# Patient Record
Sex: FEMALE | Race: WHITE | NOT HISPANIC OR LATINO | Employment: UNEMPLOYED | URBAN - METROPOLITAN AREA
[De-identification: names, ages, dates, MRNs, and addresses within clinical notes are randomized per-mention and may not be internally consistent; named-entity substitution may affect disease eponyms.]

---

## 2017-01-09 ENCOUNTER — OFFICE VISIT (OUTPATIENT)
Dept: PULMONOLOGY | Facility: CLINIC | Age: 82
End: 2017-01-09
Payer: MEDICARE

## 2017-01-09 ENCOUNTER — TELEPHONE (OUTPATIENT)
Dept: FAMILY MEDICINE | Facility: CLINIC | Age: 82
End: 2017-01-09

## 2017-01-09 VITALS
OXYGEN SATURATION: 90 % | WEIGHT: 148.38 LBS | DIASTOLIC BLOOD PRESSURE: 76 MMHG | HEIGHT: 65 IN | SYSTOLIC BLOOD PRESSURE: 118 MMHG | RESPIRATION RATE: 12 BRPM | HEART RATE: 66 BPM | BODY MASS INDEX: 24.72 KG/M2

## 2017-01-09 DIAGNOSIS — I35.0 SEVERE AORTIC STENOSIS BY PRIOR ECHOCARDIOGRAM: ICD-10-CM

## 2017-01-09 DIAGNOSIS — R05.3 CHRONIC COUGH: ICD-10-CM

## 2017-01-09 DIAGNOSIS — R91.8 MULTIPLE LUNG NODULES: Primary | ICD-10-CM
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
## 2017-01-09 DIAGNOSIS — Z79.01 LONG-TERM (CURRENT) USE OF ANTICOAGULANTS, INR GOAL 2.0-3.0: ICD-10-CM

## 2017-01-09 PROCEDURE — 99999 PR PBB SHADOW E&M-EST. PATIENT-LVL III: CPT | Mod: PBBFAC,,, | Performed by: INTERNAL MEDICINE

## 2017-01-09 PROCEDURE — 99213 OFFICE O/P EST LOW 20 MIN: CPT | Mod: PBBFAC | Performed by: INTERNAL MEDICINE

## 2017-01-09 PROCEDURE — 99214 OFFICE O/P EST MOD 30 MIN: CPT | Mod: S$PBB,,, | Performed by: INTERNAL MEDICINE

## 2017-01-09 NOTE — TELEPHONE ENCOUNTER
Can she go to outpatient PT or does she need HH to come and do PT (she must be home bound for HH to come out).

## 2017-01-09 NOTE — TELEPHONE ENCOUNTER
----- Message from Alanna Gautam sent at 1/9/2017  2:28 PM CST -----  Contact: self  Pt requesting an order for Home Health regarding knee pain.  Please call pt at .    Thanks

## 2017-01-09 NOTE — TELEPHONE ENCOUNTER
Spoke w/patient, requesting PT continuation. States when she fell and hospitalized, she was sent home with [facility] for physical therapy and discharged after 2 weeks due to her progress. States now she feels like she is having difficulty with her gait again. Please advise.

## 2017-01-10 NOTE — PATIENT INSTRUCTIONS
Agree with trial with Guaifenesin (syrup or Mucinex DM).  Call if resp symptoms worsen.  Return visit 12/2017 with repeat CXR.

## 2017-01-10 NOTE — PROGRESS NOTES
Subjective:       Patient ID: Adela Garay is a 89 y.o. female.    Chief Complaint: Pulmonary Nodules    HPI HISTORY OF PRESENT ILLNESS:  Mrs. Garay is an 89-year-old nonsmoker, who comes   for an interval assessment of pulmonary nodules.  She has had visits   here during the past several years to monitor bilateral pulmonary nodules.  Her   most recent previous visit was in January 2016.  She has a right lower lobe    nodule, which has shown very gradual enlargement during a period of many years.    Other nodules, present within her lungs, have appeared stable.  There is   one in the mid upper zone on the left, which is densely calcified.    Today, Mrs. Garay reports an intermittent cough.  This seems to occur in   association with meals or when she is talking.  She has had minimal sputum   associated with the cough.  She has never been aware of any wheezing.  She does   not recognize any ongoing sinus or nasal congestion.  She is not generally   bothered with heartburn.  She has not recognized any definite swallowing   problems.  She is not currently taking any prescribed medications which would   commonly be associated with cough as an adverse effect.  She has not taken   any prescribed medications for the cough, but plans to begin a trial with   guaifenesin-DM cough syrup.    DATA:  I have reviewed the images from a portable chest x-ray done in November.    This study once again shows bilateral pulmonary nodules.  The largest nodule in   the lower lung zone on the right does not appear significantly changed in   comparison to her chest x-ray from December 2015.  It should be noted again    that over a longer interval of time this nodule has shown very gradual enlargement.      CB/HN  dd: 01/09/2017 20:49:26 (CST)  td: 01/10/2017 10:23:27 (CST)  Doc ID   #[id number]  Job ID #[id number]    CC:       Review of Systems   Constitutional: Negative for fever and fatigue.   HENT: Negative for postnasal drip, sinus  pressure, voice change and congestion.    Respiratory: Positive for cough and dyspnea on extertion. Negative for sputum production, shortness of breath and wheezing.    Cardiovascular: Negative for chest pain and leg swelling.   Genitourinary: Negative for difficulty urinating.   Musculoskeletal: Positive for arthralgias. Negative for back pain.   Skin: Negative for rash.   Gastrointestinal: Negative for abdominal pain and acid reflux.   Neurological: Negative for dizziness and weakness.   Hematological: Negative for adenopathy.       Objective:      Physical Exam   Constitutional: She is oriented to person, place, and time. She appears well-developed and well-nourished.   HENT:   Head: Normocephalic.   Nose: Nose normal.   Mouth/Throat: No oropharyngeal exudate.   Neck: Normal range of motion. No JVD present. No tracheal deviation present. No thyromegaly present.   Cardiovascular: Normal rate and regular rhythm.    Murmur (3/6 harsh syst M at aortic area) heard.  Pulmonary/Chest: Symmetric chest wall expansion. No stridor. She has no wheezes. She has no rhonchi. She has rales (few rales posteriorly). She exhibits no tenderness.   Abdominal: Soft. There is no tenderness.   Musculoskeletal: She exhibits no edema.   Lymphadenopathy:     She has no cervical adenopathy.   Neurological: She is alert and oriented to person, place, and time.   Skin: Skin is warm and dry. No rash noted. No erythema. Nails show no clubbing.   Psychiatric: She has a normal mood and affect.   Vitals reviewed.    Personal Diagnostic Review    No flowsheet data found.      Assessment:       1. Multiple lung nodules    2. Severe aortic stenosis by prior echocardiogram    3. Chronic cough    4. Long-term (current) use of anticoagulants, INR goal 2.0-3.0        Outpatient Encounter Prescriptions as of 1/9/2017   Medication Sig Dispense Refill    acetaminophen (TYLENOL) 325 MG tablet Take 325 mg by mouth every 6 (six) hours as needed for Pain.       alpha lipoic acid 600 mg Cap Take 600 mg by mouth Daily. (Patient taking differently: Take 100 mg by mouth Daily. ) 100 each 12    aspirin 81 mg Tab Take 81 mg by mouth. 4 times weekly      blood sugar diagnostic (BLOOD GLUCOSE TEST) Strp 1 strip by Misc.(Non-Drug; Combo Route) route once daily. Currently using Nova max plus meter. 50 strip 11    cholecalciferol, vitamin D3, 1,000 unit capsule Take 1,000 Units by mouth once daily.      CHROMIUM PICOLINATE ORAL Take 200 mg by mouth once daily.      CINNAMON BARK (CINNAMON ORAL) Take 1,000 mg by mouth 2 (two) times daily.      digoxin (LANOXIN) 250 mcg tablet TAKE 1 TABLET ONE TIME DAILY AND TAKE AN ADDITIONAL 1/2 TABLET ONCE EACH WEEK 97 tablet 1    docusate sodium (COLACE) 100 MG capsule Take 100 mg by mouth. 1 Capsule Oral Every day      doxepin (SINEQUAN) 25 MG capsule TAKE 1 CAPSULE EVERY EVENING AT BEDTIME 90 capsule 1    folic acid (FOLVITE) 1 MG tablet Take 1 tablet (1 mg total) by mouth once daily. 90 tablet 3    metoprolol tartrate (LOPRESSOR) 50 MG tablet TAKE 1 TABLET TWICE DAILY 180 tablet 1    mupirocin (BACTROBAN) 2 % ointment Apply topically as needed.      ondansetron (ZOFRAN-ODT) 4 MG TbDL Take 1-2 tablets (4-8 mg total) by mouth every 8 (eight) hours as needed. 40 tablet 0    simvastatin (ZOCOR) 20 MG tablet TAKE 1 TABLET EVERY EVENING 90 tablet 1    triamcinolone acetonide 0.025% (KENALOG) 0.025 % cream Use bid as needed. 80 g 1    triamterene-hydrochlorothiazide 37.5-25 mg (DYAZIDE) 37.5-25 mg per capsule TAKE 1 CAPSULE ONE TIME DAILY 90 capsule 1    warfarin (COUMADIN) 2 MG tablet TAKE 2 TABLETS EVERY DAY EXCEPT TAKE 1 TABLET ON SUNDAY 169 tablet 3     No facility-administered encounter medications on file as of 1/9/2017.      Orders Placed This Encounter   Procedures    X-Ray Chest PA And Lateral     Standing Status:   Future     Standing Expiration Date:   1/9/2018     Plan:     Agree with trial with Guaifenesin (syrup or  Mucinex DM).  Call if resp symptoms worsen.  Return visit 12/2017 with repeat CXR.

## 2017-01-10 NOTE — TELEPHONE ENCOUNTER
Spoke w/patient, states she is on the verge on being home bound. States the only time she goes out is for doctor appt. States if she can just have the service for 1 or 2 weeks, she believes she can get back to where she need to be with strength.

## 2017-01-12 ENCOUNTER — ANTI-COAG VISIT (OUTPATIENT)
Dept: CARDIOLOGY | Facility: CLINIC | Age: 82
End: 2017-01-12

## 2017-01-12 DIAGNOSIS — Z79.01 LONG-TERM (CURRENT) USE OF ANTICOAGULANTS, INR GOAL 2.0-3.0: ICD-10-CM

## 2017-01-12 DIAGNOSIS — Z86.73 HX-TIA (TRANSIENT ISCHEMIC ATTACK): ICD-10-CM

## 2017-01-12 LAB — INR PPP: 2.1

## 2017-01-13 ENCOUNTER — TELEPHONE (OUTPATIENT)
Dept: FAMILY MEDICINE | Facility: CLINIC | Age: 82
End: 2017-01-13

## 2017-01-13 NOTE — TELEPHONE ENCOUNTER
----- Message from Samantha Rivera sent at 1/13/2017  8:36 AM CST -----  Patient was admitted to Home health on the 11th for skilled nursing and therapy. Thank you!

## 2017-01-17 ENCOUNTER — HOSPITAL ENCOUNTER (OUTPATIENT)
Dept: RADIOLOGY | Facility: HOSPITAL | Age: 82
Discharge: HOME | End: 2017-01-17
Attending: ORTHOPAEDIC SURGERY
Payer: MEDICARE

## 2017-01-17 DIAGNOSIS — R60.9 EDEMA: ICD-10-CM

## 2017-01-17 PROCEDURE — 93971 EXTREMITY STUDY: CPT | Mod: TC

## 2017-01-17 PROCEDURE — 93971 EXTREMITY STUDY: CPT | Mod: 26,,, | Performed by: RADIOLOGY

## 2017-02-01 ENCOUNTER — OFFICE VISIT (OUTPATIENT)
Dept: FAMILY MEDICINE | Facility: CLINIC | Age: 82
End: 2017-02-01
Payer: MEDICARE

## 2017-02-01 VITALS
BODY MASS INDEX: 24.61 KG/M2 | DIASTOLIC BLOOD PRESSURE: 74 MMHG | TEMPERATURE: 98 F | HEIGHT: 65 IN | SYSTOLIC BLOOD PRESSURE: 112 MMHG | OXYGEN SATURATION: 97 % | HEART RATE: 56 BPM | WEIGHT: 147.69 LBS

## 2017-02-01 DIAGNOSIS — T14.8XXA BRUISING: Primary | ICD-10-CM

## 2017-02-01 DIAGNOSIS — I50.22 CHRONIC SYSTOLIC HEART FAILURE: ICD-10-CM

## 2017-02-01 DIAGNOSIS — R80.9 CONTROLLED TYPE 2 DIABETES MELLITUS WITH MICROALBUMINURIA, WITHOUT LONG-TERM CURRENT USE OF INSULIN: ICD-10-CM

## 2017-02-01 DIAGNOSIS — I77.9 CAROTID ARTERY DISEASE, UNSPECIFIED LATERALITY: ICD-10-CM

## 2017-02-01 DIAGNOSIS — E11.29 CONTROLLED TYPE 2 DIABETES MELLITUS WITH MICROALBUMINURIA, WITHOUT LONG-TERM CURRENT USE OF INSULIN: ICD-10-CM

## 2017-02-01 DIAGNOSIS — I11.0 BENIGN HYPERTENSIVE HEART DISEASE WITH CONGESTIVE HEART FAILURE: ICD-10-CM

## 2017-02-01 DIAGNOSIS — R91.8 MULTIPLE LUNG NODULES: ICD-10-CM

## 2017-02-01 DIAGNOSIS — Z79.01 LONG-TERM (CURRENT) USE OF ANTICOAGULANTS, INR GOAL 2.0-3.0: ICD-10-CM

## 2017-02-01 DIAGNOSIS — I27.20 PULMONARY HYPERTENSION: ICD-10-CM

## 2017-02-01 DIAGNOSIS — E78.5 HYPERLIPIDEMIA, UNSPECIFIED HYPERLIPIDEMIA TYPE: ICD-10-CM

## 2017-02-01 DIAGNOSIS — Z86.73 HX-TIA (TRANSIENT ISCHEMIC ATTACK): ICD-10-CM

## 2017-02-01 DIAGNOSIS — I48.0 PAROXYSMAL ATRIAL FIBRILLATION: ICD-10-CM

## 2017-02-01 DIAGNOSIS — I70.0 ATHEROSCLEROSIS OF AORTA: ICD-10-CM

## 2017-02-01 PROCEDURE — 99214 OFFICE O/P EST MOD 30 MIN: CPT | Mod: S$PBB,,, | Performed by: NURSE PRACTITIONER

## 2017-02-01 PROCEDURE — 99999 PR PBB SHADOW E&M-EST. PATIENT-LVL IV: CPT | Mod: PBBFAC,,, | Performed by: NURSE PRACTITIONER

## 2017-02-01 PROCEDURE — 99214 OFFICE O/P EST MOD 30 MIN: CPT | Mod: PBBFAC,PO | Performed by: NURSE PRACTITIONER

## 2017-02-01 NOTE — PROGRESS NOTES
Plan:         Bruising  - Cancel: Protime-INR; Future; Expected date: 2/1/17  - Coumadin clinic will have home health check her PT and INR tomorrow at her house

## 2017-02-01 NOTE — MR AVS SNAPSHOT
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         Carotid artery disease, unspecified laterality         Controlled type 2 diabetes mellitus with microalbuminuria, without long-term current use of insulin         Chronic systolic heart failure         Hx-TIA (transient ischemic attack)         Hyperlipidemia, unspecified hyperlipidemia type         Long-term (current) use of anticoagulants, INR goal 2.0-3.0         Multiple lung nodules         Pulmonary hypertension                To Do List           Future Appointments        Provider Department Dept Phone    3/6/2017 9:00 AM Torrie Farrell MD [facility] Medicine [phone]    3/6/2017 11:00 AM [facility] [place] [phone]    3/9/2017 10:45 AM Theo Ivy MD [facility] [place] [facility] [place] [phone]    2017 11:00 AM Rory Penaloza Jr. [facility] [place] - Podiatry [phone]      Goals (5 Years of Data)              9/9/16    4/8/16    10/14/15    HEMOGLOBIN A1C < 7.0   6.2  5.8  5.7    Related Problems    Controlled type 2 diabetes mellitus with microalbuminuria    COMPLETED: Patient/caregiver will have an action plan in place to manage and prevent complications of risk for falls and wound care prior to discharge from [facility].           Notes - Note edited  2016  3:53 PM by Elyse Medina RN    Overall Time to Completion  4 weeks from 2016    Short Term Goals  Patient/caregiver will verbalize  importance of having a safe home environment by keeping room free of clutter, making sure rooms are well lit and removing throw rugs within 3 weeks.  Interventions   · Recognize and provide educational material (ALEX).  · Facilitate to needed DME.   Status  · Partially met               [facility]     [facility] Care [facility] - 24/7 Assistance  Registered nurses in the [facility] provide clinical advisement, health education, appointment booking, and other advisory services.  Call for this free service at [phone].             Medications           Message regarding Medications     Verify the changes and/or additions to your medication regime listed below are the same as discussed with your clinician today.  If any of these changes or additions are incorrect, please notify your healthcare provider.        STOP taking these medications     ondansetron (ZOFRAN-ODT) 4 MG TbDL Take 1-2 tablets (4-8 mg total) by mouth every 8 (eight) hours as needed.           Verify that the below list of medications is an accurate representation of the medications you are currently taking.  If none reported, the list may be blank. If incorrect, please contact your healthcare provider. Carry this list with you in case of emergency.           Current Medications     acetaminophen (TYLENOL) 325 MG tablet Take 325 mg by mouth every 6 (six) hours as needed for Pain.    alpha lipoic acid 600 mg Cap Take 600 mg by mouth Daily.    aspirin 81 mg Tab Take 81 mg by mouth. 4 times weekly    blood sugar diagnostic (BLOOD GLUCOSE TEST) Strp 1 strip by Misc.(Non-Drug; Combo Route) route once daily. Currently using Nova max plus meter.    cholecalciferol, vitamin D3, 1,000 unit capsule Take 1,000 Units by mouth once daily.    CHROMIUM PICOLINATE ORAL Take 200 mg by mouth once daily.    CINNAMON BARK (CINNAMON ORAL) Take 1,000 mg by mouth 2 (two) times daily.    digoxin (LANOXIN) 250 mcg tablet TAKE 1 TABLET ONE TIME  "DAILY AND TAKE AN ADDITIONAL 1/2 TABLET ONCE EACH WEEK    docusate sodium (COLACE) 100 MG capsule Take 100 mg by mouth. 1 Capsule Oral Every day    doxepin (SINEQUAN) 25 MG capsule TAKE 1 CAPSULE EVERY EVENING AT BEDTIME    metoprolol tartrate (LOPRESSOR) 50 MG tablet TAKE 1 TABLET TWICE DAILY    mupirocin (BACTROBAN) 2 % ointment Apply topically as needed.    simvastatin (ZOCOR) 20 MG tablet TAKE 1 TABLET EVERY EVENING    triamcinolone acetonide 0.025% (KENALOG) 0.025 % cream Use bid as needed.    triamterene-hydrochlorothiazide 37.5-25 mg (DYAZIDE) 37.5-25 mg per capsule TAKE 1 CAPSULE ONE TIME DAILY    warfarin (COUMADIN) 2 MG tablet TAKE 2 TABLETS EVERY DAY EXCEPT TAKE 1 TABLET ON SUNDAY    folic acid (FOLVITE) 1 MG tablet Take 1 tablet (1 mg total) by mouth once daily.           Clinical Reference Information           Vital Signs - Last Recorded  Most recent update: 2/1/2017 10:01 AM by Benita Zhu MA    BP Pulse Temp Ht Wt SpO2    112/74 (BP Location: Left arm, Patient Position: Sitting, BP Method: Manual) (!) 56 97.5 °F (36.4 °C) (Oral) 5' 5" (1.651 m) 67 kg (147 lb 11.3 oz) 97%    BMI                24.58 kg/m2          Blood Pressure          Most Recent Value    BP  112/74      Allergies as of 2/1/2017     Levaquin [Levofloxacin]    Doxycycline Hyclate    Iodine And Iodide Containing Products    Neurontin  [Gabapentin]    Norpace  [Disopyramide]    Phenytoin Sodium Extended    Sulfamethoxazole-trimethoprim      Immunizations Administered on Date of Encounter - 2/1/2017     None      "

## 2017-02-01 NOTE — PROGRESS NOTES
Subjective:       Patient ID: Adela Garay is a 89 y.o. female.    Chief Complaint: Bleeding/Bruising (on forehead)    HPI Comments: 89-year-old female presents to the clinic today with complaint of bruising to her right forehead that she noticed 2 days ago.  She denies any recent trauma.  She had a fall on November 13 which she had a large amount of bruising noted to her face and a  traumatic hematoma to her right knee which required I&D.  She states she's not sure why she is having bruising on her forehead now.  She is concerned about her PT and INR.  She would like her level checked today.  I spoke with the Coumadin clinic and will have home health draw PT and INR are at her house tomorrow.  She is currently still in physical therapy and is receiving home health.  Presently she states she feels fine.  She denies any headaches, dizziness, or blurred vision.  She denies any cardiac chest pain, heart palpitations, shortness of breath or swelling to lower extremities.  She states her blood sugars have been running anywhere from 110-123.     Past Medical History   Diagnosis Date    Anticoagulated on Coumadin     Atrial fibrillation     Atrial fibrillation     Chronic rhinosinusitis     Coronary artery disease     Granulomatous lung disease     Hyperlipidemia     Hypertension     Hypertensive heart disease without CHF (congestive heart failure)     Mitral valve prolapse     PN (peripheral neuropathy)      hereditary?(sister has it also)/idiopathic?/patient thinks from Zocor    Severe aortic stenosis by prior echocardiogram     TIA (transient ischemic attack)     Type II or unspecified type diabetes mellitus without mention of complication, not stated as uncontrolled      Past Surgical History   Procedure Laterality Date    Sinus surgery      Tonsillectomy        reports that she has never smoked. She has never used smokeless tobacco. She reports that she drinks alcohol. She reports that she does  not use illicit drugs.  Review of Systems   Respiratory: Negative for cough, shortness of breath and wheezing.    Cardiovascular: Negative for chest pain, palpitations and leg swelling.   Gastrointestinal: Negative for abdominal pain, diarrhea, nausea and vomiting.   Musculoskeletal: Positive for gait problem.        Walks with a cane    Skin:        Bruising right side of forehead    Neurological: Negative for dizziness, light-headedness and headaches.       Objective:      Physical Exam   Constitutional: She is oriented to person, place, and time. She appears well-developed and well-nourished. No distress.   Eyes: Conjunctivae and EOM are normal. Pupils are equal, round, and reactive to light. Right eye exhibits no discharge. Left eye exhibits no discharge. No scleral icterus.   Neck: Normal range of motion. Neck supple. No JVD present.   Cardiovascular: Normal rate, regular rhythm and normal heart sounds.  Exam reveals no gallop and no friction rub.    No murmur heard.  Pulmonary/Chest: Effort normal and breath sounds normal. No respiratory distress. She has no wheezes. She has no rales.   Abdominal: Soft. Bowel sounds are normal. There is no tenderness.   Musculoskeletal: Normal range of motion.   Neurological: She is alert and oriented to person, place, and time.   Skin: Skin is warm and dry. She is not diaphoretic.   Small bruise noted to right side of forehead    Psychiatric: She has a normal mood and affect.       Assessment:       1. Bruising    2. Atherosclerosis of aorta    3. Paroxysmal atrial fibrillation    4. Benign hypertensive heart disease with congestive heart failure    5. Carotid artery disease, unspecified laterality    6. Controlled type 2 diabetes mellitus with microalbuminuria, without long-term current use of insulin    7. Chronic systolic heart failure    8. Hx-TIA (transient ischemic attack)    9. Hyperlipidemia, unspecified hyperlipidemia type    10. Long-term (current) use of  anticoagulants, INR goal 2.0-3.0    11. Multiple lung nodules    12. Pulmonary hypertension        Plan:         Bruising  -     Cancel: Protime-INR; Future; Expected date: 2/1/17  - Coumadin clinic will have home health check her PT and INR tomorrow  at her house     Atherosclerosis of aorta  - Stable / Asymptomatic is on blood pressure and cholesterol lowering medications      Paroxysmal atrial fibrillation  - The current medical regimen is effective;  continue present plan and medications.    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    Carotid artery disease, unspecified laterality  - stable     Controlled type 2 diabetes mellitus with microalbuminuria, without long-term current use of insulin  - diet controlled    Chronic systolic heart failure  - The current medical regimen is effective;  continue present plan and medications.  - followed by Dr. Ivy    Hx- TIA ( transient ischemic attack)  - stable    Hyperlipidemia, unspcified hyperlipidemia type  - The current medical regimen is effective;  continue present plan and medications.    Long-term (current) use of anticoagulants, INR goal 2.0-3.0  - on Coumadin followed by the Coumadin clinic     Multiple lung nodules  - followed by Dr. Saldivar    Pulmonary hypertension  - The current medical regimen is effective;  continue present plan and medications.  - followed by Dr. Ivy

## 2017-02-02 ENCOUNTER — ANTI-COAG VISIT (OUTPATIENT)
Dept: CARDIOLOGY | Facility: CLINIC | Age: 82
End: 2017-02-02

## 2017-02-02 DIAGNOSIS — Z79.01 LONG-TERM (CURRENT) USE OF ANTICOAGULANTS, INR GOAL 2.0-3.0: ICD-10-CM

## 2017-02-02 DIAGNOSIS — Z86.73 HX-TIA (TRANSIENT ISCHEMIC ATTACK): ICD-10-CM

## 2017-02-02 DIAGNOSIS — I48.0 PAROXYSMAL ATRIAL FIBRILLATION: ICD-10-CM

## 2017-02-02 LAB — INR PPP: 2.1

## 2017-02-03 ENCOUNTER — TELEPHONE (OUTPATIENT)
Dept: FAMILY MEDICINE | Facility: CLINIC | Age: 82
End: 2017-02-03

## 2017-02-21 ENCOUNTER — TELEPHONE (OUTPATIENT)
Dept: FAMILY MEDICINE | Facility: CLINIC | Age: 82
End: 2017-02-21

## 2017-02-21 NOTE — TELEPHONE ENCOUNTER
I called and spoke with pt in regards to her prior auth. She asked me if we were working on it and I told her yes we were just waiting to hear back from [employer] and I will call her when we have a answer,Thanks

## 2017-02-21 NOTE — TELEPHONE ENCOUNTER
----- Message from Samantha Rivera sent at 2/20/2017 12:50 PM CST -----  Patient would like a call regarding her insurance needing a  fax. Please call at [phone] Thank you!

## 2017-02-22 NOTE — TELEPHONE ENCOUNTER
dmitri needs a letter stating she needs digoxin and that she has been on this medication and  patient will not do well on any other medication that this medication is working for her.  The claim number needs to be on the letter and needs to be faxed to the number below.        Claim #[id number]   Fax  [phone]

## 2017-02-24 ENCOUNTER — TELEPHONE (OUTPATIENT)
Dept: CARDIOLOGY | Facility: CLINIC | Age: 82
End: 2017-02-24

## 2017-02-24 ENCOUNTER — OFFICE VISIT (OUTPATIENT)
Dept: FAMILY MEDICINE | Facility: CLINIC | Age: 82
End: 2017-02-24
Payer: MEDICARE

## 2017-02-24 VITALS
DIASTOLIC BLOOD PRESSURE: 78 MMHG | SYSTOLIC BLOOD PRESSURE: 126 MMHG | HEART RATE: 58 BPM | OXYGEN SATURATION: 96 % | TEMPERATURE: 98 F | BODY MASS INDEX: 23.2 KG/M2 | WEIGHT: 144.38 LBS | HEIGHT: 66 IN

## 2017-02-24 DIAGNOSIS — E11.29 CONTROLLED TYPE 2 DIABETES MELLITUS WITH MICROALBUMINURIA, WITHOUT LONG-TERM CURRENT USE OF INSULIN: ICD-10-CM

## 2017-02-24 DIAGNOSIS — R80.9 CONTROLLED TYPE 2 DIABETES MELLITUS WITH MICROALBUMINURIA, WITHOUT LONG-TERM CURRENT USE OF INSULIN: ICD-10-CM

## 2017-02-24 DIAGNOSIS — E78.5 HYPERLIPIDEMIA, UNSPECIFIED HYPERLIPIDEMIA TYPE: ICD-10-CM

## 2017-02-24 DIAGNOSIS — J84.10 POSTINFLAMMATORY PULMONARY FIBROSIS: ICD-10-CM

## 2017-02-24 DIAGNOSIS — I35.0 SEVERE AORTIC STENOSIS BY PRIOR ECHOCARDIOGRAM: ICD-10-CM

## 2017-02-24 DIAGNOSIS — I11.0 BENIGN HYPERTENSIVE HEART DISEASE WITH CONGESTIVE HEART FAILURE: ICD-10-CM

## 2017-02-24 DIAGNOSIS — F32.0 MILD MAJOR DEPRESSION: ICD-10-CM

## 2017-02-24 DIAGNOSIS — I70.0 ATHEROSCLEROSIS OF AORTA: ICD-10-CM

## 2017-02-24 DIAGNOSIS — I48.0 PAROXYSMAL ATRIAL FIBRILLATION: ICD-10-CM

## 2017-02-24 DIAGNOSIS — Z00.00 ROUTINE MEDICAL EXAM: Primary | ICD-10-CM

## 2017-02-24 DIAGNOSIS — I27.20 PULMONARY HYPERTENSION: ICD-10-CM

## 2017-02-24 PROCEDURE — 99213 OFFICE O/P EST LOW 20 MIN: CPT | Mod: PBBFAC,PO | Performed by: INTERNAL MEDICINE

## 2017-02-24 PROCEDURE — 99397 PER PM REEVAL EST PAT 65+ YR: CPT | Mod: S$PBB,,, | Performed by: INTERNAL MEDICINE

## 2017-02-24 PROCEDURE — 99999 PR PBB SHADOW E&M-EST. PATIENT-LVL III: CPT | Mod: PBBFAC,,, | Performed by: INTERNAL MEDICINE

## 2017-02-24 NOTE — Clinical Note
Dr. Ivy, I am having a very hard time getting the insurance to approve this patient's digoxin. Do you think she should still be on this medication at her age? I believe she has a follow up appt with you in the near future. Torrie

## 2017-02-24 NOTE — MR AVS SNAPSHOT
[facility]  [address]  [place] [address]  Phone: [phone]  Fax: [phone]                  Adela Garay   2017 9:40 AM   Office Visit    Description:  Female : 1927   Provider:  Torrie Farrell MD   Department:  [facility] [facility]           Reason for Visit     Hyperlipidemia     Follow-up           Diagnoses this Visit        Comments    Routine medical exam    -  Primary     Controlled type 2 diabetes mellitus with microalbuminuria, without long-term current use of insulin         Benign hypertensive heart disease with congestive heart failure         Hyperlipidemia, unspecified hyperlipidemia type         Severe aortic stenosis by prior echocardiogram         Pulmonary hypertension         Paroxysmal atrial fibrillation         Mild major depression         Atherosclerosis of aorta         Postinflammatory pulmonary fibrosis                To Do List           Future Appointments        Provider Department Dept Phone    3/6/2017 9:00 AM Torrie Farrell MD [facility] [phone]    3/6/2017 11:00 AM [facility] [phone]    3/9/2017 10:45 AM Theo Ivy [place] [facility] [phone]    2017 11:00 AM Rory Penaloza Jr., DPM [place] - Podiatry [phone]      Goals (5 Years of Data)              9/9/16    4/8/16    10/14/15    HEMOGLOBIN A1C < 7.0   6.2  5.8  5.7    Related Problems    Controlled type 2 diabetes mellitus with microalbuminuria    COMPLETED: Patient/caregiver will have an action plan in place to manage and prevent complications of risk for falls and wound care prior to discharge from [facility].           Notes - Note edited  2016  3:53 PM by Elyse Medina RN    Overall Time to Completion  4 weeks from 2016    Short Term Goals  Patient/caregiver will verbalize importance of having a safe home environment by keeping room free of clutter, making sure  rooms are well lit and removing throw rugs within 3 weeks.  Interventions   · Recognize and provide educational material (ALEX).  · Facilitate to needed DME.   Status  · Partially met               Follow-Up and Disposition     Return in about 6 months (around 8/24/2017), or if symptoms worsen or fail to improve, for follow up chronic medical conditions..      [facility] On Call     [facility] On Call Nurse Care Line - 24/7 Assistance  Registered nurses in the [facility] On Call Center provide clinical advisement, health education, appointment booking, and other advisory services.  Call for this free service at [phone].             Medications           Message regarding Medications     Verify the changes and/or additions to your medication regime listed below are the same as discussed with your clinician today.  If any of these changes or additions are incorrect, please notify your healthcare provider.             Verify that the below list of medications is an accurate representation of the medications you are currently taking.  If none reported, the list may be blank. If incorrect, please contact your healthcare provider. Carry this list with you in case of emergency.           Current Medications     acetaminophen (TYLENOL) 325 MG tablet Take 325 mg by mouth every 6 (six) hours as needed for Pain.    alpha lipoic acid 600 mg Cap Take 600 mg by mouth Daily.    aspirin 81 mg Tab Take 81 mg by mouth. 4 times weekly    blood sugar diagnostic (BLOOD GLUCOSE TEST) Strp 1 strip by Misc.(Non-Drug; Combo Route) route once daily. Currently using Nova max plus meter.    cholecalciferol, vitamin D3, 1,000 unit capsule Take 1,000 Units by mouth once daily.    CHROMIUM PICOLINATE ORAL Take 200 mg by mouth once daily.    CINNAMON BARK (CINNAMON ORAL) Take 1,000 mg by mouth 2 (two) times daily.    digoxin (LANOXIN) 250 mcg tablet TAKE 1 TABLET ONE TIME DAILY AND TAKE AN ADDITIONAL 1/2 TABLET ONCE EACH WEEK    docusate sodium  (COLACE) 100 MG capsule Take 100 mg by mouth. 1 Capsule Oral Every day    doxepin (SINEQUAN) 25 MG capsule TAKE 1 CAPSULE EVERY EVENING AT BEDTIME    metoprolol tartrate (LOPRESSOR) 50 MG tablet TAKE 1 TABLET TWICE DAILY    mupirocin (BACTROBAN) 2 % ointment Apply topically as needed.    simvastatin (ZOCOR) 20 MG tablet TAKE 1 TABLET EVERY EVENING    triamcinolone acetonide 0.025% (KENALOG) 0.025 % cream Use bid as needed.    triamterene-hydrochlorothiazide 37.5-25 mg (DYAZIDE) 37.5-25 mg per capsule TAKE 1 CAPSULE ONE TIME DAILY    warfarin (COUMADIN) 2 MG tablet TAKE 2 TABLETS EVERY DAY EXCEPT TAKE 1 TABLET ON SUNDAY    folic acid (FOLVITE) 1 MG tablet Take 1 tablet (1 mg total) by mouth once daily.           Clinical Reference Information           Your Vitals Were     BP                   126/78           Blood Pressure          Most Recent Value    BP  126/78      Allergies as of 2/24/2017     Levaquin [Levofloxacin]    Doxycycline Hyclate    Iodine And Iodide Containing Products    Neurontin  [Gabapentin]    Norpace  [Disopyramide]    Phenytoin Sodium Extended    Sulfamethoxazole-trimethoprim      Immunizations Administered on Date of Encounter - 2/24/2017     None      Language Assistance Services     ATTENTION: Language assistance services are available, free of charge. Please call [phone].      ATENCIÓN: Si gaudenciola zaynab, tiene a garcia disposición servicios gratuitos de asistencia lingüística. Llame al [phone].     [facility] Ý: N?u b?n nói Ti?ng Vi?t, có các d?ch v? h? tr? ngôn ng? mi?n phí dành cho b?n. G?i s? [phone].         [facility] complies with applicable Federal civil rights laws and does not discriminate on the basis of race, color, national origin, age, disability, or sex.

## 2017-02-24 NOTE — PROGRESS NOTES
HISTORY OF PRESENT ILLNESS:  Adela Garay is a 89 y.o. female who presents to the clinic today for a routine medical physical exam. Her last physical exam was more than a year ago.    Contraception: n/a      She has completed home health physical therapy.  She has 2 more home nurse visits.  She is applying to be able to do home PT/INR monitoring.      PAST MEDICAL HISTORY:  Past Medical History:   Diagnosis Date    Anticoagulated on Coumadin     Atrial fibrillation     Atrial fibrillation     Chronic rhinosinusitis     Coronary artery disease     Granulomatous lung disease     Hyperlipidemia     Hypertension     Hypertensive heart disease without CHF (congestive heart failure)     Mitral valve prolapse     PN (peripheral neuropathy)     hereditary?(sister has it also)/idiopathic?/patient thinks from Zocor    Severe aortic stenosis by prior echocardiogram     TIA (transient ischemic attack)     Type II or unspecified type diabetes mellitus without mention of complication, not stated as uncontrolled        PAST SURGICAL HISTORY:  Past Surgical History:   Procedure Laterality Date    SINUS SURGERY      tonsillectomy         SOCIAL HISTORY:  Social History     Social History    Marital status:      Spouse name: N/A    Number of children: 6    Years of education: 2 college     Occupational History    retired housewife      Social History Main Topics    Smoking status: Never Smoker    Smokeless tobacco: Never Used    Alcohol use 0.0 oz/week     0 Standard drinks or equivalent per week      Comment: rare    Drug use: No    Sexual activity: No     Other Topics Concern    Not on file     Social History Narrative       FAMILY HISTORY:  Family History   Problem Relation Age of Onset    Heart disease Father      49    Heart disease Mother     Thyroid disease Sister     Atrial fibrillation Sister     Atrial fibrillation Sister      neice    Lymphoma Sister 29    Atrial fibrillation  Sister     Asthma Neg Hx     Emphysema Neg Hx        ALLERGIES AND MEDICATIONS: updated and reviewed.  Review of patient's allergies indicates:   Allergen Reactions    Levaquin [levofloxacin]     Doxycycline hyclate Other (See Comments)     Unknown to patient    Iodine and iodide containing products     Neurontin  [gabapentin]      Other reaction(s): Unknown    Norpace  [disopyramide]      Other reaction(s): Hives    Phenytoin sodium extended      Other reaction(s): Muscle pain    Sulfamethoxazole-trimethoprim      Other reaction(s): Muscle cramps     Medication List with Changes/Refills   Current Medications    ACETAMINOPHEN (TYLENOL) 325 MG TABLET    Take 325 mg by mouth every 6 (six) hours as needed for Pain.    ALPHA LIPOIC ACID 600 MG CAP    Take 600 mg by mouth Daily.    ASPIRIN 81 MG TAB    Take 81 mg by mouth. 4 times weekly    BLOOD SUGAR DIAGNOSTIC (BLOOD GLUCOSE TEST) STRP    1 strip by Misc.(Non-Drug; Combo Route) route once daily. Currently using Nova max plus meter.    CHOLECALCIFEROL, VITAMIN D3, 1,000 UNIT CAPSULE    Take 1,000 Units by mouth once daily.    CHROMIUM PICOLINATE ORAL    Take 200 mg by mouth once daily.    CINNAMON BARK (CINNAMON ORAL)    Take 1,000 mg by mouth 2 (two) times daily.    DIGOXIN (LANOXIN) 250 MCG TABLET    TAKE 1 TABLET ONE TIME DAILY AND TAKE AN ADDITIONAL 1/2 TABLET ONCE EACH WEEK    DOCUSATE SODIUM (COLACE) 100 MG CAPSULE    Take 100 mg by mouth. 1 Capsule Oral Every day    FOLIC ACID (FOLVITE) 1 MG TABLET    Take 1 tablet (1 mg total) by mouth once daily.    METOPROLOL TARTRATE (LOPRESSOR) 50 MG TABLET    TAKE 1 TABLET TWICE DAILY    MUPIROCIN (BACTROBAN) 2 % OINTMENT    Apply topically as needed.    SIMVASTATIN (ZOCOR) 20 MG TABLET    TAKE 1 TABLET EVERY EVENING    TRIAMCINOLONE ACETONIDE 0.025% (KENALOG) 0.025 % CREAM    Use bid as needed.    TRIAMTERENE-HYDROCHLOROTHIAZIDE 37.5-25 MG (DYAZIDE) 37.5-25 MG PER CAPSULE    TAKE 1 CAPSULE ONE TIME DAILY     "WARFARIN (COUMADIN) 2 MG TABLET    TAKE 2 TABLETS EVERY DAY EXCEPT TAKE 1 TABLET ON SUNDAY   Discontinued Medications    DOXEPIN (SINEQUAN) 25 MG CAPSULE    TAKE 1 CAPSULE EVERY EVENING AT BEDTIME             SCREENING HISTORY:  Health Maintenance       Date Due Completion Date    Hemoglobin A1c 3/9/2017 9/9/2016    Lipid Panel 9/9/2017 9/9/2016    Urine Microalbumin 9/12/2017 9/12/2016    Foot Exam 10/19/2017 10/19/2016 (Done)    Override on 10/19/2016: Done    Override on 8/10/2016: Done (patient has idiopathic neuropathy)    Override on 3/23/2015: Done (Mr. Rebolledo)    Eye Exam 11/9/2017 11/9/2016 (Done)    Override on 11/9/2016: Done (no retinopathy - [facility] (Dr. Hylton))    Override on 10/21/2015: Done (no retinopathy per patient)    Override on 5/5/2014: Done    Override on 5/13/2013: Done    TETANUS VACCINE 11/13/2026 11/13/2016            REVIEW OF SYSTEMS:   The patient reports good dietary habits.  The patient does not exercise regularly, but stays very active.  General ROS: negative for - chills, fever or malaise  Psychological ROS: negative for - memory difficulties, obsessive thoughts or suicidal ideation; she states she was prescribed doxepin in the past because she was easily "aggravated" - she has been taking this every other day and feels is fine and wants to stop the medication  Ophthalmic ROS: negative for - blurry vision or eye pain  ENT ROS: negative for - epistaxis, headaches, oral lesions or sore throat  Allergy and Immunology ROS: negative for - hives  Hematological and Lymphatic ROS: negative for - swollen lymph nodes  Endocrine ROS: negative for - polydipsia/polyuria or temperature intolerance  Respiratory ROS: no cough, shortness of breath, or wheezing  Cardiovascular ROS: no chest pain or dyspnea on exertion  Gastrointestinal ROS: no abdominal pain, change in bowel habits, or black or bloody stools  Genito-Urinary ROS: no dysuria, trouble voiding, or " "hematuria  Breast ROS: negative for breast lumps  Musculoskeletal ROS: negative for - gait disturbance, joint swelling or muscle pain  Neurological ROS: no TIA or stroke symptoms  Dermatological ROS: negative for mole changes    Physical Examination:   Vitals:    02/24/17 1017   BP: 126/78   Pulse: (!) 58   Temp: 97.8 °F (36.6 °C)     Weight: 65.5 kg (144 lb 6.4 oz)   Height: 5' 6" (167.6 cm)   Body mass index is 23.31 kg/(m^2).    General appearance - alert, well appearing, and in no distress and normal appearing weight  Mental status - alert, oriented to person, place, and time, normal mood, behavior, speech, dress, motor activity, and thought processes  Eyes - pupils equal and reactive, extraocular eye movements intact, sclera anicteric  Mouth - mucous membranes moist, pharynx normal without lesions  Neck - supple, no significant adenopathy, carotids upstroke normal bilaterally, no bruits, thyroid exam: thyroid is normal in size without nodules or tenderness  Lymphatics - no palpable lymphadenopathy  Chest - clear to auscultation, no wheezes, rales or rhonchi, symmetric air entry  Heart - no gallops noted, irregularly irregular rhythm with rate well controlled  Abdomen - soft, nontender, nondistended, no masses or organomegaly  Breasts - not examined  Back exam - limited range of motion, no pain with motion noted during exam  Neurological - alert, oriented, normal speech, no focal findings or movement disorder noted, cranial nerves II through XII intact  Musculoskeletal - no muscular tenderness noted, Mild osteoarthritic changes noted to both knee joints. No joint effusions noted.   Extremities - no pedal edema noted  Skin - normal coloration and turgor, no rashes, no suspicious skin lesions noted; she had a small area of hyperpigmentation/nodule palpated over the right medial eyebrow from previous fall about 3 months ago      ASSESSMENT AND PLAN:  1. Routine medical exam  Counseled on age appropriate medical " preventative services including age appropriate cancer screenings, age appropriate eye and dental exams, over all nutritional health, need for a consistent exercise regimen, and an over all push towards maintaining a vigorous and active lifestyle.  Counseled on age appropriate vaccines and discussed upcoming health care needs based on age/gender. Discussed good sleep hygiene and stress management.     2. Controlled type 2 diabetes mellitus with microalbuminuria, without long-term current use of insulin  Stable. We discussed low sugar/low carbohydrate diet and regular exercise to prevent progression. No need for prescription medication at this time.   - Hemoglobin A1c; Future    3. Benign hypertensive heart disease with congestive heart failure  Discussed sodium restriction, maintaining ideal body weight and regular exercise program as physiologic means to achieve blood pressure control. The patient will strive towards this. The current medical regimen is effective;  continue present plan and medications. Recommended patient to check home readings to monitor and see me for followup as scheduled or sooner as needed. Patient was educated that both decongestant and anti-inflammatory medication may raise blood pressure. Followed by cardiology.    4. Hyperlipidemia, unspecified hyperlipidemia type  We discussed low fat diet and regular exercise.The current medical regimen is effective;  continue present plan and medications.      5. Severe aortic stenosis by prior echocardiogram/6. Pulmonary hypertension  Minimally symptomatic - stable/baseline.     7. Paroxysmal atrial fibrillation  Stable. Followed by cardiology.    8. Mild major depression  The current medical regimen is effective;  continue present plan and medications.     9. Atherosclerosis of aorta  Patient with Atherosclerosis of the Aorta.  Stable/asymptomatic. Currently stable on lipid lowering medication and b/p monitoring.     10. Postinflammatory pulmonary  fibrosis  Stable. Observe.          Return in about 6 months (around 8/24/2017), or if symptoms worsen or fail to improve, for follow up chronic medical conditions.. or sooner as needed.

## 2017-03-02 ENCOUNTER — ANTI-COAG VISIT (OUTPATIENT)
Dept: CARDIOLOGY | Facility: CLINIC | Age: 82
End: 2017-03-02

## 2017-03-02 DIAGNOSIS — I48.0 PAROXYSMAL ATRIAL FIBRILLATION: ICD-10-CM

## 2017-03-02 DIAGNOSIS — Z86.73 HX-TIA (TRANSIENT ISCHEMIC ATTACK): ICD-10-CM

## 2017-03-02 DIAGNOSIS — I48.20 CHRONIC ATRIAL FIBRILLATION: ICD-10-CM

## 2017-03-02 DIAGNOSIS — Z79.01 LONG-TERM (CURRENT) USE OF ANTICOAGULANTS, INR GOAL 2.0-3.0: ICD-10-CM

## 2017-03-02 LAB — INR PPP: 1.9

## 2017-03-06 ENCOUNTER — HOSPITAL ENCOUNTER (OUTPATIENT)
Dept: CARDIOLOGY | Facility: HOSPITAL | Age: 82
Discharge: HOME | End: 2017-03-06
Attending: INTERNAL MEDICINE
Payer: MEDICARE

## 2017-03-06 DIAGNOSIS — I35.0 SEVERE AORTIC STENOSIS BY PRIOR ECHOCARDIOGRAM: ICD-10-CM

## 2017-03-06 DIAGNOSIS — I50.22 CHRONIC SYSTOLIC HEART FAILURE: ICD-10-CM

## 2017-03-06 DIAGNOSIS — E11.29 CONTROLLED TYPE 2 DIABETES MELLITUS WITH MICROALBUMINURIA, WITHOUT LONG-TERM CURRENT USE OF INSULIN: ICD-10-CM

## 2017-03-06 DIAGNOSIS — R80.9 CONTROLLED TYPE 2 DIABETES MELLITUS WITH MICROALBUMINURIA, WITHOUT LONG-TERM CURRENT USE OF INSULIN: ICD-10-CM

## 2017-03-06 DIAGNOSIS — Z86.73 HX-TIA (TRANSIENT ISCHEMIC ATTACK): ICD-10-CM

## 2017-03-06 DIAGNOSIS — I27.20 PULMONARY HYPERTENSION: ICD-10-CM

## 2017-03-06 DIAGNOSIS — I48.91 ATRIAL FIBRILLATION, UNSPECIFIED TYPE: ICD-10-CM

## 2017-03-06 PROCEDURE — 93306 TTE W/DOPPLER COMPLETE: CPT | Mod: 26,,, | Performed by: INTERNAL MEDICINE

## 2017-03-06 PROCEDURE — 93306 TTE W/DOPPLER COMPLETE: CPT

## 2017-03-07 LAB
AORTIC VALVE STENOSIS: ABNORMAL
DIASTOLIC DYSFUNCTION: YES
ESTIMATED PA SYSTOLIC PRESSURE: 28.84
GLOBAL PERICARDIAL EFFUSION: ABNORMAL
MITRAL VALVE REGURGITATION: ABNORMAL
RETIRED EF AND QEF - SEE NOTES: 50 (ref 55–65)
TRICUSPID VALVE REGURGITATION: ABNORMAL

## 2017-03-09 ENCOUNTER — OFFICE VISIT (OUTPATIENT)
Dept: CARDIOLOGY | Facility: CLINIC | Age: 82
End: 2017-03-09
Payer: MEDICARE

## 2017-03-09 VITALS
BODY MASS INDEX: 23.7 KG/M2 | HEIGHT: 66 IN | SYSTOLIC BLOOD PRESSURE: 139 MMHG | OXYGEN SATURATION: 99 % | WEIGHT: 147.5 LBS | DIASTOLIC BLOOD PRESSURE: 68 MMHG | HEART RATE: 60 BPM

## 2017-03-09 DIAGNOSIS — I27.20 PULMONARY HYPERTENSION: Primary | ICD-10-CM

## 2017-03-09 DIAGNOSIS — I35.0 SEVERE AORTIC STENOSIS BY PRIOR ECHOCARDIOGRAM: ICD-10-CM

## 2017-03-09 DIAGNOSIS — I11.0 BENIGN HYPERTENSIVE HEART DISEASE WITH CONGESTIVE HEART FAILURE: ICD-10-CM

## 2017-03-09 DIAGNOSIS — R80.9 CONTROLLED TYPE 2 DIABETES MELLITUS WITH MICROALBUMINURIA, WITHOUT LONG-TERM CURRENT USE OF INSULIN: ICD-10-CM

## 2017-03-09 DIAGNOSIS — I25.10 CORONARY ARTERY DISEASE INVOLVING NATIVE CORONARY ARTERY OF NATIVE HEART WITHOUT ANGINA PECTORIS: ICD-10-CM

## 2017-03-09 DIAGNOSIS — I48.20 CHRONIC ATRIAL FIBRILLATION: ICD-10-CM

## 2017-03-09 DIAGNOSIS — E11.29 CONTROLLED TYPE 2 DIABETES MELLITUS WITH MICROALBUMINURIA, WITHOUT LONG-TERM CURRENT USE OF INSULIN: ICD-10-CM

## 2017-03-09 PROCEDURE — 99999 PR PBB SHADOW E&M-EST. PATIENT-LVL III: CPT | Mod: PBBFAC,,, | Performed by: INTERNAL MEDICINE

## 2017-03-09 PROCEDURE — 99213 OFFICE O/P EST LOW 20 MIN: CPT | Mod: S$PBB,,, | Performed by: INTERNAL MEDICINE

## 2017-03-09 PROCEDURE — 99213 OFFICE O/P EST LOW 20 MIN: CPT | Mod: PBBFAC | Performed by: INTERNAL MEDICINE

## 2017-03-09 NOTE — PROGRESS NOTES
Subjective:    Patient ID:  Adela Garay is a 89 y.o. female who presents for follow-up of Valvular Heart Disease      HPI     Severe AS - medical Rx for now given lack of symptoms  Chronic A-fib - rate controlled on coumadin, HTN, DM, Hx TIA    Saw Dr Lamb 6/21/16  Ms Garay is the  of a [employer] vet ([facility]) referred by Dr Ivy for evaluation of severe AS. She has a PMH significant for diet-controlled DM, chronic a-fib on Coumadin, remote hx of TIA, and HTN. She has been evaluated for TAVR in the past in [place] by Dr Briseno, but she was asymptomatic at that time. She is very active and is able to do all of her ADLs without difficulty. She states she does not feel limited at allShe watches her 15 yo grandson who has Downs Syndrome every day. She denies CP, ESTRADA, PND, orthopnea, or LE edema.   She has undergone the following TAVR work-up:  ¨ Echo (4/4/16): DILAN = 0.7 cm2, mean gradient = 47.0 mmHg, EF= 50%  ¨ Needs LHC +/- (last angiogram was >20 years ago)  ¨ Frailty: 1/4  ¨ Prelim STS= 5.3%  ¨ Needs TAVR CTA  ¨ Needs PFTs              Echo 9/15/16    1 - Normal left ventricular systolic function (EF 55-60%).     2 - Concentric hypertrophy.     3 - Severe left atrial enlargement.     4 - Normal right ventricular systolic function .     5 - Pulmonary hypertension. The estimated PA systolic pressure is 64 mmHg.     6 - Severe aortic stenosis, DILAN = 0.62 cm2, mean gradient = 55.05 mmHg.     7 - Moderate mitral regurgitation.     8 - Intermediate central venous pressure.     Echo 3/6/17    1 - Low normal to mildly depressed left ventricular systolic function (EF 50-55%).     2 - Concentric hypertrophy.     3 - Severe left atrial enlargement.     4 - Left ventricular diastolic dysfunction.     5 - Low normal to mildly depressed right ventricular systolic function--severe RVE and R sided strain .     6 - The estimated PA systolic pressure is 29 mmHg--likely underestimated.     7 - Severe aortic stenosis,  DILAN = 0.44 cm2, mean gradient = 53.42 mmHg.     8 - Moderate mitral regurgitation.     9 - Increased central venous pressure.     Activity level has been stable  Denies significant CP or SOB    Review of Systems   Constitution: Negative for decreased appetite.   HENT: Negative for ear discharge.    Eyes: Negative for blurred vision.   Respiratory: Negative for hemoptysis.    Endocrine: Negative for polyphagia.   Hematologic/Lymphatic: Negative for adenopathy.   Skin: Negative for color change.   Musculoskeletal: Negative for joint swelling.   Neurological: Negative for brief paralysis.   Psychiatric/Behavioral: Negative for hallucinations.        Objective:    Physical Exam   Constitutional: She is oriented to person, place, and time. She appears well-developed and well-nourished.   HENT:   Head: Normocephalic and atraumatic.   Eyes: Conjunctivae are normal. Pupils are equal, round, and reactive to light.   Neck: Normal range of motion. Neck supple.   Cardiovascular: Normal rate and intact distal pulses.  An irregularly irregular rhythm present.   Murmur heard.   Harsh midsystolic murmur is present with a grade of 2/6  at the upper right sternal border radiating to the neck  Pulmonary/Chest: Effort normal and breath sounds normal.   Abdominal: Soft. Bowel sounds are normal.   Musculoskeletal: Normal range of motion. She exhibits edema.   Neurological: She is alert and oriented to person, place, and time.   Skin: Skin is warm and dry.         Assessment:       1. Pulmonary hypertension    2. Coronary artery disease involving native coronary artery of native heart without angina pectoris    3. Severe aortic stenosis by prior echocardiogram    4. Benign hypertensive heart disease with congestive heart failure    5. Chronic atrial fibrillation    6. Controlled type 2 diabetes mellitus with microalbuminuria, without long-term current use of insulin         Plan:       Will continue to monitor severe AS with stable  symptoms  OV 3 months  Repeat echo 6 months  If symptoms progress refer back to TAVR clinic

## 2017-03-09 NOTE — MR AVS SNAPSHOT
[facility] [place] [facility]  [address] [place] [address]  Phone: [phone]                  Adela Garay   3/9/2017 10:45 AM   Office Visit    Description:  Female : 1927   Provider:  Theo Ivy MD   Department:  [facility]                To Do List           Future Appointments        Provider Department Dept Phone    3/16/2017 8:45 AM Kylee Cormier, PharmD [facility] Cou[facility]in [phone]    2017 11:00 AM Rory Penaloza Jr., DPM [place] - Podiatry [phone]      Goals (5 Years of Data)              9/9/16    4/8/16    10/14/15    HEMOGLOBIN A1C < 7.0   6.2  5.8  5.7    Related Problems    Controlled type 2 diabetes mellitus with microalbuminuria    COMPLETED: Patient/caregiver will have an action plan in place to manage and prevent complications of risk for falls and wound care prior to discharge from [facility].           Notes - Note edited  2016  3:53 PM by Elyse Medina RN    Overall Time to Completion  4 weeks from 2016    Short Term Goals  Patient/caregiver will verbalize importance of having a safe home environment by keeping room free of clutter, making sure rooms are well lit and removing throw rugs within 3 weeks.  Interventions   · Recognize and provide educational material (ALEX).  · Facilitate to needed DME.   Status  · Partially met               [facility]     [facility] On [facility] Nurse Care Line -  Assistance  Registered nurses in the [facility] On Call Center provide clinical advisement, health education, appointment booking, and other advisory services.  Call for this free service at [phone].             Medications           Message regarding Medications     Verify the changes and/or additions to your medication regime listed below are the same as discussed with your clinician today.  If any of these changes or additions are incorrect, please notify your healthcare provider.             Verify that the below list of  "medications is an accurate representation of the medications you are currently taking.  If none reported, the list may be blank. If incorrect, please contact your healthcare provider. Carry this list with you in case of emergency.           Current Medications     acetaminophen (TYLENOL) 325 MG tablet Take 325 mg by mouth every 6 (six) hours as needed for Pain.    alpha lipoic acid 600 mg Cap Take 600 mg by mouth Daily.    aspirin 81 mg Tab Take 81 mg by mouth. 4 times weekly    blood sugar diagnostic (BLOOD GLUCOSE TEST) Strp 1 strip by Misc.(Non-Drug; Combo Route) route once daily. Currently using Nova max plus meter.    cholecalciferol, vitamin D3, 1,000 unit capsule Take 1,000 Units by mouth once daily.    CHROMIUM PICOLINATE ORAL Take 200 mg by mouth once daily.    CINNAMON BARK (CINNAMON ORAL) Take 1,000 mg by mouth 2 (two) times daily.    docusate sodium (COLACE) 100 MG capsule Take 100 mg by mouth. 1 Capsule Oral Every day    metoprolol tartrate (LOPRESSOR) 50 MG tablet TAKE 1 TABLET TWICE DAILY    mupirocin (BACTROBAN) 2 % ointment Apply topically as needed.    simvastatin (ZOCOR) 20 MG tablet TAKE 1 TABLET EVERY EVENING    triamcinolone acetonide 0.025% (KENALOG) 0.025 % cream Use bid as needed.    triamterene-hydrochlorothiazide 37.5-25 mg (DYAZIDE) 37.5-25 mg per capsule TAKE 1 CAPSULE ONE TIME DAILY    warfarin (COUMADIN) 2 MG tablet TAKE 2 TABLETS EVERY DAY EXCEPT TAKE 1 TABLET ON SUNDAY    folic acid (FOLVITE) 1 MG tablet Take 1 tablet (1 mg total) by mouth once daily.           Clinical Reference Information           Your Vitals Were     BP Pulse Height Weight SpO2 BMI    139/68 (BP Location: Right arm, Patient Position: Sitting, BP Method: Automatic) 60 5' 6" (1.676 m) 66.9 kg (147 lb 7.8 oz) 99% 23.81 kg/m2      Blood Pressure          Most Recent Value    BP  139/68      Allergies as of 3/9/2017     Levaquin [Levofloxacin]    Doxycycline Hyclate    Iodine And Iodide Containing Products    " Neurontin  [Gabapentin]    Norpace  [Disopyramide]    Phenytoin Sodium Extended    Sulfamethoxazole-trimethoprim      Immunizations Administered on Date of Encounter - 3/9/2017     None      Language Assistance Services     ATTENTION: Language assistance services are available, free of charge. Please call [phone].      ATENCIÓN: Si habla zaynab, tiene a garcia disposición servicios gratuitos de asistencia lingüística. Llame al [phone].     CHÚ Ý: N?u b?n nói Ti?ng Vi?t, có các d?ch v? h? tr? ngôn ng? mi?n phí dành cho b?n. G?i s? [phone].         [facility] complies with applicable Federal civil rights laws and does not discriminate on the basis of race, color, national origin, age, disability, or sex.

## 2017-03-16 ENCOUNTER — ANTI-COAG VISIT (OUTPATIENT)
Dept: CARDIOLOGY | Facility: CLINIC | Age: 82
End: 2017-03-16
Payer: MEDICARE

## 2017-03-16 DIAGNOSIS — Z86.73 HX-TIA (TRANSIENT ISCHEMIC ATTACK): ICD-10-CM

## 2017-03-16 DIAGNOSIS — I48.20 CHRONIC ATRIAL FIBRILLATION: ICD-10-CM

## 2017-03-16 DIAGNOSIS — Z79.01 LONG-TERM (CURRENT) USE OF ANTICOAGULANTS, INR GOAL 2.0-3.0: Primary | ICD-10-CM

## 2017-03-16 LAB — INR PPP: 2.4 (ref 2–3)

## 2017-03-16 PROCEDURE — 99211 OFF/OP EST MAY X REQ PHY/QHP: CPT | Mod: S$PBB,25,,

## 2017-03-16 PROCEDURE — 85610 PROTHROMBIN TIME: CPT | Mod: PBBFAC,PO

## 2017-03-16 NOTE — PROGRESS NOTES
INR very good. Pt denies changes in meds, health, diet. No s/sx of bleeding. Dose is very stable. Pt bruises easily. No new bruises of concern. We will continue on maintenance dose. Repeat INR next month. We may have her meter in by then so will plan accordingly.

## 2017-03-20 ENCOUNTER — TELEPHONE (OUTPATIENT)
Dept: CARDIOLOGY | Facility: CLINIC | Age: 82
End: 2017-03-20

## 2017-03-21 RX ORDER — FUROSEMIDE 20 MG/1
20 TABLET ORAL DAILY PRN
Qty: 30 TABLET | Refills: 11 | Status: SHIPPED | OUTPATIENT
Start: 2017-03-21 | End: 2017-03-21 | Stop reason: SDUPTHER

## 2017-03-22 RX ORDER — METOPROLOL TARTRATE 50 MG/1
TABLET ORAL
Qty: 180 TABLET | Refills: 0 | Status: SHIPPED | OUTPATIENT
Start: 2017-03-22 | End: 2017-06-06 | Stop reason: SDUPTHER

## 2017-03-22 RX ORDER — DIGOXIN 250 MCG
TABLET ORAL
Qty: 97 TABLET | Refills: 0 | Status: SHIPPED | OUTPATIENT
Start: 2017-03-22 | End: 2017-08-11 | Stop reason: SDUPTHER

## 2017-03-22 RX ORDER — FUROSEMIDE 20 MG/1
20 TABLET ORAL DAILY PRN
Qty: 30 TABLET | Refills: 11 | Status: SHIPPED | OUTPATIENT
Start: 2017-03-22 | End: 2017-04-12 | Stop reason: SDUPTHER

## 2017-04-11 ENCOUNTER — OFFICE VISIT (OUTPATIENT)
Dept: PODIATRY | Facility: CLINIC | Age: 82
End: 2017-04-11
Payer: MEDICARE

## 2017-04-11 VITALS
BODY MASS INDEX: 23.63 KG/M2 | SYSTOLIC BLOOD PRESSURE: 110 MMHG | HEIGHT: 66 IN | DIASTOLIC BLOOD PRESSURE: 62 MMHG | WEIGHT: 147 LBS

## 2017-04-11 DIAGNOSIS — R60.0 BILATERAL LEG EDEMA: ICD-10-CM

## 2017-04-11 DIAGNOSIS — B35.1 ONYCHOMYCOSIS DUE TO DERMATOPHYTE: ICD-10-CM

## 2017-04-11 DIAGNOSIS — I83.90 VARICOSITIES OF LEG: ICD-10-CM

## 2017-04-11 DIAGNOSIS — Z79.01 ANTICOAGULATED ON COUMADIN: ICD-10-CM

## 2017-04-11 DIAGNOSIS — L84 CORN OR CALLUS: ICD-10-CM

## 2017-04-11 DIAGNOSIS — G60.9 IDIOPATHIC PERIPHERAL NEUROPATHY: Primary | ICD-10-CM

## 2017-04-11 PROCEDURE — 11721 DEBRIDE NAIL 6 OR MORE: CPT | Mod: 59,Q9,S$PBB, | Performed by: PODIATRIST

## 2017-04-11 PROCEDURE — 99999 PR PBB SHADOW E&M-EST. PATIENT-LVL III: CPT | Mod: PBBFAC,,, | Performed by: PODIATRIST

## 2017-04-11 PROCEDURE — 11057 PARNG/CUTG B9 HYPRKR LES >4: CPT | Mod: Q9,PBBFAC,PO | Performed by: PODIATRIST

## 2017-04-11 PROCEDURE — 99213 OFFICE O/P EST LOW 20 MIN: CPT | Mod: PBBFAC,PO | Performed by: PODIATRIST

## 2017-04-11 PROCEDURE — 11057 PARNG/CUTG B9 HYPRKR LES >4: CPT | Mod: Q9,S$PBB,, | Performed by: PODIATRIST

## 2017-04-11 PROCEDURE — 99499 UNLISTED E&M SERVICE: CPT | Mod: S$PBB,,, | Performed by: PODIATRIST

## 2017-04-11 PROCEDURE — 11721 DEBRIDE NAIL 6 OR MORE: CPT | Mod: Q9,PBBFAC,PO,59 | Performed by: PODIATRIST

## 2017-04-11 NOTE — MR AVS SNAPSHOT
[facility]  [address] [place]  [place] [address]  Phone: [phone]                  Adela Garay   2017 9:15 AM   Office Visit    Description:  Female : 1927   Provider:  Elena Sharpe DPM   Department:  [facility]           Reason for Visit     Follow-up           Diagnoses this Visit        Comments    Idiopathic peripheral neuropathy    -  Primary     Bilateral leg edema         Varicosities of leg         Onychomycosis due to dermatophyte         Corn or callus         Anticoagulated on Coumadin                To Do List           Future Appointments        Provider Department Dept Phone    2017 9:00 AM Boston ReavesD [place] [facility] Coumad[facility] [phone]    2017 10:00 AM Theo Ivy MD [facility] [place] [facility] [phone]    8/15/2017 9:00 AM lEena Sharpe DPM [place] [facility] Podiatry [phone]      Goals (5 Years of Data)              9/9/16    4/8/16    10/14/15    HEMOGLOBIN A1C < 7.0   6.2  5.8  5.7    Related Problems    Controlled type 2 diabetes mellitus with microalbuminuria    COMPLETED: Patient/caregiver will have an action plan in place to manage and prevent complications of risk for falls and wound care prior to discharge from [facility].           Notes - Note edited  2016  3:53 PM by Elyse Medina RN    Overall Time to Completion  4 weeks from 2016    Short Term Goals  Patient/caregiver will verbalize importance of having a safe home environment by keeping room free of clutter, making sure rooms are well lit and removing throw rugs within 3 weeks.  Interventions   · Recognize and provide educational material (ALEX).  · Facilitate to needed DME.   Status  · Partially met               [facility]     [facility] On Call Nurse Care Line - 24/ Assistance  Unless otherwise directed by your provider, please contact [facility], our nurse care line that is available for / assistance.     Registered nurses in the [facility] On  Call Center provide: appointment scheduling, clinical advisement, health education, and other advisory services.  Call: [phone] (toll free)               Medications           Message regarding Medications     Verify the changes and/or additions to your medication regime listed below are the same as discussed with your clinician today.  If any of these changes or additions are incorrect, please notify your healthcare provider.             Verify that the below list of medications is an accurate representation of the medications you are currently taking.  If none reported, the list may be blank. If incorrect, please contact your healthcare provider. Carry this list with you in case of emergency.           Current Medications     acetaminophen (TYLENOL) 325 MG tablet Take 325 mg by mouth every 6 (six) hours as needed for Pain.    alpha lipoic acid 600 mg Cap Take 600 mg by mouth Daily.    aspirin 81 mg Tab Take 81 mg by mouth. 4 times weekly    blood sugar diagnostic (BLOOD GLUCOSE TEST) Strp 1 strip by Misc.(Non-Drug; Combo Route) route once daily. Currently using Nova max plus meter.    cholecalciferol, vitamin D3, 1,000 unit capsule Take 1,000 Units by mouth once daily.    CHROMIUM PICOLINATE ORAL Take 200 mg by mouth once daily.    CINNAMON BARK (CINNAMON ORAL) Take 1,000 mg by mouth 2 (two) times daily.    digoxin (LANOXIN) 250 mcg tablet TAKE 1 TABLET ONE TIME DAILY AND TAKE AN ADDITIONAL 1/2 TABLET ONCE EACH WEEK    docusate sodium (COLACE) 100 MG capsule Take 100 mg by mouth. 1 Capsule Oral Every day    furosemide (LASIX) 20 MG tablet Take 1 tablet (20 mg total) by mouth daily as needed (leg swelling).    metoprolol tartrate (LOPRESSOR) 50 MG tablet TAKE 1 TABLET TWICE DAILY    mupirocin (BACTROBAN) 2 % ointment Apply topically as needed.    simvastatin (ZOCOR) 20 MG tablet TAKE 1 TABLET EVERY EVENING    triamcinolone acetonide 0.025% (KENALOG) 0.025 % cream Use bid as needed.     "triamterene-hydrochlorothiazide 37.5-25 mg (DYAZIDE) 37.5-25 mg per capsule TAKE 1 CAPSULE ONE TIME DAILY    warfarin (COUMADIN) 2 MG tablet TAKE 2 TABLETS EVERY DAY EXCEPT TAKE 1 TABLET ON SUNDAY    folic acid (FOLVITE) 1 MG tablet Take 1 tablet (1 mg total) by mouth once daily.           Clinical Reference Information           Your Vitals Were     BP Height Weight BMI       110/62 5' 6" (1.676 m) 66.7 kg (147 lb) 23.73 kg/m2       Blood Pressure          Most Recent Value    BP  110/62      Allergies as of 4/11/2017     Levaquin [Levofloxacin]    Doxycycline Hyclate    Iodine And Iodide Containing Products    Neurontin  [Gabapentin]    Norpace  [Disopyramide]    Phenytoin Sodium Extended    Sulfamethoxazole-trimethoprim      Immunizations Administered on Date of Encounter - 4/11/2017     None      Instructions    Recommend lotions: eucerin, aquaphor, A&D ointment, gold bond for diabetics, sween    Shoe recommendations: (try 6pm.[employer], zappos.[employer] , Solavei, or shoes.[employer] for discounted prices) you can visit [employer]SW shoes in [place] as well    Asics (GT 1000 or gel foundations), new balance, saucony (stabil c3),  Whitney (transcend), vionic, propet (tennis shoe)    soft brand, clarks, crocs, aerosoles, naturalizers, SAS, ecco, prashant, nehemiah chew, rockports (dress shoes)    Vionic, volitiles, burkenstocks, fitflops, propet (sandals)    Nike comfort thong sandals, crocs (house shoes)    Nail Home remedy:  Vicks Vapor rub OR Listerine and apple cider vinegar in a spray bottle to nails      Understanding Chronic Venous Insufficiency  Problems with the veins in the legs may lead to chronic venous insufficiency (CVI). CVI means that there is a long-term problem with the veins not being able to pump blood back to your heart. When this happens, blood stays in the legs and causes swelling and aching.   Two problems that may lead to chronic venous insufficiency are:  · Damaged valves. Valves keep blood flowing from the legs " through the blood vessels and back to the heart. When the valves are damaged, blood does not flow as well.   · Deep vein thrombosis (DVT). Blood clots may form in the deep veins of the legs. This may cause pain, redness, and swelling in the legs. It may also block the flow of blood back to the heart. Seek immediate medical care if you have these symptoms.  · A blood clot in the leg can also break off and travel to the lungs. This is called pulmonary embolism (PE). In the lungs, the clot can cut off the flow of blood. This may cause chest pain, trouble breathing, sweating, a fast heartbeat, coughing (may cough up blood), and fainting. It is a medical emergency and may cause death. Call 911 if you have these symptoms.  · Healthcare providers call the two conditions, DVT and PE, venous thromboembolism (VTE).  CVI cant be cured, but you can control leg swelling to reduce the likelihood of ulcers (sores).  Recognizing the symptoms  Be aware of the following:  · If you stand or sit with your feet down for long periods, your legs may ache or feel heavy.  · Swollen ankles are possibly the most common symptom of CVI.  · As swelling increases, the skin over your ankles may show red spots or a brownish tinge. The skin may feel leathery or scaly, and may start to itch.  · If swelling is not controlled, an ulcer (open wound) may form.  What you can do  Reduce your risk of developing ulcers by doing the following:  · Increase blood flow back to your heart by elevating your legs, exercising daily, and wearing elastic stockings.  · Boost blood flow in your legs by losing excess weight.  · If you must stand or sit in one place for a period of time, keep your blood moving by wiggling your toes, shifting your body position, and rising up on the balls of your feet.    Date Last Reviewed: 5/1/2016  © 0020-6807 [employer]. [address] [place] [address], [place] [address]. All rights reserved. This information is not intended as  a substitute for professional medical care. Always follow your healthcare professional's instructions.        Leg Swelling in Both Legs    Swelling of the feet, ankles, and legs is called edema. It is caused by excess fluid that has collected in the tissues. Extra fluid in the body settles in the lowest part because of gravity. This is why the legs and feet are most affected.  Some of the causes for edema include:  · Disease of the heart like congestive heart failure  · Standing or sitting for long periods of time  · Infection of the feet or legs  · Blood pooling in the veins of your legs (venous insufficiency)  · Dilated veins in your lower leg (varicose veins)  · Garters or other clothing that is tight on your legs. This will cause blood to pool in your legs because the clothing limits blood flow.  · Some medicines such as hormones like birth control pills, some blood pressure medicines like calcium channel blockers (amlodipine) and steroids, some antidepressants like MAO inhibitors and tricyclics  · Menstrual periods that cause you to retain fluids  · Many types of renal disease  · Liver failure or cirrhosis  · Pregnancy, some swelling is normal, but a sudden increase in leg swelling or weight gain can be a sign of a dangerous complication of pregnancy  · Poor nutrition  · Thyroid disease  Medical treatment will depend on what is causing the swelling in your legs. Your healthcare provider may prescribe water pills (diuretics) to get rid of the extra fluid.  Home care  Follow these guidelines when caring for yourself at home:  · Don't wear clothing like garters that is tight on your legs.  · Keep your legs up while lying or sitting.  · If infection, injury, or recent surgery is causing the swelling, stay off your legs as much as possible until symptoms get better.  · If your healthcare provider says that your leg swelling is caused by venous insufficiency or varicose veins, don't sit or  one place for long  periods of time. Take breaks and walk about every few hours. Brisk walking is a good exercise. It helps circulate the blood that has collected in your leg. Talk with your provider about using support stockings to stop daytime leg swelling.  · If your provider says that heart disease is causing your leg swelling, follow a low-salt diet to stop extra fluid from staying in your body. You may also need medicine.  Follow-up care  Follow up with your healthcare provider, or as advised.  When to seek medical advice  Call your healthcare provider right away if any of these occur:  · New shortness of breath or chest pain  · Shortness of breath or chest pain that gets worse  · Swelling in both legs or ankles that gets worse  · Swelling of the abdomen  · Redness, warmth, or swelling in one leg  · Fever of 100.4ºF (38ºC) or higher, or as directed by your healthcare provider  · Yellow color to your skin or eyes  · Rapid, unexplained weight gain  · Having to sleep upright or use an increased number of pillows  Date Last Reviewed: 3/31/2016  © 2821-8025 [employer]. [address] [place] [address]. All rights reserved. This information is not intended as a substitute for professional medical care. Always follow your healthcare professional's instructions.             Language Assistance Services     ATTENTION: Language assistance services are available, free of charge. Please call [phone].      ATENCIÓN: Si habla zaynab, tiene a garcia disposición servicios gratuitos de asistencia lingüística. Llame al [phone].     CHÚ Ý: N?u b?n nói Ti?ng Vi?t, có các d?ch v? h? tr? ngôn ng? mi?n phí dành cho b?n. G?i s? [phone].         [facility] complies with applicable Federal civil rights laws and does not discriminate on the basis of race, color, national origin, age, disability, or sex.

## 2017-04-11 NOTE — PROGRESS NOTES
Subjective:      Patient ID: Adela Garay is a 89 y.o. female.    Chief Complaint: Follow-up (Idiopathic peripheral neuropathy pcp Dr. Farrell 2-24-17)    Adela is a 89 y.o. female who presents to the clinic for evaluation and treatment of high risk feet. Adela has a past medical history of Anticoagulated on Coumadin; Atrial fibrillation; Atrial fibrillation; Chronic rhinosinusitis; Coronary artery disease; Granulomatous lung disease; Hyperlipidemia; Hypertension; Hypertensive heart disease without CHF (congestive heart failure); Mitral valve prolapse; PN (peripheral neuropathy); Severe aortic stenosis by prior echocardiogram; TIA (transient ischemic attack); and Type II or unspecified type diabetes mellitus without mention of complication, not stated as uncontrolled. The patient's chief complaint is long, thick toenails. This patient has documented high risk feet requiring routine maintenance secondary to peripheral neuropathy.      PCP: Torrie Farrell MD    Date Last Seen by PCP:   Chief Complaint   Patient presents with    Follow-up     Idiopathic peripheral neuropathy pcp Dr. Farrell 2-24-17       Current shoe gear:  SAS shoes    Hemoglobin A1C   Date Value Ref Range Status   09/09/2016 6.2 4.5 - 6.2 % Final     Comment:     According to ADA guidelines, hemoglobin A1C <7.0% represents  optimal control in non-pregnant diabetic patients.  Different  metrics may apply to specific populations.   Standards of Medical Care in Diabetes - 2016.  For the purpose of screening for the presence of diabetes:  <5.7%     Consistent with the absence of diabetes  5.7-6.4%  Consistent with increasing risk for diabetes   (prediabetes)  >or=6.5%  Consistent with diabetes  Currently no consensus exists for use of hemoglobin A1C  for diagnosis of diabetes for children.     04/08/2016 5.8 4.5 - 6.2 % Final   10/14/2015 5.7 4.5 - 6.2 % Final         Patient Active Problem List   Diagnosis    Hyperlipidemia    Coronary  artery disease    Hx-TIA (transient ischemic attack)    Severe aortic stenosis by prior echocardiogram    Long-term (current) use of anticoagulants, INR goal 2.0-3.0    Benign hypertensive heart disease with congestive heart failure    Pulmonary hypertension    Carotid arterial disease    Carotid aneurysm, left    Atrial fibrillation    Controlled type 2 diabetes mellitus with microalbuminuria    DDD (degenerative disc disease), cervical    Lumbar disc disease    Multiple lung nodules    Mild major depression    Idiopathic peripheral neuropathy    Atherosclerosis of aorta    Postinflammatory pulmonary fibrosis    Chronic cough    Chronic atrial fibrillation       Current Outpatient Prescriptions on File Prior to Visit   Medication Sig Dispense Refill    acetaminophen (TYLENOL) 325 MG tablet Take 325 mg by mouth every 6 (six) hours as needed for Pain.      alpha lipoic acid 600 mg Cap Take 600 mg by mouth Daily. (Patient taking differently: Take 100 mg by mouth Daily. ) 100 each 12    aspirin 81 mg Tab Take 81 mg by mouth. 4 times weekly      blood sugar diagnostic (BLOOD GLUCOSE TEST) Strp 1 strip by Misc.(Non-Drug; Combo Route) route once daily. Currently using Nova max plus meter. 50 strip 11    cholecalciferol, vitamin D3, 1,000 unit capsule Take 1,000 Units by mouth once daily.      CHROMIUM PICOLINATE ORAL Take 200 mg by mouth once daily.      CINNAMON BARK (CINNAMON ORAL) Take 1,000 mg by mouth 2 (two) times daily.      digoxin (LANOXIN) 250 mcg tablet TAKE 1 TABLET ONE TIME DAILY AND TAKE AN ADDITIONAL 1/2 TABLET ONCE EACH WEEK 97 tablet 0    docusate sodium (COLACE) 100 MG capsule Take 100 mg by mouth. 1 Capsule Oral Every day      furosemide (LASIX) 20 MG tablet Take 1 tablet (20 mg total) by mouth daily as needed (leg swelling). 30 tablet 11    metoprolol tartrate (LOPRESSOR) 50 MG tablet TAKE 1 TABLET TWICE DAILY 180 tablet 0    mupirocin (BACTROBAN) 2 % ointment Apply  topically as needed.      simvastatin (ZOCOR) 20 MG tablet TAKE 1 TABLET EVERY EVENING 90 tablet 1    triamcinolone acetonide 0.025% (KENALOG) 0.025 % cream Use bid as needed. 80 g 1    triamterene-hydrochlorothiazide 37.5-25 mg (DYAZIDE) 37.5-25 mg per capsule TAKE 1 CAPSULE ONE TIME DAILY 90 capsule 1    warfarin (COUMADIN) 2 MG tablet TAKE 2 TABLETS EVERY DAY EXCEPT TAKE 1 TABLET ON SUNDAY 169 tablet 3    folic acid (FOLVITE) 1 MG tablet Take 1 tablet (1 mg total) by mouth once daily. 90 tablet 3     No current facility-administered medications on file prior to visit.        Review of patient's allergies indicates:   Allergen Reactions    Levaquin [levofloxacin]     Doxycycline hyclate Other (See Comments)     Unknown to patient    Iodine and iodide containing products     Neurontin  [gabapentin]      Other reaction(s): Unknown    Norpace  [disopyramide]      Other reaction(s): Hives    Phenytoin sodium extended      Other reaction(s): Muscle pain    Sulfamethoxazole-trimethoprim      Other reaction(s): Muscle cramps       Past Surgical History:   Procedure Laterality Date    SINUS SURGERY      tonsillectomy         Family History   Problem Relation Age of Onset    Heart disease Father      49    Heart disease Mother     Thyroid disease Sister     Atrial fibrillation Sister     Atrial fibrillation Sister      neice    Lymphoma Sister 29    Atrial fibrillation Sister     Asthma Neg Hx     Emphysema Neg Hx        Social History     Social History    Marital status:      Spouse name: N/A    Number of children: 6    Years of education: 2 college     Occupational History    retired housewife      Social History Main Topics    Smoking status: Never Smoker    Smokeless tobacco: Never Used    Alcohol use 0.0 oz/week     0 Standard drinks or equivalent per week      Comment: rare    Drug use: No    Sexual activity: No     Other Topics Concern    Not on file     Social History  "Narrative             ROS        Objective:       Vitals:    04/11/17 0914   BP: 110/62   Weight: 66.7 kg (147 lb)   Height: 5' 6" (1.676 m)   PainSc: 0-No pain       Physical Exam   Constitutional:  Non-toxic appearance. She does not have a sickly appearance. No distress.   Pt. is well-developed, well-nourished, appears stated age, in no acute distress, alert and oriented x 3. No evidence of depression, anxiety, or agitation. Calm, cooperative, and communicative. Appropriate interactions and affect.   Cardiovascular:   Pulses:       Dorsalis pedis pulses are 1+ on the right side, and 1+ on the left side.        Posterior tibial pulses are 1+ on the right side, and 1+ on the left side.   Dorsalis pedis and posterior tibial pulses are diminished Bilaterally. Mild pitting edema, skin is atrophic, decreased digital hair, feet slightly cool, nails thickened, mild plantar rubor     Pulmonary/Chest: No respiratory distress.   Musculoskeletal:        Right ankle: No tenderness. No lateral malleolus, no medial malleolus, no AITFL, no CF ligament and no posterior TFL tenderness found. Achilles tendon exhibits no pain, no defect and normal Harman's test results.        Left ankle: No tenderness. No lateral malleolus, no medial malleolus, no AITFL, no CF ligament and no posterior TFL tenderness found. Achilles tendon exhibits no pain, no defect and normal Harman's test results.        Right foot: There is no tenderness and no bony tenderness.        Left foot: There is no tenderness and no bony tenderness.   Patient has hammertoes of digits 2-5 bilateral partially reducible without symptom today.    2nd toes adduct against great toe    TA function absent L on muscle testing    Visible and palpable bunion without pain at dorsomedial 1st metatarsal head right and left.  Hallux abducted right and left partially reducible, tracks laterally without being track bound.  No ecchymosis, erythema, edema, or cardinal signs infection " or signs of trauma same foot.     Lymphadenopathy:   No lymphatic streaking    Negative lymphadenopathy bilateral popliteal fossa and tarsal tunnel.     Neurological:   Evart-Jakub 5.07 monofilamant testing is diminished Moreno feet. Decreased/absent vibratory sensation bilateral feet to 128Hz tuning fork.    Skin: Skin is warm, dry and intact. No abrasion, no bruising, no ecchymosis and no rash noted. She is not diaphoretic. No cyanosis or erythema. No pallor. Nails show no clubbing.   Toenails 1-5 bilaterally are elongated by 2-3 mm, thickened by 2-3 mm, discolored/yellowed, dystrophic, brittle with subungual debris.     Focal hyperkeratotic lesion consisting entirely of hyperkeratotic tissue without open skin, drainage, pus, fluctuance, malodor, or signs of infection: Med DIPJ 2 L, distal sub 2nd met, left 1st interspace   Psychiatric: Her mood appears not anxious. Her affect is not inappropriate. Her speech is not slurred. She is not combative. She is communicative. She is attentive.   Nursing note reviewed.        Assessment:       Encounter Diagnoses   Name Primary?    Idiopathic peripheral neuropathy Yes    Bilateral leg edema     Varicosities of leg     Onychomycosis due to dermatophyte     Corn or callus     Anticoagulated on Coumadin          Plan:       Adela was seen today for follow-up.    Diagnoses and all orders for this visit:    Idiopathic peripheral neuropathy    Bilateral leg edema    Varicosities of leg    Onychomycosis due to dermatophyte    Corn or callus    Anticoagulated on Coumadin      I counseled the patient on her conditions, their implications and medical management.    - Shoe inspection. Patient instructed on proper foot hygeine. We discussed wearing proper shoe gear, daily foot inspections, never walking without protective shoe gear, never putting sharp instruments to feet.      - With patient's permission, nails were aggressively reduced and debrided x 10 to their soft tissue  attachment mechanically and with electric , removing all offending nail and debris. Patient relates relief following the procedure. After cleansing the  area w/ alcohol prep pad the above mentioned hyperkeratosis was trimmed utilizing No 15 scapel, to a smooth base with out incident. Patient tolerated this  well and reported comfort to the area of: Med DIPJ 2 L, distal sub 2nd met, left 1st interspace    - She will continue to monitor the areas daily, inspect her feet, wear protective shoe gear when ambulatory, moisturizer to maintain skin integrity and follow in this office in approximately 2-3 months, sooner p.r.n.

## 2017-04-11 NOTE — PATIENT INSTRUCTIONS
Recommend lotions: eucerin, aquaphor, A&D ointment, gold bond for diabetics, sween    Shoe recommendations: (try 6pm.com, [email or web address].com , [email or web address], or shoes.com for discounted prices) you can visit DSW shoes in [place] as well    Asics (GT 1000 or gel foundations), new balance, saucony (stabil c3),  Whitney (transcend), vionic, propet (tennis shoe)    soft brand, clarks, crocs, aerosoles, naturalizers, SAS, ecco, prashant, nehemiah chew, rockports (dress shoes)    Vionic, volitiles, burkenstocks, fitflops, propet (sandals)    Nike comfort thong sandals, crocs (house shoes)    Nail Home remedy:  Vicks Vapor rub OR Listerine and apple cider vinegar in a spray bottle to nails      Understanding Chronic Venous Insufficiency  Problems with the veins in the legs may lead to chronic venous insufficiency (CVI). CVI means that there is a long-term problem with the veins not being able to pump blood back to your heart. When this happens, blood stays in the legs and causes swelling and aching.   Two problems that may lead to chronic venous insufficiency are:  · Damaged valves. Valves keep blood flowing from the legs through the blood vessels and back to the heart. When the valves are damaged, blood does not flow as well.   · Deep vein thrombosis (DVT). Blood clots may form in the deep veins of the legs. This may cause pain, redness, and swelling in the legs. It may also block the flow of blood back to the heart. Seek immediate medical care if you have these symptoms.  · A blood clot in the leg can also break off and travel to the lungs. This is called pulmonary embolism (PE). In the lungs, the clot can cut off the flow of blood. This may cause chest pain, trouble breathing, sweating, a fast heartbeat, coughing (may cough up blood), and fainting. It is a medical emergency and may cause death. Call 911 if you have these symptoms.  · Healthcare providers call the two conditions, DVT and PE, venous thromboembolism (VTE).  CVI cant  be cured, but you can control leg swelling to reduce the likelihood of ulcers (sores).  Recognizing the symptoms  Be aware of the following:  · If you stand or sit with your feet down for long periods, your legs may ache or feel heavy.  · Swollen ankles are possibly the most common symptom of CVI.  · As swelling increases, the skin over your ankles may show red spots or a brownish tinge. The skin may feel leathery or scaly, and may start to itch.  · If swelling is not controlled, an ulcer (open wound) may form.  What you can do  Reduce your risk of developing ulcers by doing the following:  · Increase blood flow back to your heart by elevating your legs, exercising daily, and wearing elastic stockings.  · Boost blood flow in your legs by losing excess weight.  · If you must stand or sit in one place for a period of time, keep your blood moving by wiggling your toes, shifting your body position, and rising up on the balls of your feet.    Date Last Reviewed: 5/1/2016 © 2000-2016 [employer]. [address] [place] [address]. All rights reserved. This information is not intended as a substitute for professional medical care. Always follow your healthcare professional's instructions.        Leg Swelling in Both Legs    Swelling of the feet, ankles, and legs is called edema. It is caused by excess fluid that has collected in the tissues. Extra fluid in the body settles in the lowest part because of gravity. This is why the legs and feet are most affected.  Some of the causes for edema include:  · Disease of the heart like congestive heart failure  · Standing or sitting for long periods of time  · Infection of the feet or legs  · Blood pooling in the veins of your legs (venous insufficiency)  · Dilated veins in your lower leg (varicose veins)  · Garters or other clothing that is tight on your legs. This will cause blood to pool in your legs because the clothing limits blood flow.  · Some medicines  such as hormones like birth control pills, some blood pressure medicines like calcium channel blockers (amlodipine) and steroids, some antidepressants like MAO inhibitors and tricyclics  · Menstrual periods that cause you to retain fluids  · Many types of renal disease  · Liver failure or cirrhosis  · Pregnancy, some swelling is normal, but a sudden increase in leg swelling or weight gain can be a sign of a dangerous complication of pregnancy  · Poor nutrition  · Thyroid disease  Medical treatment will depend on what is causing the swelling in your legs. Your healthcare provider may prescribe water pills (diuretics) to get rid of the extra fluid.  Home care  Follow these guidelines when caring for yourself at home:  · Don't wear clothing like garters that is tight on your legs.  · Keep your legs up while lying or sitting.  · If infection, injury, or recent surgery is causing the swelling, stay off your legs as much as possible until symptoms get better.  · If your healthcare provider says that your leg swelling is caused by venous insufficiency or varicose veins, don't sit or  one place for long periods of time. Take breaks and walk about every few hours. Brisk walking is a good exercise. It helps circulate the blood that has collected in your leg. Talk with your provider about using support stockings to stop daytime leg swelling.  · If your provider says that heart disease is causing your leg swelling, follow a low-salt diet to stop extra fluid from staying in your body. You may also need medicine.  Follow-up care  Follow up with your healthcare provider, or as advised.  When to seek medical advice  Call your healthcare provider right away if any of these occur:  · New shortness of breath or chest pain  · Shortness of breath or chest pain that gets worse  · Swelling in both legs or ankles that gets worse  · Swelling of the abdomen  · Redness, warmth, or swelling in one leg  · Fever of 100.4ºF (38ºC) or  higher, or as directed by your healthcare provider  · Yellow color to your skin or eyes  · Rapid, unexplained weight gain  · Having to sleep upright or use an increased number of pillows  Date Last Reviewed: 3/31/2016  © 9251-3062 [employer]. [address] [place] [address], [place] [address]. All rights reserved. This information is not intended as a substitute for professional medical care. Always follow your healthcare professional's instructions.

## 2017-04-12 ENCOUNTER — OFFICE VISIT (OUTPATIENT)
Dept: CARDIOLOGY | Facility: CLINIC | Age: 82
End: 2017-04-12
Payer: MEDICARE

## 2017-04-12 VITALS
BODY MASS INDEX: 24.06 KG/M2 | DIASTOLIC BLOOD PRESSURE: 78 MMHG | HEIGHT: 66 IN | SYSTOLIC BLOOD PRESSURE: 158 MMHG | WEIGHT: 149.69 LBS | HEART RATE: 65 BPM | OXYGEN SATURATION: 94 %

## 2017-04-12 DIAGNOSIS — R80.9 CONTROLLED TYPE 2 DIABETES MELLITUS WITH MICROALBUMINURIA, WITHOUT LONG-TERM CURRENT USE OF INSULIN: ICD-10-CM

## 2017-04-12 DIAGNOSIS — I35.0 SEVERE AORTIC STENOSIS BY PRIOR ECHOCARDIOGRAM: ICD-10-CM

## 2017-04-12 DIAGNOSIS — I48.20 CHRONIC ATRIAL FIBRILLATION: ICD-10-CM

## 2017-04-12 DIAGNOSIS — I25.10 CORONARY ARTERY DISEASE INVOLVING NATIVE CORONARY ARTERY OF NATIVE HEART WITHOUT ANGINA PECTORIS: ICD-10-CM

## 2017-04-12 DIAGNOSIS — I27.20 PULMONARY HYPERTENSION: Primary | ICD-10-CM

## 2017-04-12 DIAGNOSIS — E11.29 CONTROLLED TYPE 2 DIABETES MELLITUS WITH MICROALBUMINURIA, WITHOUT LONG-TERM CURRENT USE OF INSULIN: ICD-10-CM

## 2017-04-12 PROCEDURE — 99213 OFFICE O/P EST LOW 20 MIN: CPT | Mod: S$PBB,,, | Performed by: INTERNAL MEDICINE

## 2017-04-12 PROCEDURE — 99999 PR PBB SHADOW E&M-EST. PATIENT-LVL III: CPT | Mod: PBBFAC,,, | Performed by: INTERNAL MEDICINE

## 2017-04-12 PROCEDURE — 99213 OFFICE O/P EST LOW 20 MIN: CPT | Mod: PBBFAC | Performed by: INTERNAL MEDICINE

## 2017-04-12 RX ORDER — FUROSEMIDE 20 MG/1
20 TABLET ORAL 2 TIMES DAILY PRN
Qty: 60 TABLET | Refills: 11 | Status: ON HOLD | OUTPATIENT
Start: 2017-04-12 | End: 2017-11-12 | Stop reason: HOSPADM

## 2017-04-12 NOTE — PROGRESS NOTES
Subjective:    Patient ID:  Adela Garay is a 89 y.o. female who presents for follow-up of Edema      HPI     Severe AS - medical Rx for now given lack of symptoms  Chronic A-fib - rate controlled on coumadin, HTN, DM, Hx TIA     Saw Dr Lamb 6/21/16  Ms Garay is the  of a [employer] vet ([facility]) referred by Dr Ivy for evaluation of severe AS. She has a PMH significant for diet-controlled DM, chronic a-fib on Coumadin, remote hx of TIA, and HTN. She has been evaluated for TAVR in the past in [place] by Dr Briseno, but she was asymptomatic at that time. She is very active and is able to do all of her ADLs without difficulty. She states she does not feel limited at allShe watches her 13 yo grandson who has Downs Syndrome every day. She denies CP, ESTRADA, PND, orthopnea, or LE edema.   She has undergone the following TAVR work-up:  ¨ Echo (4/4/16): DILAN = 0.7 cm2, mean gradient = 47.0 mmHg, EF= 50%  ¨ Needs LHC +/- (last angiogram was >20 years ago)  ¨ Frailty: 1/4  ¨ Prelim STS= 5.3%  ¨ Needs TAVR CTA  ¨ Needs PFTs              Echo 9/15/16    1 - Normal left ventricular systolic function (EF 55-60%).     2 - Concentric hypertrophy.     3 - Severe left atrial enlargement.     4 - Normal right ventricular systolic function .     5 - Pulmonary hypertension. The estimated PA systolic pressure is 64 mmHg.     6 - Severe aortic stenosis, DILAN = 0.62 cm2, mean gradient = 55.05 mmHg.     7 - Moderate mitral regurgitation.     8 - Intermediate central venous pressure.      Echo 3/6/17    1 - Low normal to mildly depressed left ventricular systolic function (EF 50-55%).     2 - Concentric hypertrophy.     3 - Severe left atrial enlargement.     4 - Left ventricular diastolic dysfunction.     5 - Low normal to mildly depressed right ventricular systolic function--severe RVE and R sided strain .     6 - The estimated PA systolic pressure is 29 mmHg--likely underestimated.     7 - Severe aortic stenosis, DILAN = 0.44 cm2,  mean gradient = 53.42 mmHg.     8 - Moderate mitral regurgitation.     9 - Increased central venous pressure.      Going out of town to [place] tonight and has been having more LE edema  Stopped dyazide when she started lasix    Review of Systems   Constitution: Negative for decreased appetite.   HENT: Negative for ear discharge.    Eyes: Negative for blurred vision.   Respiratory: Negative for hemoptysis.    Endocrine: Negative for polyphagia.   Hematologic/Lymphatic: Negative for adenopathy.   Skin: Negative for color change.   Musculoskeletal: Negative for joint swelling.   Neurological: Negative for brief paralysis.   Psychiatric/Behavioral: Negative for hallucinations.        Objective:    Physical Exam   Constitutional: She is oriented to person, place, and time. She appears well-developed and well-nourished.   HENT:   Head: Normocephalic and atraumatic.   Eyes: Conjunctivae are normal. Pupils are equal, round, and reactive to light.   Neck: Normal range of motion. Neck supple.   Cardiovascular: Normal rate and intact distal pulses.  An irregularly irregular rhythm present.   Murmur heard.   Harsh midsystolic murmur is present with a grade of 2/6  at the upper right sternal border radiating to the neck  Pulmonary/Chest: Effort normal and breath sounds normal.   Abdominal: Soft. Bowel sounds are normal.   Musculoskeletal: Normal range of motion. She exhibits edema.   Neurological: She is alert and oriented to person, place, and time.   Skin: Skin is warm and dry.         Assessment:       1. Pulmonary hypertension    2. Coronary artery disease involving native coronary artery of native heart without angina pectoris    3. Severe aortic stenosis by prior echocardiogram    4. Chronic atrial fibrillation    5. Controlled type 2 diabetes mellitus with microalbuminuria, without long-term current use of insulin         Plan:       Ok to take lasix bid prn  Keep OV 3 months

## 2017-04-12 NOTE — MR AVS SNAPSHOT
[facility]  [address] [place] [address]  Phone: [phone]                  Adela Garay   2017 3:30 PM   Office Visit    Description:  Female : 1927   Provider:  Theo Ivy MD   Department:  [facility]           Reason for Visit     Edema           Diagnoses this Visit        Comments    Pulmonary hypertension    -  Primary     Coronary artery disease involving native coronary artery of native heart without angina pectoris         Severe aortic stenosis by prior echocardiogram         Chronic atrial fibrillation         Controlled type 2 diabetes mellitus with microalbuminuria, without long-term current use of insulin                To Do List           Future Appointments        Provider Department Dept Phone    2017 3:30 PM Theo Ivy MD [facility] [phone]    2017 9:00 AM Kylee Cormier, PharmD [facility] Cou[facility] [phone]    2017 10:00 AM Theo Ivy MD [facility] [phone]    8/15/2017 9:00 AM Elena Sharpe DPM [facility] Podiatry [phone]      Goals (5 Years of Data)              9/9/16    4/8/16    10/14/15    HEMOGLOBIN A1C < 7.0   6.2  5.8  5.7    Related Problems    Controlled type 2 diabetes mellitus with microalbuminuria    COMPLETED: Patient/caregiver will have an action plan in place to manage and prevent complications of risk for falls and wound care prior to discharge from [facility].           Notes - Note edited  2016  3:53 PM by Elyse Medina RN    Overall Time to Completion  4 weeks from 2016    Short Term Goals  Patient/caregiver will verbalize importance of having a safe home environment by keeping room free of clutter, making sure rooms are well lit and removing throw rugs within 3 weeks.  Interventions   · Recognize and provide educational material (ALEX).  · Facilitate to needed DME.   Status  · Partially met               [facility]      [facility] Care [facility] - 24/7 Assistance  Unless otherwise directed by your provider, please contact [facility], our nurse care line that is available for 24/7 assistance.     Registered nurses in the [facility] provide: appointment scheduling, clinical advisement, health education, and other advisory services.  Call: [phone] (toll free)               Medications           Message regarding Medications     Verify the changes and/or additions to your medication regime listed below are the same as discussed with your clinician today.  If any of these changes or additions are incorrect, please notify your healthcare provider.             Verify that the below list of medications is an accurate representation of the medications you are currently taking.  If none reported, the list may be blank. If incorrect, please contact your healthcare provider. Carry this list with you in case of emergency.           Current Medications     acetaminophen (TYLENOL) 325 MG tablet Take 325 mg by mouth every 6 (six) hours as needed for Pain.    alpha lipoic acid 600 mg Cap Take 600 mg by mouth Daily.    aspirin 81 mg Tab Take 81 mg by mouth. 4 times weekly    blood sugar diagnostic (BLOOD GLUCOSE TEST) Strp 1 strip by Misc.(Non-Drug; Combo Route) route once daily. Currently using Nova max plus meter.    cholecalciferol, vitamin D3, 1,000 unit capsule Take 1,000 Units by mouth once daily.    CHROMIUM PICOLINATE ORAL Take 200 mg by mouth once daily.    CINNAMON BARK (CINNAMON ORAL) Take 1,000 mg by mouth 2 (two) times daily.    digoxin (LANOXIN) 250 mcg tablet TAKE 1 TABLET ONE TIME DAILY AND TAKE AN ADDITIONAL 1/2 TABLET ONCE EACH WEEK    docusate sodium (COLACE) 100 MG capsule Take 100 mg by mouth. 1 Capsule Oral Every day    folic acid (FOLVITE) 1 MG tablet Take 1 tablet (1 mg total) by mouth once daily.    furosemide (LASIX) 20 MG tablet Take 1 tablet (20 mg total) by mouth daily as needed (leg  "swelling).    metoprolol tartrate (LOPRESSOR) 50 MG tablet TAKE 1 TABLET TWICE DAILY    mupirocin (BACTROBAN) 2 % ointment Apply topically as needed.    simvastatin (ZOCOR) 20 MG tablet TAKE 1 TABLET EVERY EVENING    triamcinolone acetonide 0.025% (KENALOG) 0.025 % cream Use bid as needed.    triamterene-hydrochlorothiazide 37.5-25 mg (DYAZIDE) 37.5-25 mg per capsule TAKE 1 CAPSULE ONE TIME DAILY    warfarin (COUMADIN) 2 MG tablet TAKE 2 TABLETS EVERY DAY EXCEPT TAKE 1 TABLET ON SUNDAY           Clinical Reference Information           Your Vitals Were     BP Pulse Height Weight SpO2 BMI    158/78 (BP Location: Left arm, Patient Position: Sitting, BP Method: Automatic) 65 5' 6" (1.676 m) 67.9 kg (149 lb 11.1 oz) 94% 24.16 kg/m2      Blood Pressure          Most Recent Value    BP  (!)  158/78      Allergies as of 4/12/2017     Levaquin [Levofloxacin]    Doxycycline Hyclate    Iodine And Iodide Containing Products    Neurontin  [Gabapentin]    Norpace  [Disopyramide]    Phenytoin Sodium Extended    Sulfamethoxazole-trimethoprim      Immunizations Administered on Date of Encounter - 4/12/2017     None      Language Assistance Services     ATTENTION: Language assistance services are available, free of charge. Please call [phone].      ATENCIÓN: Si gaudenciola zaynab, tiene a garcia disposición servicios gratuitos de asistencia lingüística. Llame al [phone].     [facility] Ý: N?u b?n nói Ti?ng Vi?t, có các d?ch v? h? tr? ngôn ng? mi?n phí dành cho b?n. G?i s? [phone].         [facility] complies with applicable Federal civil rights laws and does not discriminate on the basis of race, color, national origin, age, disability, or sex.        "

## 2017-04-19 ENCOUNTER — TELEPHONE (OUTPATIENT)
Dept: FAMILY MEDICINE | Facility: CLINIC | Age: 82
End: 2017-04-19

## 2017-04-19 NOTE — TELEPHONE ENCOUNTER
----- Message from Margie Naylor sent at 4/19/2017 10:50 AM CDT -----  Contact: [phone]  Pt wants to know if she can get in Friday for a few mins to ask the provider  something important Please call pt at your earliest convenience.  Thanks !

## 2017-04-20 ENCOUNTER — ANTI-COAG VISIT (OUTPATIENT)
Dept: CARDIOLOGY | Facility: CLINIC | Age: 82
End: 2017-04-20
Payer: MEDICARE

## 2017-04-20 DIAGNOSIS — Z79.01 LONG-TERM (CURRENT) USE OF ANTICOAGULANTS, INR GOAL 2.0-3.0: ICD-10-CM

## 2017-04-20 DIAGNOSIS — Z86.73 HX-TIA (TRANSIENT ISCHEMIC ATTACK): ICD-10-CM

## 2017-04-20 DIAGNOSIS — I48.20 CHRONIC ATRIAL FIBRILLATION: ICD-10-CM

## 2017-04-20 LAB — INR PPP: 1.9

## 2017-04-20 PROCEDURE — 99211 OFF/OP EST MAY X REQ PHY/QHP: CPT | Mod: S$PBB,,,

## 2017-04-20 PROCEDURE — G0248 DEMONSTRATE USE HOME INR MON: HCPCS | Mod: PBBFAC,PO

## 2017-04-20 NOTE — PROGRESS NOTES
Pt here today for self test education. Educated on how to test, report results, order supplies, and clinic policy. Pt signed policy contract and understands the rules of the program. Pt tested and demonstrated appropriate technique. She will test on Mondays.     INR a little low today. She reports one day this past week only taking 2mg instead of 4mg. We will keep dose as is since INR should already be on an upward trend. Pt is repeating INR on Monday.

## 2017-04-21 ENCOUNTER — TELEPHONE (OUTPATIENT)
Dept: FAMILY MEDICINE | Facility: CLINIC | Age: 82
End: 2017-04-21

## 2017-04-21 NOTE — TELEPHONE ENCOUNTER
----- Message from Bindu Marin sent at 4/21/2017  1:41 PM CDT -----  Contact: Self  Pt wants to know when she's due for 6 mth check-up. Pt can be reached @ [phone].

## 2017-04-24 ENCOUNTER — ANTI-COAG VISIT (OUTPATIENT)
Dept: CARDIOLOGY | Facility: CLINIC | Age: 82
End: 2017-04-24

## 2017-04-24 DIAGNOSIS — I48.20 CHRONIC ATRIAL FIBRILLATION: ICD-10-CM

## 2017-04-24 DIAGNOSIS — Z79.01 LONG-TERM (CURRENT) USE OF ANTICOAGULANTS, INR GOAL 2.0-3.0: ICD-10-CM

## 2017-04-24 DIAGNOSIS — Z86.73 HX-TIA (TRANSIENT ISCHEMIC ATTACK): ICD-10-CM

## 2017-04-24 LAB — INR PPP: 1.8

## 2017-04-28 ENCOUNTER — TELEPHONE (OUTPATIENT)
Dept: FAMILY MEDICINE | Facility: CLINIC | Age: 82
End: 2017-04-28

## 2017-04-28 NOTE — TELEPHONE ENCOUNTER
----- Message from Margie Naylor sent at 4/25/2017  2:00 PM CDT -----  Contact: [phone]  Pt is requesting a call back today in regards to a issues with medication Please call pt at your earliest convenience.  Thanks !

## 2017-05-01 ENCOUNTER — ANTI-COAG VISIT (OUTPATIENT)
Dept: CARDIOLOGY | Facility: CLINIC | Age: 82
End: 2017-05-01

## 2017-05-01 DIAGNOSIS — I48.20 CHRONIC ATRIAL FIBRILLATION: ICD-10-CM

## 2017-05-01 DIAGNOSIS — Z86.73 HX-TIA (TRANSIENT ISCHEMIC ATTACK): ICD-10-CM

## 2017-05-01 DIAGNOSIS — Z79.01 LONG-TERM (CURRENT) USE OF ANTICOAGULANTS, INR GOAL 2.0-3.0: ICD-10-CM

## 2017-05-01 LAB — INR PPP: 1.7

## 2017-05-11 ENCOUNTER — ANTI-COAG VISIT (OUTPATIENT)
Dept: CARDIOLOGY | Facility: CLINIC | Age: 82
End: 2017-05-11

## 2017-05-11 DIAGNOSIS — Z86.73 HX-TIA (TRANSIENT ISCHEMIC ATTACK): ICD-10-CM

## 2017-05-11 DIAGNOSIS — Z79.01 LONG-TERM (CURRENT) USE OF ANTICOAGULANTS, INR GOAL 2.0-3.0: ICD-10-CM

## 2017-05-11 DIAGNOSIS — I48.20 CHRONIC ATRIAL FIBRILLATION: ICD-10-CM

## 2017-05-11 LAB — INR PPP: 2.3

## 2017-05-15 ENCOUNTER — ANTI-COAG VISIT (OUTPATIENT)
Dept: CARDIOLOGY | Facility: CLINIC | Age: 82
End: 2017-05-15

## 2017-05-15 DIAGNOSIS — I48.20 CHRONIC ATRIAL FIBRILLATION: ICD-10-CM

## 2017-05-15 DIAGNOSIS — Z79.01 LONG-TERM (CURRENT) USE OF ANTICOAGULANTS, INR GOAL 2.0-3.0: ICD-10-CM

## 2017-05-15 DIAGNOSIS — Z86.73 HX-TIA (TRANSIENT ISCHEMIC ATTACK): ICD-10-CM

## 2017-05-15 LAB — INR PPP: 2.3

## 2017-05-18 ENCOUNTER — TELEPHONE (OUTPATIENT)
Dept: CARDIOLOGY | Facility: CLINIC | Age: 82
End: 2017-05-18

## 2017-05-18 DIAGNOSIS — E78.5 HYPERLIPIDEMIA, UNSPECIFIED HYPERLIPIDEMIA TYPE: Primary | ICD-10-CM

## 2017-05-22 ENCOUNTER — ANTI-COAG VISIT (OUTPATIENT)
Dept: CARDIOLOGY | Facility: CLINIC | Age: 82
End: 2017-05-22
Payer: MEDICARE

## 2017-05-22 DIAGNOSIS — Z79.01 LONG-TERM (CURRENT) USE OF ANTICOAGULANTS, INR GOAL 2.0-3.0: ICD-10-CM

## 2017-05-22 DIAGNOSIS — Z86.73 HX-TIA (TRANSIENT ISCHEMIC ATTACK): ICD-10-CM

## 2017-05-22 DIAGNOSIS — I48.20 CHRONIC ATRIAL FIBRILLATION: ICD-10-CM

## 2017-05-22 LAB — INR PPP: 1.9

## 2017-05-22 PROCEDURE — G0250 MD INR TEST REVIE INTER MGMT: HCPCS | Mod: ,,,

## 2017-05-22 NOTE — PROGRESS NOTES
Verbal result taken from ____Ms. Garay (the pt)_____. INR __1.9_____ Date drawn____5/22/2017____ Hardcopy to be faxed.    The pt reports taking 4mg on 5/15, rather than the recommended 6mg dose.

## 2017-05-26 RX ORDER — SIMVASTATIN 20 MG/1
20 TABLET, FILM COATED ORAL NIGHTLY
Qty: 90 TABLET | Refills: 0 | Status: SHIPPED | OUTPATIENT
Start: 2017-05-26 | End: 2017-08-09 | Stop reason: SDUPTHER

## 2017-05-29 ENCOUNTER — LAB VISIT (OUTPATIENT)
Dept: LAB | Facility: HOSPITAL | Age: 82
End: 2017-05-29
Attending: INTERNAL MEDICINE
Payer: MEDICARE

## 2017-05-29 ENCOUNTER — ANTI-COAG VISIT (OUTPATIENT)
Dept: CARDIOLOGY | Facility: CLINIC | Age: 82
End: 2017-05-29
Payer: MEDICARE

## 2017-05-29 DIAGNOSIS — Z86.73 HX-TIA (TRANSIENT ISCHEMIC ATTACK): ICD-10-CM

## 2017-05-29 DIAGNOSIS — I48.20 CHRONIC ATRIAL FIBRILLATION: ICD-10-CM

## 2017-05-29 DIAGNOSIS — Z79.01 LONG-TERM (CURRENT) USE OF ANTICOAGULANTS, INR GOAL 2.0-3.0: ICD-10-CM

## 2017-05-29 DIAGNOSIS — E78.5 HYPERLIPIDEMIA, UNSPECIFIED HYPERLIPIDEMIA TYPE: ICD-10-CM

## 2017-05-29 LAB
CHOLEST/HDLC SERPL: 3.5 {RATIO}
HDL/CHOLESTEROL RATIO: 28.7 %
HDLC SERPL-MCNC: 136 MG/DL
HDLC SERPL-MCNC: 39 MG/DL
INR PPP: 2.6
LDLC SERPL CALC-MCNC: 83.6 MG/DL
NONHDLC SERPL-MCNC: 97 MG/DL
TRIGL SERPL-MCNC: 67 MG/DL

## 2017-05-29 PROCEDURE — 36415 COLL VENOUS BLD VENIPUNCTURE: CPT | Mod: PO

## 2017-05-29 PROCEDURE — 80061 LIPID PANEL: CPT

## 2017-05-29 NOTE — PROGRESS NOTES
The pt called to let us know that she is uncomfortable with taking her newly increased dose of warfarin.  She would prefer to resume with her 28mg/weekly dose.  She feels that her recently low INR was 2/2 increasing her consumption of avocados, which she does not intend to continue doing.  Therefore, I agreed to resume her lower dose, but told her that if next week's INR is low, she will need to increase to this higher dose.

## 2017-05-31 ENCOUNTER — OFFICE VISIT (OUTPATIENT)
Dept: FAMILY MEDICINE | Facility: CLINIC | Age: 82
End: 2017-05-31
Payer: MEDICARE

## 2017-05-31 ENCOUNTER — LAB VISIT (OUTPATIENT)
Dept: LAB | Facility: HOSPITAL | Age: 82
End: 2017-05-31
Attending: INTERNAL MEDICINE
Payer: MEDICARE

## 2017-05-31 VITALS
HEART RATE: 67 BPM | BODY MASS INDEX: 22.52 KG/M2 | WEIGHT: 140.13 LBS | RESPIRATION RATE: 19 BRPM | DIASTOLIC BLOOD PRESSURE: 70 MMHG | OXYGEN SATURATION: 96 % | SYSTOLIC BLOOD PRESSURE: 128 MMHG | HEIGHT: 66 IN

## 2017-05-31 DIAGNOSIS — I11.0 BENIGN HYPERTENSIVE HEART DISEASE WITH CONGESTIVE HEART FAILURE: ICD-10-CM

## 2017-05-31 DIAGNOSIS — I25.10 CORONARY ARTERY DISEASE INVOLVING NATIVE CORONARY ARTERY OF NATIVE HEART WITHOUT ANGINA PECTORIS: ICD-10-CM

## 2017-05-31 DIAGNOSIS — R80.9 CONTROLLED TYPE 2 DIABETES MELLITUS WITH MICROALBUMINURIA, WITHOUT LONG-TERM CURRENT USE OF INSULIN: ICD-10-CM

## 2017-05-31 DIAGNOSIS — I77.9 CAROTID ARTERY DISEASE, UNSPECIFIED LATERALITY: ICD-10-CM

## 2017-05-31 DIAGNOSIS — I72.0 CAROTID ANEURYSM, LEFT: ICD-10-CM

## 2017-05-31 DIAGNOSIS — I27.20 PULMONARY HYPERTENSION: ICD-10-CM

## 2017-05-31 DIAGNOSIS — G60.9 IDIOPATHIC PERIPHERAL NEUROPATHY: ICD-10-CM

## 2017-05-31 DIAGNOSIS — E11.29 CONTROLLED TYPE 2 DIABETES MELLITUS WITH MICROALBUMINURIA, WITHOUT LONG-TERM CURRENT USE OF INSULIN: ICD-10-CM

## 2017-05-31 DIAGNOSIS — I48.20 CHRONIC ATRIAL FIBRILLATION: ICD-10-CM

## 2017-05-31 DIAGNOSIS — J84.10 POSTINFLAMMATORY PULMONARY FIBROSIS: ICD-10-CM

## 2017-05-31 DIAGNOSIS — Z00.00 ENCOUNTER FOR PREVENTIVE HEALTH EXAMINATION: Primary | ICD-10-CM

## 2017-05-31 DIAGNOSIS — I48.0 PAROXYSMAL ATRIAL FIBRILLATION: ICD-10-CM

## 2017-05-31 DIAGNOSIS — I70.0 ATHEROSCLEROSIS OF AORTA: ICD-10-CM

## 2017-05-31 DIAGNOSIS — F32.0 MILD MAJOR DEPRESSION: ICD-10-CM

## 2017-05-31 PROCEDURE — 83036 HEMOGLOBIN GLYCOSYLATED A1C: CPT

## 2017-05-31 PROCEDURE — G0439 PPPS, SUBSEQ VISIT: HCPCS | Mod: ,,, | Performed by: NURSE PRACTITIONER

## 2017-05-31 PROCEDURE — 99999 PR PBB SHADOW E&M-EST. PATIENT-LVL IV: CPT | Mod: PBBFAC,,, | Performed by: NURSE PRACTITIONER

## 2017-05-31 PROCEDURE — 36415 COLL VENOUS BLD VENIPUNCTURE: CPT | Mod: PO

## 2017-05-31 NOTE — PROGRESS NOTES
"Adela Garay presented for a  Medicare AWV and comprehensive Health Risk Assessment today. The following components were reviewed and updated:    · Medical history  · Family History  · Social history  · Allergies and Current Medications  · Health Risk Assessment  · Health Maintenance  · Care Team     ** See Completed Assessments for Annual Wellness Visit within the encounter summary.**       The following assessments were completed:  · Living Situation  · CAGE  · Depression Screening  · Timed Get Up and Go  · Whisper Test  · Cognitive Function Screening  · Nutrition Screening  · ADL Screening  · PAQ Screening    Vitals:    05/31/17 1122   BP: 128/70   BP Location: Left arm   Patient Position: Sitting   BP Method: Manual   Pulse: 67   Resp: 19   SpO2: 96%   Weight: 63.6 kg (140 lb 1.6 oz)   Height: 5' 6" (1.676 m)     Body mass index is 22.61 kg/m².  Physical Exam   Constitutional: She is oriented to person, place, and time.   Cardiovascular: Normal rate.    Murmur heard.  Pulmonary/Chest: Effort normal and breath sounds normal.   Musculoskeletal: Normal range of motion.   Neurological: She is alert and oriented to person, place, and time.   Skin: Skin is warm.   Psychiatric: She has a normal mood and affect. Her behavior is normal. Thought content normal.   Vitals reviewed.        Diagnoses and health risks identified today and associated recommendations/orders:    1. Encounter for preventive health examination  Education provided about preventive health examinations and procedures; addressed and discussed patient's health concerns. Additionally, reviewed medical record for risk factors and documented the results during this encounter.    2. Controlled type 2 diabetes mellitus with microalbuminuria, without long-term current use of insulin  Education provided about diabetes, management of blood glucose with diet and activities, monitoring for worsening effects of diabetes.  Reviewed most recent Ha1c (6.2- Sept " 2016), complications associated with uncontrolled diabetes. Informed of need for updated A1c She was evaluated by [facility] endocrinology dept.     3. Mild major depression  Symptoms controlled. Education provided about depression. Continue as advised.   Denies homicidal or suicidal ideation. Discussed family dynamics, social support, enrichment activities.     4. Carotid aneurysm, left  Stable, followed by [facility] cardiology, denies worsening symptoms; continue as advised.     5. Carotid artery disease, unspecified laterality  Stable, followed by [facility] cardiology, denies worsening symptoms; continue as advised.     6. Postinflammatory pulmonary fibrosis  Stable, followed by [facility] pulmonology, denies worsening symptoms; continue as advised.     7. Chronic atrial fibrillation  Stable, followed by [facility] cardiology, taking digoxin, advised about supplements and medication absorption; denies worsening symptoms; she monitors her INR with a home device; continue as advised.     8. Paroxysmal atrial fibrillation  Stable, followed by [facility] cardiology, denies worsening symptoms; she monitors her INR with a home device; continue as advised.     9. Atherosclerosis of aorta  Stable, asymptomatic on ASA, cholesterol managing STATIN, and antihypertensive medication; monitor    10. Benign hypertensive heart disease with congestive heart failure  Stable, followed by [facility] cardiology, denies worsening symptoms; continue as advised.     11. Pulmonary hypertension  Stable, followed by [facility] cardiology, denies worsening symptoms; continue as advised.     12. Coronary artery disease involving native coronary artery of native heart without angina pectoris  Stable, asymptomatic on ASA, cholesterol managing STATIN, and antihypertensive medication; monitor    13. Idiopathic peripheral neuropathy  Stable, reports compression stockings eliminates intermittent foot pain. We discussed activities, types of floor surfaces.     Reviewed health maintenance with patient,  educated about recommended examinations, procedures (labs & images), and immunizations.     Provided patient with a copy of her lipid panel with an explanation.     Return in about 1 year (around 5/31/2018) for HRA visit .    Willis Alva Jr, NP

## 2017-05-31 NOTE — PATIENT INSTRUCTIONS
Counseling and Referral of Other Preventative  (Italic type indicates deductible and co-insurance are waived)    Patient Name: Adela Garay  Today's Date: 5/31/2017      SERVICE LIMITATIONS RECOMMENDATION    Vaccines    · Pneumococcal (once after 65)    · Influenza (annually)    · Hepatitis B (if medium/high risk)    · Prevnar 13      Hepatitis B medium/high risk factors:       - End-stage renal disease       - Hemophiliacs who received Factor VII or         IX concentrates       - Clients of institutions for the mentally             retarded       - Persons who live in the same house as          a HepB carrier       - Homosexual men       - Illicit injectable drug abusers     Pneumococcal: Done, no repeat necessary     Influenza: Done, repeat in one year     Hepatitis B: N/A     Prevnar 13: Done, no repeat necessary    Mammogram (biennial age 50-74)  Annually (age 40 or over)  N/A completed Sept 2010    Pap (up to age 70 and after 70 if unknown history or abnormal study last 10 years)    N/A     no clinical risk factors     Colorectal cancer screening (to age 75)    · Fecal occult blood test (annual)  · Flexible sigmoidoscopy (5y)  · Screening colonoscopy (10y)  · Barium enema   N/A    Diabetes self-management training (no USPSTF recommendations)  Requires referral by treating physician for patient with diabetes or renal disease. 10 hours of initial DSMT sessions of no less than 30 minutes each in a continuous 12-month period. 2 hours of follow-up DSMT in subsequent years.  discussed with patient      Bone mass measurements (age 65 & older, biennial)  Requires diagnosis related to osteoporosis or estrogen deficiency. Biennial benefit unless patient has history of long-term glucocorticoid  completed bone density exam Sept 2014    Glaucoma screening (no USPSTF recommendation)  Diabetes mellitus, family history   , age 50 or over    American, age 65 or over  Last done 11/2016, recommend to  repeat every 1  years    Medical nutrition therapy for diabetes or renal disease (no recommended schedule)  Requires referral by treating physician for patient with diabetes or renal disease or kidney transplant within the past 3 years.  Can be provided in same year as diabetes self-management training (DSMT), and CMS recommends medical nutrition therapy take place after DSMT. Up to 3 hours for initial year and 2 hours in subsequent years.  discussed with patient      Cardiovascular screening blood tests (every 5 years)  · Fasting lipid panel  Order as a panel if possible  Done this year, repeat every year    Diabetes screening tests (at least every 3 years, Medicare covers annually or at 6-month intervals for prediabetic patients)  · Fasting blood sugar (FBS) or glucose tolerance test (GTT)  Patient must be diagnosed with one of the following:       - Hypertension       - Dyslipidemia       - Obesity (BMI 30kg/m2)       - Previous elevated impaired FBS or GTT       ... or any two of the following:       - Overweight (BMI 25 but <30)       - Family history of diabetes       - Age 65 or older       - History of gestational diabetes or birth of baby weighing more than 9 pounds  Done this year, repeat every 6 months     Abdominal aortic aneurysm screening (once)  · Sonogram   Limited to patients who meet one of the following criteria:       - Men who are 65-75 years old and have smoked more than 100 cigarette in their lifetime       - Anyone with a family history of abdominal aortic aneurysm       - Anyone recommended for screening by the USPSTF  N/A    HIV screening (annually for increased risk patients)  · HIV-1 and HIV-2 by EIA, or GUSTAVO, rapid antibody test or oral mucosa transudate  Patients must be at increased risk for HIV infection per USPSTF guidelines or pregnant. Tests covered annually for patient at increased risk or as requested by the patient. Pregnant patients may receive up to 3 tests during pregnancy.   no clinical risk factors      Smoking cessation counseling (up to 8 sessions per year)  Patients must be asymptomatic of tobacco-related conditions to receive as a preventative service.  Non-smoker    Subsequent annual wellness visit  At least 12 months since last AWV  Return in one year     The following information is provided to all patients.  This information is to help you find resources for any of the problems found today that may be affecting your health:                Living healthy guide: www.[facility].[place].[facility]      Understanding Diabetes: www.diabetes.org      Eating healthy: www.cdc.gov/healthyweight      [facility] home safety checklist: www.cdc.gov/steadi/patient.html      Agency on Aging: www.goea.[place].[facility]      Alcoholics anonymous (AA): www.aa.org      Physical Activity: www.erik.nih.gov/oy2kfnp      Tobacco use: www.quitwithusla.org

## 2017-06-01 LAB
ESTIMATED AVG GLUCOSE: 126 MG/DL
HBA1C MFR BLD HPLC: 6 %

## 2017-06-05 ENCOUNTER — ANTI-COAG VISIT (OUTPATIENT)
Dept: CARDIOLOGY | Facility: CLINIC | Age: 82
End: 2017-06-05

## 2017-06-05 DIAGNOSIS — I48.20 CHRONIC ATRIAL FIBRILLATION: ICD-10-CM

## 2017-06-05 DIAGNOSIS — Z79.01 LONG-TERM (CURRENT) USE OF ANTICOAGULANTS, INR GOAL 2.0-3.0: ICD-10-CM

## 2017-06-05 DIAGNOSIS — Z86.73 HX-TIA (TRANSIENT ISCHEMIC ATTACK): ICD-10-CM

## 2017-06-05 LAB — INR PPP: 2

## 2017-06-06 ENCOUNTER — TELEPHONE (OUTPATIENT)
Dept: CARDIOLOGY | Facility: CLINIC | Age: 82
End: 2017-06-06

## 2017-06-06 DIAGNOSIS — I35.0 SEVERE AORTIC STENOSIS BY PRIOR ECHOCARDIOGRAM: Primary | ICD-10-CM

## 2017-06-06 RX ORDER — METOPROLOL TARTRATE 50 MG/1
TABLET ORAL
Qty: 180 TABLET | Refills: 0 | Status: SHIPPED | OUTPATIENT
Start: 2017-06-06 | End: 2017-08-10 | Stop reason: SDUPTHER

## 2017-06-07 ENCOUNTER — HOSPITAL ENCOUNTER (OUTPATIENT)
Dept: CARDIOLOGY | Facility: HOSPITAL | Age: 82
Discharge: HOME | End: 2017-06-07
Attending: INTERNAL MEDICINE
Payer: MEDICARE

## 2017-06-07 DIAGNOSIS — I35.0 SEVERE AORTIC STENOSIS BY PRIOR ECHOCARDIOGRAM: ICD-10-CM

## 2017-06-07 LAB
AORTIC VALVE STENOSIS: ABNORMAL
DIASTOLIC DYSFUNCTION: YES
ESTIMATED PA SYSTOLIC PRESSURE: 42.88
GLOBAL PERICARDIAL EFFUSION: ABNORMAL
MITRAL VALVE REGURGITATION: ABNORMAL
RETIRED EF AND QEF - SEE NOTES: 55 (ref 55–65)
TRICUSPID VALVE REGURGITATION: ABNORMAL

## 2017-06-07 PROCEDURE — 93306 TTE W/DOPPLER COMPLETE: CPT | Mod: 26,,, | Performed by: INTERNAL MEDICINE

## 2017-06-07 PROCEDURE — 93306 TTE W/DOPPLER COMPLETE: CPT

## 2017-06-09 ENCOUNTER — OFFICE VISIT (OUTPATIENT)
Dept: CARDIOLOGY | Facility: CLINIC | Age: 82
End: 2017-06-09
Payer: MEDICARE

## 2017-06-09 VITALS
DIASTOLIC BLOOD PRESSURE: 60 MMHG | HEIGHT: 66 IN | OXYGEN SATURATION: 98 % | SYSTOLIC BLOOD PRESSURE: 134 MMHG | WEIGHT: 143.5 LBS | BODY MASS INDEX: 23.06 KG/M2 | HEART RATE: 56 BPM

## 2017-06-09 DIAGNOSIS — E78.5 HYPERLIPIDEMIA, UNSPECIFIED HYPERLIPIDEMIA TYPE: ICD-10-CM

## 2017-06-09 DIAGNOSIS — I35.0 SEVERE AORTIC STENOSIS BY PRIOR ECHOCARDIOGRAM: ICD-10-CM

## 2017-06-09 DIAGNOSIS — R80.9 CONTROLLED TYPE 2 DIABETES MELLITUS WITH MICROALBUMINURIA, WITHOUT LONG-TERM CURRENT USE OF INSULIN: ICD-10-CM

## 2017-06-09 DIAGNOSIS — I48.20 CHRONIC ATRIAL FIBRILLATION: ICD-10-CM

## 2017-06-09 DIAGNOSIS — I25.10 CORONARY ARTERY DISEASE INVOLVING NATIVE CORONARY ARTERY OF NATIVE HEART WITHOUT ANGINA PECTORIS: ICD-10-CM

## 2017-06-09 DIAGNOSIS — I10 HYPERTENSION: ICD-10-CM

## 2017-06-09 DIAGNOSIS — E11.29 CONTROLLED TYPE 2 DIABETES MELLITUS WITH MICROALBUMINURIA, WITHOUT LONG-TERM CURRENT USE OF INSULIN: ICD-10-CM

## 2017-06-09 DIAGNOSIS — I27.20 PULMONARY HYPERTENSION: Primary | ICD-10-CM

## 2017-06-09 PROCEDURE — 99213 OFFICE O/P EST LOW 20 MIN: CPT | Mod: PBBFAC | Performed by: INTERNAL MEDICINE

## 2017-06-09 PROCEDURE — 1159F MED LIST DOCD IN RCRD: CPT | Mod: ,,, | Performed by: INTERNAL MEDICINE

## 2017-06-09 PROCEDURE — 93010 ELECTROCARDIOGRAM REPORT: CPT | Mod: S$PBB,,, | Performed by: INTERNAL MEDICINE

## 2017-06-09 PROCEDURE — 1157F ADVNC CARE PLAN IN RCRD: CPT | Mod: ,,, | Performed by: INTERNAL MEDICINE

## 2017-06-09 PROCEDURE — 1126F AMNT PAIN NOTED NONE PRSNT: CPT | Mod: ,,, | Performed by: INTERNAL MEDICINE

## 2017-06-09 PROCEDURE — 99999 PR PBB SHADOW E&M-EST. PATIENT-LVL III: CPT | Mod: PBBFAC,,, | Performed by: INTERNAL MEDICINE

## 2017-06-09 PROCEDURE — 99213 OFFICE O/P EST LOW 20 MIN: CPT | Mod: S$PBB,,, | Performed by: INTERNAL MEDICINE

## 2017-06-09 PROCEDURE — 93005 ELECTROCARDIOGRAM TRACING: CPT | Mod: PBBFAC | Performed by: INTERNAL MEDICINE

## 2017-06-09 NOTE — PROGRESS NOTES
Subjective:    Patient ID:  Adela Garay is a 89 y.o. female who presents for follow-up of Valvular Heart Disease      HPI     Severe AS - medical Rx for now given lack of symptoms  Chronic A-fib - rate controlled on coumadin, HTN, DM, Hx TIA     Saw Dr Lamb 6/21/16  Ms Garay is the  of a [employer] vet ([facility]) referred by Dr Ivy for evaluation of severe AS. She has a PMH significant for diet-controlled DM, chronic a-fib on Coumadin, remote hx of TIA, and HTN. She has been evaluated for TAVR in the past in [place] by Dr Briseno, but she was asymptomatic at that time. She is very active and is able to do all of her ADLs without difficulty. She states she does not feel limited at allShe watches her 13 yo grandson who has Downs Syndrome every day. She denies CP, ESTRADA, PND, orthopnea, or LE edema.   She has undergone the following TAVR work-up:  ? Echo (4/4/16): DILAN = 0.7 cm2, mean gradient = 47.0 mmHg, EF= 50%  ? Needs LHC +/- (last angiogram was >20 years ago)  ? Frailty: 1/4  ? Prelim STS= 5.3%  ? Needs TAVR CTA  ? Needs PFTs           Echo 9/15/16    1 - Normal left ventricular systolic function (EF 55-60%).     2 - Concentric hypertrophy.     3 - Severe left atrial enlargement.     4 - Normal right ventricular systolic function .     5 - Pulmonary hypertension. The estimated PA systolic pressure is 64 mmHg.     6 - Severe aortic stenosis, DILAN = 0.62 cm2, mean gradient = 55.05 mmHg.     7 - Moderate mitral regurgitation.     8 - Intermediate central venous pressure.      Echo 3/6/17    1 - Low normal to mildly depressed left ventricular systolic function (EF 50-55%).     2 - Concentric hypertrophy.     3 - Severe left atrial enlargement.     4 - Left ventricular diastolic dysfunction.     5 - Low normal to mildly depressed right ventricular systolic function--severe RVE and R sided strain .     6 - The estimated PA systolic pressure is 29 mmHg--likely underestimated.     7 - Severe aortic stenosis,  DILAN = 0.44 cm2, mean gradient = 53.42 mmHg.     8 - Moderate mitral regurgitation.     9 - Increased central venous pressure.     Echo 6/7/17    1 - Normal left ventricular systolic function (EF 55-60%).     2 - Eccentric hypertrophy.     3 - Biatrial enlargement.     4 - Restrictive LV filling pattern, indicating markedly elevated LAP (grade 3 diastolic dysfunction).     5 - Right ventricular enlargement with moderately depressed systolic function.     6 - Pulmonary hypertension. The estimated PA systolic pressure is 43 mmHg.     7 - Severe aortic stenosis, DILAN = 0.53 cm2, peak velocity = 4.86 m/s, mean gradient = 62 mmHg.     8 - Mild mitral regurgitation.     9 - Severe tricuspid regurgitation.     10 - Trivial pulmonic regurgitation.     11 - Trivial pericardial effusion.     Still with LE edema and SOB that has worsened some  EKG A-fib IVCD       Review of Systems   Constitution: Negative for decreased appetite.   HENT: Negative for ear discharge.    Eyes: Negative for blurred vision.   Respiratory: Negative for hemoptysis.    Endocrine: Negative for polyphagia.   Hematologic/Lymphatic: Negative for adenopathy.   Skin: Negative for color change.   Musculoskeletal: Negative for joint swelling.   Neurological: Negative for brief paralysis.   Psychiatric/Behavioral: Negative for hallucinations.        Objective:    Physical Exam   Constitutional: She is oriented to person, place, and time. She appears well-developed and well-nourished.   HENT:   Head: Normocephalic and atraumatic.   Eyes: Conjunctivae are normal. Pupils are equal, round, and reactive to light.   Neck: Normal range of motion. Neck supple.   Cardiovascular: Normal rate and intact distal pulses.  An irregularly irregular rhythm present.   Murmur heard.   Harsh midsystolic murmur is present with a grade of 2/6  at the upper right sternal border radiating to the neck  Pulmonary/Chest: Effort normal and breath sounds normal.   Abdominal: Soft. Bowel  sounds are normal.   Musculoskeletal: Normal range of motion. She exhibits edema.   Neurological: She is alert and oriented to person, place, and time.   Skin: Skin is warm and dry.         Assessment:       1. Pulmonary hypertension    2. Coronary artery disease involving native coronary artery of native heart without angina pectoris    3. Severe aortic stenosis by prior echocardiogram    4. Chronic atrial fibrillation    5. Controlled type 2 diabetes mellitus with microalbuminuria, without long-term current use of insulin    6. Hyperlipidemia, unspecified hyperlipidemia type         Plan:          Symptoms related to AS and RV failure are worsening  Will refer back to TAVR clinic  OV here 3 months

## 2017-06-12 ENCOUNTER — ANTI-COAG VISIT (OUTPATIENT)
Dept: CARDIOLOGY | Facility: CLINIC | Age: 82
End: 2017-06-12

## 2017-06-12 DIAGNOSIS — I35.0 AORTIC VALVE STENOSIS, UNSPECIFIED ETIOLOGY: Primary | ICD-10-CM

## 2017-06-12 DIAGNOSIS — N18.9 CHRONIC KIDNEY DISEASE, UNSPECIFIED STAGE: ICD-10-CM

## 2017-06-12 DIAGNOSIS — Z79.01 LONG-TERM (CURRENT) USE OF ANTICOAGULANTS, INR GOAL 2.0-3.0: ICD-10-CM

## 2017-06-12 DIAGNOSIS — Z86.73 HX-TIA (TRANSIENT ISCHEMIC ATTACK): ICD-10-CM

## 2017-06-12 DIAGNOSIS — I48.20 CHRONIC ATRIAL FIBRILLATION: ICD-10-CM

## 2017-06-12 LAB — INR PPP: 2.6

## 2017-06-16 ENCOUNTER — TELEPHONE (OUTPATIENT)
Dept: FAMILY MEDICINE | Facility: CLINIC | Age: 82
End: 2017-06-16

## 2017-06-16 ENCOUNTER — HOSPITAL ENCOUNTER (EMERGENCY)
Facility: HOSPITAL | Age: 82
Discharge: HOME | End: 2017-06-16
Attending: EMERGENCY MEDICINE
Payer: MEDICARE

## 2017-06-16 VITALS
OXYGEN SATURATION: 96 % | SYSTOLIC BLOOD PRESSURE: 134 MMHG | HEART RATE: 66 BPM | DIASTOLIC BLOOD PRESSURE: 63 MMHG | TEMPERATURE: 98 F | BODY MASS INDEX: 23.66 KG/M2 | RESPIRATION RATE: 18 BRPM | WEIGHT: 142 LBS | HEIGHT: 65 IN

## 2017-06-16 DIAGNOSIS — T63.444A BEE STING, UNDETERMINED INTENT, INITIAL ENCOUNTER: Primary | ICD-10-CM

## 2017-06-16 PROCEDURE — 25000003 PHARM REV CODE 250: Performed by: NURSE PRACTITIONER

## 2017-06-16 PROCEDURE — 99283 EMERGENCY DEPT VISIT LOW MDM: CPT

## 2017-06-16 RX ORDER — DIPHENHYDRAMINE HCL 25 MG
25 CAPSULE ORAL
Status: COMPLETED | OUTPATIENT
Start: 2017-06-16 | End: 2017-06-16

## 2017-06-16 RX ORDER — FAMOTIDINE 20 MG/1
20 TABLET, FILM COATED ORAL
Status: COMPLETED | OUTPATIENT
Start: 2017-06-16 | End: 2017-06-16

## 2017-06-16 RX ADMIN — FAMOTIDINE 20 MG: 20 TABLET, FILM COATED ORAL at 01:06

## 2017-06-16 RX ADMIN — DIPHENHYDRAMINE HYDROCHLORIDE 25 MG: 25 CAPSULE ORAL at 01:06

## 2017-06-16 NOTE — TELEPHONE ENCOUNTER
Spoke w/patient's daughter Mayte,  patient was stung in (L) hand by a bee last night at home.  patient was seen in urgent care, prescribed Clindamycin 300mg and instructed if swelling involves the arm, proceed to the ED. Mayte states this morning patient's entire (L) arm is swollen. States she will proceed to ED with patient.

## 2017-06-16 NOTE — DISCHARGE INSTRUCTIONS
Please return to the ED for any new or worsening symptoms: worsening swelling or redness, chest pain, shortness of breath, loss of consciousness or any other concerns. Please follow up with primary care within in the week. You may also call [phone] for the [facility] same day appointment line.     You may take over the counter Benadryl (25mg) and Pepcid as directed for itching.

## 2017-06-16 NOTE — ED PROVIDER NOTES
"Encounter Date: 6/16/2017    SCRIBE #1 NOTE: I, Chetan Sen, am scribing for, and in the presence of,  Homa Palacios NP. I have scribed the following portions of the note - Other sections scribed: HPI and ROS.       History     Chief Complaint   Patient presents with    Cellulitis     Stung by a bee last PM. Has cellulitis to L arm & hand     Review of patient's allergies indicates:   Allergen Reactions    Levaquin [levofloxacin]     Doxycycline hyclate Other (See Comments)     Unknown to patient    Iodine and iodide containing products     Neurontin  [gabapentin]      Other reaction(s): Unknown    Norpace  [disopyramide]      Other reaction(s): Hives    Phenytoin sodium extended      Other reaction(s): Muscle pain    Sulfamethoxazole-trimethoprim      Other reaction(s): Muscle cramps     CC: Cellulitis     HPI: This 89 y.o F with PN, A-Fib, HLD, HTN, CAD, DM type II, arthritis and PMHx of TIA presents to the ED accompanied by her daughter for emergent evaluation of L hand redness and swelling with associated itching which occurred while she was gardening yesterday. Patient states she was stung a bee. No reported allergy to bee stings. The pt was seen at an  yesterday who dx her with cellulitis and Rx Clindamycin. Pt states her L hand and L forearm were "swollen and tight" this AM. Per the pt's daughter, the swelling has since decreased, but the redness began radiating to her fingers and forearm at approximately 2300 yesterday. Pt's PCP recommended the pt come to the ED. The pt denies fever, emesis, chest pain and abdominal pain. Pt attempted tx with an ice pack. No prior tx.       The history is provided by the patient and a relative. No  was used.     Past Medical History:   Diagnosis Date    Anticoagulated on Coumadin     Arthritis     Atrial fibrillation     Atrial fibrillation     Chronic rhinosinusitis     Coronary artery disease     Granulomatous lung disease     " Heart failure     Hyperlipidemia     Hypertension     Hypertensive heart disease without CHF (congestive heart failure)     Mitral valve prolapse     PN (peripheral neuropathy)     hereditary?(sister has it also)/idiopathic?/patient thinks from Zocor    Severe aortic stenosis by prior echocardiogram     TIA (transient ischemic attack)     Type 2 diabetes mellitus     Type II or unspecified type diabetes mellitus without mention of complication, not stated as uncontrolled      Past Surgical History:   Procedure Laterality Date    SINUS SURGERY      tonsillectomy       Family History   Problem Relation Age of Onset    Heart disease Father      49    Heart disease Mother     Thyroid disease Sister     Atrial fibrillation Sister     Atrial fibrillation Sister      neice    Lymphoma Sister 29    Atrial fibrillation Sister     Asthma Neg Hx     Emphysema Neg Hx      Social History   Substance Use Topics    Smoking status: Never Smoker    Smokeless tobacco: Never Used    Alcohol use 0.0 oz/week      Comment: rare     Review of Systems   Constitutional: Negative for chills and fever.   HENT: Negative for facial swelling and sore throat.    Eyes: Negative for discharge.   Respiratory: Negative for shortness of breath.    Cardiovascular: Negative for chest pain.   Gastrointestinal: Negative for abdominal pain, nausea and vomiting.   Genitourinary: Negative for dysuria and urgency.   Musculoskeletal: Negative for back pain.        (+) L hand swelling   (+) L forehead swelling    Skin: Negative for rash.        (+) L hand redness  (+) L forearm redness  (+) L hand itching   (+) L forearm itching   Neurological: Negative for weakness and headaches.       Physical Exam     Initial Vitals [06/16/17 1251]   BP Pulse Resp Temp SpO2   134/63 66 18 98.3 °F (36.8 °C) 96 %     Physical Exam    Constitutional: Vital signs are normal. She appears well-developed and well-nourished.  Non-toxic appearance.   Eyes: EOM  are normal.   Neck: Full passive range of motion without pain. Neck supple. No no neck rigidity.   Cardiovascular: Normal rate, S1 normal, S2 normal and normal heart sounds. Exam reveals no gallop.    No murmur heard.  Pulmonary/Chest: Effort normal and breath sounds normal. No tachypnea. She has no decreased breath sounds. She has no wheezes. She has no rhonchi. She has no rales.   Neurological: She is alert and oriented to person, place, and time. She has normal strength. Gait normal. GCS eye subscore is 4. GCS verbal subscore is 5. GCS motor subscore is 6.   Skin: Skin is warm and dry. No rash noted. There is erythema (with swelling; no lesions).              ED Course   Procedures  Labs Reviewed - No data to display          Medical Decision Making:   ED Management:  This is an 89-year-old female who presents to the ED with complaints of left hand redness and swelling after bee sting that occurred last night.  She is afebrile and well-appearing.  She has taken one dose of clindamycin, prescribed today at urgent care.  She denies fever, lesions or drainage.  On exam, there is swelling and erythema to the dorsal aspect of the left hand extending to the mid forearm.  No open lesions, abscess or neurovascular compromise.  Distal pulses and capillary refill normal.  I suspect this is an ALLERGIC reaction to the venom and doubt cellulitis however, I believe coverage with clindamycin is appropriate.  No evidence of angioedema or anaphylactic shock.  Benadryl and Pepcid given.  Discharged home with instructions for supportive care and follow-up.  Return precautions given.  Patient's case was discussed with Dr. Vargas, who agrees with her plan of care.            Scribe Attestation:   Scribe #1: I performed the above scribed service and the documentation accurately describes the services I performed. I attest to the accuracy of the note.    Attending Attestation:           Physician Attestation for  Scribe:  Physician Attestation Statement for Scribe #1: I, Homa Palacios NP, reviewed documentation, as scribed by Chetan Sen in my presence, and it is both accurate and complete.                 ED Course     Clinical Impression:   The encounter diagnosis was Bee sting, undetermined intent, initial encounter.    Disposition:   Disposition: Discharged  Condition: Stable       Homa Palacios NP  06/16/17 1700

## 2017-06-16 NOTE — TELEPHONE ENCOUNTER
----- Message from Alanna Gautam sent at 6/16/2017 11:15 AM CDT -----  Contact: self  Pt requesting a call back regarding urgent care visit and dx cellulitis.  Pt states it is extremely important she speaks with Nurse.  Please call pt at .    Thanks

## 2017-06-17 ENCOUNTER — OUTPATIENT CASE MANAGEMENT (OUTPATIENT)
Dept: ADMINISTRATIVE | Facility: OTHER | Age: 82
End: 2017-06-17

## 2017-06-17 NOTE — PROGRESS NOTES
Thank you for the referral.     For your information:    The following patient has been assigned to Elyse Medina RN ,  with Outpatient [facility] for high risk screening.    Reason: High Risk     [facility] at [phone] with any questions.    Thank you,      Ml Clark, SSC

## 2017-06-19 ENCOUNTER — ANTI-COAG VISIT (OUTPATIENT)
Dept: CARDIOLOGY | Facility: CLINIC | Age: 82
End: 2017-06-19

## 2017-06-19 DIAGNOSIS — Z79.01 LONG-TERM (CURRENT) USE OF ANTICOAGULANTS, INR GOAL 2.0-3.0: ICD-10-CM

## 2017-06-19 DIAGNOSIS — I48.20 CHRONIC ATRIAL FIBRILLATION: ICD-10-CM

## 2017-06-19 DIAGNOSIS — Z86.73 HX-TIA (TRANSIENT ISCHEMIC ATTACK): ICD-10-CM

## 2017-06-19 LAB — INR PPP: 2.2

## 2017-06-20 ENCOUNTER — OUTPATIENT CASE MANAGEMENT (OUTPATIENT)
Dept: ADMINISTRATIVE | Facility: OTHER | Age: 82
End: 2017-06-20

## 2017-06-20 NOTE — PROGRESS NOTES
Spoke with pt. Pt denies any needs. Pt went to ED for a bee sting this week. She reports healing. All needs addressed at this time.

## 2017-06-26 ENCOUNTER — ANTI-COAG VISIT (OUTPATIENT)
Dept: CARDIOLOGY | Facility: CLINIC | Age: 82
End: 2017-06-26
Payer: MEDICARE

## 2017-06-26 DIAGNOSIS — I48.20 CHRONIC ATRIAL FIBRILLATION: ICD-10-CM

## 2017-06-26 DIAGNOSIS — Z86.73 HX-TIA (TRANSIENT ISCHEMIC ATTACK): ICD-10-CM

## 2017-06-26 DIAGNOSIS — Z79.01 LONG-TERM (CURRENT) USE OF ANTICOAGULANTS, INR GOAL 2.0-3.0: ICD-10-CM

## 2017-06-26 LAB — INR PPP: 2.5

## 2017-06-26 PROCEDURE — G0250 MD INR TEST REVIE INTER MGMT: HCPCS | Mod: ,,,

## 2017-06-28 NOTE — PROGRESS NOTES
Please advise patient that she needs to continue weekly INR for now. We can determine if that can change once she has her first meter follow up appointment which has not yet happened. She can book the meter follow up anytime in July.

## 2017-07-03 ENCOUNTER — ANTI-COAG VISIT (OUTPATIENT)
Dept: CARDIOLOGY | Facility: CLINIC | Age: 82
End: 2017-07-03

## 2017-07-03 DIAGNOSIS — Z86.73 HX-TIA (TRANSIENT ISCHEMIC ATTACK): ICD-10-CM

## 2017-07-03 DIAGNOSIS — Z79.01 LONG-TERM (CURRENT) USE OF ANTICOAGULANTS, INR GOAL 2.0-3.0: ICD-10-CM

## 2017-07-03 DIAGNOSIS — I48.20 CHRONIC ATRIAL FIBRILLATION: ICD-10-CM

## 2017-07-03 LAB — INR PPP: 2.3

## 2017-07-10 ENCOUNTER — ANTI-COAG VISIT (OUTPATIENT)
Dept: CARDIOLOGY | Facility: CLINIC | Age: 82
End: 2017-07-10
Payer: MEDICARE

## 2017-07-10 ENCOUNTER — INITIAL CONSULT (OUTPATIENT)
Dept: CARDIOLOGY | Facility: CLINIC | Age: 82
End: 2017-07-10
Payer: MEDICARE

## 2017-07-10 ENCOUNTER — EDUCATION (OUTPATIENT)
Dept: CARDIOLOGY | Facility: CLINIC | Age: 82
End: 2017-07-10

## 2017-07-10 ENCOUNTER — LAB VISIT (OUTPATIENT)
Dept: LAB | Facility: HOSPITAL | Age: 82
End: 2017-07-10
Payer: MEDICARE

## 2017-07-10 VITALS
OXYGEN SATURATION: 98 % | SYSTOLIC BLOOD PRESSURE: 143 MMHG | WEIGHT: 143.94 LBS | HEIGHT: 65 IN | HEART RATE: 44 BPM | DIASTOLIC BLOOD PRESSURE: 65 MMHG | BODY MASS INDEX: 23.98 KG/M2

## 2017-07-10 DIAGNOSIS — I35.0 AORTIC VALVE STENOSIS, UNSPECIFIED ETIOLOGY: Primary | ICD-10-CM

## 2017-07-10 DIAGNOSIS — N18.9 CHRONIC KIDNEY DISEASE: ICD-10-CM

## 2017-07-10 DIAGNOSIS — I48.20 CHRONIC ATRIAL FIBRILLATION: ICD-10-CM

## 2017-07-10 DIAGNOSIS — Z79.01 LONG-TERM (CURRENT) USE OF ANTICOAGULANTS, INR GOAL 2.0-3.0: ICD-10-CM

## 2017-07-10 DIAGNOSIS — I35.0 SEVERE AORTIC STENOSIS BY PRIOR ECHOCARDIOGRAM: ICD-10-CM

## 2017-07-10 DIAGNOSIS — Z86.73 HX-TIA (TRANSIENT ISCHEMIC ATTACK): ICD-10-CM

## 2017-07-10 DIAGNOSIS — I35.0 AORTIC VALVE STENOSIS, UNSPECIFIED ETIOLOGY: ICD-10-CM

## 2017-07-10 DIAGNOSIS — I25.10 CORONARY ARTERY DISEASE INVOLVING NATIVE CORONARY ARTERY OF NATIVE HEART WITHOUT ANGINA PECTORIS: Primary | ICD-10-CM

## 2017-07-10 LAB
ALBUMIN SERPL BCP-MCNC: 3.7 G/DL
ANION GAP SERPL CALC-SCNC: 8 MMOL/L
APTT BLDCRRT: 30.5 SEC
BUN SERPL-MCNC: 22 MG/DL
CALCIUM SERPL-MCNC: 9.5 MG/DL
CHLORIDE SERPL-SCNC: 104 MMOL/L
CO2 SERPL-SCNC: 30 MMOL/L
CREAT SERPL-MCNC: 0.8 MG/DL
ERYTHROCYTE [DISTWIDTH] IN BLOOD BY AUTOMATED COUNT: 15.1 %
EST. GFR  (AFRICAN AMERICAN): >60 ML/MIN/1.73 M^2
EST. GFR  (NON AFRICAN AMERICAN): >60 ML/MIN/1.73 M^2
GLUCOSE SERPL-MCNC: 88 MG/DL
HCT VFR BLD AUTO: 37 %
HGB BLD-MCNC: 11.8 G/DL
INR PPP: 2
INR PPP: 2.3
MCH RBC QN AUTO: 31.4 PG
MCHC RBC AUTO-ENTMCNC: 31.9 %
MCV RBC AUTO: 98 FL
PLATELET # BLD AUTO: 181 K/UL
PMV BLD AUTO: 10.3 FL
POTASSIUM SERPL-SCNC: 4.1 MMOL/L
PROTHROMBIN TIME: 20.5 SEC
RBC # BLD AUTO: 3.76 M/UL
SODIUM SERPL-SCNC: 142 MMOL/L
WBC # BLD AUTO: 7.93 K/UL

## 2017-07-10 PROCEDURE — 99999 PR PBB SHADOW E&M-EST. PATIENT-LVL III: CPT | Mod: PBBFAC,,, | Performed by: INTERNAL MEDICINE

## 2017-07-10 PROCEDURE — 1157F ADVNC CARE PLAN IN RCRD: CPT | Mod: ,,, | Performed by: INTERNAL MEDICINE

## 2017-07-10 PROCEDURE — 1126F AMNT PAIN NOTED NONE PRSNT: CPT | Mod: ,,, | Performed by: INTERNAL MEDICINE

## 2017-07-10 PROCEDURE — 1159F MED LIST DOCD IN RCRD: CPT | Mod: ,,, | Performed by: INTERNAL MEDICINE

## 2017-07-10 PROCEDURE — 99215 OFFICE O/P EST HI 40 MIN: CPT | Mod: S$PBB,,, | Performed by: INTERNAL MEDICINE

## 2017-07-10 PROCEDURE — 99999 PR PBB SHADOW E&M-EST. PATIENT-LVL I: CPT | Mod: PBBFAC,,,

## 2017-07-10 PROCEDURE — 99213 OFFICE O/P EST LOW 20 MIN: CPT | Mod: PBBFAC | Performed by: INTERNAL MEDICINE

## 2017-07-10 RX ORDER — DEXTROSE MONOHYDRATE AND SODIUM CHLORIDE 5; .45 G/100ML; G/100ML
INJECTION, SOLUTION INTRAVENOUS CONTINUOUS
Status: CANCELLED | OUTPATIENT
Start: 2017-07-10 | End: 2017-07-10

## 2017-07-10 RX ORDER — CLINDAMYCIN HYDROCHLORIDE 300 MG/1
CAPSULE ORAL
Status: ON HOLD | COMMUNITY
Start: 2017-06-16 | End: 2017-08-22 | Stop reason: HOSPADM

## 2017-07-10 NOTE — PROGRESS NOTES
OUTPATIENT CATHETERIZATION INSTRUCTIONS    You have been scheduled for a procedure in the catheterization lab on Tuesday, August 22, 2017.     Please report to the Cardiology Waiting Area on the Third floor of the hospital and check in at 10 AM.   You will then be taken to the SSCU (Short Stay Cardiac Unit) and prepared for your procedure. Please be aware that this is not the time of your procedure but the time you are to arrive. The procedures are scheduled on an hourly basis; however, emergency cases take precedence over all other cases.       You may not have anything to eat or drink after midnight the night before your test. You may take your regular morning medications with water. If there are any medications that you should not take you will be instructed to hold them that morning. If you are diabetic and on Metformin (Glucophage) do not take it the day before, the day of, and for 2 days after your procedure.      The procedure will take 1-2 hours to perform. After the procedure, you will return to SSCU on the third floor of the hospital. You will need to lie still (or keep your arm still) for the next 4 to 6 hours to minimize bleeding from the puncture site. Your family may remain in the room with you during this time.       You may be able to be discharged home that same afternoon if there is someone to drive you home and there were no complications. If you have one of the balloon, stent, or device procedures you may spend the night in the hospital. Your doctor will determine, based on your progress, the date and time of your discharge. The results of your procedure will be discussed with you before you are discharged. Any further testing or procedures will be scheduled for you either before you leave or you will be called with these appointments.       If you should have any questions, concerns, or need to change the date of your procedure, please call  [facility] @ [phone]    Special  Instructions:  Take 4 plavix tablets the day before then one daily  Stop coumadin 3 days before  Continue on Aspirin 81 mg daily  Take Iodine prep as instructed on enclosed instructions          THE ABOVE INSTRUCTIONS WERE GIVEN TO THE PATIENT VERBALLY AND THEY VERBALIZED UNDERSTANDING.  THEY DO NOT REQUIRE ANY SPECIAL NEEDS AND DO NOT HAVE ANY LEARNING BARRIERS.          Directions for Reporting to Cardiology Waiting Area in the Hospital  If you park in the Parking Garage:  Take elevators to the1st floor of the parking garage.  Continue past the gift shop, coffee shop, and piano.  Take a right and go to the gold elevators. (Elevator B)  Take the elevator to the 3rd floor.  Follow the arrow on the sign on the wall that says Cath Lab Registration/EP Lab Registration.  Follow the long hallway all the way around until you come to a big open area.  This is the registration area.  Check in at Reception Desk.    OR    If family is dropping you off:  Have them drop you off at the front of the Hospital under the green overhang.  Enter through the doors and take a right.  Take the E elevators to the 3rd floor Cardiology Waiting Area.  Check in at the Reception Desk in the waiting room.

## 2017-07-10 NOTE — LETTER
July 10, 2017      Theo Ivy MD  [address]  [place] [address]           [facility]  [address]  [facility] [address]  Phone: [phone]          Patient: Adela Garay   MR Number: [id number]   YOB: 1927   Date of Visit: 7/10/2017       Dear Dr. Theo Ivy:    Thank you for referring Adela Garay to me for evaluation. Attached you will find relevant portions of my assessment and plan of care.    If you have questions, please do not hesitate to call me. I look forward to following Adela Garay along with you.    Sincerely,    Cesario Campbell MD    Enclosure  CC:  No Recipients    If you would like to receive this communication electronically, please contact [email or web address] or [phone] to request more information on [email or web address] Link access.    For providers and/or their staff who would like to refer a patient to [facility], please contact us through our one-stop-shop provider referral line, [facility], at [phone].    If you feel you have received this communication in error or would no longer like to receive these types of communications, please e-mail [email or web address]

## 2017-07-10 NOTE — PROGRESS NOTES
Patient presents for meter follow-up appointment.  Patient demonstrated proper use of meter.  Patient denies any difficulty using home monitor or reporting results.  INR results in meter are consistent with those reported and are without discrepancies.  Patient denies self-adjusting diet or dose based on readings.  Reminded patient to continue to contact clinic with any new medications, changes in health, or changes in diet. Patient will begin monitoring INR every 2 weeks and return to clinic in 12 months.  Patient advised to contact clinic with any changes, questions, or concerns

## 2017-07-10 NOTE — PROGRESS NOTES
Interventional Cardiology Clinic Note  Reason for Visit: severe AS  Referring MD: Dr Theo Ivy     HPI:   Adela Garay is a 89 y.o. female referred by Dr Ivy for evaluation of severe AS (NYHA Class II sx).  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 Comorbidities include DM, AF on coumadin, TIA, severe TR, PA systolic pressure is 43 mmHg and HTN. In the past she has been evaluated at [facility] by Dr Briseno and then by us 6/6/2016 for TAVR but she was not symptomatic at that time and hence further workup was differed. She is now symptomatic and is short of breath on walking in the back yard which is new, also she gets up at night because she is short of breath. No chest pain. Her daughter is with her today.     The patient has undergone the following TAVR work-up:   ECHO (Date 6/7/2017): DILAN= 0.53 cm2, MG= 62 mmHg, Peak Jose Elias= 4.86 m/s, EF= 60%.   LHC : pending  STS: 5.3%   Frailty: 2/4 (walk and  strength)  Iliacs are pending  LVOT area by CTA is pending  Incidental findings on CT: is pending  CT Surgery risk assessment: high risk, per Dr Lamb due to age and comorbidities (note from 6/7/2016 by Dr Lamb)  Rhythm issues: AF with nonspecific intraventricular block  PFTs: pending          ROS:    Constitution: Negative for fever or chills. Negative for weight loss or gain.   HENT: Negative for sore throat or headaches. Negative for rhinorrhea.  Eyes: Negative for blurred or double vision.   Cardiovascular: See above  Pulmonary:  Negative for cough.   Gastrointestinal: Negative for abdominal pain. Negative for nausea/ vomiting. Negative for diarrhea.   : Negative for dysuria.   Neurological: Negative for focal weakness or sensory changes.  PMH:     Past Medical History:   Diagnosis Date    Anticoagulated on Coumadin     Arthritis     Atrial fibrillation     Atrial fibrillation     Chronic rhinosinusitis     Coronary artery disease     Granulomatous lung disease     Heart failure     Hyperlipidemia      Hypertension     Hypertensive heart disease without CHF (congestive heart failure)     Mitral valve prolapse     PN (peripheral neuropathy)     hereditary?(sister has it also)/idiopathic?/patient thinks from Zocor    Severe aortic stenosis by prior echocardiogram     TIA (transient ischemic attack)     Type 2 diabetes mellitus     Type II or unspecified type diabetes mellitus without mention of complication, not stated as uncontrolled      Past Surgical History:   Procedure Laterality Date    SINUS SURGERY      tonsillectomy       Allergies:     Review of patient's allergies indicates:   Allergen Reactions    Levaquin [levofloxacin]     Doxycycline hyclate Other (See Comments)     Unknown to patient    Iodine and iodide containing products     Neurontin  [gabapentin]      Other reaction(s): Unknown    Norpace  [disopyramide]      Other reaction(s): Hives    Phenytoin sodium extended      Other reaction(s): Muscle pain    Sulfamethoxazole-trimethoprim      Other reaction(s): Muscle cramps     Medications:     Current Outpatient Prescriptions on File Prior to Visit   Medication Sig Dispense Refill    acetaminophen (TYLENOL) 325 MG tablet Take 325 mg by mouth every 6 (six) hours as needed for Pain.      alpha lipoic acid 600 mg Cap Take 600 mg by mouth Daily. (Patient taking differently: Take 100 mg by mouth Daily. ) 100 each 12    aspirin 81 mg Tab Take 81 mg by mouth. 4 times weekly      cholecalciferol, vitamin D3, 1,000 unit capsule Take 1,000 Units by mouth once daily.      CHROMIUM PICOLINATE ORAL Take 200 mg by mouth once daily.      CINNAMON BARK (CINNAMON ORAL) Take 1,000 mg by mouth 2 (two) times daily.      digoxin (LANOXIN) 250 mcg tablet TAKE 1 TABLET ONE TIME DAILY AND TAKE AN ADDITIONAL 1/2 TABLET ONCE EACH WEEK 97 tablet 0    folic acid (FOLVITE) 1 MG tablet Take 1 tablet (1 mg total) by mouth once daily. 90 tablet 3    furosemide (LASIX) 20 MG tablet Take 1 tablet (20 mg total)  "by mouth 2 (two) times daily as needed (leg swelling). (Patient taking differently: Take 20 mg by mouth once daily. ) 60 tablet 11    metoprolol tartrate (LOPRESSOR) 50 MG tablet TAKE 1 TABLET TWICE DAILY 180 tablet 0    mupirocin (BACTROBAN) 2 % ointment Apply topically as needed.      simvastatin (ZOCOR) 20 MG tablet Take 1 tablet (20 mg total) by mouth every evening. 90 tablet 0    warfarin (COUMADIN) 2 MG tablet TAKE 2 TABLETS EVERY DAY EXCEPT TAKE 1 TABLET ON SUNDAY 169 tablet 3    blood sugar diagnostic (BLOOD GLUCOSE TEST) Strp 1 strip by Misc.(Non-Drug; Combo Route) route once daily. Currently using Nova max plus meter. 50 strip 11    docusate sodium (COLACE) 100 MG capsule Take 100 mg by mouth. 1 Capsule Oral Every day      triamcinolone acetonide 0.025% (KENALOG) 0.025 % cream Use bid as needed. 80 g 1     No current facility-administered medications on file prior to visit.      Social History:     Social History   Substance Use Topics    Smoking status: Never Smoker    Smokeless tobacco: Never Used    Alcohol use 0.0 oz/week      Comment: rare     Family History:     Family History   Problem Relation Age of Onset    Heart disease Father      49    Heart disease Mother     Thyroid disease Sister     Atrial fibrillation Sister     Atrial fibrillation Sister      neice    Lymphoma Sister 29    Atrial fibrillation Sister     Asthma Neg Hx     Emphysema Neg Hx      Physical Exam:   BP (!) 143/65 (BP Location: Right arm, Patient Position: Sitting, BP Method: Automatic)   Pulse (!) 44   Ht 5' 5.1" (1.654 m)   Wt 65.3 kg (143 lb 15.4 oz)   SpO2 98%   BMI 23.88 kg/m²      Constitutional: No acute distress, conversant  HEENT: Sclera anicteric, Pupils equal, round and reactive to light, extraocular motions intact, Oropharynx clear  Neck: No JVD, no carotid bruits  Cardiovascular: irregular rate and rhythm, mid systolic murmur, rubs or gallops, normal S1/S2  Pulmonary: Clear to auscultation " bilaterally  Abdominal: Abdomen soft, nontender, nondistended, positive bowel sounds  Extremities: No lower extremity edema,   Skin: No ecchymosis, erythema, or ulcers  Psych: Alert and oriented x 3, appropriate affect  Neuro: CNII-XII intact, no focal deficits    Labs:     Lab Results   Component Value Date     07/10/2017    K 4.1 07/10/2017     07/10/2017    CO2 30 (H) 07/10/2017    BUN 22 07/10/2017    CREATININE 0.8 07/10/2017    ANIONGAP 8 07/10/2017     Lab Results   Component Value Date    AST 27 11/18/2016    ALT 21 11/18/2016    ALKPHOS 71 11/18/2016    BILITOT 2.2 (H) 11/18/2016    BILIDIR 0.2 12/12/2005    ALBUMIN 3.7 07/10/2017     Lab Results   Component Value Date    CALCIUM 9.5 07/10/2017    MG 2.5 (H) 08/08/2005     Lab Results   Component Value Date     (H) 09/15/2014     (H) 03/25/2014     (H) 04/28/2012    Lab Results   Component Value Date    WBC 7.93 07/10/2017    HGB 11.8 (L) 07/10/2017    HCT 37.0 07/10/2017     07/10/2017    GRAN 3.6 01/17/2017    GRAN 56.9 01/17/2017     Lab Results   Component Value Date    INR 2.0 (H) 07/10/2017    INR 2.3 07/10/2017    INR 1.8 (H) 05/03/2012     Lab Results   Component Value Date    CHOL 136 05/29/2017    HDL 39 (L) 05/29/2017    LDLCALC 83.6 05/29/2017    TRIG 67 05/29/2017     Lab Results   Component Value Date    HGBA1C 6.0 05/31/2017     Lab Results   Component Value Date    TSH 2.630 03/11/2014            Assessment:   Adela Garay is a 89 y.o. female referred by Dr Ivy for evaluation of severe AS (NYHA Class II sx).  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 Comorbidities include DM, AF on coumadin, TIA, severe TR, PA systolic pressure is 43 mmHg and HTN. Currently she is symptomatic     The patient has undergone the following TAVR work-up:   ECHO (Date 6/7/2017): DILAN= 0.53 cm2, MG= 62 mmHg, Peak Jose Elias= 4.86 m/s, EF= 60%.   LHC : pending  STS: 5.3%   Frailty: 2/4 (walk and  strength)  Iliacs are  pending  LVOT area by CTA is pending  Incidental findings on CT: is pending  CT Surgery risk assessment: high risk, per Dr Lamb due to age and comorbidities (note from 6/7/2016 by Dr Lamb)  Rhythm issues: AF with nonspecific intraventricular block  PFTs: pending  Plan:   1. The family wants to discuss every thing over before deciding to go ahead with TAVR evaluation. They will give Dr Campbell / Lacy a call to once they make their decision.   2. Consented for LHC   3. Was seen by Dr Lamb last year and is high risk for surgery   4. Needs LHC, PFTs, and TAVR CTA   5. Will be a BMS candidate, currently is on aspirin and coumadin, once makes up their mind will need to start on plavix pre cath and check PRU  6. If cath, it will be right groin access and 5 F sheath and 4 F cath     Signed:  Reji Stoll MD  Interventional / Structural  Cardiology Fellow  Beeper:  [phone]  7/10/2017 11:12 AM    Staff:  I have personally taken the history and examined this patient and agree with the fellow's note as stated above and amended it accordingly :-) This delightful lady is not sure if she wants to have TAVR evaluation.   If she decides to proceed with TAVR evaluation will do cath and possible PCI, PFT's, CTA, and have her see CT surgery.

## 2017-07-14 ENCOUNTER — TELEPHONE (OUTPATIENT)
Dept: FAMILY MEDICINE | Facility: CLINIC | Age: 82
End: 2017-07-14

## 2017-07-14 ENCOUNTER — OFFICE VISIT (OUTPATIENT)
Dept: CARDIOLOGY | Facility: CLINIC | Age: 82
End: 2017-07-14
Payer: MEDICARE

## 2017-07-14 VITALS
SYSTOLIC BLOOD PRESSURE: 140 MMHG | HEIGHT: 65 IN | DIASTOLIC BLOOD PRESSURE: 64 MMHG | WEIGHT: 144.63 LBS | HEART RATE: 68 BPM | OXYGEN SATURATION: 94 % | BODY MASS INDEX: 24.1 KG/M2

## 2017-07-14 DIAGNOSIS — I27.20 PULMONARY HYPERTENSION: Primary | ICD-10-CM

## 2017-07-14 DIAGNOSIS — I35.0 SEVERE AORTIC STENOSIS BY PRIOR ECHOCARDIOGRAM: ICD-10-CM

## 2017-07-14 DIAGNOSIS — I77.9 CAROTID ARTERY DISEASE, UNSPECIFIED LATERALITY: ICD-10-CM

## 2017-07-14 DIAGNOSIS — I25.10 CORONARY ARTERY DISEASE INVOLVING NATIVE CORONARY ARTERY OF NATIVE HEART WITHOUT ANGINA PECTORIS: ICD-10-CM

## 2017-07-14 DIAGNOSIS — I11.0 BENIGN HYPERTENSIVE HEART DISEASE WITH CONGESTIVE HEART FAILURE: ICD-10-CM

## 2017-07-14 DIAGNOSIS — I48.0 PAROXYSMAL ATRIAL FIBRILLATION: ICD-10-CM

## 2017-07-14 PROCEDURE — 99213 OFFICE O/P EST LOW 20 MIN: CPT | Mod: PBBFAC | Performed by: INTERNAL MEDICINE

## 2017-07-14 PROCEDURE — 1157F ADVNC CARE PLAN IN RCRD: CPT | Mod: ,,, | Performed by: INTERNAL MEDICINE

## 2017-07-14 PROCEDURE — 99214 OFFICE O/P EST MOD 30 MIN: CPT | Mod: S$PBB,,, | Performed by: INTERNAL MEDICINE

## 2017-07-14 PROCEDURE — 1126F AMNT PAIN NOTED NONE PRSNT: CPT | Mod: ,,, | Performed by: INTERNAL MEDICINE

## 2017-07-14 PROCEDURE — 1159F MED LIST DOCD IN RCRD: CPT | Mod: ,,, | Performed by: INTERNAL MEDICINE

## 2017-07-14 PROCEDURE — 99999 PR PBB SHADOW E&M-EST. PATIENT-LVL III: CPT | Mod: PBBFAC,,, | Performed by: INTERNAL MEDICINE

## 2017-07-14 NOTE — TELEPHONE ENCOUNTER
----- Message from Edith Griggs sent at 7/14/2017 10:51 AM CDT -----  Contact: Self  Pt calling to speak to Dr. Farrell regarding heart procedure that she will be having soon. Please call [phone]

## 2017-07-17 ENCOUNTER — TELEPHONE (OUTPATIENT)
Dept: CARDIOLOGY | Facility: CLINIC | Age: 82
End: 2017-07-17

## 2017-07-17 RX ORDER — ENOXAPARIN SODIUM 100 MG/ML
1 INJECTION SUBCUTANEOUS 2 TIMES DAILY
Qty: 10 SYRINGE | Refills: 0 | Status: SHIPPED | OUTPATIENT
Start: 2017-07-17 | End: 2017-07-31

## 2017-07-17 NOTE — TELEPHONE ENCOUNTER
----- Message from oCrrina Avery MA sent at 7/17/2017  1:48 PM CDT -----      ----- Message -----  From: Tiffanie Palomares, PharmD  Sent: 7/17/2017   1:43 PM  To: Theo Ivy MD, [facility] Staff    Good Afternoon Dr. Ivy,    Ms. Garay let us know that she will be having an angiogram on 8/22 and was instructed to hold coumadin x 3 days prior.  She is CHADs = 6 (Age, TIA, HTN, CHF, DM) and therefore, I would recommend that she holds with a lovenox bridge.  Please let me know if you disagree with this recommendation.    Thanks,  Tiffanie

## 2017-07-17 NOTE — TELEPHONE ENCOUNTER
----- Message from Tiffanie Palomares, Ivon sent at 7/17/2017  1:43 PM CDT -----  Good Afternoon Dr. Ivy,    MsMagali Garay let us know that she will be having an angiogram on 8/22 and was instructed to hold coumadin x 3 days prior.  She is CHADs = 6 (Age, TIA, HTN, CHF, DM) and therefore, I would recommend that she holds with a lovenox bridge.  Please let me know if you disagree with this recommendation.    Thanks,  Tiffanie

## 2017-07-21 ENCOUNTER — TELEPHONE (OUTPATIENT)
Dept: CARDIOLOGY | Facility: CLINIC | Age: 82
End: 2017-07-21

## 2017-07-24 ENCOUNTER — ANTI-COAG VISIT (OUTPATIENT)
Dept: CARDIOLOGY | Facility: CLINIC | Age: 82
End: 2017-07-24

## 2017-07-24 DIAGNOSIS — I48.20 CHRONIC ATRIAL FIBRILLATION: ICD-10-CM

## 2017-07-24 DIAGNOSIS — Z86.73 HX-TIA (TRANSIENT ISCHEMIC ATTACK): ICD-10-CM

## 2017-07-24 DIAGNOSIS — Z79.01 LONG-TERM (CURRENT) USE OF ANTICOAGULANTS, INR GOAL 2.0-3.0: ICD-10-CM

## 2017-07-24 LAB — INR PPP: 2.4

## 2017-07-26 ENCOUNTER — OFFICE VISIT (OUTPATIENT)
Dept: PODIATRY | Facility: CLINIC | Age: 82
End: 2017-07-26
Payer: MEDICARE

## 2017-07-26 VITALS
SYSTOLIC BLOOD PRESSURE: 114 MMHG | BODY MASS INDEX: 23.99 KG/M2 | DIASTOLIC BLOOD PRESSURE: 60 MMHG | WEIGHT: 144 LBS | HEIGHT: 65 IN

## 2017-07-26 DIAGNOSIS — B35.1 ONYCHOMYCOSIS DUE TO DERMATOPHYTE: ICD-10-CM

## 2017-07-26 DIAGNOSIS — Z79.01 ANTICOAGULATED ON COUMADIN: ICD-10-CM

## 2017-07-26 DIAGNOSIS — G60.9 IDIOPATHIC PERIPHERAL NEUROPATHY: Primary | ICD-10-CM

## 2017-07-26 DIAGNOSIS — L84 CORN OR CALLUS: ICD-10-CM

## 2017-07-26 DIAGNOSIS — I11.0 BENIGN HYPERTENSIVE HEART DISEASE WITH CONGESTIVE HEART FAILURE: ICD-10-CM

## 2017-07-26 DIAGNOSIS — R60.0 BILATERAL LEG EDEMA: ICD-10-CM

## 2017-07-26 DIAGNOSIS — I83.90 VARICOSITIES OF LEG: ICD-10-CM

## 2017-07-26 PROCEDURE — 99499 UNLISTED E&M SERVICE: CPT | Mod: S$PBB,,, | Performed by: PODIATRIST

## 2017-07-26 PROCEDURE — 99999 PR PBB SHADOW E&M-EST. PATIENT-LVL III: CPT | Mod: PBBFAC,,, | Performed by: PODIATRIST

## 2017-07-26 PROCEDURE — 11721 DEBRIDE NAIL 6 OR MORE: CPT | Mod: 59,Q9,S$PBB, | Performed by: PODIATRIST

## 2017-07-26 PROCEDURE — 11721 DEBRIDE NAIL 6 OR MORE: CPT | Mod: Q9,PBBFAC,PO | Performed by: PODIATRIST

## 2017-07-26 PROCEDURE — 11056 PARNG/CUTG B9 HYPRKR LES 2-4: CPT | Mod: Q9,PBBFAC,PO | Performed by: PODIATRIST

## 2017-07-26 PROCEDURE — 11056 PARNG/CUTG B9 HYPRKR LES 2-4: CPT | Mod: Q9,S$PBB,, | Performed by: PODIATRIST

## 2017-07-26 PROCEDURE — 99213 OFFICE O/P EST LOW 20 MIN: CPT | Mod: PBBFAC,PO | Performed by: PODIATRIST

## 2017-07-26 RX ORDER — CIMETIDINE 300 MG/1
TABLET, FILM COATED ORAL
COMMUNITY
Start: 2017-07-18 | End: 2017-08-28

## 2017-07-26 RX ORDER — PREDNISONE 50 MG/1
TABLET ORAL
Status: ON HOLD | COMMUNITY
Start: 2017-07-16 | End: 2017-08-22 | Stop reason: HOSPADM

## 2017-07-26 RX ORDER — CLOPIDOGREL BISULFATE 75 MG/1
TABLET ORAL
COMMUNITY
Start: 2017-07-16 | End: 2017-08-21 | Stop reason: SDUPTHER

## 2017-07-26 RX ORDER — TRIAMTERENE AND HYDROCHLOROTHIAZIDE 37.5; 25 MG/1; MG/1
1 CAPSULE ORAL DAILY
Qty: 90 CAPSULE | Refills: 1 | Status: SHIPPED | OUTPATIENT
Start: 2017-07-26 | End: 2017-08-09 | Stop reason: SDUPTHER

## 2017-07-26 NOTE — PATIENT INSTRUCTIONS
Recommend lotions: eucerin, aquaphor, A&D ointment, gold bond for diabetics, sween    Shoe recommendations: (try 6pm.com, zappos.com , nordstromrack.com, or shoes.com for discounted prices) you can visit [employer] shoes in [place] as well    Asics (GT 1000 or gel foundations), new balance, saucony (stabil c3),  Whitney (transcend), vionic, propet (tennis shoe)    soft brand, clarks, crocs, aerosoles, naturalizers, SAS, ecco, prashant, nehemiah chew, rockports (dress shoes)    Vionic, volitiles, burkenstocks, fitflops, propet (sandals)    Nike comfort thong sandals, crocs (house shoes)    Nail Home remedy:  Vicks Vapor rub       Wearing Proper Shoes                    You walk on your feet every day, forcing them to support the weight of your body. Repeated stress on your feet can cause damage over time. The right shoes can help protect your feet. The wrong shoes can cause more foot problems. Read the information below to help you find a shoe that fits your foot needs.     A good shoe fit will cover your foot outline.       A shoe that doesnt cover the outline is a bad fit.      Whats Your Foot Shape?  To get a good fit, you need to know the shape of your foot. Do this simple test: While standing, place your foot on a piece of paper and trace around it. Is your foot straight or curved? Do you have a foot problem, such as a bunion, that causes your foot outline to show a bulge on the side of your big toe?  Finding Your Fit  Bring your foot outline to the [employer] store to help you find the right shoe. Place a shoe you like on top of the outline to see if it matches the shape. The shoe should cover the outline. (If you have a bunion, the shoe may not cover the bulge on the outline. Look for soft leather shoes to stretch over the bunion.) Once youve found a pair of proper shoes, put them on. Walk around. Be sure the shoes dont rub or pinch. If the shoes feel good, youve found your fit!  The Right Shoe for You  A good shoe has  features that provide comfort and support. It must also be the right size and shape for your feet. Look for a shoe made of breathable fabric and lining, such as leather or canvas. Be sure that shoes have enough tread to prevent slipping. Go to a good shoe store for help finding the right shoe.  Good Shoe Features  An ideal shoe has the following:  · Laces for support. If tying laces is a problem for you, try shoes with Velcro fasteners or radha.  · A front of the shoe (toe box) with ½ inch space in front of your longest toes.  · An arch shape that supports your foot.  · No more than 1½ inches of heel.  · A stiff, snug back of the shoe to keep your foot from sliding around.  · A smooth lining with no rough seams.  Shoe Shopping Tips  Below are some dos and donts for when you go to the shoe store.  Do:  · Select the shoes that feel right. Wear them around the house. Then bring them to your foot doctor to check for fit. If they dont fit well, return them.  · Shop late in the day, when your feet will be slightly bigger.  · Each time you buy shoes, have both your feet measured while you are standing. Foot size changes with time.  · Pick shoes to suit their purpose. High heels are okay for an occasional night on the town. But for everyday wear, choose a more sensible shoe.  · Try on shoes while wearing any inserts specially made for your feet (orthoses).  · Try on both the right and left shoes. If your feet are different sizes, pick a pair that fits the larger foot.  Dont:  · Dont buy shoes based on shoe size alone. Always try on shoes, as sizes differ from brand to brand and within brands.  · Dont expect shoes to break in. If they dont fit at the store, dont buy them.  · Dont buy a shoe that doesnt match your foot shape.  What About Socks?  Always wear socks with shoes. Socks help absorb sweat and reduce friction and blistering. When shopping for shoes, choose soft, padded socks with seams that dont  irritate your feet.  If You Have Foot Problems  Some foot problems cause deformities. This can make it hard to find a good fit. Look for shoes made of soft leather to stretch over the deformity. If you have bunions, buy shoes with a wider toe box. To fit hammertoes, look for shoes with a tall toe box. If you have arch problems, you may need inserts. In some cases, youll need to have custom footwear or orthoses made for your feet.  Suggested Footwear  Ask your healthcare provider what kind of footwear you need. He or she may recommend a certain brand or shoe store.  © 5421-7751 [employer]. [address] [place] [address]. All rights reserved. This information is not intended as a substitute for professional medical care. Always follow your healthcare professional's instructions.        Toe Extension (Flexibility)    These instructions are for your right foot. Switch sides for your left foot.  1. Sit in a chair. Rest your right ankle on your left knee.  2. Hold your toes with your right hand. Gently bend the toes backward. Feel a stretch in the undersides of the toes and ball of the foot. Hold for 30 to 60 seconds.  3. Then gently bend the toes in the other direction. Gently press on them until your foot is pointed. Hold for 30 to 60 seconds.  4. Repeat 5 times, or as instructed.  © 0656-6695 [employer]. [address] [place] [address]. All rights reserved. This information is not intended as a substitute for professional medical care. Always follow your healthcare professional's instructions.

## 2017-07-26 NOTE — PROGRESS NOTES
Subjective:      Patient ID: Adela Garay is a 89 y.o. female.    Chief Complaint: Follow-up (pcp Dr. Farrell 5-31-17)    Adela is a 89 y.o. female who presents to the clinic for evaluation and treatment of high risk feet. Adela has a past medical history of Anticoagulated on Coumadin; Arthritis; Atrial fibrillation; Atrial fibrillation; Chronic rhinosinusitis; Coronary artery disease; Granulomatous lung disease; Heart failure; Hyperlipidemia; Hypertension; Hypertensive heart disease without CHF (congestive heart failure); Mitral valve prolapse; PN (peripheral neuropathy); Severe aortic stenosis by prior echocardiogram; TIA (transient ischemic attack); Type 2 diabetes mellitus; and Type II or unspecified type diabetes mellitus without mention of complication, not stated as uncontrolled. The patient's chief complaint is long, thick toenails. This patient has documented high risk feet requiring routine maintenance secondary to peripheral neuropathy.      PCP: Torrie Farrell MD    Date Last Seen by PCP:   Chief Complaint   Patient presents with    Follow-up     pcp Dr. Farrell 5-31-17       Current shoe gear:  PDV shoes    Hemoglobin A1C   Date Value Ref Range Status   05/31/2017 6.0 4.5 - 6.2 % Final     Comment:     According to ADA guidelines, hemoglobin A1C <7.0% represents  optimal control in non-pregnant diabetic patients.  Different  metrics may apply to specific populations.   Standards of Medical Care in Diabetes - 2016.  For the purpose of screening for the presence of diabetes:  <5.7%     Consistent with the absence of diabetes  5.7-6.4%  Consistent with increasing risk for diabetes   (prediabetes)  >or=6.5%  Consistent with diabetes  Currently no consensus exists for use of hemoglobin A1C  for diagnosis of diabetes for children.     09/09/2016 6.2 4.5 - 6.2 % Final     Comment:     According to ADA guidelines, hemoglobin A1C <7.0% represents  optimal control in non-pregnant diabetic patients.   Different  metrics may apply to specific populations.   Standards of Medical Care in Diabetes - 2016.  For the purpose of screening for the presence of diabetes:  <5.7%     Consistent with the absence of diabetes  5.7-6.4%  Consistent with increasing risk for diabetes   (prediabetes)  >or=6.5%  Consistent with diabetes  Currently no consensus exists for use of hemoglobin A1C  for diagnosis of diabetes for children.     04/08/2016 5.8 4.5 - 6.2 % Final         Patient Active Problem List   Diagnosis    Hyperlipidemia    Coronary artery disease    Hx-TIA (transient ischemic attack)    Severe aortic stenosis by prior echocardiogram    Long-term (current) use of anticoagulants, INR goal 2.0-3.0    Benign hypertensive heart disease with congestive heart failure    Pulmonary hypertension    Carotid arterial disease    Carotid aneurysm, left    Atrial fibrillation    Controlled type 2 diabetes mellitus with microalbuminuria    DDD (degenerative disc disease), cervical    Lumbar disc disease    Multiple lung nodules    Mild major depression    Idiopathic peripheral neuropathy    Atherosclerosis of aorta    Postinflammatory pulmonary fibrosis    Chronic cough    Chronic atrial fibrillation       Current Outpatient Prescriptions on File Prior to Visit   Medication Sig Dispense Refill    acetaminophen (TYLENOL) 325 MG tablet Take 325 mg by mouth every 6 (six) hours as needed for Pain.      alpha lipoic acid 600 mg Cap Take 600 mg by mouth Daily. (Patient taking differently: Take 100 mg by mouth Daily. ) 100 each 12    aspirin 81 mg Tab Take 81 mg by mouth. 4 times weekly      blood sugar diagnostic (BLOOD GLUCOSE TEST) Strp 1 strip by Misc.(Non-Drug; Combo Route) route once daily. Currently using Nova max plus meter. 50 strip 11    cholecalciferol, vitamin D3, 1,000 unit capsule Take 1,000 Units by mouth once daily.      CHROMIUM PICOLINATE ORAL Take 200 mg by mouth once daily.      CINNAMON BARK  (CINNAMON ORAL) Take 1,000 mg by mouth 2 (two) times daily.      clindamycin (CLEOCIN) 300 MG capsule       digoxin (LANOXIN) 250 mcg tablet TAKE 1 TABLET ONE TIME DAILY AND TAKE AN ADDITIONAL 1/2 TABLET ONCE EACH WEEK 97 tablet 0    docusate sodium (COLACE) 100 MG capsule Take 100 mg by mouth. 1 Capsule Oral Every day      enoxaparin (LOVENOX) 100 mg/mL Syrg Inject 0.7 mLs (70 mg total) into the skin 2 (two) times daily. Start the day after stopping coumadin. Do not take the morning of your heart cath 10 Syringe 0    furosemide (LASIX) 20 MG tablet Take 1 tablet (20 mg total) by mouth 2 (two) times daily as needed (leg swelling). (Patient taking differently: Take 20 mg by mouth once daily. ) 60 tablet 11    metoprolol tartrate (LOPRESSOR) 50 MG tablet TAKE 1 TABLET TWICE DAILY 180 tablet 0    mupirocin (BACTROBAN) 2 % ointment Apply topically as needed.      simvastatin (ZOCOR) 20 MG tablet Take 1 tablet (20 mg total) by mouth every evening. 90 tablet 0    triamcinolone acetonide 0.025% (KENALOG) 0.025 % cream Use bid as needed. 80 g 1    warfarin (COUMADIN) 2 MG tablet TAKE 2 TABLETS EVERY DAY EXCEPT TAKE 1 TABLET ON SUNDAY 169 tablet 3    folic acid (FOLVITE) 1 MG tablet Take 1 tablet (1 mg total) by mouth once daily. 90 tablet 3     No current facility-administered medications on file prior to visit.        Review of patient's allergies indicates:   Allergen Reactions    Levaquin [levofloxacin]     Doxycycline hyclate Other (See Comments)     Unknown to patient    Iodine and iodide containing products     Neurontin  [gabapentin]      Other reaction(s): Unknown    Norpace  [disopyramide]      Other reaction(s): Hives    Phenytoin sodium extended      Other reaction(s): Muscle pain    Sulfamethoxazole-trimethoprim      Other reaction(s): Muscle cramps       Past Surgical History:   Procedure Laterality Date    SINUS SURGERY      tonsillectomy         Family History   Problem Relation Age of  "Onset    Heart disease Father      49    Heart disease Mother     Thyroid disease Sister     Atrial fibrillation Sister     Atrial fibrillation Sister      neice    Lymphoma Sister 29    Atrial fibrillation Sister     Asthma Neg Hx     Emphysema Neg Hx        Social History     Social History    Marital status:      Spouse name: N/A    Number of children: 6    Years of education: 2 college     Occupational History    retired housewife      Social History Main Topics    Smoking status: Never Smoker    Smokeless tobacco: Never Used    Alcohol use 0.0 oz/week      Comment: rare    Drug use: No    Sexual activity: No     Other Topics Concern    Not on file     Social History Narrative    No narrative on file             ROS  Review of Systems   Constitutional: Negative for chills and fever.   Respiratory: Negative for cough.    Cardiovascular: Positive for leg swelling.   Gastrointestinal: Negative for nausea and vomiting.   Musculoskeletal: Positive for joint pain and myalgias. Negative for falls.   Skin: Negative for itching and rash.   Neurological: Positive for tingling and sensory change.           Objective:       Vitals:    07/26/17 1358   BP: 114/60   Weight: 65.3 kg (144 lb)   Height: 5' 5" (1.651 m)   PainSc: 0-No pain       Physical Exam   Constitutional:  Non-toxic appearance. She does not have a sickly appearance. No distress.   Pt. is well-developed, well-nourished, appears stated age, in no acute distress, alert and oriented x 3. No evidence of depression, anxiety, or agitation. Calm, cooperative, and communicative. Appropriate interactions and affect.   Cardiovascular:   Pulses:       Dorsalis pedis pulses are 1+ on the right side, and 1+ on the left side.        Posterior tibial pulses are 1+ on the right side, and 1+ on the left side.   Dorsalis pedis and posterior tibial pulses are diminished Bilaterally. Mild pitting edema, skin is atrophic, decreased digital hair, feet " slightly cool, nails thickened, mild plantar rubor     Pulmonary/Chest: No respiratory distress.   Musculoskeletal:        Right ankle: No tenderness. No lateral malleolus, no medial malleolus, no AITFL, no CF ligament and no posterior TFL tenderness found. Achilles tendon exhibits no pain, no defect and normal Harman's test results.        Left ankle: No tenderness. No lateral malleolus, no medial malleolus, no AITFL, no CF ligament and no posterior TFL tenderness found. Achilles tendon exhibits no pain, no defect and normal Harman's test results.        Right foot: There is no tenderness and no bony tenderness.        Left foot: There is no tenderness and no bony tenderness.   Patient has hammertoes of digits 2-5 bilateral partially reducible without symptom today.    2nd toes adduct against great toe    TA function absent L on muscle testing    Visible and palpable bunion without pain at dorsomedial 1st metatarsal head right and left.  Hallux abducted right and left partially reducible, tracks laterally without being track bound.  No ecchymosis, erythema, edema, or cardinal signs infection or signs of trauma same foot.     Lymphadenopathy:   No lymphatic streaking    Negative lymphadenopathy bilateral popliteal fossa and tarsal tunnel.     Neurological:   Shell-Jakub 5.07 monofilamant testing is diminished Moreno feet. Decreased/absent vibratory sensation bilateral feet to 128Hz tuning fork.    Skin: Skin is warm, dry and intact. No abrasion, no bruising, no ecchymosis and no rash noted. She is not diaphoretic. No cyanosis or erythema. No pallor. Nails show no clubbing.   Toenails 1-5 bilaterally are elongated by 2-3 mm, thickened by 2-3 mm, discolored/yellowed, dystrophic, brittle with subungual debris.     Focal hyperkeratotic lesion consisting entirely of hyperkeratotic tissue without open skin, drainage, pus, fluctuance, malodor, or signs of infection: Med DIPJ 2 L, distal sub 2nd met, left 1st  interspace   Psychiatric: Her mood appears not anxious. Her affect is not inappropriate. Her speech is not slurred. She is not combative. She is communicative. She is attentive.   Nursing note reviewed.        Assessment:       Encounter Diagnoses   Name Primary?    Idiopathic peripheral neuropathy Yes    Bilateral leg edema     Varicosities of leg     Onychomycosis due to dermatophyte     Corn or callus     Anticoagulated on Coumadin          Plan:       Adela was seen today for follow-up.    Diagnoses and all orders for this visit:    Idiopathic peripheral neuropathy    Bilateral leg edema    Varicosities of leg    Onychomycosis due to dermatophyte    Corn or callus    Anticoagulated on Coumadin      I counseled the patient on her conditions, their implications and medical management.    - Shoe inspection. Patient instructed on proper foot hygeine. We discussed wearing proper shoe gear, daily foot inspections, never walking without protective shoe gear, never putting sharp instruments to feet.      - With patient's permission, nails were aggressively reduced and debrided x 10 to their soft tissue attachment mechanically and with electric , removing all offending nail and debris. Patient relates relief following the procedure. After cleansing the  area w/ alcohol prep pad the above mentioned hyperkeratosis was trimmed utilizing No 15 scapel, to a smooth base with out incident. Patient tolerated this  well and reported comfort to the area of: Med DIPJ 2 L, distal sub 2nd met, left 1st interspace    - She will continue to monitor the areas daily, inspect her feet, wear protective shoe gear when ambulatory, moisturizer to maintain skin integrity and follow in this office in approximately 2-3 months, sooner p.r.n.

## 2017-07-27 ENCOUNTER — TELEPHONE (OUTPATIENT)
Dept: FAMILY MEDICINE | Facility: CLINIC | Age: 82
End: 2017-07-27

## 2017-07-27 NOTE — TELEPHONE ENCOUNTER
----- Message from Valerie Jimenes sent at 7/25/2017  8:59 AM CDT -----  Contact: self  Pt would like to speak with a nurse in regards to getting a appt with Dr Farrell on Wednesday to discuss a procedure she is considering... Please call  [phone]... Thanks

## 2017-07-28 NOTE — PROGRESS NOTES
The pt called this afternoon out of concern for using lovenox while she holds her coumadin for her upcoming angiogram on 8/22.  I explained that this recommendation was made with Dr. Ivy' approval based on her risk factors.  Per her request, I messaged Dr. Campbell to be sure that he is okay with this plan as well.

## 2017-07-31 ENCOUNTER — TELEPHONE (OUTPATIENT)
Dept: CARDIOLOGY | Facility: CLINIC | Age: 82
End: 2017-07-31

## 2017-07-31 NOTE — TELEPHONE ENCOUNTER
----- Message from Cesario Campbell MD sent at 7/31/2017  8:48 AM CDT -----  We routinely treat AF patients with percutaneous procedures.  I just hold the warfarin for two days and don't worry about the INR.  I don't think a lovenox bridge is necessary as long as Dr. Ivy agrees.  Bj  ----- Message -----  From: Tiffanie Palomares, PharmD  Sent: 7/28/2017   1:56 PM  To: Cesario Campbell MD    Good Afternoon Dr. Campbell,    We were planning to give Ms. Garay instructions to hold her coumadin with a lovenox bridge, for her upcoming angiogram scheduled with you on 8/22.  (She is CHADs = 6 (Age, TIA, HTN, CHF, DM) and this recommendation was made with Dr. Ivy' approval.)  We will instruct her last dose of lovenox to be no less than 24hrs prior to her procedure with you.    She is concerned and would like to be sure you are aware of and in agreement with our plan.  Therefore, do you have any objection to a lovenox bridge while she holds her coumadin?    Please advise and thanks,  Tiffanie

## 2017-07-31 NOTE — PROGRESS NOTES
"From Dr. Campbell: "We routinely treat AF patients with percutaneous procedures.  I just hold the warfarin for two days and don't worry about the INR.  I don't think a lovenox bridge is necessary as long as Dr. Ivy agrees."  And from Dr. Ivy: "Sounds reasonable to me."  Therefore, we will instruct the pt to hold coumadin x 2 days prior without lovenox.  I let the pt know.  "

## 2017-08-03 ENCOUNTER — HOSPITAL ENCOUNTER (OUTPATIENT)
Dept: PULMONOLOGY | Facility: CLINIC | Age: 82
Discharge: HOME | End: 2017-08-03
Payer: MEDICARE

## 2017-08-03 ENCOUNTER — OFFICE VISIT (OUTPATIENT)
Dept: PULMONOLOGY | Facility: CLINIC | Age: 82
End: 2017-08-03
Payer: MEDICARE

## 2017-08-03 VITALS
BODY MASS INDEX: 23.88 KG/M2 | DIASTOLIC BLOOD PRESSURE: 74 MMHG | HEART RATE: 59 BPM | RESPIRATION RATE: 14 BRPM | HEIGHT: 65 IN | OXYGEN SATURATION: 97 % | SYSTOLIC BLOOD PRESSURE: 146 MMHG | WEIGHT: 143.31 LBS

## 2017-08-03 DIAGNOSIS — R05.3 CHRONIC COUGH: ICD-10-CM

## 2017-08-03 DIAGNOSIS — R05.3 CHRONIC COUGH: Primary | ICD-10-CM

## 2017-08-03 DIAGNOSIS — R91.8 MULTIPLE LUNG NODULES: ICD-10-CM

## 2017-08-03 LAB
PRE FEV1 FVC: 61
PRE FEV1: 1.08
PRE FVC: 1.76
PREDICTED FEV1 FVC: 74
PREDICTED FEV1: 1.92
PREDICTED FVC: 2.64

## 2017-08-03 PROCEDURE — 99999 PR PBB SHADOW E&M-EST. PATIENT-LVL III: CPT | Mod: PBBFAC,,, | Performed by: INTERNAL MEDICINE

## 2017-08-03 PROCEDURE — 1159F MED LIST DOCD IN RCRD: CPT | Mod: ,,, | Performed by: INTERNAL MEDICINE

## 2017-08-03 PROCEDURE — 1157F ADVNC CARE PLAN IN RCRD: CPT | Mod: ,,, | Performed by: INTERNAL MEDICINE

## 2017-08-03 PROCEDURE — 82803 BLOOD GASES ANY COMBINATION: CPT | Mod: PBBFAC | Performed by: INTERNAL MEDICINE

## 2017-08-03 PROCEDURE — 99214 OFFICE O/P EST MOD 30 MIN: CPT | Mod: 25,S$PBB,, | Performed by: INTERNAL MEDICINE

## 2017-08-03 PROCEDURE — 36415 COLL VENOUS BLD VENIPUNCTURE: CPT | Mod: PBBFAC | Performed by: INTERNAL MEDICINE

## 2017-08-03 PROCEDURE — 94010 BREATHING CAPACITY TEST: CPT | Mod: PBBFAC | Performed by: INTERNAL MEDICINE

## 2017-08-03 PROCEDURE — 94010 BREATHING CAPACITY TEST: CPT | Mod: 26,S$PBB,, | Performed by: INTERNAL MEDICINE

## 2017-08-03 PROCEDURE — 3008F BODY MASS INDEX DOCD: CPT | Mod: ,,, | Performed by: INTERNAL MEDICINE

## 2017-08-03 NOTE — PATIENT INSTRUCTIONS
Spirometry and CBG (phone report).  Await results from pre-TAVR studies (CTChest and PFTs).  Continue Mucinex DM if needed to control cough.

## 2017-08-03 NOTE — PROGRESS NOTES
Subjective:       Patient ID: Adela Garay is a 89 y.o. female.    Chief Complaint: Pulmonary Nodules and Cough    HPI HISTORY OF PRESENT ILLNESS:  Mrs. Garay is an 89-year-old nonsmoker, who comes   for assessment.  She has been seen in this clinic in the past for monitoring of   multiple pulmonary nodules.  Her most recent previous visit here was in   January.  These pulmonary nodules have all been stable over a period of several   years, except for one located in the right lower lobe.  This density has   shown very gradual enlargement during a long period of observation.    Mrs. Garay is currently being evaluated in the Cardiology Clinic for possible   TAVR procedure for management of severe aortic stenosis.  She has not begun the   preprocedure evaluation yet.    Today, Mrs. Garay reports a continued cough, which remains mostly   nonproductive.  She has used Mucinex DM for control of this problem.  She is not   having any associated respiratory symptoms to suggest active infection.    Mrs. Garay remains on long-term anticoagulation therapy due to chronic atrial   fibrillation.  She had a recent echocardiogram, which reported evidence for   right ventricular dysfunction and an estimated pulmonary artery pressure of 43   mmHg.      CB/HN  dd: 08/03/2017 18:26:05 (CDT)  td: 08/04/2017 02:30:36 (CDT)  Doc ID   #[id number]  Job ID #[id number]    CC:       Review of Systems   Constitutional: Negative for fever and fatigue.   HENT: Negative for postnasal drip, sinus pressure, voice change and congestion.    Respiratory: Positive for cough and dyspnea on extertion. Negative for sputum production, shortness of breath and wheezing.    Cardiovascular: Negative for chest pain and leg swelling.   Genitourinary: Negative for difficulty urinating.   Musculoskeletal: Negative for arthralgias and back pain.   Skin: Negative for rash.   Gastrointestinal: Negative for abdominal pain and acid reflux.   Neurological:  Negative for dizziness and weakness.   Hematological: Negative for adenopathy.       Objective:      Physical Exam   Constitutional: She is oriented to person, place, and time. She appears well-developed and well-nourished.   HENT:   Head: Normocephalic.   Nose: Nose normal.   Mouth/Throat: No oropharyngeal exudate.   Neck: Normal range of motion. No JVD present. No tracheal deviation present. No thyromegaly present.   Cardiovascular: Normal rate.    Murmur (3/6 syst M at aortic area) heard.  Pulmonary/Chest: Symmetric chest wall expansion. No stridor. She has no wheezes. She has no rhonchi. She has rales (few rales posteriorly on R). She exhibits no tenderness.   Abdominal: Soft. There is no tenderness.   Musculoskeletal: She exhibits no edema.   Lymphadenopathy:     She has no cervical adenopathy.   Neurological: She is alert and oriented to person, place, and time.   Skin: Skin is warm and dry. No rash noted. No erythema. Nails show no clubbing.   Psychiatric: She has a normal mood and affect.   Vitals reviewed.    Personal Diagnostic Review    No flowsheet data found.      Assessment:       1. Chronic cough    2. Multiple lung nodules        Outpatient Encounter Prescriptions as of 8/3/2017   Medication Sig Dispense Refill    acetaminophen (TYLENOL) 325 MG tablet Take 325 mg by mouth every 6 (six) hours as needed for Pain.      alpha lipoic acid 600 mg Cap Take 600 mg by mouth Daily. (Patient taking differently: Take 100 mg by mouth Daily. ) 100 each 12    aspirin 81 mg Tab Take 81 mg by mouth. 4 times weekly      blood sugar diagnostic (BLOOD GLUCOSE TEST) Strp 1 strip by Misc.(Non-Drug; Combo Route) route once daily. Currently using Nova max plus meter. 50 strip 11    cholecalciferol, vitamin D3, 1,000 unit capsule Take 1,000 Units by mouth once daily.      CHROMIUM PICOLINATE ORAL Take 200 mg by mouth once daily.      cimetidine (TAGAMET) 300 MG tablet       CINNAMON BARK (CINNAMON ORAL) Take 1,000 mg  by mouth 2 (two) times daily.      clindamycin (CLEOCIN) 300 MG capsule       clopidogrel (PLAVIX) 75 mg tablet       digoxin (LANOXIN) 250 mcg tablet TAKE 1 TABLET ONE TIME DAILY AND TAKE AN ADDITIONAL 1/2 TABLET ONCE EACH WEEK 97 tablet 0    docusate sodium (COLACE) 100 MG capsule Take 100 mg by mouth. 1 Capsule Oral Every day      furosemide (LASIX) 20 MG tablet Take 1 tablet (20 mg total) by mouth 2 (two) times daily as needed (leg swelling). (Patient taking differently: Take 20 mg by mouth once daily. ) 60 tablet 11    metoprolol tartrate (LOPRESSOR) 50 MG tablet TAKE 1 TABLET TWICE DAILY 180 tablet 0    mupirocin (BACTROBAN) 2 % ointment Apply topically as needed.      predniSONE (DELTASONE) 50 MG Tab       simvastatin (ZOCOR) 20 MG tablet Take 1 tablet (20 mg total) by mouth every evening. 90 tablet 0    triamcinolone acetonide 0.025% (KENALOG) 0.025 % cream Use bid as needed. 80 g 1    triamterene-hydrochlorothiazide 37.5-25 mg (DYAZIDE) 37.5-25 mg per capsule Take 1 capsule by mouth once daily. 90 capsule 1    warfarin (COUMADIN) 2 MG tablet TAKE 2 TABLETS EVERY DAY EXCEPT TAKE 1 TABLET ON SUNDAY 169 tablet 3    folic acid (FOLVITE) 1 MG tablet Take 1 tablet (1 mg total) by mouth once daily. 90 tablet 3     No facility-administered encounter medications on file as of 8/3/2017.      Orders Placed This Encounter   Procedures    CAPILLARY BLOOD GAS - PULM     Standing Status:   Future     Number of Occurrences:   1     Standing Expiration Date:   8/3/2018    Spirometry without bronchodilator     Standing Status:   Future     Number of Occurrences:   1     Standing Expiration Date:   8/3/2018     Plan:     Spirometry and CBG (phone report).  Await results from pre-TAVR studies (CTChest and PFTs).  Continue Mucinex DM if needed to control cough.

## 2017-08-07 ENCOUNTER — ANTI-COAG VISIT (OUTPATIENT)
Dept: CARDIOLOGY | Facility: CLINIC | Age: 82
End: 2017-08-07
Payer: MEDICARE

## 2017-08-07 DIAGNOSIS — Z86.73 HX-TIA (TRANSIENT ISCHEMIC ATTACK): ICD-10-CM

## 2017-08-07 DIAGNOSIS — I48.20 CHRONIC ATRIAL FIBRILLATION: ICD-10-CM

## 2017-08-07 DIAGNOSIS — Z79.01 LONG-TERM (CURRENT) USE OF ANTICOAGULANTS, INR GOAL 2.0-3.0: ICD-10-CM

## 2017-08-07 LAB — INR PPP: 2

## 2017-08-07 PROCEDURE — G0250 MD INR TEST REVIE INTER MGMT: HCPCS | Mod: ,,, | Performed by: PHARMACIST

## 2017-08-07 NOTE — PROGRESS NOTES
The pt called today to let us know that she will start taking plavix due to her upcoming angiogram.  She will also increase her ASA from 81mg four times a week to daily.

## 2017-08-09 DIAGNOSIS — I11.0 BENIGN HYPERTENSIVE HEART DISEASE WITH CONGESTIVE HEART FAILURE: ICD-10-CM

## 2017-08-09 RX ORDER — SIMVASTATIN 20 MG/1
TABLET, FILM COATED ORAL
Qty: 90 TABLET | Refills: 0 | Status: SHIPPED | OUTPATIENT
Start: 2017-08-09 | End: 2017-10-11 | Stop reason: SDUPTHER

## 2017-08-09 RX ORDER — TRIAMTERENE AND HYDROCHLOROTHIAZIDE 37.5; 25 MG/1; MG/1
1 CAPSULE ORAL DAILY
Qty: 90 CAPSULE | Refills: 1 | Status: SHIPPED | OUTPATIENT
Start: 2017-08-09 | End: 2018-04-16 | Stop reason: SDUPTHER

## 2017-08-10 DIAGNOSIS — I11.0 BENIGN HYPERTENSIVE HEART DISEASE WITH CONGESTIVE HEART FAILURE: Primary | ICD-10-CM

## 2017-08-10 RX ORDER — METOPROLOL TARTRATE 50 MG/1
50 TABLET ORAL 2 TIMES DAILY
Qty: 180 TABLET | Refills: 0 | Status: SHIPPED | OUTPATIENT
Start: 2017-08-10 | End: 2017-10-11 | Stop reason: SDUPTHER

## 2017-08-10 NOTE — TELEPHONE ENCOUNTER
----- Message from Dilcia Thomas sent at 8/10/2017 10:43 AM CDT -----  Contact: self  [phone]  Pt is requesting refill on Metoprolol. Pls call [facility]. Thanks.......Natacha

## 2017-08-11 RX ORDER — DIGOXIN 250 MCG
TABLET ORAL
Qty: 97 TABLET | Refills: 0 | Status: ON HOLD | OUTPATIENT
Start: 2017-08-11 | End: 2017-11-12 | Stop reason: HOSPADM

## 2017-08-11 NOTE — TELEPHONE ENCOUNTER
----- Message from Margie Naylor sent at 8/11/2017 11:42 AM CDT -----  Contact: [phone]  Pt is requesting a refill on digoxin (LANOXIN) 250 mcg tablet Please call pt at your earliest convenience.  Thanks !

## 2017-08-14 ENCOUNTER — TELEPHONE (OUTPATIENT)
Dept: FAMILY MEDICINE | Facility: CLINIC | Age: 82
End: 2017-08-14

## 2017-08-14 NOTE — TELEPHONE ENCOUNTER
----- Message from Katie Pham sent at 8/11/2017  3:30 PM CDT -----  Contact: self  Pt is requesting a call back concerning knee pain. Please advise. [phone]

## 2017-08-21 RX ORDER — CLOPIDOGREL BISULFATE 75 MG/1
TABLET ORAL
Qty: 30 TABLET | Refills: 0 | Status: ON HOLD | OUTPATIENT
Start: 2017-08-21 | End: 2017-08-22 | Stop reason: HOSPADM

## 2017-08-22 ENCOUNTER — HOSPITAL ENCOUNTER (OUTPATIENT)
Dept: PULMONOLOGY | Facility: CLINIC | Age: 82
Discharge: HOME | End: 2017-08-22
Payer: MEDICARE

## 2017-08-22 ENCOUNTER — HOSPITAL ENCOUNTER (OUTPATIENT)
Facility: HOSPITAL | Age: 82
Discharge: HOME | End: 2017-08-22
Attending: INTERNAL MEDICINE | Admitting: INTERNAL MEDICINE
Payer: MEDICARE

## 2017-08-22 VITALS
HEIGHT: 65 IN | TEMPERATURE: 98 F | WEIGHT: 138.25 LBS | HEIGHT: 65 IN | OXYGEN SATURATION: 97 % | HEART RATE: 51 BPM | SYSTOLIC BLOOD PRESSURE: 151 MMHG | DIASTOLIC BLOOD PRESSURE: 67 MMHG | BODY MASS INDEX: 23.03 KG/M2 | WEIGHT: 138 LBS | RESPIRATION RATE: 16 BRPM | BODY MASS INDEX: 22.99 KG/M2

## 2017-08-22 DIAGNOSIS — I35.0 AORTIC VALVE STENOSIS, UNSPECIFIED ETIOLOGY: ICD-10-CM

## 2017-08-22 DIAGNOSIS — I25.10 CORONARY ARTERY DISEASE INVOLVING NATIVE CORONARY ARTERY OF NATIVE HEART WITHOUT ANGINA PECTORIS: ICD-10-CM

## 2017-08-22 DIAGNOSIS — I35.0 SEVERE AORTIC STENOSIS BY PRIOR ECHOCARDIOGRAM: ICD-10-CM

## 2017-08-22 DIAGNOSIS — I25.10 ATHEROSCLEROTIC HEART DISEASE OF NATIVE CORONARY ARTERY WITHOUT ANGINA PECTORIS: ICD-10-CM

## 2017-08-22 DIAGNOSIS — I35.0 NONRHEUMATIC AORTIC VALVE STENOSIS: ICD-10-CM

## 2017-08-22 LAB
ABO + RH BLD: NORMAL
ANION GAP SERPL CALC-SCNC: 10 MMOL/L
BASOPHILS # BLD AUTO: 0 K/UL
BASOPHILS NFR BLD: 0 %
BLD GP AB SCN CELLS X3 SERPL QL: NORMAL
BUN SERPL-MCNC: 18 MG/DL
CALCIUM SERPL-MCNC: 9.7 MG/DL
CHLORIDE SERPL-SCNC: 101 MMOL/L
CO2 SERPL-SCNC: 28 MMOL/L
CREAT SERPL-MCNC: 0.9 MG/DL
DIFFERENTIAL METHOD: ABNORMAL
EOSINOPHIL # BLD AUTO: 0 K/UL
EOSINOPHIL NFR BLD: 0 %
ERYTHROCYTE [DISTWIDTH] IN BLOOD BY AUTOMATED COUNT: 13.6 %
EST. GFR  (AFRICAN AMERICAN): >60 ML/MIN/1.73 M^2
EST. GFR  (NON AFRICAN AMERICAN): 56.9 ML/MIN/1.73 M^2
GLUCOSE SERPL-MCNC: 153 MG/DL
HCT VFR BLD AUTO: 38.4 %
HGB BLD-MCNC: 12.6 G/DL
INR PPP: 1.7
LYMPHOCYTES # BLD AUTO: 0.6 K/UL
LYMPHOCYTES NFR BLD: 9.1 %
MCH RBC QN AUTO: 30.3 PG
MCHC RBC AUTO-ENTMCNC: 32.8 G/DL
MCV RBC AUTO: 92 FL
MONOCYTES # BLD AUTO: 0.2 K/UL
MONOCYTES NFR BLD: 2.4 %
NEUTROPHILS # BLD AUTO: 5.6 K/UL
NEUTROPHILS NFR BLD: 88.3 %
PLATELET # BLD AUTO: 255 K/UL
PLATELET RESPONSE PLAVIX: 249 PRU
PMV BLD AUTO: 9.9 FL
POCT GLUCOSE: 188 MG/DL (ref 70–110)
POTASSIUM SERPL-SCNC: 3.9 MMOL/L
PRE FEV1 FVC: 61
PRE FEV1: 1.12
PRE FVC: 1.85
PREDICTED FEV1 FVC: 74
PREDICTED FEV1: 1.92
PREDICTED FVC: 2.64
PROTHROMBIN TIME: 17.1 SEC
RBC # BLD AUTO: 4.16 M/UL
SODIUM SERPL-SCNC: 139 MMOL/L
WBC # BLD AUTO: 6.29 K/UL

## 2017-08-22 PROCEDURE — 93010 ELECTROCARDIOGRAM REPORT: CPT | Mod: ,,, | Performed by: INTERNAL MEDICINE

## 2017-08-22 PROCEDURE — 25000003 PHARM REV CODE 250: Performed by: PHYSICIAN ASSISTANT

## 2017-08-22 PROCEDURE — 99214 OFFICE O/P EST MOD 30 MIN: CPT | Mod: ,,, | Performed by: NURSE PRACTITIONER

## 2017-08-22 PROCEDURE — 85025 COMPLETE CBC W/AUTO DIFF WBC: CPT

## 2017-08-22 PROCEDURE — 85610 PROTHROMBIN TIME: CPT

## 2017-08-22 PROCEDURE — 63600175 PHARM REV CODE 636 W HCPCS

## 2017-08-22 PROCEDURE — C1894 INTRO/SHEATH, NON-LASER: HCPCS

## 2017-08-22 PROCEDURE — 25000003 PHARM REV CODE 250

## 2017-08-22 PROCEDURE — 82962 GLUCOSE BLOOD TEST: CPT

## 2017-08-22 PROCEDURE — 99152 MOD SED SAME PHYS/QHP 5/>YRS: CPT | Mod: ,,, | Performed by: INTERNAL MEDICINE

## 2017-08-22 PROCEDURE — 86901 BLOOD TYPING SEROLOGIC RH(D): CPT

## 2017-08-22 PROCEDURE — S5010 5% DEXTROSE AND 0.45% SALINE: HCPCS | Performed by: INTERNAL MEDICINE

## 2017-08-22 PROCEDURE — 80048 BASIC METABOLIC PNL TOTAL CA: CPT

## 2017-08-22 PROCEDURE — 63600175 PHARM REV CODE 636 W HCPCS: Performed by: PHYSICIAN ASSISTANT

## 2017-08-22 PROCEDURE — 25000003 PHARM REV CODE 250: Performed by: INTERNAL MEDICINE

## 2017-08-22 PROCEDURE — 94620 PR PULMONARY STRESS TESTING,SIMPLE: CPT | Mod: 26,S$PBB,, | Performed by: INTERNAL MEDICINE

## 2017-08-22 PROCEDURE — 94010 BREATHING CAPACITY TEST: CPT | Mod: 26,S$PBB,, | Performed by: INTERNAL MEDICINE

## 2017-08-22 PROCEDURE — 93005 ELECTROCARDIOGRAM TRACING: CPT

## 2017-08-22 PROCEDURE — 86900 BLOOD TYPING SEROLOGIC ABO: CPT

## 2017-08-22 PROCEDURE — 36416 COLLJ CAPILLARY BLOOD SPEC: CPT | Mod: S$PBB,,, | Performed by: INTERNAL MEDICINE

## 2017-08-22 PROCEDURE — 94729 DIFFUSING CAPACITY: CPT | Mod: 26,S$PBB,, | Performed by: INTERNAL MEDICINE

## 2017-08-22 PROCEDURE — 85576 BLOOD PLATELET AGGREGATION: CPT

## 2017-08-22 PROCEDURE — 93454 CORONARY ARTERY ANGIO S&I: CPT | Mod: 26,,, | Performed by: INTERNAL MEDICINE

## 2017-08-22 PROCEDURE — 25500020 PHARM REV CODE 255: Performed by: INTERNAL MEDICINE

## 2017-08-22 RX ORDER — DIPHENHYDRAMINE HCL 50 MG
50 CAPSULE ORAL ONCE
Status: COMPLETED | OUTPATIENT
Start: 2017-08-22 | End: 2017-08-22

## 2017-08-22 RX ORDER — DEXTROSE MONOHYDRATE AND SODIUM CHLORIDE 5; .45 G/100ML; G/100ML
INJECTION, SOLUTION INTRAVENOUS CONTINUOUS
Status: ACTIVE | OUTPATIENT
Start: 2017-08-22 | End: 2017-08-22

## 2017-08-22 RX ORDER — DIPHENHYDRAMINE HCL 50 MG
50 CAPSULE ORAL ONCE
Status: DISCONTINUED | OUTPATIENT
Start: 2017-08-22 | End: 2017-08-22

## 2017-08-22 RX ORDER — SODIUM CHLORIDE 9 MG/ML
1.5 INJECTION, SOLUTION INTRAVENOUS CONTINUOUS
Status: ACTIVE | OUTPATIENT
Start: 2017-08-22 | End: 2017-08-22

## 2017-08-22 RX ADMIN — IOHEXOL 100 ML: 350 INJECTION, SOLUTION INTRAVENOUS at 01:08

## 2017-08-22 RX ADMIN — DEXTROSE AND SODIUM CHLORIDE: 5; .45 INJECTION, SOLUTION INTRAVENOUS at 10:08

## 2017-08-22 RX ADMIN — DIPHENHYDRAMINE HYDROCHLORIDE 50 MG: 50 CAPSULE ORAL at 01:08

## 2017-08-22 RX ADMIN — PREDNISONE 50 MG: 20 TABLET ORAL at 01:08

## 2017-08-22 NOTE — CONSULTS
Consult Note  Cardiothoracic Surgery    Consults  SUBJECTIVE:     History of Present Illness:  Patient is a 89 y.o. female presents with severe AS.  She was initially evaluated last year and at that time was relatively asymptomatic.  She presents now with shortness of breath with walking.  She also is awaked with shortness of breath. Denies chest pain, syncope, or presyncope.  She states she has lost 25 pounds over the past 2 years.  She has an abnormal chest CT and is planning to follow up with Dr. Saldivar for this.    Scheduled Meds:   Infusions/Drips:   sodium chloride 0.9% 1.5 mL/kg/hr (08/22/17 1600)     PRN Meds:    Review of patient's allergies indicates:   Allergen Reactions    Levaquin [levofloxacin]     Doxycycline hyclate Other (See Comments)     Unknown to patient    Iodine and iodide containing products     Neurontin  [gabapentin]      Other reaction(s): Unknown    Norpace  [disopyramide]      Other reaction(s): Hives    Phenytoin sodium extended      Other reaction(s): Muscle pain    Sulfamethoxazole-trimethoprim      Other reaction(s): Muscle cramps       Past Medical History:   Diagnosis Date    Anticoagulant long-term use     Anticoagulated on Coumadin     Arthritis     Atrial fibrillation     Atrial fibrillation     Chronic rhinosinusitis     Coronary artery disease     Granulomatous lung disease     Heart failure     Hyperlipidemia     Hypertension     Hypertensive heart disease without CHF (congestive heart failure)     Mitral valve prolapse     PN (peripheral neuropathy)     hereditary?(sister has it also)/idiopathic?/patient thinks from Zocor    Severe aortic stenosis by prior echocardiogram     TIA (transient ischemic attack)     Type 2 diabetes mellitus     Type II or unspecified type diabetes mellitus without mention of complication, not stated as uncontrolled      Past Surgical History:   Procedure Laterality Date    SINUS SURGERY      tonsillectomy       TONSILLECTOMY       Family History   Problem Relation Age of Onset    Heart disease Father      49    Heart disease Mother     Thyroid disease Sister     Atrial fibrillation Sister     Atrial fibrillation Sister      neice    Lymphoma Sister 29    Atrial fibrillation Sister     Asthma Neg Hx     Emphysema Neg Hx      Social History   Substance Use Topics    Smoking status: Never Smoker    Smokeless tobacco: Never Used    Alcohol use 0.0 oz/week      Comment: rare        Review of Systems:  Review of Systems   Constitutional: Negative for fever and malaise/fatigue.   HENT: Negative for congestion and sore throat.    Eyes: Negative for blurred vision, double vision and discharge.   Respiratory: see HPI  Cardiovascular: see HPI  Gastrointestinal: Negative for abdominal pain, constipation, diarrhea, nausea and vomiting.   Genitourinary: Negative for dysuria, frequency and urgency.   Musculoskeletal: Negative for back pain and myalgias.   Skin: Negative for itching and rash.   Neurological: Negative for dizziness, speech change, seizures, weakness and headaches.   Endo/Heme/Allergies: Does not bruise/bleed easily.   Psychiatric/Behavioral: Negative for depression. The patient is not nervous/anxious.        OBJECTIVE:     Vital Signs (Most Recent)  Temp: 97.7 °F (36.5 °C) (08/22/17 1730)  Pulse: 67 (08/22/17 1730)  Resp: 16 (08/22/17 1730)  BP: (!) 151/62 (08/22/17 1730)  SpO2: 97 % (08/22/17 1730)    Admission Weight: 62.6 kg (138 lb) (08/22/17 0934)   Most Recent Weight: 62.6 kg (138 lb) (08/22/17 0934)    Vital Signs Range (Last 24H):  Temp:  [97.5 °F (36.4 °C)-97.7 °F (36.5 °C)]   Pulse:  [52-67]   Resp:  [16-18]   BP: (124-169)/(58-76)   SpO2:  [96 %-97 %]     Physical Exam:  Physical Exam   Constitutional: Patient appears well-developed and well-nourished.   HENT:   Head: Normocephalic and atraumatic.   Eyes: Pupils are equal, round, and reactive to light.   Neck: Normal range of motion. Neck supple.    Cardiovascular: Normal rate, irregular rate, and normal heart sounds.  midsystolic murmur  Pulmonary/Chest: Effort normal and breath sounds normal.   Abdominal: Soft. Bowel sounds are normal.   Musculoskeletal: Normal range of motion.   Neurological: Alert and oriented to person, place, and time.   Skin: Skin is warm and dry.   Psychiatric: Normal mood and affect. Behavior is normal.       Laboratory:  CBC:   Recent Labs  Lab 08/22/17  0942   WBC 6.29   RBC 4.16   HGB 12.6   HCT 38.4      MCV 92   MCH 30.3   MCHC 32.8     BMP:   Recent Labs  Lab 08/22/17  0942   *      K 3.9      CO2 28   BUN 18   CREATININE 0.9   CALCIUM 9.7       Diagnostic Results:  The patient has undergone the following TAVR work-up:   · ECHO (Date 6/7/2017): DILAN= 0.53 cm2, MG= 62 mmHg, Peak Jose Elias= 4.86 m/s, EF= 60%.   · LHC : nonobstructive CAD  · STS: 5.3%   · Frailty: 2/4 (walk and  strength)  · Iliacs are pending  · LVOT area by CTA is pending  · Incidental findings on CT: is pending  · CT Surgery risk assessment: high risk, per Dr Lamb due to age and comorbidities  · Rhythm issues: AF with nonspecific intraventricular block  · PFTs: pending      ASSESSMENT/PLAN:     89 year old female presents for TAVR evaluation.  She is high risk for surgical AVR due to age and comorbidities.    CTS Attending Note:    I have personally taken the history and agree with the NP's note as stated above.

## 2017-08-22 NOTE — DISCHARGE SUMMARY
[facility]  Discharge Summary      Admit Date: 8/22/2017    Discharge Date and Time:  08/22/2017 3:02 PM    Attending Physician: Cesario Campbell MD     Reason for Admission: LHC/tave eval    Procedures Performed: Procedure(s) (LRB):  HEART CATH-LEFT (N/A)    Hospital CourseImemaria dolores Garay is a 89 y.o. female referred by Dr Ivy for evaluation of severe AS (NYHA Class II sx).  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 Comorbidities include DM, AF on coumadin, TIA, severe TR, PA systolic pressure is 43 mmHg and HTN. Currently she is symptomatic.  She will need to see pulmonary given CT findings of increased pulmonary nodule and 30 lb weight loss.     The patient has undergone the following TAVR work-up:   · ECHO (Date 6/7/2017): DILAN= 0.53 cm2, MG= 62 mmHg, Peak Jose Elias= 4.86 m/s, EF= 60%.   · LHC : 98/22/2017) Non-obstructive CAD  · STS: 5.3%   · Frailty: 1/4 (walk)  · Iliacs are pending  · LVOT area by CTA is pending  · Incidental findings on CT: Multiple calcified granulomas within both lungs that are stable since 2011.The rounded pulmonary nodule in the right lung base is slightly larger in size from CT dated 11/09/2011, now measuring 2.1 x 1.8 cm.  there is also a new subcentimeter pulmonary nodule in the right middle lung.  · CT Surgery risk assessment: high risk, per Dr Lamb due to age and comorbidities (note from 6/7/2016 by Dr Lamb)  · Rhythm issues: AF with nonspecific intraventricular block  PFTs: pending    Consults: none    Significant Diagnostic Studies: Labs:   BMP:   Recent Labs  Lab 08/22/17  0942   *      K 3.9      CO2 28   BUN 18   CREATININE 0.9   CALCIUM 9.7       Final Diagnoses:    Principal Problem: Nonrheumatic aortic valve stenosis   Secondary Diagnoses:   Active Hospital Problems    Diagnosis  POA    *Nonrheumatic aortic valve stenosis [I35.0]  Unknown    Coronary artery disease involving native coronary artery of native heart without angina pectoris  [I25.10]  Yes      Resolved Hospital Problems    Diagnosis Date Resolved POA   No resolved problems to display.       Discharged Condition: good    Disposition: Home or Self Care    Follow Up/Patient Instructions: Cardiac diet.  No heavy lifting    Medications:  Reconciled Home Medications:   Current Discharge Medication List      CONTINUE these medications which have NOT CHANGED    Details   aspirin 81 mg Tab Take 81 mg by mouth. 4 times weekly      cimetidine (TAGAMET) 300 MG tablet       digoxin (LANOXIN) 250 mcg tablet TAKE 1 TABLET ONE TIME DAILY AND TAKE AN ADDITIONAL 1/2 TABLET ONCE EACH WEEK  Qty: 97 tablet, Refills: 0      furosemide (LASIX) 20 MG tablet Take 1 tablet (20 mg total) by mouth 2 (two) times daily as needed (leg swelling).  Qty: 60 tablet, Refills: 11      metoprolol tartrate (LOPRESSOR) 50 MG tablet Take 1 tablet (50 mg total) by mouth 2 (two) times daily.  Qty: 180 tablet, Refills: 0    Associated Diagnoses: Benign hypertensive heart disease with congestive heart failure      simvastatin (ZOCOR) 20 MG tablet TAKE 1 TABLET EVERY EVENING  Qty: 90 tablet, Refills: 0      triamterene-hydrochlorothiazide 37.5-25 mg (DYAZIDE) 37.5-25 mg per capsule Take 1 capsule by mouth once daily.  Qty: 90 capsule, Refills: 1    Associated Diagnoses: Benign hypertensive heart disease with congestive heart failure      acetaminophen (TYLENOL) 325 MG tablet Take 325 mg by mouth every 6 (six) hours as needed for Pain.      alpha lipoic acid 600 mg Cap Take 600 mg by mouth Daily.  Qty: 100 each, Refills: 12      blood sugar diagnostic (BLOOD GLUCOSE TEST) Strp 1 strip by Misc.(Non-Drug; Combo Route) route once daily. Currently using Nova max plus meter.  Qty: 50 strip, Refills: 11      cholecalciferol, vitamin D3, 1,000 unit capsule Take 1,000 Units by mouth once daily.      CHROMIUM PICOLINATE ORAL Take 200 mg by mouth once daily.      CINNAMON BARK (CINNAMON ORAL) Take 1,000 mg by mouth 2 (two) times daily.       docusate sodium (COLACE) 100 MG capsule Take 100 mg by mouth. 1 Capsule Oral Every day      folic acid (FOLVITE) 1 MG tablet Take 1 tablet (1 mg total) by mouth once daily.  Qty: 90 tablet, Refills: 3    Associated Diagnoses: Type II or unspecified type diabetes mellitus without mention of complication, not stated as uncontrolled      mupirocin (BACTROBAN) 2 % ointment Apply topically as needed.      triamcinolone acetonide 0.025% (KENALOG) 0.025 % cream Use bid as needed.  Qty: 80 g, Refills: 1    Associated Diagnoses: Rash      warfarin (COUMADIN) 2 MG tablet TAKE 2 TABLETS EVERY DAY EXCEPT TAKE 1 TABLET ON SUNDAY  Qty: 169 tablet, Refills: 3    Associated Diagnoses: Paroxysmal atrial fibrillation         STOP taking these medications       clopidogrel (PLAVIX) 75 mg tablet Comments:   Reason for Stopping:         predniSONE (DELTASONE) 50 MG Tab Comments:   Reason for Stopping:         clindamycin (CLEOCIN) 300 MG capsule Comments:   Reason for Stopping:             No discharge procedures on file.

## 2017-08-22 NOTE — PLAN OF CARE
Problem: Patient Care Overview  Goal: Plan of Care Review  Outcome: Ongoing (interventions implemented as appropriate)  Report received from MARILIA Ash. Patient s/p LHC. r groin dressing cdi. Soft. + pulse. Post procedure protocol discussed with patient and family. Call light in reach. Will monitor.

## 2017-08-22 NOTE — PROGRESS NOTES
Patient is a 89 y.o. female presents with severe AS.  She was initially evaluated last year and at that time was relatively asymptomatic.  She presents now with shortness of breath with walking.  She also is awaked with shortness of breath. Denies chest pain, syncope, or presyncope.     The patient has undergone the following TAVR work-up:   · ECHO (Date 6/7/2017): DILAN= 0.53 cm2, MG= 62 mmHg, Peak Jose Elias= 4.86 m/s, EF= 60%.   · LHC : nonobstructive CAD  · STS: 5.3%   · Frailty: 2/4 (walk and  strength)  · Iliacs are pending  · LVOT area by CTA is pending  · Incidental findings on CT: is pending  · CT Surgery risk assessment: high risk, per Dr Lamb due to age and comorbidities  · Rhythm issues: AF with nonspecific intraventricular block  · PFTs: pending      Agree with Dr. Lamb.  High risk for surgical AVR due to age and comorbidities.

## 2017-08-22 NOTE — H&P
Interventional Cardiology Clinic Note  Reason for Visit: severe AS  Referring MD: Dr Theo Ivy     HPI:   Adela Garay is a 89 y.o. female referred by Dr Ivy for evaluation of severe AS (NYHA Class II sx).  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 Comorbidities include DM, AF on coumadin, TIA, severe TR, PA systolic pressure is 43 mmHg and HTN. In the past she has been evaluated at [facility] by Dr Briseno and then by us 6/6/2016 for TAVR but she was not symptomatic at that time and hence further workup was differed. She is now symptomatic and is short of breath on walking in the back yard which is new, also she gets up at night because she is short of breath. No chest pain. Her daughter is with her today.     The patient has undergone the following TAVR work-up:   ECHO (Date 6/7/2017): DILAN= 0.53 cm2, MG= 62 mmHg, Peak Jose Elias= 4.86 m/s, EF= 60%.   LHC : pending  STS: 5.3%   Frailty: 2/4 (walk and  strength)  Iliacs are pending  LVOT area by CTA is pending  Incidental findings on CT: is pending  CT Surgery risk assessment: high risk, per Dr Lamb due to age and comorbidities (note from 6/7/2016 by Dr Lamb)  Rhythm issues: AF with nonspecific intraventricular block  PFTs: pending          ROS:    Constitution: Negative for fever or chills. Negative for weight loss or gain.   HENT: Negative for sore throat or headaches. Negative for rhinorrhea.  Eyes: Negative for blurred or double vision.   Cardiovascular: See above  Pulmonary:  Negative for cough.   Gastrointestinal: Negative for abdominal pain. Negative for nausea/ vomiting. Negative for diarrhea.   : Negative for dysuria.   Neurological: Negative for focal weakness or sensory changes.  PMH:     Past Medical History:   Diagnosis Date    Anticoagulated on Coumadin     Arthritis     Atrial fibrillation     Atrial fibrillation     Chronic rhinosinusitis     Coronary artery disease     Granulomatous lung disease     Heart failure     Hyperlipidemia      Hypertension     Hypertensive heart disease without CHF (congestive heart failure)     Mitral valve prolapse     PN (peripheral neuropathy)     hereditary?(sister has it also)/idiopathic?/patient thinks from Zocor    Severe aortic stenosis by prior echocardiogram     TIA (transient ischemic attack)     Type 2 diabetes mellitus     Type II or unspecified type diabetes mellitus without mention of complication, not stated as uncontrolled      Past Surgical History:   Procedure Laterality Date    SINUS SURGERY      tonsillectomy       Allergies:     Review of patient's allergies indicates:   Allergen Reactions    Levaquin [levofloxacin]     Doxycycline hyclate Other (See Comments)     Unknown to patient    Iodine and iodide containing products     Neurontin  [gabapentin]      Other reaction(s): Unknown    Norpace  [disopyramide]      Other reaction(s): Hives    Phenytoin sodium extended      Other reaction(s): Muscle pain    Sulfamethoxazole-trimethoprim      Other reaction(s): Muscle cramps     Medications:     No current facility-administered medications on file prior to encounter.      Current Outpatient Prescriptions on File Prior to Encounter   Medication Sig Dispense Refill    acetaminophen (TYLENOL) 325 MG tablet Take 325 mg by mouth every 6 (six) hours as needed for Pain.      alpha lipoic acid 600 mg Cap Take 600 mg by mouth Daily. (Patient taking differently: Take 100 mg by mouth Daily. ) 100 each 12    aspirin 81 mg Tab Take 81 mg by mouth. 4 times weekly      blood sugar diagnostic (BLOOD GLUCOSE TEST) Strp 1 strip by Misc.(Non-Drug; Combo Route) route once daily. Currently using Nova max plus meter. 50 strip 11    cholecalciferol, vitamin D3, 1,000 unit capsule Take 1,000 Units by mouth once daily.      CHROMIUM PICOLINATE ORAL Take 200 mg by mouth once daily.      CINNAMON BARK (CINNAMON ORAL) Take 1,000 mg by mouth 2 (two) times daily.      clindamycin (CLEOCIN) 300 MG capsule        docusate sodium (COLACE) 100 MG capsule Take 100 mg by mouth. 1 Capsule Oral Every day      folic acid (FOLVITE) 1 MG tablet Take 1 tablet (1 mg total) by mouth once daily. 90 tablet 3    furosemide (LASIX) 20 MG tablet Take 1 tablet (20 mg total) by mouth 2 (two) times daily as needed (leg swelling). (Patient taking differently: Take 20 mg by mouth once daily. ) 60 tablet 11    mupirocin (BACTROBAN) 2 % ointment Apply topically as needed.      triamcinolone acetonide 0.025% (KENALOG) 0.025 % cream Use bid as needed. 80 g 1    warfarin (COUMADIN) 2 MG tablet TAKE 2 TABLETS EVERY DAY EXCEPT TAKE 1 TABLET ON SUNDAY 169 tablet 3     Social History:     Social History   Substance Use Topics    Smoking status: Never Smoker    Smokeless tobacco: Never Used    Alcohol use 0.0 oz/week      Comment: rare     Family History:     Family History   Problem Relation Age of Onset    Heart disease Father      49    Heart disease Mother     Thyroid disease Sister     Atrial fibrillation Sister     Atrial fibrillation Sister      neice    Lymphoma Sister 29    Atrial fibrillation Sister     Asthma Neg Hx     Emphysema Neg Hx      Physical Exam:   There were no vitals taken for this visit.     Constitutional: No acute distress, conversant  HEENT: Sclera anicteric, Pupils equal, round and reactive to light, extraocular motions intact, Oropharynx clear  Neck: No JVD, no carotid bruits  Cardiovascular: irregular rate and rhythm, mid systolic murmur, rubs or gallops, normal S1/S2  Pulmonary: Clear to auscultation bilaterally  Abdominal: Abdomen soft, nontender, nondistended, positive bowel sounds  Extremities: No lower extremity edema,   Skin: No ecchymosis, erythema, or ulcers  Psych: Alert and oriented x 3, appropriate affect  Neuro: CNII-XII intact, no focal deficits    Labs:     Lab Results   Component Value Date     07/10/2017    K 4.1 07/10/2017     07/10/2017    CO2 30 (H) 07/10/2017    BUN 22  07/10/2017    CREATININE 0.8 07/10/2017    ANIONGAP 8 07/10/2017     Lab Results   Component Value Date    AST 27 11/18/2016    ALT 21 11/18/2016    ALKPHOS 71 11/18/2016    BILITOT 2.2 (H) 11/18/2016    BILIDIR 0.2 12/12/2005    ALBUMIN 3.7 07/10/2017     Lab Results   Component Value Date    CALCIUM 9.5 07/10/2017    MG 2.5 (H) 08/08/2005     Lab Results   Component Value Date     (H) 09/15/2014     (H) 03/25/2014     (H) 04/28/2012    Lab Results   Component Value Date    WBC 7.93 07/10/2017    HGB 11.8 (L) 07/10/2017    HCT 37.0 07/10/2017     07/10/2017    GRAN 3.6 01/17/2017    GRAN 56.9 01/17/2017     Lab Results   Component Value Date    INR 2.0 08/07/2017    INR 1.8 (H) 05/03/2012     Lab Results   Component Value Date    CHOL 136 05/29/2017    HDL 39 (L) 05/29/2017    LDLCALC 83.6 05/29/2017    TRIG 67 05/29/2017     Lab Results   Component Value Date    HGBA1C 6.0 05/31/2017     Lab Results   Component Value Date    TSH 2.630 03/11/2014            Assessment:   Adela Garay is a 89 y.o. female referred by Dr Ivy for evaluation of severe AS (NYHA Class II sx).  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 Comorbidities include DM, AF on coumadin, TIA, severe TR, PA systolic pressure is 43 mmHg and HTN. Currently she is symptomatic     The patient has undergone the following TAVR work-up:   ECHO (Date 6/7/2017): DILAN= 0.53 cm2, MG= 62 mmHg, Peak Jose Elias= 4.86 m/s, EF= 60%.   LHC : pending  STS: 5.3%   Frailty: 2/4 (walk and  strength)  Iliacs are pending  LVOT area by CTA is pending  Incidental findings on CT: is pending  CT Surgery risk assessment: high risk, per Dr Lamb due to age and comorbidities (note from 6/7/2016 by Dr Lamb)  Rhythm issues: AF with nonspecific intraventricular block  PFTs: pending  Plan:   1. The family wants to discuss every thing over before deciding to go ahead with TAVR evaluation. They will give Dr Campbell / Lacy a call to once they make their  decision.   2. Consented for LHC   3. Was seen by Dr Lamb last year and is high risk for surgery   4. Needs LHC, PFTs, and TAVR CTA   5. Will be a BMS candidate, currently is on aspirin and coumadin, once makes up their mind will need to start on plavix pre cath and check PRU  6. If cath, it will be right groin access and 5 F sheath and 4 F cath     Madison Canseco MD  Interventional Cardiology

## 2017-08-23 ENCOUNTER — DOCUMENTATION ONLY (OUTPATIENT)
Dept: CARDIOLOGY | Facility: CLINIC | Age: 82
End: 2017-08-23

## 2017-08-23 DIAGNOSIS — N18.9 CHRONIC KIDNEY DISEASE, UNSPECIFIED CKD STAGE: ICD-10-CM

## 2017-08-23 DIAGNOSIS — I35.0 AORTIC VALVE STENOSIS, UNSPECIFIED ETIOLOGY: Primary | ICD-10-CM

## 2017-08-23 RX ORDER — DEXTROSE MONOHYDRATE AND SODIUM CHLORIDE 5; .45 G/100ML; G/100ML
INJECTION, SOLUTION INTRAVENOUS CONTINUOUS
Status: CANCELLED | OUTPATIENT
Start: 2017-08-23

## 2017-08-23 RX ORDER — DIPHENHYDRAMINE HCL 25 MG
50 CAPSULE ORAL ONCE
Status: CANCELLED | OUTPATIENT
Start: 2017-08-23 | End: 2017-08-23

## 2017-08-23 NOTE — PROGRESS NOTES
Ms Garay is the  of a WW[employer] vet ([facility]) referred by Dr Ivy for evaluation of severe AS. She has a PMH significant for diet-controlled DM, chronic a-fib on Coumadin, remote hx of TIA, and HTN. She has been evaluated for TAVR in the past in [place] by Dr Briseno, but she was asymptomatic at that time.    The patient has undergone the following TAVR work-up:   · ECHO (Date 6/7/2017): DILAN= 0.53 cm2, MG= 62 mmHg, Peak Jose Elias= 4.86 m/s, EF= 60%.   · LHC : (8/22/2017) Non-obstructive CAD  · STS: 5.3%   · Frailty: 1/4 (walk)  · PFTs: FEV1=  1.12/ 58% pred, FVC= 1.85/70% pred, DLCO = 90% pred   · Iliacs are >5.63 mm on R and > 5.0 on L  · LVOT: Area = 5.01 cm2, Avg Diam= 25.2 mm (28.2 x 21.1 mm)  · Incidental findings on CT: Multiple calcified granulomas within both lungs that are stable since 2011.The rounded pulmonary nodule in the right lung base is slightly larger in size from CT dated 11/09/2011, now measuring 2.1 x 1.8 cm.  there is also a new subcentimeter pulmonary nodule in the right middle lung. Dr. Campbell spoke with Dr. Saldivar via phone -- Dr. Saldivar reviewed the CT and does not feel anything further needs to be done.   · She is high risk due to age and comorbidities per Dr. Lamb and Dr. Willson   · Rhythm issues: AF with nonspecific intraventricular block    26mm Christopher S3 + 2 ccs (4% oversized) via R TF access. May need to dilate RCI with 7x4 balloon. She will be contacted with a date for her TAVR.

## 2017-08-23 NOTE — PROCEDURES
Adela Garay is a 89 y.o.  female patient, who presents for a 6 minute walk test ordered by MD. Adrian.  The diagnosis is Aortic Valve Disorder.  The patient's BMI is 23.1 kg/m2.  Predicted distance (lower limit of normal) is 214.99 meters.      Test Results:    The test was completed without stopping.    The total time walked was 360 seconds.  During walking, the patient reported:  No complaints. The patient used no assistive devices  during testing.     8/22/2017---------Distance: 243.84 meters (800 feet)     O2 Sat % Supplemental Oxygen Heart Rate Blood Pressure Nura Scale   Pre-exercise  (Resting) 97 % Room Air 67 bpm 154/70 mmHg 0   During Exercise 96 % Room Air 75 bpm 183/80 mmHg 1   Post-exercise  (Recovery) 97 % Room Air  66 bpm 175/81 mmHg       Recovery Time: 37 seconds    Performing nurse/tech: FARHAN Altman.      PREVIOUS STUDY:   The patient has not had a previous study.      CLINICAL INTERPRETATION:  Six minute walk distance is 243.84 meters (800 feet) with very light dyspnea.  During exercise, there was no significant desaturation while breathing room air.  Both blood pressure and heart rate remained stable with walking.  Hypertension was present prior to exercise.  The patient did not report non-pulmonary symptoms during exercise.  No previous study performed.  Based upon age and body mass index, exercise capacity is normal.

## 2017-08-24 ENCOUNTER — ANTI-COAG VISIT (OUTPATIENT)
Dept: CARDIOLOGY | Facility: CLINIC | Age: 82
End: 2017-08-24

## 2017-08-24 DIAGNOSIS — Z79.01 LONG-TERM (CURRENT) USE OF ANTICOAGULANTS, INR GOAL 2.0-3.0: ICD-10-CM

## 2017-08-24 DIAGNOSIS — Z86.73 HX-TIA (TRANSIENT ISCHEMIC ATTACK): ICD-10-CM

## 2017-08-24 DIAGNOSIS — I48.20 CHRONIC ATRIAL FIBRILLATION: ICD-10-CM

## 2017-08-24 LAB — INR PPP: 2.3

## 2017-08-25 ENCOUNTER — HOSPITAL ENCOUNTER (OUTPATIENT)
Dept: CARDIOLOGY | Facility: CLINIC | Age: 82
Discharge: HOME | End: 2017-08-25
Payer: MEDICARE

## 2017-08-25 ENCOUNTER — RESEARCH ENCOUNTER (OUTPATIENT)
Dept: RESEARCH | Facility: HOSPITAL | Age: 82
End: 2017-08-25
Payer: MEDICARE

## 2017-08-25 DIAGNOSIS — I35.0 AORTIC VALVE STENOSIS, UNSPECIFIED ETIOLOGY: ICD-10-CM

## 2017-08-25 LAB
AORTIC VALVE REGURGITATION: ABNORMAL
AORTIC VALVE STENOSIS: ABNORMAL
ESTIMATED PA SYSTOLIC PRESSURE: 56.22
GLOBAL PERICARDIAL EFFUSION: ABNORMAL
MITRAL VALVE REGURGITATION: ABNORMAL
RETIRED EF AND QEF - SEE NOTES: 60 (ref 55–65)
TRICUSPID VALVE REGURGITATION: ABNORMAL

## 2017-08-25 PROCEDURE — 93306 TTE W/DOPPLER COMPLETE: CPT | Mod: PBBFAC | Performed by: INTERNAL MEDICINE

## 2017-08-25 NOTE — PROGRESS NOTES
Date Consent signed: 25/AUG/2017    Sponsor: [employer]    Study Title/IRB Number: [id number]    Principle Investigator: Cesario Campbell MD    Present for Discussion: Ms Garay, her son and daughter-in-law and Romi Rojas    Is LAR Consenting for Subject: No    Prior to the Informed Consent (IC) being signed, or any study protocol required data collection, testing, procedure, or intervention being performed, the following was done and/or discussed:   Patient was given a copy of the IC for review    Purpose of the study and qualifications to participate    Study design, Follow up schedule, and tests or procedures done at each visit   Confidentiality and HIPAA Authorization for Release of Medical Records for the research trial/ subject's rights/research related injury   Risk, Benefits, Alternative Treatments, Compensation and Costs   Participation in the research trial is voluntary and patient may withdraw at anytime   Contact information for study related questions    Patient verbalizes understanding of the above: Yes  Contact information for CRC and PI given to patient: Yes  Patient able to adequately summarize: the purpose of the study, the risks associated with the study, and all procedures, testing, and follow-ups associated with the study: Yes    Ms Garay signed the informed consent form for the [facility] research study with an IRB approval date of July 14, 2017.  Each page of the consent form was reviewed with Ms Garay and family and all questions answered satisfactorily. Ms Garay signed the consent form and received a copy of same. The original consent was scanned into electronic medical records (EPIC) and filed into the subject's research study binder.

## 2017-08-28 ENCOUNTER — OFFICE VISIT (OUTPATIENT)
Dept: FAMILY MEDICINE | Facility: CLINIC | Age: 82
End: 2017-08-28
Payer: MEDICARE

## 2017-08-28 ENCOUNTER — ANTI-COAG VISIT (OUTPATIENT)
Dept: CARDIOLOGY | Facility: CLINIC | Age: 82
End: 2017-08-28

## 2017-08-28 VITALS
BODY MASS INDEX: 22.18 KG/M2 | TEMPERATURE: 98 F | SYSTOLIC BLOOD PRESSURE: 120 MMHG | HEIGHT: 66 IN | WEIGHT: 138 LBS | HEART RATE: 59 BPM | OXYGEN SATURATION: 96 % | DIASTOLIC BLOOD PRESSURE: 70 MMHG

## 2017-08-28 DIAGNOSIS — E11.29 CONTROLLED TYPE 2 DIABETES MELLITUS WITH MICROALBUMINURIA, WITHOUT LONG-TERM CURRENT USE OF INSULIN: ICD-10-CM

## 2017-08-28 DIAGNOSIS — I48.0 PAROXYSMAL ATRIAL FIBRILLATION: ICD-10-CM

## 2017-08-28 DIAGNOSIS — Z23 FLU VACCINE NEED: ICD-10-CM

## 2017-08-28 DIAGNOSIS — Z79.01 LONG-TERM (CURRENT) USE OF ANTICOAGULANTS, INR GOAL 2.0-3.0: ICD-10-CM

## 2017-08-28 DIAGNOSIS — I35.0 SEVERE AORTIC STENOSIS BY PRIOR ECHOCARDIOGRAM: Primary | ICD-10-CM

## 2017-08-28 DIAGNOSIS — Z86.73 HX-TIA (TRANSIENT ISCHEMIC ATTACK): ICD-10-CM

## 2017-08-28 DIAGNOSIS — E11.40 CONTROLLED TYPE 2 DIABETES WITH NEUROPATHY: ICD-10-CM

## 2017-08-28 DIAGNOSIS — R80.9 CONTROLLED TYPE 2 DIABETES MELLITUS WITH MICROALBUMINURIA, WITHOUT LONG-TERM CURRENT USE OF INSULIN: ICD-10-CM

## 2017-08-28 DIAGNOSIS — I11.0 BENIGN HYPERTENSIVE HEART DISEASE WITH CONGESTIVE HEART FAILURE: ICD-10-CM

## 2017-08-28 PROBLEM — I48.20 CHRONIC ATRIAL FIBRILLATION: Status: RESOLVED | Noted: 2017-03-02 | Resolved: 2017-08-28

## 2017-08-28 LAB — INR PPP: 2.6

## 2017-08-28 PROCEDURE — 1157F ADVNC CARE PLAN IN RCRD: CPT | Mod: ,,, | Performed by: INTERNAL MEDICINE

## 2017-08-28 PROCEDURE — 99999 PR PBB SHADOW E&M-EST. PATIENT-LVL III: CPT | Mod: PBBFAC,,, | Performed by: INTERNAL MEDICINE

## 2017-08-28 PROCEDURE — 99214 OFFICE O/P EST MOD 30 MIN: CPT | Mod: S$PBB,,, | Performed by: INTERNAL MEDICINE

## 2017-08-28 PROCEDURE — 99213 OFFICE O/P EST LOW 20 MIN: CPT | Mod: PBBFAC,PO | Performed by: INTERNAL MEDICINE

## 2017-08-28 PROCEDURE — 1159F MED LIST DOCD IN RCRD: CPT | Mod: ,,, | Performed by: INTERNAL MEDICINE

## 2017-08-28 PROCEDURE — 1126F AMNT PAIN NOTED NONE PRSNT: CPT | Mod: ,,, | Performed by: INTERNAL MEDICINE

## 2017-08-28 NOTE — PROGRESS NOTES
HISTORY OF PRESENT ILLNESS:  Adela Garay is a 89 y.o. female who presents to the clinic today for Hyperlipidemia (6 Month Follow Up); Hypertension; and Diabetes  .  The patient presents to clinic today for follow-up of her type 2 diabetes mellitus complicated by proteinuria and neuropathy, as well as her severe aortic stenosis/hypertension with CHF/paroxysmal atrial fibrillation.  The patient reports her blood pressures at home are very well controlled.  She recently underwent a very extensive workup with cardiology regarding her severe aortic stenosis.  She has become a little bit symptomatic.  They are scheduling her for an intervention for October 17.  The patient's diabetes is diet controlled.  She reports very good dietary habits.  She states is active as she can be.      PAST MEDICAL HISTORY:  Past Medical History:   Diagnosis Date    Anticoagulant long-term use     Anticoagulated on Coumadin     Arthritis     Atrial fibrillation     Atrial fibrillation     Chronic rhinosinusitis     Coronary artery disease     Granulomatous lung disease     Heart failure     Hyperlipidemia     Hypertension     Hypertensive heart disease without CHF (congestive heart failure)     Mitral valve prolapse     PN (peripheral neuropathy)     hereditary?(sister has it also)/idiopathic?/patient thinks from Zocor    Severe aortic stenosis by prior echocardiogram     TIA (transient ischemic attack)     Type 2 diabetes mellitus     Type II or unspecified type diabetes mellitus without mention of complication, not stated as uncontrolled        PAST SURGICAL HISTORY:  Past Surgical History:   Procedure Laterality Date    SINUS SURGERY      tonsillectomy      TONSILLECTOMY         SOCIAL HISTORY:  Social History     Social History    Marital status:      Spouse name: N/A    Number of children: 6    Years of education: 2 college     Occupational History    retired housewife      Social History Main  Topics    Smoking status: Never Smoker    Smokeless tobacco: Never Used    Alcohol use 0.0 oz/week      Comment: rare    Drug use: No    Sexual activity: No     Other Topics Concern    Not on file     Social History Narrative    No narrative on file       FAMILY HISTORY:  Family History   Problem Relation Age of Onset    Heart disease Father      49    Heart disease Mother     Thyroid disease Sister     Atrial fibrillation Sister     Atrial fibrillation Sister      neice    Lymphoma Sister 29    Atrial fibrillation Sister     Asthma Neg Hx     Emphysema Neg Hx        ALLERGIES AND MEDICATIONS: updated and reviewed.  Review of patient's allergies indicates:   Allergen Reactions    Levaquin [levofloxacin]     Doxycycline hyclate Other (See Comments)     Unknown to patient    Iodine and iodide containing products     Neurontin  [gabapentin]      Other reaction(s): Unknown    Norpace  [disopyramide]      Other reaction(s): Hives    Phenytoin sodium extended      Other reaction(s): Muscle pain    Sulfamethoxazole-trimethoprim      Other reaction(s): Muscle cramps     Medication List with Changes/Refills   Current Medications    ACETAMINOPHEN (TYLENOL) 325 MG TABLET    Take 325 mg by mouth every 6 (six) hours as needed for Pain.    ALPHA LIPOIC ACID 600 MG CAP    Take 600 mg by mouth Daily.    ASPIRIN 81 MG TAB    Take 81 mg by mouth. 4 times weekly    BLOOD SUGAR DIAGNOSTIC (BLOOD GLUCOSE TEST) STRP    1 strip by Misc.(Non-Drug; Combo Route) route once daily. Currently using Nova max plus meter.    CHOLECALCIFEROL, VITAMIN D3, 1,000 UNIT CAPSULE    Take 1,000 Units by mouth once daily.    CHROMIUM PICOLINATE ORAL    Take 200 mg by mouth once daily.    CINNAMON BARK (CINNAMON ORAL)    Take 1,000 mg by mouth 2 (two) times daily.    DIGOXIN (LANOXIN) 250 MCG TABLET    TAKE 1 TABLET ONE TIME DAILY AND TAKE AN ADDITIONAL 1/2 TABLET ONCE EACH WEEK    DOCUSATE SODIUM (COLACE) 100 MG CAPSULE    Take 100 mg  "by mouth. 1 Capsule Oral Every day    FOLIC ACID (FOLVITE) 1 MG TABLET    Take 1 tablet (1 mg total) by mouth once daily.    FUROSEMIDE (LASIX) 20 MG TABLET    Take 1 tablet (20 mg total) by mouth 2 (two) times daily as needed (leg swelling).    METOPROLOL TARTRATE (LOPRESSOR) 50 MG TABLET    Take 1 tablet (50 mg total) by mouth 2 (two) times daily.    MUPIROCIN (BACTROBAN) 2 % OINTMENT    Apply topically as needed.    SIMVASTATIN (ZOCOR) 20 MG TABLET    TAKE 1 TABLET EVERY EVENING    TRIAMCINOLONE ACETONIDE 0.025% (KENALOG) 0.025 % CREAM    Use bid as needed.    TRIAMTERENE-HYDROCHLOROTHIAZIDE 37.5-25 MG (DYAZIDE) 37.5-25 MG PER CAPSULE    Take 1 capsule by mouth once daily.    WARFARIN (COUMADIN) 2 MG TABLET    TAKE 2 TABLETS EVERY DAY EXCEPT TAKE 1 TABLET ON SUNDAY   Discontinued Medications    CIMETIDINE (TAGAMET) 300 MG TABLET                REVIEW OF SYSTEMS:  General ROS: negative for - chills, fever or malaise  Psychological ROS: negative for - anxiety, depression, sleep disturbances or suicidal ideation  Ophthalmic ROS: negative for - blurry vision or eye pain  ENT ROS: negative for - epistaxis, oral lesions or sore throat  Allergy and Immunology ROS: negative for - hives  Hematological and Lymphatic ROS: negative for - swollen lymph nodes  Endocrine ROS: negative for - polydipsia/polyuria or temperature intolerance  Respiratory ROS: negative for - hemoptysis or sputum changes  Cardiovascular ROS: negative for - loss of consciousness or palpitations  Gastrointestinal ROS: no abdominal pain, change in bowel habits, or black or bloody stools  Dermatological ROS: negative for mole changes  Musculoskeletal ROS: negative for - joint swelling or muscle pain  Neurological ROS: no TIA or stroke symptoms  Genito-Urinary ROS: no dysuria, trouble voiding, or hematuria      PHYSICAL EXAM:   Vitals:    08/28/17 0908   BP: 120/70   Pulse: (!) 59   Temp: 98 °F (36.7 °C)     Weight: 62.6 kg (138 lb 0.1 oz)   Height: 5' 6" " (167.6 cm)   Body mass index is 22.28 kg/m².     General appearance - alert, well appearing, and in no distress and normal appearing weight  Mental status - alert, oriented to person, place, and time, normal mood, behavior, speech, dress, motor activity, and thought processes  Eyes - pupils equal and reactive, extraocular eye movements intact, sclera anicteric  Mouth - mucous membranes moist, pharynx normal without lesions  Neck - supple, no significant adenopathy  Lymphatics - no palpable lymphadenopathy  Chest - clear to auscultation, no wheezes, rales or rhonchi, symmetric air entry  Heart - irregularly irregular rhythm with rate controlled; loud systolic murmur 4/6  Neurological - alert, oriented, normal speech, no focal findings or movement disorder noted, cranial nerves II through XII intact  Musculoskeletal - no muscular tenderness noted, Moderate osteoarthritic changes noted to both knee joints. No joint effusions noted.   Extremities - pedal edema trace +  Skin - normal coloration and turgor, no rashes, no suspicious skin lesions noted      Protective Sensation (w/ 10 gram monofilament):  Right: Decreased  Left: Decreased    Visual Inspection:  Dry Skin -  Bilateral and Onychomycosis -  Bilateral (mild)    Pedal Pulses:   Right: Diminshed  Left: Diminshed    Posterior tibialis:   Right:Diminshed  Left: Diminshed         ASSESSMENT AND PLAN:  1. Severe aortic stenosis by prior echocardiogram  Has become symptomatic. Scheduled for intervention 10/17.    2. Benign hypertensive heart disease with congestive heart failure  Discussed sodium restriction, maintaining ideal body weight and regular exercise program as physiologic means to achieve blood pressure control. The patient will strive towards this. The current medical regimen is effective;  continue present plan and medications. Recommended patient to check home readings to monitor and see me for followup as scheduled or sooner as needed. Patient was  educated that both decongestant and anti-inflammatory medication may raise blood pressure.     3. Paroxysmal atrial fibrillation  CHADS2 for A-FIB Stroke Risk  Age?: 75 years old or older  CHF history: Yes  HTN history: Yes  Previous Stroke Sx or TIA: No  Vascular Disease History?: Yes  Diabetes Mellitus History: Yes  Female?: Yes  Total Score: 7    Patient is on coumadin with home monitoring.    4. Controlled type 2 diabetes mellitus with microalbuminuria, without long-term current use of insulin/5. Controlled type 2 diabetes with neuropathy  Stable. We discussed low sugar/low carbohydrate diet and regular exercise to prevent progression. No need for prescription medication at this time. Neuropathy pain controlled.  - Microalbumin/creatinine urine ratio; Future    6. Flu vaccine need  Patient was advised to come back in about a month or go to the pharmacy since we do not have this available in the clinic today.     Patient denies a need for case management services at this time.          Return in about 4 months (around 12/28/2017), or if symptoms worsen or fail to improve, for follow up chronic medical conditions.. or sooner as needed.

## 2017-08-30 ENCOUNTER — TELEPHONE (OUTPATIENT)
Dept: PULMONOLOGY | Facility: CLINIC | Age: 82
End: 2017-08-30

## 2017-08-31 ENCOUNTER — LAB VISIT (OUTPATIENT)
Dept: LAB | Facility: HOSPITAL | Age: 82
End: 2017-08-31
Attending: INTERNAL MEDICINE
Payer: MEDICARE

## 2017-08-31 DIAGNOSIS — E11.29 CONTROLLED TYPE 2 DIABETES MELLITUS WITH MICROALBUMINURIA, WITHOUT LONG-TERM CURRENT USE OF INSULIN: ICD-10-CM

## 2017-08-31 DIAGNOSIS — R80.9 CONTROLLED TYPE 2 DIABETES MELLITUS WITH MICROALBUMINURIA, WITHOUT LONG-TERM CURRENT USE OF INSULIN: ICD-10-CM

## 2017-08-31 LAB
CREAT UR-MCNC: 153 MG/DL
MICROALBUMIN UR DL<=1MG/L-MCNC: 172 UG/ML
MICROALBUMIN/CREATININE RATIO: 112.4 UG/MG

## 2017-08-31 PROCEDURE — 82570 ASSAY OF URINE CREATININE: CPT

## 2017-08-31 NOTE — TELEPHONE ENCOUNTER
Results from recent CT Chest and PFTs reviewed with patient.  She has a variety of lung nodules, but none show threatening behavior.  She has modest ventilatory impairment, but lung function seems adequate for TAVR procedure if she elects to proceed with this.  OK for this from pulmonary perspective.

## 2017-09-05 ENCOUNTER — ANTI-COAG VISIT (OUTPATIENT)
Dept: CARDIOLOGY | Facility: CLINIC | Age: 82
End: 2017-09-05

## 2017-09-05 DIAGNOSIS — Z79.01 LONG-TERM (CURRENT) USE OF ANTICOAGULANTS, INR GOAL 2.0-3.0: ICD-10-CM

## 2017-09-05 DIAGNOSIS — Z86.73 HX-TIA (TRANSIENT ISCHEMIC ATTACK): ICD-10-CM

## 2017-09-05 LAB — INR PPP: 2.3

## 2017-09-14 ENCOUNTER — OFFICE VISIT (OUTPATIENT)
Dept: CARDIOLOGY | Facility: CLINIC | Age: 82
End: 2017-09-14
Payer: MEDICARE

## 2017-09-14 VITALS
HEART RATE: 66 BPM | SYSTOLIC BLOOD PRESSURE: 125 MMHG | WEIGHT: 135.56 LBS | BODY MASS INDEX: 21.79 KG/M2 | DIASTOLIC BLOOD PRESSURE: 60 MMHG | HEIGHT: 66 IN | OXYGEN SATURATION: 96 %

## 2017-09-14 DIAGNOSIS — I35.0 SEVERE AORTIC STENOSIS BY PRIOR ECHOCARDIOGRAM: ICD-10-CM

## 2017-09-14 DIAGNOSIS — E78.5 HYPERLIPIDEMIA, UNSPECIFIED HYPERLIPIDEMIA TYPE: ICD-10-CM

## 2017-09-14 DIAGNOSIS — I27.20 PULMONARY HYPERTENSION: Primary | ICD-10-CM

## 2017-09-14 DIAGNOSIS — I48.0 PAROXYSMAL ATRIAL FIBRILLATION: ICD-10-CM

## 2017-09-14 DIAGNOSIS — I25.10 CORONARY ARTERY DISEASE INVOLVING NATIVE CORONARY ARTERY OF NATIVE HEART WITHOUT ANGINA PECTORIS: ICD-10-CM

## 2017-09-14 DIAGNOSIS — E11.29 CONTROLLED TYPE 2 DIABETES MELLITUS WITH MICROALBUMINURIA, WITHOUT LONG-TERM CURRENT USE OF INSULIN: ICD-10-CM

## 2017-09-14 DIAGNOSIS — R80.9 CONTROLLED TYPE 2 DIABETES MELLITUS WITH MICROALBUMINURIA, WITHOUT LONG-TERM CURRENT USE OF INSULIN: ICD-10-CM

## 2017-09-14 PROCEDURE — 99213 OFFICE O/P EST LOW 20 MIN: CPT | Mod: S$PBB,,, | Performed by: INTERNAL MEDICINE

## 2017-09-14 PROCEDURE — 99214 OFFICE O/P EST MOD 30 MIN: CPT | Mod: PBBFAC | Performed by: INTERNAL MEDICINE

## 2017-09-14 PROCEDURE — 1159F MED LIST DOCD IN RCRD: CPT | Mod: ,,, | Performed by: INTERNAL MEDICINE

## 2017-09-14 PROCEDURE — 99999 PR PBB SHADOW E&M-EST. PATIENT-LVL IV: CPT | Mod: PBBFAC,,, | Performed by: INTERNAL MEDICINE

## 2017-09-14 PROCEDURE — 1126F AMNT PAIN NOTED NONE PRSNT: CPT | Mod: ,,, | Performed by: INTERNAL MEDICINE

## 2017-09-14 PROCEDURE — 1157F ADVNC CARE PLAN IN RCRD: CPT | Mod: ,,, | Performed by: INTERNAL MEDICINE

## 2017-09-14 NOTE — PROGRESS NOTES
Subjective:    Patient ID:  Adela Garay is a 89 y.o. female who presents for follow-up of Valvular Heart Disease      HPI     Severe AS   No CAD by [facility] 8/22/17  Chronic A-fib - rate controlled on coumadin, HTN, DM, Hx TIA     Echo 6/7/17    1 - Normal left ventricular systolic function (EF 55-60%).     2 - Eccentric hypertrophy.     3 - Biatrial enlargement.     4 - Restrictive LV filling pattern, indicating markedly elevated LAP (grade 3 diastolic dysfunction).     5 - Right ventricular enlargement with moderately depressed systolic function.     6 - Pulmonary hypertension. The estimated PA systolic pressure is 43 mmHg.     7 - Severe aortic stenosis, DILAN = 0.53 cm2, peak velocity = 4.86 m/s, mean gradient = 62 mmHg.     8 - Mild mitral regurgitation.     9 - Severe tricuspid regurgitation.     10 - Trivial pulmonic regurgitation.     11 - Trivial pericardial effusion.      Saw Dr Campbell 7/10/17  Adela Garay is a 89 y.o. female referred by Dr Ivy for evaluation of severe AS (NYHA Class II sx).  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 Comorbidities include DM, AF on coumadin, TIA, severe TR, PA systolic pressure is 43 mmHg and HTN. In the past she has been evaluated at [facility] by Dr Briseno and then by us 6/6/2016 for TAVR but she was not symptomatic at that time and hence further workup was differed. She is now symptomatic and is short of breath on walking in the back yard which is new, also she gets up at night because she is short of breath. No chest pain. Her daughter is with her today.      The patient has undergone the following TAVR work-up:   · ECHO (Date 6/7/2017): DILAN= 0.53 cm2, MG= 62 mmHg, Peak Jose Elias= 4.86 m/s, EF= 60%.   · LHC : pending  · STS: 5.3%   · Frailty: 2/4 (walk and  strength)  · Iliacs are pending  · LVOT area by CTA is pending  · Incidental findings on CT: is pending  · CT Surgery risk assessment: high risk, per Dr Lamb due to age and comorbidities (note from 6/7/2016 by   Zainab)  · Rhythm issues: AF with nonspecific intraventricular block  · PFTs: pending     Staff:  I have personally taken the history and examined this patient and agree with the fellow's note as stated above and amended it accordingly :-) This delightful lady is not sure if she wants to have TAVR evaluation.   If she decides to proceed with TAVR evaluation will do cath and possible PCI, PFT's, CTA, and have her see CT surgery.    [facility] 8/22/17    No significant CAD.    Severe AS with TAVR evaluation in progress.    Saw CT surgery Katelin  Agree with Dr. Lamb.  High risk for surgical AVR due to age and comorbidities.    TAVR scheduled for 10/17/17    Review of Systems   Constitution: Negative for decreased appetite.   HENT: Negative for ear discharge.    Eyes: Negative for blurred vision.   Respiratory: Negative for hemoptysis.    Endocrine: Negative for polyphagia.   Hematologic/Lymphatic: Negative for adenopathy.   Skin: Negative for color change.   Musculoskeletal: Negative for joint swelling.   Neurological: Negative for brief paralysis.   Psychiatric/Behavioral: Negative for hallucinations.        Objective:    Physical Exam   Constitutional: She is oriented to person, place, and time. She appears well-developed and well-nourished.   HENT:   Head: Normocephalic and atraumatic.   Eyes: Conjunctivae are normal. Pupils are equal, round, and reactive to light.   Neck: Normal range of motion. Neck supple.   Cardiovascular: Normal rate and intact distal pulses.  An irregularly irregular rhythm present.   Murmur heard.   Harsh midsystolic murmur is present with a grade of 2/6  at the upper right sternal border radiating to the neck  Pulmonary/Chest: Effort normal and breath sounds normal.   Abdominal: Soft. Bowel sounds are normal.   Musculoskeletal: Normal range of motion. She exhibits edema.   Neurological: She is alert and oriented to person, place, and time.   Skin: Skin is warm and dry.          Assessment:       1. Pulmonary hypertension    2. Hyperlipidemia, unspecified hyperlipidemia type    3. Severe aortic stenosis by prior echocardiogram    4. Coronary artery disease involving native coronary artery of native heart without angina pectoris    5. Paroxysmal atrial fibrillation    6. Controlled type 2 diabetes mellitus with microalbuminuria, without long-term current use of insulin         Plan:       Agree with plans for TAVR  OV here 2 months

## 2017-09-18 ENCOUNTER — ANTI-COAG VISIT (OUTPATIENT)
Dept: CARDIOLOGY | Facility: CLINIC | Age: 82
End: 2017-09-18
Payer: MEDICARE

## 2017-09-18 DIAGNOSIS — Z86.73 HX-TIA (TRANSIENT ISCHEMIC ATTACK): ICD-10-CM

## 2017-09-18 DIAGNOSIS — Z79.01 LONG-TERM (CURRENT) USE OF ANTICOAGULANTS, INR GOAL 2.0-3.0: ICD-10-CM

## 2017-09-18 LAB — INR PPP: 2.5

## 2017-09-18 PROCEDURE — G0250 MD INR TEST REVIE INTER MGMT: HCPCS | Mod: ,,, | Performed by: PHARMACIST

## 2017-09-25 DIAGNOSIS — I35.0 SEVERE AORTIC VALVE STENOSIS: Primary | ICD-10-CM

## 2017-09-25 DIAGNOSIS — Z00.6 EXAMINATION OF PARTICIPANT IN CLINICAL TRIAL: ICD-10-CM

## 2017-09-25 RX ORDER — PREDNISONE 50 MG/1
50 TABLET ORAL EVERY 6 HOURS
Qty: 3 TABLET | Refills: 0 | Status: SHIPPED | OUTPATIENT
Start: 2017-09-25 | End: 2017-09-26

## 2017-09-25 RX ORDER — DIPHENHYDRAMINE HCL 25 MG
50 CAPSULE ORAL EVERY 6 HOURS
Qty: 6 CAPSULE | Refills: 0 | COMMUNITY
Start: 2017-09-25 | End: 2017-09-26

## 2017-09-25 RX ORDER — CIMETIDINE 300 MG/1
300 TABLET, FILM COATED ORAL EVERY 6 HOURS
Qty: 3 TABLET | Refills: 0 | Status: ON HOLD | OUTPATIENT
Start: 2017-09-25 | End: 2017-10-18 | Stop reason: HOSPADM

## 2017-09-25 NOTE — PROGRESS NOTES
The pt called earlier today.  I tried to return her call, but had to [facility]

## 2017-09-25 NOTE — PROGRESS NOTES
The pt called this afternoon to let me know that she is scheduled for a valve replacement on 10/17/17.  She has been approved to hold coumadin with a lovenox bridge in the past, due to her CHADs score = 6 (Age, TIA, HTN, CHF, DM).  We will provide detailed instructions with an INR that is ~ 1 week prior to her surgery and she will hold coumadin x 5 days.

## 2017-10-03 ENCOUNTER — RESEARCH ENCOUNTER (OUTPATIENT)
Dept: CARDIOLOGY | Facility: CLINIC | Age: 82
End: 2017-10-03

## 2017-10-03 ENCOUNTER — ANTI-COAG VISIT (OUTPATIENT)
Dept: CARDIOLOGY | Facility: CLINIC | Age: 82
End: 2017-10-03

## 2017-10-03 DIAGNOSIS — Z00.6 EXAMINATION OF PARTICIPANT IN CLINICAL TRIAL: ICD-10-CM

## 2017-10-03 DIAGNOSIS — Z79.01 LONG-TERM (CURRENT) USE OF ANTICOAGULANTS, INR GOAL 2.0-3.0: ICD-10-CM

## 2017-10-03 DIAGNOSIS — Z86.73 HX-TIA (TRANSIENT ISCHEMIC ATTACK): ICD-10-CM

## 2017-10-03 DIAGNOSIS — Z95.2 S/P TAVR (TRANSCATHETER AORTIC VALVE REPLACEMENT): Primary | ICD-10-CM

## 2017-10-03 LAB — INR PPP: 2.5

## 2017-10-03 RX ORDER — ENOXAPARIN SODIUM 100 MG/ML
60 INJECTION SUBCUTANEOUS EVERY 12 HOURS PRN
Qty: 10 SYRINGE | Refills: 1 | Status: CANCELLED | OUTPATIENT
Start: 2017-10-03

## 2017-10-03 NOTE — PROGRESS NOTES
The subject is currently enrolled in the Portico TAVR research trial and was RANDOMIZED to the Portico valve.

## 2017-10-03 NOTE — PROGRESS NOTES
Patient is undergoing a TAVR 10/17 with Dr. Campbell. She will hold warfarin 5 days prior with Lovenox (CrCl 40ml/min using actual body weight of 61.5kg, Ht: 5'6'', Scr 0.9.) She has normal platelets. See calendar for warfarin dosing pre and post-op. Upon calling the patient with these instructions she stated she was informed by Dr. Campbell to hold warfarin 3 days prior. In July she held 3 days per patient, 2 days per our chart, and did not hold with Lovenox. I sent a message to Dr. Campbell and BREN Borges to confirm the amount of days to hold warfarin and whether Lovenox is needed.    Patient is here for lovenox bridging instructions    Pre-Procedure Instructions:    Take last dose of warfarin on :        10/11                         Start enoxaparin injections the morning of:     10/13                            Enoxaparin dose is:         60mg               Every 12 hours (Twice Daily)  Last dose of enoxaparin will be the morning of:         10/16                        Post-Procedure Instructions:  Patient will be admitted post procedure. Patient is to call the coumadin clinic upon discharge.

## 2017-10-04 NOTE — PROGRESS NOTES
"Per Dr. Campbell "No need to bridge her or hold for five days.  Thanks for offering though. We do the two day thing for afib all the time.   Bj " Patient is aware of the procedure instructions and will contact us upon discharge.  "

## 2017-10-11 ENCOUNTER — ANTI-COAG VISIT (OUTPATIENT)
Dept: CARDIOLOGY | Facility: CLINIC | Age: 82
End: 2017-10-11

## 2017-10-11 DIAGNOSIS — I48.0 PAROXYSMAL ATRIAL FIBRILLATION: ICD-10-CM

## 2017-10-11 DIAGNOSIS — I11.0 BENIGN HYPERTENSIVE HEART DISEASE WITH CONGESTIVE HEART FAILURE: ICD-10-CM

## 2017-10-11 DIAGNOSIS — Z79.01 LONG-TERM (CURRENT) USE OF ANTICOAGULANTS, INR GOAL 2.0-3.0: ICD-10-CM

## 2017-10-11 DIAGNOSIS — Z86.73 HX-TIA (TRANSIENT ISCHEMIC ATTACK): ICD-10-CM

## 2017-10-11 LAB — INR PPP: 2.3

## 2017-10-11 RX ORDER — METOPROLOL TARTRATE 50 MG/1
TABLET ORAL
Qty: 180 TABLET | Refills: 0 | Status: ON HOLD | OUTPATIENT
Start: 2017-10-11 | End: 2017-11-12 | Stop reason: HOSPADM

## 2017-10-11 RX ORDER — SIMVASTATIN 20 MG/1
TABLET, FILM COATED ORAL
Qty: 90 TABLET | Refills: 0 | Status: SHIPPED | OUTPATIENT
Start: 2017-10-11 | End: 2018-04-16 | Stop reason: SDUPTHER

## 2017-10-11 NOTE — PROGRESS NOTES
Patient called in a verbal result dated today 10/11 as: INR -2.3, Patient has called in the result to [facility]--hard copy to be faxed, Patient reports that she's having Aortic Valve replacement on 10/17 and as per Dr. Campbell is to hold coumadin 2 -3 days prior

## 2017-10-12 RX ORDER — WARFARIN 2 MG/1
TABLET ORAL
Qty: 175 TABLET | Refills: 3 | Status: SHIPPED | OUTPATIENT
Start: 2017-10-12 | End: 2018-09-19 | Stop reason: SDUPTHER

## 2017-10-16 ENCOUNTER — OFFICE VISIT (OUTPATIENT)
Dept: CARDIOLOGY | Facility: CLINIC | Age: 82
DRG: 266 | End: 2017-10-16
Payer: MEDICARE

## 2017-10-16 ENCOUNTER — HOSPITAL ENCOUNTER (OUTPATIENT)
Dept: RADIOLOGY | Facility: HOSPITAL | Age: 82
Discharge: HOME | DRG: 266 | End: 2017-10-16
Attending: INTERNAL MEDICINE
Payer: MEDICARE

## 2017-10-16 ENCOUNTER — ANESTHESIA EVENT (OUTPATIENT)
Dept: MEDSURG UNIT | Facility: HOSPITAL | Age: 82
DRG: 266 | End: 2017-10-16
Payer: MEDICARE

## 2017-10-16 VITALS
OXYGEN SATURATION: 96 % | BODY MASS INDEX: 23.83 KG/M2 | HEIGHT: 65 IN | HEART RATE: 64 BPM | WEIGHT: 143.06 LBS | DIASTOLIC BLOOD PRESSURE: 74 MMHG | SYSTOLIC BLOOD PRESSURE: 139 MMHG

## 2017-10-16 DIAGNOSIS — R91.8 MULTIPLE LUNG NODULES: ICD-10-CM

## 2017-10-16 DIAGNOSIS — R80.9 CONTROLLED TYPE 2 DIABETES MELLITUS WITH MICROALBUMINURIA, WITHOUT LONG-TERM CURRENT USE OF INSULIN: ICD-10-CM

## 2017-10-16 DIAGNOSIS — I35.0 SEVERE AORTIC STENOSIS BY PRIOR ECHOCARDIOGRAM: Primary | ICD-10-CM

## 2017-10-16 DIAGNOSIS — Z00.6 EXAMINATION OF PARTICIPANT IN CLINICAL TRIAL: ICD-10-CM

## 2017-10-16 DIAGNOSIS — E11.29 CONTROLLED TYPE 2 DIABETES MELLITUS WITH MICROALBUMINURIA, WITHOUT LONG-TERM CURRENT USE OF INSULIN: ICD-10-CM

## 2017-10-16 DIAGNOSIS — Z79.01 LONG-TERM (CURRENT) USE OF ANTICOAGULANTS, INR GOAL 2.0-3.0: ICD-10-CM

## 2017-10-16 DIAGNOSIS — I25.10 CORONARY ARTERY DISEASE INVOLVING NATIVE CORONARY ARTERY OF NATIVE HEART WITHOUT ANGINA PECTORIS: ICD-10-CM

## 2017-10-16 DIAGNOSIS — I48.0 PAROXYSMAL ATRIAL FIBRILLATION: ICD-10-CM

## 2017-10-16 DIAGNOSIS — Z86.73 HX-TIA (TRANSIENT ISCHEMIC ATTACK): ICD-10-CM

## 2017-10-16 DIAGNOSIS — I35.0 SEVERE AORTIC VALVE STENOSIS: ICD-10-CM

## 2017-10-16 PROCEDURE — 99214 OFFICE O/P EST MOD 30 MIN: CPT | Mod: PBBFAC,25

## 2017-10-16 PROCEDURE — 99999 PR PBB SHADOW E&M-EST. PATIENT-LVL IV: CPT | Mod: PBBFAC,,,

## 2017-10-16 PROCEDURE — 99214 OFFICE O/P EST MOD 30 MIN: CPT | Mod: S$PBB,,, | Performed by: INTERNAL MEDICINE

## 2017-10-16 PROCEDURE — 71020 XR CHEST PA AND LATERAL: CPT | Mod: TC

## 2017-10-16 PROCEDURE — 71020 XR CHEST PA AND LATERAL: CPT | Mod: 26,Q1,, | Performed by: RADIOLOGY

## 2017-10-16 NOTE — ANESTHESIA PREPROCEDURE EVALUATION
10/16/2017  Adela Garay is a 89 y.o., female with severe AS, DM, chronic AF on coumadin, pulmonary HTN, CAD here for the procedure below. Patient with preserved EF, but has pulmonary HTN ( 56mmHg) and moderately depressed right heart function.    Pre-operative evaluation for REPLACEMENT-VALVE-AORTIC (N/A)    Previous Airway:  Placement Date: 11/19/16; Placement Time: 0907; Inserted by: CRNA; Airway Device: LMA; Mask Ventilation: Not Attempted; Airway Device Size: 3.0; Style: Cuffed; Cuff Inflation: Minimal occlusive pressure; Placement Verified By: Auscultation, Capnometry; Complicating Factors: None; Intubation Findings: Positive EtCO2, Bilateral breath sounds, Atraumatic/Condition of teeth unchanged; Securment: Lips; Complications: None; Breath Sounds: Equal Bilateral; Insertion Attempts: 1; Removal Date: 11/19/16;  Removal Time: 0936      Past Surgical History:   Procedure Laterality Date    SINUS SURGERY      tonsillectomy      TONSILLECTOMY           Vital Signs Range (Last 24H):  Pulse:  [64]   BP: (139-149)/(65-74)   SpO2:  [96 %]       CBC:     Recent Labs  Lab 10/16/17  1215   WBC 7.51   RBC 3.73*   HGB 11.2*   HCT 35.2*      MCV 94   MCH 30.0   MCHC 31.8*       CMP: No results for input(s): NA, K, CL, CO2, BUN, CREATININE, GLU, MG, PHOS, CALCIUM, ALBUMIN, PROT, ALKPHOS, ALT, AST, BILITOT in the last 720 hours.    INR:    Recent Labs  Lab 09/18/17 10/03/17 10/11/17   INR 2.5 2.5 2.3         Diagnostic Studies:      EKG:  Vent. Rate : 071 BPM     Atrial Rate : 028 BPM     P-R Int : 000 ms          QRS Dur : 168 ms      QT Int : 476 ms       P-R-T Axes : 000 092 -73 degrees     QTc Int : 517 ms    Atrial fibrillation with premature ventricular or aberrantly conducted  complexes  Rightward axis  RBBB, complete  LVH  Abnormal ECG  When compared with ECG of 09-JUN-2017 10:17,  QT has  lengthened  Confirmed by CANDACE SIMON MD ([id number]) on 8/22/2017 8:09:02 PM    2D Echo:  General: The patient was in an irregularly irregular rhythm throughout the study.     Aorta: The aortic root is normal in size, measuring 2.8 cm at sinotubular junction and 3.5 cm at Sinuses of Valsalva. The proximal ascending aorta is normal in size, measuring 3.4 cm across.     Left Atrium: The left atrial volume index is severely enlarged, measuring 108.26 cc/m2.     Left Ventricle: The left ventricle is normal in size, with an end-diastolic diameter of 4.8 cm, and an end-systolic diameter of 3.6 cm. Septal wall thickness is upper limit of normal in size, and posterior wall thickness is increased, with the septum   measuring 1.1 cm and the posterior wall measuring 1.5 cm across. Relative wall thickness was increased at 0.63, and the LV mass index was increased at 173.3 g/m2 consistent with concentric left ventricular hypertrophy. There are no regional wall motion   abnormalities. Left ventricular systolic function appears normal. Visually estimated ejection fraction is 60-65%. The LV Doppler derived stroke volume equals 65.0 ccs.         Right Atrium: The right atrium is enlarged, measuring 9.5 cm in length and 7.2 cm in width in the apical view.     Right Ventricle: The right ventricle is enlarged measuring 5.0 cm at the base in the apical right ventricle-focused view. Global right ventricular systolic function appears moderately depressed. Tricuspid annular plane systolic excursion (TAPSE) is .9   cm. Tissue Doppler-derived tricuspid annular peak systolic velocity (S prime) is 10.6 cm/s. The estimated PA systolic pressure is 56 mmHg.     Aortic Valve:  The aortic valve is markedly sclerotic with markedly restricted leaflet mobility. The peak velocity obtained across the aortic valve is 5.28 m/s, which translates to a peak gradient of 112 mmHg. The mean gradient is 61 mmHg. Using a left   ventricular outflow tract diameter of  2.2 cm, a left ventricular outflow tract velocity time integral of 17 cm, and a peak instantaneous transvalvular velocity time integral of 113 cm, the calculated aortic valve area is 0.57 cm2, consistent with severe   aortic stenosis. Additionally, there is trivial aortic regurgitation.     Mitral Valve:  The mitral valve is mildly sclerotic. There is mild mitral regurgitation. There is marked mitral annular calcification.     Tricuspid Valve:  There is severe tricuspid regurgitation. Tricuspid valve is normal in structure with normal leaflet mobility.     Pulmonary Valve:  There is mild pulmonic regurgitation. Pulmonary valve is normal in structure with normal leaflet mobility.     Pericardium: There is evidence of a small posterior pericardial effusion.     IVC: IVC is enlarged and collapses < 50% with a sniff, suggesting high right atrial pressure of 15 mmHg.     Intracavitary: There is no evidence of intracavity mass, thrombi, or vegetation.     In the presence of severe tricuspid valve regurgitaiton and elevated right sided filling pressures, pulmonary pressure might be underestimated..     CONCLUSIONS     1 - Normal left ventricular systolic function (EF 60-65%).     2 - Concentric hypertrophy.     3 - Right ventricular enlargement with moderately depressed systolic function.     4 - Biatrial enlargement.     5 - Pulmonary hypertension. The estimated PA systolic pressure is 56 mmHg.     6 - Severe aortic stenosis, DILAN = 0.57 cm2, peak velocity = 5.28 m/s, mean gradient = 61 mmHg.     7 - Trivial aortic regurgitation.     8 - Mild mitral regurgitation.     9 - Severe tricuspid regurgitation.     10 - Small pericardial effusion.     11 - Increased central venous pressure.       Anesthesia Evaluation    I have reviewed the Patient Summary Reports.    I have reviewed the Nursing Notes.      Review of Systems  Anesthesia Hx:  No problems with previous Anesthesia Denies Hx of Anesthetic complications  History of  prior surgery of interest to airway management or planning: Denies Family Hx of Anesthesia complications.   Denies Personal Hx of Anesthesia complications.   Social:  Non-Smoker, Alcohol Use    Hematology/Oncology:  Hematology Normal   Oncology Normal     EENT/Dental:EENT/Dental Normal   Cardiovascular:   Exercise tolerance: poor Hypertension CAD  Dysrhythmias atrial fibrillation CHF ECG has been reviewed.    Pulmonary:  Pulmonary Normal    Renal/:  Renal/ Normal     Hepatic/GI:  Hepatic/GI Normal    Musculoskeletal:   Arthritis     Neurological:   TIA, Neuromuscular Disease,    Endocrine:   Diabetes, type 2    Dermatological:  Skin Normal    Psych:   Psychiatric History          Physical Exam  General:  Well nourished    Airway/Jaw/Neck:  Airway Findings: Mouth Opening: Normal Tongue: Normal  General Airway Assessment: Adult  Mallampati: I  TM Distance: Normal, at least 6 cm  Jaw/Neck Findings:  Neck ROM: Normal ROM      Dental:  Dental Findings: In tact   Chest/Lungs:  Chest/Lungs Findings: Clear to auscultation, Normal Respiratory Rate     Heart/Vascular:  Heart Findings: Rate: Bradycardia  Rhythm: Irregularly Irregular  Sounds: Normal        Mental Status:  Mental Status Findings:  Cooperative, Alert and Oriented         Anesthesia Plan  Type of Anesthesia, risks & benefits discussed:  Anesthesia Type:  general  Patient's Preference:   Intra-op Monitoring Plan: arterial line, central line, [place] and standard ASA monitors  Intra-op Monitoring Plan Comments:   Post Op Pain Control Plan: multimodal analgesia  Post Op Pain Control Plan Comments:   Induction:   IV  Beta Blocker:  Patient is not currently on a Beta-Blocker (No further documentation required).       Informed Consent: Patient understands risks and agrees with Anesthesia plan.  Questions answered. Anesthesia consent signed with patient.  ASA Score: 4     Day of Surgery Review of History & Physical:    H&P update referred to the surgeon.          Ready For Surgery From Anesthesia Perspective.

## 2017-10-16 NOTE — PROGRESS NOTES
Subjective:    Patient ID:  Adela Garay is a 89 y.o. female who presents for follow-up of Aortic Stenosis    HPI  Ms Garay is the  of a [employer] vet ([facility]) referred by Dr Ivy for evaluation of severe AS. She has a PMH significant for diet-controlled DM, chronic a-fib on Coumadin, remote hx of TIA, and HTN. She has been evaluated for TAVR in the past in [place] by Dr Briseno, but she was asymptomatic at that time.    Since her last visit with us in 8/2017, she remains symptomatic and is short of breath when walking in her backyard, and also has shortness of breath at night. Otherwise denies any chest pain, orthopnea, or LE edema. She has an existing RBBB and has chronic AFib, Dr. Ranulfo Delgado is aware of her procedure tomorrow.    The patient has undergone the following TAVR work-up:     · ECHO (Date 08.25.2017): DILAN= 0.57 cm2, MG= 61 mmHg, Peak Jose Elias= 5.28 m/s, EF= 60%.   · LHC (08.22.2017): Non-obstructive CAD  · STS: 12.3%  · Frailty: 2/4 (Walking time and  strength)  · PFTs: FEV1= 1.12/58% predicted, FVC= 1.85/70% pred, DLCO = 90% pred   · Iliacs are >5.63 mm on R and > 5.0mm on L  · LVOT: Area = 5.01 cm2, Avg Diam= 25.2 mm (28.2 x 21.1 mm)  · Incidental findings on CT: Multiple calcified granulomas within both lungs that are stable since 2011.The rounded pulmonary nodule in the right lung base is slightly larger in size from CT dated 11/09/2011, now measuring 2.1 x 1.8 cm.  there is also a new subcentimeter pulmonary nodule in the right middle lung. Dr. Campbell spoke with Dr. Saldivar via phone -- Dr. Saldivar reviewed the CT and does not feel anything further needs to be done.  · She is high risk due to age and comorbidities per Dr. Lamb and Dr. Willson  · Rhythm issues: AF with complete RBBB, EP aware (Dr. Delgado), unable to see this afternoon     29 mm PORTICO via R TF access (patient is in the PORTICO trial and randomized to the PORTICO valve). 24 mm True Balloon. May needs 8 x 4 balloon with 8 x  5 Viabahn.    Review of Systems   Constitution: Negative for chills, fever and weight loss.   HENT: Negative for sore throat and stridor.    Eyes: Negative for blurred vision, double vision and pain.   Cardiovascular: Positive for dyspnea on exertion and paroxysmal nocturnal dyspnea. Negative for chest pain, claudication, near-syncope, orthopnea, palpitations and syncope.   Respiratory: Positive for shortness of breath. Negative for cough, sputum production and wheezing.    Endocrine: Negative for polydipsia, polyphagia and polyuria.   Skin: Negative for rash.   Musculoskeletal: Negative for joint pain, joint swelling and myalgias.   Gastrointestinal: Negative for abdominal pain, constipation, diarrhea, heartburn, melena, nausea and vomiting.   Genitourinary: Negative for dysuria and hematuria.   Neurological: Negative for focal weakness and loss of balance.   Psychiatric/Behavioral: Negative for depression and memory loss.        Objective:    Physical Exam   Constitutional: She is oriented to person, place, and time. She appears well-developed and well-nourished.   HENT:   Head: Normocephalic and atraumatic.   Eyes: Pupils are equal, round, and reactive to light.   Neck: Normal range of motion. No JVD present.   Cardiovascular: Normal rate and regular rhythm.    Murmur heard.  Pulmonary/Chest: Effort normal and breath sounds normal. She has no wheezes. She has no rales.   Abdominal: Soft. Bowel sounds are normal. She exhibits no distension. There is no tenderness.   Neurological: She is alert and oriented to person, place, and time.   Skin: Skin is warm and dry.         Assessment:       1. Severe aortic stenosis, symptomatic (NYHA Class 3 symptoms)    The patient has undergone the following TAVR work-up:     · ECHO (Date 08.25.2017): IDLAN= 0.57 cm2, MG= 61 mmHg, Peak Jose Elias= 5.28 m/s, EF= 60%.   · LHC (08.22.2017): Non-obstructive CAD  · STS: 12.3%  · Frailty: 2/4 (Walking time and  strength)  · PFTs: FEV1=  1.12/58% predicted, FVC= 1.85/70% pred, DLCO = 90% pred   · Iliacs are >5.63 mm on R and > 5.0mm on L  · LVOT: Area = 5.01 cm2, Avg Diam= 25.2 mm (28.2 x 21.1 mm)  · Incidental findings on CT: Multiple calcified granulomas within both lungs that are stable since 2011.The rounded pulmonary nodule in the right lung base is slightly larger in size from CT dated 11/09/2011, now measuring 2.1 x 1.8 cm.  there is also a new subcentimeter pulmonary nodule in the right middle lung. Dr. Campbell spoke with Dr. Saldivar via phone -- Dr. Saldivar reviewed the CT and does not feel anything further needs to be done.  · She is high risk due to age and comorbidities per Dr. Lamb and Dr. Willson  · Rhythm issues: AF with complete RBBB, EP aware (Dr. Delgado), unable to see this afternoon     29 mm PORTICO via R TF access (patient is in the PORTICO trial and randomized to the PORTICO valve). 24 mm True Balloon. May needs 8 x 4 balloon with 8 x 5 Viabahn.     2. Paroxysmal atrial fibrillation with RBBB - On warfarin, held since 10/14, INR 1.6 today   3. Coronary artery disease involving native coronary artery of native heart without angina pectoris - Non obstructive [facility] in 8/2014, on ASA and simvastatin   4. Long-term (current) use of anticoagulants, INR goal 2.0-3.0 - On Coumadin as above, INR 1.6 today   5. Controlled type 2 diabetes mellitus with microalbuminuria, without long-term current use of insulin - diet controlled, HbA1c 6.0 in 5/2017   6. Hx-TIA (transient ischemic attack) - in 1994, on ASA and statin as above   7. Multiple lung nodules - per CT, discussed with Dr. Saldivar as above        Plan:         Transcatheter Aortic Valve Replacement on 10/17/2017  1. Valve: 29mm PORTICO  2. TAVR access: R TF  3. Valvuloplasty balloon: 24 mm True Balloon  4. Viabahn size if needed: 8 x 5 Viabahn and 8 x 4 balloon  5. Antithrombotic therapy: ASA and Warfarin  6. Pacemaker risk factors: Chronic AFib with RBBB  1. The patient was explained the  the procedure carries at least a 50% risk of permanent pacemaker requirement and that risk depends on the patients underlying conduction system.  7. Patient was educated abut the pathophysiology and natural history of severe aortic stenosis and was educated about the treatment options including surgical and transcatheter valve replacement. She agrees to be full code for the forseable future and to undergo workup for treatment of severe AS.   8. The risks, benefits, and alternatives of transcatheter aortic valve replacement were discussed with the patient. All questions were answered and informed consent was obtained. I had a detailed discussion with the patient regarding risk of stroke, MI, bleeding access site complications including limb loss, allergy, kidney failure including dialysis and death.  9. The patient understands the risks and benefits and wishes to go ahead with the procedure.  10. All patient's questions were answered        Signed:  Sabino Olson MD  Cardiology Fellow - [id number]  Pager: [phone]  10/16/2017 2:14 PM    Staff:  I have personally taken the history and examined this patient and agree with the fellow's note as stated above and amended it accordingly :-) This patient has pre-existing RBBB and I've notified Rishi Delgado of high risk of PPM.  Patient also aware.  OK for RTF Portico Valve with Gene tomorrow.

## 2017-10-17 ENCOUNTER — ANESTHESIA (OUTPATIENT)
Dept: MEDSURG UNIT | Facility: HOSPITAL | Age: 82
DRG: 266 | End: 2017-10-17
Payer: MEDICARE

## 2017-10-17 ENCOUNTER — HOSPITAL ENCOUNTER (INPATIENT)
Facility: HOSPITAL | Age: 82
LOS: 1 days | Discharge: HOME | DRG: 266 | End: 2017-10-18
Attending: INTERNAL MEDICINE | Admitting: INTERNAL MEDICINE
Payer: MEDICARE

## 2017-10-17 ENCOUNTER — SURGERY (OUTPATIENT)
Age: 82
End: 2017-10-17

## 2017-10-17 ENCOUNTER — ANESTHESIA EVENT (OUTPATIENT)
Dept: MEDSURG UNIT | Facility: HOSPITAL | Age: 82
DRG: 266 | End: 2017-10-17
Payer: MEDICARE

## 2017-10-17 DIAGNOSIS — I45.10 RIGHT BUNDLE-BRANCH BLOCK: ICD-10-CM

## 2017-10-17 DIAGNOSIS — Z95.2 S/P AORTIC VALVE REPLACEMENT: ICD-10-CM

## 2017-10-17 DIAGNOSIS — Z95.2 S/P TAVR (TRANSCATHETER AORTIC VALVE REPLACEMENT): Primary | ICD-10-CM

## 2017-10-17 DIAGNOSIS — I35.0 AORTIC VALVE STENOSIS, UNSPECIFIED ETIOLOGY: ICD-10-CM

## 2017-10-17 DIAGNOSIS — Z00.6 EXAMINATION OF PARTICIPANT IN CLINICAL TRIAL: ICD-10-CM

## 2017-10-17 DIAGNOSIS — I35.0 SEVERE AORTIC VALVE STENOSIS: ICD-10-CM

## 2017-10-17 DIAGNOSIS — E78.5 HYPERLIPIDEMIA: ICD-10-CM

## 2017-10-17 DIAGNOSIS — I49.9 ARRHYTHMIA: ICD-10-CM

## 2017-10-17 LAB
ABO + RH BLD: NORMAL
BLD GP AB SCN CELLS X3 SERPL QL: NORMAL
INR PPP: 1.4
PROTHROMBIN TIME: 14.8 SEC

## 2017-10-17 PROCEDURE — 25000003 PHARM REV CODE 250: Performed by: INTERNAL MEDICINE

## 2017-10-17 PROCEDURE — 33210 INSERT ELECTRD/PM CATH SNGL: CPT | Mod: 59,Q0,, | Performed by: ANESTHESIOLOGY

## 2017-10-17 PROCEDURE — 86850 RBC ANTIBODY SCREEN: CPT

## 2017-10-17 PROCEDURE — 27000239 HC STAND-BY BYPASS PUMP

## 2017-10-17 PROCEDURE — 27100006 EP LAB PROCEDURE

## 2017-10-17 PROCEDURE — C1898 LEAD, PMKR, OTHER THAN TRANS: HCPCS

## 2017-10-17 PROCEDURE — 0JH604Z INSERTION OF PACEMAKER, SINGLE CHAMBER INTO CHEST SUBCUTANEOUS TISSUE AND FASCIA, OPEN APPROACH: ICD-10-PCS | Performed by: INTERNAL MEDICINE

## 2017-10-17 PROCEDURE — A4216 STERILE WATER/SALINE, 10 ML: HCPCS | Performed by: NURSE ANESTHETIST, CERTIFIED REGISTERED

## 2017-10-17 PROCEDURE — 63600175 PHARM REV CODE 636 W HCPCS: Performed by: ANESTHESIOLOGY

## 2017-10-17 PROCEDURE — D9220A PRA ANESTHESIA: Mod: ANES,,, | Performed by: ANESTHESIOLOGY

## 2017-10-17 PROCEDURE — 20000000 HC ICU ROOM

## 2017-10-17 PROCEDURE — 33361 REPLACE AORTIC VALVE PERQ: CPT | Mod: 62,Q0,, | Performed by: THORACIC SURGERY (CARDIOTHORACIC VASCULAR SURGERY)

## 2017-10-17 PROCEDURE — 63600175 PHARM REV CODE 636 W HCPCS: Performed by: NURSE ANESTHETIST, CERTIFIED REGISTERED

## 2017-10-17 PROCEDURE — C1894 INTRO/SHEATH, NON-LASER: HCPCS

## 2017-10-17 PROCEDURE — A4216 STERILE WATER/SALINE, 10 ML: HCPCS | Performed by: ANESTHESIOLOGY

## 2017-10-17 PROCEDURE — 93005 ELECTROCARDIOGRAM TRACING: CPT

## 2017-10-17 PROCEDURE — 63600175 PHARM REV CODE 636 W HCPCS: Performed by: INTERNAL MEDICINE

## 2017-10-17 PROCEDURE — C1760 CLOSURE DEV, VASC: HCPCS

## 2017-10-17 PROCEDURE — 27000191 HC C-V MONITORING

## 2017-10-17 PROCEDURE — 33361 REPLACE AORTIC VALVE PERQ: CPT | Mod: 62,Q0,, | Performed by: INTERNAL MEDICINE

## 2017-10-17 PROCEDURE — 25000003 PHARM REV CODE 250: Performed by: NURSE ANESTHETIST, CERTIFIED REGISTERED

## 2017-10-17 PROCEDURE — 02RF38Z REPLACEMENT OF AORTIC VALVE WITH ZOOPLASTIC TISSUE, PERCUTANEOUS APPROACH: ICD-10-PCS | Performed by: INTERNAL MEDICINE

## 2017-10-17 PROCEDURE — 25000003 PHARM REV CODE 250: Performed by: ANESTHESIOLOGY

## 2017-10-17 PROCEDURE — 86900 BLOOD TYPING SEROLOGIC ABO: CPT

## 2017-10-17 PROCEDURE — 93010 ELECTROCARDIOGRAM REPORT: CPT | Mod: ,,, | Performed by: INTERNAL MEDICINE

## 2017-10-17 PROCEDURE — D9220A PRA ANESTHESIA: Mod: CRNA,,, | Performed by: NURSE ANESTHETIST, CERTIFIED REGISTERED

## 2017-10-17 PROCEDURE — 33207 INSERT HEART PM VENTRICULAR: CPT | Mod: KX,,, | Performed by: INTERNAL MEDICINE

## 2017-10-17 PROCEDURE — 36620 INSERTION CATHETER ARTERY: CPT | Mod: 59,Q0,, | Performed by: ANESTHESIOLOGY

## 2017-10-17 PROCEDURE — D9220A PRA ANESTHESIA: Mod: Q0,,, | Performed by: ANESTHESIOLOGY

## 2017-10-17 PROCEDURE — 37000009 HC ANESTHESIA EA ADD 15 MINS: Performed by: INTERNAL MEDICINE

## 2017-10-17 PROCEDURE — C1769 GUIDE WIRE: HCPCS

## 2017-10-17 PROCEDURE — 02HK3JZ INSERTION OF PACEMAKER LEAD INTO RIGHT VENTRICLE, PERCUTANEOUS APPROACH: ICD-10-PCS | Performed by: INTERNAL MEDICINE

## 2017-10-17 PROCEDURE — S5010 5% DEXTROSE AND 0.45% SALINE: HCPCS | Performed by: INTERNAL MEDICINE

## 2017-10-17 PROCEDURE — 5A1223Z PERFORMANCE OF CARDIAC PACING, CONTINUOUS: ICD-10-PCS | Performed by: INTERNAL MEDICINE

## 2017-10-17 PROCEDURE — 37000008 HC ANESTHESIA 1ST 15 MINUTES: Performed by: INTERNAL MEDICINE

## 2017-10-17 PROCEDURE — 85610 PROTHROMBIN TIME: CPT

## 2017-10-17 RX ORDER — CEFAZOLIN SODIUM 2 G/50ML
2 SOLUTION INTRAVENOUS ONCE
Status: DISCONTINUED | OUTPATIENT
Start: 2017-10-17 | End: 2017-10-17

## 2017-10-17 RX ORDER — POTASSIUM CHLORIDE 20 MEQ/15ML
60 SOLUTION ORAL
Status: DISCONTINUED | OUTPATIENT
Start: 2017-10-17 | End: 2017-10-18 | Stop reason: HOSPADM

## 2017-10-17 RX ORDER — PROTAMINE SULFATE 10 MG/ML
INJECTION, SOLUTION INTRAVENOUS
Status: DISCONTINUED | OUTPATIENT
Start: 2017-10-17 | End: 2017-10-17

## 2017-10-17 RX ORDER — CEFAZOLIN SODIUM 2 G/50ML
2 SOLUTION INTRAVENOUS
Status: COMPLETED | OUTPATIENT
Start: 2017-10-17 | End: 2017-10-18

## 2017-10-17 RX ORDER — LANOLIN ALCOHOL/MO/W.PET/CERES
800 CREAM (GRAM) TOPICAL
Status: DISCONTINUED | OUTPATIENT
Start: 2017-10-17 | End: 2017-10-18 | Stop reason: HOSPADM

## 2017-10-17 RX ORDER — HEPARIN SODIUM 1000 [USP'U]/ML
INJECTION, SOLUTION INTRAVENOUS; SUBCUTANEOUS
Status: DISCONTINUED | OUTPATIENT
Start: 2017-10-17 | End: 2017-10-17

## 2017-10-17 RX ORDER — FENTANYL CITRATE 50 UG/ML
INJECTION, SOLUTION INTRAMUSCULAR; INTRAVENOUS
Status: DISCONTINUED | OUTPATIENT
Start: 2017-10-17 | End: 2017-10-17

## 2017-10-17 RX ORDER — DIPHENHYDRAMINE HCL 50 MG
50 CAPSULE ORAL ONCE
Status: COMPLETED | OUTPATIENT
Start: 2017-10-17 | End: 2017-10-17

## 2017-10-17 RX ORDER — PHENYLEPHRINE HYDROCHLORIDE 10 MG/ML
INJECTION INTRAVENOUS
Status: DISCONTINUED | OUTPATIENT
Start: 2017-10-17 | End: 2017-10-17

## 2017-10-17 RX ORDER — DOCUSATE SODIUM 100 MG/1
100 CAPSULE, LIQUID FILLED ORAL DAILY
Status: DISCONTINUED | OUTPATIENT
Start: 2017-10-17 | End: 2017-10-18 | Stop reason: HOSPADM

## 2017-10-17 RX ORDER — NOREPINEPHRINE BITARTRATE 1 MG/ML
INJECTION, SOLUTION INTRAVENOUS
Status: DISCONTINUED | OUTPATIENT
Start: 2017-10-17 | End: 2017-10-17

## 2017-10-17 RX ORDER — DEXTROSE MONOHYDRATE AND SODIUM CHLORIDE 5; .45 G/100ML; G/100ML
INJECTION, SOLUTION INTRAVENOUS CONTINUOUS
Status: DISCONTINUED | OUTPATIENT
Start: 2017-10-17 | End: 2017-10-17

## 2017-10-17 RX ORDER — ACETAMINOPHEN 325 MG/1
650 TABLET ORAL ONCE
Status: COMPLETED | OUTPATIENT
Start: 2017-10-17 | End: 2017-10-17

## 2017-10-17 RX ORDER — POTASSIUM CHLORIDE 20 MEQ/15ML
40 SOLUTION ORAL
Status: DISCONTINUED | OUTPATIENT
Start: 2017-10-17 | End: 2017-10-18 | Stop reason: HOSPADM

## 2017-10-17 RX ORDER — SIMVASTATIN 5 MG/1
20 TABLET, FILM COATED ORAL NIGHTLY
Status: DISCONTINUED | OUTPATIENT
Start: 2017-10-17 | End: 2017-10-18 | Stop reason: HOSPADM

## 2017-10-17 RX ORDER — SODIUM CHLORIDE 9 MG/ML
INJECTION, SOLUTION INTRAVENOUS CONTINUOUS PRN
Status: DISCONTINUED | OUTPATIENT
Start: 2017-10-17 | End: 2017-10-17

## 2017-10-17 RX ORDER — LIDOCAINE HCL/PF 100 MG/5ML
SYRINGE (ML) INTRAVENOUS
Status: DISCONTINUED | OUTPATIENT
Start: 2017-10-17 | End: 2017-10-17

## 2017-10-17 RX ORDER — CEPHALEXIN 500 MG/1
500 CAPSULE ORAL EVERY 8 HOURS
Status: DISCONTINUED | OUTPATIENT
Start: 2017-10-18 | End: 2017-10-18 | Stop reason: HOSPADM

## 2017-10-17 RX ORDER — ASPIRIN 81 MG/1
81 TABLET ORAL DAILY
Status: DISCONTINUED | OUTPATIENT
Start: 2017-10-18 | End: 2017-10-18 | Stop reason: HOSPADM

## 2017-10-17 RX ORDER — WARFARIN 1 MG/1
2 TABLET ORAL DAILY
Status: DISCONTINUED | OUTPATIENT
Start: 2017-10-17 | End: 2017-10-18 | Stop reason: HOSPADM

## 2017-10-17 RX ORDER — MIDAZOLAM HYDROCHLORIDE 1 MG/ML
INJECTION, SOLUTION INTRAMUSCULAR; INTRAVENOUS
Status: DISCONTINUED | OUTPATIENT
Start: 2017-10-17 | End: 2017-10-17

## 2017-10-17 RX ADMIN — DEXTROSE 2 G: 50 INJECTION, SOLUTION INTRAVENOUS at 08:10

## 2017-10-17 RX ADMIN — DIPHENHYDRAMINE HYDROCHLORIDE 50 MG: 50 CAPSULE ORAL at 06:10

## 2017-10-17 RX ADMIN — SODIUM CHLORIDE, SODIUM GLUCONATE, SODIUM ACETATE, POTASSIUM CHLORIDE, MAGNESIUM CHLORIDE, SODIUM PHOSPHATE, DIBASIC, AND POTASSIUM PHOSPHATE: .53; .5; .37; .037; .03; .012; .00082 INJECTION, SOLUTION INTRAVENOUS at 07:10

## 2017-10-17 RX ADMIN — DEXMEDETOMIDINE HYDROCHLORIDE 1 MCG/KG/HR: 100 INJECTION, SOLUTION, CONCENTRATE INTRAVENOUS at 07:10

## 2017-10-17 RX ADMIN — LIDOCAINE HYDROCHLORIDE 100 MG: 20 INJECTION, SOLUTION INTRAVENOUS at 08:10

## 2017-10-17 RX ADMIN — SODIUM CHLORIDE, SODIUM GLUCONATE, SODIUM ACETATE, POTASSIUM CHLORIDE, MAGNESIUM CHLORIDE, SODIUM PHOSPHATE, DIBASIC, AND POTASSIUM PHOSPHATE: .53; .5; .37; .037; .03; .012; .00082 INJECTION, SOLUTION INTRAVENOUS at 11:10

## 2017-10-17 RX ADMIN — FENTANYL CITRATE 25 MCG: 50 INJECTION, SOLUTION INTRAMUSCULAR; INTRAVENOUS at 07:10

## 2017-10-17 RX ADMIN — WARFARIN SODIUM 2 MG: 1 TABLET ORAL at 04:10

## 2017-10-17 RX ADMIN — HEPARIN SODIUM 9000 UNITS: 1000 INJECTION INTRAVENOUS; SUBCUTANEOUS at 08:10

## 2017-10-17 RX ADMIN — CEFAZOLIN SODIUM 2 G: 2 SOLUTION INTRAVENOUS at 04:10

## 2017-10-17 RX ADMIN — SODIUM CHLORIDE: 0.9 INJECTION, SOLUTION INTRAVENOUS at 11:10

## 2017-10-17 RX ADMIN — PHENYLEPHRINE HYDROCHLORIDE 100 MCG: 10 INJECTION INTRAVENOUS at 09:10

## 2017-10-17 RX ADMIN — CEFAZOLIN SODIUM 2 G: 2 SOLUTION INTRAVENOUS at 12:10

## 2017-10-17 RX ADMIN — ACETAMINOPHEN 650 MG: 325 TABLET ORAL at 08:10

## 2017-10-17 RX ADMIN — MIDAZOLAM 0.5 MG: 1 INJECTION INTRAMUSCULAR; INTRAVENOUS at 07:10

## 2017-10-17 RX ADMIN — NOREPINEPHRINE BITARTRATE 16 MCG: 1 INJECTION, SOLUTION, CONCENTRATE INTRAVENOUS at 09:10

## 2017-10-17 RX ADMIN — DOCUSATE SODIUM 100 MG: 100 CAPSULE, LIQUID FILLED ORAL at 08:10

## 2017-10-17 RX ADMIN — DEXTROSE MONOHYDRATE AND SODIUM CHLORIDE: 5; .45 INJECTION, SOLUTION INTRAVENOUS at 06:10

## 2017-10-17 RX ADMIN — SIMVASTATIN 20 MG: 5 TABLET, FILM COATED ORAL at 08:10

## 2017-10-17 RX ADMIN — FENTANYL CITRATE 5 MCG: 50 INJECTION, SOLUTION INTRAMUSCULAR; INTRAVENOUS at 12:10

## 2017-10-17 RX ADMIN — DEXMEDETOMIDINE HYDROCHLORIDE 0.5 MCG/KG/HR: 100 INJECTION, SOLUTION, CONCENTRATE INTRAVENOUS at 12:10

## 2017-10-17 RX ADMIN — PROTAMINE SULFATE 90 MG: 10 INJECTION, SOLUTION INTRAVENOUS at 09:10

## 2017-10-17 NOTE — ANESTHESIA PREPROCEDURE EVALUATION
10/17/2017  Adela Garay is a 89 y.o., female s/p TAVR earlier today now with complete heart block here for PM placement.     Anesthesia Evaluation    I have reviewed the Patient Summary Reports.    I have reviewed the Nursing Notes.   I have reviewed the Medications.     Review of Systems  Anesthesia Hx:  No problems with previous Anesthesia    Cardiovascular:   Hypertension Valvular problems/Murmurs (severe TR) CAD   CHF    Renal/:  Renal/ Normal     Hepatic/GI:  Hepatic/GI Normal    Musculoskeletal:   Arthritis     Neurological:   TIA, Neuromuscular Disease,    Endocrine:   Diabetes        Physical Exam   Airway/Jaw/Neck:  Airway Findings: Mouth Opening: Normal Tongue: Normal  General Airway Assessment: Adult  Mallampati: II  TM Distance: Normal, at least 6 cm       Chest/Lungs:  Chest/Lungs Findings: Clear to auscultation, Normal Respiratory Rate     Heart/Vascular:  Heart Findings: Rate: Normal  Rhythm: Regular Rhythm             Anesthesia Plan  Type of Anesthesia, risks & benefits discussed:  Anesthesia Type:  general, MAC  Patient's Preference:   Intra-op Monitoring Plan: standard ASA monitors  Intra-op Monitoring Plan Comments:   Post Op Pain Control Plan: IV/PO Opioids PRN  Post Op Pain Control Plan Comments:   Induction:   IV  Beta Blocker:  Patient is not currently on a Beta-Blocker (No further documentation required).       Informed Consent: Patient understands risks and agrees with Anesthesia plan.  Questions answered. Anesthesia consent signed with patient.  ASA Score: 4     Day of Surgery Review of History & Physical:            Ready For Surgery From Anesthesia Perspective.     Patient Active Problem List   Diagnosis    Hyperlipidemia    Coronary artery disease    Hx-TIA (transient ischemic attack)    Severe aortic stenosis by prior echocardiogram    Long-term (current) use of  anticoagulants, INR goal 2.0-3.0    Benign hypertensive heart disease with congestive heart failure    Pulmonary hypertension    Carotid arterial disease    Carotid aneurysm, left    Atrial fibrillation    Controlled type 2 diabetes mellitus with microalbuminuria    DDD (degenerative disc disease), cervical    Lumbar disc disease    Multiple lung nodules    Mild major depression    Idiopathic peripheral neuropathy    Atherosclerosis of aorta    Postinflammatory pulmonary fibrosis    Chronic cough    Coronary artery disease involving native coronary artery of native heart without angina pectoris    Controlled type 2 diabetes with neuropathy    Aortic valve stenosis, unspecified etiology    S/P TAVR (transcatheter aortic valve replacement)

## 2017-10-17 NOTE — TRANSFER OF CARE
"Anesthesia Transfer of Care Note    Patient: Adela Garay    Procedure(s) Performed: Procedure(s) (LRB):  REPLACEMENT-VALVE-AORTIC (N/A)    Patient location: ICU    Anesthesia Type: MAC    Transport from OR: Transported from OR on 6-10 L/min O2 by face mask with adequate spontaneous ventilation. Continuous ECG monitoring in transport. Continuous SpO2 monitoring in transport. Continuos invasive BP monitoring in transport    Post pain: adequate analgesia    Post assessment: no apparent anesthetic complications    Post vital signs: stable    Level of consciousness: awake    Nausea/Vomiting: no nausea/vomiting    Complications: none    Transfer of care protocol was followed      Last vitals:   Visit Vitals  BP (!) 161/71 (BP Location: Right arm, Patient Position: Lying)   Pulse 60   Temp 36.6 °C (97.8 °F) (Oral)   Resp 18   Ht 5' 5" (1.651 m)   Wt 64.9 kg (143 lb)   SpO2 97%   Breastfeeding? No   BMI 23.80 kg/m²     "

## 2017-10-17 NOTE — CONSULTS
"Cardiac Rehab     Adela Garay   [id number]   10/17/2017         Cardiac Rehab Phase Taught: Phase I & II    Risk Factors-Modifiable: diabetes, hypertension, sedentary lifestyle, exercise    Risk Factors-Non modifiable: age    Teaching Method: Verbal, Written and Living with Heart Disease book.    Understanding/Response: Pt drowsy - spoke to daughter at bedside. All questions answered. Discussed with daughter logistics of cardiac rehab. Pt lives in [place] and while she does drive, she doesn't drive outside of the neighborhood. Pt will have a follow up appt with Dr. Campbell after discharged at which she can discuss cardiac rehab with physician if she would like to attend a cardiac rehab program closer to her home.     Comments: S/P TAVR. Discussed cardiac rehab and risk factor modification.  Educational materials were used in the process and given to the patient. They included "Your Guide to Living with Heart Disease", Phase Two Cardiac Rehabilitation information.    KRISTAL Freeman RN  Cardiac Rehab Nurse      "

## 2017-10-17 NOTE — H&P (VIEW-ONLY)
Subjective:    Patient ID:  Adela Garay is a 89 y.o. female who presents for follow-up of Aortic Stenosis    HPI  Ms Garay is the  of a [place] vet ([facility]) referred by Dr Ivy for evaluation of severe AS. She has a PMH significant for diet-controlled DM, chronic a-fib on Coumadin, remote hx of TIA, and HTN. She has been evaluated for TAVR in the past in [place] by Dr Briseno, but she was asymptomatic at that time.    Since her last visit with us in 8/2017, she remains symptomatic and is short of breath when walking in her backyard, and also has shortness of breath at night. Otherwise denies any chest pain, orthopnea, or LE edema. She has an existing RBBB and has chronic AFib, Dr. Ranulfo Delgado is aware of her procedure tomorrow.    The patient has undergone the following TAVR work-up:     · ECHO (Date 08.25.2017): DILAN= 0.57 cm2, MG= 61 mmHg, Peak Jose Elias= 5.28 m/s, EF= 60%.   · LHC (08.22.2017): Non-obstructive CAD  · STS: 12.3%  · Frailty: 2/4 (Walking time and  strength)  · PFTs: FEV1= 1.12/58% predicted, FVC= 1.85/70% pred, DLCO = 90% pred   · Iliacs are >5.63 mm on R and > 5.0mm on L  · LVOT: Area = 5.01 cm2, Avg Diam= 25.2 mm (28.2 x 21.1 mm)  · Incidental findings on CT: Multiple calcified granulomas within both lungs that are stable since 2011.The rounded pulmonary nodule in the right lung base is slightly larger in size from CT dated 11/09/2011, now measuring 2.1 x 1.8 cm.  there is also a new subcentimeter pulmonary nodule in the right middle lung. Dr. Campbell spoke with Dr. Saldivar via phone -- Dr. Saldivar reviewed the CT and does not feel anything further needs to be done.  · She is high risk due to age and comorbidities per Dr. Lamb and Dr. Willson  · Rhythm issues: AF with complete RBBB, EP aware (Dr. Delgado), unable to see this afternoon     29 mm PORTICO via R TF access (patient is in the PORTICO trial and randomized to the PORTICO valve). 24 mm True Balloon. May needs 8 x 4 balloon with 8 x  5 Viabahn.    Review of Systems   Constitution: Negative for chills, fever and weight loss.   HENT: Negative for sore throat and stridor.    Eyes: Negative for blurred vision, double vision and pain.   Cardiovascular: Positive for dyspnea on exertion and paroxysmal nocturnal dyspnea. Negative for chest pain, claudication, near-syncope, orthopnea, palpitations and syncope.   Respiratory: Positive for shortness of breath. Negative for cough, sputum production and wheezing.    Endocrine: Negative for polydipsia, polyphagia and polyuria.   Skin: Negative for rash.   Musculoskeletal: Negative for joint pain, joint swelling and myalgias.   Gastrointestinal: Negative for abdominal pain, constipation, diarrhea, heartburn, melena, nausea and vomiting.   Genitourinary: Negative for dysuria and hematuria.   Neurological: Negative for focal weakness and loss of balance.   Psychiatric/Behavioral: Negative for depression and memory loss.        Objective:    Physical Exam   Constitutional: She is oriented to person, place, and time. She appears well-developed and well-nourished.   HENT:   Head: Normocephalic and atraumatic.   Eyes: Pupils are equal, round, and reactive to light.   Neck: Normal range of motion. No JVD present.   Cardiovascular: Normal rate and regular rhythm.    Murmur heard.  Pulmonary/Chest: Effort normal and breath sounds normal. She has no wheezes. She has no rales.   Abdominal: Soft. Bowel sounds are normal. She exhibits no distension. There is no tenderness.   Neurological: She is alert and oriented to person, place, and time.   Skin: Skin is warm and dry.         Assessment:       1. Severe aortic stenosis, symptomatic (NYHA Class 3 symptoms)    The patient has undergone the following TAVR work-up:     · ECHO (Date 08.25.2017): DILAN= 0.57 cm2, MG= 61 mmHg, Peak Jose Elias= 5.28 m/s, EF= 60%.   · LHC (08.22.2017): Non-obstructive CAD  · STS: 12.3%  · Frailty: 2/4 (Walking time and  strength)  · PFTs: FEV1=  1.12/58% predicted, FVC= 1.85/70% pred, DLCO = 90% pred   · Iliacs are >5.63 mm on R and > 5.0mm on L  · LVOT: Area = 5.01 cm2, Avg Diam= 25.2 mm (28.2 x 21.1 mm)  · Incidental findings on CT: Multiple calcified granulomas within both lungs that are stable since 2011.The rounded pulmonary nodule in the right lung base is slightly larger in size from CT dated 11/09/2011, now measuring 2.1 x 1.8 cm.  there is also a new subcentimeter pulmonary nodule in the right middle lung. Dr. Campbell spoke with Dr. Saldivar via phone -- Dr. Saldivar reviewed the CT and does not feel anything further needs to be done.  · She is high risk due to age and comorbidities per Dr. Lamb and Dr. Willson  · Rhythm issues: AF with complete RBBB, EP aware (Dr. Delgado), unable to see this afternoon     29 mm PORTICO via R TF access (patient is in the PORTICO trial and randomized to the PORTICO valve). 24 mm True Balloon. May needs 8 x 4 balloon with 8 x 5 Viabahn.     2. Paroxysmal atrial fibrillation with RBBB - On warfarin, held since 10/14, INR 1.6 today   3. Coronary artery disease involving native coronary artery of native heart without angina pectoris - Non obstructive [facility] in 8/2014, on ASA and simvastatin   4. Long-term (current) use of anticoagulants, INR goal 2.0-3.0 - On Coumadin as above, INR 1.6 today   5. Controlled type 2 diabetes mellitus with microalbuminuria, without long-term current use of insulin - diet controlled, HbA1c 6.0 in 5/2017   6. Hx-TIA (transient ischemic attack) - in 1994, on ASA and statin as above   7. Multiple lung nodules - per CT, discussed with Dr. Saldivar as above        Plan:         Transcatheter Aortic Valve Replacement on 10/17/2017  1. Valve: 29mm PORTICO  2. TAVR access: R TF  3. Valvuloplasty balloon: 24 mm True Balloon  4. Viabahn size if needed: 8 x 5 Viabahn and 8 x 4 balloon  5. Antithrombotic therapy: ASA and Warfarin  6. Pacemaker risk factors: Chronic AFib with RBBB  1. The patient was explained the  the procedure carries at least a 50% risk of permanent pacemaker requirement and that risk depends on the patients underlying conduction system.  7. Patient was educated abut the pathophysiology and natural history of severe aortic stenosis and was educated about the treatment options including surgical and transcatheter valve replacement. She agrees to be full code for the forseable future and to undergo workup for treatment of severe AS.   8. The risks, benefits, and alternatives of transcatheter aortic valve replacement were discussed with the patient. All questions were answered and informed consent was obtained. I had a detailed discussion with the patient regarding risk of stroke, MI, bleeding access site complications including limb loss, allergy, kidney failure including dialysis and death.  9. The patient understands the risks and benefits and wishes to go ahead with the procedure.  10. All patient's questions were answered        Signed:  Sabino Olson MD  Cardiology Fellow - PGY5  Pager: [phone]  10/16/2017 2:14 PM    Staff:  I have personally taken the history and examined this patient and agree with the fellow's note as stated above and amended it accordingly :-) This patient has pre-existing RBBB and I've notified Rishi Delgado of high risk of PPM.  Patient also aware.  OK for RTF Portico Valve with Gene tomorrow.

## 2017-10-17 NOTE — ASSESSMENT & PLAN NOTE
Ms Garay is the  of [facility] referred by Dr Ivy for evaluation of severe AS. She has a PMH significant for diet-controlled DM, chronic a-fib on Coumadin, remote hx of TIA, HTN, w existing RBBB and has Chronic Afib is s/p 29 mm PORTICO via R TF access with junctional bradycardia rhythm requiring pacing from TVP. EP consulted for PPM placement.   - NPO  - No heparin products  - Plan for PPM today

## 2017-10-17 NOTE — TRANSFER OF CARE
"Anesthesia Transfer of Care Note    Patient: Adela Garay    Procedure(s) Performed: Procedure(s) (LRB):  INSERTION-PACEMAKER-DUAL (Left)    Patient location: ICU    Anesthesia Type: MAC    Transport from OR: Transported from OR on 100% O2 by closed face mask with adequate spontaneous ventilation. Continuos invasive BP monitoring in transport. Continuous SpO2 monitoring in transport. Continuous ECG monitoring in transport    Post pain: adequate analgesia    Post assessment: no apparent anesthetic complications and tolerated procedure well    Post vital signs: stable    Level of consciousness: awake and responds to stimulation    Nausea/Vomiting: no nausea/vomiting    Complications: none    Transfer of care protocol was followed      Last vitals:   Visit Vitals  BP (!) 161/71 (BP Location: Right arm, Patient Position: Lying)   Pulse 80   Temp (!) 35.4 °C (95.8 °F) (Axillary)   Resp 18   Ht 5' 5" (1.651 m)   Wt 64.9 kg (143 lb)   SpO2 96%   Breastfeeding? No   BMI 23.80 kg/m²     "

## 2017-10-17 NOTE — PROGRESS NOTES
"    Post Cath Note  Referring Physician: Cesario Campbell MD  Procedure: REPLACEMENT-VALVE-AORTIC (N/A)       Access: Right CFA, L CFA    See full report for further details    Intervention:   PORTICO 29 mm TAVR via R TF access    Closure device: Perclosure and Mynx    Post Cath Exam:   BP (!) 161/71 (BP Location: Right arm, Patient Position: Lying)   Pulse 60   Temp 97.8 °F (36.6 °C) (Oral)   Resp 18   Ht 5' 5" (1.651 m)   Wt 64.9 kg (143 lb)   SpO2 97%   Breastfeeding? No   BMI 23.80 kg/m²   No unusual pain, hematoma, thrill or bruit at vascular access site.  Distal pulse present without signs of ischemia.    Recommendations:   - Routine post-cath care  - No IVF as LVEDP was high  - STAT EP consult, patient had existing RBBB, no in junctional escape requiring pacing via TVP, may need PPM today  - Ancef 2gm IV Q8H x 3 doses for prophylaxis  - Incentive spirometry  - PT evaluation for early ambulation  - ASA and Coumadin as AC regimen    Signed:  Sabino Olson MD  Cardiology Fellow, PGY-5  10/17/2017 9:46 AM    "

## 2017-10-17 NOTE — ANESTHESIA POSTPROCEDURE EVALUATION
"Anesthesia Post Evaluation    Patient: Adela Garay    Procedure(s) Performed: Procedure(s) (LRB):  INSERTION-PACEMAKER-DUAL (Left)    Final Anesthesia Type: MAC  Patient location during evaluation: ICU  Patient participation: Yes- Able to Participate  Level of consciousness: sedated  Post-procedure vital signs: reviewed and stable  Pain management: adequate  Airway patency: patent  PONV status at discharge: No PONV  Anesthetic complications: no      Cardiovascular status: blood pressure returned to baseline  Respiratory status: unassisted and spontaneous ventilation  Hydration status: euvolemic  Follow-up not needed.        Visit Vitals  BP (!) 161/71 (BP Location: Right arm, Patient Position: Lying)   Pulse (!) 59   Temp (!) 35.4 °C (95.8 °F) (Axillary)   Resp 16   Ht 5' 5" (1.651 m)   Wt 64.9 kg (143 lb)   SpO2 98%   Breastfeeding? No   BMI 23.80 kg/m²       Pain/Jung Score: Pain Assessment Performed: Yes (10/17/2017  3:00 PM)  Presence of Pain: denies (10/17/2017  3:00 PM)      "

## 2017-10-17 NOTE — ANESTHESIA PROCEDURE NOTES
Seibert Gary Line    Diagnosis: aortic stenosis  Patient location during procedure: done in OR  Procedure start time: 10/17/2017 7:33 AM  Timeout: 10/17/2017 7:33 AM  Procedure end time: 10/17/2017 7:53 AM  Staffing  Anesthesiologist: WILLIAM RETANA  Resident/CRNA: LISSY BARGER  Other anesthesia staff: MIGUELINA THOMPSON  Performed: resident/CRNA   Anesthesiologist was present at the time of the procedure.  Preanesthetic Checklist  Completed: patient identified, site marked, surgical consent, pre-op evaluation, timeout performed, IV checked, risks and benefits discussed, monitors and equipment checked and anesthesia consent given  Seibert Gary Line  Skin Prep: chlorhexidine gluconate and isopropyl alcohol  Local Infiltration: lidocaine  Location: right,  internal jugular vein  Vessel Caliber: large, patent  Coaxial introducer size: 6.5 Fr single lumen. manometry used.  Device: transvenous pacing catheter, fluoroscopy used.   Catheter Size: 6.5 Fr  Catheter placement by yes. Heme positive aspiration all ports.Sterile sheath used  Locked at: 42 cm.Insertion Attempts: 1  Indication: transvenous pacing, hemodynamic monitoring, intravenous therapy  Ultrasound Guidance  Sterile sheath used.  Assessment  Central Line Bundle Protocol followed. Hand hygiene before procedure, surgical cap worn, surgical mask worn, sterile surgical gloves worn, large sterile drape used.  Verification: ultrasound and blood return  Dressing: tegaderm  Patient: Tolerated Well

## 2017-10-17 NOTE — HPI
Ms aGray is the  of a [place] vet ([facility]) referred by Dr Ivy for evaluation of severe AS. She has a PMH significant for diet-controlled DM, chronic a-fib on Coumadin, remote hx of TIA, HTN, w existing RBBB and has Chronic Afib is s/p 29 mm PORTICO via R TF access with junctional rhythm requiring pacing. EP consulted for PPM placement.

## 2017-10-17 NOTE — PLAN OF CARE
Problem: Patient Care Overview  Goal: Plan of Care Review  Pt arrived to unit accompanied by family.  Vss. Oriented pt and family to rm and unit.  Pre op orders and assessment initiated.  Pt remains npo.  Pt in no acute distress and verbalizes no complaints.  Will continue to monitor.

## 2017-10-17 NOTE — SUBJECTIVE & OBJECTIVE
Past Medical History:   Diagnosis Date    Anticoagulant long-term use     Anticoagulated on Coumadin     Arthritis     Atrial fibrillation     Atrial fibrillation     Carotid artery occlusion     Chronic rhinosinusitis     Coronary artery disease     Granulomatous lung disease     Heart failure     Hyperlipidemia     Hypertension     Hypertensive heart disease without CHF (congestive heart failure)     Mitral valve prolapse     PN (peripheral neuropathy)     hereditary?(sister has it also)/idiopathic?/patient thinks from Zocor    Severe aortic stenosis by prior echocardiogram     TIA (transient ischemic attack)     Type 2 diabetes mellitus     Type II or unspecified type diabetes mellitus without mention of complication, not stated as uncontrolled        Past Surgical History:   Procedure Laterality Date    SINUS SURGERY      tonsillectomy      TONSILLECTOMY         Review of patient's allergies indicates:   Allergen Reactions    Levaquin [levofloxacin]     Doxycycline hyclate Other (See Comments)     Unknown to patient    Iodine and iodide containing products     Neurontin  [gabapentin]      Other reaction(s): Unknown    Norpace  [disopyramide]      Other reaction(s): Hives    Phenytoin sodium extended      Other reaction(s): Muscle pain    Sulfamethoxazole-trimethoprim      Other reaction(s): Muscle cramps       No current facility-administered medications on file prior to encounter.      Current Outpatient Prescriptions on File Prior to Encounter   Medication Sig    aspirin 81 mg Tab Take 81 mg by mouth. 4 times weekly    CINNAMON BARK (CINNAMON ORAL) Take 1,000 mg by mouth 2 (two) times daily.    digoxin (LANOXIN) 250 mcg tablet TAKE 1 TABLET ONE TIME DAILY AND TAKE AN ADDITIONAL 1/2 TABLET ONCE EACH WEEK    docusate sodium (COLACE) 100 MG capsule Take 100 mg by mouth. 1 Capsule Oral Every day    folic acid (FOLVITE) 1 MG tablet Take 1 tablet (1 mg total) by mouth once daily.     triamterene-hydrochlorothiazide 37.5-25 mg (DYAZIDE) 37.5-25 mg per capsule Take 1 capsule by mouth once daily.    acetaminophen (TYLENOL) 325 MG tablet Take 325 mg by mouth every 6 (six) hours as needed for Pain.    alpha lipoic acid 600 mg Cap Take 600 mg by mouth Daily. (Patient taking differently: Take 100 mg by mouth Daily. )    blood sugar diagnostic (BLOOD GLUCOSE TEST) Strp 1 strip by Misc.(Non-Drug; Combo Route) route once daily. Currently using Nova max plus meter.    furosemide (LASIX) 20 MG tablet Take 1 tablet (20 mg total) by mouth 2 (two) times daily as needed (leg swelling). (Patient taking differently: Take 20 mg by mouth once daily. )    mupirocin (BACTROBAN) 2 % ointment Apply topically as needed.    triamcinolone acetonide 0.025% (KENALOG) 0.025 % cream Use bid as needed.     Family History     Problem Relation (Age of Onset)    Atrial fibrillation Sister, Sister, Sister    Heart disease Father, Mother    Lymphoma Sister (29)    Thyroid disease Sister        Social History Main Topics    Smoking status: Never Smoker    Smokeless tobacco: Never Used    Alcohol use 0.0 oz/week      Comment: rare    Drug use: No    Sexual activity: No     Review of Systems   Reason unable to perform ROS: drowsy.     Objective:     Vital Signs (Most Recent):  Temp: (!) 95.6 °F (35.3 °C) (10/17/17 1000)  Pulse: 80 (10/17/17 1000)  Resp: (!) 44 (10/17/17 1000)  BP: (!) 161/71 (10/17/17 0629)  SpO2: 100 % (10/17/17 1000) Vital Signs (24h Range):  Temp:  [95.6 °F (35.3 °C)-97.8 °F (36.6 °C)] 95.6 °F (35.3 °C)  Pulse:  [60-80] 80  Resp:  [18-44] 44  SpO2:  [96 %-100 %] 100 %  BP: (139-176)/(65-83) 161/71  Arterial Line BP: (167)/(80) 167/80     Weight: 64.9 kg (143 lb)  Body mass index is 23.8 kg/m².    SpO2: 100 %  O2 Device (Oxygen Therapy): Simple Face Mask    Physical Exam   Constitutional: She appears well-developed and well-nourished.   HENT:   Head: Normocephalic and atraumatic.   Neck: Normal range  of motion. Neck supple.   Cardiovascular: Normal rate and regular rhythm.    Pulmonary/Chest: Effort normal and breath sounds normal.   Abdominal: Soft. Bowel sounds are normal.   Neurological: She is alert.   Skin: Skin is warm and dry.       Significant Labs: All pertinent lab results from the last 24 hours have been reviewed.    Significant Imaging: Tele with pacing at 80 underlying junctional bradycardia with wide escape

## 2017-10-17 NOTE — PLAN OF CARE
Problem: Patient Care Overview  Goal: Plan of Care Review  Outcome: Ongoing (interventions implemented as appropriate)  Pt received from TAVR placement and after a few hours with neuro and neurovascular checks went to EP for pacemaker placement. Pt is 100% paced with it set at 60. Pt temperature has run in the 95s, requiring a heat blanket. Now sats 95% on RA. Pacer wires removed by MD and introducer removed at bedside per MD Canseco's request after getting another IV access. Pt tolerated well. Will keep R Radial A line thru the night. No hematoma or bleeding at R and L groin sites. Pt remained flat in bed, to sitting HOB up, to now sitting in chair using incentive spirometer. See flow sheet for full assessment. Pt remains on continuous tele and pulse ox monitor. No falls. No complaints of pain or nausea. Pt continues receiving antibx and administered Coumadin per MD Pat order.  POC reviewed w/Pt who verbalized understanding. No acute events, will continue to monitor.

## 2017-10-17 NOTE — INTERVAL H&P NOTE
The patient has been examined and the H&P has been reviewed:    I concur with the findings and no changes have occurred since H&P was written.   Took her dye prep overnight, all 3 doses of prednisone, Pepcid, and Benadryl.  Last meal around 8 PM yesterday.    Anesthesia/Surgery risks, benefits and alternative options discussed and understood by patient/family.          Active Hospital Problems    Diagnosis  POA    Aortic valve stenosis, unspecified etiology [I35.0]  Yes      Resolved Hospital Problems    Diagnosis Date Resolved POA   No resolved problems to display.

## 2017-10-17 NOTE — PROGRESS NOTES
Pt taken by EP for placement of pacemaker. Anesthesia still being worn off after TAVR this morning. Pt AAOx4, RASS -1, and following motor commands. Pulses +2, no hematoma to groin insertion sites. Will await return

## 2017-10-17 NOTE — CONSULTS
[facility]  Cardiac Electrophysiology  Consult Note    Admission Date: 10/17/2017  Code Status: Prior   Attending Provider: Cesario Campbell MD  Consulting Provider: Rosa Gr MD  Principal Problem:<principal problem not specified>    Inpatient consult to Electrophysiology  Consult performed by: ROSA GR  Consult ordered by: CHERYL LONG        Subjective:     Chief Complaint:  Junctional bradycardia    HPI:   Ms Garay is the  of a [employer] vet ([facility]) referred by Dr Ivy for evaluation of severe AS. She has a PMH significant for diet-controlled DM, chronic a-fib on Coumadin, remote hx of TIA, HTN, w existing RBBB and has Chronic Afib is s/p 29 mm PORTICO via R TF access with junctional rhythm requiring pacing. EP consulted for PPM placement.     Past Medical History:   Diagnosis Date    Anticoagulant long-term use     Anticoagulated on Coumadin     Arthritis     Atrial fibrillation     Atrial fibrillation     Carotid artery occlusion     Chronic rhinosinusitis     Coronary artery disease     Granulomatous lung disease     Heart failure     Hyperlipidemia     Hypertension     Hypertensive heart disease without CHF (congestive heart failure)     Mitral valve prolapse     PN (peripheral neuropathy)     hereditary?(sister has it also)/idiopathic?/patient thinks from [place]ocor    Severe aortic stenosis by prior echocardiogram     TIA (transient ischemic attack)     Type 2 diabetes mellitus     Type II or unspecified type diabetes mellitus without mention of complication, not stated as uncontrolled        Past Surgical History:   Procedure Laterality Date    SINUS SURGERY      tonsillectomy      TONSILLECTOMY         Review of patient's allergies indicates:   Allergen Reactions    Levaquin [levofloxacin]     Doxycycline hyclate Other (See Comments)     Unknown to patient    Iodine and iodide containing products     Neurontin  [gabapentin]       Other reaction(s): Unknown    Norpace  [disopyramide]      Other reaction(s): Hives    Phenytoin sodium extended      Other reaction(s): Muscle pain    Sulfamethoxazole-trimethoprim      Other reaction(s): Muscle cramps       No current facility-administered medications on file prior to encounter.      Current Outpatient Prescriptions on File Prior to Encounter   Medication Sig    aspirin 81 mg Tab Take 81 mg by mouth. 4 times weekly    CINNAMON BARK (CINNAMON ORAL) Take 1,000 mg by mouth 2 (two) times daily.    digoxin (LANOXIN) 250 mcg tablet TAKE 1 TABLET ONE TIME DAILY AND TAKE AN ADDITIONAL 1/2 TABLET ONCE EACH WEEK    docusate sodium (COLACE) 100 MG capsule Take 100 mg by mouth. 1 Capsule Oral Every day    folic acid (FOLVITE) 1 MG tablet Take 1 tablet (1 mg total) by mouth once daily.    triamterene-hydrochlorothiazide 37.5-25 mg (DYAZIDE) 37.5-25 mg per capsule Take 1 capsule by mouth once daily.    acetaminophen (TYLENOL) 325 MG tablet Take 325 mg by mouth every 6 (six) hours as needed for Pain.    alpha lipoic acid 600 mg Cap Take 600 mg by mouth Daily. (Patient taking differently: Take 100 mg by mouth Daily. )    blood sugar diagnostic (BLOOD GLUCOSE TEST) Strp 1 strip by Misc.(Non-Drug; Combo Route) route once daily. Currently using Nova max plus meter.    furosemide (LASIX) 20 MG tablet Take 1 tablet (20 mg total) by mouth 2 (two) times daily as needed (leg swelling). (Patient taking differently: Take 20 mg by mouth once daily. )    mupirocin (BACTROBAN) 2 % ointment Apply topically as needed.    triamcinolone acetonide 0.025% (KENALOG) 0.025 % cream Use bid as needed.     Family History     Problem Relation (Age of Onset)    Atrial fibrillation Sister, Sister, Sister    Heart disease Father, Mother    Lymphoma Sister (29)    Thyroid disease Sister        Social History Main Topics    Smoking status: Never Smoker    Smokeless tobacco: Never Used    Alcohol use 0.0 oz/week       Comment: rare    Drug use: No    Sexual activity: No     Review of Systems   Reason unable to perform ROS: drowsy.     Objective:     Vital Signs (Most Recent):  Temp: (!) 95.6 °F (35.3 °C) (10/17/17 1000)  Pulse: 80 (10/17/17 1000)  Resp: (!) 44 (10/17/17 1000)  BP: (!) 161/71 (10/17/17 0629)  SpO2: 100 % (10/17/17 1000) Vital Signs (24h Range):  Temp:  [95.6 °F (35.3 °C)-97.8 °F (36.6 °C)] 95.6 °F (35.3 °C)  Pulse:  [60-80] 80  Resp:  [18-44] 44  SpO2:  [96 %-100 %] 100 %  BP: (139-176)/(65-83) 161/71  Arterial Line BP: (167)/(80) 167/80     Weight: 64.9 kg (143 lb)  Body mass index is 23.8 kg/m².    SpO2: 100 %  O2 Device (Oxygen Therapy): Simple Face Mask    Physical Exam   Constitutional: She appears well-developed and well-nourished.   HENT:   Head: Normocephalic and atraumatic.   Neck: Normal range of motion. Neck supple.   Cardiovascular: Normal rate and regular rhythm.    Pulmonary/Chest: Effort normal and breath sounds normal.   Abdominal: Soft. Bowel sounds are normal.   Neurological: She is alert.   Skin: Skin is warm and dry.       Significant Labs: All pertinent lab results from the last 24 hours have been reviewed.    Significant Imaging: Tele with pacing at 80 underlying junctional bradycardia with wide escape    Assessment and Plan:     S/P TAVR (transcatheter aortic valve replacement)    Ms Garay is the  of a WW[place] vet ([facility]) referred by Dr Ivy for evaluation of severe AS. She has a PMH significant for diet-controlled DM, chronic a-fib on Coumadin, remote hx of TIA, HTN, w existing RBBB and has Chronic Afib is s/p 29 mm PORTICO via R TF access with junctional bradycardia rhythm requiring pacing from TVP. EP consulted for PPM placement.   - NPO  - No heparin products  - Plan for PPM today             Thank you for your consult. I will follow-up with patient. Please contact us if you have any additional questions.    Nettie Gr MD  Cardiac Electrophysiology  [facility]

## 2017-10-17 NOTE — ANESTHESIA PROCEDURE NOTES
Arterial    Diagnosis: aortic stenosis    Patient location during procedure: done in OR  Procedure start time: 10/17/2017 7:20 AM  Timeout: 10/17/2017 7:20 AM  Procedure end time: 10/17/2017 7:27 AM  Staffing  Anesthesiologist: WILLIAM RETANA  Resident/CRNA: LISSY BARGER  Other anesthesia staff: MIGUELINA THOMPSON  Performed: resident/CRNA   Anesthesiologist was present at the time of the procedure.  Preanesthetic Checklist  Completed: patient identified, site marked, surgical consent, pre-op evaluation, timeout performed, IV checked, risks and benefits discussed, monitors and equipment checked and anesthesia consent givenArterial  Skin Prep: chlorhexidine gluconate and isopropyl alcohol  Local Infiltration: lidocaine  Orientation: right  Location: radial  Catheter Size: 20 G  Catheter placement by Ultrasound guidance. Heme positive aspiration all ports.  Vessel Caliber: small, patent, compressibility normalInsertion Attempts: 1  Assessment  Dressing: secured with tape and tegaderm  Patient: Tolerated well

## 2017-10-18 ENCOUNTER — OUTPATIENT CASE MANAGEMENT (OUTPATIENT)
Dept: ADMINISTRATIVE | Facility: OTHER | Age: 82
End: 2017-10-18

## 2017-10-18 VITALS
WEIGHT: 143 LBS | HEART RATE: 92 BPM | HEIGHT: 65 IN | TEMPERATURE: 98 F | DIASTOLIC BLOOD PRESSURE: 60 MMHG | BODY MASS INDEX: 23.82 KG/M2 | SYSTOLIC BLOOD PRESSURE: 132 MMHG | OXYGEN SATURATION: 98 % | RESPIRATION RATE: 22 BRPM

## 2017-10-18 LAB
ALBUMIN SERPL BCP-MCNC: 3.3 G/DL
ALP SERPL-CCNC: 63 U/L
ALT SERPL W/O P-5'-P-CCNC: 19 U/L
ANION GAP SERPL CALC-SCNC: 9 MMOL/L
AST SERPL-CCNC: 30 U/L
BASOPHILS # BLD AUTO: 0.01 K/UL
BASOPHILS NFR BLD: 0.1 %
BILIRUB SERPL-MCNC: 0.8 MG/DL
BUN SERPL-MCNC: 26 MG/DL
CALCIUM SERPL-MCNC: 8.9 MG/DL
CHLORIDE SERPL-SCNC: 102 MMOL/L
CO2 SERPL-SCNC: 26 MMOL/L
CREAT SERPL-MCNC: 0.8 MG/DL
DIASTOLIC DYSFUNCTION: YES
DIFFERENTIAL METHOD: ABNORMAL
EOSINOPHIL # BLD AUTO: 0 K/UL
EOSINOPHIL NFR BLD: 0 %
ERYTHROCYTE [DISTWIDTH] IN BLOOD BY AUTOMATED COUNT: 14.9 %
EST. GFR  (AFRICAN AMERICAN): >60 ML/MIN/1.73 M^2
EST. GFR  (NON AFRICAN AMERICAN): >60 ML/MIN/1.73 M^2
ESTIMATED PA SYSTOLIC PRESSURE: 50.76
GLUCOSE SERPL-MCNC: 112 MG/DL
HCT VFR BLD AUTO: 30.4 %
HGB BLD-MCNC: 9.9 G/DL
IMM GRANULOCYTES # BLD AUTO: 0.06 K/UL
IMM GRANULOCYTES NFR BLD AUTO: 0.5 %
LYMPHOCYTES # BLD AUTO: 1.3 K/UL
LYMPHOCYTES NFR BLD: 9.9 %
MAGNESIUM SERPL-MCNC: 2.2 MG/DL
MCH RBC QN AUTO: 29.7 PG
MCHC RBC AUTO-ENTMCNC: 32.6 G/DL
MCV RBC AUTO: 91 FL
MITRAL VALVE REGURGITATION: ABNORMAL
MONOCYTES # BLD AUTO: 1.8 K/UL
MONOCYTES NFR BLD: 14.2 %
NEUTROPHILS # BLD AUTO: 9.5 K/UL
NEUTROPHILS NFR BLD: 75.3 %
NRBC BLD-RTO: 0 /100 WBC
PLATELET # BLD AUTO: 153 K/UL
PMV BLD AUTO: 10.8 FL
POC ACTIVATED CLOTTING TIME K: 142 SEC (ref 74–137)
POC ACTIVATED CLOTTING TIME K: 153 SEC (ref 74–137)
POC ACTIVATED CLOTTING TIME K: 257 SEC (ref 74–137)
POTASSIUM SERPL-SCNC: 3.7 MMOL/L
POTASSIUM SERPL-SCNC: 4.5 MMOL/L
PROT SERPL-MCNC: 6.3 G/DL
RBC # BLD AUTO: 3.33 M/UL
RETIRED EF AND QEF - SEE NOTES: 55 (ref 55–65)
SAMPLE: ABNORMAL
SODIUM SERPL-SCNC: 137 MMOL/L
TRICUSPID VALVE REGURGITATION: ABNORMAL
TROPONIN I SERPL DL<=0.01 NG/ML-MCNC: 0.68 NG/ML
WBC # BLD AUTO: 12.67 K/UL

## 2017-10-18 PROCEDURE — 36415 COLL VENOUS BLD VENIPUNCTURE: CPT

## 2017-10-18 PROCEDURE — 97530 THERAPEUTIC ACTIVITIES: CPT

## 2017-10-18 PROCEDURE — 84132 ASSAY OF SERUM POTASSIUM: CPT

## 2017-10-18 PROCEDURE — 63600175 PHARM REV CODE 636 W HCPCS: Performed by: INTERNAL MEDICINE

## 2017-10-18 PROCEDURE — 93306 TTE W/DOPPLER COMPLETE: CPT | Mod: 26,,, | Performed by: INTERNAL MEDICINE

## 2017-10-18 PROCEDURE — 93010 ELECTROCARDIOGRAM REPORT: CPT | Mod: ,,, | Performed by: INTERNAL MEDICINE

## 2017-10-18 PROCEDURE — 25000003 PHARM REV CODE 250: Performed by: INTERNAL MEDICINE

## 2017-10-18 PROCEDURE — 97161 PT EVAL LOW COMPLEX 20 MIN: CPT

## 2017-10-18 PROCEDURE — 84484 ASSAY OF TROPONIN QUANT: CPT

## 2017-10-18 PROCEDURE — 80053 COMPREHEN METABOLIC PANEL: CPT

## 2017-10-18 PROCEDURE — 93306 TTE W/DOPPLER COMPLETE: CPT

## 2017-10-18 PROCEDURE — 99239 HOSP IP/OBS DSCHRG MGMT >30: CPT | Mod: ,,, | Performed by: INTERNAL MEDICINE

## 2017-10-18 PROCEDURE — 85025 COMPLETE CBC W/AUTO DIFF WBC: CPT

## 2017-10-18 PROCEDURE — 83735 ASSAY OF MAGNESIUM: CPT

## 2017-10-18 RX ORDER — ACETAMINOPHEN 10 MG/ML
1000 INJECTION, SOLUTION INTRAVENOUS ONCE
Status: COMPLETED | OUTPATIENT
Start: 2017-10-18 | End: 2017-10-18

## 2017-10-18 RX ORDER — CEPHALEXIN 500 MG/1
500 CAPSULE ORAL EVERY 8 HOURS
Qty: 15 CAPSULE | Refills: 0 | Status: SHIPPED | OUTPATIENT
Start: 2017-10-18 | End: 2017-10-23

## 2017-10-18 RX ORDER — POTASSIUM CHLORIDE 20 MEQ/1
20 TABLET, EXTENDED RELEASE ORAL ONCE
Status: COMPLETED | OUTPATIENT
Start: 2017-10-18 | End: 2017-10-18

## 2017-10-18 RX ORDER — FUROSEMIDE 10 MG/ML
10 INJECTION INTRAMUSCULAR; INTRAVENOUS ONCE
Status: DISCONTINUED | OUTPATIENT
Start: 2017-10-18 | End: 2017-10-18

## 2017-10-18 RX ORDER — FUROSEMIDE 10 MG/ML
20 INJECTION INTRAMUSCULAR; INTRAVENOUS ONCE
Status: COMPLETED | OUTPATIENT
Start: 2017-10-18 | End: 2017-10-18

## 2017-10-18 RX ADMIN — ASPIRIN 81 MG: 81 TABLET, COATED ORAL at 08:10

## 2017-10-18 RX ADMIN — CEFAZOLIN SODIUM 2 G: 2 SOLUTION INTRAVENOUS at 07:10

## 2017-10-18 RX ADMIN — CEFAZOLIN SODIUM 2 G: 2 SOLUTION INTRAVENOUS at 12:10

## 2017-10-18 RX ADMIN — ACETAMINOPHEN 1000 MG: 10 INJECTION, SOLUTION INTRAVENOUS at 08:10

## 2017-10-18 RX ADMIN — POTASSIUM CHLORIDE 20 MEQ: 1500 TABLET, EXTENDED RELEASE ORAL at 07:10

## 2017-10-18 RX ADMIN — DOCUSATE SODIUM 100 MG: 100 CAPSULE, LIQUID FILLED ORAL at 08:10

## 2017-10-18 RX ADMIN — POTASSIUM CHLORIDE 40 MEQ: 20 SOLUTION ORAL at 05:10

## 2017-10-18 RX ADMIN — FUROSEMIDE 20 MG: 10 INJECTION, SOLUTION INTRAVENOUS at 07:10

## 2017-10-18 NOTE — DISCHARGE INSTRUCTIONS
Weigh yourself on the same scale every morning. If you gain more than 3 lbs in 1 day or more than 5 lbs in 1 week, or if you experience worsening shortness of breath, swelling in your feet or legs, or difficulty laying flat, take a Lasix pill until you reach your normal weight or your symptoms have resolved. If you have any questions, you can call our office at [phone].     You will need to take a single dose of antibiotics prior to certain dental procedures, colonoscopies, or bladder procedures. We can call this prescription in for you or the physician performing the procedure can call it in for you. If you have questions about whether or not a procedure will require antibiotics, you can call our office. Always let your physician know that you have an artificial heart valve.     Instructions from heart rhythm team  We have implanted a pacemaker on this admission, please take the following precautions in the days/weeks following your procedure:  - Wear your sling for the first 24 hours after your discharge and avoid getting your wound wet.  - You may shower in 48 hours, but do not let beam of shower hit site directly and no scrubbing in area  - For the first 6 weeks, while your implanted leads become permanently fixed, we ask that you avoid raising your left elbow above your shoulder and lifting greater than 5-10 lbs, and strongly urge that you wear your sling at night time during that time while you sleep to avoid excessive movement.  - We will schedule a visit to our wound clinic 1 week after your procedure for close follow up, in the interim period please keep your wound as clean & dry as possible. If you notice any discharge,worsening tenderness or discomfort from the area please call our clinic ASAP.  - Please avoid lifting more then 5 lbs with the involved side.  - No driving for 1 week and for 4 weeks if patient uses left arm to make turns.   - You may use over the counter medications for pain relief,  if that is not sufficient your physician may have prescribed you additional pain medication-take as instructed  - Please resume your home medication.   - Follow with Dr. Delgado in 3 months post procedure, we will call you with an appointment.

## 2017-10-18 NOTE — PROGRESS NOTES
[facility]  Cardiac Electrophysiology  Progress Note    Admission Date: 10/17/2017  Code Status: Full Code   Attending Physician: Cesario Campbell MD   Expected Discharge Date:   Principal Problem:<principal problem not specified>    Subjective:     Interval History: Feels well with slight pain over PPM site.     Review of Systems   Constitution: Negative for decreased appetite and weakness.   HENT: Negative for congestion and hearing loss.    Eyes: Negative for double vision.   Cardiovascular: Negative for chest pain and palpitations.   Respiratory: Negative for cough and shortness of breath.    Hematologic/Lymphatic: Does not bruise/bleed easily.   Gastrointestinal: Negative for abdominal pain, nausea and vomiting.   Neurological: Negative for dizziness and light-headedness.     Objective:     Vital Signs (Most Recent):  Temp: 97.7 °F (36.5 °C) (10/18/17 0300)  Pulse: 68 (10/18/17 0600)  Resp: (!) 27 (10/18/17 0600)  BP: (!) 161/71 (10/17/17 0629)  SpO2: 98 % (10/18/17 0600) Vital Signs (24h Range):  Temp:  [95.6 °F (35.3 °C)-97.7 °F (36.5 °C)] 97.7 °F (36.5 °C)  Pulse:  [59-80] 68  Resp:  [12-62] 27  SpO2:  [96 %-100 %] 98 %  Arterial Line BP: (112-167)/(47-80) 159/56     Weight: 64.9 kg (143 lb)  Body mass index is 23.8 kg/m².     SpO2: 98 %  O2 Device (Oxygen Therapy): room air    Physical Exam   Constitutional: She is oriented to person, place, and time. She appears well-developed and well-nourished.   HENT:   Head: Normocephalic and atraumatic.   Neck: Normal range of motion. Neck supple.   Cardiovascular: Normal rate and regular rhythm.    Pulmonary/Chest: Effort normal and breath sounds normal.   Abdominal: Soft. Bowel sounds are normal.   Neurological: She is alert and oriented to person, place, and time.   Skin: Skin is warm and dry.       Significant Labs: All pertinent lab results from the last 24 hours have been reviewed.      Assessment and Plan:     S/P TAVR (transcatheter aortic  valve replacement)    Ms Garay is the  of a [place] vet ([facility] referred by Dr Ivy for evaluation of severe AS. She has a PMH significant for diet-controlled DM, chronic a-fib on Coumadin, remote hx of TIA, HTN, w existing RBBB and has Chronic Afib is s/p 29 mm PORTICO via R TF access with junctional bradycardia rhythm requiring pacing from TVP. S/p PPM 10/17. Site looks well. Will need to follow up with device clinic in 1-2 weeks for wound check. Continue keflex for 5 days. Pacemaker Care instruction packet given to pt and daughter.             Nettie Gr MD  Cardiac Electrophysiology  [facility]

## 2017-10-18 NOTE — PLAN OF CARE
PLAN OF CARE: reviewed w/pt & pts family, PPM to left chest, dsg removed by ANA YANG, dermabond noted to incision, edges well approximated, no s/s of bleeding or infection noted, PPM dc instructions given early to pt & pts daughter to discuss, vitals stable, pt on RA, expected dc home later today

## 2017-10-18 NOTE — NURSING
DC instructions given to pt & pts family, prescription faxed to pts pharmacy per primary team, PPM info & materials given, tele dc'd, piv's dcd, 2D echo in process, wll notify transport, vitals stable, pt & pts family verbalized understanding of all above

## 2017-10-18 NOTE — PROGRESS NOTES
Thank you for the referral. The following patient has been assigned to Elyse Medina RN with Outpatient Complex Care Management for high risk screening.    Reason for referral: S/P TAVR (transcatheter aortic valve replacement)    Please contact [facility] at [phone] with any questions.    Thank you,  Susannah Hartley

## 2017-10-18 NOTE — PROGRESS NOTES
Patient did well during her TVAR. Discharged home with Keflex. She will resume her previous dose until follow-up. INR and dose while inpatient are in the calendar.

## 2017-10-18 NOTE — DISCHARGE SUMMARY
[facility]  Discharge Summary      Admit Date: 10/17/2017    Discharge Date and Time:  10/18/2017 1:44 PM    Attending Physician: Cesario Campbell MD     Reason for Admission: Ms Garay is the  of a [employer] vet ([facility]) referred by Dr Ivy for evaluation of severe AS. She has a PMH significant for diet-controlled DM, chronic a-fib on Coumadin, remote hx of TIA, and HTN. She has been evaluated for TAVR in the past in [place] by Dr Briseno, but she was asymptomatic at that time.     Since her last visit with us in 8/2017, she remains symptomatic and is short of breath when walking in her backyard, and also has shortness of breath at night. Otherwise denies any chest pain, orthopnea, or LE edema. She has an existing RBBB and has chronic AFib, Dr. Ranulfo Delgado is aware of her procedure tomorrow.     The patient has undergone the following TAVR work-up:      · ECHO (Date 08.25.2017): DILAN= 0.57 cm2, MG= 61 mmHg, Peak Jose Elias= 5.28 m/s, EF= 60%.   · LHC (08.22.2017): Non-obstructive CAD  · STS: 12.3%  · Frailty: 2/4 (Walking time and  strength)  · PFTs: FEV1= 1.12/58% predicted, FVC= 1.85/70% pred, DLCO = 90% pred   · Iliacs are >5.63 mm on R and > 5.0mm on L  · LVOT: Area = 5.01 cm2, Avg Diam= 25.2 mm (28.2 x 21.1 mm)  · Incidental findings on CT: Multiple calcified granulomas within both lungs that are stable since 2011.The rounded pulmonary nodule in the right lung base is slightly larger in size from CT dated 11/09/2011, now measuring 2.1 x 1.8 cm.  there is also a new subcentimeter pulmonary nodule in the right middle lung. Dr. Campbell spoke with Dr. Saldivar via phone -- Dr. Saldivar reviewed the CT and does not feel anything further needs to be done.  · She is high risk due to age and comorbidities per Dr. Lamb and Dr. Willson  · Rhythm issues: AF with complete RBBB, EP aware (Dr. Delgado), unable to see this afternoon     29 mm PORTICO via R TF access (patient is in the PORTICO trial and  randomized to the PORTICO valve). 24 mm True Balloon. May needs 8 x 4 balloon with 8 x 5 Viabahn.    Procedures Performed: Procedure(s) (LRB):  INSERTION-PACEMAKER-DUAL (Left)    Hospital Course (synopsis of major diagnoses, care, treatment, and services provided during the course of the hospital stay): Ms. Garay was admitted and underwent successful placement of a 29 mm Portico TAVR via R TF access. She developed complete heart block post-TAVR.  TVP was left in place and EP was consulted per protocol. She went for PPM that afternoon. There were no complications and she returned to the CCU in stable condition. The following morning, she required diuresis for acute on chronic diastolic heart failure.That afternoon, she ambulated in the patel without difficulty. She was eager to go home. It was felt she was stable for discharge.        Consults: PT and EP    Final Diagnoses:    Principal Problem: Aortic valve stenosis, unspecified etiology   Secondary Diagnoses:   Active Hospital Problems    Diagnosis  POA    *Aortic valve stenosis, unspecified etiology [I35.0]  Yes    S/P TAVR (transcatheter aortic valve replacement) [Z95.2]  Not Applicable    Controlled type 2 diabetes mellitus with microalbuminuria [E11.29, R80.9]  Yes     - diet controlled      Atrial fibrillation [I48.91]  Yes     Chronic - on coumadin followed by the coumadin clinic  Followed by cardiology, Dr. Ivy      Pulmonary hypertension [I27.20]  Yes    Coronary artery disease [I25.10]  Yes    Hyperlipidemia [E78.5]  Yes    Hx-TIA (transient ischemic attack) [Z86.73]  Not Applicable      Resolved Hospital Problems    Diagnosis Date Resolved POA   No resolved problems to display.       Discharged Condition: stable    Disposition: Home or Self Care    Follow Up/Patient Instructions: Follow up with EP in 1 week for wound check and 3 months for follow up , See us in 1 month with echo    Medications:  Reconciled Home Medications:   Current Discharge  Medication List      START taking these medications    Details   cephALEXin (KEFLEX) 500 MG capsule Take 1 capsule (500 mg total) by mouth every 8 (eight) hours.  Qty: 15 capsule, Refills: 0         CONTINUE these medications which have NOT CHANGED    Details   aspirin 81 mg Tab Take 81 mg by mouth. 4 times weekly      CINNAMON BARK (CINNAMON ORAL) Take 1,000 mg by mouth 2 (two) times daily.      digoxin (LANOXIN) 250 mcg tablet TAKE 1 TABLET ONE TIME DAILY AND TAKE AN ADDITIONAL 1/2 TABLET ONCE EACH WEEK  Qty: 97 tablet, Refills: 0      docusate sodium (COLACE) 100 MG capsule Take 100 mg by mouth. 1 Capsule Oral Every day      metoprolol tartrate (LOPRESSOR) 50 MG tablet TAKE 1 TABLET TWICE DAILY  Qty: 180 tablet, Refills: 0    Associated Diagnoses: Benign hypertensive heart disease with congestive heart failure      simvastatin (ZOCOR) 20 MG tablet TAKE 1 TABLET EVERY EVENING  Qty: 90 tablet, Refills: 0      triamterene-hydrochlorothiazide 37.5-25 mg (DYAZIDE) 37.5-25 mg per capsule Take 1 capsule by mouth once daily.  Qty: 90 capsule, Refills: 1    Associated Diagnoses: Benign hypertensive heart disease with congestive heart failure      acetaminophen (TYLENOL) 325 MG tablet Take 325 mg by mouth every 6 (six) hours as needed for Pain.      alpha lipoic acid 600 mg Cap Take 600 mg by mouth Daily.  Qty: 100 each, Refills: 12      blood sugar diagnostic (BLOOD GLUCOSE TEST) Strp 1 strip by Misc.(Non-Drug; Combo Route) route once daily. Currently using Nova max plus meter.  Qty: 50 strip, Refills: 11      furosemide (LASIX) 20 MG tablet Take 1 tablet (20 mg total) by mouth 2 (two) times daily as needed (leg swelling).  Qty: 60 tablet, Refills: 11      mupirocin (BACTROBAN) 2 % ointment Apply topically as needed.      triamcinolone acetonide 0.025% (KENALOG) 0.025 % cream Use bid as needed.  Qty: 80 g, Refills: 1    Associated Diagnoses: Rash      warfarin (COUMADIN) 2 MG tablet Take 2 pills by mouth daily or as  directed per the Coumadin Clinic  Qty: 175 tablet, Refills: 3    Associated Diagnoses: Paroxysmal atrial fibrillation         STOP taking these medications       cimetidine (TAGAMET) 300 MG tablet Comments:   Reason for Stopping:         folic acid (FOLVITE) 1 MG tablet Comments:   Reason for Stopping:               Discharge Procedure Orders  Ambulatory referral to Outpatient Case Management   Referral Priority: Routine Referral Type: Consultation   Referral Reason: Specialty Services Required    Number of Visits Requested: 1      Diet general   Order Specific Question Answer Comments   Additional restrictions: Cardiac (Low Na/Chol)      Activity as tolerated     Lifting restrictions   Scheduling Instructions: No lifting more than 5 lbs for 5 days     Call MD for:  temperature >100.4     Call MD for:  persistent nausea and vomiting or diarrhea     Call MD for:  severe uncontrolled pain     Call MD for:  redness, tenderness, or signs of infection (pain, swelling, redness, odor or green/yellow discharge around incision site)     Call MD for:  difficulty breathing or increased cough     Call MD for:  severe persistent headache     Call MD for:  worsening rash     Call MD for:  persistent dizziness, light-headedness, or visual disturbances     Call MD for:  increased confusion or weakness

## 2017-10-18 NOTE — PLAN OF CARE
Problem: Physical Therapy Goal  Goal: Physical Therapy Goal  Outcome: Outcome(s) achieved Date Met: 10/18/17  Larisaal completed, no further goals assessed at this time

## 2017-10-18 NOTE — PROGRESS NOTES
Brenden/HARDEEP/LONNY called 1018/17 to inform us that patient has been discharge from ( [facility]). Patient is going home on (Warfarin 4mg Daily). Please advise.

## 2017-10-18 NOTE — PROGRESS NOTES
Progress Note  Interventional Cardiology  [facility]      Hospital Day: 2    Subjective:  Interval History: No events overnight. No complaints this AM. S/p PPM yesterday.     Scheduled Meds:   aspirin  81 mg Oral Daily    cephALEXin  500 mg Oral Q8H    docusate sodium  100 mg Oral Daily    simvastatin  20 mg Oral QHS    warfarin  2 mg Oral Daily     Continuous Infusions:   PRN Meds:magnesium oxide, magnesium oxide, potassium chloride 10%, potassium chloride 10%, potassium chloride 10%    Objective:  Vital signs in last 24 hours:   Temp:  [96 °F (35.6 °C)-98.3 °F (36.8 °C)] 97.7 °F (36.5 °C)  Pulse:  [59-92] 92  Resp:  [16-62] 22  SpO2:  [97 %-99 %] 98 %  BP: (132-165)/(60-71) 132/60  Arterial Line BP: (112-170)/(47-64) 170/64    Intake/Output last 3 shifts:  I/O last 3 completed shifts:  In: 795 [P.O.:210; I.V.:435; IV Piggyback:150]  Out: -   Intake/Output this shift:  I/O this shift:  In: 220 [P.O.:120; IV Piggyback:100]  Out: -     Physical Exam:  General Appearance:    Alert, cooperative, no distress, appears stated age   Head:    Normocephalic, without obvious abnormality, atraumatic   Eyes:    PERRL, conjunctiva/corneas clear, EOM's intact, both eyes   Neck:   Supple, symmetrical, trachea midline, no carotid    Bruit, + JVD   Lungs:     Decreased bibasilar BS, respirations unlabored   Chest Wall:    PPM site C/D/I    Heart:    Irreg irreg, S1 and S2 normal, no murmur, rub or gallop   Abdomen:     Soft, non-tender, bowel sounds active all four quadrants,     no masses, no organomegaly   Extremities:   Extremities normal, atraumatic, no cyanosis or edema   Pulses:   2+ and symmetric all extremities   Skin:   Skin color, texture, turgor normal, no rashes or lesions   Neurologic:   Grossly intact       Cardiographics  ECG:  A-fib with CVR, RBBB    Imaging  Chest X-Ray: CHF    Lab Review  Lab Results   Component Value Date     10/18/2017    K 4.5 10/18/2017     10/18/2017    CO2 26 10/18/2017     BUN 26 (H) 10/18/2017    CREATININE 0.8 10/18/2017    CALCIUM 8.9 10/18/2017     Lab Results   Component Value Date    WBC 12.67 10/18/2017    HGB 9.9 (L) 10/18/2017    HCT 30.4 (L) 10/18/2017    MCV 91 10/18/2017     10/18/2017        Assessment/Plan:      1. Severe aortic stenosis - s/p 29mm Portico TAVR via TF access. On ASA and Coumadin.    2. Paroxysmal atrial fibrillation with RBBB - rate-controlled, on warfarin, follows with Coumadin Clinic   3. Coronary artery disease involving native coronary artery of native heart without angina pectoris - Non obstructive [facility] in 8/2014, on ASA and simvastatin   4. Acute on chronic diastolic heart failure - fluid balance + with evidence of volume overload on CXR and physical exam. Will diurese with IV Lasix and continue PO Lasix at discharge.    5. Controlled type 2 diabetes mellitus with microalbuminuria, without long-term current use of insulin - diet controlled, HbA1c 6.0 in 5/2017   6. Hx-TIA (transient ischemic attack) - in 1994, on ASA and statin as above       Lasix IV this AM.   OOB to chair, PT today.   Anticipate d/c home this afternoon if stable.

## 2017-10-18 NOTE — PLAN OF CARE
Problem: Patient Care Overview  Goal: Plan of Care Review  Outcome: Ongoing (interventions implemented as appropriate)  VSS within the shift. Patient is conscious and coherent. Ambulates without difficulty on exertion. Reported mild left shoulder discomfort which was pharmacologically intervened as per MD's order. On paced rhythm. No dizziness and chest pain. Tolerates room air with O2 saturations within normal limits. Voiding freely. For possible discharge today. POC discussed with patient and daughter. Will continue to monitor.

## 2017-10-18 NOTE — PT/OT/SLP EVAL
Physical Therapy  Evaluation    Adela Garay   MRN: [id number]   Admitting Diagnosis: Aortic valve stenosis, unspecified etiology    PT Received On: 10/18/17  PT Start Time: 1022     PT Stop Time: 1049    PT Total Time (min): 27 min       Billable Minutes:  Evaluation 15 mins and Therapeutic Activity 12 mins    Diagnosis: Aortic valve stenosis, unspecified etiology  S/p TAVR and PPM 10/17/17    Past Medical History:   Diagnosis Date    Anticoagulant long-term use     Anticoagulated on Coumadin     Arthritis     Atrial fibrillation     Atrial fibrillation     Carotid artery occlusion     Chronic rhinosinusitis     Coronary artery disease     Granulomatous lung disease     Heart failure     Hyperlipidemia     Hypertension     Hypertensive heart disease without CHF (congestive heart failure)     Mitral valve prolapse     PN (peripheral neuropathy)     hereditary?(sister has it also)/idiopathic?/patient thinks from [place]    Severe aortic stenosis by prior echocardiogram     TIA (transient ischemic attack)     Type 2 diabetes mellitus     Type II or unspecified type diabetes mellitus without mention of complication, not stated as uncontrolled       Past Surgical History:   Procedure Laterality Date    SINUS SURGERY      tonsillectomy      TONSILLECTOMY       General Precautions: Standard, fall, pacemaker  Orthopedic Precautions: N/A   Braces: UE Sling       Do you have any cultural, spiritual, Advent conflicts, given your current situation?: none noted    Patient History:  Lives With: alone (pt has children next door)  Living Arrangements: house  Living Environment Comment: pt lives in a 1 level house with 0 MAU. Pt has a dtr that will stay with her at d/c and family set to assist as needed. Pt reports using a SPC or RW as needed.   Equipment Currently Used at Home: bedside commode, cane, straight, walker, rolling, shower chair    Previous Level of Function:  Ambulation Skills: needs  device  Transfer Skills: independent  ADL Skills: independent  Work/Leisure Activity: independent    Subjective:  Communicated with RN prior to session.  Pt agreeable to session  Chief Complaint: pain at pacemaker site  Patient goals: to go home    Pain/Comfort  Pain Rating 1:  (reports pain in L shoulder (incision) however does not rate)      Objective:   Patient found with: blood pressure cuff, pulse ox (continuous), telemetry     Cognitive Exam:  Oriented to: Person, Place and Time    Follows Commands/attention: Follows one-step commands  Communication: clear/fluent  Safety awareness/insight to disability: intact    Physical Exam:  Postural examination/scapula alignment: Rounded shoulder    Skin integrity: Visible skin intact  Edema: None noted     Sensation:   Intact    Upper Extremity Range of Motion:  Right Upper Extremity: WFL  Left Upper Extremity: not assessed d/t PPM    Upper Extremity Strength:  Right Upper Extremity: WFL  Left Upper Extremity: not assessed d/t PPM    Lower Extremity Range of Motion:  Right Lower Extremity: WFL  Left Lower Extremity: WFL    Lower Extremity Strength:  Right Lower Extremity: WFL  Left Lower Extremity: WFL     Fine motor coordination:  Intact    Gross motor coordination: WFL    Functional Mobility:  Bed Mobility:   UIC upon PT arrival    Transfers:  Sit <> Stand Assistance: Stand By Assistance  Sit <> Stand Assistive Device: Straight Cane    Gait:   Gait Distance: ~160 feet  Assistance 1: Contact Guard Assistance  Gait Assistive Device: Single point cane  Gait Pattern: 3-point gait  Gait Deviation(s): decreased roberta, increased time in double stance, decreased step length, forward lean (trunk flexion)    Balance:   Static Sit: GOOD: Takes MODERATE challenges from all directions  Dynamic Sit: GOOD-: Maintains balance through MODERATE excursions of active trunk movement,     Static Stand: FAIR+: Takes MINIMAL challenges from all directions  Dynamic stand: FAIR: Needs CONTACT  GUARD during gait    Therapeutic Activities and Exercises:  Pt and family educated on how to delta/doff sling during session. Pt and family educated on safety with mobility inculding with using SPC.     AM-PAC 6 CLICK MOBILITY  How much help from another person does this patient currently need?   1 = Unable, Total/Dependent Assistance  2 = A lot, Maximum/Moderate Assistance  3 = A little, Minimum/Contact Guard/Supervision  4 = None, Modified [place]/Independent    Turning over in bed (including adjusting bedclothes, sheets and blankets)?: 4  Sitting down on and standing up from a chair with arms (e.g., wheelchair, bedside commode, etc.): 4  Moving from lying on back to sitting on the side of the bed?: 3  Moving to and from a bed to a chair (including a wheelchair)?: 3  Need to walk in hospital room?: 3  Climbing 3-5 steps with a railing?: 1  Total Score: 18     AM-PAC Raw Score CMS G-Code Modifier Level of Impairment Assistance   6 % Total / Unable   7 - 9 CM 80 - 100% Maximal Assist   10 - 14 CL 60 - 80% Moderate Assist   15 - 19 CK 40 - 60% Moderate Assist   20 - 22 CJ 20 - 40% Minimal Assist   23 CI 1-20% SBA / CGA   24 CH 0% Independent/ Mod I     Patient left up in chair with all lines intact, call button in reach, RN and PA notified and family present.    Assessment:   Adela Garay is a 89 y.o. female with a medical diagnosis of Aortic valve stenosis, unspecified etiology and presents with deficits listed below. Pt will need skilled PT to address deficits and increase functional mobility as able.    Rehab identified problem list/impairments: Rehab identified problem list/impairments: impaired endurance, impaired functional mobilty, gait instability, impaired balance, weakness, decreased upper extremity function, decreased safety awareness    Rehab potential is good.    Activity tolerance: Good    Discharge recommendations: Discharge Facility/Level Of Care Needs: home (with initial 24 hour  supervision and cardiac rehab)     Barriers to discharge: Barriers to Discharge: None    Equipment recommendations: Equipment Needed After Discharge: none     GOALS:    Physical Therapy Goals     Not on file          Multidisciplinary Problems (Resolved)        Problem: Physical Therapy Goal    Goal Priority Disciplines Outcome Goal Variances Interventions   Physical Therapy Goal   (Resolved)     PT/OT, PT Outcome(s) achieved                     PLAN:    Patient to be d/c'ed from skilled PT at this time in this setting.   Plan of Care expires: 11/18/17  Plan of Care reviewed with: patient, daughter, son          Torie LONNY Marcelle, PT  10/18/2017

## 2017-10-18 NOTE — SUBJECTIVE & OBJECTIVE
Interval History: Feels well with slight pain over PPM site.     Review of Systems   Constitution: Negative for decreased appetite and weakness.   HENT: Negative for congestion and hearing loss.    Eyes: Negative for double vision.   Cardiovascular: Negative for chest pain and palpitations.   Respiratory: Negative for cough and shortness of breath.    Hematologic/Lymphatic: Does not bruise/bleed easily.   Gastrointestinal: Negative for abdominal pain, nausea and vomiting.   Neurological: Negative for dizziness and light-headedness.     Objective:     Vital Signs (Most Recent):  Temp: 97.7 °F (36.5 °C) (10/18/17 0300)  Pulse: 68 (10/18/17 0600)  Resp: (!) 27 (10/18/17 0600)  BP: (!) 161/71 (10/17/17 0629)  SpO2: 98 % (10/18/17 0600) Vital Signs (24h Range):  Temp:  [95.6 °F (35.3 °C)-97.7 °F (36.5 °C)] 97.7 °F (36.5 °C)  Pulse:  [59-80] 68  Resp:  [12-62] 27  SpO2:  [96 %-100 %] 98 %  Arterial Line BP: (112-167)/(47-80) 159/56     Weight: 64.9 kg (143 lb)  Body mass index is 23.8 kg/m².     SpO2: 98 %  O2 Device (Oxygen Therapy): room air    Physical Exam   Constitutional: She is oriented to person, place, and time. She appears well-developed and well-nourished.   HENT:   Head: Normocephalic and atraumatic.   Neck: Normal range of motion. Neck supple.   Cardiovascular: Normal rate and regular rhythm.    Pulmonary/Chest: Effort normal and breath sounds normal.   Abdominal: Soft. Bowel sounds are normal.   Neurological: She is alert and oriented to person, place, and time.   Skin: Skin is warm and dry.       Significant Labs: All pertinent lab results from the last 24 hours have been reviewed.

## 2017-10-18 NOTE — ANESTHESIA POSTPROCEDURE EVALUATION
"Anesthesia Post Evaluation    Patient: Adela Garay    Procedure(s) Performed: Procedure(s) (LRB):  REPLACEMENT-VALVE-AORTIC (N/A)    Final Anesthesia Type: general  Patient location during evaluation: ICU  Patient participation: Yes- Able to Participate  Level of consciousness: awake and alert and oriented  Post-procedure vital signs: reviewed and stable  Pain management: adequate  Airway patency: patent  PONV status at discharge: No PONV  Anesthetic complications: no      Cardiovascular status: hemodynamically stable  Respiratory status: unassisted  Hydration status: euvolemic  Follow-up not needed.        Visit Vitals  /60   Pulse 92   Temp 36.8 °C (98.3 °F) (Oral)   Resp (!) 22   Ht 5' 5" (1.651 m)   Wt 64.9 kg (143 lb)   SpO2 98%   Breastfeeding? No   BMI 23.80 kg/m²       Pain/Jung Score: Pain Assessment Performed: Yes (10/18/2017  9:00 AM)  Presence of Pain: denies (10/18/2017  9:00 AM)  Pain Rating Prior to Med Admin: 4 (10/18/2017  8:07 AM)  Pain Rating Post Med Admin: 0 (10/17/2017  9:32 PM)      "

## 2017-10-18 NOTE — ASSESSMENT & PLAN NOTE
Ms Garay is the  of a [employer] vet [facility] referred by Dr Ivy for evaluation of severe AS. She has a PMH significant for diet-controlled DM, chronic a-fib on Coumadin, remote hx of TIA, HTN, w existing RBBB and has Chronic Afib is s/p 29 mm PORTICO via R TF access with junctional bradycardia rhythm requiring pacing from TVP. S/p PPM 10/17. Site looks well. Will need to follow up with device clinic in 1-2 weeks for wound check. Continue keflex for 5 days. Pacemaker Care instruction packet given to pt and daughter.

## 2017-10-19 ENCOUNTER — PATIENT OUTREACH (OUTPATIENT)
Dept: ADMINISTRATIVE | Facility: CLINIC | Age: 82
End: 2017-10-19

## 2017-10-19 ENCOUNTER — TELEPHONE (OUTPATIENT)
Dept: FAMILY MEDICINE | Facility: CLINIC | Age: 82
End: 2017-10-19

## 2017-10-19 DIAGNOSIS — I45.9 HB (HEART BLOCK): Primary | ICD-10-CM

## 2017-10-19 DIAGNOSIS — Z95.0 CARDIAC PACEMAKER IN SITU: ICD-10-CM

## 2017-10-19 NOTE — TELEPHONE ENCOUNTER
Agree with stool softener once or twice daily and miralax as needed to have a bowel movement. Ok to override UC spot for next month to come and see me.

## 2017-10-19 NOTE — TELEPHONE ENCOUNTER
According to the daughter the patient has been using a enema daily to stimulate her bowels. Advised her to stop and to use a stool softener because she could cause a vagal nerve stimulation and have her pace maker misfire. She would also like to come in next month to see you.

## 2017-10-19 NOTE — TELEPHONE ENCOUNTER
----- Message from Yuli So sent at 10/19/2017  9:07 AM CDT -----  Contact: daughter  Daughter requesting a call back to speak to someone regarding patient's bowel movements. She was advised to contact PCP from [facility] Sur[facility] due to patient just having surgery. Please contact Shiloh at [phone].    Thanks!

## 2017-10-19 NOTE — PATIENT INSTRUCTIONS
Pacemakers    A pacemaker is a small electronic device that keeps your heart beating at the right pace. Inserting the pacemaker into your body is called implantation. You stay awake during the procedure. You may be asked some questions or be asked to take some deep breaths.  How do I get ready for a pacemaker procedure?  · Dont eat or drink anything after midnight the night before the procedure, or 8 hours before the procedure.  · Tell your doctor about any allergies to medicines, shellfish or iodine contrast, tape or adhesives, or antibiotic types of soaps. Alternatives can be provided and precautions can be taken to avoid exposing you to anything that you are allergic to.  · Follow your healthcare provider's instructions on what medicines to take. If you are taking a blood thinning medicines, your healthcare provider may give you instructions on stopping it before the procedure to decrease the risk of major bleeding. You may be instructed to stop medicines that interact with the contrast dye that is used to place the pacemaker wires in the vein.  · You may be asked to shower with antibacterial soap the night before your procedure and the morning of the procedure. Ask your provider if he or she wants you to use a certain kind of soap.  · Your provider may ask you to use clean bed sheets and pajamas the night before the procedure to reduce the risk of infection.  · You may be given an antibiotic through an IV to also protect you from infection after the pacemaker implant procedure.  · You will have blood drawn depending on your overall health. If your kidney function is not normal, special precautions may be needed before the procedure.  · If you are a woman of child-bearing age you may be asked to take a pregnancy test before the procedure.  What happens during the procedure?  · Your doctor may prescribe a medicine to help you relax and to prevent pain during the procedure.  · A local anesthetic is given by  injection to numb the area where the pacemaker will be inserted. This keeps you from feeling pain during the procedure.  · The doctor will make a cut (incision) where the generator is placed.  · The doctor will guide the wire (lead) through a vein into your hearts chambers using X-ray monitors.  · The doctor will attach the pacemaker generator to the lead or leads.  · The doctor will close the incision site with stitches and may seal the site with a surgical glue to prevent infection.  · A dressing may be applied to your incision to reduce the risk of bleeding and also protect it from infection.  · The pacemakers settings are programmed to help your heart beat at a rate thats right for you.  What happens after the procedure?  · You will have a chest X-ray while you are in the recovery area.  · Your pacemaker settings will be rechecked.  · Your provider may prescribe antibiotics to take after the procedure to prevent infection.  · Follow the instructions you are given for caring for the implantation site. You will likely be told not to raise the arm on that side for a certain amount of time. You may have a sling or arm immobilizer put on to keep you from moving your arm on the incision side. This is done to prevent the new pacemaker wired from becoming displaced. Your doctor will give you instructions on how long and when you should wear this.  · Take your temperature and check your incision for signs of infection every day for a week.  · Return for a follow-up visit as advised.  · As your healthcare provider when it will be safe to shower, bathe, or swim. Generally, you should not soak in water for about a week to prevent the incision from softening, opening, or becoming infected.  · Avoid all activities that would put pressure on your incision site or cause irritation to the incision. Do not use lotions, powders, or ointments unless your provider says it is safe to do so. Do not carry a purse or backpack that  would put pressure on your incision site.  Call 911  Call 911 if you have:  · Chest pain  · Severe trouble breathing  When should I call my healthcare provider?  Call your healthcare provider right away if you have any of the following:  · You feel any of the symptoms you had before the pacemaker was implanted. These include dizziness, lightheadedness, lack of energy, or fainting spells.  · Your chest or abdominal muscles twitch.  · You have hiccups that won't stop  · You have a rapid or pounding heartbeat or shortness of breath.  · You feel pain in the area around your pacemaker.  · You have a fever over 100.4°F (38°C)  · Redness, severe swelling, drainage, bleeding, or warmth at the incision site  · Your incision site is not healing or the incision separates or opens  · Your pacemaker generator feels loose or like it is wiggling in the pocket under the skin  · If you need an MRI for any reason. In some cases, it is not safe to have an MRI with a pacemaker.   Date Last Reviewed: 3/30/2016  © 6156-2006 [employer]. [address] [place] [address]. All rights reserved. This information is not intended as a substitute for professional medical care. Always follow your healthcare professional's instructions.

## 2017-10-20 ENCOUNTER — TELEPHONE (OUTPATIENT)
Dept: FAMILY MEDICINE | Facility: CLINIC | Age: 82
End: 2017-10-20

## 2017-10-20 ENCOUNTER — OUTPATIENT CASE MANAGEMENT (OUTPATIENT)
Dept: ADMINISTRATIVE | Facility: OTHER | Age: 82
End: 2017-10-20

## 2017-10-20 NOTE — PROGRESS NOTES
Assessment completed with pt and her daughter, Shiloh. Pt is s/p TAVR and pacemaker. Pt lives alone but has children who live behind her home. Her children have been taking turns staying with her around the clock. Pt c/o fatigue since the surgery. She reports her SOB has greatly imporved. She does complain of a persistent cough mostly during the day. She states it does not feel like it's from her chest. Pt reports she has seasonal allergies and feels the cough is from a post-nasal drip. Shiloh reports she started giving pt OTC Flonase, which has provided immediate relief. She has not discussed this medication with pt's physicians. Pt has started weighing daily. Shiloh is tracking her weight. She states she did have a 1 lb weight gain but feels this is because pt has been eating better since she's been there. She denies edema. She also monitors pt's blood pressure and temperature. Pt is taking Keflex. Will complete on Monday. Pt is interested in Cardiac Rehab once she recovers.     Interventions performed:  Med rec  Message pcp regarding Flonase and whether she should use  Mail education materials on TAVR recovery, CHF and fall precautions  Provide contact information  Review upcoming appts  Discuss walk in clinics and extended clinic hours should pt need to be seen prior to PCP appt.     Plan:   Follow up on Flonase and receipt of education materials.   Continue education

## 2017-10-20 NOTE — PATIENT INSTRUCTIONS
After Your Transcatheter Aortic Valve Replacement (TAVR)  You have just had surgery to replace your aortic valve with a new biological tissue valve. When you go home, follow all instructions for medicines, pain control, diet, activity, and wound care. Make sure to keep all your follow-up appointments. You should feel better after your surgery. Complete recovery may take several weeks. How long depends on whether the surgery was done through your groin, underneath the collarbone, or between your ribs. All of these methods are considered less invasive than open heart surgery. This means fewer complications and a shorter recovery time. Below are some general guidelines to follow as you heal.     A pill organizer can help you keep track of the medicines you take each day.   Recovering at home  Follow your healthcare providers directions for recovery at home. It may take several weeks to get back to your normal routine. Using the groin artery is the least invasive method and heals the fastest. TAVR done between your ribs requires a larger incision takes longer to recover from.  During your recovery:  · Take medicine as directed. Take pain medicine, blood thinners, and any other medicine exactly as your healthcare provider advises. You will likely need to take aspirin and another blood thinner (antiplatelets) for a period. Youll need to take the aspirin for the rest of your life. Be sure your doctor knows about all other medicines you take, including over-the-counter medicines and dietary supplements.  · Care for your incision. Its normal for your incision to be bruised, itchy, or sore while its healing. Your incision may take a week or more to heal. A surgery site between your ribs will take longer to heal than one in your groin. Care for the bandage and incision as advised. Wash it every day with warm water and soap. Gently pat it dry. Dont put powder, lotion, or ointment on the incision until its  healed.  · Shower carefully. Unless youre told otherwise, you can shower once you get home. Use warm water and a mild soap. Avoid hot water because it can make you feel lightheaded. Dont take a bath until your healthcare provider says its OK. Also dont sit in a swimming pool or hot tub until your doctor says its OK. If your surgery was between your ribs, you may need to keep the incision site dry for a certain period.  · Avoid activities as directed. Your doctor may tell you to avoid strenuous activities for a week or more. This includes no heavy lifting. Instructions on what to do are different depending on how your surgery was done. Your doctor will give you specific instructions that are appropriate for you.  · Dont drive if you are taking opioid pain medicine. These medicines can make you feel sleepy and it is unsafe to drive or operate heavy machinery while taking them.  · Walk regularly. One of the best ways to get stronger is to walk. If your doctor agrees, start with short walks at home. Walk a little more each day. Take someone with you until you feel OK to walk alone. Your healthcare provider may recommend a cardiac rehab program. This will allow you to be closely monitored  by trained personnel during exercise. Most insurance companies will cover these programs.  Call 911  Call 911 for immediate care if you have:  · Chest pain  · Severe difficulty breathing  · Signs of stroke such as sudden numbness or weakness in the face, arms, or legs  When should I call my healthcare provider?  Seek immediate medical help if you have any of the following symptoms:  · Bowel movement that is bright red  · Bleeding that is frequent or persistent, such as nosebleeds  · Chills or fever of 100.4°F (38°C) or higher  · Dizziness, lightheadedness, or fainting  · Redness, swelling, bleeding, warmth, or fluid draining at the incision site  · Weight gain of more than 2 to 3 pounds in 24 hours or more than 5 pounds in 1  week  · Swelling in your hands, feet, or ankles  · Shortness of breath that doesnt get better when you rest  · Pain that gets worse or doesnt go away  · Fast, slow, or irregular pulse  · Worsening or severe fatigue  · Other signs or symptoms as indicated by your healthcare provider   Follow-up care  Follow-up visits with your healthcare provider help make sure youre recovering well. In fact, to keep feeling your best, youll need regular checkups for the rest of your life. During these visits, you may have:  · Blood tests to monitor the function of your heart and kidneys, and check for anemia  · Echocardiograms to check how well your heart and new heart valve are working  · Electrocardiograms (ECGs) to show if your heart rhythm has changed after your valve replacement  Staying healthy after a heart valve replacement  · Learn to take your own blood pressure and pulse. Keep a record of your results. Ask your healthcare provider which readings mean that you need medical care.  · Weigh yourself everyday and keep a record of this. It is normal for your weight to fluctuate 2 to 3 pounds in a day, anything more than this could mean you are retaining fluid and developing heart failure  · Tell all your healthcare providers and dentists youve had a valve replacement. Before any dental procedure, you will need to take an antibiotic to protect your new heart valve.  · Take any prescribed blood thinners exactly as directed.  · Make lifestyle changes as advised by your healthcare provider. Keep in mind that healthy habits such as exercise, and a healthy diet can strengthen your heart.  · Alert your healthcare provider to any new symptoms.  Date Last Reviewed: 5/1/2016  © 1151-3219 [employer]. [address] [place] [address], [place] [address]. All rights reserved. This information is not intended as a substitute for professional medical care. Always follow your healthcare professional's  instructions.        Fall Prevention  Falls often occur due to slipping, tripping or losing your balance. Millions of people fall every year and injure themselves. Here are ways to reduce your risk of falling again.  · Think about your fall, was there anything that caused your fall that can be fixed, removed, or replaced?  · Make your home safe by keeping walkways clear of objects you may trip over.  · Use non-slip pads under rugs. Do not use area rugs or small throw rugs.  · Use non-slip mats in bathtubs and showers.  · Install handrails and lights on staircases.  · Do not walk in poorly lit areas.  · Do not stand on chairs or wobbly ladders.  · Use caution when reaching overhead or looking upward. This position can cause a loss of balance.  · Be sure your shoes fit properly, have non-slip bottoms and are in good condition.   · Wear shoes both inside and out. Avoid going barefoot or wearing slippers.  · Be cautious when going up and down stairs, curbs, and when walking on uneven sidewalks.  · If your balance is poor, consider using a cane or walker.  · If your fall was related to alcohol use, stop or limit alcohol intake.   · If your fall was related to use of sleeping medicines, talk to your doctor about this. You may need to reduce your dosage at bedtime if you awaken during the night to go to the bathroom.    · To reduce the need for nighttime bathroom trips:  ¨ Avoid drinking fluids for several hours before going to bed  ¨ Empty your bladder before going to bed  ¨ Men can keep a urinal at the bedside  · Stay as active as you can. Balance, flexibility, strength, and endurance all come from exercise. They all play a role in preventing falls. Ask your healthcare provider which types of activity are right for you.  · Get your vision checked on a regular basis.  · If you have pets, know where they are before you stand up or walk so you don't trip over them.  · Use night lights.  Date Last Reviewed: 11/5/2015  ©  7195-7175 [employer]. [address] [place] [address], [place] [address]. All rights reserved. This information is not intended as a substitute for professional medical care. Always follow your healthcare professional's instructions.        Heart Failure: Warning Signs of a Flare-Up  You have a condition called heart failure. Once you have heart failure, flare-ups can happen. Below are signs that can mean your heart failure is getting worse. If you notice any of these warning signs, call your healthcare provider.  Swelling    · Your feet, ankles, or lower legs get puffier.  · You notice skin changes on your lower legs.  · Your shoes feel too tight.  · Your clothes are tighter in the waist.  · You have trouble getting rings on or off your fingers.  Shortness of breath  · You have to breathe harder even when youre doing your normal activities or when youre resting.  · You are short of breath walking up stairs or even short distances.  · You wake up at night short of breath or coughing.  · You need to use more pillows or sit up to sleep.  · You wake up tired or restless.  Other warning signs  · You feel weaker, dizzy, or more tired.  · You have chest pain or changes in your heartbeat.  · You have a cough that wont go away.  · You cant remember things or dont feel like eating.  Tracking your weight  Gaining weight is often the first warning sign that heart failure is getting worse. Gaining even a few pounds can be a sign that your body is retaining excess water and salt. Weighing yourself each day in the morning after you urinate and before you eat, is the best way to know if you're retaining water. Get a scale that is easy to read and make sure you wear the same clothes and use the same scale every time you weigh. Your healthcare provider will show you how to track your weight. Call your doctor if you gain more than 2 pounds in 1 day, 5 pounds in 1 week, or whatever weight gain you were told to report by  your doctor. This is often a sign of worsening heart failure and needs to be evaluated and treated before it compromises your breathing. Your doctor will tell you what to do next.    Date Last Reviewed: 3/15/2016  © 9261-0490 [employer]. [address] [place] [address], [place] [address]. All rights reserved. This information is not intended as a substitute for professional medical care. Always follow your healthcare professional's instructions.

## 2017-10-21 NOTE — TELEPHONE ENCOUNTER
----- Message from Elyse Medina RN sent at 10/20/2017 11:10 AM CDT -----  Dr. Farrell,   Ms. Garay is c/o of persistent cough. She feels like it's coming from a sinus drip and not her chest. She is taking otc Flonase.     Please advise pt if this is OK to use.     Thanks,     Elyse Medina RN, [facility]  Outpatient Case Management  [facility]

## 2017-10-23 ENCOUNTER — ANTI-COAG VISIT (OUTPATIENT)
Dept: CARDIOLOGY | Facility: CLINIC | Age: 82
End: 2017-10-23

## 2017-10-23 DIAGNOSIS — Z79.01 LONG-TERM (CURRENT) USE OF ANTICOAGULANTS, INR GOAL 2.0-3.0: ICD-10-CM

## 2017-10-23 DIAGNOSIS — Z86.73 HX-TIA (TRANSIENT ISCHEMIC ATTACK): ICD-10-CM

## 2017-10-23 LAB — INR PPP: 2.1

## 2017-10-23 NOTE — TELEPHONE ENCOUNTER
Patient notified of recommendation. States she still has the cough, requesting recommendation. Please advise.

## 2017-10-24 ENCOUNTER — OUTPATIENT CASE MANAGEMENT (OUTPATIENT)
Dept: ADMINISTRATIVE | Facility: OTHER | Age: 82
End: 2017-10-24

## 2017-10-24 ENCOUNTER — TELEPHONE (OUTPATIENT)
Dept: CARDIOLOGY | Facility: CLINIC | Age: 82
End: 2017-10-24

## 2017-10-24 NOTE — TELEPHONE ENCOUNTER
----- Message from Elyse Medina RN sent at 10/24/2017 12:03 PM CDT -----  Dr. Campbell,     MsMagali Garay is complaining of shortness of breath when lying supine. She has been sleeping in a recliner which is causing her to have back spasms and pain.  She states the shortness of breath is worse since the TAVR.     She is also having a dry, persistent cough and mild edema to her lower extremities. She has not gained any weight.     She would like to know if she can take Tylenol PM for sleep and if she can use a lidocaine patch for the back pain/spasms.     Please advise patient.     Thanks,     Elyse Medina RN, [facility]  Outpatient Case Management  [facility]

## 2017-10-24 NOTE — TELEPHONE ENCOUNTER
Spoke with patient and daughter-in-law.  Patient reports that her weight is up 6 pounds.  She is slightly short of breath with exertion.  Reviewed discharge instructions again regarding taking extra Lasix dose for weight gain.  Avoid salt intake.  Patient is very anxious and wanted  to prescribe something for this.  She also reports that her back is hurting and would like something for that.  Advised patient to contact PCP for these issues.  She did speak with PCP regarding cough that she believes is from post nasal drip.  She will follow up with PCP for anxiety and cough.  Advised patient to contact 's office directly for any heart concerns.  Direct number provided.

## 2017-10-25 ENCOUNTER — TELEPHONE (OUTPATIENT)
Dept: FAMILY MEDICINE | Facility: CLINIC | Age: 82
End: 2017-10-25

## 2017-10-25 DIAGNOSIS — R05.9 COUGH: Primary | ICD-10-CM

## 2017-10-25 NOTE — TELEPHONE ENCOUNTER
"----- Message from Meseret Alvares sent at 10/25/2017 10:41 AM CDT -----  Contact: daughter in law -- Fiorella   Calling to get Dr Farrell's opinion on patient's current situation  -- while undergoing heart valve replacement , a pacemaker was placed . She has a cough , arm & back pain . A humidifier helps . Should she try Mucinex or Mucinex DM , or Tessalon Pearls ?  Could the pain be from the procedure? Or should they be concerned ? She is sleeping in a recliner . Requesting something for muscle aches .Biofreeze & Icy Hot are helpful.  Is that safe post surgery ? They have tried meditation & massages.She is " fidgety "  has an active mind ,restless & has anxiety at night .      [phone]     LL      "

## 2017-10-25 NOTE — TELEPHONE ENCOUNTER
Not sure if any of these symptoms could be from the surgery - have they contacted cardiology for advise?  Is she taking any pain medication?  Ok to take mucinex DM for cough.

## 2017-10-26 RX ORDER — BENZONATATE 100 MG/1
100 CAPSULE ORAL 3 TIMES DAILY PRN
Qty: 30 CAPSULE | Refills: 0 | Status: ON HOLD | OUTPATIENT
Start: 2017-10-26 | End: 2017-11-12

## 2017-10-26 NOTE — TELEPHONE ENCOUNTER
I sent the Rx for tessalon perles to the pharmacy.   Would recommend family try to stretch her calves and hamstrings twice a day. Also can try half of a glass of tonic water at bedtime for leg twitching.

## 2017-10-26 NOTE — TELEPHONE ENCOUNTER
Spoke w/patient's daughter, informed Rx sent to pharmacy with recommendations. Verbalized understanding.

## 2017-10-26 NOTE — TELEPHONE ENCOUNTER
Spoke w/patient's daughter, states they did contact cardiologist and was advise to contact PCP. States patient is taking Tylenol 325mg twice daily, and she is taking Mucinex DM but it is not helping. States the mucus appears to be stuck in throat and cannot be expelled. Requesting Tessalon cristy for cough.  Daughter also request recommendations for night time leg twitching that she has been having, states it comes and go. Please advise.

## 2017-10-26 NOTE — PROGRESS NOTES
"Returned missed call to pt's daughter, Monica. She states pt continues with cough. Pt was instructed to use Mucinex DM. She states this is not helping. She states she feels like the cough is a nasal drip. She is requesting a medication for cough. She states she was using "Biofreeze" on pt. She read ingredients which list Salicylate. Instructed her to avoid Salicylate without consulting with pt's cardiologist. She states pt continues to sleep in a recliner due to sob. She states the sob is worse when lying supine. She states before the surgery, pt was able to sleep in her bed on 1 pillow. She states pt has mild edema to both feet and calves. She is weighing pt and states she has not had a weight gain. She states pt is now complaining of spasms to her back which is keeping her from sleeping at night. Moraima would like to know if she can apply a lidocaine patch to pt. Family members are with pt at all times to ensure safety and prevent falls. Safety goal met.     Interventions performed:   Send message to Dr. Campbell's office regarding above symptoms and recommendations  Encourage family to avoid giving pt any new medications without first consulting with her doctors.   Encourage pt to continue weighing daily    Plan:   Follow up on new orders  Continue education    "

## 2017-10-27 ENCOUNTER — TELEPHONE (OUTPATIENT)
Dept: FAMILY MEDICINE | Facility: CLINIC | Age: 82
End: 2017-10-27

## 2017-10-27 DIAGNOSIS — G47.00 INSOMNIA, UNSPECIFIED TYPE: Primary | ICD-10-CM

## 2017-10-27 RX ORDER — TRAZODONE HYDROCHLORIDE 50 MG/1
50 TABLET ORAL NIGHTLY PRN
Qty: 30 TABLET | Refills: 2 | Status: ON HOLD | OUTPATIENT
Start: 2017-10-27 | End: 2017-11-12 | Stop reason: HOSPADM

## 2017-10-27 NOTE — TELEPHONE ENCOUNTER
----- Message from Dilcia Thomas sent at 10/27/2017  9:20 AM CDT -----  Contact: daughter   Galina [phone]  Requesting a script for  anxiety meds for her mother . Pls call karo Fowler   [phone], Thanks................Natacha

## 2017-10-27 NOTE — OP NOTE
DATE OF PROCEDURE: 10/17/2017    ATTENDING SURGEONS: Cesario Campbell M.D, Pranav Wilcox M.D., and Jose C Willson M.D.    PREOPERATIVE DIAGNOSES:  1. Severe aortic stenosis.    POSTOPERATIVE DIAGNOSES:  1. Severe aortic stenosis      OPERATION PERFORMED: Transcatheter aortic valve insertion via right transfemoral   approach using a 29 mm [employer]. Turner Portico valve    ANESTHESIA: General.    ESTIMATED BLOOD LOSS: 10 mL.    BRIEF HISTORY: This patient is a 89-year-old woman with severe aortic stenosis.  The patient has had progressive dyspnea on exertion. This prompted a thoughtful and thorough evaluation, which demonstrated severe aortic stenosis. Given the comorbid conditions, a transcatheter valve insertion was recommended. The patient now presents for transcatheter aortic valve insertion.    PROCEDURE: After obtaining informed and written consent, the patient was   brought to the cath lab and placed on the cath table in supine position. After   induction of adequate anesthesia, a transvenous pacing wire was placed.   Bilateral femoral arterial and femoral venous access was obtained.  A wire was advanced across the aortic valve. It was exchanged for stiff wire, and  a 22 mm Chinchilla balloon was placed. Under rapid ventricular pacing, balloon valvuloplasty was performed. The balloon was then withdrawn, and a 29 mm Portico valve was advanced to the level of the aortic annulus. Once the team was satisfied that the valve was in proper position, it was deployed. Excellent positioning was obtained. Post-procedure echo demonstrated no aortic insufficiency. The femoral access sites were controlled using percutaneous techniques. The patient tolerated the procedure well, there were no complications. At the conclusion of the case, sponge and instrument counts were correct.

## 2017-10-27 NOTE — TELEPHONE ENCOUNTER
Patient's daughter (Galina) states that the patient is at best getting 2 hours of sleep per night.  The patient is unable to relax because she is constantly thinking of various things.  The daughter said she checked with the patient's cardiologist to be sure it was ok for the patient to take a med that would calm her down at night.

## 2017-10-27 NOTE — TELEPHONE ENCOUNTER
Spoke with patient's daughter.  I will add trazodone to see if it will help her sleep at night.  They will first try half of a pill a day may increase it to a whole pill or even 2 pills, if needed.  Patient has had some weight gain.  Family has increase Lasix to 2 pills.  I advised him they can increase a little bit more if needed if weight does not improve.  Patient's daughter will call me next week to let me know how she is doing.

## 2017-10-30 ENCOUNTER — HOSPITAL ENCOUNTER (INPATIENT)
Facility: HOSPITAL | Age: 82
LOS: 13 days | Discharge: HOME HEALTH | DRG: 292 | End: 2017-11-12
Attending: EMERGENCY MEDICINE | Admitting: HOSPITALIST
Payer: MEDICARE

## 2017-10-30 ENCOUNTER — ANTI-COAG VISIT (OUTPATIENT)
Dept: CARDIOLOGY | Facility: CLINIC | Age: 82
End: 2017-10-30
Payer: MEDICARE

## 2017-10-30 DIAGNOSIS — I25.10 CORONARY ARTERY DISEASE INVOLVING NATIVE CORONARY ARTERY OF NATIVE HEART WITHOUT ANGINA PECTORIS: ICD-10-CM

## 2017-10-30 DIAGNOSIS — M25.561 ACUTE PAIN OF RIGHT KNEE: ICD-10-CM

## 2017-10-30 DIAGNOSIS — Z79.01 LONG-TERM (CURRENT) USE OF ANTICOAGULANTS, INR GOAL 2.0-3.0: ICD-10-CM

## 2017-10-30 DIAGNOSIS — I50.9 HEART FAILURE: ICD-10-CM

## 2017-10-30 DIAGNOSIS — I48.19 PERSISTENT ATRIAL FIBRILLATION: ICD-10-CM

## 2017-10-30 DIAGNOSIS — I50.9 HEART FAILURE, UNSPECIFIED HEART FAILURE CHRONICITY, UNSPECIFIED HEART FAILURE TYPE: ICD-10-CM

## 2017-10-30 DIAGNOSIS — I50.33 ACUTE ON CHRONIC DIASTOLIC HEART FAILURE: ICD-10-CM

## 2017-10-30 DIAGNOSIS — Z95.2 S/P AORTIC VALVE REPLACEMENT: ICD-10-CM

## 2017-10-30 DIAGNOSIS — F32.0 MILD MAJOR DEPRESSION: ICD-10-CM

## 2017-10-30 DIAGNOSIS — I44.2 COMPLETE HEART BLOCK, POST-SURGICAL: ICD-10-CM

## 2017-10-30 DIAGNOSIS — Z86.73 HX-TIA (TRANSIENT ISCHEMIC ATTACK): ICD-10-CM

## 2017-10-30 DIAGNOSIS — I50.9 CONGESTIVE HEART FAILURE: ICD-10-CM

## 2017-10-30 DIAGNOSIS — Z00.6 PATIENT IN CLINICAL RESEARCH STUDY: ICD-10-CM

## 2017-10-30 DIAGNOSIS — I97.89 COMPLETE HEART BLOCK, POST-SURGICAL: ICD-10-CM

## 2017-10-30 DIAGNOSIS — Z95.2 S/P TAVR (TRANSCATHETER AORTIC VALVE REPLACEMENT): Primary | ICD-10-CM

## 2017-10-30 DIAGNOSIS — R05.9 COUGH: ICD-10-CM

## 2017-10-30 DIAGNOSIS — I11.0 BENIGN HYPERTENSIVE HEART DISEASE WITH CONGESTIVE HEART FAILURE: ICD-10-CM

## 2017-10-30 DIAGNOSIS — R07.9 CHEST PAIN: ICD-10-CM

## 2017-10-30 DIAGNOSIS — R60.9 EDEMA: ICD-10-CM

## 2017-10-30 PROBLEM — R05.8 NON-PRODUCTIVE COUGH: Status: ACTIVE | Noted: 2017-10-30

## 2017-10-30 LAB
ALBUMIN SERPL BCP-MCNC: 3.5 G/DL
ALP SERPL-CCNC: 85 U/L
ALT SERPL W/O P-5'-P-CCNC: 13 U/L
ANION GAP SERPL CALC-SCNC: 12 MMOL/L
AST SERPL-CCNC: 29 U/L
BASOPHILS # BLD AUTO: 0.05 K/UL
BASOPHILS NFR BLD: 0.5 %
BILIRUB SERPL-MCNC: 1.2 MG/DL
BNP SERPL-MCNC: 918 PG/ML
BUN SERPL-MCNC: 15 MG/DL
CALCIUM SERPL-MCNC: 9.4 MG/DL
CHLORIDE SERPL-SCNC: 98 MMOL/L
CO2 SERPL-SCNC: 26 MMOL/L
CREAT SERPL-MCNC: 0.8 MG/DL
DIFFERENTIAL METHOD: ABNORMAL
EOSINOPHIL # BLD AUTO: 0.1 K/UL
EOSINOPHIL NFR BLD: 0.9 %
ERYTHROCYTE [DISTWIDTH] IN BLOOD BY AUTOMATED COUNT: 15.6 %
EST. GFR  (AFRICAN AMERICAN): >60 ML/MIN/1.73 M^2
EST. GFR  (NON AFRICAN AMERICAN): >60 ML/MIN/1.73 M^2
ESTIMATED AVG GLUCOSE: 114 MG/DL
GLUCOSE SERPL-MCNC: 111 MG/DL
HBA1C MFR BLD HPLC: 5.6 %
HCT VFR BLD AUTO: 36.2 %
HGB BLD-MCNC: 11.3 G/DL
IMM GRANULOCYTES # BLD AUTO: 0.04 K/UL
IMM GRANULOCYTES NFR BLD AUTO: 0.4 %
INR PPP: 2.4
INR PPP: 2.7
LYMPHOCYTES # BLD AUTO: 1.4 K/UL
LYMPHOCYTES NFR BLD: 12.8 %
MAGNESIUM SERPL-MCNC: 2.3 MG/DL
MCH RBC QN AUTO: 29.4 PG
MCHC RBC AUTO-ENTMCNC: 31.2 G/DL
MCV RBC AUTO: 94 FL
MONOCYTES # BLD AUTO: 1.3 K/UL
MONOCYTES NFR BLD: 12.7 %
NEUTROPHILS # BLD AUTO: 7.7 K/UL
NEUTROPHILS NFR BLD: 72.7 %
NRBC BLD-RTO: 0 /100 WBC
PLATELET # BLD AUTO: 195 K/UL
PMV BLD AUTO: 9.5 FL
POTASSIUM SERPL-SCNC: 4 MMOL/L
PROT SERPL-MCNC: 7.5 G/DL
PROTHROMBIN TIME: 23.8 SEC
RBC # BLD AUTO: 3.85 M/UL
SODIUM SERPL-SCNC: 136 MMOL/L
TROPONIN I SERPL DL<=0.01 NG/ML-MCNC: 0.02 NG/ML
WBC # BLD AUTO: 10.56 K/UL

## 2017-10-30 PROCEDURE — 99223 1ST HOSP IP/OBS HIGH 75: CPT | Mod: GC,,, | Performed by: INTERNAL MEDICINE

## 2017-10-30 PROCEDURE — A4216 STERILE WATER/SALINE, 10 ML: HCPCS | Performed by: NURSE PRACTITIONER

## 2017-10-30 PROCEDURE — 93010 ELECTROCARDIOGRAM REPORT: CPT | Mod: ,,, | Performed by: INTERNAL MEDICINE

## 2017-10-30 PROCEDURE — 99285 EMERGENCY DEPT VISIT HI MDM: CPT | Mod: ,,, | Performed by: EMERGENCY MEDICINE

## 2017-10-30 PROCEDURE — 80053 COMPREHEN METABOLIC PANEL: CPT

## 2017-10-30 PROCEDURE — 85025 COMPLETE CBC W/AUTO DIFF WBC: CPT

## 2017-10-30 PROCEDURE — 63600175 PHARM REV CODE 636 W HCPCS: Performed by: EMERGENCY MEDICINE

## 2017-10-30 PROCEDURE — 96374 THER/PROPH/DIAG INJ IV PUSH: CPT

## 2017-10-30 PROCEDURE — 25000003 PHARM REV CODE 250: Performed by: HOSPITALIST

## 2017-10-30 PROCEDURE — 20600001 HC STEP DOWN PRIVATE ROOM

## 2017-10-30 PROCEDURE — 25000003 PHARM REV CODE 250

## 2017-10-30 PROCEDURE — 99223 1ST HOSP IP/OBS HIGH 75: CPT | Mod: AI,,, | Performed by: NURSE PRACTITIONER

## 2017-10-30 PROCEDURE — 94640 AIRWAY INHALATION TREATMENT: CPT

## 2017-10-30 PROCEDURE — 85610 PROTHROMBIN TIME: CPT

## 2017-10-30 PROCEDURE — 25000003 PHARM REV CODE 250: Performed by: NURSE PRACTITIONER

## 2017-10-30 PROCEDURE — 25000242 PHARM REV CODE 250 ALT 637 W/ HCPCS: Performed by: NURSE PRACTITIONER

## 2017-10-30 PROCEDURE — 83880 ASSAY OF NATRIURETIC PEPTIDE: CPT

## 2017-10-30 PROCEDURE — 84484 ASSAY OF TROPONIN QUANT: CPT

## 2017-10-30 PROCEDURE — 83735 ASSAY OF MAGNESIUM: CPT

## 2017-10-30 PROCEDURE — 83036 HEMOGLOBIN GLYCOSYLATED A1C: CPT

## 2017-10-30 PROCEDURE — G0250 MD INR TEST REVIE INTER MGMT: HCPCS | Mod: ,,,

## 2017-10-30 PROCEDURE — 93005 ELECTROCARDIOGRAM TRACING: CPT

## 2017-10-30 PROCEDURE — 99285 EMERGENCY DEPT VISIT HI MDM: CPT | Mod: 25

## 2017-10-30 RX ORDER — SIMVASTATIN 20 MG/1
20 TABLET, FILM COATED ORAL NIGHTLY
Status: DISCONTINUED | OUTPATIENT
Start: 2017-10-30 | End: 2017-11-12 | Stop reason: HOSPADM

## 2017-10-30 RX ORDER — HYDROCODONE BITARTRATE AND ACETAMINOPHEN 5; 325 MG/1; MG/1
1 TABLET ORAL EVERY 4 HOURS PRN
Status: DISCONTINUED | OUTPATIENT
Start: 2017-10-30 | End: 2017-11-12 | Stop reason: HOSPADM

## 2017-10-30 RX ORDER — MORPHINE SULFATE 4 MG/ML
4 INJECTION, SOLUTION INTRAMUSCULAR; INTRAVENOUS EVERY 4 HOURS PRN
Status: DISCONTINUED | OUTPATIENT
Start: 2017-10-30 | End: 2017-11-12 | Stop reason: HOSPADM

## 2017-10-30 RX ORDER — ACETAMINOPHEN 325 MG/1
650 TABLET ORAL EVERY 4 HOURS PRN
Status: DISCONTINUED | OUTPATIENT
Start: 2017-10-30 | End: 2017-11-12 | Stop reason: HOSPADM

## 2017-10-30 RX ORDER — DIGOXIN 250 MCG
0.25 TABLET ORAL DAILY
Status: DISCONTINUED | OUTPATIENT
Start: 2017-10-31 | End: 2017-11-07

## 2017-10-30 RX ORDER — FUROSEMIDE 10 MG/ML
60 INJECTION INTRAMUSCULAR; INTRAVENOUS
Status: COMPLETED | OUTPATIENT
Start: 2017-10-30 | End: 2017-10-30

## 2017-10-30 RX ORDER — ONDANSETRON 2 MG/ML
4 INJECTION INTRAMUSCULAR; INTRAVENOUS EVERY 12 HOURS PRN
Status: DISCONTINUED | OUTPATIENT
Start: 2017-10-30 | End: 2017-11-12 | Stop reason: HOSPADM

## 2017-10-30 RX ORDER — RAMELTEON 8 MG/1
8 TABLET ORAL NIGHTLY PRN
Status: DISCONTINUED | OUTPATIENT
Start: 2017-10-30 | End: 2017-11-12 | Stop reason: HOSPADM

## 2017-10-30 RX ORDER — SODIUM CHLORIDE 0.9 % (FLUSH) 0.9 %
3 SYRINGE (ML) INJECTION EVERY 8 HOURS
Status: DISCONTINUED | OUTPATIENT
Start: 2017-10-30 | End: 2017-11-12 | Stop reason: HOSPADM

## 2017-10-30 RX ORDER — BENZONATATE 100 MG/1
100 CAPSULE ORAL 3 TIMES DAILY PRN
Status: DISCONTINUED | OUTPATIENT
Start: 2017-10-30 | End: 2017-11-12 | Stop reason: HOSPADM

## 2017-10-30 RX ORDER — METOPROLOL TARTRATE 25 MG/1
50 TABLET, FILM COATED ORAL 2 TIMES DAILY
Status: DISCONTINUED | OUTPATIENT
Start: 2017-10-30 | End: 2017-11-06

## 2017-10-30 RX ORDER — BISACODYL 10 MG
10 SUPPOSITORY, RECTAL RECTAL DAILY PRN
Status: DISCONTINUED | OUTPATIENT
Start: 2017-10-30 | End: 2017-11-12 | Stop reason: HOSPADM

## 2017-10-30 RX ORDER — TIZANIDINE 2 MG/1
2 TABLET ORAL ONCE
Status: COMPLETED | OUTPATIENT
Start: 2017-10-30 | End: 2017-10-30

## 2017-10-30 RX ORDER — OXYCODONE HYDROCHLORIDE 5 MG/1
5 TABLET ORAL EVERY 4 HOURS PRN
Status: DISCONTINUED | OUTPATIENT
Start: 2017-10-30 | End: 2017-11-12 | Stop reason: HOSPADM

## 2017-10-30 RX ORDER — ACETAMINOPHEN 325 MG/1
650 TABLET ORAL ONCE
Status: COMPLETED | OUTPATIENT
Start: 2017-10-30 | End: 2017-10-30

## 2017-10-30 RX ORDER — IPRATROPIUM BROMIDE AND ALBUTEROL SULFATE 2.5; .5 MG/3ML; MG/3ML
3 SOLUTION RESPIRATORY (INHALATION) EVERY 8 HOURS
Status: DISCONTINUED | OUTPATIENT
Start: 2017-10-30 | End: 2017-11-08

## 2017-10-30 RX ORDER — FUROSEMIDE 10 MG/ML
40 INJECTION INTRAMUSCULAR; INTRAVENOUS 2 TIMES DAILY
Status: DISCONTINUED | OUTPATIENT
Start: 2017-10-31 | End: 2017-11-02

## 2017-10-30 RX ORDER — WARFARIN 2 MG/1
2 TABLET ORAL DAILY
Status: DISCONTINUED | OUTPATIENT
Start: 2017-10-31 | End: 2017-10-30

## 2017-10-30 RX ORDER — TRAZODONE HYDROCHLORIDE 50 MG/1
50 TABLET ORAL NIGHTLY PRN
Status: DISCONTINUED | OUTPATIENT
Start: 2017-10-30 | End: 2017-11-02

## 2017-10-30 RX ORDER — ONDANSETRON 8 MG/1
8 TABLET, ORALLY DISINTEGRATING ORAL EVERY 8 HOURS PRN
Status: DISCONTINUED | OUTPATIENT
Start: 2017-10-30 | End: 2017-11-12 | Stop reason: HOSPADM

## 2017-10-30 RX ORDER — NAPROXEN SODIUM 220 MG/1
81 TABLET, FILM COATED ORAL DAILY
Status: DISCONTINUED | OUTPATIENT
Start: 2017-10-31 | End: 2017-11-12 | Stop reason: HOSPADM

## 2017-10-30 RX ORDER — TRIAMTERENE AND HYDROCHLOROTHIAZIDE 37.5; 25 MG/1; MG/1
1 CAPSULE ORAL DAILY
Status: DISCONTINUED | OUTPATIENT
Start: 2017-10-31 | End: 2017-11-02

## 2017-10-30 RX ORDER — DOCUSATE SODIUM 100 MG/1
100 CAPSULE, LIQUID FILLED ORAL 2 TIMES DAILY
Status: DISCONTINUED | OUTPATIENT
Start: 2017-10-30 | End: 2017-11-12 | Stop reason: HOSPADM

## 2017-10-30 RX ORDER — WARFARIN 4 MG/1
4 TABLET ORAL DAILY
Status: DISCONTINUED | OUTPATIENT
Start: 2017-10-30 | End: 2017-11-01

## 2017-10-30 RX ORDER — POLYETHYLENE GLYCOL 3350 17 G/17G
17 POWDER, FOR SOLUTION ORAL 2 TIMES DAILY PRN
Status: DISCONTINUED | OUTPATIENT
Start: 2017-10-30 | End: 2017-11-12 | Stop reason: HOSPADM

## 2017-10-30 RX ADMIN — METOPROLOL TARTRATE 50 MG: 25 TABLET ORAL at 10:10

## 2017-10-30 RX ADMIN — DOCUSATE SODIUM 100 MG: 100 CAPSULE, LIQUID FILLED ORAL at 10:10

## 2017-10-30 RX ADMIN — IPRATROPIUM BROMIDE AND ALBUTEROL SULFATE 3 ML: .5; 3 SOLUTION RESPIRATORY (INHALATION) at 06:10

## 2017-10-30 RX ADMIN — SIMVASTATIN 20 MG: 20 TABLET, FILM COATED ORAL at 10:10

## 2017-10-30 RX ADMIN — WARFARIN SODIUM 4 MG: 4 TABLET ORAL at 10:10

## 2017-10-30 RX ADMIN — TIZANIDINE 2 MG: 2 TABLET ORAL at 06:10

## 2017-10-30 RX ADMIN — FUROSEMIDE 60 MG: 10 INJECTION, SOLUTION INTRAVENOUS at 01:10

## 2017-10-30 RX ADMIN — HYDROCODONE BITARTRATE AND ACETAMINOPHEN 1 TABLET: 5; 325 TABLET ORAL at 11:10

## 2017-10-30 RX ADMIN — ACETAMINOPHEN 650 MG: 325 TABLET ORAL at 05:10

## 2017-10-30 RX ADMIN — IPRATROPIUM BROMIDE AND ALBUTEROL SULFATE 3 ML: .5; 3 SOLUTION RESPIRATORY (INHALATION) at 11:10

## 2017-10-30 RX ADMIN — Medication 3 ML: at 10:10

## 2017-10-30 NOTE — ED TRIAGE NOTES
Patient had a TAVR and a pacemaker placed 2 weeks ago.  Patient now has some swelling and SOB and states the lasix is not working.  Patient extremely SOB and cannot lay flat. Patient states the patient's BP has been higher than normal lately. Patient has also had a dry cough x 2 weeks.

## 2017-10-30 NOTE — ASSESSMENT & PLAN NOTE
-on admission chemistry unremarkable, BNP elevated at 918, troponin negative and CBC stable  -CXR with bilateral pleural effusion larger on the right side and atelectatic changes at the lung bases  -EKG with atrial fibrillation with paced ventricular rhythm  -Cardiology evaluated in ED, Interventional TAVR team consulted due to recent procedure  -2D echo pending  -diuresis initiated with lasix 40 mg IVP BID  -resume home BB and digoxin   -continue tele monitoring  -EKG PRN chest pain or arrhythmia

## 2017-10-30 NOTE — HOSPITAL COURSE
Ms. Garay was admitted 10/30 s/p recent TAVR and PPM with acute on chronic diastolic heart failure and edema. She was evaluated by cardiology in ED and assessed by TAVR team in house. On admission her BNP was elevated at 918 and CXR with bilateral pleural effusion larger on the right side and atelectatic changes at the lung bases, this noted to be worsened from prior exam 2 weeks ago. Her diuresis was initiated with lasix 40 mg IVP BID, and titrated up to 60 mg IVP TID with occasional metolazone booster for improved diuresis. Repeat CXR with continued pulmonary edema/pleural fluid and repeat echo 11/7 with markedly increased central venous pressure, RAP of 15, and trivial pericardial effusion, with chemistry continued towards contraction alkalosis thus metolazone was discontinued and lasix IVP decreased to BID, responded appropriately and chemistry somewhat improved, admission symptoms were also resolving >>> then transitioned to PO lasix dosing, poor response, thus transitioned to bumex, to which she responded very well. She thus far has diuresed with net negative ~18 liters as of current and weight down 16 lbs since admission, she remains at 136-138 lbs, which is likely her new dry weight.  She is no longer oxygen dependant, is ambulating independently without ESTRADA, and all SOB has resolved.     On 11/5 she developed a 101.9 fever and R knee pain.  Ortho was consulted, arthrocentesis performed and resulted in bloody fluid for analysis, however the specimen clotted so cell count not obtained. The bloody tap could be because of OAC with warfarin, however she did have a right septic knee a year ago which require surgical washout. Blood cultures and right knee fluid cultures with NGTD, continued ceftriaxone coverage for appropriate course and discontinued vanc. She developed glossitis most likely d/t ABX, continue nystatin and magic mouthwash as needed, now reports symptoms resolved.      Disposition plans: Home with  home health and family care, educated at great length in regards to low sodium diet and fluid restriction. She will also be monitored with digital heart failure program. She follows outpt with home INR and warfarin clinic. Home health to draw labs in one week and report to TAVR team and/or primary cardiologist, she will need repeat echo in approximately 3 months, or sooner if deemed appropriate by TAVR team. Outpt follows already scheduled with primary Cardiologist, TAVR team, and PCP.

## 2017-10-30 NOTE — HPI
Ms. Garay is a 89 year old  woman with past medical history of HTN, HLD, DM, aFib, HFpEF, and recent TAVR complicated by PPM placement for CHB (Portico Valve 29mm ) on 10/17/17 for severe AS who presents to the ED with complaints of leg swelling. She states that she has been having issues since she went home after the surgery. She endorses orthopnea, ESTRADA, new cough, leg swelling, and weight gain. Denies issues like this before. Denies Fever. Denies chest pain.     While in ED, chemistry was unremarkable, BNP elevated at 918, troponin negative and CBC stable. CXR with bilateral pleural effusion larger on the right side and atelectatic changes at the lung bases; EKG with atrial fibrillation with paced ventricular rhythm.    The patient was admitted to the Hospital Medicine Service for further evaluation and management.

## 2017-10-30 NOTE — SUBJECTIVE & OBJECTIVE
Past Medical History:   Diagnosis Date    Anticoagulant long-term use     Anticoagulated on Coumadin     Arthritis     Atrial fibrillation     Atrial fibrillation     Carotid artery occlusion     Chronic rhinosinusitis     Coronary artery disease     Granulomatous lung disease     Heart failure     Hyperlipidemia     Hypertension     Hypertensive heart disease without CHF (congestive heart failure)     Mitral valve prolapse     PN (peripheral neuropathy)     hereditary?(sister has it also)/idiopathic?/patient thinks from Zocor    Severe aortic stenosis by prior echocardiogram     TIA (transient ischemic attack)     Type 2 diabetes mellitus     Type II or unspecified type diabetes mellitus without mention of complication, not stated as uncontrolled        Past Surgical History:   Procedure Laterality Date    SINUS SURGERY      tonsillectomy      TONSILLECTOMY         Review of patient's allergies indicates:   Allergen Reactions    Levaquin [levofloxacin]     Doxycycline hyclate Other (See Comments)     Unknown to patient    Iodine and iodide containing products     Neurontin  [gabapentin]      Other reaction(s): Unknown    Norpace  [disopyramide]      Other reaction(s): Hives    Phenytoin sodium extended      Other reaction(s): Muscle pain    Sulfamethoxazole-trimethoprim      Other reaction(s): Muscle cramps       No current facility-administered medications on file prior to encounter.      Current Outpatient Prescriptions on File Prior to Encounter   Medication Sig    digoxin (LANOXIN) 250 mcg tablet TAKE 1 TABLET ONE TIME DAILY AND TAKE AN ADDITIONAL 1/2 TABLET ONCE EACH WEEK    furosemide (LASIX) 20 MG tablet Take 1 tablet (20 mg total) by mouth 2 (two) times daily as needed (leg swelling). (Patient taking differently: Take 20 mg by mouth once daily. )    metoprolol tartrate (LOPRESSOR) 50 MG tablet TAKE 1 TABLET TWICE DAILY    simvastatin (ZOCOR) 20 MG tablet TAKE 1 TABLET EVERY  EVENING    traZODone (DESYREL) 50 MG tablet Take 1 tablet (50 mg total) by mouth nightly as needed for Insomnia.    triamterene-hydrochlorothiazide 37.5-25 mg (DYAZIDE) 37.5-25 mg per capsule Take 1 capsule by mouth once daily.    warfarin (COUMADIN) 2 MG tablet Take 2 pills by mouth daily or as directed per the [facility]    acetaminophen (TYLENOL) 325 MG tablet Take 325 mg by mouth every 6 (six) hours as needed for Pain.    alpha lipoic acid 600 mg Cap Take 600 mg by mouth Daily. (Patient taking differently: Take 100 mg by mouth Daily. )    aspirin 81 mg Tab Take 81 mg by mouth once daily.     benzonatate (TESSALON) 100 MG capsule Take 1 capsule (100 mg total) by mouth 3 (three) times daily as needed for Cough.    blood sugar diagnostic (BLOOD GLUCOSE TEST) Strp 1 strip by Misc.(Non-Drug; Combo Route) route once daily. Currently using Nova max plus meter.    CINNAMON BARK (CINNAMON ORAL) Take 1,000 mg by mouth 2 (two) times daily.    docusate sodium (COLACE) 100 MG capsule Take 100 mg by mouth. 1 Capsule Oral Every day    mupirocin (BACTROBAN) 2 % ointment Apply topically as needed.    triamcinolone acetonide 0.025% (KENALOG) 0.025 % cream Use bid as needed.     Family History     Problem Relation (Age of Onset)    Atrial fibrillation Sister, Sister, Sister    Heart disease Father, Mother    Lymphoma Sister (29)    Thyroid disease Sister        Social History Main Topics    Smoking status: Never Smoker    Smokeless tobacco: Never Used    Alcohol use 0.0 oz/week      Comment: rare    Drug use: No    Sexual activity: No     Review of Systems   Constitutional: Positive for fatigue and unexpected weight change. Negative for activity change, appetite change, chills, diaphoresis and fever.   HENT: Negative for congestion, rhinorrhea, sinus pain, sinus pressure, sneezing, sore throat and trouble swallowing.    Eyes: Negative.    Respiratory: Positive for cough. Negative for chest tightness,  shortness of breath and wheezing.    Cardiovascular: Positive for leg swelling. Negative for chest pain and palpitations.   Gastrointestinal: Negative for abdominal distention, abdominal pain, constipation, diarrhea, nausea and vomiting.   Endocrine: Negative.    Genitourinary: Negative for decreased urine volume, difficulty urinating, dysuria, enuresis and urgency.   Musculoskeletal: Positive for back pain. Negative for arthralgias and myalgias.   Skin: Negative.    Allergic/Immunologic: Negative.    Neurological: Positive for weakness. Negative for dizziness, syncope, light-headedness and headaches.   Hematological: Negative.    Psychiatric/Behavioral: Negative.      Objective:     Vital Signs (Most Recent):  Temp: 97.8 °F (36.6 °C) (10/30/17 1152)  Pulse: 60 (10/30/17 1602)  Resp: (!) 23 (10/30/17 1505)  BP: 139/62 (10/30/17 1602)  SpO2: 97 % (10/30/17 1602) Vital Signs (24h Range):  Temp:  [97.8 °F (36.6 °C)] 97.8 °F (36.6 °C)  Pulse:  [60] 60  Resp:  [14-24] 23  SpO2:  [94 %-97 %] 97 %  BP: (126-173)/(60-75) 139/62     Weight: 67.6 kg (149 lb)  Body mass index is 24.79 kg/m².    Physical Exam   Constitutional: She is oriented to person, place, and time. She appears well-developed and well-nourished.   HENT:   Head: Normocephalic and atraumatic.   Mouth/Throat: Mucous membranes are normal. Normal dentition.   Eyes: Conjunctivae and lids are normal. Pupils are equal, round, and reactive to light.   Neck: Normal range of motion. Neck supple. JVD present.   Cardiovascular: Normal rate, regular rhythm, normal heart sounds and intact distal pulses.  Exam reveals no gallop and no friction rub.    No murmur heard.  Pulmonary/Chest: Effort normal. She has rales (bilateral bases). She exhibits no tenderness.   Abdominal: Soft. Bowel sounds are normal. She exhibits no distension. There is no tenderness.   Musculoskeletal: Normal range of motion. She exhibits edema (1+ BLE ). She exhibits no deformity.   Neurological: She  is alert and oriented to person, place, and time. No cranial nerve deficit.   Skin: Skin is warm and dry. No rash noted. No erythema.   Psychiatric: She has a normal mood and affect. Judgment and thought content normal.   Nursing note and vitals reviewed.    Significant Labs:   CBC:   Recent Labs  Lab 10/30/17  1225   WBC 10.56   HGB 11.3*   HCT 36.2*        CMP:   Recent Labs  Lab 10/30/17  1225      K 4.0   CL 98   CO2 26   *   BUN 15   CREATININE 0.8   CALCIUM 9.4   PROT 7.5   ALBUMIN 3.5   BILITOT 1.2*   ALKPHOS 85   AST 29   ALT 13   ANIONGAP 12   EGFRNONAA >60.0     Cardiac Markers:   Recent Labs  Lab 10/30/17  1225   *     Coagulation:   Recent Labs  Lab 10/30/17  1634   INR 2.4*     Magnesium:   Recent Labs  Lab 10/30/17  1225   MG 2.3     All pertinent labs within the past 24 hours have been reviewed.    Significant Imaging: I have reviewed all pertinent imaging results/findings within the past 24 hours.

## 2017-10-30 NOTE — ASSESSMENT & PLAN NOTE
-HgA1C pending, last in May 6.0  -monitor with AM labs, on admission chemistry glucose 111  -initiate SSI if appropriate   -cardiac/diabetic diet in house

## 2017-10-30 NOTE — H&P
[facility] Hospital Medicine  History & Physical    Patient Name: Adela Garay  MRN: [id number]  Admission Date: 10/30/2017  Attending Physician: Fabian Walker MD   Primary Care Provider: Torrie Farrell MD    [facility] Medicine Team: [facility] [place] María Elena Euceda NP     Patient information was obtained from patient, relative(s), past medical records and ER records.     Subjective:     Principal Problem:Acute on chronic diastolic heart failure    Chief Complaint:   Chief Complaint   Patient presents with    Edema     2 weeks ago had tavr procedure, and pacemaker, more swelling, lasix not working any more        HPI: Ms. Garay is a 89 year old  woman with past medical history of HTN, HLD, DM, aFib, HFpEF, and recent TAVR complicated by PPM placement for CHB ([place]ico Valve 29mm ) on 10/17/17 for severe AS who presents to the ED with complaints of leg swelling. She states that she has been having issues since she went home after the surgery. She endorses orthopnea, ESTRADA, new cough, leg swelling, and weight gain. Denies issues like this before. Denies Fever. Denies chest pain.     While in ED, chemistry was unremarkable, BNP elevated at 918, troponin negative and CBC stable. CXR with bilateral pleural effusion larger on the right side and atelectatic changes at the lung bases; EKG with atrial fibrillation with paced ventricular rhythm.    The patient was admitted to the Hospital Medicine Service for further evaluation and management.     Past Medical History:   Diagnosis Date    Anticoagulant long-term use     Anticoagulated on Coumadin     Arthritis     Atrial fibrillation     Atrial fibrillation     Carotid artery occlusion     Chronic rhinosinusitis     Coronary artery disease     Granulomatous lung disease     Heart failure     Hyperlipidemia     Hypertension     Hypertensive heart disease without CHF (congestive heart failure)     Mitral valve prolapse     PN  (peripheral neuropathy)     hereditary?(sister has it also)/idiopathic?/patient thinks from Zocor    Severe aortic stenosis by prior echocardiogram     TIA (transient ischemic attack)     Type 2 diabetes mellitus     Type II or unspecified type diabetes mellitus without mention of complication, not stated as uncontrolled        Past Surgical History:   Procedure Laterality Date    SINUS SURGERY      tonsillectomy      TONSILLECTOMY         Review of patient's allergies indicates:   Allergen Reactions    Levaquin [levofloxacin]     Doxycycline hyclate Other (See Comments)     Unknown to patient    Iodine and iodide containing products     Neurontin  [gabapentin]      Other reaction(s): Unknown    Norpace  [disopyramide]      Other reaction(s): Hives    Phenytoin sodium extended      Other reaction(s): Muscle pain    Sulfamethoxazole-trimethoprim      Other reaction(s): Muscle cramps       No current facility-administered medications on file prior to encounter.      Current Outpatient Prescriptions on File Prior to Encounter   Medication Sig    digoxin (LANOXIN) 250 mcg tablet TAKE 1 TABLET ONE TIME DAILY AND TAKE AN ADDITIONAL 1/2 TABLET ONCE EACH WEEK    furosemide (LASIX) 20 MG tablet Take 1 tablet (20 mg total) by mouth 2 (two) times daily as needed (leg swelling). (Patient taking differently: Take 20 mg by mouth once daily. )    metoprolol tartrate (LOPRESSOR) 50 MG tablet TAKE 1 TABLET TWICE DAILY    simvastatin (ZOCOR) 20 MG tablet TAKE 1 TABLET EVERY EVENING    traZODone (DESYREL) 50 MG tablet Take 1 tablet (50 mg total) by mouth nightly as needed for Insomnia.    triamterene-hydrochlorothiazide 37.5-25 mg (DYAZIDE) 37.5-25 mg per capsule Take 1 capsule by mouth once daily.    warfarin (COUMADIN) 2 MG tablet Take 2 pills by mouth daily or as directed per the [facility]    acetaminophen (TYLENOL) 325 MG tablet Take 325 mg by mouth every 6 (six) hours as needed for Pain.    alpha  lipoic acid 600 mg Cap Take 600 mg by mouth Daily. (Patient taking differently: Take 100 mg by mouth Daily. )    aspirin 81 mg Tab Take 81 mg by mouth once daily.     benzonatate (TESSALON) 100 MG capsule Take 1 capsule (100 mg total) by mouth 3 (three) times daily as needed for Cough.    blood sugar diagnostic (BLOOD GLUCOSE TEST) Strp 1 strip by Misc.(Non-Drug; Combo Route) route once daily. Currently using Nova max plus meter.    CINNAMON BARK (CINNAMON ORAL) Take 1,000 mg by mouth 2 (two) times daily.    docusate sodium (COLACE) 100 MG capsule Take 100 mg by mouth. 1 Capsule Oral Every day    mupirocin (BACTROBAN) 2 % ointment Apply topically as needed.    triamcinolone acetonide 0.025% (KENALOG) 0.025 % cream Use bid as needed.     Family History     Problem Relation (Age of Onset)    Atrial fibrillation Sister, Sister, Sister    Heart disease Father, Mother    Lymphoma Sister (29)    Thyroid disease Sister        Social History Main Topics    Smoking status: Never Smoker    Smokeless tobacco: Never Used    Alcohol use 0.0 oz/week      Comment: rare    Drug use: No    Sexual activity: No     Review of Systems   Constitutional: Positive for fatigue and unexpected weight change. Negative for activity change, appetite change, chills, diaphoresis and fever.   HENT: Negative for congestion, rhinorrhea, sinus pain, sinus pressure, sneezing, sore throat and trouble swallowing.    Eyes: Negative.    Respiratory: Positive for cough. Negative for chest tightness, shortness of breath and wheezing.    Cardiovascular: Positive for leg swelling. Negative for chest pain and palpitations.   Gastrointestinal: Negative for abdominal distention, abdominal pain, constipation, diarrhea, nausea and vomiting.   Endocrine: Negative.    Genitourinary: Negative for decreased urine volume, difficulty urinating, dysuria, enuresis and urgency.   Musculoskeletal: Positive for back pain. Negative for arthralgias and myalgias.    Skin: Negative.    Allergic/Immunologic: Negative.    Neurological: Positive for weakness. Negative for dizziness, syncope, light-headedness and headaches.   Hematological: Negative.    Psychiatric/Behavioral: Negative.      Objective:     Vital Signs (Most Recent):  Temp: 97.8 °F (36.6 °C) (10/30/17 1152)  Pulse: 60 (10/30/17 1602)  Resp: (!) 23 (10/30/17 1505)  BP: 139/62 (10/30/17 1602)  SpO2: 97 % (10/30/17 1602) Vital Signs (24h Range):  Temp:  [97.8 °F (36.6 °C)] 97.8 °F (36.6 °C)  Pulse:  [60] 60  Resp:  [14-24] 23  SpO2:  [94 %-97 %] 97 %  BP: (126-173)/(60-75) 139/62     Weight: 67.6 kg (149 lb)  Body mass index is 24.79 kg/m².    Physical Exam   Constitutional: She is oriented to person, place, and time. She appears well-developed and well-nourished.   HENT:   Head: Normocephalic and atraumatic.   Mouth/Throat: Mucous membranes are normal. Normal dentition.   Eyes: Conjunctivae and lids are normal. Pupils are equal, round, and reactive to light.   Neck: Normal range of motion. Neck supple. JVD present.   Cardiovascular: Normal rate, regular rhythm, normal heart sounds and intact distal pulses.  Exam reveals no gallop and no friction rub.    No murmur heard.  Pulmonary/Chest: Effort normal. She has rales (bilateral bases). She exhibits no tenderness.   Abdominal: Soft. Bowel sounds are normal. She exhibits no distension. There is no tenderness.   Musculoskeletal: Normal range of motion. She exhibits edema (1+ BLE ). She exhibits no deformity.   Neurological: She is alert and oriented to person, place, and time. No cranial nerve deficit.   Skin: Skin is warm and dry. No rash noted. No erythema.   Psychiatric: She has a normal mood and affect. Judgment and thought content normal.   Nursing note and vitals reviewed.    Significant Labs:   CBC:   Recent Labs  Lab 10/30/17  1225   WBC 10.56   HGB 11.3*   HCT 36.2*        CMP:   Recent Labs  Lab 10/30/17  1225      K 4.0   CL 98   CO2 26   *    BUN 15   CREATININE 0.8   CALCIUM 9.4   PROT 7.5   ALBUMIN 3.5   BILITOT 1.2*   ALKPHOS 85   AST 29   ALT 13   ANIONGAP 12   EGFRNONAA >60.0     Cardiac Markers:   Recent Labs  Lab 10/30/17  1225   *     Coagulation:   Recent Labs  Lab 10/30/17  1634   INR 2.4*     Magnesium:   Recent Labs  Lab 10/30/17  1225   MG 2.3     All pertinent labs within the past 24 hours have been reviewed.    Significant Imaging: I have reviewed all pertinent imaging results/findings within the past 24 hours.    Assessment/Plan:     * Acute on chronic diastolic heart failure    -on admission chemistry unremarkable, BNP elevated at 918, troponin negative and CBC stable  -CXR with bilateral pleural effusion larger on the right side and atelectatic changes at the lung bases  -EKG with atrial fibrillation with paced ventricular rhythm  -Cardiology evaluated in ED, Interventional TAVR team consulted due to recent procedure  -2D echo pending  -diuresis initiated with lasix 40 mg IVP BID  -resume home BB and digoxin   -continue tele monitoring  -EKG PRN chest pain or arrhythmia        Complete heart block, post-surgical    -st christian device placed post TAVR  -pacer programming pending        S/P TAVR (transcatheter aortic valve replacement)    -see above  -pending evaluation by TAVR team        Severe aortic stenosis by prior echocardiogram    -see above and HPI        Long-term (current) use of anticoagulants, INR goal 2.0-3.0    -INR currently 2.4, resume home warfarin  -daily PT/INR        Non-productive cough    -oxygen supplementation via NC  -duo nebs q8H  -repeat CXR post diuresis         Coronary artery disease involving native coronary artery of native heart without angina pectoris    -chest pain free, EKG without acute ischemic changes  -continue ASA, BB, and statin         Benign hypertensive heart disease with congestive heart failure    -continue home BB, digoxin, and dyazide  -BP elevated in ED, now improving, continue to  monitor         Hyperlipidemia    -continue statin         Controlled type 2 diabetes mellitus with microalbuminuria    -HgA1C pending, last in May 6.0  -monitor with AM labs, on admission chemistry glucose 111  -initiate SSI if appropriate   -cardiac/diabetic diet in house        DDD (degenerative disc disease), cervical    -PRN tylenol and oxycodone        Mild major depression    -continue home trazodone  -no SI/HI        Hx-TIA (transient ischemic attack)    -continue ASA          VTE Risk Mitigation     None        María Elena Euceda NP  Department of Hospital Medicine   [facility]  Pager [phone]  [facility] [phone]

## 2017-10-31 ENCOUNTER — OUTPATIENT CASE MANAGEMENT (OUTPATIENT)
Dept: ADMINISTRATIVE | Facility: OTHER | Age: 82
End: 2017-10-31

## 2017-10-31 ENCOUNTER — DOCUMENTATION ONLY (OUTPATIENT)
Dept: ELECTROPHYSIOLOGY | Facility: CLINIC | Age: 82
End: 2017-10-31

## 2017-10-31 PROBLEM — I97.89 COMPLETE HEART BLOCK, POST-SURGICAL: Status: ACTIVE | Noted: 2017-10-31

## 2017-10-31 PROBLEM — I50.33 ACUTE ON CHRONIC DIASTOLIC HEART FAILURE: Status: ACTIVE | Noted: 2017-10-31

## 2017-10-31 PROBLEM — I44.2 COMPLETE HEART BLOCK, POST-SURGICAL: Status: ACTIVE | Noted: 2017-10-31

## 2017-10-31 LAB
ALBUMIN SERPL BCP-MCNC: 3.2 G/DL
ALP SERPL-CCNC: 69 U/L
ALT SERPL W/O P-5'-P-CCNC: 12 U/L
ANION GAP SERPL CALC-SCNC: 8 MMOL/L
AORTIC VALVE REGURGITATION: ABNORMAL
AST SERPL-CCNC: 22 U/L
BASOPHILS # BLD AUTO: 0.04 K/UL
BASOPHILS NFR BLD: 0.5 %
BILIRUB SERPL-MCNC: 0.9 MG/DL
BUN SERPL-MCNC: 17 MG/DL
CALCIUM SERPL-MCNC: 8.9 MG/DL
CHLORIDE SERPL-SCNC: 99 MMOL/L
CO2 SERPL-SCNC: 31 MMOL/L
CREAT SERPL-MCNC: 0.8 MG/DL
DIFFERENTIAL METHOD: ABNORMAL
DIGOXIN SERPL-MCNC: 0.9 NG/ML
EOSINOPHIL # BLD AUTO: 0.2 K/UL
EOSINOPHIL NFR BLD: 2.2 %
ERYTHROCYTE [DISTWIDTH] IN BLOOD BY AUTOMATED COUNT: 15.3 %
EST. GFR  (AFRICAN AMERICAN): >60 ML/MIN/1.73 M^2
EST. GFR  (NON AFRICAN AMERICAN): >60 ML/MIN/1.73 M^2
ESTIMATED PA SYSTOLIC PRESSURE: 40
GLUCOSE SERPL-MCNC: 88 MG/DL
HCT VFR BLD AUTO: 32.7 %
HGB BLD-MCNC: 10.1 G/DL
IMM GRANULOCYTES # BLD AUTO: 0.02 K/UL
IMM GRANULOCYTES NFR BLD AUTO: 0.2 %
INR PPP: 2.5
LYMPHOCYTES # BLD AUTO: 1.8 K/UL
LYMPHOCYTES NFR BLD: 21 %
MAGNESIUM SERPL-MCNC: 2.1 MG/DL
MCH RBC QN AUTO: 29.4 PG
MCHC RBC AUTO-ENTMCNC: 30.9 G/DL
MCV RBC AUTO: 95 FL
MITRAL VALVE MOBILITY: ABNORMAL
MITRAL VALVE REGURGITATION: ABNORMAL
MONOCYTES # BLD AUTO: 1.2 K/UL
MONOCYTES NFR BLD: 13.8 %
NEUTROPHILS # BLD AUTO: 5.3 K/UL
NEUTROPHILS NFR BLD: 62.3 %
NRBC BLD-RTO: 0 /100 WBC
PLATELET # BLD AUTO: 167 K/UL
PMV BLD AUTO: 9.8 FL
POTASSIUM SERPL-SCNC: 3.7 MMOL/L
PROT SERPL-MCNC: 6.4 G/DL
PROTHROMBIN TIME: 24.7 SEC
RBC # BLD AUTO: 3.44 M/UL
RETIRED EF AND QEF - SEE NOTES: 50 (ref 55–65)
SODIUM SERPL-SCNC: 138 MMOL/L
TRICUSPID VALVE REGURGITATION: ABNORMAL
WBC # BLD AUTO: 8.52 K/UL

## 2017-10-31 PROCEDURE — 25000242 PHARM REV CODE 250 ALT 637 W/ HCPCS: Performed by: NURSE PRACTITIONER

## 2017-10-31 PROCEDURE — 83735 ASSAY OF MAGNESIUM: CPT

## 2017-10-31 PROCEDURE — 25000003 PHARM REV CODE 250: Performed by: NURSE PRACTITIONER

## 2017-10-31 PROCEDURE — 94640 AIRWAY INHALATION TREATMENT: CPT

## 2017-10-31 PROCEDURE — 80162 ASSAY OF DIGOXIN TOTAL: CPT

## 2017-10-31 PROCEDURE — G8987 SELF CARE CURRENT STATUS: HCPCS | Mod: CJ

## 2017-10-31 PROCEDURE — 85610 PROTHROMBIN TIME: CPT

## 2017-10-31 PROCEDURE — 36415 COLL VENOUS BLD VENIPUNCTURE: CPT

## 2017-10-31 PROCEDURE — 80053 COMPREHEN METABOLIC PANEL: CPT

## 2017-10-31 PROCEDURE — 25000003 PHARM REV CODE 250: Performed by: HOSPITALIST

## 2017-10-31 PROCEDURE — 63600175 PHARM REV CODE 636 W HCPCS: Performed by: NURSE PRACTITIONER

## 2017-10-31 PROCEDURE — 93306 TTE W/DOPPLER COMPLETE: CPT | Mod: 26,,, | Performed by: INTERNAL MEDICINE

## 2017-10-31 PROCEDURE — G8988 SELF CARE GOAL STATUS: HCPCS | Mod: CI

## 2017-10-31 PROCEDURE — 27000221 HC OXYGEN, UP TO 24 HOURS

## 2017-10-31 PROCEDURE — 20600001 HC STEP DOWN PRIVATE ROOM

## 2017-10-31 PROCEDURE — 97116 GAIT TRAINING THERAPY: CPT

## 2017-10-31 PROCEDURE — 85025 COMPLETE CBC W/AUTO DIFF WBC: CPT

## 2017-10-31 PROCEDURE — 97165 OT EVAL LOW COMPLEX 30 MIN: CPT

## 2017-10-31 PROCEDURE — 93306 TTE W/DOPPLER COMPLETE: CPT

## 2017-10-31 PROCEDURE — 99233 SBSQ HOSP IP/OBS HIGH 50: CPT | Mod: ,,, | Performed by: NURSE PRACTITIONER

## 2017-10-31 PROCEDURE — 97161 PT EVAL LOW COMPLEX 20 MIN: CPT

## 2017-10-31 PROCEDURE — 99223 1ST HOSP IP/OBS HIGH 75: CPT | Mod: ,,, | Performed by: INTERNAL MEDICINE

## 2017-10-31 PROCEDURE — 97535 SELF CARE MNGMENT TRAINING: CPT

## 2017-10-31 PROCEDURE — A4216 STERILE WATER/SALINE, 10 ML: HCPCS | Performed by: NURSE PRACTITIONER

## 2017-10-31 RX ORDER — TIZANIDINE 2 MG/1
2 TABLET ORAL EVERY 8 HOURS PRN
Status: DISCONTINUED | OUTPATIENT
Start: 2017-10-31 | End: 2017-11-03

## 2017-10-31 RX ORDER — POTASSIUM CHLORIDE 750 MG/1
20 CAPSULE, EXTENDED RELEASE ORAL ONCE
Status: COMPLETED | OUTPATIENT
Start: 2017-10-31 | End: 2017-10-31

## 2017-10-31 RX ADMIN — IPRATROPIUM BROMIDE AND ALBUTEROL SULFATE 3 ML: .5; 3 SOLUTION RESPIRATORY (INHALATION) at 07:10

## 2017-10-31 RX ADMIN — ASPIRIN 81 MG CHEWABLE TABLET 81 MG: 81 TABLET CHEWABLE at 08:10

## 2017-10-31 RX ADMIN — BENZONATATE 100 MG: 100 CAPSULE ORAL at 11:10

## 2017-10-31 RX ADMIN — DIGOXIN 0.25 MG: 250 TABLET ORAL at 08:10

## 2017-10-31 RX ADMIN — FUROSEMIDE 40 MG: 10 INJECTION, SOLUTION INTRAVENOUS at 05:10

## 2017-10-31 RX ADMIN — Medication 3 ML: at 10:10

## 2017-10-31 RX ADMIN — TIZANIDINE 2 MG: 2 TABLET ORAL at 06:10

## 2017-10-31 RX ADMIN — DOCUSATE SODIUM 100 MG: 100 CAPSULE, LIQUID FILLED ORAL at 08:10

## 2017-10-31 RX ADMIN — TRIAMTERENE AND HYDROCHLOROTHIAZIDE 1 CAPSULE: 25; 37.5 CAPSULE ORAL at 08:10

## 2017-10-31 RX ADMIN — IPRATROPIUM BROMIDE AND ALBUTEROL SULFATE 3 ML: .5; 3 SOLUTION RESPIRATORY (INHALATION) at 03:10

## 2017-10-31 RX ADMIN — METOPROLOL TARTRATE 50 MG: 25 TABLET ORAL at 08:10

## 2017-10-31 RX ADMIN — WARFARIN SODIUM 4 MG: 4 TABLET ORAL at 05:10

## 2017-10-31 RX ADMIN — SIMVASTATIN 20 MG: 20 TABLET, FILM COATED ORAL at 08:10

## 2017-10-31 RX ADMIN — FUROSEMIDE 40 MG: 10 INJECTION, SOLUTION INTRAVENOUS at 08:10

## 2017-10-31 RX ADMIN — TIZANIDINE 2 MG: 2 TABLET ORAL at 11:10

## 2017-10-31 RX ADMIN — POTASSIUM CHLORIDE 20 MEQ: 750 CAPSULE, EXTENDED RELEASE ORAL at 08:10

## 2017-10-31 RX ADMIN — RAMELTEON 8 MG: 8 TABLET, FILM COATED ORAL at 10:10

## 2017-10-31 NOTE — NURSING TRANSFER
Nursing Transfer Note      10/30/2017     Transfer From: ED    Transfer via stecher    Transfer with 2L NC to O2, cardiac monitoring    Transported by escort    Medicines sent: none    Chart send with patient: No    Notified: daughters    Patient reassessed at: 10/30/17 @ 2110 (date, time)    Upon arrival to floor: cardiac monitor applied, patient oriented to room, call bell in reach and bed in lowest position. VSS. No acute distress noted. Daughters at bedside. Dr. Hilario notified of pt arrival. Will continue to monitor.

## 2017-10-31 NOTE — PLAN OF CARE
Problem: Patient Care Overview  Goal: Plan of Care Review  Outcome: Ongoing (interventions implemented as appropriate)  Plan of care discussed with patient. Patient is free of fall/trauma/injury. Denies CP, SOB, or pain/discomfort. Lasix BID continued. Diuresing well. All questions addressed. Will continue to monitor.

## 2017-10-31 NOTE — PROGRESS NOTES
[facility] Hospital Medicine  Progress Note    Patient Name: Adela Garay  MRN: [id number]  Patient Class: IP- Inpatient   Admission Date: 10/30/2017  Length of Stay: 1 days  Attending Physician: Justice Alexander MD  Primary Care Provider: Torrie Farrell MD    [facility] Medicine Team: [facility] MED J Liyah Dover NP    Subjective:     Principal Problem:Acute on chronic diastolic heart failure    HPI:  Ms. Garay is a 89 year old  woman with past medical history of HTN, HLD, DM, aFib, HFpEF, and recent TAVR complicated by PPM placement for CHB (Portico Valve 29mm ) on 10/17/17 for severe AS who presents to the ED with complaints of leg swelling. She states that she has been having issues since she went home after the surgery. She endorses orthopnea, ESTRADA, new cough, leg swelling, and weight gain. Denies issues like this before. Denies Fever. Denies chest pain.     While in ED, chemistry was unremarkable, BNP elevated at 918, troponin negative and CBC stable. CXR with bilateral pleural effusion larger on the right side and atelectatic changes at the lung bases; EKG with atrial fibrillation with paced ventricular rhythm.    The patient was admitted to the Hospital Medicine Service for further evaluation and management.     Hospital Course:  Ms. Garay was admitted 10/30 s/p recent TAVR and PPM with acute on chronic diastolic heart failure and edema. She was evaluated by cardiology in ED and assessed by TAVR team in house by Dr. Jarvis. Diuresis initiated with lasix 40 mg IVP BID, dry weight closer to 143-5 lbs per daughter, admitted at 151 lbs.  Diuresing well, plan to wean off oxygen and transition to torsemide closer to her dry weight and off oxygen.      Interval History: Patient slept very well after zanaflex last night.  She's feeling much better with volume removal.  Weaning off the oxygen slowly.    Review of Systems   Constitutional: Positive for fatigue and unexpected weight  change. Negative for activity change, appetite change, chills, diaphoresis and fever.   HENT: Negative for congestion, rhinorrhea, sinus pain, sinus pressure, sneezing, sore throat and trouble swallowing.    Eyes: Negative.    Respiratory: Positive for cough. Negative for chest tightness, shortness of breath and wheezing.    Cardiovascular: Positive for leg swelling. Negative for chest pain and palpitations.   Gastrointestinal: Negative for abdominal distention, abdominal pain, constipation, diarrhea, nausea and vomiting.   Endocrine: Negative.    Genitourinary: Negative for decreased urine volume, difficulty urinating, dysuria, enuresis and urgency.   Musculoskeletal: Positive for back pain. Negative for arthralgias and myalgias.        Back spasms   Skin: Negative.    Allergic/Immunologic: Negative.    Neurological: Positive for weakness. Negative for dizziness, syncope, light-headedness and headaches.   Hematological: Negative.    Psychiatric/Behavioral: Negative.      Objective:     Vital Signs (Most Recent):  Temp: 98.1 °F (36.7 °C) (10/31/17 0851)  Pulse: 60 (10/31/17 1100)  Resp: 18 (10/31/17 0851)  BP: (!) 144/65 (10/31/17 0851)  SpO2: (!) 92 % (10/31/17 0851) Vital Signs (24h Range):  Temp:  [97.8 °F (36.6 °C)-98.1 °F (36.7 °C)] 98.1 °F (36.7 °C)  Pulse:  [58-78] 60  Resp:  [12-28] 18  SpO2:  [92 %-99 %] 92 %  BP: (119-157)/(55-72) 144/65     Weight: 67.8 kg (149 lb 9.3 oz)  Body mass index is 24.89 kg/m².    Intake/Output Summary (Last 24 hours) at 10/31/17 1526  Last data filed at 10/31/17 0400   Gross per 24 hour   Intake                0 ml   Output              150 ml   Net             -150 ml      Physical Exam   Constitutional: She is oriented to person, place, and time. She appears well-developed and well-nourished.   HENT:   Head: Normocephalic and atraumatic.   Right Ear: External ear normal.   Left Ear: External ear normal.   Nose: Nose normal.   Mouth/Throat: Mucous membranes are normal. Normal  dentition.   Eyes: Conjunctivae, EOM and lids are normal.   Neck: Normal range of motion. Neck supple. Hepatojugular reflux and JVD present.   Cardiovascular: Normal rate, regular rhythm, normal heart sounds and intact distal pulses.  Exam reveals no gallop and no friction rub.    No murmur heard.  Pulmonary/Chest: Effort normal. She has rales (half way up bilaterally). She exhibits no tenderness.   Abdominal: Soft. Bowel sounds are normal. She exhibits no distension. There is no tenderness.   Musculoskeletal: Normal range of motion. She exhibits edema (1+ BLE ). She exhibits no deformity.   Neurological: She is alert and oriented to person, place, and time. No cranial nerve deficit.   Skin: Skin is warm and dry. No rash noted. No erythema.   Psychiatric: She has a normal mood and affect. Judgment and thought content normal.   Nursing note and vitals reviewed.      Significant Labs:   CBC:   Recent Labs  Lab 10/30/17  1225 10/31/17  0421   WBC 10.56 8.52   HGB 11.3* 10.1*   HCT 36.2* 32.7*    167     CMP:   Recent Labs  Lab 10/30/17  1225 10/31/17  0421    138   K 4.0 3.7   CL 98 99   CO2 26 31*   * 88   BUN 15 17   CREATININE 0.8 0.8   CALCIUM 9.4 8.9   PROT 7.5 6.4   ALBUMIN 3.5 3.2*   BILITOT 1.2* 0.9   ALKPHOS 85 69   AST 29 22   ALT 13 12   ANIONGAP 12 8   EGFRNONAA >60.0 >60.0     Cardiac Markers:   Recent Labs  Lab 10/30/17  1225   *     Magnesium:   Recent Labs  Lab 10/30/17  1225 10/31/17  0421   MG 2.3 2.1     Troponin:   Recent Labs  Lab 10/30/17  1225   TROPONINI 0.020     Significant Imaging: I have reviewed and interpreted all pertinent imaging results/findings within the past 24 hours.    Assessment/Plan:      * Acute on chronic diastolic heart failure    - BNP elevated at 918, troponin negative and CBC/ CMP stable  - CXR with bilateral pleural effusion larger on the right side and atelectatic changes at the lung bases  - EKG with atrial fibrillation with paced ventricular  rhythm  - Cardiology evaluated in ED, Interventional TAVR team consulted due to recent procedure  - 2D echo EF 50% mod MR, severe TR, PAP 40, RAP 15  - diuresis initiated with lasix 40 mg IVP BID, she's mobilizing her fluid faster than her heart and renal function can keep up    - resume home BB and digoxin   - continue tele monitoring  - EKG PRN chest pain or arrhythmia  - hold triamterene HCTZ for now, while using IV        S/P TAVR (transcatheter aortic valve replacement)    - see above  -TAVR team see and this is to be expected, she's mobilizing her fluid faster than her heart and renal function can keep up        Complete heart block, post-surgical    - st christian device placed post TAVR  - pacer programming pending        Atrial fibrillation    - rate control, warfarin           Long-term (current) use of anticoagulants, INR goal 2.0-3.0    -INR currently 2., home warfarin  -daily PT/INR  - f/u with Dr. Ivy        Hx-TIA (transient ischemic attack)    -continue ASA        Hyperlipidemia    -continue statin         Controlled type 2 diabetes mellitus with microalbuminuria    -HgA1C 5.6 last in May 6.0  - diet controlled  -cardiac/diabetic diet in house        Non-productive cough    -oxygen supplementation via NC  -duo nebs q8H  -repeat CXR post diuresis         Coronary artery disease involving native coronary artery of native heart without angina pectoris    -chest pain free, EKG without acute ischemic changes  -continue ASA, BB, and statin         Mild major depression    -continue home trazodone  -no SI/HI        DDD (degenerative disc disease), cervical    -PRN tylenol and oxycodone        Benign hypertensive heart disease with congestive heart failure    - continue home BB, digoxin, and hold triamterene /hctz for now  - BP elevated in ED, now improving, continue to monitor         Severe aortic stenosis by prior echocardiogram    -see above and HPI          VTE Risk Mitigation         Ordered     warfarin  (COUMADIN) tablet 4 mg  Daily     Route:  Oral        10/30/17 2157     Medium Risk of VTE  Once      10/30/17 2102     Place JONO hose  Until discontinued      10/30/17 2102              Liyah Dover NP  Department of Hospital Medicine   [facility]  Spectra:  [phone]  Pager: [phone]

## 2017-10-31 NOTE — PLAN OF CARE
10/31/17 1031   Discharge Assessment   Assessment Type Discharge Planning Assessment   Confirmed/corrected address and phone number on facesheet? Yes   Assessment information obtained from? Patient;Medical Record   Expected Length of Stay (days) 3   Communicated expected length of stay with patient/caregiver yes   Prior to hospitilization cognitive status: Alert/Oriented   Prior to hospitalization functional status: Assistive Equipment   Current cognitive status: Alert/Oriented   Current Functional Status: Assistive Equipment   Lives With alone   Able to Return to Prior Arrangements yes   Is patient able to care for self after discharge? Yes   Patient's perception of discharge disposition home or selfcare   Readmission Within The Last 30 Days previous discharge plan unsuccessful   Patient currently being followed by outpatient case management? No   Patient currently receives any other outside agency services? No   Equipment Currently Used at Home bedside commode;cane, straight;rollator;shower chair   Do you have any problems affording any of your prescribed medications? No   Is the patient taking medications as prescribed? yes   Does the patient have transportation home? Yes   Transportation Available family or friend will provide  (daughter)   Does the patient receive services at the [facility]? Yes   Discharge Plan A Home   Patient/Family In Agreement With Plan yes   Readmission Questionnaire   At the time of your discharge, did someone talk to you about what your health problems were? Yes   At the time of discharge, did someone talk to you about what to watch out for regarding worsening of your health problem? Yes   At the time of discharge, did someone talk to you about what to do if you experienced worsening of your health problem? Yes   At the time of discharge, did someone talk to you about which medication to take when you left the hospital and which ones to stop taking? Yes   At the time of  discharge, did someone talk to you about when and where to follow up with a doctor after you left the hospital? Yes   How often do you need to have someone help you when you read instructions, pamphlets, or other written material from your doctor or pharmacy? Rarely   Do you have problems taking your medications as prescribed? No   Do you have any problems affording any of  your prescribed medications? No   Do you have problems obtaining/receiving your medications? No   Does the patient have transportation to healthcare appointments? Yes   Living Arrangements house   Does the patient have family/friends to help with healtcare needs after discharge? yes   Does your caregiver provide all the help you need? Yes   Are you currently feeling confused? No   Are you currently having problems thinking? No   Are you currently having memory problems? No   Have you felt down, depressed, or hopeless? 0   Have you felt little interest or pleasure in doing things? 0   In the last 7 days, my sleep quality was: poor   Readmitted with CHF. Pt is post TAVR. Lives alone and is independent in her ADLs. Plan is to DC home. No DC needs identified.

## 2017-10-31 NOTE — PT/OT/SLP EVAL
Physical Therapy  Evaluation    Adela Garay   MRN: [id number]   Admitting Diagnosis: Acute on chronic diastolic heart failure    PT Received On: 10/31/17  PT Start Time: 0930     PT Stop Time: 1004    PT Total Time (min): 34 min       Billable Minutes:  Evaluation 10 and Gait Training 20   Co-eval with OT    Diagnosis: Acute on chronic diastolic heart failure      Past Medical History:   Diagnosis Date    Anticoagulant long-term use     Anticoagulated on Coumadin     Arthritis     Atrial fibrillation     Atrial fibrillation     Carotid artery occlusion     Chronic rhinosinusitis     Coronary artery disease     Granulomatous lung disease     Heart failure     Hyperlipidemia     Hypertension     Hypertensive heart disease without CHF (congestive heart failure)     Mitral valve prolapse     PN (peripheral neuropathy)     hereditary?(sister has it also)/idiopathic?/patient thinks from [place]    Severe aortic stenosis by prior echocardiogram     TIA (transient ischemic attack)     Type 2 diabetes mellitus     Type II or unspecified type diabetes mellitus without mention of complication, not stated as uncontrolled       Past Surgical History:   Procedure Laterality Date    SINUS SURGERY      tonsillectomy      TONSILLECTOMY         Referring physician: María Elena Euceda  Date referred to PT: 10/30/2017    General Precautions: Standard, fall  Orthopedic Precautions:  (L UE pacemaker precautions)   Braces: N/A       Do you have any cultural, spiritual, Holiness conflicts, given your current situation?: none reported     Patient History:  Pt's daughter and son live close and are available to check on pt if needed.   Lives With: alone  Living Arrangements: house  Living Environment Comment: pt lives along in [facility]. PTA, pt mod indep with ambulation (using single point cane) and indep with ADL's. Pt still drives.  DME: SPC and rollator (pt does not use rollator as needed)  Equipment Currently Used  at Home: cane, straight, rollator  DME owned (not currently used):    Previous Level of Function:  Ambulation Skills: needs device  Transfer Skills: needs device    Subjective:  Communicated with RN prior to session and daughter present in room. Pt agreeable to session. Pt reported having back spasms this morning. RN already notified and ordered appropriate medication.    Chief Complaint: back spasms in AM  Patient goals: to return home     Pain/Comfort  Pain Rating 1: 0/10  Pain Rating Post-Intervention 1: 0/10      Objective:   Patient found with: telemetry, oxygen (internal pacemaker)     Cognitive Exam:  Oriented to: Person, Place, Time and Situation    Follows Commands/attention: Follows multistep  commands  Communication: clear/fluent  Safety awareness/insight to disability: intact    Physical Exam:  Sensation:   Intact; B LE light touch; no numbness reported     Slight B LE swelling     Lower Extremity Range of Motion:  Right Lower Extremity: WFL  Left Lower Extremity: WFL    Lower Extremity Strength:  Right Lower Extremity: 4/5  Left Lower Extremity: 4/5     Functional Mobility:  Bed Mobility:  Supine to Sit: Supervision    Transfers:  Sit <> Stand Assistance: Minimum Assistance x 2 trials  Trial 1: from EOB  Trial 2: from bathroom toilet  Pt required assist with clearing gluteal region off surface. VC's for upright posture  Sit <> Stand Assistive Device: Straight Cane    Gait:   Gait Distance: ~250 ft with SPC with CGA; no LOB; O2 off (RN approved) during ambulation and SpO2 reamined at 91%; SpO2 returned to 99% with 2L O2 upon sitting in chair   Assistance 1: Contact Guard Assistance  Gait Assistive Device: Single point cane  Gait Pattern: 2-point gait  Gait Deviation(s): decreased roberta, decreased step length, decreased stride length, decreased toe-to-floor clearance, forward lean    VC's for upright posture.     Balance:   Static Sit: GOOD: Takes MODERATE challenges from all directions  Dynamic Sit:  GOOD: Maintains balance through MODERATE excursions of active trunk movement  Static Stand: FAIR+: Takes MINIMAL challenges from all directions with SPC  Dynamic stand: FAIR: Needs CONTACT GUARD during gait with SPC    Therapeutic Activities and Exercises:  PT educated pt on incr OOB activity including sitting in bedside chair majority of day and amb with nsg and PCT.   Pt educated on PT role/POC  Pt verbalized knowledge of pacemaker precautions for L UE.    Ambulation in hallway to increase endurance/strength and prevent deconditioning. (see assist levels above)         AM-PAC 6 CLICK MOBILITY  How much help from another person does this patient currently need?   1 = Unable, Total/Dependent Assistance  2 = A lot, Maximum/Moderate Assistance  3 = A little, Minimum/Contact Guard/Supervision  4 = None, Modified [place]/Independent    Turning over in bed (including adjusting bedclothes, sheets and blankets)?: 4  Sitting down on and standing up from a chair with arms (e.g., wheelchair, bedside commode, etc.): 3  Moving from lying on back to sitting on the side of the bed?: 4  Moving to and from a bed to a chair (including a wheelchair)?: 3  Need to walk in hospital room?: 3  Climbing 3-5 steps with a railing?: 3  Total Score: 20     AM-PAC Raw Score CMS G-Code Modifier Level of Impairment Assistance   6 % Total / Unable   7 - 9 CM 80 - 100% Maximal Assist   10 - 14 CL 60 - 80% Moderate Assist   15 - 19 CK 40 - 60% Moderate Assist   20 - 22 CJ 20 - 40% Minimal Assist   23 CI 1-20% SBA / CGA   24 CH 0% Independent/ Mod I     Patient left up in chair with all lines intact, call button in reach, RN notified and daughter present.    Assessment:   Adela Garay is a 89 y.o. female with a medical diagnosis of Acute on chronic diastolic heart failure and presents with generalized weakness and decreased endurance, balance, transfers, and gait distance. Pt verbalized knowledge of L UE precautions 2nd to  pacemakers insertion. Pt with good tolerance to session with no increase in pain/discomfort. Pt ambulated 250ft with SPC with CGA with SpO2 at 91% with no O2 (RN approved). Pt c/o slight SOB upon sitting in chair after ambulation trial. SpO2 at 99% with 2L O2. Pt would benefit from skilled PT to address impairments and return to PLOF. Anticipate home with no needs upon d/c 2nd to pt's functional level and family available to assist at home.     Rehab identified problem list/impairments: Rehab identified problem list/impairments: impaired endurance, impaired functional mobilty, impaired balance, gait instability, edema, impaired cardiopulmonary response to activity    Rehab potential is good.    Activity tolerance: Good    Discharge recommendations: Discharge Facility/Level Of Care Needs: home     Barriers to discharge: Barriers to Discharge: None    Equipment recommendations: Equipment Needed After Discharge: none (pt owns required equiptment )     GOALS:    Physical Therapy Goals        Problem: Physical Therapy Goal    Goal Priority Disciplines Outcome Goal Variances Interventions   Physical Therapy Goal     PT/OT, PT Ongoing (interventions implemented as appropriate)     Description:  Goals to be met by: 2017    Patient will increase functional independence with mobility by performin. Sit to stand transfer with Modified [place] using single point cane - not met  2. Gait  x 400 feet with Modified [place] using Single-point Cane . - not met  3. Lower extremity exercise program x15 reps per handout, with independence - not met                      PLAN:    Patient to be seen 3 x/week to address the above listed problems via gait training, therapeutic activities, therapeutic exercises, neuromuscular re-education  Plan of Care expires: 17  Plan of Care reviewed with: patient, daughter          Ivis TAM Mala, PT  10/31/2017

## 2017-10-31 NOTE — PROGRESS NOTES
Pt currently in hospital.  Wound check done at bedside.  Device/lead wnl.  Educated pt on wound care, home monitoring, arm restrictions.  Issued Merlin home  monitor with instructions.  F/u in clinic in 3mos with Dr. Delgado.

## 2017-10-31 NOTE — ASSESSMENT & PLAN NOTE
- see above  -TAVR team see and this is to be expected, she's mobilizing her fluid faster than her heart and renal function can keep up

## 2017-10-31 NOTE — PROGRESS NOTES
Please note an Outpatient Complex Care Management welcome packet and consent form was created and mailed to the patient on 10/31/17.    Please contact [facility] at wsp. [phone] with any questions.    Thank you,    Ml Clark, SSC

## 2017-10-31 NOTE — PLAN OF CARE
Problem: Occupational Therapy Goal  Goal: Occupational Therapy Goal  Goals to be met by: 7 days (11/7/17)     Patient will increase functional independence with ADLs by performing:    UE Dressing with Supervision.  LE Dressing with Supervision.  Grooming while standing at sink with Supervision.  Toileting from toilet with Supervision for hygiene and clothing management.   Supine to sit with [place].  Toilet transfer to toilet with Supervision.  Pt will perform functional mobility household distance with supervision and AD as needed.    Outcome: Ongoing (interventions implemented as appropriate)  Eval and POC set 10/31/17

## 2017-10-31 NOTE — PT/OT/SLP EVAL
Occupational Therapy  Evaluation    Adela Garay   MRN: [id number]   Admitting Diagnosis: Acute on chronic diastolic heart failure    OT Date of Treatment: 10/31/17   OT Start Time: 0930  OT Stop Time: 1003  OT Total Time (min): 33 min    Billable Minutes:  Evaluation 21  Self Care/Home Management 12    Diagnosis: Acute on chronic diastolic heart failure     Past Medical History:   Diagnosis Date    Anticoagulant long-term use     Anticoagulated on Coumadin     Arthritis     Atrial fibrillation     Atrial fibrillation     Carotid artery occlusion     Chronic rhinosinusitis     Coronary artery disease     Granulomatous lung disease     Heart failure     Hyperlipidemia     Hypertension     Hypertensive heart disease without CHF (congestive heart failure)     Mitral valve prolapse     PN (peripheral neuropathy)     hereditary?(sister has it also)/idiopathic?/patient thinks from [place]    Severe aortic stenosis by prior echocardiogram     TIA (transient ischemic attack)     Type 2 diabetes mellitus     Type II or unspecified type diabetes mellitus without mention of complication, not stated as uncontrolled       Past Surgical History:   Procedure Laterality Date    SINUS SURGERY      tonsillectomy      TONSILLECTOMY       Referring physician: ALYSSA Euceda  Date referred to OT: 10/30/17    General Precautions: Standard, fall, pacemaker  Orthopedic Precautions:  N/A  Braces: UE Sling (L sling at night)    Do you have any cultural, spiritual, Yarsani conflicts, given your current situation?: None     Patient History:  Living Environment  Lives With: alone  Living Arrangements: house  Living Environment Comment: Pt lives alone in 1-story house with 0 MAU. Prior to recent TAVR and PPM placement, pt was using cane for ambulation and (I) with ADLs/IADLs, driving. Daughter and son live on property and are able to assist as needed.  Equipment Currently Used at Home: cane, straight (owns  rollator)    Prior level of function:   Bed Mobility/Transfers: needs device  Grooming: independent  Bathing: independent  Upper Body Dressing: independent  Lower Body Dressing: independent  Toileting: independent  Home Management Skills: independent  Driving License: Yes     Dominant hand: right    Subjective:  Communicated with RN prior to session.    Chief Complaint: None stated; reported back spasms starting after TAVR and relieved by medication last night  Patient/Family stated goals: Return home    Pain/Comfort  Pain Rating 1: 0/10  Pain Rating Post-Intervention 1: 0/10    Objective:  Patient found with: telemetry, oxygen    Cognitive Exam:  Oriented to: Person, Place, Time and Situation  Follows Commands/attention: Follows multistep commands  Communication: clear/fluent  Memory:  No Deficits noted  Safety awareness/insight to disability: intact  Coping skills/emotional control: Appropriate to situation    Visual/perceptual:  Intact    Physical Exam:  Postural examination/scapula alignment: No postural abnormalities identified  Skin integrity: Visible skin intact  Edema: None noted     Sensation:   Intact B UE    Upper Extremity Range of Motion:  Right Upper Extremity: WFL  Left Upper Extremity: WFL within pacemaker precautions    Upper Extremity Strength:  Right Upper Extremity: WFL  Left Upper Extremity: NT due to pacemaker precautions   Strength: WFL    Fine motor coordination:   Intact    Functional Mobility:  Bed Mobility:  Supine to Sit: Supervision    Transfers:  Sit <> Stand Assistance: Minimum Assistance from EOB and toilet  Sit <> Stand Assistive Device: Straight Cane  Toilet Transfer Assistance: Minimum Assistance  Toilet Transfer Assistive Device: Straight Cane    Functional Ambulation: Within room and hallway ~250 ft with CGA using cane; O2 91% on RA and increased to 99% when nasal canula donned    Activities of Daily Living:  UE Dressing Level of Assistance: Minimum assistance to don gown  "around back  LE Dressing Level of Assistance: Stand by assistance to don socks  Grooming Position: Standing at sink  Grooming Level of Assistance: Stand by assistance to wash hands  Toileting Where Assessed: Toilet  Toileting Level of Assistance: Contact guard for balance; able to manage clothing and hygiene    Balance:   Static Sit: NORMAL: No deviations seen in posture held statically  Dynamic Sit: NORMAL: No deviations seen in posture held dynamically  Static Stand: GOOD-: Takes MODERATE challenges from all directions inconsistently  Dynamic stand: FAIR: Needs CONTACT GUARD during gait    Therapeutic Activities and Exercises:  OT/PT eval; educated on OT role and POC; reviewed PPM precautions, pt aware and able to recall; performed functional mobility to bathroom and further mobility in hallway; discussed D/C recs with pt and family    AM-PAC 6 CLICK ADL  How much help from another person does this patient currently need?  1 = Unable, Total/Dependent Assistance  2 = A lot, Maximum/Moderate Assistance  3 = A little, Minimum/Contact Guard/Supervision  4 = None, Modified [place]/Independent    Putting on and taking off regular lower body clothing? : 3  Bathing (including washing, rinsing, drying)?: 3  Toileting, which includes using toilet, bedpan, or urinal? : 3  Putting on and taking off regular upper body clothing?: 3  Taking care of personal grooming such as brushing teeth?: 4  Eating meals?: 4  Total Score: 20    AM-PAC Raw Score CMS "G-Code Modifier Level of Impairment Assistance   6 % Total / Unable   7 - 9 CM 80 - 100% Maximal Assist   10-14 CL 60 - 80% Moderate Assist   15 - 19 CK 40 - 60% Moderate Assist   20 - 22 CJ 20 - 40% Minimal Assist   23 CI 1-20% SBA / CGA   24 CH 0% Independent/ Mod I       Patient left up in chair with all lines intact, call button in reach and daughter present    Assessment:  Adela Garay is a 89 y.o. female with a medical diagnosis of Acute on chronic " diastolic heart failure, recent TAVR and PPM placement and presents with deficits listed below. Pt (I) at baseline and would continue to benefit from OT to progress toward goals. Safe to return home with family assistance upon D/C.    Pt evaluation falls under low complexity for evaluation coding due to performance deficits noted in 1-3 areas as stated above and 0 co-morbities affecting current functional status. Data obtained from problem focused assessments. No modifications or assistance was required for completion of evaluation. Only brief occupational profile and history review completed.     Rehab identified problem list/impairments: Rehab identified problem list/impairments: weakness, impaired endurance, impaired self care skills, gait instability, decreased upper extremity function, impaired cardiopulmonary response to activity    Rehab potential is good.    Activity tolerance: Good    Discharge recommendations: Discharge Facility/Level Of Care Needs: home (with family assist)     Barriers to discharge: Barriers to Discharge: None    Equipment recommendations: none     GOALS:    Occupational Therapy Goals        Problem: Occupational Therapy Goal    Goal Priority Disciplines Outcome Interventions   Occupational Therapy Goal     OT, PT/OT Ongoing (interventions implemented as appropriate)    Description:  Goals to be met by: 7 days (11/7/17)     Patient will increase functional independence with ADLs by performing:    UE Dressing with Supervision.  LE Dressing with Supervision.  Grooming while standing at sink with Supervision.  Toileting from toilet with Supervision for hygiene and clothing management.   Supine to sit with [place].  Toilet transfer to toilet with Supervision.  Pt will perform functional mobility household distance with supervision and AD as needed.                      PLAN:  Patient to be seen 3 x/week to address the above listed problems via self-care/home management, therapeutic  activities, therapeutic exercises  Plan of Care expires:  11/30/17  Plan of Care reviewed with: patient, daughter    OT G-codes  Functional Assessment Tool Used: [facility]  Score: 20  Functional Limitation: Self care  Self Care Current Status (): CJ  Self Care Goal Status (): CI    LAUREANO Way  10/31/2017

## 2017-10-31 NOTE — PLAN OF CARE
Problem: Patient Care Overview  Goal: Plan of Care Review  Outcome: Revised  Plan of care discussed with patient. Pt planned to receive lasix IVP BID. Pt on 2LNC sating > 95%. Pt ambulating with cane and 1 person assist, becomes dyspneic on exertion. Educated on fall risk and need to call for assistance when oob.  PRN Norco given for back pain. Patient is free of fall/trauma/injury. All questions addressed. Will continue to monitor.

## 2017-10-31 NOTE — LETTER
October 31, 2017    Adela Garay  [address] [address]             Outpatient Case Management  [address]  [facility] [address] Dear Adela Garay:    We understand that receiving many services from different doctors and healthcare providers is overwhelming. There are appointments to make, transportation to arrange, dietary instructions to understand, and new medications to obtain.    This is where [facility] can help.     You are eligible to receive Outpatient Case Management services when you have healthcare needs that require the coordination of many providers, treatments, and services. You also qualify if you need assistance with a new treatment plan.     There is no charge for this support. You may have been referred to this program from your doctor(s), hospital staff member(s), or insurance company but you always have a choice to participate or not participate. To participate, you must give us your permission to be enrolled.     When you are enrolled in the [facility] program, the  who is assigned to you is    Elyse Medina, RN  [phone]    Depending on your needs and wishes, your  may speak with you by phone, visit you at your place of living (for example your home, skilled nursing facility, or rehabilitation facility), or meet you at your doctors office.     Your  will tell you why you have been selected to participate in the program and will complete an assessment of your needs. Then a personalized plan of care will be developed with you and or your caregiver.             Here are examples of the services your [facility]  provides.     Coordinate communication among multiple providers.   Arrange for transportation, doctors visits, durable medical equipment, home care services, and special clinics.    Provide coaching on how to manage your health condition.     Answer questions about your health condition.   Help you understand your doctors treatment plan.    Provide additional instruction about your health condition, treatments, and medications.    Help you obtain information about your insurance coverage.    Advocate for your individual needs.    Request a Licensed Clinical  (LCSW) to visit you if you need their services. LCSWs help with long term planning (discussing placement options, advanced planning directives), financial planning, and assistance (for example rent, utilities, medication funding).     Your  will coordinate their activities with other outpatient services you are receiving. All services provided by [facility]  are coordinated with and communicated to your primary care physician.    Our goal is to help you manage your health condition(s) safely within your living environment, whether that is your home or a medical facility. We want to help you function at the healthiest and highest level possible.     Sincerely,      Lev Hughes MD  Medical Director    Enclosures:    Frequently Asked Questions  Patient Rights and Responsibilities   Reporting a Grievance or Complaint  Consent/Release of Information  Stamped Addressed Envelope                  Frequently Asked Questions about [facility]    What is [facility]?  Outpatient Case management is not Home healthcare services. [facility]  do not provide hands-on care. [facility]  will work with your doctor to arrange for home health services, if needed. Home health services have a limited duration and there are some restrictions as to who can get these services. There is no prescribed limit to the amount of time you receive [facility] services. [facility]  are not agents of your insurance company. However, [facility]  can help you obtain information from your insurance company.     Who are the [facility] ?  [facility]  are Registered Nurses and Social Workers. It is important to remember that you and your  are a team that works together with your primary care physician to create your individualized plan of care. The ultimate goal of your care plan is to help you implement your doctors treatment plan and to help you function at the highest level of health possible.     What are my rights as a patient?  It is important for you to know and understand your rights and responsibilities while receiving services from the [facility] program. We have enclosed a complete description of your rights and responsibilities. You can help to make your care more effective when you understand your right and responsibilities.     What is needed to be enrolled in the program?  You are only enrolled in the [facility] Program when you give us your consent to participate. You will find enclosed a consent form. You are receiving this letter because you or your caregivers have given us a verbal consent to enroll you in [facility] Program. We ask that you sign and return the enclosed written consent in the stamped self-addressed envelope.                           Patient Rights and Responsibilities    We consider you a partner in your care. When you are well informed, participate in treatment decisions and communicate openly with your doctor and other healthcare professionals, you help make your care more effective.     While you are in the Outpatient Case Management Program, your rights include the following:     You have a right to be provided services in a non-discriminatory manner in accordance with the provisions of Title VI of the Civil Rights Act of 1964, Section 504 of the Rehabilitation Act of  1973, the Age Discrimination Act of 1975, the Americans with Disabilities Act as well as any other applicable Federal and State laws and regulations.     You have the right to a reasonable, timely response to your request or need for care, as well as the right to considerate and respectful care including an environment that preserves dignity and contributes to a positive self-image. You are responsible for being considerate and respectful of our staff.     You have a right to information regarding patient rights, advocacy services and complaint mechanisms, and the right to prompt resolution of any complaint. You or a designee has the right to participate in the resolution of ethical issues surrounding your care. You have a right to file a complaint if you feel that your rights have been infringed, without fear or penalty from [facility] or the federal government. You may file a complaint with the Director of Outpatient Case Management by calling [phone]. At any time, you may lodge a grievance with the [facility] and hospitals by calling [phone], or the Joint Commission on Accreditation of Healthcare Organizations at [phone].     You, or someone acting on your behalf, have the right to understandable information on your health status, treatment and progress in order to make decisions. You have the right to know the nature, risks and alternatives to treatment. You have the right to be informed, when appropriate, regarding the outcome of the care that has been provided. You have the right to refuse treatment to the extent permitted by law, and the right to be informed of the alternatives and consequences of refusing treatment.     You, in collaboration with your physician, have the right to make decisions regarding care and the right to participate in the development and implementation of the plan of care and effective pain management. You have the right to know the name and  professional status of those responsible for the delivery of your care and treatment.       You have a right, within legal guidelines, to have a guardian, next-of-kin or legal designee exercises your patient rights when you are unable to do so. You have the right for your wishes regarding end-of-life decisions to be addressed by the healthcare team through advance directives. You have the right to personal privacy and confidentiality and to expect confidentiality of all records and communications pertaining to your care.      You have the right to receive communications about your health information confidentially. You have the right to request restrictions on the uses and disclosures of your health information. You have the right to inspect, copy, request amendments and receive an accounting of to whom we have disclosed your health information.     You have the right to be provided with interpretation services if you do not speak English; to alternative communication techniques if you are hearing or vision impaired; and to have any other resources needed on your behalf to ensure effective communication. These services are provided free of charge.     You have a right to personal safety (free from mental, physical, sexual and verbal abuse, neglect and exploitation). You have the right to access protective and advocacy services.     Advance Directives  A Patient Advocate is available to meet with patients to answer questions regarding advance directives.    Living Will  A document that outlines what medical treatment the patient does or does not want in the event the patient becomes unable to make those decisions at the appropriate time.    Durable Medical Power of   A document by which the patient designates an individual to be responsible for making medical decisions in the event the patient becomes unable to do so.    HIPAA Notice of Privacy Practices  Your medical information is governed by federal  privacy laws. HIPAA protects private medical information and how that information is disclosed. If you have a question regarding the HIPAA Notice of Privacy Practices, or if you believe your privacy rights have been violated, you may call our designated hotline at [phone].            Quality Improvement  Because we consistently strive to improve the care and service provided to our patients, we welcome your feedback. Your comments are an important part of our quality improvement process, as we like to know what we are doing right and which areas are in need of improvement. Our policy is to listen, be responsive and provide you with an appropriate and timely follow-up to your questions or concerns. Our goal is active patient and family involvement in all aspects of the care process.            Reporting a Grievance or Complaint    During your time with the [facility] team you may have a grievance or complaint with our services. Your Patients Bill of Rights gives patients, families, and caregivers the right to express concerns and grievances and the right to expect a reasonable and timely response.     Your presentation of your concerns is not viewed negatively. It is an opportunity for us to improve the quality of our care and services we provide to you.     You may report your concerns directly to your , or you can phone in a complaint to:     Director of Outpatient Case Management  [phone]    You may also send a complaint letter to:    Director of Outpatient Case Management Services  [address] [place] [id number]    Tell us the details of your complaint and provide us with a contact phone number so we can contact you to obtain additional information. We will return a call to you within two business days of our receipt of your complaint, and to request additional information as needed. If you choose to mail a letter, your complaint may take a few days  longer to reach us.     All grievances will be addressed as quickly as possible. A grievance or complaint that involves situations or practices which place patients in immediate danger will be addressed as an urgent matter. We will work to resolve all other complaints within seven days of receipt. By that time, you will receive a phone call with either the resolution of your complaint, or a plan for corrective action. A formal written response will be sent to you within 30 days of receipt of your grievance.     If a resolution cannot be completed within 30 days, a letter will be sent to you or your family member with an estimated time for the final response.    Additionally, all patients have the right to file complaints with external agencies, without exception. Complaints/grievances can be addressed to the following agencies:            Patient Safety or Quality of Care Concerns  Office of Quality Monitoring   The Joint [facility] [place] [address]  [phone] Toll Free    HIPPA Privacy/Security Concerns  Office for Civil Rights Region IV  [place] Department of Health & Human Services  [address] [place] [address], [address]  [place] [address] [phone] Phone  [phone] TDD  [phone] Toll Free    Medicare/Medicaid Billing Concerns  [place] for Medicare & Medicaid Services  Region [address]  [address] [place] [address], [address]  [place] [address] [phone] Phone  [phone] Toll Free    General Concerns  [facility] ([facility])  [phone] Toll Free Complaint Hotline                                      Consent Form/Release of Information    By signing--     (1) I agree I have read the Outpatient Case Management information provided to me;     (2) I agree to voluntarily participate in the Outpatient Case Management program;     (3) I understand I must consent to participation in the Outpatient Case Management program during my first interview  with my ;    (4) I consent to the discussion and release of my personal health information to my healthcare team (including my personal physician, my medical home care team, any specialty physician(s), and my [facility] team);     (5) I agree my consent is valid for the length of time I am receiving Outpatient Case Management;    (6) I agree to referrals to community resources which my Case Management team recommends for me. I agree to the release of my personal information and personal health information as necessary to referral sources.    ___________________________________________________________________  Patients Printed Name     ___________________________________________________________________  Patients Signature       Date    If patient is in being cared for, please complete this section:     ___________________________________________________________________  Printed Name of Person Caring For Patient   Relationship To Patient    ___________________________________________________________________   Signature of Person Caring For Patient     Date    PLEASE SIGN AND RETURN IN THE ENCLOSED PRE-ADDRESSED ENVELOPE.

## 2017-10-31 NOTE — PLAN OF CARE
Problem: Physical Therapy Goal  Goal: Physical Therapy Goal  Goals to be met by: 2017    Patient will increase functional independence with mobility by performin. Sit to stand transfer with Modified [place] using single point cane - not met  2. Gait  x 400 feet with Modified [place] using Single-point Cane . - not met  3. Lower extremity exercise program x15 reps per handout, with independence - not met    Outcome: Ongoing (interventions implemented as appropriate)  Eval completed and goals appropriate.

## 2017-10-31 NOTE — ASSESSMENT & PLAN NOTE
- BNP elevated at 918, troponin negative and CBC/ CMP stable  - CXR with bilateral pleural effusion larger on the right side and atelectatic changes at the lung bases  - EKG with atrial fibrillation with paced ventricular rhythm  - Cardiology evaluated in ED, Interventional TAVR team consulted due to recent procedure  - 2D echo EF 50% mod MR, severe TR, PAP 40, RAP 15  - diuresis initiated with lasix 40 mg IVP BID, she's mobilizing her fluid faster than her heart and renal function can keep up    - resume home BB and digoxin   - continue tele monitoring  - EKG PRN chest pain or arrhythmia  - hold triamterene HCTZ for now, while using IV

## 2017-10-31 NOTE — PLAN OF CARE
10/31/17 1031   Discharge Assessment   Assessment Type Discharge Planning Assessment   Confirmed/corrected address and phone number on facesheet? Yes   Assessment information obtained from? Patient;Medical Record   Expected Length of Stay (days) 3   Communicated expected length of stay with patient/caregiver yes   Prior to hospitilization cognitive status: Alert/Oriented   Prior to hospitalization functional status: Assistive Equipment   Current cognitive status: Alert/Oriented   Current Functional Status: Assistive Equipment   Lives With alone   Able to Return to Prior Arrangements yes   Is patient able to care for self after discharge? Yes   Patient's perception of discharge disposition home or selfcare   Readmission Within The Last 30 Days previous discharge plan unsuccessful   Patient currently being followed by outpatient case management? No   Patient currently receives any other outside agency services? No   Equipment Currently Used at Home bedside commode;cane, straight;rollator;shower chair   Do you have any problems affording any of your prescribed medications? No   Is the patient taking medications as prescribed? yes   Does the patient have transportation home? Yes   Transportation Available family or friend will provide  (daughter)   Does the patient receive services at the [facility]? Yes   Discharge Plan A Home   Patient/Family In Agreement With Plan yes   Admitted with CHF. Lives alone and is independent in her ADLs. Plan is to DC home. No DC needs identified.

## 2017-10-31 NOTE — SUBJECTIVE & OBJECTIVE
Interval History: Patient slept very well after zanaflex last night.  She's feeling much better with volume removal.  Weaning off the oxygen slowly.    Review of Systems   Constitutional: Positive for fatigue and unexpected weight change. Negative for activity change, appetite change, chills, diaphoresis and fever.   HENT: Negative for congestion, rhinorrhea, sinus pain, sinus pressure, sneezing, sore throat and trouble swallowing.    Eyes: Negative.    Respiratory: Positive for cough. Negative for chest tightness, shortness of breath and wheezing.    Cardiovascular: Positive for leg swelling. Negative for chest pain and palpitations.   Gastrointestinal: Negative for abdominal distention, abdominal pain, constipation, diarrhea, nausea and vomiting.   Endocrine: Negative.    Genitourinary: Negative for decreased urine volume, difficulty urinating, dysuria, enuresis and urgency.   Musculoskeletal: Positive for back pain. Negative for arthralgias and myalgias.        Back spasms   Skin: Negative.    Allergic/Immunologic: Negative.    Neurological: Positive for weakness. Negative for dizziness, syncope, light-headedness and headaches.   Hematological: Negative.    Psychiatric/Behavioral: Negative.      Objective:     Vital Signs (Most Recent):  Temp: 98.1 °F (36.7 °C) (10/31/17 0851)  Pulse: 60 (10/31/17 1100)  Resp: 18 (10/31/17 0851)  BP: (!) 144/65 (10/31/17 0851)  SpO2: (!) 92 % (10/31/17 0851) Vital Signs (24h Range):  Temp:  [97.8 °F (36.6 °C)-98.1 °F (36.7 °C)] 98.1 °F (36.7 °C)  Pulse:  [58-78] 60  Resp:  [12-28] 18  SpO2:  [92 %-99 %] 92 %  BP: (119-157)/(55-72) 144/65     Weight: 67.8 kg (149 lb 9.3 oz)  Body mass index is 24.89 kg/m².    Intake/Output Summary (Last 24 hours) at 10/31/17 1526  Last data filed at 10/31/17 0400   Gross per 24 hour   Intake                0 ml   Output              150 ml   Net             -150 ml      Physical Exam   Constitutional: She is oriented to person, place, and time.  She appears well-developed and well-nourished.   HENT:   Head: Normocephalic and atraumatic.   Right Ear: External ear normal.   Left Ear: External ear normal.   Nose: Nose normal.   Mouth/Throat: Mucous membranes are normal. Normal dentition.   Eyes: Conjunctivae, EOM and lids are normal.   Neck: Normal range of motion. Neck supple. Hepatojugular reflux and JVD present.   Cardiovascular: Normal rate, regular rhythm, normal heart sounds and intact distal pulses.  Exam reveals no gallop and no friction rub.    No murmur heard.  Pulmonary/Chest: Effort normal. She has rales (half way up bilaterally). She exhibits no tenderness.   Abdominal: Soft. Bowel sounds are normal. She exhibits no distension. There is no tenderness.   Musculoskeletal: Normal range of motion. She exhibits edema (1+ BLE ). She exhibits no deformity.   Neurological: She is alert and oriented to person, place, and time. No cranial nerve deficit.   Skin: Skin is warm and dry. No rash noted. No erythema.   Psychiatric: She has a normal mood and affect. Judgment and thought content normal.   Nursing note and vitals reviewed.      Significant Labs:   CBC:   Recent Labs  Lab 10/30/17  1225 10/31/17  0421   WBC 10.56 8.52   HGB 11.3* 10.1*   HCT 36.2* 32.7*    167     CMP:   Recent Labs  Lab 10/30/17  1225 10/31/17  0421    138   K 4.0 3.7   CL 98 99   CO2 26 31*   * 88   BUN 15 17   CREATININE 0.8 0.8   CALCIUM 9.4 8.9   PROT 7.5 6.4   ALBUMIN 3.5 3.2*   BILITOT 1.2* 0.9   ALKPHOS 85 69   AST 29 22   ALT 13 12   ANIONGAP 12 8   EGFRNONAA >60.0 >60.0     Cardiac Markers:   Recent Labs  Lab 10/30/17  1225   *     Magnesium:   Recent Labs  Lab 10/30/17  1225 10/31/17  0421   MG 2.3 2.1     Troponin:   Recent Labs  Lab 10/30/17  1225   TROPONINI 0.020     Significant Imaging: I have reviewed and interpreted all pertinent imaging results/findings within the past 24 hours.

## 2017-10-31 NOTE — ASSESSMENT & PLAN NOTE
- continue home BB, digoxin, and hold triamterene /hctz for now  - BP elevated in ED, now improving, continue to monitor

## 2017-11-01 LAB
ALBUMIN SERPL BCP-MCNC: 3.1 G/DL
ALP SERPL-CCNC: 68 U/L
ALT SERPL W/O P-5'-P-CCNC: 11 U/L
ANION GAP SERPL CALC-SCNC: 7 MMOL/L
AST SERPL-CCNC: 20 U/L
BILIRUB SERPL-MCNC: 1 MG/DL
BUN SERPL-MCNC: 22 MG/DL
CALCIUM SERPL-MCNC: 9 MG/DL
CHLORIDE SERPL-SCNC: 97 MMOL/L
CO2 SERPL-SCNC: 33 MMOL/L
CREAT SERPL-MCNC: 0.8 MG/DL
EST. GFR  (AFRICAN AMERICAN): >60 ML/MIN/1.73 M^2
EST. GFR  (NON AFRICAN AMERICAN): >60 ML/MIN/1.73 M^2
GLUCOSE SERPL-MCNC: 96 MG/DL
INR PPP: 2.8
MAGNESIUM SERPL-MCNC: 2.2 MG/DL
POTASSIUM SERPL-SCNC: 3.7 MMOL/L
PROT SERPL-MCNC: 6.2 G/DL
PROTHROMBIN TIME: 28.4 SEC
SODIUM SERPL-SCNC: 137 MMOL/L

## 2017-11-01 PROCEDURE — A4216 STERILE WATER/SALINE, 10 ML: HCPCS | Performed by: NURSE PRACTITIONER

## 2017-11-01 PROCEDURE — 99233 SBSQ HOSP IP/OBS HIGH 50: CPT | Mod: ,,, | Performed by: NURSE PRACTITIONER

## 2017-11-01 PROCEDURE — 80053 COMPREHEN METABOLIC PANEL: CPT

## 2017-11-01 PROCEDURE — 25000003 PHARM REV CODE 250: Performed by: NURSE PRACTITIONER

## 2017-11-01 PROCEDURE — 63600175 PHARM REV CODE 636 W HCPCS: Performed by: NURSE PRACTITIONER

## 2017-11-01 PROCEDURE — 20600001 HC STEP DOWN PRIVATE ROOM

## 2017-11-01 PROCEDURE — 85610 PROTHROMBIN TIME: CPT

## 2017-11-01 PROCEDURE — 97530 THERAPEUTIC ACTIVITIES: CPT

## 2017-11-01 PROCEDURE — 83735 ASSAY OF MAGNESIUM: CPT

## 2017-11-01 PROCEDURE — 25000242 PHARM REV CODE 250 ALT 637 W/ HCPCS: Performed by: NURSE PRACTITIONER

## 2017-11-01 PROCEDURE — 94761 N-INVAS EAR/PLS OXIMETRY MLT: CPT

## 2017-11-01 PROCEDURE — 27000221 HC OXYGEN, UP TO 24 HOURS

## 2017-11-01 PROCEDURE — 97116 GAIT TRAINING THERAPY: CPT

## 2017-11-01 PROCEDURE — 36415 COLL VENOUS BLD VENIPUNCTURE: CPT

## 2017-11-01 PROCEDURE — 94640 AIRWAY INHALATION TREATMENT: CPT

## 2017-11-01 RX ORDER — WARFARIN 2 MG/1
2 TABLET ORAL DAILY
Status: DISCONTINUED | OUTPATIENT
Start: 2017-11-01 | End: 2017-11-03

## 2017-11-01 RX ORDER — POTASSIUM CHLORIDE 20 MEQ/15ML
40 SOLUTION ORAL ONCE
Status: COMPLETED | OUTPATIENT
Start: 2017-11-02 | End: 2017-11-01

## 2017-11-01 RX ADMIN — DOCUSATE SODIUM 100 MG: 100 CAPSULE, LIQUID FILLED ORAL at 08:11

## 2017-11-01 RX ADMIN — ASPIRIN 81 MG CHEWABLE TABLET 81 MG: 81 TABLET CHEWABLE at 09:11

## 2017-11-01 RX ADMIN — TIZANIDINE 2 MG: 2 TABLET ORAL at 03:11

## 2017-11-01 RX ADMIN — TIZANIDINE 2 MG: 2 TABLET ORAL at 08:11

## 2017-11-01 RX ADMIN — TRIAMTERENE AND HYDROCHLOROTHIAZIDE 1 CAPSULE: 25; 37.5 CAPSULE ORAL at 09:11

## 2017-11-01 RX ADMIN — METOPROLOL TARTRATE 50 MG: 25 TABLET ORAL at 09:11

## 2017-11-01 RX ADMIN — FUROSEMIDE 40 MG: 10 INJECTION, SOLUTION INTRAVENOUS at 05:11

## 2017-11-01 RX ADMIN — Medication 3 ML: at 05:11

## 2017-11-01 RX ADMIN — IPRATROPIUM BROMIDE AND ALBUTEROL SULFATE 3 ML: .5; 3 SOLUTION RESPIRATORY (INHALATION) at 08:11

## 2017-11-01 RX ADMIN — WARFARIN SODIUM 2 MG: 2 TABLET ORAL at 05:11

## 2017-11-01 RX ADMIN — DOCUSATE SODIUM 100 MG: 100 CAPSULE, LIQUID FILLED ORAL at 09:11

## 2017-11-01 RX ADMIN — POTASSIUM CHLORIDE 40 MEQ: 20 SOLUTION ORAL at 11:11

## 2017-11-01 RX ADMIN — FUROSEMIDE 40 MG: 10 INJECTION, SOLUTION INTRAVENOUS at 09:11

## 2017-11-01 RX ADMIN — METOPROLOL TARTRATE 50 MG: 25 TABLET ORAL at 08:11

## 2017-11-01 RX ADMIN — IPRATROPIUM BROMIDE AND ALBUTEROL SULFATE 3 ML: .5; 3 SOLUTION RESPIRATORY (INHALATION) at 04:11

## 2017-11-01 RX ADMIN — SIMVASTATIN 20 MG: 20 TABLET, FILM COATED ORAL at 08:11

## 2017-11-01 RX ADMIN — DIGOXIN 0.25 MG: 250 TABLET ORAL at 09:11

## 2017-11-01 NOTE — ASSESSMENT & PLAN NOTE
- st christian device placed post TAVR  - pacer programming: Wound check done at bedside.   - Device/lead wnl.   - Educated pt on wound care, home monitoring, arm restrictions.   - Issued Merlin home  monitor with instructions.   - F/u in clinic in 3mos with Dr. Delgado.

## 2017-11-01 NOTE — PHYSICIAN QUERY
PT Name: Adela Garay  MR #: [id number]    Physician Query Form - Atrial Fibrillation Specificity     CDS/: Danisha Jolly               Contact information:[email or web address]     This form is a permanent document in the medical record.     Query Date: November 1, 2017    By submitting this query, we are merely seeking further clarification of documentation. Please utilize your independent clinical judgment when addressing the question(s) below.    The medical record contains the following:   Indicators     Supporting Clinical Findings Location in Medical Record   x Atrial Fibrillation Hx of Atrial fibrillation H&P   x EKG results EKG with atrial fibrillation with paced ventricular  rhythm.    H&P    Medication     x Treatment resume home BB and digoxin  H&P    Other         Provider, please further specify the Atrial Fibrillation diagnosis.    [  ] Chronic  [  ] Paroxysmal  [ x ] Permanent  [  ] Persistent  [  ] Other (please specify): ____________________________  [  ] Clinically Undetermined    Please document in your progress notes daily for the duration of treatment until resolved, and include in your discharge summary.

## 2017-11-01 NOTE — PROGRESS NOTES
[facility] Hospital Medicine  Progress Note    Patient Name: Adela Garay  MRN: [id number]  Patient Class: IP- Inpatient   Admission Date: 10/30/2017  Length of Stay: 2 days  Attending Physician: Justice Alexander MD  Primary Care Provider: Torrie Farrell MD    [facility] Medicine Team: [facility] J Liyah Dover NP    Subjective:     Principal Problem:Acute on chronic diastolic heart failure    HPI:  Ms. Garay is a 89 year old  woman with past medical history of HTN, HLD, DM, aFib, HFpEF, and recent TAVR complicated by PPM placement for CHB ([place]ico Valve 29mm ) on 10/17/17 for severe AS who presents to the ED with complaints of leg swelling. She states that she has been having issues since she went home after the surgery. She endorses orthopnea, ESTRADA, new cough, leg swelling, and weight gain. Denies issues like this before. Denies Fever. Denies chest pain.     While in ED, chemistry was unremarkable, BNP elevated at 918, troponin negative and CBC stable. CXR with bilateral pleural effusion larger on the right side and atelectatic changes at the lung bases; EKG with atrial fibrillation with paced ventricular rhythm.    The patient was admitted to the Hospital Medicine Service for further evaluation and management.     Hospital Course:  Ms. Garay was admitted 10/30 s/p recent TAVR and PPM with acute on chronic diastolic heart failure and edema. She was evaluated by cardiology in ED and assessed by TAVR team in house by Dr. Jarvis. Diuresis initiated with lasix 40 mg IVP BID, dry weight closer to 143-5 lbs per daughter, admitted at 151 lbs.  Diuresing well, weaned off oxygen and will transition to torsemide closer to her dry weight.      Interval History: Patient slept very well after zanaflex last night.  She's feeling much better with volume removal.  Weaned off oxygen.    Review of Systems   Constitutional: Positive for fatigue and unexpected weight change. Negative for  activity change, appetite change, chills, diaphoresis and fever.   HENT: Negative for congestion, rhinorrhea, sinus pain, sinus pressure, sneezing, sore throat and trouble swallowing.    Eyes: Negative.    Respiratory: Positive for shortness of breath (improving). Negative for cough, chest tightness and wheezing.    Cardiovascular: Positive for leg swelling. Negative for chest pain and palpitations.   Gastrointestinal: Negative for abdominal distention, abdominal pain, constipation, diarrhea, nausea and vomiting.   Endocrine: Negative.    Genitourinary: Negative for decreased urine volume, difficulty urinating, dysuria, enuresis and urgency.   Musculoskeletal: Positive for back pain. Negative for arthralgias and myalgias.        Back spasms   Skin: Negative.    Allergic/Immunologic: Negative.    Neurological: Positive for weakness. Negative for dizziness, syncope, light-headedness and headaches.   Hematological: Negative.    Psychiatric/Behavioral: Negative.      Objective:     Vital Signs (Most Recent):  Temp: 98.4 °F (36.9 °C) (11/01/17 1234)  Pulse: 60 (11/01/17 1459)  Resp: 18 (11/01/17 1234)  BP: (!) 141/66 (11/01/17 1234)  SpO2: (!) 90 % (11/01/17 1234) Vital Signs (24h Range):  Temp:  [97.8 °F (36.6 °C)-98.4 °F (36.9 °C)] 98.4 °F (36.9 °C)  Pulse:  [60] 60  Resp:  [14-18] 18  SpO2:  [90 %-99 %] 90 %  BP: (113-143)/(57-66) 141/66     Weight: 67.2 kg (148 lb 2.4 oz)  Body mass index is 24.65 kg/m².    Intake/Output Summary (Last 24 hours) at 11/01/17 1544  Last data filed at 11/01/17 1300   Gross per 24 hour   Intake              800 ml   Output             1250 ml   Net             -450 ml      Physical Exam   Constitutional: She is oriented to person, place, and time. She appears well-developed and well-nourished.   HENT:   Head: Normocephalic and atraumatic.   Right Ear: External ear normal.   Left Ear: External ear normal.   Nose: Nose normal.   Mouth/Throat: Mucous membranes are normal. Normal dentition.    Eyes: Conjunctivae, EOM and lids are normal.   Neck: Normal range of motion. Neck supple. Hepatojugular reflux and JVD present.   Cardiovascular: Normal rate, regular rhythm, normal heart sounds and intact distal pulses.  Exam reveals no gallop and no friction rub.    No murmur heard.  Pulmonary/Chest: Effort normal. She has rales (half way up bilaterally). She exhibits no tenderness.   Abdominal: Soft. Bowel sounds are normal. She exhibits no distension. There is no tenderness.   Musculoskeletal: Normal range of motion. She exhibits edema (1+ BLE ). She exhibits no deformity.   Neurological: She is alert and oriented to person, place, and time. No cranial nerve deficit.   Skin: Skin is warm and dry. No rash noted. No erythema.   Psychiatric: She has a normal mood and affect. Judgment and thought content normal.   Nursing note and vitals reviewed.      Significant Labs:   CBC:     Recent Labs  Lab 10/31/17  0421   WBC 8.52   HGB 10.1*   HCT 32.7*        CMP:     Recent Labs  Lab 10/31/17  0421 11/01/17  0744    137   K 3.7 3.7   CL 99 97   CO2 31* 33*   GLU 88 96   BUN 17 22   CREATININE 0.8 0.8   CALCIUM 8.9 9.0   PROT 6.4 6.2   ALBUMIN 3.2* 3.1*   BILITOT 0.9 1.0   ALKPHOS 69 68   AST 22 20   ALT 12 11   ANIONGAP 8 7*   EGFRNONAA >60.0 >60.0      Magnesium:     Recent Labs  Lab 10/31/17  0421 11/01/17  0744   MG 2.1 2.2     Significant Imaging: I have reviewed and interpreted all pertinent imaging results/findings within the past 24 hours.    Assessment/Plan:      * Acute on chronic diastolic heart failure    - BNP elevated at 918, troponin negative and CBC/ CMP stable  - CXR with bilateral pleural effusion larger on the right side and atelectatic changes at the lung bases  - EKG with atrial fibrillation with paced ventricular rhythm  - Cardiology evaluated in ED, Interventional TAVR team consulted due to recent procedure  - 2D echo EF 50% mod MR, severe TR, PAP 40, RAP 15  - diuresis initiated with  lasix 40 mg IVP BID, she's mobilizing her fluid faster than her heart and renal function can keep up    - resume home BB and digoxin   - continue tele monitoring  - EKG PRN chest pain or arrhythmia  - hold triamterene HCTZ for now, while using IV        S/P TAVR (transcatheter aortic valve replacement)    - see above  -TAVR team have seen and this is to be expected, she's mobilizing her fluid faster than her heart and renal function can keep up        Complete heart block, post-surgical    - st christian device placed post TAVR  - pacer programming: Wound check done at bedside.   - Device/lead wnl.   - Educated pt on wound care, home monitoring, arm restrictions.   - Issued Merlin home  monitor with instructions.   - F/u in clinic in 3mos with Dr. Delgado.        Atrial fibrillation    - rate control, warfarin           Long-term (current) use of anticoagulants, INR goal 2.0-3.0    - INR currently 2., home warfarin  - daily PT/INR  - f/u with Dr. Ivy        Hx-TIA (transient ischemic attack)    -continue ASA        Hyperlipidemia    -continue statin         Controlled type 2 diabetes mellitus with microalbuminuria    -HgA1C 5.6 last in May 6.0  - diet controlled  -cardiac/diabetic diet in house        Non-productive cough    -weaned off oxygen   -duo nebs q8H  -repeat CXR post diuresis as necessary        Coronary artery disease involving native coronary artery of native heart without angina pectoris    -chest pain free, EKG without acute ischemic changes  -continue ASA, BB, and statin         Mild major depression    -continue home trazodone  -no SI/HI        DDD (degenerative disc disease), cervical    -PRN tylenol and oxycodone        Benign hypertensive heart disease with congestive heart failure    - continue home BB, digoxin, and hold triamterene /hctz for now  - BP elevated in ED, now improving, continue to monitor         Severe aortic stenosis by prior echocardiogram    -see above and HPI          VTE Risk  Mitigation         Ordered     warfarin (COUMADIN) tablet 2 mg  Daily     Route:  Oral        11/01/17 0739     Medium Risk of VTE  Once      10/30/17 2102     Place JONO hose  Until discontinued      10/30/17 2102              Liyah Dover NP  Department of Hospital Medicine   [facility]

## 2017-11-01 NOTE — PT/OT/SLP PROGRESS
Physical Therapy  Treatment    Adela Garay   MRN: [id number]   Admitting Diagnosis: Acute on chronic diastolic heart failure    PT Received On: 11/01/17  PT Start Time: 1358     PT Stop Time: 1430    PT Total Time (min): 32 min       Billable Minutes:  Gait [id number] and Therapeutic Activity 12    Treatment Type: Treatment  PT/PTA: PTA     PTA Visit Number: 1       General Precautions: Standard, fall, pacemaker  Orthopedic Precautions:  (L UE pacemaker precautions)   Braces:      Do you have any cultural, spiritual, Confucianist conflicts, given your current situation?: none reported     Subjective:  Communicated with NSG prior to session.  Patient stated she wanted to walk    Pain/Comfort  Pain Rating 1: 0/10    Objective:   Patient found with: telemetry, oxygen (NSG stated patient could ambulate without O2, O2 sats 95-97% on RA)    Functional Mobility:  Bed Mobility: non, patient UIC       Transfers:from BS chair x4 trials and commode x1 trial  Sit <> Stand Assistance: Minimum Assistance, Moderate Assistance (V/T cues for technique)  Sit <> Stand Assistive Device: Straight Cane; HHA from commode    Gait:   Gait Distance: 200 feet   Assistance 1: Contact Guard Assistance  Gait Assistive Device: Single point cane  Gait Pattern: 2-point gait  Gait Deviation(s): decreased roberta, increased time in double stance, decreased velocity of limb motion, decreased step length, decreased stride length (required 2-3 standing rest breaks, no LOB, but noted decrease balance with fatigue last 25 feet), also noted min SOB, but O2 sats b/w 95-97% on RA.      Balance:   Static Sit: NORMAL: No deviations seen in posture held statically  Dynamic Sit: GOOD: Maintains balance through MODERATE excursions of active trunk movement  Static Stand: FAIR: Maintains without assist but unable to take challenges  Dynamic stand: FAIR: Needs CONTACT GUARD during gait     Therapeutic Activities and Exercises:  Patient educated on proper technique  with sit to stand transfers   Patient required Supervision with [facility]    AM-[facility] 6 CLICK MOBILITY  How much help from another person does this patient currently need?   1 = Unable, Total/Dependent Assistance  2 = A lot, Maximum/Moderate Assistance  3 = A little, Minimum/Contact Guard/Supervision  4 = None, Modified [place]/Independent    Turning over in bed (including adjusting bedclothes, sheets and blankets)?: 4  Sitting down on and standing up from a chair with arms (e.g., wheelchair, bedside commode, etc.): 3  Moving from lying on back to sitting on the side of the bed?: 4  Moving to and from a bed to a chair (including a wheelchair)?: 3  Need to walk in hospital room?: 3  Climbing 3-5 steps with a railing?: 3  Total Score: 20    AM-PAC Raw Score CMS G-Code Modifier Level of Impairment Assistance   6 % Total / Unable   7 - 9 CM 80 - 100% Maximal Assist   10 - 14 CL 60 - 80% Moderate Assist   15 - 19 CK 40 - 60% Moderate Assist   20 - 22 CJ 20 - 40% Minimal Assist   23 CI 1-20% SBA / CGA   24 CH 0% Independent/ Mod I     Patient left up in chair with all lines intact and daughter present.    Assessment:  Adela Garay is a 89 y.o. female with a medical diagnosis of Acute on chronic diastolic heart failure and presents with decrease strength, mobility, balance and endurance. Patient requires assistance with all mobility and transfers.  Patient has increase difficulty with sit to stand transfers from low surfaces.  Patient demonstrated fair balance with gait using SC.  Patient progressing toward goals.  Patient will require 24 hour assistance upon discharge form hospital.    .    Rehab identified problem list/impairments: Rehab identified problem list/impairments: weakness, impaired endurance, impaired functional mobilty, gait instability, impaired self care skills, impaired balance, decreased upper extremity function, impaired cardiopulmonary response to activity    Rehab potential is  good.    Activity tolerance: Fair    Discharge recommendations: Discharge Facility/Level Of Care Needs: home     Barriers to discharge: Barriers to Discharge: None (Patient's Daughter stated the family can provide 24 hour assistance)    Equipment recommendations: Equipment Needed After Discharge: none     GOALS:    Physical Therapy Goals        Problem: Physical Therapy Goal    Goal Priority Disciplines Outcome Goal Variances Interventions   Physical Therapy Goal     PT/OT, PT Ongoing (interventions implemented as appropriate)     Description:  Goals to be met by: 2017    Patient will increase functional independence with mobility by performin. Sit to stand transfer with Modified [place] using single point cane - not met  2. Gait  x 400 feet with Modified [place] using Single-point Cane . - not met  3. Lower extremity exercise program x15 reps per handout, with independence - not met                      PLAN:    Patient to be seen 3 x/week  to address the above listed problems via gait training, therapeutic activities, therapeutic exercises, neuromuscular re-education  Plan of Care expires: 17  Plan of Care reviewed with: patient, daughter         Alex PATRICK Yeimi AMIN, PTA  2017

## 2017-11-01 NOTE — ASSESSMENT & PLAN NOTE
- see above  -TAVR team have seen and this is to be expected, she's mobilizing her fluid faster than her heart and renal function can keep up

## 2017-11-01 NOTE — PLAN OF CARE
Problem: Patient Care Overview  Goal: Plan of Care Review  Outcome: Revised  Plan of care discussed with patient. Pt receiving IVP lasix BID, diuresing well. Patient reported that her breathing is much better today. Patient is free of fall/trauma/injury, fall precautions in place. PRN tizanidine given for back spasms. PRN ramelteon given for sleep. All questions addressed. Will continue to monitor.

## 2017-11-01 NOTE — PLAN OF CARE
Problem: Patient Care Overview  Goal: Plan of Care Review  Outcome: Ongoing (interventions implemented as appropriate)  Pt remains free from falls and injury. Plan of care reviewed with pt. Pt understands plan. Pt denies pain, VSS. Plans for the start of torsemide PO today or tomorrow was discussed.  Daughter as bedside. Will continue to monitor.

## 2017-11-01 NOTE — PLAN OF CARE
Problem: Physical Therapy Goal  Goal: Physical Therapy Goal  Goals to be met by: 2017    Patient will increase functional independence with mobility by performin. Sit to stand transfer with Modified [place] using single point cane - not met  2. Gait  x 400 feet with Modified [place] using Single-point Cane . - not met  3. Lower extremity exercise program x15 reps per handout, with independence - not met     Patient goals remain appropriate.  Patient will continue to benefit from further PT services.  Alex Jalloh, PTA

## 2017-11-01 NOTE — SUBJECTIVE & OBJECTIVE
Interval History: Patient slept very well after zanaflex last night.  She's feeling much better with volume removal.  Weaned off oxygen.    Review of Systems   Constitutional: Positive for fatigue and unexpected weight change. Negative for activity change, appetite change, chills, diaphoresis and fever.   HENT: Negative for congestion, rhinorrhea, sinus pain, sinus pressure, sneezing, sore throat and trouble swallowing.    Eyes: Negative.    Respiratory: Positive for shortness of breath (improving). Negative for cough, chest tightness and wheezing.    Cardiovascular: Positive for leg swelling. Negative for chest pain and palpitations.   Gastrointestinal: Negative for abdominal distention, abdominal pain, constipation, diarrhea, nausea and vomiting.   Endocrine: Negative.    Genitourinary: Negative for decreased urine volume, difficulty urinating, dysuria, enuresis and urgency.   Musculoskeletal: Positive for back pain. Negative for arthralgias and myalgias.        Back spasms   Skin: Negative.    Allergic/Immunologic: Negative.    Neurological: Positive for weakness. Negative for dizziness, syncope, light-headedness and headaches.   Hematological: Negative.    Psychiatric/Behavioral: Negative.      Objective:     Vital Signs (Most Recent):  Temp: 98.4 °F (36.9 °C) (11/01/17 1234)  Pulse: 60 (11/01/17 1459)  Resp: 18 (11/01/17 1234)  BP: (!) 141/66 (11/01/17 1234)  SpO2: (!) 90 % (11/01/17 1234) Vital Signs (24h Range):  Temp:  [97.8 °F (36.6 °C)-98.4 °F (36.9 °C)] 98.4 °F (36.9 °C)  Pulse:  [60] 60  Resp:  [14-18] 18  SpO2:  [90 %-99 %] 90 %  BP: (113-143)/(57-66) 141/66     Weight: 67.2 kg (148 lb 2.4 oz)  Body mass index is 24.65 kg/m².    Intake/Output Summary (Last 24 hours) at 11/01/17 2714  Last data filed at 11/01/17 1300   Gross per 24 hour   Intake              800 ml   Output             1250 ml   Net             -450 ml      Physical Exam   Constitutional: She is oriented to person, place, and time. She  appears well-developed and well-nourished.   HENT:   Head: Normocephalic and atraumatic.   Right Ear: External ear normal.   Left Ear: External ear normal.   Nose: Nose normal.   Mouth/Throat: Mucous membranes are normal. Normal dentition.   Eyes: Conjunctivae, EOM and lids are normal.   Neck: Normal range of motion. Neck supple. Hepatojugular reflux and JVD present.   Cardiovascular: Normal rate, regular rhythm, normal heart sounds and intact distal pulses.  Exam reveals no gallop and no friction rub.    No murmur heard.  Pulmonary/Chest: Effort normal. She has rales (half way up bilaterally). She exhibits no tenderness.   Abdominal: Soft. Bowel sounds are normal. She exhibits no distension. There is no tenderness.   Musculoskeletal: Normal range of motion. She exhibits edema (1+ BLE ). She exhibits no deformity.   Neurological: She is alert and oriented to person, place, and time. No cranial nerve deficit.   Skin: Skin is warm and dry. No rash noted. No erythema.   Psychiatric: She has a normal mood and affect. Judgment and thought content normal.   Nursing note and vitals reviewed.      Significant Labs:   CBC:     Recent Labs  Lab 10/31/17  0421   WBC 8.52   HGB 10.1*   HCT 32.7*        CMP:     Recent Labs  Lab 10/31/17  0421 11/01/17  0744    137   K 3.7 3.7   CL 99 97   CO2 31* 33*   GLU 88 96   BUN 17 22   CREATININE 0.8 0.8   CALCIUM 8.9 9.0   PROT 6.4 6.2   ALBUMIN 3.2* 3.1*   BILITOT 0.9 1.0   ALKPHOS 69 68   AST 22 20   ALT 12 11   ANIONGAP 8 7*   EGFRNONAA >60.0 >60.0      Magnesium:     Recent Labs  Lab 10/31/17  0421 11/01/17  0744   MG 2.1 2.2     Significant Imaging: I have reviewed and interpreted all pertinent imaging results/findings within the past 24 hours.

## 2017-11-02 DIAGNOSIS — I50.33 ACUTE ON CHRONIC DIASTOLIC HEART FAILURE: Primary | ICD-10-CM

## 2017-11-02 LAB
ALBUMIN SERPL BCP-MCNC: 3.3 G/DL
ALP SERPL-CCNC: 71 U/L
ALT SERPL W/O P-5'-P-CCNC: 10 U/L
ANION GAP SERPL CALC-SCNC: 10 MMOL/L
ANION GAP SERPL CALC-SCNC: 9 MMOL/L
AST SERPL-CCNC: 22 U/L
BILIRUB SERPL-MCNC: 1 MG/DL
BUN SERPL-MCNC: 24 MG/DL
BUN SERPL-MCNC: 26 MG/DL
CALCIUM SERPL-MCNC: 9.4 MG/DL
CALCIUM SERPL-MCNC: 9.4 MG/DL
CHLORIDE SERPL-SCNC: 95 MMOL/L
CHLORIDE SERPL-SCNC: 96 MMOL/L
CO2 SERPL-SCNC: 31 MMOL/L
CO2 SERPL-SCNC: 33 MMOL/L
CREAT SERPL-MCNC: 0.8 MG/DL
CREAT SERPL-MCNC: 0.9 MG/DL
EST. GFR  (AFRICAN AMERICAN): >60 ML/MIN/1.73 M^2
EST. GFR  (AFRICAN AMERICAN): >60 ML/MIN/1.73 M^2
EST. GFR  (NON AFRICAN AMERICAN): 56.5 ML/MIN/1.73 M^2
EST. GFR  (NON AFRICAN AMERICAN): >60 ML/MIN/1.73 M^2
GLUCOSE SERPL-MCNC: 113 MG/DL
GLUCOSE SERPL-MCNC: 126 MG/DL
INR PPP: 2.6
MAGNESIUM SERPL-MCNC: 2 MG/DL
MAGNESIUM SERPL-MCNC: 2.2 MG/DL
POTASSIUM SERPL-SCNC: 3.4 MMOL/L
POTASSIUM SERPL-SCNC: 4.7 MMOL/L
PROT SERPL-MCNC: 6.8 G/DL
PROTHROMBIN TIME: 26.1 SEC
SODIUM SERPL-SCNC: 137 MMOL/L
SODIUM SERPL-SCNC: 137 MMOL/L

## 2017-11-02 PROCEDURE — 20600001 HC STEP DOWN PRIVATE ROOM

## 2017-11-02 PROCEDURE — 94761 N-INVAS EAR/PLS OXIMETRY MLT: CPT

## 2017-11-02 PROCEDURE — 94640 AIRWAY INHALATION TREATMENT: CPT

## 2017-11-02 PROCEDURE — 25000003 PHARM REV CODE 250: Performed by: NURSE PRACTITIONER

## 2017-11-02 PROCEDURE — 80048 BASIC METABOLIC PNL TOTAL CA: CPT

## 2017-11-02 PROCEDURE — 83735 ASSAY OF MAGNESIUM: CPT

## 2017-11-02 PROCEDURE — 25000003 PHARM REV CODE 250

## 2017-11-02 PROCEDURE — A4216 STERILE WATER/SALINE, 10 ML: HCPCS | Performed by: NURSE PRACTITIONER

## 2017-11-02 PROCEDURE — 36415 COLL VENOUS BLD VENIPUNCTURE: CPT

## 2017-11-02 PROCEDURE — 25000003 PHARM REV CODE 250: Performed by: HOSPITALIST

## 2017-11-02 PROCEDURE — 85610 PROTHROMBIN TIME: CPT

## 2017-11-02 PROCEDURE — 99900035 HC TECH TIME PER 15 MIN (STAT)

## 2017-11-02 PROCEDURE — 25000242 PHARM REV CODE 250 ALT 637 W/ HCPCS: Performed by: NURSE PRACTITIONER

## 2017-11-02 PROCEDURE — 80053 COMPREHEN METABOLIC PANEL: CPT

## 2017-11-02 PROCEDURE — 63600175 PHARM REV CODE 636 W HCPCS: Performed by: NURSE PRACTITIONER

## 2017-11-02 PROCEDURE — 97530 THERAPEUTIC ACTIVITIES: CPT

## 2017-11-02 PROCEDURE — 99233 SBSQ HOSP IP/OBS HIGH 50: CPT | Mod: ,,, | Performed by: NURSE PRACTITIONER

## 2017-11-02 RX ORDER — TRAZODONE HYDROCHLORIDE 50 MG/1
100 TABLET ORAL NIGHTLY PRN
Status: DISCONTINUED | OUTPATIENT
Start: 2017-11-02 | End: 2017-11-12 | Stop reason: HOSPADM

## 2017-11-02 RX ORDER — POTASSIUM CHLORIDE 750 MG/1
60 CAPSULE, EXTENDED RELEASE ORAL ONCE
Status: COMPLETED | OUTPATIENT
Start: 2017-11-02 | End: 2017-11-02

## 2017-11-02 RX ORDER — FUROSEMIDE 10 MG/ML
60 INJECTION INTRAMUSCULAR; INTRAVENOUS 2 TIMES DAILY
Status: DISCONTINUED | OUTPATIENT
Start: 2017-11-02 | End: 2017-11-03

## 2017-11-02 RX ADMIN — POTASSIUM CHLORIDE 60 MEQ: 750 CAPSULE, EXTENDED RELEASE ORAL at 09:11

## 2017-11-02 RX ADMIN — TIZANIDINE 2 MG: 2 TABLET ORAL at 04:11

## 2017-11-02 RX ADMIN — SIMVASTATIN 20 MG: 20 TABLET, FILM COATED ORAL at 09:11

## 2017-11-02 RX ADMIN — Medication 3 ML: at 06:11

## 2017-11-02 RX ADMIN — TRAZODONE HYDROCHLORIDE 100 MG: 50 TABLET ORAL at 09:11

## 2017-11-02 RX ADMIN — FUROSEMIDE 60 MG: 10 INJECTION, SOLUTION INTRAVENOUS at 05:11

## 2017-11-02 RX ADMIN — ASPIRIN 81 MG CHEWABLE TABLET 81 MG: 81 TABLET CHEWABLE at 09:11

## 2017-11-02 RX ADMIN — DOCUSATE SODIUM 100 MG: 100 CAPSULE, LIQUID FILLED ORAL at 09:11

## 2017-11-02 RX ADMIN — METOPROLOL TARTRATE 50 MG: 25 TABLET ORAL at 09:11

## 2017-11-02 RX ADMIN — WARFARIN SODIUM 2 MG: 2 TABLET ORAL at 05:11

## 2017-11-02 RX ADMIN — FUROSEMIDE 60 MG: 10 INJECTION, SOLUTION INTRAVENOUS at 09:11

## 2017-11-02 RX ADMIN — Medication 3 ML: at 02:11

## 2017-11-02 RX ADMIN — Medication 3 ML: at 09:11

## 2017-11-02 RX ADMIN — IPRATROPIUM BROMIDE AND ALBUTEROL SULFATE 3 ML: .5; 3 SOLUTION RESPIRATORY (INHALATION) at 12:11

## 2017-11-02 RX ADMIN — HYDROCODONE BITARTRATE AND ACETAMINOPHEN 1 TABLET: 5; 325 TABLET ORAL at 09:11

## 2017-11-02 RX ADMIN — RAMELTEON 8 MG: 8 TABLET, FILM COATED ORAL at 02:11

## 2017-11-02 RX ADMIN — DIGOXIN 0.25 MG: 250 TABLET ORAL at 09:11

## 2017-11-02 NOTE — PLAN OF CARE
Problem: Patient Care Overview  Goal: Plan of Care Review  Outcome: Revised  Plan of care discussed with patient. Patient is free of fall/trauma/injury. Denies CP, SOB, or pain/discomfort. All questions addressed. Will continue to monitor.

## 2017-11-02 NOTE — PROGRESS NOTES
Pt c/o bilateral arm cramping. Potassium 3.7, mag 2.2. Dr. Edwards notified. Stated he will order 40meq  potassiumX1, recheck labs in AM. Will carry out and continue to monitor.

## 2017-11-02 NOTE — PLAN OF CARE
Problem: Occupational Therapy Goal  Goal: Occupational Therapy Goal  Goals to be met by: 7 days (11/7/17)     Patient will increase functional independence with ADLs by performing:    UE Dressing with Supervision.  LE Dressing with Supervision.  Grooming while standing at sink with Supervision.  Toileting from toilet with Supervision for hygiene and clothing management.   Supine to sit with [place].  Toilet transfer to toilet with Supervision.  Pt will perform functional mobility household distance with supervision and AD as needed.     Outcome: Ongoing (interventions implemented as appropriate)  Continue OT plan of care.

## 2017-11-02 NOTE — PT/OT/SLP PROGRESS
"Occupational Therapy  Treatment    Adela Garay   MRN: [id number]   Admitting Diagnosis: Acute on chronic diastolic heart failure    OT Date of Treatment: 11/02/17   OT Start Time: 1410  OT Stop Time: 1433  OT Total Time (min): 23 min    Billable Minutes:  Therapeutic Activity 23 minutes    General Precautions: Standard, fall, pacemaker  Orthopedic Precautions: N/A  Braces: UE Sling (L UE sling at night)    Do you have any cultural, spiritual, Jain conflicts, given your current situation?: None    Subjective:  Communicated with RN prior to session.  "I want to be more independent and be able to get up on my own." Pt also reported she was disappointed in B LE fluid.    Pain/Comfort  Pain Rating 1: 0/10  Pain Rating Post-Intervention 1: 0/10    Objective:  Patient found with: telemetry     Functional Mobility:  Bed Mobility: NT as pt up in chair     Transfers:  Sit <> Stand Assistance: Minimum Assistance 1st attempt from bedside chair, CGA 2nd attempt  Sit <> Stand Assistive Device: Straight Cane    Functional Ambulation: within room and hallway ~200 ft with CGA using straight cane    Activities of Daily Living:  UE Dressing Level of Assistance: Minimum assistance to don gown around back    Balance:   Static Sit: NORMAL: No deviations seen in posture held statically  Dynamic Sit: NORMAL: No deviations seen in posture held dynamically  Static Stand: FAIR+: Takes MINIMAL challenges from all directions  Dynamic stand: FAIR: Needs CONTACT GUARD during gait    Therapeutic Activities and Exercises:  Pt up in chair with daughter present; reports she just finished taking a bath and ADLs in bathroom, agreeable to functional mobility; required Min A for T/F from bedside chair and performed functional mobility as described above; returned to bedside chair and performed one more standing trial requiring CGA; pt left with B LE elevated for edema reduction and educated on ankle pumps to perform throughout the day   O2 sats " "monitored throughout session and pt on room air-- prior to ambulation, O2 97%, after ambulation 96% and pt with no c/o SOB    AM-PAC 6 CLICK ADL   How much help from another person does this patient currently need?   1 = Unable, Total/Dependent Assistance  2 = A lot, Maximum/Moderate Assistance  3 = A little, Minimum/Contact Guard/Supervision  4 = None, Modified [place]/Independent    Putting on and taking off regular lower body clothing? : 3  Bathing (including washing, rinsing, drying)?: 3  Toileting, which includes using toilet, bedpan, or urinal? : 3  Putting on and taking off regular upper body clothing?: 3  Taking care of personal grooming such as brushing teeth?: 4  Eating meals?: 4  Total Score: 20     AM-PAC Raw Score CMS "G-Code Modifier Level of Impairment Assistance   6 % Total / Unable   7 - 8 CM 80 - 100% Maximal Assist   9-13 CL 60 - 80% Moderate Assist   14 - 19 CK 40 - 60% Moderate Assist   20 - 22 CJ 20 - 40% Minimal Assist   23 CI 1-20% SBA / CGA   24 CH 0% Independent/ Mod I       Patient left up in chair with all lines intact, call button in reach and daughter present    ASSESSMENT:  Adela Garay is a 89 y.o. female with a medical diagnosis of Acute on chronic diastolic heart failure and presents with good effort and participation in therapy. Pt would continue to benefit from OT to increase independence. Safe to return home with family assistance when medically appropriate.     Rehab identified problem list/impairments: Rehab identified problem list/impairments: weakness, impaired endurance, impaired self care skills, impaired functional mobilty, impaired balance, impaired cardiopulmonary response to activity, orthopedic precautions    Rehab potential is good.    Activity tolerance: Good    Discharge recommendations: Discharge Facility/Level Of Care Needs: home     Barriers to discharge: Barriers to Discharge: None (has family assist)    Equipment recommendations: none "     GOALS:    Occupational Therapy Goals        Problem: Occupational Therapy Goal    Goal Priority Disciplines Outcome Interventions   Occupational Therapy Goal     OT, PT/OT Ongoing (interventions implemented as appropriate)    Description:  Goals to be met by: 7 days (11/7/17)     Patient will increase functional independence with ADLs by performing:    UE Dressing with Supervision.  LE Dressing with Supervision.  Grooming while standing at sink with Supervision.  Toileting from toilet with Supervision for hygiene and clothing management.   Supine to sit with [place].  Toilet transfer to toilet with Supervision.  Pt will perform functional mobility household distance with supervision and AD as needed.                      Plan:  Patient to be seen 3 x/week to address the above listed problems via self-care/home management, therapeutic activities, therapeutic exercises  Plan of Care expires: 11/30/17  Plan of Care reviewed with: patient, daughter         Priscilla LAUREANO Palomares  11/02/2017

## 2017-11-02 NOTE — ASSESSMENT & PLAN NOTE
-satting 88-89%, requiring oxygen again, still with bilateral pulm edema  -duo nebs q8H  -repeat CXR post diuresis as necessary

## 2017-11-02 NOTE — PLAN OF CARE
Problem: Patient Care Overview  Goal: Plan of Care Review  Outcome: Ongoing (interventions implemented as appropriate)  Pt remains free from falls and injury. Plan of care reviewed with pt. Pt understands plan. Pt denies pain, VSS. Plans to increase IV lasix to 60 mg was discussed. Crackles at lungs of bases still present. Pt placed back on 2L NC due to sats dropping 88-91% on RA with episodes of SOB. Respiratory tx given.  Will continue to monitor.

## 2017-11-02 NOTE — PROGRESS NOTES
[facility] Hospital Medicine  Progress Note    Patient Name: Adela Garay  MRN: [id number]  Patient Class: IP- Inpatient   Admission Date: 10/30/2017  Length of Stay: 3 days  Attending Physician: Justice Alexander MD  Primary Care Provider: Torrie Farrell MD    [facility] Medicine Team: [facility] MED J Liyah Dover NP    Subjective:     Principal Problem:Acute on chronic diastolic heart failure    HPI:  Ms. Garay is a 89 year old  woman with past medical history of HTN, HLD, DM, aFib, HFpEF, and recent TAVR complicated by PPM placement for CHB ([place] Valve 29mm ) on 10/17/17 for severe AS who presents to the ED with complaints of leg swelling. She states that she has been having issues since she went home after the surgery. She endorses orthopnea, ESTRADA, new cough, leg swelling, and weight gain. Denies issues like this before. Denies Fever. Denies chest pain.     While in ED, chemistry was unremarkable, BNP elevated at 918, troponin negative and CBC stable. CXR with bilateral pleural effusion larger on the right side and atelectatic changes at the lung bases; EKG with atrial fibrillation with paced ventricular rhythm.    The patient was admitted to the Hospital Medicine Service for further evaluation and management.     Hospital Course:  Ms. Garay was admitted 10/30 s/p recent TAVR and PPM with acute on chronic diastolic heart failure and edema. She was evaluated by cardiology in ED and assessed by TAVR team in house by Dr. Jarvis. Diuresis initiated with lasix 40 mg IVP BID, dry weight closer to 143-5 lbs per daughter, admitted at 151 lbs.  Diuresing well, oxygen sat down to 88-89% requiring re-institution of oxgyen, lungs still sound wet on exam, + jvd, + peripheral edema still, will up lasix to 60mg iv bid and reassess in afternoon.  Once she's dry will transition to torsemide for discharge planning.      Interval History: Patient had an episode of dyspnea/ orthopnea overnight.   Did not sleep very well despite ramelteon.  Oxygen sat back down to 88-89%, resumed oxygen.  Titrating up on diuresis    Review of Systems   Constitutional: Positive for fatigue and unexpected weight change. Negative for activity change, appetite change, chills, diaphoresis and fever.   HENT: Negative for congestion, rhinorrhea, sinus pain, sinus pressure, sneezing, sore throat and trouble swallowing.    Eyes: Negative.    Respiratory: Positive for shortness of breath (improving). Negative for cough, chest tightness and wheezing.    Cardiovascular: Positive for leg swelling. Negative for chest pain and palpitations.   Gastrointestinal: Negative for abdominal distention, abdominal pain, constipation, diarrhea, nausea and vomiting.   Endocrine: Negative.    Genitourinary: Negative for decreased urine volume, difficulty urinating, dysuria, enuresis and urgency.   Musculoskeletal: Positive for back pain. Negative for arthralgias and myalgias.        Back spasms   Skin: Negative.    Allergic/Immunologic: Negative.    Neurological: Positive for weakness. Negative for dizziness, syncope, light-headedness and headaches.   Hematological: Negative.    Psychiatric/Behavioral: Negative.      Objective:     Vital Signs (Most Recent):  Temp: 98.1 °F (36.7 °C) (11/02/17 1240)  Pulse: 60 (11/02/17 1240)  Resp: 18 (11/02/17 1240)  BP: (!) 146/63 (11/02/17 1240)  SpO2: (!) 91 % (11/02/17 1240) Vital Signs (24h Range):  Temp:  [98.1 °F (36.7 °C)-98.4 °F (36.9 °C)] 98.1 °F (36.7 °C)  Pulse:  [60-82] 60  Resp:  [18] 18  SpO2:  [91 %-98 %] 91 %  BP: (115-167)/(53-71) 146/63     Weight: 66 kg (145 lb 8.1 oz)  Body mass index is 24.21 kg/m².    Intake/Output Summary (Last 24 hours) at 11/02/17 1644  Last data filed at 11/02/17 1145   Gross per 24 hour   Intake              330 ml   Output             2600 ml   Net            -2270 ml      Physical Exam   Constitutional: She is oriented to person, place, and time. She appears  well-developed and well-nourished.   HENT:   Head: Normocephalic and atraumatic.   Right Ear: External ear normal.   Left Ear: External ear normal.   Nose: Nose normal.   Mouth/Throat: Mucous membranes are normal. Normal dentition.   Eyes: Conjunctivae, EOM and lids are normal.   Neck: Normal range of motion. Neck supple. Hepatojugular reflux and JVD present.   Cardiovascular: Normal rate, regular rhythm, normal heart sounds and intact distal pulses.  Exam reveals no gallop and no friction rub.    No murmur heard.  Pulmonary/Chest: Effort normal. She has rales (half way up bilaterally). She exhibits no tenderness.   Abdominal: Soft. Bowel sounds are normal. She exhibits no distension. There is no tenderness.   Musculoskeletal: Normal range of motion. She exhibits edema (1+ BLE ). She exhibits no deformity.   Neurological: She is alert and oriented to person, place, and time. No cranial nerve deficit.   Skin: Skin is warm and dry. No rash noted. No erythema.   Psychiatric: She has a normal mood and affect. Judgment and thought content normal.   Nursing note and vitals reviewed.      Significant Labs:   CMP:     Recent Labs  Lab 11/01/17  0744 11/02/17  0504 11/02/17  1449    137 137   K 3.7 3.4* 4.7   CL 97 95 96   CO2 33* 33* 31*   GLU 96 113* 126*   BUN 22 24* 26*   CREATININE 0.8 0.8 0.9   CALCIUM 9.0 9.4 9.4   PROT 6.2 6.8  --    ALBUMIN 3.1* 3.3*  --    BILITOT 1.0 1.0  --    ALKPHOS 68 71  --    AST 20 22  --    ALT 11 10  --    ANIONGAP 7* 9 10   EGFRNONAA >60.0 >60.0 56.5*      Magnesium:     Recent Labs  Lab 11/01/17  0744 11/02/17  0504 11/02/17  1449   MG 2.2 2.2 2.0     Significant Imaging: I have reviewed and interpreted all pertinent imaging results/findings within the past 24 hours.    Assessment/Plan:      * Acute on chronic diastolic heart failure    - BNP elevated at 918, troponin negative and CBC/ CMP stable  - CXR with bilateral pleural effusion larger on the right side and atelectatic  changes at the lung bases  - EKG with atrial fibrillation with paced ventricular rhythm  - Cardiology evaluated in ED, Interventional TAVR team consulted due to recent procedure  - 2D echo EF 50% mod MR, severe TR, PAP 40, RAP 15  - diuresis initiated with lasix 40 mg IVP BID, she's mobilizing her fluid faster than her heart and renal function can keep up    - resume home BB and digoxin   - continue tele monitoring  - EKG PRN chest pain or arrhythmia  - hold triamterene HCTZ for now, while using IV        S/P TAVR (transcatheter aortic valve replacement)    - see above  -TAVR team have seen and this is to be expected, she's mobilizing her fluid faster than her heart and renal function can keep up        Complete heart block, post-surgical    - st christian device placed post TAVR  - pacer programming: Wound check done at bedside.   - Device/lead wnl.   - Educated pt on wound care, home monitoring, arm restrictions.   - Issued Merlin home  monitor with instructions.   - F/u in clinic in 3mos with Dr. Delgado.        Atrial fibrillation    - rate control, warfarin           Long-term (current) use of anticoagulants, INR goal 2.0-3.0    - INR currently 2., home warfarin  - daily PT/INR  - f/u with Dr. Ivy        Hx-TIA (transient ischemic attack)    -continue ASA        Hyperlipidemia    -continue statin         Controlled type 2 diabetes mellitus with microalbuminuria    -HgA1C 5.6 last in May 6.0  - diet controlled  -cardiac/diabetic diet in house        Non-productive cough    -weaned off oxygen   -duo nebs q8H  -repeat CXR post diuresis as necessary        Coronary artery disease involving native coronary artery of native heart without angina pectoris    -chest pain free, EKG without acute ischemic changes  -continue ASA, BB, and statin         Mild major depression    -continue home trazodone  -no SI/HI        DDD (degenerative disc disease), cervical    -PRN tylenol and oxycodone        Benign hypertensive heart  disease with congestive heart failure    - continue home BB, digoxin, and hold triamterene /hctz for now  - BP elevated in ED, now improving, continue to monitor         Severe aortic stenosis by prior echocardiogram    -see above and HPI          VTE Risk Mitigation         Ordered     warfarin (COUMADIN) tablet 2 mg  Daily     Route:  Oral        11/01/17 0739     Medium Risk of VTE  Once      10/30/17 2102     Place JONO seamane  Until discontinued      10/30/17 2102              Liyah Dover NP  Department of Hospital Medicine   [facility]  Spectra:  [phone]  Pager: [phone]

## 2017-11-02 NOTE — SUBJECTIVE & OBJECTIVE
Interval History: Patient had an episode of dyspnea/ orthopnea overnight.  Did not sleep very well despite ramelteon.  Oxygen sat back down to 88-89%, resumed oxygen.  Titrating up on diuresis    Review of Systems   Constitutional: Positive for fatigue and unexpected weight change. Negative for activity change, appetite change, chills, diaphoresis and fever.   HENT: Negative for congestion, rhinorrhea, sinus pain, sinus pressure, sneezing, sore throat and trouble swallowing.    Eyes: Negative.    Respiratory: Positive for shortness of breath (improving). Negative for cough, chest tightness and wheezing.    Cardiovascular: Positive for leg swelling. Negative for chest pain and palpitations.   Gastrointestinal: Negative for abdominal distention, abdominal pain, constipation, diarrhea, nausea and vomiting.   Endocrine: Negative.    Genitourinary: Negative for decreased urine volume, difficulty urinating, dysuria, enuresis and urgency.   Musculoskeletal: Positive for back pain. Negative for arthralgias and myalgias.        Back spasms   Skin: Negative.    Allergic/Immunologic: Negative.    Neurological: Positive for weakness. Negative for dizziness, syncope, light-headedness and headaches.   Hematological: Negative.    Psychiatric/Behavioral: Negative.      Objective:     Vital Signs (Most Recent):  Temp: 98.1 °F (36.7 °C) (11/02/17 1240)  Pulse: 60 (11/02/17 1240)  Resp: 18 (11/02/17 1240)  BP: (!) 146/63 (11/02/17 1240)  SpO2: (!) 91 % (11/02/17 1240) Vital Signs (24h Range):  Temp:  [98.1 °F (36.7 °C)-98.4 °F (36.9 °C)] 98.1 °F (36.7 °C)  Pulse:  [60-82] 60  Resp:  [18] 18  SpO2:  [91 %-98 %] 91 %  BP: (115-167)/(53-71) 146/63     Weight: 66 kg (145 lb 8.1 oz)  Body mass index is 24.21 kg/m².    Intake/Output Summary (Last 24 hours) at 11/02/17 1644  Last data filed at 11/02/17 1145   Gross per 24 hour   Intake              330 ml   Output             2600 ml   Net            -2270 ml      Physical Exam    Constitutional: She is oriented to person, place, and time. She appears well-developed and well-nourished.   HENT:   Head: Normocephalic and atraumatic.   Right Ear: External ear normal.   Left Ear: External ear normal.   Nose: Nose normal.   Mouth/Throat: Mucous membranes are normal. Normal dentition.   Eyes: Conjunctivae, EOM and lids are normal.   Neck: Normal range of motion. Neck supple. Hepatojugular reflux and JVD present.   Cardiovascular: Normal rate, regular rhythm, normal heart sounds and intact distal pulses.  Exam reveals no gallop and no friction rub.    No murmur heard.  Pulmonary/Chest: Effort normal. She has rales (half way up bilaterally). She exhibits no tenderness.   Abdominal: Soft. Bowel sounds are normal. She exhibits no distension. There is no tenderness.   Musculoskeletal: Normal range of motion. She exhibits edema (1+ BLE ). She exhibits no deformity.   Neurological: She is alert and oriented to person, place, and time. No cranial nerve deficit.   Skin: Skin is warm and dry. No rash noted. No erythema.   Psychiatric: She has a normal mood and affect. Judgment and thought content normal.   Nursing note and vitals reviewed.      Significant Labs:   CMP:     Recent Labs  Lab 11/01/17  0744 11/02/17  0504 11/02/17  1449    137 137   K 3.7 3.4* 4.7   CL 97 95 96   CO2 33* 33* 31*   GLU 96 113* 126*   BUN 22 24* 26*   CREATININE 0.8 0.8 0.9   CALCIUM 9.0 9.4 9.4   PROT 6.2 6.8  --    ALBUMIN 3.1* 3.3*  --    BILITOT 1.0 1.0  --    ALKPHOS 68 71  --    AST 20 22  --    ALT 11 10  --    ANIONGAP 7* 9 10   EGFRNONAA >60.0 >60.0 56.5*      Magnesium:     Recent Labs  Lab 11/01/17 0744 11/02/17  0504 11/02/17  1449   MG 2.2 2.2 2.0     Significant Imaging: I have reviewed and interpreted all pertinent imaging results/findings within the past 24 hours.

## 2017-11-02 NOTE — ASSESSMENT & PLAN NOTE
- BNP elevated at 918, troponin negative and CBC/ CMP stable  - CXR with bilateral pleural effusion larger on the right side and atelectatic changes at the lung bases  - EKG with atrial fibrillation with paced ventricular rhythm  - Cardiology evaluated in ED, Interventional TAVR team consulted due to recent procedure  - 2D echo EF 50% mod MR, severe TR, PAP 40, RAP 15  - diuresis initiated with lasix 40 mg IVP BID, she's mobilizing her fluid faster than her heart and renal function can keep up, still with significant pulm edema, increase lasix to 60mg iv bid    - resume home BB and digoxin   - continue tele monitoring  - EKG PRN chest pain or arrhythmia  - hold triamterene HCTZ for now, while using IV

## 2017-11-02 NOTE — PROGRESS NOTES
Digital Medicine Heart Failure Program consult complete. Patient sitting up in bedside chair, daughter at bedside. Mrs. Garay was able to complete RN assessment without difficulty, daughter contributed. Explained program details including home monitoring expectations, scale + router setup, weekly PharmD calls, and follow up appointment with labs. Addressed pt's questions and concerns to his/her satisfaction. Reviewed blue educational folder (Welcome letter, PharmD contact number, Heart Failure Zones, and program booklet.) Pt verbalized understanding of all discussed and gave consent to enroll in 30-day monitoring program.    H&P on admit: Ms. Garay is a 89 year old  woman with past medical history of HTN, HLD, DM, aFib, HFpEF, and recent TAVR complicated by PPM placement for CHB (Portico Valve 29mm ) on 10/17/17 for severe AS who presents to the ED with complaints of leg swelling. She states that she has been having issues since she went home after the surgery. She endorses orthopnea, ESTRADA, new cough, leg swelling, and weight gain. Denies issues like this before. Denies Fever. Denies chest pain.      While in ED, chemistry was unremarkable, BNP elevated at 918, troponin negative and CBC stable. CXR with bilateral pleural effusion larger on the right side and atelectatic changes at the lung bases; EKG with atrial fibrillation with paced ventricular rhythm.    Issues: poor dietary compliance (reviewed low sodium diet (1,500 mg/day) + fluid restriction (50 oz))    Mrs. Garay lives alone near [place], [place]. She has 6 children -- most are local and will be available to help her daily, daughter at bedside in from [place]. Patient is able to drive her own vehicle but may rely on her children for appointments until further notice. Denies issues with insurance coverage or Rx affordability. Daughter reports her mother has always led a very active lifestyle and she has experienced some depression recently while recovering  from her recent procedure (TAVR). Encouraged patient to rest and allow her body to recover once she is home. Compliant with home medication regimen. Patient and family are aware of dietary changes that are needed. We discussed her intake at length -- white bread, crackers with butter, cereal with milk, eggs, sausage, frederick, biscuits, salted mixed nuts, ice cream + chocolate candies, beef stroganoff, salads (iceberg) with italian dressing or oil/vinegar, ham or turkey with swiss cheese, soups / gumbos, fried chicken or fish, boiled seafood, etc. Vegetable intake is limited per daughter due to Coumadin, however patient reports eating canned green beans & brussels sprouts. Family members contribute to majority of patient's meals. We reviewed HF Zones sheet, 3 dietary sheets, and 7 day sample menu. Discussed sodium and fluid restrictions. Encouraged patient & family to communicate any questions or concerns with the care team while in the program.     Contact information:  [phone] (home)   [phone] (mobile)    Extended Emergency Contact Information  Primary Emergency Contact: Mayte Mcghee   [facility]  Home Phone: [phone]  Mobile Phone: [phone]  Relation: Daughter  Secondary Emergency Contact: Monica Garay   [facility]  Home Phone: [phone]  Relation: Daughter    Consults placed: Dietary  Readmission Risk: 25%  CHF Kit Name: CARMELINA [id number] linked to pt with most recent inpatient dry weight (145lb).  Follow-up appointment scheduled on 11/13/2017 with labs at 8:40A + HF Clinic at 9:00A with Dr. Ivy (WB per pt request).

## 2017-11-03 LAB
ALBUMIN SERPL BCP-MCNC: 3.1 G/DL
ALP SERPL-CCNC: 68 U/L
ALT SERPL W/O P-5'-P-CCNC: 10 U/L
ANION GAP SERPL CALC-SCNC: 11 MMOL/L
ANION GAP SERPL CALC-SCNC: 6 MMOL/L
AST SERPL-CCNC: 21 U/L
BILIRUB SERPL-MCNC: 1.1 MG/DL
BUN SERPL-MCNC: 22 MG/DL
BUN SERPL-MCNC: 23 MG/DL
CALCIUM SERPL-MCNC: 9.2 MG/DL
CALCIUM SERPL-MCNC: 9.5 MG/DL
CHLORIDE SERPL-SCNC: 94 MMOL/L
CHLORIDE SERPL-SCNC: 97 MMOL/L
CO2 SERPL-SCNC: 35 MMOL/L
CO2 SERPL-SCNC: 36 MMOL/L
CREAT SERPL-MCNC: 0.8 MG/DL
CREAT SERPL-MCNC: 0.9 MG/DL
EST. GFR  (AFRICAN AMERICAN): >60 ML/MIN/1.73 M^2
EST. GFR  (AFRICAN AMERICAN): >60 ML/MIN/1.73 M^2
EST. GFR  (NON AFRICAN AMERICAN): 56.5 ML/MIN/1.73 M^2
EST. GFR  (NON AFRICAN AMERICAN): >60 ML/MIN/1.73 M^2
GLUCOSE SERPL-MCNC: 103 MG/DL
GLUCOSE SERPL-MCNC: 129 MG/DL
INR PPP: 2.1
MAGNESIUM SERPL-MCNC: 2 MG/DL
POCT GLUCOSE: 106 MG/DL (ref 70–110)
POCT GLUCOSE: 122 MG/DL (ref 70–110)
POCT GLUCOSE: 151 MG/DL (ref 70–110)
POTASSIUM SERPL-SCNC: 3.5 MMOL/L
POTASSIUM SERPL-SCNC: 4.1 MMOL/L
PROT SERPL-MCNC: 6.3 G/DL
PROTHROMBIN TIME: 20.8 SEC
SODIUM SERPL-SCNC: 139 MMOL/L
SODIUM SERPL-SCNC: 140 MMOL/L

## 2017-11-03 PROCEDURE — 25000003 PHARM REV CODE 250

## 2017-11-03 PROCEDURE — A4216 STERILE WATER/SALINE, 10 ML: HCPCS | Performed by: NURSE PRACTITIONER

## 2017-11-03 PROCEDURE — 80048 BASIC METABOLIC PNL TOTAL CA: CPT

## 2017-11-03 PROCEDURE — 63600175 PHARM REV CODE 636 W HCPCS: Performed by: NURSE PRACTITIONER

## 2017-11-03 PROCEDURE — 99233 SBSQ HOSP IP/OBS HIGH 50: CPT | Mod: ,,, | Performed by: NURSE PRACTITIONER

## 2017-11-03 PROCEDURE — 94640 AIRWAY INHALATION TREATMENT: CPT

## 2017-11-03 PROCEDURE — 25000003 PHARM REV CODE 250: Performed by: NURSE PRACTITIONER

## 2017-11-03 PROCEDURE — 25000242 PHARM REV CODE 250 ALT 637 W/ HCPCS: Performed by: NURSE PRACTITIONER

## 2017-11-03 PROCEDURE — 36415 COLL VENOUS BLD VENIPUNCTURE: CPT

## 2017-11-03 PROCEDURE — 94761 N-INVAS EAR/PLS OXIMETRY MLT: CPT

## 2017-11-03 PROCEDURE — 85610 PROTHROMBIN TIME: CPT

## 2017-11-03 PROCEDURE — 20600001 HC STEP DOWN PRIVATE ROOM

## 2017-11-03 PROCEDURE — 80053 COMPREHEN METABOLIC PANEL: CPT

## 2017-11-03 PROCEDURE — 83735 ASSAY OF MAGNESIUM: CPT

## 2017-11-03 RX ORDER — TIZANIDINE 2 MG/1
2 TABLET ORAL EVERY 6 HOURS PRN
Status: DISCONTINUED | OUTPATIENT
Start: 2017-11-03 | End: 2017-11-12 | Stop reason: HOSPADM

## 2017-11-03 RX ORDER — POTASSIUM CHLORIDE 20 MEQ/1
40 TABLET, EXTENDED RELEASE ORAL ONCE
Status: COMPLETED | OUTPATIENT
Start: 2017-11-03 | End: 2017-11-03

## 2017-11-03 RX ORDER — WARFARIN 3 MG/1
3 TABLET ORAL DAILY
Status: DISCONTINUED | OUTPATIENT
Start: 2017-11-03 | End: 2017-11-05

## 2017-11-03 RX ORDER — FUROSEMIDE 10 MG/ML
60 INJECTION INTRAMUSCULAR; INTRAVENOUS 3 TIMES DAILY
Status: DISCONTINUED | OUTPATIENT
Start: 2017-11-03 | End: 2017-11-04

## 2017-11-03 RX ADMIN — Medication 3 ML: at 10:11

## 2017-11-03 RX ADMIN — WARFARIN SODIUM 3 MG: 3 TABLET ORAL at 04:11

## 2017-11-03 RX ADMIN — TIZANIDINE 2 MG: 2 TABLET ORAL at 06:11

## 2017-11-03 RX ADMIN — ASPIRIN 81 MG CHEWABLE TABLET 81 MG: 81 TABLET CHEWABLE at 08:11

## 2017-11-03 RX ADMIN — FUROSEMIDE 60 MG: 10 INJECTION, SOLUTION INTRAVENOUS at 03:11

## 2017-11-03 RX ADMIN — FUROSEMIDE 60 MG: 10 INJECTION, SOLUTION INTRAVENOUS at 08:11

## 2017-11-03 RX ADMIN — DOCUSATE SODIUM 100 MG: 100 CAPSULE, LIQUID FILLED ORAL at 09:11

## 2017-11-03 RX ADMIN — DIGOXIN 0.25 MG: 250 TABLET ORAL at 08:11

## 2017-11-03 RX ADMIN — SIMVASTATIN 20 MG: 20 TABLET, FILM COATED ORAL at 09:11

## 2017-11-03 RX ADMIN — METOPROLOL TARTRATE 50 MG: 25 TABLET ORAL at 09:11

## 2017-11-03 RX ADMIN — METOPROLOL TARTRATE 50 MG: 25 TABLET ORAL at 08:11

## 2017-11-03 RX ADMIN — IPRATROPIUM BROMIDE AND ALBUTEROL SULFATE 3 ML: .5; 3 SOLUTION RESPIRATORY (INHALATION) at 08:11

## 2017-11-03 RX ADMIN — POTASSIUM CHLORIDE 40 MEQ: 1500 TABLET, EXTENDED RELEASE ORAL at 06:11

## 2017-11-03 RX ADMIN — IPRATROPIUM BROMIDE AND ALBUTEROL SULFATE 3 ML: .5; 3 SOLUTION RESPIRATORY (INHALATION) at 04:11

## 2017-11-03 RX ADMIN — DOCUSATE SODIUM 100 MG: 100 CAPSULE, LIQUID FILLED ORAL at 08:11

## 2017-11-03 RX ADMIN — FUROSEMIDE 60 MG: 10 INJECTION, SOLUTION INTRAVENOUS at 09:11

## 2017-11-03 RX ADMIN — HYDROCODONE BITARTRATE AND ACETAMINOPHEN 1 TABLET: 5; 325 TABLET ORAL at 09:11

## 2017-11-03 NOTE — PLAN OF CARE
Problem: Fall Risk (Adult)  Intervention: Reduce Risk/Promote Restraint Free Environment  Fall precautions reviewed with pt. And family

## 2017-11-03 NOTE — PROGRESS NOTES
Patient called 11/3/17 to inform us that she is admitted into ([facility]) as of 10/30/17.  C/S

## 2017-11-03 NOTE — ASSESSMENT & PLAN NOTE
- BNP elevated at 918, troponin negative and CBC/ CMP stable  - CXR with bilateral pleural effusion larger on the right side and atelectatic changes at the lung bases  - EKG with atrial fibrillation with paced ventricular rhythm  - Cardiology evaluated in ED, Interventional TAVR team consulted due to recent procedure  - 2D echo EF 50% mod MR, severe TR, PAP 40, RAP 15  - diuresis initiated with lasix 40 mg IVP BID, she's mobilizing her fluid faster than her heart and renal function can keep up, still with significant pulm edema, increase lasix to 60mg iv bid, with moderate improvement but still with pulm issues/ peripheral edema/ etc, increase to 60mg tid and recheck labs this afternoon  - resume home BB and digoxin   - continue tele monitoring  - EKG PRN chest pain or arrhythmia  - hold triamterene HCTZ for now, while using IV diuresis

## 2017-11-03 NOTE — PROGRESS NOTES
[facility] Hospital Medicine  Progress Note    Patient Name: Adela Garay  MRN: [id number]  Patient Class: IP- Inpatient   Admission Date: 10/30/2017  Length of Stay: 4 days  Attending Physician: Justice Alexander MD  Primary Care Provider: Torrie Farrell MD    [facility] Medicine Team: [facility] HOSP MED ROBBIE Dover NP    Subjective:     Principal Problem:Acute on chronic diastolic heart failure    HPI:  Ms. Garay is a 89 year old  woman with past medical history of HTN, HLD, DM, aFib, HFpEF, and recent TAVR complicated by PPM placement for CHB (Portico Valve 29mm ) on 10/17/17 for severe AS who presents to the ED with complaints of leg swelling. She states that she has been having issues since she went home after the surgery. She endorses orthopnea, ESTRADA, new cough, leg swelling, and weight gain. Denies issues like this before. Denies Fever. Denies chest pain.     While in ED, chemistry was unremarkable, BNP elevated at 918, troponin negative and CBC stable. CXR with bilateral pleural effusion larger on the right side and atelectatic changes at the lung bases; EKG with atrial fibrillation with paced ventricular rhythm.    The patient was admitted to the Hospital Medicine Service for further evaluation and management.     Hospital Course:  Ms. Garay was admitted 10/30 s/p recent TAVR and PPM with acute on chronic diastolic heart failure and edema.  She was admitted at 151 lbs with potential goal weight of 143-145 lbs per daugher. She was evaluated by cardiology in ED and assessed by TAVR team in house by Dr. Jarvis. Diuresis initiated with lasix 40 mg IVP BID, and titrated to 60mg iv bid for improved diuresis.  Her dry weight appears to be closer to 140 lbs, as she is still quite volume up at 144 lbs.   Diuresing well, oxygen sat down to 88-89% requiring re-institution of oxgyen, lungs still sound wet on exam, + jvd, + peripheral edema still.  Once she's dry will transition to  torsemide for discharge planning.      Interval History:  Breathing has not improved much overnight.  She had a few episodes of hard to breath.  Still with volume, RA oxygen still 88-89%.  Re-evaluate diuresis after 1500 lab draw.    Review of Systems   Constitutional: Positive for fatigue and unexpected weight change. Negative for activity change, appetite change, chills, diaphoresis and fever.   HENT: Negative for congestion, rhinorrhea, sinus pain, sinus pressure, sneezing, sore throat and trouble swallowing.    Eyes: Negative.    Respiratory: Positive for shortness of breath (improving). Negative for cough, chest tightness and wheezing.    Cardiovascular: Positive for leg swelling. Negative for chest pain and palpitations.   Gastrointestinal: Negative for abdominal distention, abdominal pain, constipation, diarrhea, nausea and vomiting.   Endocrine: Negative.    Genitourinary: Negative for decreased urine volume, difficulty urinating, dysuria, enuresis and urgency.   Musculoskeletal: Positive for back pain. Negative for arthralgias and myalgias.        Back spasms./ placido horse bilateral legs   Skin: Negative.    Allergic/Immunologic: Negative.    Neurological: Positive for weakness. Negative for dizziness, syncope, light-headedness and headaches.   Hematological: Negative.    Psychiatric/Behavioral: Positive for sleep disturbance. Negative for agitation and behavioral problems. The patient is not nervous/anxious.      Objective:     Vital Signs (Most Recent):  Temp: 98.1 °F (36.7 °C) (11/03/17 0740)  Pulse: 60 (11/03/17 1230)  Resp: 18 (11/03/17 0834)  BP: (!) 155/65 (11/03/17 0740)  SpO2: (!) 91 % (room air) (11/03/17 0834) Vital Signs (24h Range):  Temp:  [96.7 °F (35.9 °C)-98.2 °F (36.8 °C)] 98.1 °F (36.7 °C)  Pulse:  [59-78] 60  Resp:  [18] 18  SpO2:  [90 %-97 %] 91 %  BP: (119-164)/(58-68) 155/65     Weight: 65.4 kg (144 lb 2.9 oz)  Body mass index is 23.99 kg/m².    Intake/Output Summary (Last 24 hours)  at 11/03/17 1344  Last data filed at 11/03/17 1000   Gross per 24 hour   Intake              540 ml   Output             2900 ml   Net            -2360 ml      Physical Exam   Constitutional: She is oriented to person, place, and time. She appears well-developed and well-nourished.   HENT:   Head: Normocephalic and atraumatic.   Right Ear: External ear normal.   Left Ear: External ear normal.   Nose: Nose normal.   Mouth/Throat: Mucous membranes are normal. Normal dentition.   Eyes: Conjunctivae, EOM and lids are normal.   Neck: Normal range of motion. Neck supple. Hepatojugular reflux and JVD present.   Cardiovascular: Normal rate, regular rhythm and intact distal pulses.  Exam reveals no gallop and no friction rub.    Murmur heard.  Pulmonary/Chest: Effort normal. She has rales (half way up bilaterally). She exhibits no tenderness.   Abdominal: Soft. Bowel sounds are normal. She exhibits no distension. There is no tenderness.   Musculoskeletal: Normal range of motion. She exhibits edema (2-3+ BLE ). She exhibits no deformity.   Neurological: She is alert and oriented to person, place, and time. No cranial nerve deficit.   Skin: Skin is warm and dry. No rash noted. No erythema.   Psychiatric: She has a normal mood and affect. Her behavior is normal. Judgment and thought content normal.   Nursing note and vitals reviewed.      Significant Labs:   CMP:     Recent Labs  Lab 11/02/17  0504 11/02/17  1449 11/03/17  0507    137 139   K 3.4* 4.7 4.1   CL 95 96 97   CO2 33* 31* 36*   * 126* 103   BUN 24* 26* 22   CREATININE 0.8 0.9 0.8   CALCIUM 9.4 9.4 9.2   PROT 6.8  --  6.3   ALBUMIN 3.3*  --  3.1*   BILITOT 1.0  --  1.1*   ALKPHOS 71  --  68   AST 22  --  21   ALT 10  --  10   ANIONGAP 9 10 6*   EGFRNONAA >60.0 56.5* >60.0      Magnesium:     Recent Labs  Lab 11/02/17  0504 11/02/17  1449 11/03/17  0507   MG 2.2 2.0 2.0     Significant Imaging: I have reviewed and interpreted all pertinent imaging  results/findings within the past 24 hours.    Assessment/Plan:      * Acute on chronic diastolic heart failure    - BNP elevated at 918, troponin negative and CBC/ CMP stable  - CXR with bilateral pleural effusion larger on the right side and atelectatic changes at the lung bases  - EKG with atrial fibrillation with paced ventricular rhythm  - Cardiology evaluated in ED, Interventional TAVR team consulted due to recent procedure  - 2D echo EF 50% mod MR, severe TR, PAP 40, RAP 15  - diuresis initiated with lasix 40 mg IVP BID, she's mobilizing her fluid faster than her heart and renal function can keep up, still with significant pulm edema, increase lasix to 60mg iv bid, with moderate improvement but still with pulm issues/ peripheral edema/ etc, increase to 60mg tid and recheck labs this afternoon  - resume home BB and digoxin   - continue tele monitoring  - EKG PRN chest pain or arrhythmia  - hold triamterene HCTZ for now, while using IV diuresis        S/P TAVR (transcatheter aortic valve replacement)    - see above  -TAVR team have seen and this is to be expected, she's mobilizing her fluid faster than her heart and renal function can keep up        Complete heart block, post-surgical    - st christian device placed post TAVR  - pacer programming: Wound check done at bedside.   - Device/lead wnl.   - Educated pt on wound care, home monitoring, arm restrictions.   - Issued Merlin home  monitor with instructions.   - F/u in clinic in 3mos with Dr. Delgado.        Atrial fibrillation    - rate control, warfarin           Long-term (current) use of anticoagulants, INR goal 2.0-3.0    - INR currently 2.1, warfarin  - daily PT/INR  - f/u with Dr. Ivy        Hx-TIA (transient ischemic attack)    -continue ASA        Hyperlipidemia    -continue statin         Controlled type 2 diabetes mellitus with microalbuminuria    -HgA1C 5.6 last in May 6.0  - diet controlled  -cardiac/diabetic diet in house        Non-productive cough     -satting 88-89%, requiring oxygen again, still with bilateral pulm edema  -duo nebs q8H  -repeat CXR post diuresis as necessary        Coronary artery disease involving native coronary artery of native heart without angina pectoris    -chest pain free, EKG without acute ischemic changes  -continue ASA, BB, and statin         Mild major depression    -continue home trazodone  -no SI/HI        DDD (degenerative disc disease), cervical    -PRN tylenol and oxycodone        Benign hypertensive heart disease with congestive heart failure    - continue home BB, digoxin, and hold triamterene /hctz for now  - BP elevated in ED, now improving, continue to monitor         Severe aortic stenosis by prior echocardiogram    -see above and HPI          VTE Risk Mitigation         Ordered     warfarin (COUMADIN) tablet 3 mg  Daily     Route:  Oral        11/03/17 1005     Medium Risk of VTE  Once      10/30/17 2102     Place JONO hose  Until discontinued      10/30/17 2102              Liyah Dover NP  Department of Hospital Medicine   [facility]

## 2017-11-03 NOTE — PLAN OF CARE
Problem: Diabetes, Type 2 (Adult)  Intervention: Support/Optimize Psychosocial Response to Condition  Compliant with care. Concerned about cbg levels.      Problem: Fall Risk (Adult)  Goal: Absence of Falls  Patient will demonstrate the desired outcomes by discharge/transition of care.   Outcome: Ongoing (interventions implemented as appropriate)  No falls nor injury.    Problem: Patient Care Overview  Goal: Plan of Care Review  Outcome: Ongoing (interventions implemented as appropriate)  Plan of care reviewed.    Problem: Pain, Acute (Adult)  Goal: Acceptable Pain Control/Comfort Level  Patient will demonstrate the desired outcomes by discharge/transition of care.   Outcome: Ongoing (interventions implemented as appropriate)  No c/o pain.

## 2017-11-03 NOTE — CONSULTS
RN consult received for elevated salt intake score. Provided and discussed low Na diet handout. Pt and family member present at time of education and voiced understanding. RD to f/u.

## 2017-11-03 NOTE — SUBJECTIVE & OBJECTIVE
Interval History:  Breathing has not improved much overnight.  She had a few episodes of hard to breath.  Still with volume, RA oxygen still 88-89%.  Re-evaluate diuresis after 1500 lab draw.    Review of Systems   Constitutional: Positive for fatigue and unexpected weight change. Negative for activity change, appetite change, chills, diaphoresis and fever.   HENT: Negative for congestion, rhinorrhea, sinus pain, sinus pressure, sneezing, sore throat and trouble swallowing.    Eyes: Negative.    Respiratory: Positive for shortness of breath (improving). Negative for cough, chest tightness and wheezing.    Cardiovascular: Positive for leg swelling. Negative for chest pain and palpitations.   Gastrointestinal: Negative for abdominal distention, abdominal pain, constipation, diarrhea, nausea and vomiting.   Endocrine: Negative.    Genitourinary: Negative for decreased urine volume, difficulty urinating, dysuria, enuresis and urgency.   Musculoskeletal: Positive for back pain. Negative for arthralgias and myalgias.        Back spasms./ placido horse bilateral legs   Skin: Negative.    Allergic/Immunologic: Negative.    Neurological: Positive for weakness. Negative for dizziness, syncope, light-headedness and headaches.   Hematological: Negative.    Psychiatric/Behavioral: Positive for sleep disturbance. Negative for agitation and behavioral problems. The patient is not nervous/anxious.      Objective:     Vital Signs (Most Recent):  Temp: 98.1 °F (36.7 °C) (11/03/17 0740)  Pulse: 60 (11/03/17 1230)  Resp: 18 (11/03/17 0834)  BP: (!) 155/65 (11/03/17 0740)  SpO2: (!) 91 % (room air) (11/03/17 0834) Vital Signs (24h Range):  Temp:  [96.7 °F (35.9 °C)-98.2 °F (36.8 °C)] 98.1 °F (36.7 °C)  Pulse:  [59-78] 60  Resp:  [18] 18  SpO2:  [90 %-97 %] 91 %  BP: (119-164)/(58-68) 155/65     Weight: 65.4 kg (144 lb 2.9 oz)  Body mass index is 23.99 kg/m².    Intake/Output Summary (Last 24 hours) at 11/03/17 1344  Last data filed at  11/03/17 1000   Gross per 24 hour   Intake              540 ml   Output             2900 ml   Net            -2360 ml      Physical Exam   Constitutional: She is oriented to person, place, and time. She appears well-developed and well-nourished.   HENT:   Head: Normocephalic and atraumatic.   Right Ear: External ear normal.   Left Ear: External ear normal.   Nose: Nose normal.   Mouth/Throat: Mucous membranes are normal. Normal dentition.   Eyes: Conjunctivae, EOM and lids are normal.   Neck: Normal range of motion. Neck supple. Hepatojugular reflux and JVD present.   Cardiovascular: Normal rate, regular rhythm and intact distal pulses.  Exam reveals no gallop and no friction rub.    Murmur heard.  Pulmonary/Chest: Effort normal. She has rales (half way up bilaterally). She exhibits no tenderness.   Abdominal: Soft. Bowel sounds are normal. She exhibits no distension. There is no tenderness.   Musculoskeletal: Normal range of motion. She exhibits edema (2-3+ BLE ). She exhibits no deformity.   Neurological: She is alert and oriented to person, place, and time. No cranial nerve deficit.   Skin: Skin is warm and dry. No rash noted. No erythema.   Psychiatric: She has a normal mood and affect. Her behavior is normal. Judgment and thought content normal.   Nursing note and vitals reviewed.      Significant Labs:   CMP:     Recent Labs  Lab 11/02/17  0504 11/02/17  1449 11/03/17  0507    137 139   K 3.4* 4.7 4.1   CL 95 96 97   CO2 33* 31* 36*   * 126* 103   BUN 24* 26* 22   CREATININE 0.8 0.9 0.8   CALCIUM 9.4 9.4 9.2   PROT 6.8  --  6.3   ALBUMIN 3.3*  --  3.1*   BILITOT 1.0  --  1.1*   ALKPHOS 71  --  68   AST 22  --  21   ALT 10  --  10   ANIONGAP 9 10 6*   EGFRNONAA >60.0 56.5* >60.0      Magnesium:     Recent Labs  Lab 11/02/17  0504 11/02/17  1449 11/03/17  0507   MG 2.2 2.0 2.0     Significant Imaging: I have reviewed and interpreted all pertinent imaging results/findings within the past 24 hours.

## 2017-11-03 NOTE — PLAN OF CARE
Problem: Fall Risk (Adult)  Intervention: Patient Rounds  Fall precautions reviewed with pt. And family

## 2017-11-04 LAB
ALBUMIN SERPL BCP-MCNC: 3.1 G/DL
ALP SERPL-CCNC: 69 U/L
ALT SERPL W/O P-5'-P-CCNC: 10 U/L
ANION GAP SERPL CALC-SCNC: 12 MMOL/L
ANION GAP SERPL CALC-SCNC: 9 MMOL/L
AST SERPL-CCNC: 20 U/L
BILIRUB SERPL-MCNC: 1.2 MG/DL
BUN SERPL-MCNC: 21 MG/DL
BUN SERPL-MCNC: 25 MG/DL
CALCIUM SERPL-MCNC: 9.2 MG/DL
CALCIUM SERPL-MCNC: 9.4 MG/DL
CHLORIDE SERPL-SCNC: 94 MMOL/L
CHLORIDE SERPL-SCNC: 97 MMOL/L
CO2 SERPL-SCNC: 33 MMOL/L
CO2 SERPL-SCNC: 36 MMOL/L
CREAT SERPL-MCNC: 0.8 MG/DL
CREAT SERPL-MCNC: 0.9 MG/DL
EST. GFR  (AFRICAN AMERICAN): >60 ML/MIN/1.73 M^2
EST. GFR  (AFRICAN AMERICAN): >60 ML/MIN/1.73 M^2
EST. GFR  (NON AFRICAN AMERICAN): 56.5 ML/MIN/1.73 M^2
EST. GFR  (NON AFRICAN AMERICAN): >60 ML/MIN/1.73 M^2
GLUCOSE SERPL-MCNC: 107 MG/DL
GLUCOSE SERPL-MCNC: 124 MG/DL
INR PPP: 2.2
MAGNESIUM SERPL-MCNC: 1.8 MG/DL
POTASSIUM SERPL-SCNC: 3.4 MMOL/L
POTASSIUM SERPL-SCNC: 4 MMOL/L
PROT SERPL-MCNC: 6.5 G/DL
PROTHROMBIN TIME: 21.6 SEC
SODIUM SERPL-SCNC: 139 MMOL/L
SODIUM SERPL-SCNC: 142 MMOL/L

## 2017-11-04 PROCEDURE — 25000003 PHARM REV CODE 250: Performed by: HOSPITALIST

## 2017-11-04 PROCEDURE — 63600175 PHARM REV CODE 636 W HCPCS: Performed by: NURSE PRACTITIONER

## 2017-11-04 PROCEDURE — 20600001 HC STEP DOWN PRIVATE ROOM

## 2017-11-04 PROCEDURE — 25000242 PHARM REV CODE 250 ALT 637 W/ HCPCS: Performed by: NURSE PRACTITIONER

## 2017-11-04 PROCEDURE — 25000003 PHARM REV CODE 250: Performed by: NURSE PRACTITIONER

## 2017-11-04 PROCEDURE — 36415 COLL VENOUS BLD VENIPUNCTURE: CPT

## 2017-11-04 PROCEDURE — 83735 ASSAY OF MAGNESIUM: CPT

## 2017-11-04 PROCEDURE — 85610 PROTHROMBIN TIME: CPT

## 2017-11-04 PROCEDURE — 80053 COMPREHEN METABOLIC PANEL: CPT

## 2017-11-04 PROCEDURE — 27000221 HC OXYGEN, UP TO 24 HOURS

## 2017-11-04 PROCEDURE — 80048 BASIC METABOLIC PNL TOTAL CA: CPT

## 2017-11-04 PROCEDURE — 25000003 PHARM REV CODE 250

## 2017-11-04 PROCEDURE — 94760 N-INVAS EAR/PLS OXIMETRY 1: CPT

## 2017-11-04 PROCEDURE — 94640 AIRWAY INHALATION TREATMENT: CPT

## 2017-11-04 PROCEDURE — 94761 N-INVAS EAR/PLS OXIMETRY MLT: CPT

## 2017-11-04 PROCEDURE — A4216 STERILE WATER/SALINE, 10 ML: HCPCS | Performed by: NURSE PRACTITIONER

## 2017-11-04 PROCEDURE — 99233 SBSQ HOSP IP/OBS HIGH 50: CPT | Mod: ,,, | Performed by: NURSE PRACTITIONER

## 2017-11-04 RX ORDER — POTASSIUM CHLORIDE 750 MG/1
60 CAPSULE, EXTENDED RELEASE ORAL ONCE
Status: DISCONTINUED | OUTPATIENT
Start: 2017-11-04 | End: 2017-11-04

## 2017-11-04 RX ORDER — MAGNESIUM SULFATE HEPTAHYDRATE 40 MG/ML
2 INJECTION, SOLUTION INTRAVENOUS ONCE
Status: COMPLETED | OUTPATIENT
Start: 2017-11-04 | End: 2017-11-04

## 2017-11-04 RX ORDER — METOLAZONE 2.5 MG/1
2.5 TABLET ORAL ONCE
Status: COMPLETED | OUTPATIENT
Start: 2017-11-04 | End: 2017-11-04

## 2017-11-04 RX ORDER — FUROSEMIDE 10 MG/ML
60 INJECTION INTRAMUSCULAR; INTRAVENOUS 2 TIMES DAILY
Status: DISCONTINUED | OUTPATIENT
Start: 2017-11-05 | End: 2017-11-05

## 2017-11-04 RX ADMIN — DIGOXIN 0.25 MG: 250 TABLET ORAL at 08:11

## 2017-11-04 RX ADMIN — TRAZODONE HYDROCHLORIDE 50 MG: 50 TABLET ORAL at 11:11

## 2017-11-04 RX ADMIN — DOCUSATE SODIUM 100 MG: 100 CAPSULE, LIQUID FILLED ORAL at 08:11

## 2017-11-04 RX ADMIN — FUROSEMIDE 60 MG: 10 INJECTION, SOLUTION INTRAVENOUS at 08:11

## 2017-11-04 RX ADMIN — ASPIRIN 81 MG CHEWABLE TABLET 81 MG: 81 TABLET CHEWABLE at 08:11

## 2017-11-04 RX ADMIN — IPRATROPIUM BROMIDE AND ALBUTEROL SULFATE 3 ML: .5; 3 SOLUTION RESPIRATORY (INHALATION) at 04:11

## 2017-11-04 RX ADMIN — MAGNESIUM SULFATE IN WATER 2 G: 40 INJECTION, SOLUTION INTRAVENOUS at 09:11

## 2017-11-04 RX ADMIN — POTASSIUM BICARBONATE 50 MEQ: 25 TABLET, EFFERVESCENT ORAL at 08:11

## 2017-11-04 RX ADMIN — METOPROLOL TARTRATE 50 MG: 25 TABLET ORAL at 08:11

## 2017-11-04 RX ADMIN — Medication 3 ML: at 06:11

## 2017-11-04 RX ADMIN — FUROSEMIDE 60 MG: 10 INJECTION, SOLUTION INTRAVENOUS at 05:11

## 2017-11-04 RX ADMIN — BENZONATATE 100 MG: 100 CAPSULE ORAL at 08:11

## 2017-11-04 RX ADMIN — METOLAZONE 2.5 MG: 2.5 TABLET ORAL at 01:11

## 2017-11-04 RX ADMIN — SIMVASTATIN 20 MG: 20 TABLET, FILM COATED ORAL at 08:11

## 2017-11-04 RX ADMIN — FUROSEMIDE 60 MG: 10 INJECTION, SOLUTION INTRAVENOUS at 02:11

## 2017-11-04 RX ADMIN — Medication 3 ML: at 02:11

## 2017-11-04 RX ADMIN — WARFARIN SODIUM 3 MG: 3 TABLET ORAL at 04:11

## 2017-11-04 RX ADMIN — IPRATROPIUM BROMIDE AND ALBUTEROL SULFATE 3 ML: .5; 3 SOLUTION RESPIRATORY (INHALATION) at 08:11

## 2017-11-04 RX ADMIN — HYDROCODONE BITARTRATE AND ACETAMINOPHEN 1 TABLET: 5; 325 TABLET ORAL at 03:11

## 2017-11-04 RX ADMIN — IPRATROPIUM BROMIDE AND ALBUTEROL SULFATE 3 ML: .5; 3 SOLUTION RESPIRATORY (INHALATION) at 11:11

## 2017-11-04 RX ADMIN — IPRATROPIUM BROMIDE AND ALBUTEROL SULFATE 3 ML: .5; 3 SOLUTION RESPIRATORY (INHALATION) at 12:11

## 2017-11-04 RX ADMIN — TRAZODONE HYDROCHLORIDE 50 MG: 50 TABLET ORAL at 12:11

## 2017-11-04 NOTE — PLAN OF CARE
Problem: Patient Care Overview  Goal: Plan of Care Review  Outcome: Ongoing (interventions implemented as appropriate)  Patient complained of pain, relieved with prn meds; denies chest pain, SOB, or other discomfort. Pt remains free of falls or injuries. Pt verbalizes complete understanding of plan of care. Daughter at BS. Will continue to monitor.

## 2017-11-04 NOTE — PLAN OF CARE
Problem: Patient Care Overview  Goal: Plan of Care Review  Outcome: Ongoing (interventions implemented as appropriate)  Plan of care discussed with patient. Patient is free of fall/trauma/injury. Denies CP, SOB, or pain/discomfort. Magnesium replaced. Patient ambulated in hallway and has compression socks on. All questions addressed. Will continue to monitor

## 2017-11-05 PROBLEM — M25.561 RIGHT KNEE PAIN: Status: ACTIVE | Noted: 2017-11-05

## 2017-11-05 LAB
ALBUMIN SERPL BCP-MCNC: 3 G/DL
ALP SERPL-CCNC: 84 U/L
ALT SERPL W/O P-5'-P-CCNC: 13 U/L
ANION GAP SERPL CALC-SCNC: 10 MMOL/L
ANION GAP SERPL CALC-SCNC: 10 MMOL/L
APPEARANCE FLD: NORMAL
AST SERPL-CCNC: 24 U/L
BILIRUB SERPL-MCNC: 1.1 MG/DL
BODY FLD TYPE: NORMAL
BODY FLD TYPE: NORMAL
BODY FLUID COMMENTS: NORMAL
BUN SERPL-MCNC: 28 MG/DL
BUN SERPL-MCNC: 28 MG/DL
CALCIUM SERPL-MCNC: 10 MG/DL
CALCIUM SERPL-MCNC: 9.2 MG/DL
CHLORIDE SERPL-SCNC: 92 MMOL/L
CHLORIDE SERPL-SCNC: 93 MMOL/L
CO2 SERPL-SCNC: 36 MMOL/L
CO2 SERPL-SCNC: 39 MMOL/L
COLOR FLD: NORMAL
CREAT SERPL-MCNC: 0.9 MG/DL
CREAT SERPL-MCNC: 0.9 MG/DL
CRP SERPL-MCNC: 101.1 MG/L
CRYSTALS FLD MICRO: NEGATIVE
ERYTHROCYTE [SEDIMENTATION RATE] IN BLOOD BY WESTERGREN METHOD: 52 MM/HR
EST. GFR  (AFRICAN AMERICAN): >60 ML/MIN/1.73 M^2
EST. GFR  (AFRICAN AMERICAN): >60 ML/MIN/1.73 M^2
EST. GFR  (NON AFRICAN AMERICAN): 56.5 ML/MIN/1.73 M^2
EST. GFR  (NON AFRICAN AMERICAN): 56.5 ML/MIN/1.73 M^2
FLUAV AG SPEC QL IA: NEGATIVE
FLUBV AG SPEC QL IA: NEGATIVE
GLUCOSE SERPL-MCNC: 130 MG/DL
GLUCOSE SERPL-MCNC: 99 MG/DL
GRAM STN SPEC: NORMAL
GRAM STN SPEC: NORMAL
INR PPP: 1.8
LYMPHOCYTES NFR FLD MANUAL: 28 %
MAGNESIUM SERPL-MCNC: 2.2 MG/DL
NEUTROPHILS NFR FLD MANUAL: 72 %
POTASSIUM SERPL-SCNC: 3.4 MMOL/L
POTASSIUM SERPL-SCNC: 3.8 MMOL/L
PROT SERPL-MCNC: 6.8 G/DL
PROTHROMBIN TIME: 17.9 SEC
SODIUM SERPL-SCNC: 139 MMOL/L
SODIUM SERPL-SCNC: 141 MMOL/L
SPECIMEN SOURCE: NORMAL
WBC # FLD: NORMAL /CU MM

## 2017-11-05 PROCEDURE — 99233 SBSQ HOSP IP/OBS HIGH 50: CPT | Mod: ,,, | Performed by: NURSE PRACTITIONER

## 2017-11-05 PROCEDURE — 25000242 PHARM REV CODE 250 ALT 637 W/ HCPCS: Performed by: NURSE PRACTITIONER

## 2017-11-05 PROCEDURE — 80048 BASIC METABOLIC PNL TOTAL CA: CPT

## 2017-11-05 PROCEDURE — 89051 BODY FLUID CELL COUNT: CPT

## 2017-11-05 PROCEDURE — 80053 COMPREHEN METABOLIC PANEL: CPT

## 2017-11-05 PROCEDURE — 87116 MYCOBACTERIA CULTURE: CPT

## 2017-11-05 PROCEDURE — 89060 EXAM SYNOVIAL FLUID CRYSTALS: CPT

## 2017-11-05 PROCEDURE — 87075 CULTR BACTERIA EXCEPT BLOOD: CPT

## 2017-11-05 PROCEDURE — 94640 AIRWAY INHALATION TREATMENT: CPT

## 2017-11-05 PROCEDURE — 27000173 HC ACAPELLA DEVICE DH OR DM

## 2017-11-05 PROCEDURE — 25000003 PHARM REV CODE 250: Performed by: NURSE PRACTITIONER

## 2017-11-05 PROCEDURE — 86140 C-REACTIVE PROTEIN: CPT

## 2017-11-05 PROCEDURE — 20600001 HC STEP DOWN PRIVATE ROOM

## 2017-11-05 PROCEDURE — 85610 PROTHROMBIN TIME: CPT

## 2017-11-05 PROCEDURE — 87086 URINE CULTURE/COLONY COUNT: CPT

## 2017-11-05 PROCEDURE — 87070 CULTURE OTHR SPECIMN AEROBIC: CPT

## 2017-11-05 PROCEDURE — 87205 SMEAR GRAM STAIN: CPT

## 2017-11-05 PROCEDURE — 36415 COLL VENOUS BLD VENIPUNCTURE: CPT

## 2017-11-05 PROCEDURE — 27000221 HC OXYGEN, UP TO 24 HOURS

## 2017-11-05 PROCEDURE — 63600175 PHARM REV CODE 636 W HCPCS: Performed by: HOSPITALIST

## 2017-11-05 PROCEDURE — 85651 RBC SED RATE NONAUTOMATED: CPT

## 2017-11-05 PROCEDURE — A4216 STERILE WATER/SALINE, 10 ML: HCPCS | Performed by: NURSE PRACTITIONER

## 2017-11-05 PROCEDURE — 83735 ASSAY OF MAGNESIUM: CPT

## 2017-11-05 PROCEDURE — 63600175 PHARM REV CODE 636 W HCPCS: Performed by: NURSE PRACTITIONER

## 2017-11-05 PROCEDURE — 87400 INFLUENZA A/B EACH AG IA: CPT

## 2017-11-05 PROCEDURE — 94664 DEMO&/EVAL PT USE INHALER: CPT

## 2017-11-05 PROCEDURE — 25000003 PHARM REV CODE 250

## 2017-11-05 PROCEDURE — 0S9D3ZZ DRAINAGE OF LEFT KNEE JOINT, PERCUTANEOUS APPROACH: ICD-10-PCS | Performed by: ORTHOPAEDIC SURGERY

## 2017-11-05 PROCEDURE — 87040 BLOOD CULTURE FOR BACTERIA: CPT

## 2017-11-05 PROCEDURE — 94761 N-INVAS EAR/PLS OXIMETRY MLT: CPT

## 2017-11-05 PROCEDURE — 87102 FUNGUS ISOLATION CULTURE: CPT

## 2017-11-05 RX ORDER — POTASSIUM CHLORIDE 750 MG/1
40 CAPSULE, EXTENDED RELEASE ORAL ONCE
Status: COMPLETED | OUTPATIENT
Start: 2017-11-05 | End: 2017-11-05

## 2017-11-05 RX ORDER — FUROSEMIDE 10 MG/ML
60 INJECTION INTRAMUSCULAR; INTRAVENOUS 3 TIMES DAILY
Status: DISCONTINUED | OUTPATIENT
Start: 2017-11-05 | End: 2017-11-06

## 2017-11-05 RX ORDER — POTASSIUM CHLORIDE 750 MG/1
60 CAPSULE, EXTENDED RELEASE ORAL ONCE
Status: COMPLETED | OUTPATIENT
Start: 2017-11-05 | End: 2017-11-05

## 2017-11-05 RX ORDER — WARFARIN 4 MG/1
4 TABLET ORAL DAILY
Status: DISCONTINUED | OUTPATIENT
Start: 2017-11-05 | End: 2017-11-06

## 2017-11-05 RX ADMIN — DOCUSATE SODIUM 100 MG: 100 CAPSULE, LIQUID FILLED ORAL at 08:11

## 2017-11-05 RX ADMIN — IPRATROPIUM BROMIDE AND ALBUTEROL SULFATE 3 ML: .5; 3 SOLUTION RESPIRATORY (INHALATION) at 04:11

## 2017-11-05 RX ADMIN — POLYETHYLENE GLYCOL 3350 17 G: 17 POWDER, FOR SOLUTION ORAL at 08:11

## 2017-11-05 RX ADMIN — POTASSIUM CHLORIDE 60 MEQ: 750 CAPSULE, EXTENDED RELEASE ORAL at 08:11

## 2017-11-05 RX ADMIN — Medication 3 ML: at 02:11

## 2017-11-05 RX ADMIN — DIGOXIN 0.25 MG: 250 TABLET ORAL at 08:11

## 2017-11-05 RX ADMIN — FUROSEMIDE 60 MG: 10 INJECTION, SOLUTION INTRAMUSCULAR; INTRAVENOUS at 01:11

## 2017-11-05 RX ADMIN — HYDROCODONE BITARTRATE AND ACETAMINOPHEN 1 TABLET: 5; 325 TABLET ORAL at 08:11

## 2017-11-05 RX ADMIN — VANCOMYCIN HYDROCHLORIDE 1000 MG: 1 INJECTION, POWDER, LYOPHILIZED, FOR SOLUTION INTRAVENOUS at 03:11

## 2017-11-05 RX ADMIN — WARFARIN SODIUM 4 MG: 4 TABLET ORAL at 05:11

## 2017-11-05 RX ADMIN — SIMVASTATIN 20 MG: 20 TABLET, FILM COATED ORAL at 08:11

## 2017-11-05 RX ADMIN — POTASSIUM CHLORIDE 40 MEQ: 750 CAPSULE, EXTENDED RELEASE ORAL at 03:11

## 2017-11-05 RX ADMIN — IPRATROPIUM BROMIDE AND ALBUTEROL SULFATE 3 ML: .5; 3 SOLUTION RESPIRATORY (INHALATION) at 07:11

## 2017-11-05 RX ADMIN — FUROSEMIDE 60 MG: 10 INJECTION, SOLUTION INTRAVENOUS at 08:11

## 2017-11-05 RX ADMIN — METOPROLOL TARTRATE 50 MG: 25 TABLET ORAL at 08:11

## 2017-11-05 RX ADMIN — CEFTRIAXONE 1 G: 1 INJECTION, SOLUTION INTRAVENOUS at 01:11

## 2017-11-05 RX ADMIN — ASPIRIN 81 MG CHEWABLE TABLET 81 MG: 81 TABLET CHEWABLE at 08:11

## 2017-11-05 RX ADMIN — TIZANIDINE 2 MG: 2 TABLET ORAL at 03:11

## 2017-11-05 RX ADMIN — ACETAMINOPHEN 650 MG: 325 TABLET ORAL at 12:11

## 2017-11-05 RX ADMIN — FUROSEMIDE 60 MG: 10 INJECTION, SOLUTION INTRAMUSCULAR; INTRAVENOUS at 08:11

## 2017-11-05 NOTE — PROGRESS NOTES
Patient developed non productive cough since yesterday and spike fever of 101.9F around midnight. No SOB and oxygenating well. Hemodynamically stable.    Check blood culture, CXR, influenza swab   Empiric rocephin after blood culture is drawn for suspected lung infection   Will monitor     Yinka Edwards

## 2017-11-05 NOTE — NURSING TRANSFER
Nursing Transfer Note      11/5/2017     Transfer To: Radiology    Transfer via wheelchair    Transfer with 2 L to O2, cardiac monitoring    Transported by transport    Medicines sent: none    Chart send with patient: No    Notified: daughter

## 2017-11-05 NOTE — HPI
Adela Garay is a 89 y.o. female with a history of HTN, HLD, DM, Afib, HFpEF, and recent valve replacement. She is admitted to medicine for management of acute on chronic diastolic heart failure. She reports increased pain and stiffness in her right knee since yesterday morning. She developed swelling of the knee throughout the day and was febrile to 101.9 F overnight. She has a history of osteoarthritis in this knee and was treated for septic arthritis of this knee last year by Dr. Arellano. She denies any recent trauma to the knee. She walks with a cane at baseline. She was able to ambulate yesterday morning, but towards the end of the day reports feeling as though the knee is going to give out. She is on Coumadin.

## 2017-11-05 NOTE — PLAN OF CARE
Problem: Patient Care Overview  Goal: Plan of Care Review  Outcome: Ongoing (interventions implemented as appropriate)  Patient c/o of cough that caused SOB; relieved with breathing Tx. Pt also complained of new onset of knee pain. Pt became febrile overnight, MD aware. IV Abx administered per MD order; blood Cx done. Daughter at . Pt remains free of falls or injuries. Pt and daughter verbalizes complete understanding of plan of care. Will continue to monitor.

## 2017-11-05 NOTE — PROGRESS NOTES
Pt has a temp 101.9 and c/o of new onset of knee pain. PRN tylenol was given. MD Edwards notified. MD stated that he will put in orders for blood cultures X2, IV Abx, CXR, and Flu Nasal Swab; ABx to be given after blood cultures are drawn. Orders to be carried out. Will continue to monitor.

## 2017-11-05 NOTE — ASSESSMENT & PLAN NOTE
Adela Garay is a 89 y.o. female with right knee pain and swelling  - Exam and history consistent with arthritic flair  - Pt found to have hemarthrosis which may be related to minor trauma as she is on coumadin  - Cell count non-diagnostic due to clot, but no organisms or white cells on gram stain  - Will follow cultures    Procedure note:  After consent was obtained, the right knee was prepped using sterile technique. The joint was entered from a inferomedial approach and 7 mL's of sanguinous joint fluid were withdrawn and sent for laboratory analysis. The patient tolerated the procedure well with no complications. No staff was present.

## 2017-11-05 NOTE — ASSESSMENT & PLAN NOTE
- BNP elevated at 918, troponin negative and CBC/ CMP stable  - CXR with bilateral pleural effusion larger on the right side and atelectatic changes at the lung bases  - EKG with atrial fibrillation with paced ventricular rhythm  - Cardiology evaluated in ED, Interventional TAVR team consulted due to recent procedure  - 2D echo EF 50% mod MR, severe TR, PAP 40, RAP 15  - diuresis initiated with lasix 40 mg IVP BID, she's mobilizing her fluid faster than her heart and renal function can keep up, still with significant pulm edema, increased lasix to 60mg iv bid, with moderate improvement but still with pulm issues/ peripheral edema/ etc, increased to 60mg tid and finally added metolazone  2.5 mg x1 to add as a booster.    - admit weight 151 lbs, down to 142 lbs which is past her previously predicted dry weight and she's still volume up, it appears she's closer to 135 lbs dry weight.    - 15 beat run of VT, K 3.4. In addition noted to have febrile episode to 101.9, xray still with pulm edema/effusion, will hold on metolazone after VT episodes.   - resume home BB and digoxin   - continue tele monitoring  - EKG PRN chest pain or arrhythmia  - hold triamterene HCTZ for now, while using IV diuresis

## 2017-11-05 NOTE — SUBJECTIVE & OBJECTIVE
Interval History:  Febrile to 101.9 overnight, xray with continued pulm edema / effusion, now has what appears to be r knee effusion/ hot to touch.  Ortho tapped, unable to analyze fluid as it clotted in wrong tube.  Her new dry weight appears to be 135 lbs.  Weight unchanged despite negative I/O's.     Review of Systems   Constitutional: Positive for fatigue, fever and unexpected weight change. Negative for activity change, appetite change, chills and diaphoresis.   HENT: Negative for congestion, rhinorrhea, sinus pain, sinus pressure, sneezing, sore throat and trouble swallowing.    Eyes: Negative.    Respiratory: Positive for shortness of breath (improving). Negative for cough, chest tightness and wheezing.    Cardiovascular: Positive for leg swelling. Negative for chest pain and palpitations.   Gastrointestinal: Negative for abdominal distention, abdominal pain, constipation, diarrhea, nausea and vomiting.   Endocrine: Negative.    Genitourinary: Negative for decreased urine volume, difficulty urinating, dysuria, enuresis and urgency.   Musculoskeletal: Positive for back pain and joint swelling (r knee bloody effusion). Negative for arthralgias and myalgias.        Back spasms./ placido horse bilateral legs   Skin: Negative.    Allergic/Immunologic: Negative.    Neurological: Positive for weakness. Negative for dizziness, syncope, light-headedness and headaches.   Hematological: Negative.    Psychiatric/Behavioral: Positive for sleep disturbance. Negative for agitation and behavioral problems. The patient is not nervous/anxious.      Objective:     Vital Signs (Most Recent):  Temp: 98.3 °F (36.8 °C) (11/05/17 1124)  Pulse: 65 (11/05/17 1504)  Resp: 20 (11/05/17 1124)  BP: (!) 154/65 (11/05/17 1124)  SpO2: 96 % (11/05/17 1124) Vital Signs (24h Range):  Temp:  [98.1 °F (36.7 °C)-101.9 °F (38.8 °C)] 98.3 °F (36.8 °C)  Pulse:  [60-78] 65  Resp:  [18-20] 20  SpO2:  [93 %-98 %] 96 %  BP: (129-177)/(59-77) 154/65      Weight: 64.5 kg (142 lb 3.2 oz)  Body mass index is 23.66 kg/m².    Intake/Output Summary (Last 24 hours) at 11/05/17 1534  Last data filed at 11/05/17 1500   Gross per 24 hour   Intake              780 ml   Output             3150 ml   Net            -2370 ml      Physical Exam   Constitutional: She is oriented to person, place, and time. She appears well-developed and well-nourished.   HENT:   Head: Normocephalic and atraumatic.   Right Ear: External ear normal.   Left Ear: External ear normal.   Nose: Nose normal.   Mouth/Throat: Mucous membranes are normal. Normal dentition.   Eyes: Conjunctivae, EOM and lids are normal.   Neck: Normal range of motion. Neck supple. Hepatojugular reflux and JVD (+ TR jet, not so much jvd/ hjr anymore) present.   Cardiovascular: Normal rate, regular rhythm and intact distal pulses.  Exam reveals no gallop and no friction rub.    Murmur (Aortic position/ gallop, II/VI) heard.  Pulmonary/Chest: Effort normal. She has rales in the right middle field, the right lower field and the left lower field. She exhibits no tenderness.   Abdominal: Soft. Bowel sounds are normal. She exhibits no distension. There is no tenderness.   Musculoskeletal: Normal range of motion. She exhibits edema (2+ BLE ). She exhibits no deformity.   Neurological: She is alert and oriented to person, place, and time. No cranial nerve deficit or sensory deficit. She exhibits normal muscle tone.   Skin: Skin is warm and dry. Capillary refill takes less than 2 seconds. No rash noted. No erythema.   Psychiatric: She has a normal mood and affect. Her behavior is normal. Judgment and thought content normal.   Nursing note and vitals reviewed.      Significant Labs:   CMP:     Recent Labs  Lab 11/04/17  0417 11/04/17  1933 11/05/17  0537 11/05/17  1255    139 139 141   K 4.0 3.4* 3.4* 3.8   CL 97 94* 93* 92*   CO2 36* 33* 36* 39*    124* 130* 99   BUN 21 25* 28* 28*   CREATININE 0.8 0.9 0.9 0.9   CALCIUM  9.2 9.4 9.2 10.0   PROT 6.5  --  6.8  --    ALBUMIN 3.1*  --  3.0*  --    BILITOT 1.2*  --  1.1*  --    ALKPHOS 69  --  84  --    AST 20  --  24  --    ALT 10  --  13  --    ANIONGAP 9 12 10 10   EGFRNONAA >60.0 56.5* 56.5* 56.5*      Magnesium:     Recent Labs  Lab 11/04/17  0417 11/05/17  0537   MG 1.8 2.2     Significant Imaging: I have reviewed and interpreted all pertinent imaging results/findings within the past 24 hours.

## 2017-11-05 NOTE — SUBJECTIVE & OBJECTIVE
Past Medical History:   Diagnosis Date    Anticoagulant long-term use     Anticoagulated on Coumadin     Arthritis     Atrial fibrillation     Atrial fibrillation     Carotid artery occlusion     Chronic rhinosinusitis     Coronary artery disease     Granulomatous lung disease     Heart failure     Hyperlipidemia     Hypertension     Hypertensive heart disease without CHF (congestive heart failure)     Mitral valve prolapse     PN (peripheral neuropathy)     hereditary?(sister has it also)/idiopathic?/patient thinks from Zocor    Severe aortic stenosis by prior echocardiogram     TIA (transient ischemic attack)     Type 2 diabetes mellitus     Type II or unspecified type diabetes mellitus without mention of complication, not stated as uncontrolled        Past Surgical History:   Procedure Laterality Date    SINUS SURGERY      tonsillectomy      TONSILLECTOMY         Review of patient's allergies indicates:   Allergen Reactions    Levaquin [levofloxacin]     Doxycycline hyclate Other (See Comments)     Unknown to patient    Iodine and iodide containing products     Neurontin  [gabapentin]      Other reaction(s): Unknown    Norpace  [disopyramide]      Other reaction(s): Hives    Phenytoin sodium extended      Other reaction(s): Muscle pain    Sulfamethoxazole-trimethoprim      Other reaction(s): Muscle cramps       Current Facility-Administered Medications   Medication    acetaminophen tablet 650 mg    albuterol-ipratropium 2.5mg-0.5mg/3mL nebulizer solution 3 mL    aspirin chewable tablet 81 mg    benzonatate capsule 100 mg    bisacodyl suppository 10 mg    dextromethorphan HBr syrup 10 mL    digoxin tablet 0.25 mg    docusate sodium capsule 100 mg    furosemide injection 60 mg    hydrocodone-acetaminophen 5-325mg per tablet 1 tablet    metoprolol tartrate (LOPRESSOR) tablet 50 mg    morphine injection 4 mg    ondansetron disintegrating tablet 8 mg    ondansetron injection  "4 mg    ondansetron injection 4 mg    oxyCODONE immediate release tablet 5 mg    polyethylene glycol packet 17 g    ramelteon tablet 8 mg    simvastatin tablet 20 mg    sodium chloride 0.9% flush 3 mL    tiZANidine tablet 2 mg    traZODone tablet 100 mg    warfarin (COUMADIN) tablet 4 mg     Family History     Problem Relation (Age of Onset)    Atrial fibrillation Sister, Sister, Sister    Heart disease Father, Mother    Lymphoma Sister (29)    Thyroid disease Sister        Social History Main Topics    Smoking status: Never Smoker    Smokeless tobacco: Never Used    Alcohol use 0.0 oz/week      Comment: rare    Drug use: No    Sexual activity: No     ROS  Objective:     Vital Signs (Most Recent):  Temp: 98.3 °F (36.8 °C) (11/05/17 0755)  Pulse: 68 (11/05/17 0755)  Resp: 18 (11/05/17 0755)  BP: 137/63 (11/05/17 0755)  SpO2: 96 % (11/05/17 0755) Vital Signs (24h Range):  Temp:  [98.1 °F (36.7 °C)-101.9 °F (38.8 °C)] 98.3 °F (36.8 °C)  Pulse:  [60-78] 68  Resp:  [18-20] 18  SpO2:  [93 %-98 %] 96 %  BP: (129-177)/(59-77) 137/63     Weight: 64.5 kg (142 lb 3.2 oz)  Height: 5' 5" (165.1 cm)  Body mass index is 23.66 kg/m².      Intake/Output Summary (Last 24 hours) at 11/05/17 1026  Last data filed at 11/05/17 0858   Gross per 24 hour   Intake              540 ml   Output             2250 ml   Net            -1710 ml       Ortho/SPM Exam   HEENT: normocephalic, atraumatic  Resp: no increased work of breathing  CV: regular rate and rhythm  MSK: moves b/l upper extremities well    right lower extremity:  Skin intact  Significant swelling of the right knee compared to left, mild warmth  Sensation intact SP/DP/T/Sural/Saphenous nerves  Motor intact EHL/FHL/TA/gastroc  Knee AROM 5-90, pain at terminal flexion and extension  TTP around medial and lateral joint line, worse medially; no posterior capsule pain  Palpable DP/PT pulses    Significant Labs: All pertinent labs within the past 24 hours have been reviewed. "     Recent Labs  Lab 11/05/17  0951   SEDRATE 52*   .1*      Significant Imaging: X-ray right knee consistent with degenerative joint disease

## 2017-11-05 NOTE — PROGRESS NOTES
Patient stated that orthopedics told her not to eat incase she needed to have a procedure done. Patient has a lunch tray but has not eaten and would like to. Notified Joelle Dover, stated she will speak with ortho to determine what the plan is for the patient. Will continue to monitor.

## 2017-11-05 NOTE — PROGRESS NOTES
Patient had a 15 run of Vtach. Asymptomatic. Joelle Dover notified. BP of 177/74, Map of 107, Pulse of 77.She Ordered labs for now and will give further orders after labs if needed. Will continue ot monitor.

## 2017-11-05 NOTE — PROGRESS NOTES
Patient asked if it was ok to only have breathing treatments while awake. Joelle Dover NP notified stated it was ok for patient to not get night breathing treatments.

## 2017-11-05 NOTE — PROGRESS NOTES
Pt complaining of dry non productive cough; unrelieved with PRN benzonatate. MD Edwards. MD stated that he will place orders for another med for pt's cough. Will continue to monitor.

## 2017-11-05 NOTE — SUBJECTIVE & OBJECTIVE
Interval History:  She's still labored with talking extensively, she feels better but still having issues with gardner and attempting to lie flat at night .  She had a few episodes of hard to breath however the oxygen improved her sensation.  Still with volume, RA oxygen still 88-89%.  Adding metolazone as a small booster.    Review of Systems   Constitutional: Positive for fatigue and unexpected weight change. Negative for activity change, appetite change, chills, diaphoresis and fever.   HENT: Negative for congestion, rhinorrhea, sinus pain, sinus pressure, sneezing, sore throat and trouble swallowing.    Eyes: Negative.    Respiratory: Positive for shortness of breath (improving). Negative for cough, chest tightness and wheezing.    Cardiovascular: Positive for leg swelling. Negative for chest pain and palpitations.   Gastrointestinal: Negative for abdominal distention, abdominal pain, constipation, diarrhea, nausea and vomiting.   Endocrine: Negative.    Genitourinary: Negative for decreased urine volume, difficulty urinating, dysuria, enuresis and urgency.   Musculoskeletal: Positive for back pain. Negative for arthralgias and myalgias.        Back spasms./ placido horse bilateral legs   Skin: Negative.    Allergic/Immunologic: Negative.    Neurological: Positive for weakness. Negative for dizziness, syncope, light-headedness and headaches.   Hematological: Negative.    Psychiatric/Behavioral: Positive for sleep disturbance. Negative for agitation and behavioral problems. The patient is not nervous/anxious.      Objective:     Vital Signs (Most Recent):  Temp: 98.1 °F (36.7 °C) (11/04/17 1931)  Pulse: 78 (11/04/17 1931)  Resp: 18 (11/04/17 1931)  BP: (!) 154/65 (11/04/17 1931)  SpO2: 95 % (11/04/17 1931) Vital Signs (24h Range):  Temp:  [98.1 °F (36.7 °C)-99.1 °F (37.3 °C)] 98.1 °F (36.7 °C)  Pulse:  [60-95] 78  Resp:  [16-18] 18  SpO2:  [93 %-98 %] 95 %  BP: (125-177)/(59-78) 154/65     Weight: 64.5 kg (142 lb 3.2  oz)  Body mass index is 23.66 kg/m².    Intake/Output Summary (Last 24 hours) at 11/04/17 2121  Last data filed at 11/04/17 1853   Gross per 24 hour   Intake             1130 ml   Output             2600 ml   Net            -1470 ml      Physical Exam   Constitutional: She is oriented to person, place, and time. She appears well-developed and well-nourished.   HENT:   Head: Normocephalic and atraumatic.   Right Ear: External ear normal.   Left Ear: External ear normal.   Nose: Nose normal.   Mouth/Throat: Mucous membranes are normal. Normal dentition.   Eyes: Conjunctivae, EOM and lids are normal.   Neck: Normal range of motion. Neck supple. Hepatojugular reflux and JVD present.   Cardiovascular: Normal rate, regular rhythm and intact distal pulses.  Exam reveals no gallop and no friction rub.    Murmur heard.  Pulmonary/Chest: Effort normal. She has rales in the right middle field, the right lower field and the left lower field. She exhibits no tenderness.   Abdominal: Soft. Bowel sounds are normal. She exhibits no distension. There is no tenderness.   Musculoskeletal: Normal range of motion. She exhibits edema (2-3+ BLE ). She exhibits no deformity.   Neurological: She is alert and oriented to person, place, and time. No cranial nerve deficit or sensory deficit. She exhibits normal muscle tone.   Skin: Skin is warm and dry. Capillary refill takes less than 2 seconds. No rash noted. No erythema.   Psychiatric: She has a normal mood and affect. Her behavior is normal. Judgment and thought content normal.   Nursing note and vitals reviewed.      Significant Labs:   CMP:     Recent Labs  Lab 11/03/17  0507 11/03/17  1636 11/04/17  0417 11/04/17  1933    140 142 139   K 4.1 3.5 4.0 3.4*   CL 97 94* 97 94*   CO2 36* 35* 36* 33*    129* 107 124*   BUN 22 23 21 25*   CREATININE 0.8 0.9 0.8 0.9   CALCIUM 9.2 9.5 9.2 9.4   PROT 6.3  --  6.5  --    ALBUMIN 3.1*  --  3.1*  --    BILITOT 1.1*  --  1.2*  --     ALKPHOS 68  --  69  --    AST 21  --  20  --    ALT 10  --  10  --    ANIONGAP 6* 11 9 12   EGFRNONAA >60.0 56.5* >60.0 56.5*      Magnesium:     Recent Labs  Lab 11/03/17  0507 11/04/17  0417   MG 2.0 1.8     Significant Imaging: I have reviewed and interpreted all pertinent imaging results/findings within the past 24 hours.

## 2017-11-05 NOTE — CONSULTS
[facility]  Orthopedics  Consult Note    Patient Name: Adela Garay  MRN: [id number]  Admission Date: 10/30/2017  Hospital Length of Stay: 6 days  Attending Provider: Justice Alexander MD  Primary Care Provider: Torrie Farrell MD    Patient information was obtained from patient and relative(s).     Inpatient consult to Orthopedic Surgery  Consult performed by: RADHA CHAKRABORTY  Consult ordered by: ALEJANDRO TEIXEIRA        Subjective:     Principal Problem:Acute on chronic diastolic heart failure    Chief Complaint:   Chief Complaint   Patient presents with    Edema     2 weeks ago had tavr procedure, and pacemaker, more swelling, lasix not working any more        HPI: Adela Garay is a 89 y.o. female with a history of HTN, HLD, DM, Afib, HFpEF, and recent valve replacement. She is admitted to medicine for management of acute on chronic diastolic heart failure. She reports increased pain and stiffness in her right knee since yesterday morning. She developed swelling of the knee throughout the day and was febrile to 101.9 F overnight. She has a history of osteoarthritis in this knee and was treated for septic arthritis of this knee last year by Dr. Arellano. She denies any recent trauma to the knee. She walks with a cane at baseline. She was able to ambulate yesterday morning, but towards the end of the day reports feeling as though the knee is going to give out. She is on Coumadin.    Past Medical History:   Diagnosis Date    Anticoagulant long-term use     Anticoagulated on Coumadin     Arthritis     Atrial fibrillation     Atrial fibrillation     Carotid artery occlusion     Chronic rhinosinusitis     Coronary artery disease     Granulomatous lung disease     Heart failure     Hyperlipidemia     Hypertension     Hypertensive heart disease without CHF (congestive heart failure)     Mitral valve prolapse     PN (peripheral neuropathy)     hereditary?(sister  has it also)/idiopathic?/patient thinks from Zocor    Severe aortic stenosis by prior echocardiogram     TIA (transient ischemic attack)     Type 2 diabetes mellitus     Type II or unspecified type diabetes mellitus without mention of complication, not stated as uncontrolled        Past Surgical History:   Procedure Laterality Date    SINUS SURGERY      tonsillectomy      TONSILLECTOMY         Review of patient's allergies indicates:   Allergen Reactions    Levaquin [levofloxacin]     Doxycycline hyclate Other (See Comments)     Unknown to patient    Iodine and iodide containing products     Neurontin  [gabapentin]      Other reaction(s): Unknown    Norpace  [disopyramide]      Other reaction(s): Hives    Phenytoin sodium extended      Other reaction(s): Muscle pain    Sulfamethoxazole-trimethoprim      Other reaction(s): Muscle cramps       Current Facility-Administered Medications   Medication    acetaminophen tablet 650 mg    albuterol-ipratropium 2.5mg-0.5mg/3mL nebulizer solution 3 mL    aspirin chewable tablet 81 mg    benzonatate capsule 100 mg    bisacodyl suppository 10 mg    dextromethorphan HBr syrup 10 mL    digoxin tablet 0.25 mg    docusate sodium capsule 100 mg    furosemide injection 60 mg    hydrocodone-acetaminophen 5-325mg per tablet 1 tablet    metoprolol tartrate (LOPRESSOR) tablet 50 mg    morphine injection 4 mg    ondansetron disintegrating tablet 8 mg    ondansetron injection 4 mg    ondansetron injection 4 mg    oxyCODONE immediate release tablet 5 mg    polyethylene glycol packet 17 g    ramelteon tablet 8 mg    simvastatin tablet 20 mg    sodium chloride 0.9% flush 3 mL    tiZANidine tablet 2 mg    traZODone tablet 100 mg    warfarin (COUMADIN) tablet 4 mg     Family History     Problem Relation (Age of Onset)    Atrial fibrillation Sister, Sister, Sister    Heart disease Father, Mother    Lymphoma Sister (29)    Thyroid disease Sister        Social  "History Main Topics    Smoking status: Never Smoker    Smokeless tobacco: Never Used    Alcohol use 0.0 oz/week      Comment: rare    Drug use: No    Sexual activity: No     ROS  Objective:     Vital Signs (Most Recent):  Temp: 98.3 °F (36.8 °C) (11/05/17 0755)  Pulse: 68 (11/05/17 0755)  Resp: 18 (11/05/17 0755)  BP: 137/63 (11/05/17 0755)  SpO2: 96 % (11/05/17 0755) Vital Signs (24h Range):  Temp:  [98.1 °F (36.7 °C)-101.9 °F (38.8 °C)] 98.3 °F (36.8 °C)  Pulse:  [60-78] 68  Resp:  [18-20] 18  SpO2:  [93 %-98 %] 96 %  BP: (129-177)/(59-77) 137/63     Weight: 64.5 kg (142 lb 3.2 oz)  Height: 5' 5" (165.1 cm)  Body mass index is 23.66 kg/m².      Intake/Output Summary (Last 24 hours) at 11/05/17 1026  Last data filed at 11/05/17 0858   Gross per 24 hour   Intake              540 ml   Output             2250 ml   Net            -1710 ml       Ortho/SPM Exam   HEENT: normocephalic, atraumatic  Resp: no increased work of breathing  CV: regular rate and rhythm  MSK: moves b/l upper extremities well    right lower extremity:  Skin intact  Significant swelling of the right knee compared to left, mild warmth  Sensation intact SP/DP/T/Sural/Saphenous nerves  Motor intact EHL/FHL/TA/gastroc  Knee AROM 5-90, pain at terminal flexion and extension  TTP around medial and lateral joint line, worse medially; no posterior capsule pain  Palpable DP/PT pulses    Significant Labs: All pertinent labs within the past 24 hours have been reviewed.     Recent Labs  Lab 11/05/17  0951   SEDRATE 52*   .1*      Significant Imaging: X-ray right knee consistent with degenerative joint disease    Assessment/Plan:     Right knee pain    Adela Garay is a 89 y.o. female with right knee pain and swelling  - Exam and history consistent with arthritic flair  - Pt found to have hemarthrosis which may be related to minor trauma as she is on coumadin  - Cell count non-diagnostic due to clot, but no organisms or white cells on gram " stain  - Will follow cultures    Procedure note:  After consent was obtained, the right knee was prepped using sterile technique. The joint was entered from a inferomedial approach and 7 mL's of sanguinous joint fluid were withdrawn and sent for laboratory analysis. The patient tolerated the procedure well with no complications. No staff was present.            Thank you for your consult. I will follow-up with patient. Please contact us if you have any additional questions.    Leeann Barajas MD  Orthopedics  [facility]

## 2017-11-05 NOTE — NURSING TRANSFER
Nursing Transfer Note      11/5/2017     Transfer From: Radiology    Transfer via wheelchair    Transfer with 3L  to O2, cardiac monitoring    Transported by transport    Medicines sent: none    Chart send with patient: No    Notified: daughter    Patient reassessed at: 11/5/2017 1124    Upon arrival to floor: cardiac monitor applied, patient oriented to room, call bell in reach and bed in lowest position

## 2017-11-05 NOTE — PROGRESS NOTES
[facility] Hospital Medicine  Progress Note    Patient Name: Adela Garay  MRN: [id number]  Patient Class: IP- Inpatient   Admission Date: 10/30/2017  Length of Stay: 6 days  Attending Physician: Justice Alexander MD  Primary Care Provider: Torrie Farrell MD    [facility] Medicine Team: [facility] HOSP MED ROBBIE Dover NP    Subjective:     Principal Problem:Acute on chronic diastolic heart failure    HPI:  Ms. Garay is a 89 year old  woman with past medical history of HTN, HLD, DM, aFib, HFpEF, and recent TAVR complicated by PPM placement for CHB ([place] Valve 29mm ) on 10/17/17 for severe AS who presents to the ED with complaints of leg swelling. She states that she has been having issues since she went home after the surgery. She endorses orthopnea, ESTRADA, new cough, leg swelling, and weight gain. Denies issues like this before. Denies Fever. Denies chest pain.     While in ED, chemistry was unremarkable, BNP elevated at 918, troponin negative and CBC stable. CXR with bilateral pleural effusion larger on the right side and atelectatic changes at the lung bases; EKG with atrial fibrillation with paced ventricular rhythm.    The patient was admitted to the Hospital Medicine Service for further evaluation and management.     Hospital Course:  Ms. Garay was admitted 10/30 s/p recent TAVR and PPM with acute on chronic diastolic heart failure and edema.  She was admitted at 151 lbs with potential goal weight of 143-145 lbs per daugher. She was evaluated by cardiology in ED and assessed by TAVR team in house by Dr. Jarvis. Diuresis initiated with lasix 40 mg IVP BID, and titrated to 60mg iv bid for improved diuresis.  Her dry weight appears to be closer to 135 lbs, as she is still quite volume up at 142 lbs.   Diuresing well, oxygen sat down to 88-89% requiring re-institution of oxgyen, lungs still sound wet on exam, + jvd, + peripheral edema still. She developed a 101.9 fever, R knee  effusion and pain.  Ortho was consulted who tapped and sent for fluid analysis, however the specimen clotted and was unable to be analyzed properly.  It was a bloody tap, she's on warfarin, so source could be knee or pulm.  Cultures pending, will continue vanc/ ceftriaxone coverage till cultures are finalized.  Xray shows continued pulm edema and layered pleural effusion, will try acapella in addition to IS to try and break it up, along with continued aggressive diuresis.  Once she's dry will transition to torsemide for discharge planning.      No new subjective & objective note has been filed under this hospital service since the last note was generated.    Assessment/Plan:      * Acute on chronic diastolic heart failure    - BNP elevated at 918, troponin negative and CBC/ CMP stable  - CXR with bilateral pleural effusion larger on the right side and atelectatic changes at the lung bases  - EKG with atrial fibrillation with paced ventricular rhythm  - Cardiology evaluated in ED, Interventional TAVR team consulted due to recent procedure  - 2D echo EF 50% mod MR, severe TR, PAP 40, RAP 15  - diuresis initiated with lasix 40 mg IVP BID, she's mobilizing her fluid faster than her heart and renal function can keep up, still with significant pulm edema, increased lasix to 60mg iv bid, with moderate improvement but still with pulm issues/ peripheral edema/ etc, increased to 60mg tid and finally added metolazone  2.5 mg x1 to add as a booster.    - admit weight 151 lbs, down to 142 lbs which is past her previously predicted dry weight and she's still volume up, it appears she's closer to 135 lbs dry weight.    - 15 beat run of VT, K 3.4. In addition noted to have febrile episode to 101.9, xray still with pulm edema/effusion, will hold on metolazone after VT episodes.   - resume home BB and digoxin   - continue tele monitoring  - EKG PRN chest pain or arrhythmia  - hold triamterene HCTZ for now, while using IV diuresis         S/P TAVR (transcatheter aortic valve replacement)    - see above  -TAVR team have seen and this is to be expected, she's mobilizing her fluid faster than her heart and renal function can keep up        Complete heart block, post-surgical    - st christian device placed post TAVR  - pacer programming: Wound check done at bedside.   - Device/lead wnl.   - Educated pt on wound care, home monitoring, arm restrictions.   - Issued Merlin home  monitor with instructions.   - F/u in clinic in 3mos with Dr. Delgado.        Atrial fibrillation    - rate control, warfarin           Long-term (current) use of anticoagulants, INR goal 2.0-3.0    - INR currently 2.2, warfarin  - daily PT/INR  - f/u with Dr. Ivy        Hx-TIA (transient ischemic attack)    -continue ASA        Hyperlipidemia    -continue statin         Controlled type 2 diabetes mellitus with microalbuminuria    -HgA1C 5.6 last in May 6.0  - diet controlled  -cardiac/diabetic diet in house        Non-productive cough    -satting 88-89%, requiring oxygen again, still with bilateral pulm edema  -duo nebs q8H  -repeat CXR post diuresis as necessary        Coronary artery disease involving native coronary artery of native heart without angina pectoris    -chest pain free, EKG without acute ischemic changes  -continue ASA, BB, and statin         Mild major depression    -continue home trazodone  -no SI/HI        DDD (degenerative disc disease), cervical    -PRN tylenol and oxycodone        Benign hypertensive heart disease with congestive heart failure    - continue home BB, digoxin, and hold triamterene /hctz for now  - BP elevated in ED, now improving, continue to monitor         Severe aortic stenosis by prior echocardiogram    -see above and HPI          VTE Risk Mitigation         Ordered     warfarin (COUMADIN) tablet 4 mg  Daily     Route:  Oral        11/05/17 0732     Medium Risk of VTE  Once      10/30/17 2102     Place JONO hose  Until discontinued       10/30/17 2102              Liyah Dover NP  Department of Hospital Medicine   [facility]

## 2017-11-05 NOTE — ASSESSMENT & PLAN NOTE
- BNP elevated at 918, troponin negative and CBC/ CMP stable  - CXR with bilateral pleural effusion larger on the right side and atelectatic changes at the lung bases  - EKG with atrial fibrillation with paced ventricular rhythm  - Cardiology evaluated in ED, Interventional TAVR team consulted due to recent procedure  - 2D echo EF 50% mod MR, severe TR, PAP 40, RAP 15  - diuresis initiated with lasix 40 mg IVP BID, she's mobilizing her fluid faster than her heart and renal function can keep up, still with significant pulm edema, increased lasix to 60mg iv bid, with moderate improvement but still with pulm issues/ peripheral edema/ etc, increase to 60mg tid and finally added metolazone  2.5 mg x1 to add as a booster.    - admit weight 151 lbs, down to 142 lbs which is past her previously predicted dry weight and she's still volume up, it appears she's closer to 135 lbs dry weight.    - 15 beat run of VT, K 3.4.   - resume home BB and digoxin   - continue tele monitoring  - EKG PRN chest pain or arrhythmia  - hold triamterene HCTZ for now, while using IV diuresis

## 2017-11-05 NOTE — PROGRESS NOTES
Patient complaining of right knee pain and fluid in the knee. Notified Joelle Dover NP and she assess the patient and put in orders. Will continue to monitor.

## 2017-11-05 NOTE — PLAN OF CARE
Problem: Patient Care Overview  Goal: Plan of Care Review  Outcome: Ongoing (interventions implemented as appropriate)  Plan of care discussed with patient. Patient is free of fall/trauma/injury. Denies CP, SOB. Patient has discomfort and swelling in right knee, workup being done for possible infection. All questions addressed. Will continue to monitor

## 2017-11-06 ENCOUNTER — TELEPHONE (OUTPATIENT)
Dept: FAMILY MEDICINE | Facility: CLINIC | Age: 82
End: 2017-11-06

## 2017-11-06 ENCOUNTER — OUTPATIENT CASE MANAGEMENT (OUTPATIENT)
Dept: ADMINISTRATIVE | Facility: OTHER | Age: 82
End: 2017-11-06

## 2017-11-06 PROBLEM — K14.0: Status: ACTIVE | Noted: 2017-11-06

## 2017-11-06 LAB
ALBUMIN SERPL BCP-MCNC: 2.7 G/DL
ALP SERPL-CCNC: 71 U/L
ALT SERPL W/O P-5'-P-CCNC: 10 U/L
ANION GAP SERPL CALC-SCNC: 9 MMOL/L
ANION GAP SERPL CALC-SCNC: 9 MMOL/L
AST SERPL-CCNC: 18 U/L
BASOPHILS # BLD AUTO: 0.03 K/UL
BASOPHILS NFR BLD: 0.3 %
BILIRUB SERPL-MCNC: 1.1 MG/DL
BNP SERPL-MCNC: 597 PG/ML
BUN SERPL-MCNC: 29 MG/DL
BUN SERPL-MCNC: 29 MG/DL
CALCIUM SERPL-MCNC: 9.1 MG/DL
CALCIUM SERPL-MCNC: 9.9 MG/DL
CHLORIDE SERPL-SCNC: 89 MMOL/L
CHLORIDE SERPL-SCNC: 92 MMOL/L
CO2 SERPL-SCNC: 36 MMOL/L
CO2 SERPL-SCNC: 40 MMOL/L
CREAT SERPL-MCNC: 0.8 MG/DL
CREAT SERPL-MCNC: 0.9 MG/DL
DIFFERENTIAL METHOD: ABNORMAL
DIGOXIN SERPL-MCNC: 1.2 NG/ML
EOSINOPHIL # BLD AUTO: 0.1 K/UL
EOSINOPHIL NFR BLD: 1.1 %
ERYTHROCYTE [DISTWIDTH] IN BLOOD BY AUTOMATED COUNT: 14.9 %
EST. GFR  (AFRICAN AMERICAN): >60 ML/MIN/1.73 M^2
EST. GFR  (AFRICAN AMERICAN): >60 ML/MIN/1.73 M^2
EST. GFR  (NON AFRICAN AMERICAN): 56.5 ML/MIN/1.73 M^2
EST. GFR  (NON AFRICAN AMERICAN): >60 ML/MIN/1.73 M^2
GLUCOSE SERPL-MCNC: 129 MG/DL
GLUCOSE SERPL-MCNC: 173 MG/DL
HCT VFR BLD AUTO: 33.6 %
HGB BLD-MCNC: 10.4 G/DL
IMM GRANULOCYTES # BLD AUTO: 0.04 K/UL
IMM GRANULOCYTES NFR BLD AUTO: 0.4 %
INR PPP: 1.7
LYMPHOCYTES # BLD AUTO: 1 K/UL
LYMPHOCYTES NFR BLD: 9.3 %
MAGNESIUM SERPL-MCNC: 2.1 MG/DL
MCH RBC QN AUTO: 29.4 PG
MCHC RBC AUTO-ENTMCNC: 31 G/DL
MCV RBC AUTO: 95 FL
MONOCYTES # BLD AUTO: 1.9 K/UL
MONOCYTES NFR BLD: 17 %
NEUTROPHILS # BLD AUTO: 8 K/UL
NEUTROPHILS NFR BLD: 71.9 %
NRBC BLD-RTO: 0 /100 WBC
PATH INTERP FLD-IMP: NORMAL
PLATELET # BLD AUTO: 189 K/UL
PMV BLD AUTO: 10.1 FL
POTASSIUM SERPL-SCNC: 3.2 MMOL/L
POTASSIUM SERPL-SCNC: 4 MMOL/L
PROT SERPL-MCNC: 6.2 G/DL
PROTHROMBIN TIME: 16.9 SEC
RBC # BLD AUTO: 3.54 M/UL
SODIUM SERPL-SCNC: 137 MMOL/L
SODIUM SERPL-SCNC: 138 MMOL/L
VANCOMYCIN TROUGH SERPL-MCNC: 15.2 UG/ML
WBC # BLD AUTO: 11.06 K/UL

## 2017-11-06 PROCEDURE — 99233 SBSQ HOSP IP/OBS HIGH 50: CPT | Mod: ,,, | Performed by: NURSE PRACTITIONER

## 2017-11-06 PROCEDURE — 25000003 PHARM REV CODE 250: Performed by: NURSE PRACTITIONER

## 2017-11-06 PROCEDURE — 25000003 PHARM REV CODE 250

## 2017-11-06 PROCEDURE — 20600001 HC STEP DOWN PRIVATE ROOM

## 2017-11-06 PROCEDURE — 83735 ASSAY OF MAGNESIUM: CPT

## 2017-11-06 PROCEDURE — 25000003 PHARM REV CODE 250: Performed by: HOSPITALIST

## 2017-11-06 PROCEDURE — 80053 COMPREHEN METABOLIC PANEL: CPT

## 2017-11-06 PROCEDURE — 83880 ASSAY OF NATRIURETIC PEPTIDE: CPT

## 2017-11-06 PROCEDURE — 97530 THERAPEUTIC ACTIVITIES: CPT

## 2017-11-06 PROCEDURE — 36415 COLL VENOUS BLD VENIPUNCTURE: CPT

## 2017-11-06 PROCEDURE — 94640 AIRWAY INHALATION TREATMENT: CPT

## 2017-11-06 PROCEDURE — 80162 ASSAY OF DIGOXIN TOTAL: CPT

## 2017-11-06 PROCEDURE — 97535 SELF CARE MNGMENT TRAINING: CPT

## 2017-11-06 PROCEDURE — 80202 ASSAY OF VANCOMYCIN: CPT

## 2017-11-06 PROCEDURE — 85025 COMPLETE CBC W/AUTO DIFF WBC: CPT

## 2017-11-06 PROCEDURE — 80048 BASIC METABOLIC PNL TOTAL CA: CPT

## 2017-11-06 PROCEDURE — 85610 PROTHROMBIN TIME: CPT

## 2017-11-06 PROCEDURE — 25000242 PHARM REV CODE 250 ALT 637 W/ HCPCS: Performed by: NURSE PRACTITIONER

## 2017-11-06 PROCEDURE — A4216 STERILE WATER/SALINE, 10 ML: HCPCS | Performed by: NURSE PRACTITIONER

## 2017-11-06 PROCEDURE — 27000221 HC OXYGEN, UP TO 24 HOURS

## 2017-11-06 PROCEDURE — 63600175 PHARM REV CODE 636 W HCPCS: Performed by: NURSE PRACTITIONER

## 2017-11-06 PROCEDURE — 97116 GAIT TRAINING THERAPY: CPT

## 2017-11-06 PROCEDURE — 94761 N-INVAS EAR/PLS OXIMETRY MLT: CPT

## 2017-11-06 RX ORDER — NYSTATIN 100000 [USP'U]/ML
500000 SUSPENSION ORAL
Status: DISCONTINUED | OUTPATIENT
Start: 2017-11-06 | End: 2017-11-12 | Stop reason: HOSPADM

## 2017-11-06 RX ORDER — METOLAZONE 2.5 MG/1
2.5 TABLET ORAL DAILY
Status: DISCONTINUED | OUTPATIENT
Start: 2017-11-06 | End: 2017-11-07

## 2017-11-06 RX ORDER — POLYETHYLENE GLYCOL 3350 17 G/17G
17 POWDER, FOR SOLUTION ORAL 2 TIMES DAILY
Status: DISCONTINUED | OUTPATIENT
Start: 2017-11-06 | End: 2017-11-12 | Stop reason: HOSPADM

## 2017-11-06 RX ORDER — METOPROLOL TARTRATE 25 MG/1
75 TABLET, FILM COATED ORAL 2 TIMES DAILY
Status: DISCONTINUED | OUTPATIENT
Start: 2017-11-06 | End: 2017-11-12 | Stop reason: HOSPADM

## 2017-11-06 RX ORDER — FUROSEMIDE 10 MG/ML
80 INJECTION INTRAMUSCULAR; INTRAVENOUS 3 TIMES DAILY
Status: DISCONTINUED | OUTPATIENT
Start: 2017-11-06 | End: 2017-11-07

## 2017-11-06 RX ORDER — POTASSIUM CHLORIDE 750 MG/1
40 CAPSULE, EXTENDED RELEASE ORAL ONCE
Status: COMPLETED | OUTPATIENT
Start: 2017-11-06 | End: 2017-11-06

## 2017-11-06 RX ORDER — POTASSIUM CHLORIDE 750 MG/1
60 CAPSULE, EXTENDED RELEASE ORAL ONCE
Status: COMPLETED | OUTPATIENT
Start: 2017-11-06 | End: 2017-11-06

## 2017-11-06 RX ADMIN — DOCUSATE SODIUM 100 MG: 100 CAPSULE, LIQUID FILLED ORAL at 08:11

## 2017-11-06 RX ADMIN — IPRATROPIUM BROMIDE AND ALBUTEROL SULFATE 3 ML: .5; 3 SOLUTION RESPIRATORY (INHALATION) at 11:11

## 2017-11-06 RX ADMIN — ASPIRIN 81 MG CHEWABLE TABLET 81 MG: 81 TABLET CHEWABLE at 08:11

## 2017-11-06 RX ADMIN — VANCOMYCIN HYDROCHLORIDE 1000 MG: 1 INJECTION, POWDER, LYOPHILIZED, FOR SOLUTION INTRAVENOUS at 04:11

## 2017-11-06 RX ADMIN — METOLAZONE 2.5 MG: 2.5 TABLET ORAL at 01:11

## 2017-11-06 RX ADMIN — POTASSIUM CHLORIDE 40 MEQ: 750 CAPSULE, EXTENDED RELEASE ORAL at 01:11

## 2017-11-06 RX ADMIN — METOPROLOL TARTRATE 75 MG: 25 TABLET ORAL at 08:11

## 2017-11-06 RX ADMIN — SIMVASTATIN 20 MG: 20 TABLET, FILM COATED ORAL at 08:11

## 2017-11-06 RX ADMIN — FUROSEMIDE 80 MG: 10 INJECTION, SOLUTION INTRAVENOUS at 03:11

## 2017-11-06 RX ADMIN — NYSTATIN 500000 UNITS: 500000 SUSPENSION ORAL at 05:11

## 2017-11-06 RX ADMIN — DEXTROMETHORPHAN HYDROBROMIDE 10 ML: 7.5 LIQUID ORAL at 06:11

## 2017-11-06 RX ADMIN — IPRATROPIUM BROMIDE AND ALBUTEROL SULFATE 3 ML: .5; 3 SOLUTION RESPIRATORY (INHALATION) at 03:11

## 2017-11-06 RX ADMIN — CEFTRIAXONE 1 G: 1 INJECTION, SOLUTION INTRAVENOUS at 12:11

## 2017-11-06 RX ADMIN — HYDROCODONE BITARTRATE AND ACETAMINOPHEN 1 TABLET: 5; 325 TABLET ORAL at 04:11

## 2017-11-06 RX ADMIN — Medication 3 ML: at 08:11

## 2017-11-06 RX ADMIN — METOPROLOL TARTRATE 50 MG: 25 TABLET ORAL at 08:11

## 2017-11-06 RX ADMIN — POTASSIUM CHLORIDE 60 MEQ: 750 CAPSULE, EXTENDED RELEASE ORAL at 08:11

## 2017-11-06 RX ADMIN — IPRATROPIUM BROMIDE AND ALBUTEROL SULFATE 3 ML: .5; 3 SOLUTION RESPIRATORY (INHALATION) at 08:11

## 2017-11-06 RX ADMIN — TIZANIDINE 2 MG: 2 TABLET ORAL at 09:11

## 2017-11-06 RX ADMIN — IPRATROPIUM BROMIDE AND ALBUTEROL SULFATE 3 ML: .5; 3 SOLUTION RESPIRATORY (INHALATION) at 12:11

## 2017-11-06 RX ADMIN — FUROSEMIDE 60 MG: 10 INJECTION, SOLUTION INTRAMUSCULAR; INTRAVENOUS at 05:11

## 2017-11-06 RX ADMIN — TRAZODONE HYDROCHLORIDE 50 MG: 50 TABLET ORAL at 12:11

## 2017-11-06 RX ADMIN — WARFARIN SODIUM 6 MG: 2 TABLET ORAL at 04:11

## 2017-11-06 RX ADMIN — NYSTATIN 500000 UNITS: 500000 SUSPENSION ORAL at 09:11

## 2017-11-06 RX ADMIN — DEXTROMETHORPHAN HYDROBROMIDE 10 ML: 7.5 LIQUID ORAL at 03:11

## 2017-11-06 RX ADMIN — FUROSEMIDE 80 MG: 10 INJECTION, SOLUTION INTRAVENOUS at 08:11

## 2017-11-06 RX ADMIN — DIGOXIN 0.25 MG: 250 TABLET ORAL at 08:11

## 2017-11-06 NOTE — PT/OT/SLP PROGRESS
"Physical Therapy  Treatment    Adela Garay   MRN: [id number]   Admitting Diagnosis: Acute on chronic diastolic heart failure    PT Received On: 11/06/17  PT Start Time: 1035     PT Stop Time: 1117    PT Total Time (min): 42 min       Billable Minutes:  Gait [id number] and Therapeutic Activity 18    Treatment Type: Treatment  PT/PTA: PT     PTA Visit Number: 0       General Precautions: Standard, fall, pacemaker  Orthopedic Precautions: N/A   Braces: N/A    Do you have any cultural, spiritual, Druze conflicts, given your current situation?: none reported     Subjective:  Communicated with RN prior to session.  "This is such a miracle." re: pt ambulating for the first time since R knee problems began    Pain/Comfort  Pain Rating 1: 3/10  Location - Side 1: Right  Location 1: knee (during gait )  Pain Addressed 1: Reposition, Distraction    R knee edema noted; pt reports that this is improved compared to yesterday    Objective:   Patient found with: telemetry, peripheral IV, oxygen    Functional Mobility:  Bed Mobility:   Supine to Sit:  (NT 2* pt found seated EOB and left UIC )    Transfers:  Sit <> Stand Assistance: Minimum Assistance (x5 reps)  Sit <> Stand Assistive Device: No Assistive Device  Toilet Transfer Technique: Stand Pivot  Toilet Transfer Assistance: Contact Guard Assistance  Toilet Transfer Assistive Device: Straight Cane, bedside commode    Gait:   Gait Distance: ~10 ft. + ~102 ft. + ~30 ft. (w/c follow throughout; seated rest breaks between ambulation trials)  Assistance 1: Minimum assistance  Gait Assistive Device: Single point cane  Gait Pattern: 2-point gait  Gait Deviation(s): decreased roberta, decreased step length, decreased weight-shifting ability, decreased toe-to-floor clearance, decreased velocity of limb motion (increased trunk flexion )    3L portable O2 in place throughout; no SOB noted     Balance:   Static Sit: supervision   Dynamic Sit: SBA-CGA  Static Stand: CGA  Dynamic " stand: CGA-Patrice     Therapeutic Activities and Exercises:  Reviewed pacemaker precautions. Pt verbalized understanding and was able to maintain throughout mobility without cueing.   Prior to mobility, pt reporting concerns with ambulation, as she feels like her R knee is unstable and will give out when standing. Pt reporting that she has not ambulated since R knee problems began.   Pt able to perform ambulation in hallway as described above. Chair follow throughout for safety.   Following ambulation, pt reporting need to void. Assisted pt to BSC with CGA for standing pivot transfer. MaxA for donning and doffing underwear. Required increased time to void. Patrice for hygiene following toileting. Pt given hand  to perform hand hygiene. Ambulated within room, BSC to bedside chair, with SPC and Patrice. Pt and pt's daughter educated on the importance of OOB activity. Pt and pt's daughter v/u.   Discussed d/c recs for HHPT and 24-hour assist pending progress. Pt and pt's daughter verbalized agreement to recs.      AM-PAC 6 CLICK MOBILITY  How much help from another person does this patient currently need?    1 = Unable, Total/Dependent Assistance  2 = A lot, Maximum/Moderate Assistance  3 = A little, Minimum/Contact Guard/Supervision  4 = None, Modified [place]/Independent    Turning over in bed (including adjusting bedclothes, sheets and blankets)?: 4  Sitting down on and standing up from a chair with arms (e.g., wheelchair, bedside commode, etc.): 3  Moving from lying on back to sitting on the side of the bed?: 3  Moving to and from a bed to a chair (including a wheelchair)?: 3  Need to walk in hospital room?: 3  Climbing 3-5 steps with a railing?: 2  Total Score: 18    AM-PAC Raw Score CMS G-Code Modifier Level of Impairment Assistance   6 % Total / Unable   7 - 9 CM 80 - 100% Maximal Assist   10 - 14 CL 60 - 80% Moderate Assist   15 - 19 CK 40 - 60% Moderate Assist   20 - 22 CJ 20 - 40% Minimal Assist    23 CI 1-20% SBA / CGA   24 CH 0% Independent/ Mod I     Patient left up in chair with all lines intact, call button in reach and pt's daughter present.    Assessment:  Adela Garay is a 89 y.o. female with a medical diagnosis of Acute on chronic diastolic heart failure and presents with R knee edema and discomfort this session. Pt pleased with her ability to ambulate this session, as she has been feeling sensation of R knee instability when standing. However due to R knee problems, pt is now requiring increased assist for gait as well as chair follow for safety. Due to slight decline in mobility, POC increased to 4x/week and HHPT now recommended upon d/c. Pt would continue to benefit from skilled acute PT in order to address current deficits and progress functional mobility.     Rehab identified problem list/impairments: Rehab identified problem list/impairments: weakness, impaired functional mobilty, gait instability, impaired endurance, impaired balance, impaired self care skills, decreased lower extremity function, pain, edema, impaired cardiopulmonary response to activity    Rehab potential is good.    Activity tolerance: Good    Discharge recommendations: Discharge Facility/Level Of Care Needs: home health PT     Barriers to discharge: Barriers to Discharge: None (lives alone, but has family assist)    Equipment recommendations: Equipment Needed After Discharge: none     GOALS:    Physical Therapy Goals        Problem: Physical Therapy Goal    Goal Priority Disciplines Outcome Goal Variances Interventions   Physical Therapy Goal     PT/OT, PT Ongoing (interventions implemented as appropriate)     Description:  Goals to be met by: 2017    Patient will increase functional independence with mobility by performin. Sit to stand transfer with Modified [place] using single point cane - not met  2. Gait  x 400 feet with Modified [place] using Single-point Cane . - not met  3. Lower  extremity exercise program x15 reps per handout, with independence - not met                       PLAN:    Patient to be seen 4 x/week  to address the above listed problems via therapeutic activities, gait training, therapeutic exercises, neuromuscular re-education  Plan of Care expires: 11/30/17  Plan of Care reviewed with: patient, daughter        Laina Garay, PT, DPT   11/6/2017  [phone]

## 2017-11-06 NOTE — PLAN OF CARE
Problem: Occupational Therapy Goal  Goal: Occupational Therapy Goal  Goals to be met by: 7 days (11/7/17)     Patient will increase functional independence with ADLs by performing:    UE Dressing with Supervision.  LE Dressing with Supervision.  Grooming while standing at sink with Supervision.  Toileting from toilet with Supervision for hygiene and clothing management.   Supine to sit with [place].  Toilet transfer to toilet with Supervision.  Pt will perform functional mobility household distance with supervision and AD as needed.     Outcome: Ongoing (interventions implemented as appropriate)  Continue OT plan of care.

## 2017-11-06 NOTE — TELEPHONE ENCOUNTER
----- Message from Yuli So sent at 11/6/2017  9:15 AM CST -----  Contact: self  Patient called to get Dr Farrell know she is at [facility] Room 325 and is getting excellent care  Please Note, Thanks!

## 2017-11-06 NOTE — PLAN OF CARE
Problem: Physical Therapy Goal  Goal: Physical Therapy Goal  Goals to be met by: 2017    Patient will increase functional independence with mobility by performin. Sit to stand transfer with Modified [place] using single point cane - not met  2. Gait  x 400 feet with Modified [place] using Single-point Cane . - not met  3. Lower extremity exercise program x15 reps per handout, with independence - not met     Outcome: Ongoing (interventions implemented as appropriate)  Goals reviewed and remain appropriate. Pt progressing towards goals.    Laina Garay, PT, DPT   2017  [phone]

## 2017-11-06 NOTE — NURSING
"Noted pt tongue is red and looks irritated. Pt states "it feels sensitive". Also, pt had a 9 beat run of vtach. Asymptomatic at this time. Notified Joelle Dover NP. No further orders given. Will continue to monitor.   "

## 2017-11-06 NOTE — PLAN OF CARE
Problem: Patient Care Overview  Goal: Plan of Care Review  Outcome: Ongoing (interventions implemented as appropriate)  Patient c/o pain relieved with prn meds; denies chest pain, SOB, or other discomfort. IV Abx administered as ordered.Urine specimen collected. Pt remains free of falls or injuries.  Daughter at BS. Pt and family verbalizes complete understanding of plan of care. Will continue to monitor.

## 2017-11-06 NOTE — PT/OT/SLP PROGRESS
Occupational Therapy  Treatment    Adela Garay   MRN: [id number]   Admitting Diagnosis: Acute on chronic diastolic heart failure    OT Date of Treatment: 11/06/17   OT Start Time: 1413  OT Stop Time: 1500  OT Total Time (min): 47 min    Billable Minutes:  Self Care/Home Management 20 and Therapeutic Activity 27    General Precautions: Standard, fall, pacemaker  Orthopedic Precautions: N/A  Braces: N/A    Do you have any cultural, spiritual, Religion conflicts, given your current situation?: None    Subjective:  Communicated with RN prior to session. Pt up in chair with daughter present, agreeable to OT.    Pain/Comfort  Pain Rating 1: 0/10  Pain Rating Post-Intervention 1: 0/10    Objective:  Patient found with: telemetry, peripheral IV, oxygen     Functional Mobility:  Bed Mobility: NT as pt up in chair     Transfers:  Sit <> Stand Assistance: Moderate Assistance first attempt from bedside chair; Min A from W/C and BSC  Sit <> Stand Assistive Device: Straight Cane  Toilet Transfer Assistance: Minimum Assistance  Toilet Transfer Assistive Device: bedside commode    Functional Ambulation: Within room and hallway ~150 ft with CGA-Min A using straight cane, 3L O2 in tow; cues for breathing and to slow pace, take standing rest breaks as needed    Activities of Daily Living:  Toileting Where Assessed: Bedside commode  Toileting Level of Assistance: Moderate assistance for hygiene and clothing management    Balance:   Static Sit: NORMAL: No deviations seen in posture held statically  Dynamic Sit: GOOD+: Maintains balance through MAXIMAL excursions of active trunk motion  Static Stand: FAIR: Maintains without assist but unable to take challenges  Dynamic stand: FAIR: Needs CONTACT GUARD during gait    Therapeutic Activities and Exercises:  Pt up in chair; required Mod A for sit to stand from bedside chair; performed functional mobility as described above; returned to room, toileting with Mod A from BSC; assisted back  "to bedside chair with Min A; discussed POC and D/C recs with pt and daughter    AM-PAC 6 CLICK ADL   How much help from another person does this patient currently need?   1 = Unable, Total/Dependent Assistance  2 = A lot, Maximum/Moderate Assistance  3 = A little, Minimum/Contact Guard/Supervision  4 = None, Modified [place]/Independent    Putting on and taking off regular lower body clothing? : 3  Bathing (including washing, rinsing, drying)?: 3  Toileting, which includes using toilet, bedpan, or urinal? : 2  Putting on and taking off regular upper body clothing?: 3  Taking care of personal grooming such as brushing teeth?: 4  Eating meals?: 4  Total Score: 19     AM-PAC Raw Score CMS "G-Code Modifier Level of Impairment Assistance   6 % Total / Unable   7 - 8 CM 80 - 100% Maximal Assist   9-13 CL 60 - 80% Moderate Assist   14 - 19 CK 40 - 60% Moderate Assist   20 - 22 CJ 20 - 40% Minimal Assist   23 CI 1-20% SBA / CGA   24 CH 0% Independent/ Mod I       Patient left up in chair with all lines intact, call button in reach and daughter present    ASSESSMENT:  Adela Garay is a 89 y.o. female with a medical diagnosis of Acute on chronic diastolic heart failure and presents with good effort and participation in therapy. Pt making progress toward goals and would continue to benefit from OT to increase independence. POC frequency increased to 4x/week and recommend HH upon D/C.    Rehab identified problem list/impairments: Rehab identified problem list/impairments: weakness, impaired endurance, impaired functional mobilty, gait instability, impaired cardiopulmonary response to activity    Rehab potential is good.    Activity tolerance: Good    Discharge recommendations: Discharge Facility/Level Of Care Needs: home with home health     Barriers to discharge: Barriers to Discharge: None    Equipment recommendations: none     GOALS:    Occupational Therapy Goals        Problem: Occupational Therapy Goal "    Goal Priority Disciplines Outcome Interventions   Occupational Therapy Goal     OT, PT/OT Ongoing (interventions implemented as appropriate)    Description:  Goals to be met by: 7 days (11/7/17)     Patient will increase functional independence with ADLs by performing:    UE Dressing with Supervision.  LE Dressing with Supervision.  Grooming while standing at sink with Supervision.  Toileting from toilet with Supervision for hygiene and clothing management.   Supine to sit with [place].  Toilet transfer to toilet with Supervision.  Pt will perform functional mobility household distance with supervision and AD as needed.                      Plan:  Patient to be seen 4 x/week to address the above listed problems via self-care/home management, therapeutic activities, therapeutic exercises  Plan of Care expires: 11/30/17  Plan of Care reviewed with: patient, daughter         Priscilla VargasLAUREANO hatfeild  11/06/2017

## 2017-11-06 NOTE — PLAN OF CARE
11/06/17 0823   Discharge Reassessment   Assessment Type Discharge Planning Reassessment   Do you have any problems affording any of your prescribed medications? No   Discharge Plan A Home   Can the patient answer the patient profile reliably? Yes, cognitively intact   How does the patient rate their overall health at the present time? Fair   Describe the patient's ability to walk at the present time. Walks with the help of equipment   How often would a person be available to care for the patient? Whenever needed   Number of comorbid conditions (as recorded on the chart) Four   During the past month, has the patient often been bothered by feeling down, depressed or hopeless? No   During the past month, has the patient often been bothered by little interest or pleasure in doing things? No

## 2017-11-07 LAB
ALBUMIN SERPL BCP-MCNC: 2.8 G/DL
ALP SERPL-CCNC: 70 U/L
ALT SERPL W/O P-5'-P-CCNC: 10 U/L
ANION GAP SERPL CALC-SCNC: 7 MMOL/L
AORTIC VALVE REGURGITATION: ABNORMAL
AST SERPL-CCNC: 17 U/L
BACTERIA UR CULT: NO GROWTH
BILIRUB SERPL-MCNC: 0.7 MG/DL
BUN SERPL-MCNC: 31 MG/DL
CALCIUM SERPL-MCNC: 9.6 MG/DL
CHLORIDE SERPL-SCNC: 86 MMOL/L
CO2 SERPL-SCNC: 42 MMOL/L
CREAT SERPL-MCNC: 0.8 MG/DL
EST. GFR  (AFRICAN AMERICAN): >60 ML/MIN/1.73 M^2
EST. GFR  (NON AFRICAN AMERICAN): >60 ML/MIN/1.73 M^2
ESTIMATED PA SYSTOLIC PRESSURE: 59.09
GLOBAL PERICARDIAL EFFUSION: ABNORMAL
GLUCOSE SERPL-MCNC: 147 MG/DL
INR PPP: 2
MAGNESIUM SERPL-MCNC: 1.8 MG/DL
MITRAL VALVE MOBILITY: NORMAL
MITRAL VALVE REGURGITATION: ABNORMAL
POTASSIUM SERPL-SCNC: 2.5 MMOL/L
POTASSIUM SERPL-SCNC: 4.3 MMOL/L
PROT SERPL-MCNC: 6.5 G/DL
PROTHROMBIN TIME: 19.9 SEC
RETIRED EF AND QEF - SEE NOTES: 65 (ref 55–65)
SODIUM SERPL-SCNC: 135 MMOL/L
TRICUSPID VALVE REGURGITATION: ABNORMAL

## 2017-11-07 PROCEDURE — 83735 ASSAY OF MAGNESIUM: CPT

## 2017-11-07 PROCEDURE — 99900035 HC TECH TIME PER 15 MIN (STAT)

## 2017-11-07 PROCEDURE — 94761 N-INVAS EAR/PLS OXIMETRY MLT: CPT

## 2017-11-07 PROCEDURE — 84132 ASSAY OF SERUM POTASSIUM: CPT

## 2017-11-07 PROCEDURE — 99233 SBSQ HOSP IP/OBS HIGH 50: CPT | Mod: ,,, | Performed by: NURSE PRACTITIONER

## 2017-11-07 PROCEDURE — 93306 TTE W/DOPPLER COMPLETE: CPT | Mod: 26,,, | Performed by: INTERNAL MEDICINE

## 2017-11-07 PROCEDURE — 25000003 PHARM REV CODE 250: Performed by: NURSE PRACTITIONER

## 2017-11-07 PROCEDURE — 85610 PROTHROMBIN TIME: CPT

## 2017-11-07 PROCEDURE — 63600175 PHARM REV CODE 636 W HCPCS: Performed by: NURSE PRACTITIONER

## 2017-11-07 PROCEDURE — 94640 AIRWAY INHALATION TREATMENT: CPT

## 2017-11-07 PROCEDURE — A4216 STERILE WATER/SALINE, 10 ML: HCPCS | Performed by: NURSE PRACTITIONER

## 2017-11-07 PROCEDURE — 93306 TTE W/DOPPLER COMPLETE: CPT

## 2017-11-07 PROCEDURE — 36415 COLL VENOUS BLD VENIPUNCTURE: CPT

## 2017-11-07 PROCEDURE — 97530 THERAPEUTIC ACTIVITIES: CPT

## 2017-11-07 PROCEDURE — 94664 DEMO&/EVAL PT USE INHALER: CPT

## 2017-11-07 PROCEDURE — 25000242 PHARM REV CODE 250 ALT 637 W/ HCPCS: Performed by: NURSE PRACTITIONER

## 2017-11-07 PROCEDURE — 80053 COMPREHEN METABOLIC PANEL: CPT

## 2017-11-07 PROCEDURE — 20600001 HC STEP DOWN PRIVATE ROOM

## 2017-11-07 RX ORDER — DIGOXIN 125 MCG
0.12 TABLET ORAL DAILY
Status: DISCONTINUED | OUTPATIENT
Start: 2017-11-08 | End: 2017-11-12 | Stop reason: HOSPADM

## 2017-11-07 RX ORDER — POTASSIUM CHLORIDE 20 MEQ/1
40 TABLET, EXTENDED RELEASE ORAL
Status: COMPLETED | OUTPATIENT
Start: 2017-11-07 | End: 2017-11-07

## 2017-11-07 RX ORDER — MAGNESIUM SULFATE HEPTAHYDRATE 40 MG/ML
2 INJECTION, SOLUTION INTRAVENOUS ONCE
Status: COMPLETED | OUTPATIENT
Start: 2017-11-07 | End: 2017-11-07

## 2017-11-07 RX ORDER — FUROSEMIDE 10 MG/ML
80 INJECTION INTRAMUSCULAR; INTRAVENOUS 2 TIMES DAILY
Status: DISCONTINUED | OUTPATIENT
Start: 2017-11-07 | End: 2017-11-09

## 2017-11-07 RX ADMIN — IPRATROPIUM BROMIDE AND ALBUTEROL SULFATE 3 ML: .5; 3 SOLUTION RESPIRATORY (INHALATION) at 07:11

## 2017-11-07 RX ADMIN — CEFTRIAXONE 1 G: 1 INJECTION, SOLUTION INTRAVENOUS at 12:11

## 2017-11-07 RX ADMIN — ACETAMINOPHEN 650 MG: 325 TABLET ORAL at 12:11

## 2017-11-07 RX ADMIN — Medication 10 ML: at 12:11

## 2017-11-07 RX ADMIN — TRAZODONE HYDROCHLORIDE 100 MG: 50 TABLET ORAL at 12:11

## 2017-11-07 RX ADMIN — NYSTATIN 500000 UNITS: 500000 SUSPENSION ORAL at 09:11

## 2017-11-07 RX ADMIN — TIZANIDINE 2 MG: 2 TABLET ORAL at 09:11

## 2017-11-07 RX ADMIN — SIMVASTATIN 20 MG: 20 TABLET, FILM COATED ORAL at 09:11

## 2017-11-07 RX ADMIN — OXYCODONE HYDROCHLORIDE 5 MG: 5 TABLET ORAL at 03:11

## 2017-11-07 RX ADMIN — Medication 3 ML: at 02:11

## 2017-11-07 RX ADMIN — METOPROLOL TARTRATE 75 MG: 25 TABLET ORAL at 09:11

## 2017-11-07 RX ADMIN — NYSTATIN 500000 UNITS: 500000 SUSPENSION ORAL at 12:11

## 2017-11-07 RX ADMIN — VANCOMYCIN HYDROCHLORIDE 1000 MG: 1 INJECTION, POWDER, LYOPHILIZED, FOR SOLUTION INTRAVENOUS at 03:11

## 2017-11-07 RX ADMIN — DIGOXIN 0.25 MG: 250 TABLET ORAL at 08:11

## 2017-11-07 RX ADMIN — POTASSIUM CHLORIDE 40 MEQ: 1500 TABLET, EXTENDED RELEASE ORAL at 03:11

## 2017-11-07 RX ADMIN — METOPROLOL TARTRATE 75 MG: 25 TABLET ORAL at 08:11

## 2017-11-07 RX ADMIN — Medication 3 ML: at 09:11

## 2017-11-07 RX ADMIN — ASPIRIN 81 MG CHEWABLE TABLET 81 MG: 81 TABLET CHEWABLE at 08:11

## 2017-11-07 RX ADMIN — NYSTATIN 500000 UNITS: 500000 SUSPENSION ORAL at 06:11

## 2017-11-07 RX ADMIN — POTASSIUM CHLORIDE 40 MEQ: 1500 TABLET, EXTENDED RELEASE ORAL at 12:11

## 2017-11-07 RX ADMIN — NYSTATIN 500000 UNITS: 500000 SUSPENSION ORAL at 08:11

## 2017-11-07 RX ADMIN — WARFARIN SODIUM 6 MG: 2 TABLET ORAL at 06:11

## 2017-11-07 RX ADMIN — Medication 10 ML: at 03:11

## 2017-11-07 RX ADMIN — MAGNESIUM SULFATE IN WATER 2 G: 40 INJECTION, SOLUTION INTRAVENOUS at 08:11

## 2017-11-07 RX ADMIN — FUROSEMIDE 80 MG: 10 INJECTION, SOLUTION INTRAVENOUS at 06:11

## 2017-11-07 RX ADMIN — FUROSEMIDE 80 MG: 10 INJECTION, SOLUTION INTRAVENOUS at 05:11

## 2017-11-07 RX ADMIN — POLYETHYLENE GLYCOL 3350 17 G: 17 POWDER, FOR SOLUTION ORAL at 09:11

## 2017-11-07 NOTE — ASSESSMENT & PLAN NOTE
-remains oxygen supplementation dependant, however much diuresis overnight and feels less short of breath, will further attempt weaning in AM   -duo nebs q8H  -IS and flutter encouraged  -cough has much improved since admission

## 2017-11-07 NOTE — SUBJECTIVE & OBJECTIVE
Interval History:  Febrile to 101.9 overnight, xray with continued pulm edema / effusion, now has what appears to be r knee effusion/ hot to touch.  Ortho tapped, unable to analyze fluid as it clotted in wrong tube.  Her new dry weight appears to be 135 lbs.  Weight unchanged despite negative I/O's.     Review of Systems   Constitutional: Positive for fatigue and unexpected weight change. Negative for activity change, appetite change, chills, diaphoresis and fever.   HENT: Positive for mouth sores (glossitis). Negative for congestion, rhinorrhea, sinus pain, sinus pressure, sneezing, sore throat and trouble swallowing.    Eyes: Negative.    Respiratory: Positive for shortness of breath (improving). Negative for cough, chest tightness and wheezing.    Cardiovascular: Positive for leg swelling. Negative for chest pain and palpitations.   Gastrointestinal: Negative for abdominal distention, abdominal pain, constipation, diarrhea, nausea and vomiting.   Endocrine: Negative.    Genitourinary: Negative for decreased urine volume, difficulty urinating, dysuria, enuresis and urgency.   Musculoskeletal: Positive for back pain and joint swelling (r knee bloody effusion). Negative for arthralgias and myalgias.   Skin: Negative.    Allergic/Immunologic: Negative.    Neurological: Positive for weakness. Negative for dizziness, syncope, light-headedness and headaches.   Hematological: Negative.    Psychiatric/Behavioral: Positive for sleep disturbance. Negative for agitation and behavioral problems. The patient is not nervous/anxious.      Objective:     Vital Signs (Most Recent):  Temp: 98 °F (36.7 °C) (11/06/17 1715)  Pulse: 94 (11/06/17 1900)  Resp: 18 (11/06/17 1715)  BP: (!) 161/70 (11/06/17 1715)  SpO2: (!) 94 % (11/06/17 1715) Vital Signs (24h Range):  Temp:  [96.3 °F (35.7 °C)-98.4 °F (36.9 °C)] 98 °F (36.7 °C)  Pulse:  [60-94] 94  Resp:  [16-18] 18  SpO2:  [94 %-98 %] 94 %  BP: (119-166)/(57-70) 161/70     Weight: 64 kg  (141 lb 1.5 oz)  Body mass index is 23.48 kg/m².    Intake/Output Summary (Last 24 hours) at 11/06/17 2010  Last data filed at 11/06/17 1700   Gross per 24 hour   Intake              540 ml   Output             2400 ml   Net            -1860 ml      Physical Exam   Constitutional: She is oriented to person, place, and time. She appears well-developed and well-nourished.   HENT:   Head: Normocephalic and atraumatic.   Right Ear: External ear normal.   Left Ear: External ear normal.   Nose: Nose normal.   Mouth/Throat: Mucous membranes are normal. Normal dentition.   Eyes: Conjunctivae, EOM and lids are normal.   Neck: Normal range of motion. Neck supple. Hepatojugular reflux and JVD (+ TR jet, not so much jvd/ hjr anymore) present.   Cardiovascular: Normal rate, regular rhythm and intact distal pulses.  Exam reveals no gallop and no friction rub.    No murmur heard.  Pulmonary/Chest: Effort normal. She has rales in the right middle field, the right lower field and the left lower field. She exhibits no tenderness.   Abdominal: Soft. Bowel sounds are normal. She exhibits no distension. There is no tenderness.   Musculoskeletal: Normal range of motion. She exhibits edema (1+ BLE improving). She exhibits no deformity.   Neurological: She is alert and oriented to person, place, and time. No cranial nerve deficit or sensory deficit. She exhibits normal muscle tone. Coordination normal.   Skin: Skin is warm and dry. Capillary refill takes less than 2 seconds. No rash noted. No erythema.   Psychiatric: She has a normal mood and affect. Her behavior is normal. Judgment and thought content normal.   Nursing note and vitals reviewed.      Significant Labs:   CMP:     Recent Labs  Lab 11/05/17  0537 11/05/17  1255 11/06/17  0522 11/06/17  1500    141 137 138   K 3.4* 3.8 3.2* 4.0   CL 93* 92* 92* 89*   CO2 36* 39* 36* 40*   * 99 129* 173*   BUN 28* 28* 29* 29*   CREATININE 0.9 0.9 0.8 0.9   CALCIUM 9.2 10.0 9.1 9.9    PROT 6.8  --  6.2  --    ALBUMIN 3.0*  --  2.7*  --    BILITOT 1.1*  --  1.1*  --    ALKPHOS 84  --  71  --    AST 24  --  18  --    ALT 13  --  10  --    ANIONGAP 10 10 9 9   EGFRNONAA 56.5* 56.5* >60.0 56.5*      Magnesium:     Recent Labs  Lab 11/05/17  0537 11/06/17  0522   MG 2.2 2.1     Significant Imaging: I have reviewed and interpreted all pertinent imaging results/findings within the past 24 hours.

## 2017-11-07 NOTE — ASSESSMENT & PLAN NOTE
-on admission BNP elevated at 918, troponin negative, CBC/ CMP stable, CXR with bilateral pleural effusion larger on the right side and atelectatic changes at the lung bases, appears to have evolved over that past 2-3 weeks as CXR mid October noted to be much clearer   -BNP now down trending ~500  -EKG with atrial fibrillation with paced ventricular rhythm  -Cardiology evaluated in ED, Interventional TAVR team evaluated in house  -admission 2D echo EF 50% mod MR, severe TR, PAP 40, RAP 15  -repeat echo 11/7 with markedly increased CVP, RAP of 15, and trivial pericardial effusion  -repeat CXR 11/7 with continued pulmonary edema/pleural fluid  -chemistry beginning to look alkalotic so metolazone discontinued and lasix decreased to BID and discontinued metolazone, will continue to monitor   -diuresed ~12 liters since admission, weight down down 13 lbs, currently 138 lbs, dry weight estimated at 135 lbs  -continue home BB and decrease digoxin due to elevated level   -hold triamterene/HCTZ for now, while using IV diuresis  -continue tele monitoring  -EKG PRN chest pain or arrhythmia  -outpt follow up with TAVR team post discharge  -educated on low sodium diet with fluid restriction  -will need home health for further CHF management/monitoring

## 2017-11-07 NOTE — ASSESSMENT & PLAN NOTE
-pain and swelling much improved today   -bloody effusion arthrocentesis per Ortho  -right knee cultures with NGTD, continue vanc/ceftraxione until finalized  -WBC WNL and no further fevers  -unable to ascertain cell count due to clot within synovial fluid

## 2017-11-07 NOTE — ASSESSMENT & PLAN NOTE
-continue increased BB and decreased digoxin (dig level 1.2)  -hold triamterene/HCTZ with IV diuretics at this time  -goal -150

## 2017-11-07 NOTE — ASSESSMENT & PLAN NOTE
- continue home BB, digoxin, and hold triamterene /hctz for now  - BP elevated, increased metoprolol to 75mg   - goal sbp 140-150

## 2017-11-07 NOTE — ASSESSMENT & PLAN NOTE
- most likely d/t recent addition of abx  - nystatin s/s  - magic mouthwash  - stop abx when able

## 2017-11-07 NOTE — ASSESSMENT & PLAN NOTE
- BNP elevated at 918, troponin negative and CBC/ CMP stable  - CXR with bilateral pleural effusion larger on the right side and atelectatic changes at the lung bases, appears to have evolved over that past 2-3 weeks, as cxr mid October much clearer.  - EKG with atrial fibrillation with paced ventricular rhythm  - Cardiology evaluated in ED, Interventional TAVR team consulted due to recent procedure  - 2D echo EF 50% mod MR, severe TR, PAP 40, RAP 15  - diuresis initiated with lasix 40 mg IVP BID, she's mobilizing her fluid faster than her heart and renal function can keep up, still with significant pulm edema, increased lasix to 60mg iv bid, with moderate improvement but still with pulm issues/ peripheral edema/ etc, increased to 60mg tid and finally added metolazone  2.5 mg to add as a booster.    - admit weight 151 lbs, down to 140 lbs, predicted dry weight appears closer to 135 lbs.    - BNP trending down but still elevated at 597  - 15 beat run of VT, K 3.4. In addition noted to have febrile episode to 101.9, xray still with significant pulm edema/ and layered effusion.   - repeat echo ordered to r/o change in EF and pericardial effusion post procedure - pending   - resume home BB and digoxin   - continue tele monitoring  - EKG PRN chest pain or arrhythmia  - hold triamterene HCTZ for now, while using IV diuresis

## 2017-11-07 NOTE — PT/OT/SLP PROGRESS
"Occupational Therapy  Treatment    Adela Garay   MRN: [id number]   Admitting Diagnosis: Acute on chronic diastolic heart failure    OT Date of Treatment: 11/07/17   OT Start Time: 1250  OT Stop Time: 1322  OT Total Time (min): 32 min    Billable Minutes:  Therapeutic Activity 32    General Precautions: Standard, fall, pacemaker      Do you have any cultural, spiritual, Adventism conflicts, given your current situation?: None    Subjective:  Communicated with RN prior to session.  " I am doing well, but I feel weak"- Pt agreeable to OT session  Pain/Comfort  Pain Rating 1: 0/10    Objective:  Patient found reclined in bed with: oxygen, telemetry, pulse ox (continuous). Family at bedside      Functional Mobility:  Bed Mobility:  Rolling/Turning to Left: Supervision  Scooting/Bridging: Supervision  Supine to Sit: Supervision    Transfers:   Sit <> Stand Assistance: Minimum Assistance  Sit <> Stand Assistive Device: Straight Cane  Bed <> Chair Technique: Stand Pivot  Bed <> Chair Transfer Assistance: Minimum Assistance  Bed <> Chair Assistive Device: Straight Cane    Functional Ambulation: Pt walked 80 feet in hallway with Min assist with use of cane.     Activities of Daily Living:  UE Dressing Level of Assistance: Minimum assistance (donning gown around back )  Grooming Position: Standing at sink  Grooming Level of Assistance: Stand by assistance (standing at the sink to wash hands in prep for lunch )    Balance:    Static Sit: GOOD: Takes MODERATE challenges from all directions  Dynamic Sit: GOOD-: Maintains balance through MODERATE excursions of active trunk movement,     Static Stand: FAIR: Maintains without assist but unable to take challenges  Dynamic stand: Fair-     Therapeutic Activities and Exercises:  Bed mobility, sit to stand while reinforcing Pacemaker precautions, bed to chair, functional mobility retraining, ADL/ Self care retraining  Pt educated on role of OT, plan of care, safety awareness, DME, " "importance of out of bed activity, energy conservation techniques, Pacemaker precautions     AM-PAC 6 CLICK ADL   How much help from another person does this patient currently need?   1 = Unable, Total/Dependent Assistance  2 = A lot, Maximum/Moderate Assistance  3 = A little, Minimum/Contact Guard/Supervision  4 = None, Modified [place]/Independent    Putting on and taking off regular lower body clothing? : 3  Bathing (including washing, rinsing, drying)?: 3  Toileting, which includes using toilet, bedpan, or urinal? : 2  Putting on and taking off regular upper body clothing?: 3  Taking care of personal grooming such as brushing teeth?: 4  Eating meals?: 4  Total Score: 19     AM-PAC Raw Score CMS "G-Code Modifier Level of Impairment Assistance   6 % Total / Unable   7 - 8 CM 80 - 100% Maximal Assist   9-13 CL 60 - 80% Moderate Assist   14 - 19 CK 40 - 60% Moderate Assist   20 - 22 CJ 20 - 40% Minimal Assist   23 CI 1-20% SBA / CGA   24 CH 0% Independent/ Mod I       Patient left up in chair eating lunch with all lines intact, call button in reach, RN notified and family present    ASSESSMENT:  Adela Garay is a 89 y.o. female with a medical diagnosis of Acute on chronic diastolic heart failure and presents with decreased activity tolerance impacting indep and safety with ADL/Self care and functional mobility. Pt walked 80 feet in hallway this day with Min assist with use of Sc. Pt tolerated OT tx well. Continue OT plan of care     Rehab identified problem list/impairments: Rehab identified problem list/impairments: weakness, impaired endurance, impaired functional mobilty, impaired cardiopulmonary response to activity, impaired self care skills, gait instability    Rehab potential is good.    Activity tolerance: Good    Discharge recommendations: Discharge Facility/Level Of Care Needs: home with home health     Barriers to discharge: Barriers to Discharge: None    Equipment recommendations: none "     GOALS:    Occupational Therapy Goals        Problem: Occupational Therapy Goal    Goal Priority Disciplines Outcome Interventions   Occupational Therapy Goal     OT, PT/OT Ongoing (interventions implemented as appropriate)    Description:  Goals to be met by: 7 days (11/7/17)     Patient will increase functional independence with ADLs by performing:    UE Dressing with Supervision.  LE Dressing with Supervision.  Grooming while standing at sink with Supervision.  Toileting from toilet with Supervision for hygiene and clothing management.   Supine to sit with [place].  Toilet transfer to toilet with Supervision.  Pt will perform functional mobility household distance with supervision and AD as needed.                      Plan:  Patient to be seen 4 x/week to address the above listed problems via self-care/home management, therapeutic activities, therapeutic exercises  Plan of Care expires: 11/30/17  Plan of Care reviewed with: patient, daughter         Alee OTERO Fred, OT  11/07/2017

## 2017-11-07 NOTE — PROGRESS NOTES
[facility] Hospital Medicine  Progress Note    Patient Name: Adela Garay  MRN: [id number]  Patient Class: IP- Inpatient   Admission Date: 10/30/2017  Length of Stay: 7 days  Attending Physician: Justice Alexander MD  Primary Care Provider: Torrie Farrell MD    [facility] Medicine Team: [facility] ROBBIE Dover NP    Subjective:     Principal Problem:Acute on chronic diastolic heart failure    HPI:  Ms. Garay is a 89 year old  woman with past medical history of HTN, HLD, DM, aFib, HFpEF, and recent TAVR complicated by PPM placement for CHB ([place] Valve 29mm ) on 10/17/17 for severe AS who presents to the ED with complaints of leg swelling. She states that she has been having issues since she went home after the surgery. She endorses orthopnea, ESTRADA, new cough, leg swelling, and weight gain. Denies issues like this before. Denies Fever. Denies chest pain.     While in ED, chemistry was unremarkable, BNP elevated at 918, troponin negative and CBC stable. CXR with bilateral pleural effusion larger on the right side and atelectatic changes at the lung bases; EKG with atrial fibrillation with paced ventricular rhythm.    The patient was admitted to the Hospital Medicine Service for further evaluation and management.     Hospital Course:  Ms. Garay was admitted 10/30 s/p recent TAVR and PPM with acute on chronic diastolic heart failure and edema.  She was admitted at 151 lbs with potential dry weight of 135 lbs per daugher. She was evaluated by cardiology in ED and assessed by TAVR team in house by Dr. Jarvis. Diuresis initiated with lasix 40 mg IVP BID, and titrated to 60mg iv tid for improved diuresis.  Cxr with significantly worsened pulm edema/ effusions as compared to cxr 2 weeks prior to admit.  She's diuresing daily, however she still has hypoxia, ra sat 81-82% requiring re-institution of oxgyen, lungs still sound wet on exam, + jvd, + peripheral edema still. She developed a  101.9 fever, R knee effusion and pain.  Ortho was consulted who tapped and sent bloody fluid for analysis, however the specimen clotted and was unable to be analyzed properly.  The bloody tap could be because she's on warfarin, however she did have a septic knee there a year ago which require surgical washout.  Source of fever and leukocytosis could be the blood in the R knee or pulm.  Cultures are pending, will continue vanc/ ceftriaxone coverage till cultures are finalized. She developed glossitis most likely d/t abx.  Xray continued to show pulm edema and layered pleural effusion, added acapella in addition to IS to try and break it up, along with aggressive diuresis.  F/u echo ordered to make sure heart function and pericardial effusion had not developed post procedure.  Once she's dry will transition to bumex for discharge planning.      Interval History:  Febrile to 101.9 overnight, xray with continued pulm edema / effusion, now has what appears to be r knee effusion/ hot to touch.  Ortho tapped, unable to analyze fluid as it clotted in wrong tube.  Her new dry weight appears to be 135 lbs.  Weight unchanged despite negative I/O's.     Review of Systems   Constitutional: Positive for fatigue and unexpected weight change. Negative for activity change, appetite change, chills, diaphoresis and fever.   HENT: Positive for mouth sores (glossitis). Negative for congestion, rhinorrhea, sinus pain, sinus pressure, sneezing, sore throat and trouble swallowing.    Eyes: Negative.    Respiratory: Positive for shortness of breath (improving). Negative for cough, chest tightness and wheezing.    Cardiovascular: Positive for leg swelling. Negative for chest pain and palpitations.   Gastrointestinal: Negative for abdominal distention, abdominal pain, constipation, diarrhea, nausea and vomiting.   Endocrine: Negative.    Genitourinary: Negative for decreased urine volume, difficulty urinating, dysuria, enuresis and urgency.    Musculoskeletal: Positive for back pain and joint swelling (r knee bloody effusion). Negative for arthralgias and myalgias.   Skin: Negative.    Allergic/Immunologic: Negative.    Neurological: Positive for weakness. Negative for dizziness, syncope, light-headedness and headaches.   Hematological: Negative.    Psychiatric/Behavioral: Positive for sleep disturbance. Negative for agitation and behavioral problems. The patient is not nervous/anxious.      Objective:     Vital Signs (Most Recent):  Temp: 98 °F (36.7 °C) (11/06/17 1715)  Pulse: 94 (11/06/17 1900)  Resp: 18 (11/06/17 1715)  BP: (!) 161/70 (11/06/17 1715)  SpO2: (!) 94 % (11/06/17 1715) Vital Signs (24h Range):  Temp:  [96.3 °F (35.7 °C)-98.4 °F (36.9 °C)] 98 °F (36.7 °C)  Pulse:  [60-94] 94  Resp:  [16-18] 18  SpO2:  [94 %-98 %] 94 %  BP: (119-166)/(57-70) 161/70     Weight: 64 kg (141 lb 1.5 oz)  Body mass index is 23.48 kg/m².    Intake/Output Summary (Last 24 hours) at 11/06/17 2010  Last data filed at 11/06/17 1700   Gross per 24 hour   Intake              540 ml   Output             2400 ml   Net            -1860 ml      Physical Exam   Constitutional: She is oriented to person, place, and time. She appears well-developed and well-nourished.   HENT:   Head: Normocephalic and atraumatic.   Right Ear: External ear normal.   Left Ear: External ear normal.   Nose: Nose normal.   Mouth/Throat: Mucous membranes are normal. Normal dentition.   Eyes: Conjunctivae, EOM and lids are normal.   Neck: Normal range of motion. Neck supple. Hepatojugular reflux and JVD (+ TR jet, not so much jvd/ hjr anymore) present.   Cardiovascular: Normal rate, regular rhythm and intact distal pulses.  Exam reveals no gallop and no friction rub.    No murmur heard.  Pulmonary/Chest: Effort normal. She has rales in the right middle field, the right lower field and the left lower field. She exhibits no tenderness.   Abdominal: Soft. Bowel sounds are normal. She exhibits no  distension. There is no tenderness.   Musculoskeletal: Normal range of motion. She exhibits edema (1+ BLE improving). She exhibits no deformity.   Neurological: She is alert and oriented to person, place, and time. No cranial nerve deficit or sensory deficit. She exhibits normal muscle tone. Coordination normal.   Skin: Skin is warm and dry. Capillary refill takes less than 2 seconds. No rash noted. No erythema.   Psychiatric: She has a normal mood and affect. Her behavior is normal. Judgment and thought content normal.   Nursing note and vitals reviewed.      Significant Labs:   CMP:     Recent Labs  Lab 11/05/17  0537 11/05/17  1255 11/06/17  0522 11/06/17  1500    141 137 138   K 3.4* 3.8 3.2* 4.0   CL 93* 92* 92* 89*   CO2 36* 39* 36* 40*   * 99 129* 173*   BUN 28* 28* 29* 29*   CREATININE 0.9 0.9 0.8 0.9   CALCIUM 9.2 10.0 9.1 9.9   PROT 6.8  --  6.2  --    ALBUMIN 3.0*  --  2.7*  --    BILITOT 1.1*  --  1.1*  --    ALKPHOS 84  --  71  --    AST 24  --  18  --    ALT 13  --  10  --    ANIONGAP 10 10 9 9   EGFRNONAA 56.5* 56.5* >60.0 56.5*      Magnesium:     Recent Labs  Lab 11/05/17  0537 11/06/17  0522   MG 2.2 2.1     Significant Imaging: I have reviewed and interpreted all pertinent imaging results/findings within the past 24 hours.    Assessment/Plan:      * Acute on chronic diastolic heart failure    - BNP elevated at 918, troponin negative and CBC/ CMP stable  - CXR with bilateral pleural effusion larger on the right side and atelectatic changes at the lung bases, appears to have evolved over that past 2-3 weeks, as cxr mid October much clearer.  - EKG with atrial fibrillation with paced ventricular rhythm  - Cardiology evaluated in ED, Interventional TAVR team consulted due to recent procedure  - 2D echo EF 50% mod MR, severe TR, PAP 40, RAP 15  - diuresis initiated with lasix 40 mg IVP BID, she's mobilizing her fluid faster than her heart and renal function can keep up, still with  significant pulm edema, increased lasix to 60mg iv bid, with moderate improvement but still with pulm issues/ peripheral edema/ etc, increased to 60mg tid and finally added metolazone  2.5 mg to add as a booster.    - admit weight 151 lbs, down to 140 lbs, predicted dry weight appears closer to 135 lbs.    - BNP trending down but still elevated at 597  - 15 beat run of VT, K 3.4. In addition noted to have febrile episode to 101.9, xray still with significant pulm edema/ and layered effusion.   - repeat echo ordered to r/o change in EF and pericardial effusion post procedure - pending   - resume home BB and digoxin   - continue tele monitoring  - EKG PRN chest pain or arrhythmia  - hold triamterene HCTZ for now, while using IV diuresis        S/P TAVR (transcatheter aortic valve replacement)    - see above  -TAVR team have seen and this is to be expected, she's mobilizing her fluid faster than her heart and renal function can keep up        Complete heart block, post-surgical    - st christian device placed post TAVR  - pacer programming: Wound check done at bedside.   - Device/lead wnl.   - Educated pt on wound care, home monitoring, arm restrictions.   - Issued Merlin home  monitor with instructions.   - F/u in clinic in os with Dr. Delgado.        Atrial fibrillation    - rate control, warfarin   - slowly increasing INR as she had bloody tap to R knee  - dig level 1.2        Long-term (current) use of anticoagulants, INR goal 2.0-3.0    - INR currently 1.7, warfarin  -see above  - daily PT/INR  - f/u with Dr. Ivy        Hx-TIA (transient ischemic attack)    -continue ASA        Hyperlipidemia    -continue statin         Controlled type 2 diabetes mellitus with microalbuminuria    -HgA1C 5.6 last in May 6.0  - diet controlled  -cardiac/diabetic diet in house        Acute glossitis    - most likely d/t recent addition of abx  - nystatin s/s  - magic mouthwash  - stop abx when able          Right knee pain    - pain  improving  - bloody effusion tapped per ortho  - waiting for micro to be finalized before d/c'ing abx  - synovial fluid clotted unable to analyze          Non-productive cough    -satting 88-89%, requiring oxygen again, still with bilateral pulm edema / layered effusions  -duo nebs q8H  -repeat CXR in a few days if not improvement        Coronary artery disease involving native coronary artery of native heart without angina pectoris    -chest pain free, EKG without acute ischemic changes  -continue ASA, BB, and statin         Mild major depression    -continue home trazodone  -no SI/HI        DDD (degenerative disc disease), cervical    -PRN tylenol and oxycodone        Benign hypertensive heart disease with congestive heart failure    - continue home BB, digoxin, and hold triamterene /hctz for now  - BP elevated, increased metoprolol to 75mg   - goal sbp 140-150        Severe aortic stenosis by prior echocardiogram    -see above and HPI          VTE Risk Mitigation         Ordered     warfarin tablet 6 mg  Daily     Route:  Oral        11/06/17 0759     Medium Risk of VTE  Once      10/30/17 2102     Place JONO hose  Until discontinued      10/30/17 2102              Liyah Dover NP  Department of Hospital Medicine   [facility]  Spectra:  [phone]  Pager: [phone]

## 2017-11-07 NOTE — PLAN OF CARE
Problem: Patient Care Overview  Goal: Plan of Care Review  Outcome: Ongoing (interventions implemented as appropriate)  Patient remains free of falls or injury. Patient complains of knee pain; treated with PO tylenol. Continued on IV vanc and rocephin. Continued on lasix IV push TID. Patient working with PT/OT. Plan of care reviewed with patient and daughter.

## 2017-11-07 NOTE — ASSESSMENT & PLAN NOTE
-rate controlled and on warfarin   -INR 2.0 today   -dig level 1.2, will decrease dig for now >>> repeat level later in hospital course

## 2017-11-07 NOTE — ASSESSMENT & PLAN NOTE
- st christian device placed post TAVR  - pacer programming and wound check done at bedside   -device/lead wnl   -Merlin home monitor device issued with instructions per arrhythmia tech  -outpt follow up in clinic in 3 mos with Dr. Delgado

## 2017-11-07 NOTE — ASSESSMENT & PLAN NOTE
-most likely d/t recent addition of ABX  -continue nystatin PO and magic mouthwash PRN  -discontinue ABX therapy as soon as cultures result if possible

## 2017-11-07 NOTE — SUBJECTIVE & OBJECTIVE
Interval History: Resting in bed, has just returned from room from CXR and echo, she endorses feeling weak and fatigued from the activity however she denies chest pain, palpitations, or shortness of breath. Denies any acute events or distress overnight. She also endorses much improved pain and swelling right knee.     Review of Systems   Constitutional: Positive for fatigue. Negative for activity change, appetite change, chills, diaphoresis and fever.   HENT: Positive for mouth sores (glossitis). Negative for congestion, sore throat and trouble swallowing.    Respiratory: Positive for shortness of breath (improving). Negative for cough, chest tightness and wheezing.    Cardiovascular: Positive for leg swelling (much improved). Negative for chest pain and palpitations.   Gastrointestinal: Negative for abdominal distention, abdominal pain, constipation, diarrhea, nausea and vomiting.   Genitourinary: Negative for decreased urine volume, difficulty urinating, dysuria, enuresis and urgency.   Musculoskeletal: Negative for arthralgias, back pain, joint swelling (right knee swelling much improved overnight ) and myalgias.   Neurological: Positive for weakness. Negative for dizziness, syncope, light-headedness and headaches.   Psychiatric/Behavioral: Positive for sleep disturbance. Negative for agitation and behavioral problems. The patient is not nervous/anxious.      Objective:     Vital Signs (Most Recent):  Temp: 97.7 °F (36.5 °C) (11/07/17 1235)  Pulse: 64 (11/07/17 1500)  Resp: 18 (11/07/17 1235)  BP: (!) 150/65 (11/07/17 1235)  SpO2: (!) 92 % (11/07/17 1235) Vital Signs (24h Range):  Temp:  [97.7 °F (36.5 °C)-98.7 °F (37.1 °C)] 97.7 °F (36.5 °C)  Pulse:  [59-94] 64  Resp:  [16-20] 18  SpO2:  [92 %-99 %] 92 %  BP: (118-175)/(56-71) 150/65     Weight: 62.6 kg (138 lb 0.1 oz)  Body mass index is 22.97 kg/m².    Intake/Output Summary (Last 24 hours) at 11/07/17 1514  Last data filed at 11/07/17 1200   Gross per 24 hour    Intake              420 ml   Output             2775 ml   Net            -2355 ml      Physical Exam   Constitutional: She is oriented to person, place, and time. She appears well-developed and well-nourished.   HENT:   Head: Normocephalic and atraumatic.   Right Ear: External ear normal.   Left Ear: External ear normal.   Nose: Nose normal.   Mouth/Throat: Mucous membranes are normal. Normal dentition.   Eyes: Conjunctivae, EOM and lids are normal.   Neck: Normal range of motion. Neck supple. JVD (+ TR jet, not so much jvd/ hjr anymore) present.   Cardiovascular: Normal rate, regular rhythm and intact distal pulses.  Exam reveals no gallop and no friction rub.    No murmur heard.  Pulmonary/Chest: Effort normal. She has rhonchi in the right lower field and the left lower field. She has no rales. She exhibits no tenderness.   Abdominal: Soft. Bowel sounds are normal. She exhibits no distension. There is no tenderness.   Musculoskeletal: Normal range of motion. She exhibits edema (1+ BLE improving). She exhibits no deformity.        Right knee: She exhibits swelling (much improved from yesterday, small bandadid intact from tap ).   Neurological: She is alert and oriented to person, place, and time. No cranial nerve deficit or sensory deficit. She exhibits normal muscle tone. Coordination normal.   Skin: Skin is warm and dry. Capillary refill takes less than 2 seconds. No rash noted. No erythema.   Psychiatric: She has a normal mood and affect. Her behavior is normal. Judgment and thought content normal.   Nursing note and vitals reviewed.    Significant Labs:   CBC:   Recent Labs  Lab 11/06/17  1500   WBC 11.06   HGB 10.4*   HCT 33.6*        CMP:   Recent Labs  Lab 11/06/17  0522 11/06/17  1500 11/07/17  0514    138 135*   K 3.2* 4.0 2.5*   CL 92* 89* 86*   CO2 36* 40* 42*   * 173* 147*   BUN 29* 29* 31*   CREATININE 0.8 0.9 0.8   CALCIUM 9.1 9.9 9.6   PROT 6.2  --  6.5   ALBUMIN 2.7*  --  2.8*    BILITOT 1.1*  --  0.7   ALKPHOS 71  --  70   AST 18  --  17   ALT 10  --  10   ANIONGAP 9 9 7*   EGFRNONAA >60.0 56.5* >60.0     Magnesium:   Recent Labs  Lab 11/06/17 0522 11/07/17  0514   MG 2.1 1.8     Microbiology Results (last 7 days)     Procedure Component Value Units Date/Time    Fungus culture [[id number]] Collected:  11/05/17 1000    Order Status:  Completed Specimen:  Joint Fluid from Knee, Right Updated:  11/07/17 0944     Fungus (Mycology) Culture Culture in progress    Urine culture [[id number]] Collected:  11/05/17 2058    Order Status:  Completed Specimen:  Urine from Urine, Clean Catch Updated:  11/07/17 0728     Urine Culture, Routine No growth    Blood culture [[id number]] Collected:  11/05/17 0050    Order Status:  Completed Specimen:  Blood Updated:  11/07/17 0612     Blood Culture, Routine No Growth to date     Blood Culture, Routine No Growth to date     Blood Culture, Routine No Growth to date    Blood culture [[id number]] Collected:  11/05/17 0050    Order Status:  Completed Specimen:  Blood Updated:  11/07/17 0612     Blood Culture, Routine No Growth to date     Blood Culture, Routine No Growth to date     Blood Culture, Routine No Growth to date    AFB Culture & Smear [[id number]] Collected:  11/05/17 1000    Order Status:  Completed Specimen:  Joint Fluid from Knee, Right Updated:  11/06/17 2127     AFB Culture & Smear Culture in progress     AFB CULTURE STAIN No acid fast bacilli seen.    Aerobic culture [[id number]] Collected:  11/05/17 1000    Order Status:  Completed Specimen:  Joint Fluid from Knee, Right Updated:  11/06/17 0730     Aerobic Bacterial Culture No growth    Culture, Anaerobe [[id number]] Collected:  11/05/17 1000    Order Status:  Completed Specimen:  Joint Fluid from Knee, Right Updated:  11/06/17 0722     Anaerobic Culture Culture in progress    Gram stain [[id number]]     Order Status:  No result Specimen:  Urine from Urine, Clean Catch     Gram stain [[id number]]  Collected:  11/05/17 1000    Order Status:  Completed Specimen:  Joint Fluid from Knee, Right Updated:  11/05/17 1057     Gram Stain Result No WBC's      No organisms seen        All pertinent labs within the past 24 hours have been reviewed.    Significant Imaging: I have reviewed all pertinent imaging results/findings within the past 24 hours.

## 2017-11-07 NOTE — PLAN OF CARE
Problem: Occupational Therapy Goal  Goal: Occupational Therapy Goal  Goals to be met by: 7 days (11/7/17)     Patient will increase functional independence with ADLs by performing:    UE Dressing with Supervision.  LE Dressing with Supervision.  Grooming while standing at sink with Supervision.  Toileting from toilet with Supervision for hygiene and clothing management.   Supine to sit with [place].  Toilet transfer to toilet with Supervision.  Pt will perform functional mobility household distance with supervision and AD as needed.     Outcome: Ongoing (interventions implemented as appropriate)  Continue OT plan of care

## 2017-11-07 NOTE — PROGRESS NOTES
[facility] Hospital Medicine  Progress Note    Patient Name: Adela Garay  MRN: [id number]  Patient Class: IP- Inpatient   Admission Date: 10/30/2017  Length of Stay: 8 days  Attending Physician: Susannah Campbell MD  Primary Care Provider: Torrie Farrell MD    [facility] Medicine Team: [facility] HOSP MED J María Elena Euceda NP    Subjective:     Principal Problem:Acute on chronic diastolic heart failure    HPI:  Ms. Garay is a 89 year old  woman with past medical history of HTN, HLD, DM, aFib, HFpEF, and recent TAVR complicated by PPM placement for CHB ([place]ico Valve 29mm ) on 10/17/17 for severe AS who presents to the ED with complaints of leg swelling. She states that she has been having issues since she went home after the surgery. She endorses orthopnea, ESTRADA, new cough, leg swelling, and weight gain. Denies issues like this before. Denies Fever. Denies chest pain.     While in ED, chemistry was unremarkable, BNP elevated at 918, troponin negative and CBC stable. CXR with bilateral pleural effusion larger on the right side and atelectatic changes at the lung bases; EKG with atrial fibrillation with paced ventricular rhythm.    The patient was admitted to the Hospital Medicine Service for further evaluation and management.     Hospital Course:  Ms. Garay was admitted 10/30 s/p recent TAVR and PPM with acute on chronic diastolic heart failure and edema. She was evaluated by cardiology in ED and assessed by TAVR team in house. On admission her BNP was elevated at 918 and CXR with bilateral pleural effusion larger on the right side and atelectatic changes at the lung bases, this noted to be worsened from prior exam 2 weeks ago. Her diuresis was initiated with lasix 40 mg IVP BID, and titrated up to 60 mg IVP TID with occasional metolazone booster for improved diuresis. She thus far has diuresed well with net negative ~12 liters as of current and weight down 13 lbs since admission, still  approximately 3 lbs from dry weight of 135 lbs. However repeat CXR with continued pulmonary edema/pleural fluid and repeat echo 11/7 with markedly increased central venous pressure, RAP of 15, and trivial pericardial effusion,with chemistry beginning to look alkalotic so metolazone discontinued and lasix decreased to BID, will monitor for response, still in need of further IV diuresis with above findings. She remains oxygen dependant, will continue to attempt weaning, in addition to acapella and IS treatments.    On 11/5 she developed a 101.9 fever and R knee pain.  Ortho was consulted, arthrocentesis performed and resulted in bloody fluid for analysis, however the specimen clotted so cell count not obtained. The bloody tap could be because of OAC with warfarin, however she did have a right septic knee a year ago which require surgical washout. Blood cultures and right knee fluid cultures currently with NGTD, will continue vanc/ceftriaxone coverage till cultures are finalized. She developed glossitis most likely d/t ABX,  continue nystatin and magic mouthwash as needed, reports improved symptoms today.      Disposition plans: home with home health and family care post transition to PO diuretics.     Interval History: Resting in bed, has just returned from room from CXR and echo, she endorses feeling weak and fatigued from the activity however she denies chest pain, palpitations, or shortness of breath. Denies any acute events or distress overnight. She also endorses much improved pain and swelling right knee.     Review of Systems   Constitutional: Positive for fatigue. Negative for activity change, appetite change, chills, diaphoresis and fever.   HENT: Positive for mouth sores (glossitis). Negative for congestion, sore throat and trouble swallowing.    Respiratory: Positive for shortness of breath (improving). Negative for cough, chest tightness and wheezing.    Cardiovascular: Positive for leg swelling (much  improved). Negative for chest pain and palpitations.   Gastrointestinal: Negative for abdominal distention, abdominal pain, constipation, diarrhea, nausea and vomiting.   Genitourinary: Negative for decreased urine volume, difficulty urinating, dysuria, enuresis and urgency.   Musculoskeletal: Negative for arthralgias, back pain, joint swelling (right knee swelling much improved overnight ) and myalgias.   Neurological: Positive for weakness. Negative for dizziness, syncope, light-headedness and headaches.   Psychiatric/Behavioral: Positive for sleep disturbance. Negative for agitation and behavioral problems. The patient is not nervous/anxious.      Objective:     Vital Signs (Most Recent):  Temp: 97.7 °F (36.5 °C) (11/07/17 1235)  Pulse: 64 (11/07/17 1500)  Resp: 18 (11/07/17 1235)  BP: (!) 150/65 (11/07/17 1235)  SpO2: (!) 92 % (11/07/17 1235) Vital Signs (24h Range):  Temp:  [97.7 °F (36.5 °C)-98.7 °F (37.1 °C)] 97.7 °F (36.5 °C)  Pulse:  [59-94] 64  Resp:  [16-20] 18  SpO2:  [92 %-99 %] 92 %  BP: (118-175)/(56-71) 150/65     Weight: 62.6 kg (138 lb 0.1 oz)  Body mass index is 22.97 kg/m².    Intake/Output Summary (Last 24 hours) at 11/07/17 1514  Last data filed at 11/07/17 1200   Gross per 24 hour   Intake              420 ml   Output             2775 ml   Net            -2355 ml      Physical Exam   Constitutional: She is oriented to person, place, and time. She appears well-developed and well-nourished.   HENT:   Head: Normocephalic and atraumatic.   Right Ear: External ear normal.   Left Ear: External ear normal.   Nose: Nose normal.   Mouth/Throat: Mucous membranes are normal. Normal dentition.   Eyes: Conjunctivae, EOM and lids are normal.   Neck: Normal range of motion. Neck supple. JVD (+ TR jet, not so much jvd/ hjr anymore) present.   Cardiovascular: Normal rate, regular rhythm and intact distal pulses.  Exam reveals no gallop and no friction rub.    No murmur heard.  Pulmonary/Chest: Effort normal.  She has rhonchi in the right lower field and the left lower field. She has no rales. She exhibits no tenderness.   Abdominal: Soft. Bowel sounds are normal. She exhibits no distension. There is no tenderness.   Musculoskeletal: Normal range of motion. She exhibits edema (1+ BLE improving). She exhibits no deformity.        Right knee: She exhibits swelling (much improved from yesterday, small bandadid intact from tap ).   Neurological: She is alert and oriented to person, place, and time. No cranial nerve deficit or sensory deficit. She exhibits normal muscle tone. Coordination normal.   Skin: Skin is warm and dry. Capillary refill takes less than 2 seconds. No rash noted. No erythema.   Psychiatric: She has a normal mood and affect. Her behavior is normal. Judgment and thought content normal.   Nursing note and vitals reviewed.    Significant Labs:   CBC:   Recent Labs  Lab 11/06/17  1500   WBC 11.06   HGB 10.4*   HCT 33.6*        CMP:   Recent Labs  Lab 11/06/17  0522 11/06/17  1500 11/07/17  0514    138 135*   K 3.2* 4.0 2.5*   CL 92* 89* 86*   CO2 36* 40* 42*   * 173* 147*   BUN 29* 29* 31*   CREATININE 0.8 0.9 0.8   CALCIUM 9.1 9.9 9.6   PROT 6.2  --  6.5   ALBUMIN 2.7*  --  2.8*   BILITOT 1.1*  --  0.7   ALKPHOS 71  --  70   AST 18  --  17   ALT 10  --  10   ANIONGAP 9 9 7*   EGFRNONAA >60.0 56.5* >60.0     Magnesium:   Recent Labs  Lab 11/06/17  0522 11/07/17  0514   MG 2.1 1.8     Microbiology Results (last 7 days)     Procedure Component Value Units Date/Time    Fungus culture [[id number]] Collected:  11/05/17 1000    Order Status:  Completed Specimen:  Joint Fluid from Knee, Right Updated:  11/07/17 0944     Fungus (Mycology) Culture Culture in progress    Urine culture [[id number]] Collected:  11/05/17 2058    Order Status:  Completed Specimen:  Urine from Urine, Clean Catch Updated:  11/07/17 0728     Urine Culture, Routine No growth    Blood culture [[id number]] Collected:  11/05/17  0050    Order Status:  Completed Specimen:  Blood Updated:  11/07/17 0612     Blood Culture, Routine No Growth to date     Blood Culture, Routine No Growth to date     Blood Culture, Routine No Growth to date    Blood culture [[id number]] Collected:  11/05/17 0050    Order Status:  Completed Specimen:  Blood Updated:  11/07/17 0612     Blood Culture, Routine No Growth to date     Blood Culture, Routine No Growth to date     Blood Culture, Routine No Growth to date    AFB Culture & Smear [[id number]] Collected:  11/05/17 1000    Order Status:  Completed Specimen:  Joint Fluid from Knee, Right Updated:  11/06/17 2127     AFB Culture & Smear Culture in progress     AFB CULTURE STAIN No acid fast bacilli seen.    Aerobic culture [[id number]] Collected:  11/05/17 1000    Order Status:  Completed Specimen:  Joint Fluid from Knee, Right Updated:  11/06/17 0730     Aerobic Bacterial Culture No growth    Culture, Anaerobe [[id number]] Collected:  11/05/17 1000    Order Status:  Completed Specimen:  Joint Fluid from Knee, Right Updated:  11/06/17 0722     Anaerobic Culture Culture in progress    Gram stain [[id number]]     Order Status:  No result Specimen:  Urine from Urine, Clean Catch     Gram stain [[id number]] Collected:  11/05/17 1000    Order Status:  Completed Specimen:  Joint Fluid from Knee, Right Updated:  11/05/17 1057     Gram Stain Result No WBC's      No organisms seen        All pertinent labs within the past 24 hours have been reviewed.    Significant Imaging: I have reviewed all pertinent imaging results/findings within the past 24 hours.    Assessment/Plan:      * Acute on chronic diastolic heart failure    -on admission BNP elevated at 918, troponin negative, CBC/ CMP stable, CXR with bilateral pleural effusion larger on the right side and atelectatic changes at the lung bases, appears to have evolved over that past 2-3 weeks as CXR mid October noted to be much clearer   -BNP now down trending ~500  -EKG with  atrial fibrillation with paced ventricular rhythm  -Cardiology evaluated in ED, Interventional TAVR team evaluated in house  -admission 2D echo EF 50% mod MR, severe TR, PAP 40, RAP 15  -repeat echo 11/7 with markedly increased CVP, RAP of 15, and trivial pericardial effusion  -repeat CXR 11/7 with continued pulmonary edema/pleural fluid  -chemistry beginning to look alkalotic so metolazone discontinued and lasix decreased to BID and discontinued metolazone, will continue to monitor   -diuresed ~12 liters since admission, weight down down 13 lbs, currently 138 lbs, dry weight estimated at 135 lbs  -continue home BB and decrease digoxin due to elevated level   -hold triamterene/HCTZ for now, while using IV diuresis  -continue tele monitoring  -EKG PRN chest pain or arrhythmia  -outpt follow up with TAVR team post discharge  -educated on low sodium diet with fluid restriction  -will need home health for further CHF management/monitoring         Complete heart block, post-surgical    - st christian device placed post TAVR  - pacer programming and wound check done at bedside   -device/lead wnl   -Merlin home monitor device issued with instructions per arrhythmia tech  -outpt follow up in clinic in 3 mos with Dr. Delgado        S/P TAVR (transcatheter aortic valve replacement)    -see above  -TAVR team evaluated in house, will need to follow up outpt        Atrial fibrillation    -rate controlled and on warfarin   -INR 2.0 today   -dig level 1.2, will decrease dig for now >>> repeat level later in hospital course        Severe aortic stenosis by prior echocardiogram    -see above and HPI        Long-term (current) use of anticoagulants, INR goal 2.0-3.0    -INR currently 2.0, continue warfarin  -daily PT/INR  -f/u with Dr. Ivy        Non-productive cough    -remains oxygen supplementation dependant, however much diuresis overnight and feels less short of breath, will further attempt weaning in AM   -duo nebs q8H  -IS and  flutter encouraged  -cough has much improved since admission         Coronary artery disease involving native coronary artery of native heart without angina pectoris    -chest pain free, EKG without acute ischemic changes  -continue ASA, BB, and statin         Benign hypertensive heart disease with congestive heart failure    -continue increased BB and decreased digoxin (dig level 1.2)  -hold triamterene/HCTZ with IV diuretics at this time  -goal -150        Hyperlipidemia    -continue statin         Controlled type 2 diabetes mellitus with microalbuminuria    -HgA1C 5.6 last in May 6.0  -diet controlled  -cardiac/diabetic diet in house        DDD (degenerative disc disease), cervical    -PRN tylenol and oxycodone        Mild major depression    -continue home trazodone  -no SI/HI        Hx-TIA (transient ischemic attack)    -continue ASA        Acute glossitis    -most likely d/t recent addition of ABX  -continue nystatin PO and magic mouthwash PRN  -discontinue ABX therapy as soon as cultures result if possible         Right knee pain    -pain and swelling much improved today   -bloody effusion arthrocentesis per Ortho  -right knee cultures with NGTD, continue vanc/ceftraxione until finalized  -WBC WNL and no further fevers  -unable to ascertain cell count due to clot within synovial fluid          VTE Risk Mitigation         Ordered     warfarin tablet 6 mg  Daily     Route:  Oral        11/06/17 0759     Medium Risk of VTE  Once      10/30/17 2102     Place JONO hose  Until discontinued      10/30/17 2102        María Elena Euceda NP  Department of Hospital Medicine   [facility]  Pager [phone]  [facility] [id number]

## 2017-11-07 NOTE — ASSESSMENT & PLAN NOTE
- rate control, warfarin   - slowly increasing INR as she had bloody tap to R knee  - dig level 1.2

## 2017-11-07 NOTE — ASSESSMENT & PLAN NOTE
-satting 88-89%, requiring oxygen again, still with bilateral pulm edema / layered effusions  -duo nebs q8H  -repeat CXR in a few days if not improvement

## 2017-11-07 NOTE — ASSESSMENT & PLAN NOTE
- pain improving  - bloody effusion tapped per ortho  - waiting for micro to be finalized before d/c'ing abx  - synovial fluid clotted unable to analyze

## 2017-11-08 LAB
ANION GAP SERPL CALC-SCNC: 5 MMOL/L
BACTERIA SPEC AEROBE CULT: NO GROWTH
BASOPHILS # BLD AUTO: 0.03 K/UL
BASOPHILS NFR BLD: 0.5 %
BNP SERPL-MCNC: 606 PG/ML
BUN SERPL-MCNC: 24 MG/DL
CALCIUM SERPL-MCNC: 9.8 MG/DL
CHLORIDE SERPL-SCNC: 88 MMOL/L
CO2 SERPL-SCNC: 45 MMOL/L
CREAT SERPL-MCNC: 0.8 MG/DL
DIFFERENTIAL METHOD: ABNORMAL
EOSINOPHIL # BLD AUTO: 0.1 K/UL
EOSINOPHIL NFR BLD: 1.7 %
ERYTHROCYTE [DISTWIDTH] IN BLOOD BY AUTOMATED COUNT: 14.6 %
EST. GFR  (AFRICAN AMERICAN): >60 ML/MIN/1.73 M^2
EST. GFR  (NON AFRICAN AMERICAN): >60 ML/MIN/1.73 M^2
GLUCOSE SERPL-MCNC: 118 MG/DL
HCT VFR BLD AUTO: 32.5 %
HGB BLD-MCNC: 9.9 G/DL
IMM GRANULOCYTES # BLD AUTO: 0.02 K/UL
IMM GRANULOCYTES NFR BLD AUTO: 0.3 %
INR PPP: 2.4
LYMPHOCYTES # BLD AUTO: 1.1 K/UL
LYMPHOCYTES NFR BLD: 17 %
MAGNESIUM SERPL-MCNC: 2 MG/DL
MAGNESIUM SERPL-MCNC: 2.3 MG/DL
MCH RBC QN AUTO: 28.9 PG
MCHC RBC AUTO-ENTMCNC: 30.5 G/DL
MCV RBC AUTO: 95 FL
MONOCYTES # BLD AUTO: 1 K/UL
MONOCYTES NFR BLD: 16.2 %
NEUTROPHILS # BLD AUTO: 4.1 K/UL
NEUTROPHILS NFR BLD: 64.3 %
NRBC BLD-RTO: 0 /100 WBC
PLATELET # BLD AUTO: 184 K/UL
PMV BLD AUTO: 9.5 FL
POTASSIUM SERPL-SCNC: 3.3 MMOL/L
POTASSIUM SERPL-SCNC: 4.5 MMOL/L
PROTHROMBIN TIME: 24.5 SEC
RBC # BLD AUTO: 3.42 M/UL
SODIUM SERPL-SCNC: 138 MMOL/L
WBC # BLD AUTO: 6.43 K/UL

## 2017-11-08 PROCEDURE — 36415 COLL VENOUS BLD VENIPUNCTURE: CPT

## 2017-11-08 PROCEDURE — 99900035 HC TECH TIME PER 15 MIN (STAT)

## 2017-11-08 PROCEDURE — 85610 PROTHROMBIN TIME: CPT

## 2017-11-08 PROCEDURE — 20600001 HC STEP DOWN PRIVATE ROOM

## 2017-11-08 PROCEDURE — 94799 UNLISTED PULMONARY SVC/PX: CPT

## 2017-11-08 PROCEDURE — 25000003 PHARM REV CODE 250: Performed by: NURSE PRACTITIONER

## 2017-11-08 PROCEDURE — 27000221 HC OXYGEN, UP TO 24 HOURS

## 2017-11-08 PROCEDURE — A4216 STERILE WATER/SALINE, 10 ML: HCPCS | Performed by: NURSE PRACTITIONER

## 2017-11-08 PROCEDURE — 99233 SBSQ HOSP IP/OBS HIGH 50: CPT | Mod: ,,, | Performed by: NURSE PRACTITIONER

## 2017-11-08 PROCEDURE — 97535 SELF CARE MNGMENT TRAINING: CPT

## 2017-11-08 PROCEDURE — 97530 THERAPEUTIC ACTIVITIES: CPT

## 2017-11-08 PROCEDURE — 94664 DEMO&/EVAL PT USE INHALER: CPT

## 2017-11-08 PROCEDURE — 63600175 PHARM REV CODE 636 W HCPCS: Performed by: NURSE PRACTITIONER

## 2017-11-08 PROCEDURE — 80048 BASIC METABOLIC PNL TOTAL CA: CPT

## 2017-11-08 PROCEDURE — 25000242 PHARM REV CODE 250 ALT 637 W/ HCPCS: Performed by: NURSE PRACTITIONER

## 2017-11-08 PROCEDURE — 84132 ASSAY OF SERUM POTASSIUM: CPT

## 2017-11-08 PROCEDURE — 83735 ASSAY OF MAGNESIUM: CPT | Mod: 91

## 2017-11-08 PROCEDURE — 83880 ASSAY OF NATRIURETIC PEPTIDE: CPT

## 2017-11-08 PROCEDURE — 94640 AIRWAY INHALATION TREATMENT: CPT

## 2017-11-08 PROCEDURE — 83735 ASSAY OF MAGNESIUM: CPT

## 2017-11-08 PROCEDURE — 85025 COMPLETE CBC W/AUTO DIFF WBC: CPT

## 2017-11-08 RX ORDER — IPRATROPIUM BROMIDE AND ALBUTEROL SULFATE 2.5; .5 MG/3ML; MG/3ML
3 SOLUTION RESPIRATORY (INHALATION) EVERY 6 HOURS PRN
Status: DISCONTINUED | OUTPATIENT
Start: 2017-11-08 | End: 2017-11-12 | Stop reason: HOSPADM

## 2017-11-08 RX ORDER — DEXTROMETHORPHAN POLISTIREX 30 MG/5 ML
1 SUSPENSION, EXTENDED RELEASE 12 HR ORAL DAILY PRN
Status: DISCONTINUED | OUTPATIENT
Start: 2017-11-08 | End: 2017-11-12 | Stop reason: HOSPADM

## 2017-11-08 RX ORDER — WARFARIN 4 MG/1
4 TABLET ORAL DAILY
Status: DISCONTINUED | OUTPATIENT
Start: 2017-11-08 | End: 2017-11-09

## 2017-11-08 RX ADMIN — OXYCODONE HYDROCHLORIDE 5 MG: 5 TABLET ORAL at 02:11

## 2017-11-08 RX ADMIN — IPRATROPIUM BROMIDE AND ALBUTEROL SULFATE 3 ML: .5; 3 SOLUTION RESPIRATORY (INHALATION) at 08:11

## 2017-11-08 RX ADMIN — ASPIRIN 81 MG CHEWABLE TABLET 81 MG: 81 TABLET CHEWABLE at 08:11

## 2017-11-08 RX ADMIN — WARFARIN SODIUM 4 MG: 4 TABLET ORAL at 05:11

## 2017-11-08 RX ADMIN — OXYCODONE HYDROCHLORIDE 5 MG: 5 TABLET ORAL at 09:11

## 2017-11-08 RX ADMIN — BENZONATATE 100 MG: 100 CAPSULE ORAL at 08:11

## 2017-11-08 RX ADMIN — NYSTATIN 500000 UNITS: 500000 SUSPENSION ORAL at 12:11

## 2017-11-08 RX ADMIN — NYSTATIN 500000 UNITS: 500000 SUSPENSION ORAL at 08:11

## 2017-11-08 RX ADMIN — CEFTRIAXONE 1 G: 1 INJECTION, SOLUTION INTRAVENOUS at 12:11

## 2017-11-08 RX ADMIN — Medication 10 ML: at 04:11

## 2017-11-08 RX ADMIN — DOCUSATE SODIUM 100 MG: 100 CAPSULE, LIQUID FILLED ORAL at 09:11

## 2017-11-08 RX ADMIN — Medication 3 ML: at 04:11

## 2017-11-08 RX ADMIN — TIZANIDINE 2 MG: 2 TABLET ORAL at 08:11

## 2017-11-08 RX ADMIN — FUROSEMIDE 80 MG: 10 INJECTION, SOLUTION INTRAVENOUS at 05:11

## 2017-11-08 RX ADMIN — VANCOMYCIN HYDROCHLORIDE 1000 MG: 1 INJECTION, POWDER, LYOPHILIZED, FOR SOLUTION INTRAVENOUS at 04:11

## 2017-11-08 RX ADMIN — DIGOXIN 0.12 MG: 0.12 TABLET ORAL at 08:11

## 2017-11-08 RX ADMIN — FUROSEMIDE 80 MG: 10 INJECTION, SOLUTION INTRAVENOUS at 08:11

## 2017-11-08 RX ADMIN — POLYETHYLENE GLYCOL 3350 17 G: 17 POWDER, FOR SOLUTION ORAL at 08:11

## 2017-11-08 RX ADMIN — Medication 3 ML: at 02:11

## 2017-11-08 RX ADMIN — METOPROLOL TARTRATE 75 MG: 25 TABLET ORAL at 08:11

## 2017-11-08 RX ADMIN — SIMVASTATIN 20 MG: 20 TABLET, FILM COATED ORAL at 08:11

## 2017-11-08 RX ADMIN — IPRATROPIUM BROMIDE AND ALBUTEROL SULFATE 3 ML: .5; 3 SOLUTION RESPIRATORY (INHALATION) at 12:11

## 2017-11-08 RX ADMIN — NYSTATIN 500000 UNITS: 500000 SUSPENSION ORAL at 05:11

## 2017-11-08 RX ADMIN — TRAZODONE HYDROCHLORIDE 100 MG: 50 TABLET ORAL at 12:11

## 2017-11-08 NOTE — ASSESSMENT & PLAN NOTE
-pain and swelling resolved, ROM WNL  -bloody effusion arthrocentesis per Ortho 11/5  -right knee cultures with NGTD, continue ceftraxione until finalized   -discontinued vanc as blood cultures are negative  -WBC WNL and no further fevers  -unable to ascertain cell count due to clot within synovial fluid

## 2017-11-08 NOTE — SUBJECTIVE & OBJECTIVE
Interval History: Resting in recliner with daughter at bedside, she endorses no supplemental oxygen requirements overnight and has much improved shortness of breath, occasional dry cough. Denies chest pain or palpitations, no acute events or distress overnight.      Review of Systems   Constitutional: Positive for fatigue (improving). Negative for activity change, appetite change, chills, diaphoresis and fever.   HENT: Positive for mouth sores (glossitis). Negative for congestion, sore throat and trouble swallowing.    Respiratory: Positive for shortness of breath (significantly improving). Negative for cough, chest tightness and wheezing.    Cardiovascular: Positive for leg swelling (much improved). Negative for chest pain and palpitations.   Gastrointestinal: Negative for abdominal distention, abdominal pain, constipation, diarrhea, nausea and vomiting.   Genitourinary: Negative for decreased urine volume, difficulty urinating, dysuria, enuresis and urgency.   Musculoskeletal: Negative for arthralgias, back pain, joint swelling (right knee swelling much improved overnight ) and myalgias.   Neurological: Positive for weakness. Negative for dizziness, syncope, light-headedness and headaches.   Psychiatric/Behavioral: Negative for sleep disturbance.     Objective:     Vital Signs (Most Recent):  Temp: 98.3 °F (36.8 °C) (11/08/17 1151)  Pulse: 62 (11/08/17 1151)  Resp: 18 (11/08/17 1151)  BP: (!) 125/58 (11/08/17 1151)  SpO2: (!) 93 % (11/08/17 1151) Vital Signs (24h Range):  Temp:  [97.7 °F (36.5 °C)-98.3 °F (36.8 °C)] 98.3 °F (36.8 °C)  Pulse:  [60-94] 62  Resp:  [14-20] 18  SpO2:  [90 %-96 %] 93 %  BP: (125-155)/(58-75) 125/58     Weight: 63.1 kg (139 lb 1.8 oz)  Body mass index is 23.15 kg/m².    Intake/Output Summary (Last 24 hours) at 11/08/17 1434  Last data filed at 11/08/17 1400   Gross per 24 hour   Intake              660 ml   Output             2000 ml   Net            -1340 ml      Physical Exam    Constitutional: She is oriented to person, place, and time. She appears well-developed and well-nourished.   HENT:   Head: Normocephalic and atraumatic.   Right Ear: External ear normal.   Left Ear: External ear normal.   Nose: Nose normal.   Mouth/Throat: Mucous membranes are normal. Normal dentition.   Eyes: Conjunctivae, EOM and lids are normal.   Neck: Normal range of motion. Neck supple. No JVD (+TR jet) present.   Cardiovascular: Normal rate, regular rhythm and intact distal pulses.  Exam reveals no gallop and no friction rub.    No murmur heard.  Pulmonary/Chest: Effort normal. She has rhonchi in the right lower field and the left lower field. She has no rales. She exhibits no tenderness.   Abdominal: Soft. Bowel sounds are normal. She exhibits no distension. There is no tenderness.   Musculoskeletal: Normal range of motion. She exhibits edema (1+ BLE improving). She exhibits no deformity.        Right knee: She exhibits normal range of motion and no swelling (much improved from yesterday, small bandadid intact from tap ).   Neurological: She is alert and oriented to person, place, and time. No cranial nerve deficit or sensory deficit. She exhibits normal muscle tone. Coordination normal.   Skin: Skin is warm and dry. Capillary refill takes less than 2 seconds. No rash noted. No erythema.   Psychiatric: She has a normal mood and affect. Her behavior is normal. Judgment and thought content normal.   Nursing note and vitals reviewed.    Significant Labs:   BMP:   Recent Labs  Lab 11/08/17  0555   *      K 4.5   CL 88*   CO2 45*   BUN 24*   CREATININE 0.8   CALCIUM 9.8   MG 2.3     CBC:   Recent Labs  Lab 11/06/17  1500 11/08/17  0555   WBC 11.06 6.43   HGB 10.4* 9.9*   HCT 33.6* 32.5*    184     Magnesium:   Recent Labs  Lab 11/07/17  0514 11/08/17  0555   MG 1.8 2.3     All pertinent labs within the past 24 hours have been reviewed.    Significant Imaging: I have reviewed all pertinent  imaging results/findings within the past 24 hours.

## 2017-11-08 NOTE — ASSESSMENT & PLAN NOTE
-on admission BNP elevated at 918, troponin negative, CBC/ CMP stable, CXR with bilateral pleural effusion larger on the right side and atelectatic changes at the lung bases, appears to have evolved over that past 2-3 weeks as CXR mid October noted to be much clearer   -BNP now down trending ~500  -EKG with atrial fibrillation with paced ventricular rhythm  -Cardiology evaluated in ED, Interventional TAVR team evaluated in house >>> will see outpt  -admission 2D echo EF 50% mod MR, severe TR, PAP 40, RAP 15  -repeat echo 11/7 with markedly increased CVP, RAP of 15, and trivial pericardial effusion  -repeat CXR 11/7 with continued pulmonary edema/pleural fluid  -chemistry beginning to look alkalotic so metolazone discontinued and lasix decreased to BID and discontinued metolazone, will continue to monitor    -remains with fine intermittent bibasilar crackles, dry cough, exertional SOB, and BLE R > L edema  -diuresed ~14 liters since admission, weight down down 13 lbs, currently 138 lbs, dry weight estimated at 135 lbs  -continue home BB and decrease digoxin due to elevated level   -hold triamterene/HCTZ for now, while using IV diuresis  -continue tele monitoring  -EKG PRN chest pain or arrhythmia  -outpt follow up with TAVR team post discharge  -educated on low sodium diet with fluid restriction >>> amenable to compliance now that she and daughter have better understanding  -will need home health for further CHF management/monitoring and PT

## 2017-11-08 NOTE — PT/OT/SLP PROGRESS
Occupational Therapy  Treatment    Adela Garay   MRN: [id number]   Admitting Diagnosis: Acute on chronic diastolic heart failure    OT Date of Treatment: 11/08/17   OT Start Time: 1000  OT Stop Time: 1030  OT Total Time (min): 30 min    Billable Minutes:  Self Care/Home Management 15  Thera Act 15    General Precautions: Standard, pacemaker, fall  Orthopedic Precautions: N/A    Do you have any cultural, spiritual, Mormon conflicts, given your current situation?: None    Subjective:  Communicated with nsg prior to session.  Pt reports she has many things to look forward to even though she is 89 years old.     Pain/Comfort  Pain Rating 1: 0/10    Objective:   Pt found seated in b/s chair with daughter present in room.      Functional Mobility:    Transfers:   Sit <> Stand Assistance: Minimum Assistance  Sit <> Stand Assistive Device: No Assistive Device    Pt completed functional mobility in room with SPC with CGA. Pt is unsteady with mobility.     Activities of Daily Living:     Grooming Position: Standing  Grooming Level of Assistance: Stand by assistance   Pt tolerated stand x 6 min with SBA for g/h skills.     Pt verb and demo good understanding of pacemaker precautions. Pt provided education to pt re: AROM left UE within pacemaker precautions to avoid decreased ROM from decreased use in left UE. Pt/daughter verb understanding.   OT also provided education to pt/daughter re: importance of changing positioning of LE from elevated in b/s chair to having feet on ground to promote both edema james't and LE exs. Pt/daughter verb understanding.     AM-PAC 6 CLICK ADL   How much help from another person does this patient currently need?   1 = Unable, Total/Dependent Assistance  2 = A lot, Maximum/Moderate Assistance  3 = A little, Minimum/Contact Guard/Supervision  4 = None, Modified [place]/Independent    Putting on and taking off regular lower body clothing? : 3  Bathing (including washing, rinsing,  "drying)?: 3  Toileting, which includes using toilet, bedpan, or urinal? : 3  Putting on and taking off regular upper body clothing?: 3  Taking care of personal grooming such as brushing teeth?: 3  Eating meals?: 4  Total Score: 19     AM-PAC Raw Score CMS "G-Code Modifier Level of Impairment Assistance   6 % Total / Unable   7 - 8 CM 80 - 100% Maximal Assist   9-13 CL 60 - 80% Moderate Assist   14 - 19 CK 40 - 60% Moderate Assist   20 - 22 CJ 20 - 40% Minimal Assist   23 CI 1-20% SBA / CGA   24 CH 0% Independent/ Mod I       Patient left up in chair with all lines intact, call button in reach and fl present    ASSESSMENT:  Adela Garay is a 89 y.o. female with a medical diagnosis of Acute on chronic diastolic heart failure and tolerated session well. Pt is progressing well and remains very motivated to increase functional independence. Pt to benefit from cont OT to address stated goals. .    Rehab identified problem list/impairments: Rehab identified problem list/impairments: weakness, impaired self care skills, impaired balance, gait instability, impaired functional mobilty, impaired endurance    Rehab potential is good.    Activity tolerance: Good    Discharge recommendations: Discharge Facility/Level Of Care Needs: home with home health         GOALS:    Occupational Therapy Goals        Problem: Occupational Therapy Goal    Goal Priority Disciplines Outcome Interventions   Occupational Therapy Goal     OT, PT/OT Ongoing (interventions implemented as appropriate)    Description:  Goals to be met by: 7 days (11/7/17)     Patient will increase functional independence with ADLs by performing:    UE Dressing with Supervision.  LE Dressing with Supervision.  Grooming while standing at sink with Supervision.  Toileting from toilet with Supervision for hygiene and clothing management.   Supine to sit with [place].  Toilet transfer to toilet with Supervision.  Pt will perform functional mobility " household distance with supervision and AD as needed.                      Plan:  Patient to be seen 4 x/week to address the above listed problems via self-care/home management, therapeutic activities, therapeutic exercises  Plan of Care expires: 11/30/17  Plan of Care reviewed with: patient, daughter         Khushbu MUKHERJEE LAUREANO Mccullough  11/08/2017

## 2017-11-08 NOTE — ASSESSMENT & PLAN NOTE
-rate controlled and on warfarin   -INR 2.4 today, decrease warfarin dose to 4 mg and repeat INR in AM   -dig level 1.2, will decrease dig for now >>> repeat level later in hospital course

## 2017-11-08 NOTE — ASSESSMENT & PLAN NOTE
-st christian device placed post TAVR  -pacer programming and wound check done at bedside   -device/lead wnl   -Merlin home monitor device issued with instructions per arrhythmia tech  -outpt follow up in clinic in 3 mos with Dr. Delgado

## 2017-11-08 NOTE — PLAN OF CARE
Problem: Patient Care Overview  Goal: Plan of Care Review  Outcome: Ongoing (interventions implemented as appropriate)  Patient remains free of falls or injury. Patient denies pain. Continued on IV vanc and rocephin. IV push lasix decreased to BID. Patient working with PT/OT. Plan of care reviewed with patient and daughter.

## 2017-11-08 NOTE — PLAN OF CARE
met with pt and pts family at the bedside.  Pt is an alert 89 year old lady who lives alone; however she has very good family support from her adult children.  pts son lives next door to her.  Pt has used [facility] in the past.  She is requesting home health  Upon discharge.   will follow.

## 2017-11-08 NOTE — ASSESSMENT & PLAN NOTE
-INR 2.4 today, decrease warfarin dose to 4 mg and repeat INR in AM   -daily PT/INR  -f/u with Dr. Ivy

## 2017-11-08 NOTE — PROGRESS NOTES
[facility] Hospital Medicine  Progress Note    Patient Name: Adela Garay  MRN: [id number]  Patient Class: IP- Inpatient   Admission Date: 10/30/2017  Length of Stay: 9 days  Attending Physician: Susannah Campbell MD  Primary Care Provider: Torrie Farrell MD    [facility] Medicine Team: [facility] HOSP MED J María Elena Euceda NP    Subjective:     Principal Problem:Acute on chronic diastolic heart failure    HPI:  Ms. Garay is a 89 year old  woman with past medical history of HTN, HLD, DM, aFib, HFpEF, and recent TAVR complicated by PPM placement for CHB ([place] Valve 29mm ) on 10/17/17 for severe AS who presents to the ED with complaints of leg swelling. She states that she has been having issues since she went home after the surgery. She endorses orthopnea, ESTRADA, new cough, leg swelling, and weight gain. Denies issues like this before. Denies Fever. Denies chest pain.     While in ED, chemistry was unremarkable, BNP elevated at 918, troponin negative and CBC stable. CXR with bilateral pleural effusion larger on the right side and atelectatic changes at the lung bases; EKG with atrial fibrillation with paced ventricular rhythm.    The patient was admitted to the Hospital Medicine Service for further evaluation and management.     Hospital Course:  Ms. Garay was admitted 10/30 s/p recent TAVR and PPM with acute on chronic diastolic heart failure and edema. She was evaluated by cardiology in ED and assessed by TAVR team in house. On admission her BNP was elevated at 918 and CXR with bilateral pleural effusion larger on the right side and atelectatic changes at the lung bases, this noted to be worsened from prior exam 2 weeks ago. Her diuresis was initiated with lasix 40 mg IVP BID, and titrated up to 60 mg IVP TID with occasional metolazone booster for improved diuresis. She thus far has diuresed well with net negative ~14 liters as of current and weight down 13 lbs since admission, still  approximately 3 lbs from dry weight of 135 lbs. However repeat CXR with continued pulmonary edema/pleural fluid and repeat echo 11/7 with markedly increased central venous pressure, RAP of 15, and trivial pericardial effusion,with chemistry beginning to look alkalotic so metolazone discontinued and lasix decreased to BID, will monitor for response, still in need of further IV diuresis with above findings. She is no longer oxygen dependant, available PRN, in addition to PRN duo-nebs, acapella, and IS therapies.    On 11/5 she developed a 101.9 fever and R knee pain.  Ortho was consulted, arthrocentesis performed and resulted in bloody fluid for analysis, however the specimen clotted so cell count not obtained. The bloody tap could be because of OAC with warfarin, however she did have a right septic knee a year ago which require surgical washout. Blood cultures and right knee fluid cultures currently with NGTD, will continue ceftriaxone coverage till cultures are finalized and discontinue vanc. She developed glossitis most likely d/t ABX, continue nystatin and magic mouthwash as needed, reports improved symptoms today.      Disposition plans: home with home health and family care post transition to PO diuretics.     Interval History: Resting in recliner with daughter at bedside, she endorses no supplemental oxygen requirements overnight and has much improved shortness of breath, occasional dry cough. Denies chest pain or palpitations, no acute events or distress overnight.      Review of Systems   Constitutional: Positive for fatigue (improving). Negative for activity change, appetite change, chills, diaphoresis and fever.   HENT: Positive for mouth sores (glossitis). Negative for congestion, sore throat and trouble swallowing.    Respiratory: Positive for shortness of breath (significantly improving). Negative for cough, chest tightness and wheezing.    Cardiovascular: Positive for leg swelling (much improved).  Negative for chest pain and palpitations.   Gastrointestinal: Negative for abdominal distention, abdominal pain, constipation, diarrhea, nausea and vomiting.   Genitourinary: Negative for decreased urine volume, difficulty urinating, dysuria, enuresis and urgency.   Musculoskeletal: Negative for arthralgias, back pain, joint swelling (right knee swelling much improved overnight ) and myalgias.   Neurological: Positive for weakness. Negative for dizziness, syncope, light-headedness and headaches.   Psychiatric/Behavioral: Negative for sleep disturbance.     Objective:     Vital Signs (Most Recent):  Temp: 98.3 °F (36.8 °C) (11/08/17 1151)  Pulse: 62 (11/08/17 1151)  Resp: 18 (11/08/17 1151)  BP: (!) 125/58 (11/08/17 1151)  SpO2: (!) 93 % (11/08/17 1151) Vital Signs (24h Range):  Temp:  [97.7 °F (36.5 °C)-98.3 °F (36.8 °C)] 98.3 °F (36.8 °C)  Pulse:  [60-94] 62  Resp:  [14-20] 18  SpO2:  [90 %-96 %] 93 %  BP: (125-155)/(58-75) 125/58     Weight: 63.1 kg (139 lb 1.8 oz)  Body mass index is 23.15 kg/m².    Intake/Output Summary (Last 24 hours) at 11/08/17 1434  Last data filed at 11/08/17 1400   Gross per 24 hour   Intake              660 ml   Output             2000 ml   Net            -1340 ml      Physical Exam   Constitutional: She is oriented to person, place, and time. She appears well-developed and well-nourished.   HENT:   Head: Normocephalic and atraumatic.   Right Ear: External ear normal.   Left Ear: External ear normal.   Nose: Nose normal.   Mouth/Throat: Mucous membranes are normal. Normal dentition.   Eyes: Conjunctivae, EOM and lids are normal.   Neck: Normal range of motion. Neck supple. No JVD (+TR jet) present.   Cardiovascular: Normal rate, regular rhythm and intact distal pulses.  Exam reveals no gallop and no friction rub.    No murmur heard.  Pulmonary/Chest: Effort normal. She has rhonchi in the right lower field and the left lower field. She has no rales. She exhibits no tenderness.   Abdominal:  Soft. Bowel sounds are normal. She exhibits no distension. There is no tenderness.   Musculoskeletal: Normal range of motion. She exhibits edema (1+ BLE improving). She exhibits no deformity.        Right knee: She exhibits normal range of motion and no swelling (much improved from yesterday, small bandadid intact from tap ).   Neurological: She is alert and oriented to person, place, and time. No cranial nerve deficit or sensory deficit. She exhibits normal muscle tone. Coordination normal.   Skin: Skin is warm and dry. Capillary refill takes less than 2 seconds. No rash noted. No erythema.   Psychiatric: She has a normal mood and affect. Her behavior is normal. Judgment and thought content normal.   Nursing note and vitals reviewed.    Significant Labs:   BMP:   Recent Labs  Lab 11/08/17  0555   *      K 4.5   CL 88*   CO2 45*   BUN 24*   CREATININE 0.8   CALCIUM 9.8   MG 2.3     CBC:   Recent Labs  Lab 11/06/17  1500 11/08/17  0555   WBC 11.06 6.43   HGB 10.4* 9.9*   HCT 33.6* 32.5*    184     Magnesium:   Recent Labs  Lab 11/07/17  0514 11/08/17  0555   MG 1.8 2.3     All pertinent labs within the past 24 hours have been reviewed.    Significant Imaging: I have reviewed all pertinent imaging results/findings within the past 24 hours.    Assessment/Plan:      * Acute on chronic diastolic heart failure    -on admission BNP elevated at 918, troponin negative, CBC/ CMP stable, CXR with bilateral pleural effusion larger on the right side and atelectatic changes at the lung bases, appears to have evolved over that past 2-3 weeks as CXR mid October noted to be much clearer   -BNP now down trending ~500  -EKG with atrial fibrillation with paced ventricular rhythm  -Cardiology evaluated in ED, Interventional TAVR team evaluated in house >>> will see outpt  -admission 2D echo EF 50% mod MR, severe TR, PAP 40, RAP 15  -repeat echo 11/7 with markedly increased CVP, RAP of 15, and trivial pericardial  effusion  -repeat CXR 11/7 with continued pulmonary edema/pleural fluid  -chemistry beginning to look alkalotic so metolazone discontinued and lasix decreased to BID and discontinued metolazone, will continue to monitor    -remains with fine intermittent bibasilar crackles, dry cough, exertional SOB, and BLE R > L edema  -diuresed ~14 liters since admission, weight down down 13 lbs, currently 138 lbs, dry weight estimated at 135 lbs  -continue home BB and decrease digoxin due to elevated level   -hold triamterene/HCTZ for now, while using IV diuresis  -continue tele monitoring  -EKG PRN chest pain or arrhythmia  -outpt follow up with TAVR team post discharge  -educated on low sodium diet with fluid restriction >>> amenable to compliance now that she and daughter have better understanding  -will need home health for further CHF management/monitoring and PT        Complete heart block, post-surgical    -st christian device placed post TAVR  -pacer programming and wound check done at bedside   -device/lead wnl   -Merlin home monitor device issued with instructions per arrhythmia tech  -outpt follow up in clinic in 3 mos with Dr. Delgado        S/P TAVR (transcatheter aortic valve replacement)    -see above  -TAVR team evaluated in house, will need to follow up outpt        Atrial fibrillation    -rate controlled and on warfarin   -INR 2.4 today, decrease warfarin dose to 4 mg and repeat INR in AM   -dig level 1.2, will decrease dig for now >>> repeat level later in hospital course        Severe aortic stenosis by prior echocardiogram    -see above and HPI        Long-term (current) use of anticoagulants, INR goal 2.0-3.0    -INR 2.4 today, decrease warfarin dose to 4 mg and repeat INR in AM   -daily PT/INR  -f/u with Dr. Ivy        Non-productive cough    -weaned off oxygen, shortness of breath much improved  -duo nebs q8H PRN  -IS and flutter encouraged  -cough has much improved since admission >>> add tessalon pearls for  comfort        Coronary artery disease involving native coronary artery of native heart without angina pectoris    -chest pain free, EKG without acute ischemic changes  -continue ASA, BB, and statin         Benign hypertensive heart disease with congestive heart failure    -BP well controlled in house  -continue increased BB and decreased digoxin (dig level 1.2)  -hold triamterene/HCTZ with IV diuretics at this time  -goal -150        Hyperlipidemia    -continue statin         Controlled type 2 diabetes mellitus with microalbuminuria    -HgA1C 5.6 last in May 6.0  -diet controlled  -cardiac/diabetic diet in house        DDD (degenerative disc disease), cervical    -PRN tylenol and oxycodone        Mild major depression    -continue home trazodone  -no SI/HI        Hx-TIA (transient ischemic attack)    -continue ASA        Acute glossitis    -most likely d/t recent addition of ABX  -continue nystatin PO and magic mouthwash PRN  -discontinue ABX therapy as soon as cultures result if possible         Right knee pain    -pain and swelling resolved, ROM WNL  -bloody effusion arthrocentesis per Ortho 11/5  -right knee cultures with NGTD, continue ceftraxione until finalized   -discontinued vanc as blood cultures are negative  -WBC WNL and no further fevers  -unable to ascertain cell count due to clot within synovial fluid          VTE Risk Mitigation         Ordered     warfarin (COUMADIN) tablet 4 mg  Daily     Route:  Oral        11/08/17 1138     Place sequential compression device  Until discontinued      11/07/17 1609     Medium Risk of VTE  Once      10/30/17 2102        María Elena Euceda NP  Department of Hospital Medicine   [facility]  Pager [phone]  [facility] [id number]

## 2017-11-08 NOTE — PLAN OF CARE
Problem: Occupational Therapy Goal  Goal: Occupational Therapy Goal  Goals to be met by: 7 days (11/7/17)     Patient will increase functional independence with ADLs by performing:    UE Dressing with Supervision.  LE Dressing with Supervision.  Grooming while standing at sink with Supervision.  Toileting from toilet with Supervision for hygiene and clothing management.   Supine to sit with [place].  Toilet transfer to toilet with Supervision.  Pt will perform functional mobility household distance with supervision and AD as needed.     Goals remain appropriate.

## 2017-11-08 NOTE — ASSESSMENT & PLAN NOTE
-weaned off oxygen, shortness of breath much improved  -duo nebs q8H PRN  -IS and flutter encouraged  -cough has much improved since admission >>> add tessalon pearls for comfort

## 2017-11-08 NOTE — ASSESSMENT & PLAN NOTE
-BP well controlled in house  -continue increased BB and decreased digoxin (dig level 1.2)  -hold triamterene/HCTZ with IV diuretics at this time  -goal -150

## 2017-11-09 ENCOUNTER — RESEARCH ENCOUNTER (OUTPATIENT)
Dept: CARDIOLOGY | Facility: CLINIC | Age: 82
End: 2017-11-09

## 2017-11-09 LAB
ALBUMIN SERPL BCP-MCNC: 2.9 G/DL
ANION GAP SERPL CALC-SCNC: 9 MMOL/L
BASOPHILS # BLD AUTO: 0.04 K/UL
BASOPHILS NFR BLD: 0.5 %
BNP SERPL-MCNC: 697 PG/ML
BUN SERPL-MCNC: 24 MG/DL
CALCIUM SERPL-MCNC: 9.8 MG/DL
CHLORIDE SERPL-SCNC: 89 MMOL/L
CO2 SERPL-SCNC: 40 MMOL/L
CREAT SERPL-MCNC: 0.8 MG/DL
DIFFERENTIAL METHOD: ABNORMAL
DIGOXIN SERPL-MCNC: 0.8 NG/ML
EOSINOPHIL # BLD AUTO: 0.1 K/UL
EOSINOPHIL NFR BLD: 1.7 %
ERYTHROCYTE [DISTWIDTH] IN BLOOD BY AUTOMATED COUNT: 14.7 %
EST. GFR  (AFRICAN AMERICAN): >60 ML/MIN/1.73 M^2
EST. GFR  (NON AFRICAN AMERICAN): >60 ML/MIN/1.73 M^2
GLUCOSE SERPL-MCNC: 109 MG/DL
HCT VFR BLD AUTO: 32.3 %
HGB BLD-MCNC: 10.2 G/DL
IMM GRANULOCYTES # BLD AUTO: 0.03 K/UL
IMM GRANULOCYTES NFR BLD AUTO: 0.4 %
INR PPP: 2.8
LYMPHOCYTES # BLD AUTO: 1.1 K/UL
LYMPHOCYTES NFR BLD: 14 %
MAGNESIUM SERPL-MCNC: 2.1 MG/DL
MCH RBC QN AUTO: 29.7 PG
MCHC RBC AUTO-ENTMCNC: 31.6 G/DL
MCV RBC AUTO: 94 FL
MONOCYTES # BLD AUTO: 1.6 K/UL
MONOCYTES NFR BLD: 20.3 %
NEUTROPHILS # BLD AUTO: 5 K/UL
NEUTROPHILS NFR BLD: 63.1 %
NRBC BLD-RTO: 0 /100 WBC
PLATELET # BLD AUTO: 193 K/UL
PMV BLD AUTO: 9.7 FL
POTASSIUM SERPL-SCNC: 3.6 MMOL/L
PROTHROMBIN TIME: 27.8 SEC
RBC # BLD AUTO: 3.44 M/UL
SODIUM SERPL-SCNC: 138 MMOL/L
TROPONIN I SERPL DL<=0.01 NG/ML-MCNC: 0.04 NG/ML
WBC # BLD AUTO: 7.85 K/UL

## 2017-11-09 PROCEDURE — 83735 ASSAY OF MAGNESIUM: CPT

## 2017-11-09 PROCEDURE — 80162 ASSAY OF DIGOXIN TOTAL: CPT

## 2017-11-09 PROCEDURE — 85025 COMPLETE CBC W/AUTO DIFF WBC: CPT

## 2017-11-09 PROCEDURE — 93010 ELECTROCARDIOGRAM REPORT: CPT | Mod: ,,, | Performed by: INTERNAL MEDICINE

## 2017-11-09 PROCEDURE — 80048 BASIC METABOLIC PNL TOTAL CA: CPT

## 2017-11-09 PROCEDURE — 20600001 HC STEP DOWN PRIVATE ROOM

## 2017-11-09 PROCEDURE — 99233 SBSQ HOSP IP/OBS HIGH 50: CPT | Mod: ,,, | Performed by: NURSE PRACTITIONER

## 2017-11-09 PROCEDURE — 63600175 PHARM REV CODE 636 W HCPCS: Performed by: NURSE PRACTITIONER

## 2017-11-09 PROCEDURE — 36415 COLL VENOUS BLD VENIPUNCTURE: CPT

## 2017-11-09 PROCEDURE — 25000003 PHARM REV CODE 250: Performed by: HOSPITALIST

## 2017-11-09 PROCEDURE — 82040 ASSAY OF SERUM ALBUMIN: CPT

## 2017-11-09 PROCEDURE — A4216 STERILE WATER/SALINE, 10 ML: HCPCS | Performed by: NURSE PRACTITIONER

## 2017-11-09 PROCEDURE — 97116 GAIT TRAINING THERAPY: CPT

## 2017-11-09 PROCEDURE — 85610 PROTHROMBIN TIME: CPT

## 2017-11-09 PROCEDURE — 83880 ASSAY OF NATRIURETIC PEPTIDE: CPT

## 2017-11-09 PROCEDURE — 25000003 PHARM REV CODE 250: Performed by: NURSE PRACTITIONER

## 2017-11-09 PROCEDURE — 97530 THERAPEUTIC ACTIVITIES: CPT

## 2017-11-09 PROCEDURE — 94664 DEMO&/EVAL PT USE INHALER: CPT

## 2017-11-09 PROCEDURE — 27000173 HC ACAPELLA DEVICE DH OR DM

## 2017-11-09 PROCEDURE — 84484 ASSAY OF TROPONIN QUANT: CPT

## 2017-11-09 PROCEDURE — 93005 ELECTROCARDIOGRAM TRACING: CPT

## 2017-11-09 RX ORDER — WARFARIN 2 MG/1
2 TABLET ORAL DAILY
Status: DISCONTINUED | OUTPATIENT
Start: 2017-11-09 | End: 2017-11-10

## 2017-11-09 RX ORDER — POTASSIUM CHLORIDE 20 MEQ/1
40 TABLET, EXTENDED RELEASE ORAL ONCE
Status: COMPLETED | OUTPATIENT
Start: 2017-11-09 | End: 2017-11-09

## 2017-11-09 RX ORDER — FUROSEMIDE 40 MG/1
40 TABLET ORAL 2 TIMES DAILY
Status: DISCONTINUED | OUTPATIENT
Start: 2017-11-09 | End: 2017-11-09

## 2017-11-09 RX ADMIN — NYSTATIN 500000 UNITS: 500000 SUSPENSION ORAL at 09:11

## 2017-11-09 RX ADMIN — NYSTATIN 500000 UNITS: 500000 SUSPENSION ORAL at 02:11

## 2017-11-09 RX ADMIN — FUROSEMIDE 40 MG: 40 TABLET ORAL at 12:11

## 2017-11-09 RX ADMIN — Medication 3 ML: at 02:11

## 2017-11-09 RX ADMIN — DOCUSATE SODIUM 100 MG: 100 CAPSULE, LIQUID FILLED ORAL at 09:11

## 2017-11-09 RX ADMIN — METOPROLOL TARTRATE 75 MG: 25 TABLET ORAL at 09:11

## 2017-11-09 RX ADMIN — DIGOXIN 0.12 MG: 0.12 TABLET ORAL at 09:11

## 2017-11-09 RX ADMIN — ASPIRIN 81 MG CHEWABLE TABLET 81 MG: 81 TABLET CHEWABLE at 09:11

## 2017-11-09 RX ADMIN — WARFARIN SODIUM 2 MG: 2 TABLET ORAL at 05:11

## 2017-11-09 RX ADMIN — POLYETHYLENE GLYCOL 3350 17 G: 17 POWDER, FOR SOLUTION ORAL at 09:11

## 2017-11-09 RX ADMIN — CEFTRIAXONE 1 G: 1 INJECTION, SOLUTION INTRAVENOUS at 12:11

## 2017-11-09 RX ADMIN — POTASSIUM BICARBONATE 50 MEQ: 25 TABLET, EFFERVESCENT ORAL at 12:11

## 2017-11-09 RX ADMIN — NYSTATIN 500000 UNITS: 500000 SUSPENSION ORAL at 05:11

## 2017-11-09 RX ADMIN — TIZANIDINE 2 MG: 2 TABLET ORAL at 09:11

## 2017-11-09 RX ADMIN — TRAZODONE HYDROCHLORIDE 100 MG: 50 TABLET ORAL at 12:11

## 2017-11-09 RX ADMIN — FUROSEMIDE 40 MG: 40 TABLET ORAL at 05:11

## 2017-11-09 RX ADMIN — Medication 10 ML: at 10:11

## 2017-11-09 RX ADMIN — BENZONATATE 100 MG: 100 CAPSULE ORAL at 10:11

## 2017-11-09 RX ADMIN — SIMVASTATIN 20 MG: 20 TABLET, FILM COATED ORAL at 09:11

## 2017-11-09 RX ADMIN — TRAZODONE HYDROCHLORIDE 100 MG: 50 TABLET ORAL at 10:11

## 2017-11-09 RX ADMIN — POTASSIUM CHLORIDE 40 MEQ: 1500 TABLET, EXTENDED RELEASE ORAL at 09:11

## 2017-11-09 NOTE — ASSESSMENT & PLAN NOTE
-pain and swelling resolved, ROM WNL  -bloody effusion arthrocentesis per Ortho 11/5  -right knee cultures with NGTD, continue ceftraxione for 5 day course   -discontinued vanc as blood cultures are negative  -WBC WNL and no further fevers  -unable to ascertain cell count due to clot within synovial fluid

## 2017-11-09 NOTE — PLAN OF CARE
Problem: Patient Care Overview  Goal: Plan of Care Review  Outcome: Ongoing (interventions implemented as appropriate)  Patient remains free of falls or injury. Patient denies pain. Continued on IV rocephin. Continued on IV push vanc BID. Patient working with PT/OT. Plan of care reviewed with patient and daughter.

## 2017-11-09 NOTE — SUBJECTIVE & OBJECTIVE
"Interval History: Up to recliner, daughter at bedside. Patient endorses feeling much better today without oxygen supplementation, denies shortness of breath and states cough has improved. She was able to ambulate farther today with PT and with less assistance, right knee is no "longer trouble". She and daughter understand there may be chronic swelling of right leg. She denies chest pain or palpations, no dizziness/light headiness. No acute events or distress overnight.    Review of Systems   Constitutional: Positive for fatigue (improving). Negative for activity change, appetite change, chills, diaphoresis and fever.   HENT: Negative for congestion, mouth sores, sore throat and trouble swallowing.    Respiratory: Negative for cough, chest tightness, shortness of breath and wheezing.    Cardiovascular: Positive for leg swelling (much improved, R > L). Negative for chest pain and palpitations.   Gastrointestinal: Negative for abdominal distention, abdominal pain, constipation, diarrhea, nausea and vomiting.   Genitourinary: Negative for decreased urine volume, difficulty urinating, dysuria, enuresis and urgency.   Musculoskeletal: Negative for arthralgias, back pain, joint swelling (right knee swelling much improved overnight ) and myalgias.   Neurological: Positive for weakness (improving). Negative for dizziness, syncope, light-headedness and headaches.   Psychiatric/Behavioral: Negative for sleep disturbance.     Objective:     Vital Signs (Most Recent):  Temp: 98.4 °F (36.9 °C) (11/09/17 1115)  Pulse: 73 (11/09/17 1400)  Resp: 16 (11/09/17 1115)  BP: (!) 153/66 (11/09/17 1115)  SpO2: (!) 94 % (11/09/17 1115) Vital Signs (24h Range):  Temp:  [98.1 °F (36.7 °C)-98.4 °F (36.9 °C)] 98.4 °F (36.9 °C)  Pulse:  [60-88] 73  Resp:  [16-18] 16  SpO2:  [90 %-97 %] 94 %  BP: (130-166)/(58-73) 153/66     Weight: 62.9 kg (138 lb 9.6 oz)  Body mass index is 23.06 kg/m².    Intake/Output Summary (Last 24 hours) at 11/09/17 " 1619  Last data filed at 11/09/17 1400   Gross per 24 hour   Intake              600 ml   Output             1725 ml   Net            -1125 ml      Physical Exam   Constitutional: She is oriented to person, place, and time. She appears well-developed and well-nourished.   HENT:   Head: Normocephalic and atraumatic.   Right Ear: External ear normal.   Left Ear: External ear normal.   Nose: Nose normal.   Mouth/Throat: Mucous membranes are normal. Normal dentition.   Eyes: Conjunctivae, EOM and lids are normal.   Neck: Normal range of motion. Neck supple. No JVD (+TR jet) present.   Cardiovascular: Normal rate, regular rhythm and intact distal pulses.  Exam reveals no gallop and no friction rub.    No murmur heard.  Pulmonary/Chest: Effort normal. She has rhonchi in the left lower field. She has no rales. She exhibits no tenderness.   Abdominal: Soft. Bowel sounds are normal. She exhibits no distension. There is no tenderness.   Musculoskeletal: Normal range of motion. She exhibits edema (1+ BLE improving). She exhibits no deformity.        Right knee: She exhibits normal range of motion and no swelling.   Neurological: She is alert and oriented to person, place, and time. No cranial nerve deficit or sensory deficit. She exhibits normal muscle tone. Coordination normal.   Skin: Skin is warm and dry. Capillary refill takes less than 2 seconds. No rash noted. No erythema.   Psychiatric: She has a normal mood and affect. Her behavior is normal. Judgment and thought content normal.   Nursing note and vitals reviewed.    Significant Labs:   BMP:   Recent Labs  Lab 11/09/17  0500         K 3.6   CL 89*   CO2 40*   BUN 24*   CREATININE 0.8   CALCIUM 9.8   MG 2.1     CBC:   Recent Labs  Lab 11/08/17  0555 11/09/17  1005   WBC 6.43 7.85   HGB 9.9* 10.2*   HCT 32.5* 32.3*    193     Cardiac Markers:   Recent Labs  Lab 11/09/17  1004   *     Magnesium:   Recent Labs  Lab 11/08/17  0555 11/08/17  2204  11/09/17  0500   MG 2.3 2.0 2.1     All pertinent labs within the past 24 hours have been reviewed.    Significant Imaging: I have reviewed all pertinent imaging results/findings within the past 24 hours.

## 2017-11-09 NOTE — PROGRESS NOTES
Patient complaint of cramps to left leg. Dr. Weintsein notified; verbal order for STAT mag and potassium. PRN pain medication given. K 3.3; Dr. Weinstein paged, awaiting response.

## 2017-11-09 NOTE — PT/OT/SLP PROGRESS
Physical Therapy  Treatment    Adela Garay   MRN: [id number]   Admitting Diagnosis: Acute on chronic diastolic heart failure    PT Received On: 11/09/17  PT Start Time: 1006     PT Stop Time: 1045    PT Total Time (min): 39 min       Billable Minutes:  Gait [id number] and Therapeutic Activity 16    Treatment Type: Treatment  PT/PTA: PT     PTA Visit Number: 0       General Precautions: Standard, fall, pacemaker  Orthopedic Precautions: N/A   Braces: N/A    Do you have any cultural, spiritual, Cheondoism conflicts, given your current situation?: none reported     Subjective:  Communicated with RN prior to session.  Pt agreeable to therapy. Pt excited about improvement in R knee pain and function this date. Pt asking several questions about weaning supplemental O2.     Pain/Comfort  Pain Rating 1: 0/10    Objective:   Patient found with: telemetry, oxygen    Functional Mobility:  Bed Mobility:   Supine to Sit: Contact Guard Assistance (with HOB elevated)    Transfers:  Sit <> Stand Assistance: Contact Guard Assistance (x2 reps)  Sit <> Stand Assistive Device: No Assistive Device  Toilet Transfer Technique: Stand Pivot  Toilet Transfer Assistance: Contact Guard Assistance  Toilet Transfer Assistive Device: bedside commode    Gait:   Gait Distance: 380 ft.   Assistance 1: Contact Guard Assistance  Gait Assistive Device: Single point cane  Gait Pattern: 2-point gait  Gait Deviation(s): decreased roberta, decreased step length, decreased weight-shifting ability, decreased velocity of limb motion (increased trunk flexion )   W/c follow throughout for safety per pt request   2L portable O2 in place throughout; no SOB noted; spO2 95% following gait     Balance:   Static Sit: supervision   Dynamic Sit: SBA-CGA  Static Stand: SBA-CGA  Dynamic stand: CGA     Therapeutic Activities and Exercises:  Reviewed pacemaker precautions. Pt able to maintain throughout mobility with min cueing.  Performed seated toileting on [facility] with  CGA for transfer T/F commode. Pt performed giselle-care following without assist. Required assist for doffing and donning undergarments.   Donned second gown as robe. Pt educated on donning LUE first and doffing RUE first due to pacemaker precautions.    Pt asking several questions regarding weaning supplemental O2, as she is hopeful that she will not go home with O2. Questions answered within PT scope of practice. RN notified of pt questions to provide further explanation.   Pt also asking about PT returning this date for additional sessions. PT explained PT POC and educated pt that RN staff can assist pt with ambulation later this date. Pt and daughter verbalized understanding.     AM-PAC 6 CLICK MOBILITY   How much help from another person does this patient currently need?   1 = Unable, Total/Dependent Assistance  2 = A lot, Maximum/Moderate Assistance  3 = A little, Minimum/Contact Guard/Supervision  4 = None, Modified [place]/Independent    Turning over in bed (including adjusting bedclothes, sheets and blankets)?: 4  Sitting down on and standing up from a chair with arms (e.g., wheelchair, bedside commode, etc.): 3  Moving from lying on back to sitting on the side of the bed?: 3  Moving to and from a bed to a chair (including a wheelchair)?: 3  Need to walk in hospital room?: 3  Climbing 3-5 steps with a railing?: 2  Total Score: 18    AM-PAC Raw Score CMS G-Code Modifier Level of Impairment Assistance   6 % Total / Unable   7 - 9 CM 80 - 100% Maximal Assist   10 - 14 CL 60 - 80% Moderate Assist   15 - 19 CK 40 - 60% Moderate Assist   20 - 22 CJ 20 - 40% Minimal Assist   23 CI 1-20% SBA / CGA   24 CH 0% Independent/ Mod I     Patient left up in chair with all lines intact, call button in reach, RN notified and pt's daughter and staff member present.    Assessment:  Adela Garay is a 89 y.o. female with a medical diagnosis of Acute on chronic diastolic heart failure and presents s/p pacemaker  insertion with pacemaker precautions in place. Improvement in functional mobility this session with pt able to increase ambulation distance and perform mobility with less assist. Pt continues to require CGA for all mobility for improved safety 2* pt demo'ing mild instability and generalized weakness. Pt would continue to benefit from skilled acute PT in order to address current deficits and progress functional mobility.     Rehab identified problem list/impairments: Rehab identified problem list/impairments: weakness, impaired functional mobilty, gait instability, impaired endurance, impaired balance, impaired self care skills, impaired cardiopulmonary response to activity, decreased lower extremity function, edema    Rehab potential is good.    Activity tolerance: Good    Discharge recommendations: Discharge Facility/Level Of Care Needs: home health PT     Barriers to discharge: Barriers to Discharge: None    Equipment recommendations: Equipment Needed After Discharge: none     GOALS:    Physical Therapy Goals        Problem: Physical Therapy Goal    Goal Priority Disciplines Outcome Goal Variances Interventions   Physical Therapy Goal     PT/OT, PT Ongoing (interventions implemented as appropriate)     Description:  Goals to be met by: 2017    Patient will increase functional independence with mobility by performin. Sit to stand transfer with Modified [place] using single point cane - not met  2. Gait  x 400 feet with Modified [place] using Single-point Cane . - not met  3. Lower extremity exercise program x15 reps per handout, with independence - not met                       PLAN:    Patient to be seen 4 x/week  to address the above listed problems via gait training, therapeutic activities, therapeutic exercises, neuromuscular re-education  Plan of Care expires: 17  Plan of Care reviewed with: patient, daughter        Laina Garay, PT, DPT   2017  [phone]

## 2017-11-09 NOTE — PROGRESS NOTES
[facility] Hospital Medicine  Progress Note    Patient Name: Adela Garay  MRN: [id number]  Patient Class: IP- Inpatient   Admission Date: 10/30/2017  Length of Stay: 10 days  Attending Physician: Susannah Campbell MD  Primary Care Provider: Torrie Farrell MD    [facility] [facility] María Elena Euceda NP    Subjective:     Principal Problem:Acute on chronic diastolic heart failure    HPI:  Ms. Garay is a 89 year old  woman with past medical history of HTN, HLD, DM, aFib, HFpEF, and recent TAVR complicated by PPM placement for CHB ([place] Valve 29mm ) on 10/17/17 for severe AS who presents to the ED with complaints of leg swelling. She states that she has been having issues since she went home after the surgery. She endorses orthopnea, ESTRADA, new cough, leg swelling, and weight gain. Denies issues like this before. Denies Fever. Denies chest pain.     While in ED, chemistry was unremarkable, BNP elevated at 918, troponin negative and CBC stable. CXR with bilateral pleural effusion larger on the right side and atelectatic changes at the lung bases; EKG with atrial fibrillation with paced ventricular rhythm.    The patient was admitted to the Hospital Medicine Service for further evaluation and management.     Hospital Course:  Ms. Garay was admitted 10/30 s/p recent TAVR and PPM with acute on chronic diastolic heart failure and edema. She was evaluated by cardiology in ED and assessed by TAVR team in house. On admission her BNP was elevated at 918 and CXR with bilateral pleural effusion larger on the right side and atelectatic changes at the lung bases, this noted to be worsened from prior exam 2 weeks ago. Her diuresis was initiated with lasix 40 mg IVP BID, and titrated up to 60 mg IVP TID with occasional metolazone booster for improved diuresis. Repeat CXR with continued pulmonary edema/pleural fluid and repeat echo 11/7 with markedly increased central venous pressure,  "RAP of 15, and trivial pericardial effusion, with chemistry continuing towards contraction alkalosis the metolazone was discontinued and lasix IVP decreased to BID >>> then transitioned to PO dosing, will monitor for response.  She thus far has diuresed well with net negative ~16 liters as of current and weight down 13 lbs since admission, she remains at 138 lbs, which is likely her new dry weight.  She is no longer oxygen dependant, available PRN, in addition to PRN duo-nebs, acapella, and IS therapies.    On 11/5 she developed a 101.9 fever and R knee pain.  Ortho was consulted, arthrocentesis performed and resulted in bloody fluid for analysis, however the specimen clotted so cell count not obtained. The bloody tap could be because of OAC with warfarin, however she did have a right septic knee a year ago which require surgical washout. Blood cultures and right knee fluid cultures with NGTD, will continue ceftriaxone coverage for appropriate course and until cultures are finalized and discontinued vanc. She developed glossitis most likely d/t ABX, continue nystatin and magic mouthwash as needed, reports symptoms near resolved.      Disposition plans: Home with home health and family care post transition to PO diuretics and evidence of toleration, educated at great length in regards to low sodium diet and fluid restriction. She will also be monitored with digital heart failure program. She follows outpt with home INR and warfarin clinic.      Interval History: Up to recliner, daughter at bedside. Patient endorses feeling much better today without oxygen supplementation, denies shortness of breath and states cough has improved. She was able to ambulate farther today with PT and with less assistance, right knee is no "longer trouble". She and daughter understand there may be chronic swelling of right leg. She denies chest pain or palpations, no dizziness/light headiness. No acute events or distress " overnight.    Review of Systems   Constitutional: Positive for fatigue (improving). Negative for activity change, appetite change, chills, diaphoresis and fever.   HENT: Negative for congestion, mouth sores, sore throat and trouble swallowing.    Respiratory: Negative for cough, chest tightness, shortness of breath and wheezing.    Cardiovascular: Positive for leg swelling (much improved, R > L). Negative for chest pain and palpitations.   Gastrointestinal: Negative for abdominal distention, abdominal pain, constipation, diarrhea, nausea and vomiting.   Genitourinary: Negative for decreased urine volume, difficulty urinating, dysuria, enuresis and urgency.   Musculoskeletal: Negative for arthralgias, back pain, joint swelling (right knee swelling much improved overnight ) and myalgias.   Neurological: Positive for weakness (improving). Negative for dizziness, syncope, light-headedness and headaches.   Psychiatric/Behavioral: Negative for sleep disturbance.     Objective:     Vital Signs (Most Recent):  Temp: 98.4 °F (36.9 °C) (11/09/17 1115)  Pulse: 73 (11/09/17 1400)  Resp: 16 (11/09/17 1115)  BP: (!) 153/66 (11/09/17 1115)  SpO2: (!) 94 % (11/09/17 1115) Vital Signs (24h Range):  Temp:  [98.1 °F (36.7 °C)-98.4 °F (36.9 °C)] 98.4 °F (36.9 °C)  Pulse:  [60-88] 73  Resp:  [16-18] 16  SpO2:  [90 %-97 %] 94 %  BP: (130-166)/(58-73) 153/66     Weight: 62.9 kg (138 lb 9.6 oz)  Body mass index is 23.06 kg/m².    Intake/Output Summary (Last 24 hours) at 11/09/17 1619  Last data filed at 11/09/17 1400   Gross per 24 hour   Intake              600 ml   Output             1725 ml   Net            -1125 ml      Physical Exam   Constitutional: She is oriented to person, place, and time. She appears well-developed and well-nourished.   HENT:   Head: Normocephalic and atraumatic.   Right Ear: External ear normal.   Left Ear: External ear normal.   Nose: Nose normal.   Mouth/Throat: Mucous membranes are normal. Normal dentition.    Eyes: Conjunctivae, EOM and lids are normal.   Neck: Normal range of motion. Neck supple. No JVD (+TR jet) present.   Cardiovascular: Normal rate, regular rhythm and intact distal pulses.  Exam reveals no gallop and no friction rub.    No murmur heard.  Pulmonary/Chest: Effort normal. She has rhonchi in the left lower field. She has no rales. She exhibits no tenderness.   Abdominal: Soft. Bowel sounds are normal. She exhibits no distension. There is no tenderness.   Musculoskeletal: Normal range of motion. She exhibits edema (1+ BLE improving). She exhibits no deformity.        Right knee: She exhibits normal range of motion and no swelling.   Neurological: She is alert and oriented to person, place, and time. No cranial nerve deficit or sensory deficit. She exhibits normal muscle tone. Coordination normal.   Skin: Skin is warm and dry. Capillary refill takes less than 2 seconds. No rash noted. No erythema.   Psychiatric: She has a normal mood and affect. Her behavior is normal. Judgment and thought content normal.   Nursing note and vitals reviewed.    Significant Labs:   BMP:   Recent Labs  Lab 11/09/17  0500         K 3.6   CL 89*   CO2 40*   BUN 24*   CREATININE 0.8   CALCIUM 9.8   MG 2.1     CBC:   Recent Labs  Lab 11/08/17  0555 11/09/17  1005   WBC 6.43 7.85   HGB 9.9* 10.2*   HCT 32.5* 32.3*    193     Cardiac Markers:   Recent Labs  Lab 11/09/17  1004   *     Magnesium:   Recent Labs  Lab 11/08/17  0555 11/08/17  2204 11/09/17  0500   MG 2.3 2.0 2.1     All pertinent labs within the past 24 hours have been reviewed.    Significant Imaging: I have reviewed all pertinent imaging results/findings within the past 24 hours.    Assessment/Plan:      * Acute on chronic diastolic heart failure    -on admission BNP elevated at 918, troponin negative, CBC/CMP stable, CXR with bilateral pleural effusion larger on the right side and atelectatic changes at the lung bases, appears to have  evolved over that past 2-3 weeks as CXR mid October noted to be much clearer   -BNP now down trending ~500  -EKG with atrial fibrillation with paced ventricular rhythm  -Cardiology evaluated in ED, Interventional TAVR team evaluated in house >>> will see outpt  -admission 2D echo EF 50% mod MR, severe TR, PAP 40, RAP 15  -repeat echo 11/7 with markedly increased CVP, RAP of 15, and trivial pericardial effusion  -repeat CXR 11/7 with continued pulmonary edema/pleural fluid  -chemistry continues with contraction alkalosis >>> transitioned to PO lasix today, will monitor    -remains with fine intermittent rhonchi in LLL, dry cough, and BLE R > L edema  -diuresed ~14 liters since admission, weight down down 13 lbs, currently 138 lbs and holding steady, likely her new dry weight  -continue home BB and decrease digoxin due to elevated level >>> repeat in AM  -hold triamterene/HCTZ for now, while using IV diuresis  -continue tele monitoring >>> no events thus far  -EKG PRN chest pain or arrhythmia  -outpt follow up with TAVR team post discharge  -educated on low sodium diet with fluid restriction >>> amenable to compliance now that she and daughter have better understanding  -will need home health for PT, will participate in digital heart failure program        Complete heart block, post-surgical    -st christian device placed post TAVR  -pacer programming and wound check done at bedside   -device/lead wnl   -Merlin home monitor device issued with instructions per arrhythmia tech  -outpt follow up in clinic in 3 mos with Dr. Delgado        S/P TAVR (transcatheter aortic valve replacement)    -see above  -TAVR team evaluated in house, will need to follow up outpt        Atrial fibrillation    -rate controlled and on warfarin   -INR 2.8 today, decrease warfarin dose to 2 mg and repeat INR in AM   -dig level 1.2, will decrease dig for now >>> repeat level later in AM        Severe aortic stenosis by prior echocardiogram    -see  above and HPI        Long-term (current) use of anticoagulants, INR goal 2.0-3.0    -INR 2.8 today, decrease warfarin dose to 2 mg and repeat INR in AM   -daily PT/INR  -at discharge will continue home checks and following with warfarin clinic        Non-productive cough    -cough has much improved since admission >>> tessalon pearls for comfort  -weaned off oxygen, shortness of breath resolved  -duo nebs q8H PRN  -IS and flutter encouraged        Coronary artery disease involving native coronary artery of native heart without angina pectoris    -chest pain free, EKG without acute ischemic changes  -continue ASA, BB, and statin         Benign hypertensive heart disease with congestive heart failure    -BP well controlled in house  -continue increased BB and decreased digoxin (dig level 1.2)  -hold triamterene/HCTZ with IV diuretics at this time  -goal -150        Hyperlipidemia    -continue statin         Controlled type 2 diabetes mellitus with microalbuminuria    -HgA1C 5.6 last in May 6.0  -diet controlled  -cardiac/diabetic diet in house        DDD (degenerative disc disease), cervical    -PRN tylenol and oxycodone        Mild major depression    -continue home trazodone  -no SI/HI        Hx-TIA (transient ischemic attack)    -continue ASA        Acute glossitis    -symptoms essentially resolved per patient   -most likely d/t recent addition of ABX  -continue nystatin PO and magic mouthwash PRN  -discontinue ABX therapy as soon as cultures result if possible         Right knee pain    -pain and swelling resolved, ROM WNL  -bloody effusion arthrocentesis per Ortho 11/5  -right knee cultures with NGTD, continue ceftraxione for 5 day course   -discontinued vanc as blood cultures are negative  -WBC WNL and no further fevers  -unable to ascertain cell count due to clot within synovial fluid          VTE Risk Mitigation         Ordered     warfarin (COUMADIN) tablet 2 mg  Daily     Route:  Oral        11/09/17  0823     Place sequential compression device  Until discontinued      11/07/17 1609     Medium Risk of VTE  Once      10/30/17 2102        María Elena Euceda NP  Department of Hospital Medicine   [facility]  Pager [phone]  [facility] [id number]

## 2017-11-09 NOTE — PLAN OF CARE
Problem: Physical Therapy Goal  Goal: Physical Therapy Goal  Goals to be met by: 2017    Patient will increase functional independence with mobility by performin. Sit to stand transfer with Modified [place] using single point cane - not met  2. Gait  x 400 feet with Modified [place] using Single-point Cane . - not met  3. Lower extremity exercise program x15 reps per handout, with independence - not met      Outcome: Ongoing (interventions implemented as appropriate)  Goals reviewed and remain appropriate. Pt progressing towards goals.    Laina Garay, PT, DPT   2017  [phone]

## 2017-11-09 NOTE — PROGRESS NOTES
"Study: [facility]R  Sponsor: [employer]  Follow-up Visit: 30 Day Follow Up   Date of Visit: 09-NOV-2017    Patient wishes to continue in study: Yes  All study protocol required CRFs completed: Yes    The subject is currently admitted at [facility].  Therefore, her 30 Day follow up visit will be conducted while she is inpatien.  Current list of medications obtained and verified.   Adverse events since her TAVR discharge have been reported.    She had an ECHO performed on Nov. 7, 2017 which is two days "out of the 30 Day research window" but we will use it rather than repeat this test.   Mobility is limited due to right knee pain and swelling, dizziness, and weakness.  We will leave the 6 Minute Walk test to the subject's discretion.         The following tests and procedures are related to research study:  EKG, TTE, 6 MWT, Labs, QOL, NIHSS, MMSE, Barthel, mRS and Frailty.    "

## 2017-11-09 NOTE — ASSESSMENT & PLAN NOTE
-rate controlled and on warfarin   -INR 2.8 today, decrease warfarin dose to 2 mg and repeat INR in AM   -dig level 1.2, will decrease dig for now >>> repeat level later in AM

## 2017-11-09 NOTE — ASSESSMENT & PLAN NOTE
-on admission BNP elevated at 918, troponin negative, CBC/CMP stable, CXR with bilateral pleural effusion larger on the right side and atelectatic changes at the lung bases, appears to have evolved over that past 2-3 weeks as CXR mid October noted to be much clearer   -BNP now down trending ~500  -EKG with atrial fibrillation with paced ventricular rhythm  -Cardiology evaluated in ED, Interventional TAVR team evaluated in house >>> will see outpt  -admission 2D echo EF 50% mod MR, severe TR, PAP 40, RAP 15  -repeat echo 11/7 with markedly increased CVP, RAP of 15, and trivial pericardial effusion  -repeat CXR 11/7 with continued pulmonary edema/pleural fluid  -chemistry continues with contraction alkalosis >>> transitioned to PO lasix today, will monitor    -remains with fine intermittent rhonchi in LLL, dry cough, and BLE R > L edema  -diuresed ~14 liters since admission, weight down down 13 lbs, currently 138 lbs and holding steady, likely her new dry weight  -continue home BB and decrease digoxin due to elevated level >>> repeat in AM  -hold triamterene/HCTZ for now, while using IV diuresis  -continue tele monitoring >>> no events thus far  -EKG PRN chest pain or arrhythmia  -outpt follow up with TAVR team post discharge  -educated on low sodium diet with fluid restriction >>> amenable to compliance now that she and daughter have better understanding  -will need home health for PT, will participate in digital heart failure program

## 2017-11-09 NOTE — PLAN OF CARE
11/09/17 1248   Medicare Message   Important Message from Medicare regarding Discharge Appeal Rights Given to patient/caregiver;Explained to patient/caregiver;Signed/date by patient/caregiver   Date IMM was signed 11/09/17   Time IMM was signed [id number]

## 2017-11-09 NOTE — ASSESSMENT & PLAN NOTE
-INR 2.8 today, decrease warfarin dose to 2 mg and repeat INR in AM   -daily PT/INR  -at discharge will continue home checks and following with warfarin clinic

## 2017-11-09 NOTE — ASSESSMENT & PLAN NOTE
-symptoms essentially resolved per patient   -most likely d/t recent addition of ABX  -continue nystatin PO and magic mouthwash PRN  -discontinue ABX therapy as soon as cultures result if possible

## 2017-11-09 NOTE — ASSESSMENT & PLAN NOTE
-cough has much improved since admission >>> tessalon pearls for comfort  -weaned off oxygen, shortness of breath resolved  -duo nebs q8H PRN  -IS and flutter encouraged

## 2017-11-10 LAB
ANION GAP SERPL CALC-SCNC: 7 MMOL/L
BACTERIA BLD CULT: NORMAL
BACTERIA BLD CULT: NORMAL
BUN SERPL-MCNC: 22 MG/DL
CALCIUM SERPL-MCNC: 9.5 MG/DL
CHLORIDE SERPL-SCNC: 88 MMOL/L
CO2 SERPL-SCNC: 40 MMOL/L
CREAT SERPL-MCNC: 0.7 MG/DL
EST. GFR  (AFRICAN AMERICAN): >60 ML/MIN/1.73 M^2
EST. GFR  (NON AFRICAN AMERICAN): >60 ML/MIN/1.73 M^2
GLUCOSE SERPL-MCNC: 95 MG/DL
INR PPP: 2.4
MAGNESIUM SERPL-MCNC: 2.1 MG/DL
POTASSIUM SERPL-SCNC: 3.8 MMOL/L
PROTHROMBIN TIME: 24.4 SEC
SODIUM SERPL-SCNC: 135 MMOL/L

## 2017-11-10 PROCEDURE — 85610 PROTHROMBIN TIME: CPT

## 2017-11-10 PROCEDURE — 83735 ASSAY OF MAGNESIUM: CPT

## 2017-11-10 PROCEDURE — 99233 SBSQ HOSP IP/OBS HIGH 50: CPT | Mod: ,,, | Performed by: NURSE PRACTITIONER

## 2017-11-10 PROCEDURE — 80048 BASIC METABOLIC PNL TOTAL CA: CPT

## 2017-11-10 PROCEDURE — 20600001 HC STEP DOWN PRIVATE ROOM

## 2017-11-10 PROCEDURE — 94799 UNLISTED PULMONARY SVC/PX: CPT

## 2017-11-10 PROCEDURE — 63600175 PHARM REV CODE 636 W HCPCS: Performed by: NURSE PRACTITIONER

## 2017-11-10 PROCEDURE — A4216 STERILE WATER/SALINE, 10 ML: HCPCS | Performed by: NURSE PRACTITIONER

## 2017-11-10 PROCEDURE — 97116 GAIT TRAINING THERAPY: CPT

## 2017-11-10 PROCEDURE — 99900035 HC TECH TIME PER 15 MIN (STAT)

## 2017-11-10 PROCEDURE — 25000003 PHARM REV CODE 250: Performed by: NURSE PRACTITIONER

## 2017-11-10 PROCEDURE — 27000221 HC OXYGEN, UP TO 24 HOURS

## 2017-11-10 PROCEDURE — 36415 COLL VENOUS BLD VENIPUNCTURE: CPT

## 2017-11-10 PROCEDURE — 27000173 HC ACAPELLA DEVICE DH OR DM

## 2017-11-10 PROCEDURE — 94664 DEMO&/EVAL PT USE INHALER: CPT

## 2017-11-10 RX ORDER — BUMETANIDE 1 MG/1
2 TABLET ORAL 2 TIMES DAILY
Status: DISCONTINUED | OUTPATIENT
Start: 2017-11-10 | End: 2017-11-11

## 2017-11-10 RX ORDER — POTASSIUM CHLORIDE 20 MEQ/1
20 TABLET, EXTENDED RELEASE ORAL ONCE
Status: COMPLETED | OUTPATIENT
Start: 2017-11-10 | End: 2017-11-10

## 2017-11-10 RX ORDER — WARFARIN 4 MG/1
4 TABLET ORAL DAILY
Status: DISCONTINUED | OUTPATIENT
Start: 2017-11-10 | End: 2017-11-12 | Stop reason: HOSPADM

## 2017-11-10 RX ADMIN — TIZANIDINE 2 MG: 2 TABLET ORAL at 09:11

## 2017-11-10 RX ADMIN — METOPROLOL TARTRATE 75 MG: 25 TABLET ORAL at 09:11

## 2017-11-10 RX ADMIN — WARFARIN SODIUM 4 MG: 4 TABLET ORAL at 05:11

## 2017-11-10 RX ADMIN — ASPIRIN 81 MG CHEWABLE TABLET 81 MG: 81 TABLET CHEWABLE at 09:11

## 2017-11-10 RX ADMIN — Medication 3 ML: at 09:11

## 2017-11-10 RX ADMIN — DOCUSATE SODIUM 100 MG: 100 CAPSULE, LIQUID FILLED ORAL at 09:11

## 2017-11-10 RX ADMIN — NYSTATIN 500000 UNITS: 500000 SUSPENSION ORAL at 09:11

## 2017-11-10 RX ADMIN — SIMVASTATIN 20 MG: 20 TABLET, FILM COATED ORAL at 09:11

## 2017-11-10 RX ADMIN — NYSTATIN 500000 UNITS: 500000 SUSPENSION ORAL at 11:11

## 2017-11-10 RX ADMIN — CEFTRIAXONE 1 G: 1 INJECTION, SOLUTION INTRAVENOUS at 12:11

## 2017-11-10 RX ADMIN — POLYETHYLENE GLYCOL 3350 17 G: 17 POWDER, FOR SOLUTION ORAL at 09:11

## 2017-11-10 RX ADMIN — Medication 3 ML: at 06:11

## 2017-11-10 RX ADMIN — DIGOXIN 0.12 MG: 0.12 TABLET ORAL at 09:11

## 2017-11-10 RX ADMIN — BUMETANIDE 2 MG: 1 TABLET ORAL at 09:11

## 2017-11-10 RX ADMIN — POTASSIUM CHLORIDE 20 MEQ: 1500 TABLET, EXTENDED RELEASE ORAL at 09:11

## 2017-11-10 RX ADMIN — Medication 3 ML: at 03:11

## 2017-11-10 RX ADMIN — OXYCODONE HYDROCHLORIDE 5 MG: 5 TABLET ORAL at 12:11

## 2017-11-10 RX ADMIN — NYSTATIN 500000 UNITS: 500000 SUSPENSION ORAL at 05:11

## 2017-11-10 NOTE — PLAN OF CARE
Problem: Patient Care Overview  Goal: Plan of Care Review  Outcome: Ongoing (interventions implemented as appropriate)  Pt free of falls, injury this shift. POC reviewed with pt at bedside, verbalized understanding. Lasix transitioned to PO, to increase to 60 mg PO BID in AM.

## 2017-11-10 NOTE — SUBJECTIVE & OBJECTIVE
Interval History: Resting in recliner, family at bedside. She endorses low saturations this morning with oxygen requirement, however has since resolved, also felt her urine volume was decreased. She denies shortness of breath, chest pain, palpitations, or dizziness/light headiness, no acute events or distress overnight.     Review of Systems   Constitutional: Positive for fatigue (improving). Negative for activity change, appetite change, chills, diaphoresis and fever.   HENT: Negative for congestion, mouth sores, sore throat and trouble swallowing.    Respiratory: Negative for cough, chest tightness, shortness of breath and wheezing.    Cardiovascular: Positive for leg swelling (much improved, R > L). Negative for chest pain and palpitations.   Gastrointestinal: Negative for abdominal distention, abdominal pain, constipation, diarrhea, nausea and vomiting.   Genitourinary: Negative for decreased urine volume, difficulty urinating, dysuria, enuresis and urgency.   Musculoskeletal: Negative for arthralgias, back pain, joint swelling (right knee swelling much improved overnight ) and myalgias.   Neurological: Positive for weakness (improving). Negative for dizziness, syncope, light-headedness and headaches.   Psychiatric/Behavioral: Negative for sleep disturbance.     Objective:     Vital Signs (Most Recent):  Temp: 98.2 °F (36.8 °C) (11/10/17 1120)  Pulse: 60 (11/10/17 1200)  Resp: 18 (11/10/17 1120)  BP: 125/60 (11/10/17 1120)  SpO2: (!) 89 % (11/10/17 1120) Vital Signs (24h Range):  Temp:  [97.6 °F (36.4 °C)-98.3 °F (36.8 °C)] 98.2 °F (36.8 °C)  Pulse:  [60-86] 60  Resp:  [16-18] 18  SpO2:  [88 %-97 %] 89 %  BP: (125-177)/(59-80) 125/60     Weight: 62.3 kg (137 lb 4.8 oz)  Body mass index is 22.85 kg/m².    Intake/Output Summary (Last 24 hours) at 11/10/17 1452  Last data filed at 11/10/17 0717   Gross per 24 hour   Intake                0 ml   Output              900 ml   Net             -900 ml      Physical Exam    Constitutional: She is oriented to person, place, and time. She appears well-developed and well-nourished.   HENT:   Head: Normocephalic and atraumatic.   Right Ear: External ear normal.   Left Ear: External ear normal.   Nose: Nose normal.   Mouth/Throat: Mucous membranes are normal. Normal dentition.   Eyes: Conjunctivae, EOM and lids are normal.   Neck: Normal range of motion. Neck supple. No JVD (+TR jet) present.   Cardiovascular: Normal rate, regular rhythm and intact distal pulses.  Exam reveals no gallop and no friction rub.    No murmur heard.  Pulmonary/Chest: Effort normal. She has rhonchi in the left lower field. She has no rales. She exhibits no tenderness.   Abdominal: Soft. Bowel sounds are normal. She exhibits no distension. There is no tenderness.   Musculoskeletal: Normal range of motion. She exhibits edema (1+ BLE improving). She exhibits no deformity.        Right knee: She exhibits normal range of motion and no swelling.   Neurological: She is alert and oriented to person, place, and time. No cranial nerve deficit or sensory deficit. She exhibits normal muscle tone. Coordination normal.   Skin: Skin is warm and dry. Capillary refill takes less than 2 seconds. No rash noted. No erythema.   Psychiatric: She has a normal mood and affect. Her behavior is normal. Judgment and thought content normal.   Nursing note and vitals reviewed.    Significant Labs:   BMP:   Recent Labs  Lab 11/10/17  0518   GLU 95   *   K 3.8   CL 88*   CO2 40*   BUN 22   CREATININE 0.7   CALCIUM 9.5   MG 2.1     CMP:   Recent Labs  Lab 11/08/17  2204 11/09/17  0500 11/09/17  1004 11/10/17  0518   NA  --  138  --  135*   K 3.3* 3.6  --  3.8   CL  --  89*  --  88*   CO2  --  40*  --  40*   GLU  --  109  --  95   BUN  --  24*  --  22   CREATININE  --  0.8  --  0.7   CALCIUM  --  9.8  --  9.5   ALBUMIN  --   --  2.9*  --    ANIONGAP  --  9  --  7*   EGFRNONAA  --  >60.0  --  >60.0     Magnesium:   Recent Labs  Lab  11/08/17  2204 11/09/17  0500 11/10/17  0518   MG 2.0 2.1 2.1     All pertinent labs within the past 24 hours have been reviewed.    Significant Imaging: I have reviewed all pertinent imaging results/findings within the past 24 hours.

## 2017-11-10 NOTE — ASSESSMENT & PLAN NOTE
-on admission BNP elevated at 918, troponin negative, CBC/CMP stable, CXR with bilateral pleural effusion larger on the right side and atelectatic changes at the lung bases, appears to have evolved over that past 2-3 weeks as CXR mid October noted to be much clearer   -BNP now down trending ~500  -EKG with atrial fibrillation with paced ventricular rhythm  -Cardiology evaluated in ED, Interventional TAVR team evaluated in house >>> will see outpt  -admission 2D echo EF 50% mod MR, severe TR, PAP 40, RAP 15  -repeat echo 11/7 with markedly increased CVP, RAP of 15, and trivial pericardial effusion  -repeat CXR 11/7 with continued pulmonary edema/pleural fluid, CXR 11/10 with same  -chemistry continues with contraction alkalosis >>> transitioned to PO lasix today, poor response, transitioned to bumex    -remains with fine intermittent rhonchi in LLL, dry cough, and BLE R > L edema  -diuresed ~14 liters since admission, weight down down 13 lbs, currently 138 lbs and holding steady, likely her new dry weight  -continue home BB and decrease digoxin due to elevated level >>> repeat 0.8  -hold triamterene/HCTZ for now, while using IV diuresis  -continue tele monitoring >>> no events thus far  -EKG PRN chest pain or arrhythmia  -outpt follow up with TAVR team post discharge  -educated on low sodium diet with fluid restriction >>> amenable to compliance now that she and daughter have better understanding  -will need home health for PT, will participate in digital heart failure program

## 2017-11-10 NOTE — PLAN OF CARE
[facility] to [facility] in [facility] to possibly Monday. She states they will follow over the weekend in case the DC happens Sat or Sun.

## 2017-11-10 NOTE — NURSING
Patient progressing all goals. Plan of care discussed with patient, no questions at this time. Fall pre-cautions in place. Vs within defined limits. Will continue to monitor.

## 2017-11-10 NOTE — ASSESSMENT & PLAN NOTE
-rate controlled and on warfarin   -INR 2.4 today, resume home warfarin dose of 4mg and repeat INR in AM   -dig level 1.2 on admission, repeat 0.8, continue lower dose dig

## 2017-11-10 NOTE — PT/OT/SLP PROGRESS
Physical Therapy  Treatment    Adela Garay   MRN: [id number]   Admitting Diagnosis: Acute on chronic diastolic heart failure    PT Received On: 11/10/17  PT Start Time: 1558     PT Stop Time: 1614    PT Total Time (min): 16 min       Billable Minutes:  Gait [id number]    Treatment Type: Treatment  PT/PTA: PT     PTA Visit Number: 0       General Precautions: Standard, fall, pacemaker  Orthopedic Precautions: N/A   Braces: N/A    Do you have any cultural, spiritual, [employer] conflicts, given your current situation?: none reported     Subjective:  Communicated with RN prior to session.  Pt agreeable to therapy.     Pain/Comfort  Pain Rating 1: 0/10    Objective:   Patient found with: telemetry    Functional Mobility:  Bed Mobility:   Supine to Sit:  (NT 2* pt UIC before and after treatment session)    Transfers:  Sit <> Stand Assistance: Minimum Assistance  Sit <> Stand Assistive Device: Straight Cane    Gait:   Gait Distance: ~300 ft.   Assistance 1: Contact Guard Assistance  Gait Assistive Device: Single point cane  Gait Pattern: 2-point gait  Gait Deviation(s): decreased roberta, decreased velocity of limb motion, decreased step length, decreased weight-shifting ability (trunk flexion; decreased arm swing with LUE)   Cues for upright posture and increased L arm swing   SpO2 91% on RA prior to ambulation, per pt's son. Pt ambulated on RA with no SOB noted. Following ambulation, spO2 initially reading 72% but with seated rest and cues for pursed lip breathing spO2 increased to 97%.     Balance:   Static Stand: SBA-CGA  Dynamic stand: CGA     Therapeutic Activities and Exercises:  Reviewed pacemaker precautions. Pt able to maintain throughout mobility without cueing.   Pt educated on PT POC with regards to upcoming weekend. Pt verbalized understanding.      AM-PAC 6 CLICK MOBILITY  How much help from another person does this patient currently need?   1 = Unable, Total/Dependent Assistance  2 = A lot,  Maximum/Moderate Assistance  3 = A little, Minimum/Contact Guard/Supervision  4 = None, Modified [place]/Independent    Turning over in bed (including adjusting bedclothes, sheets and blankets)?: 4  Sitting down on and standing up from a chair with arms (e.g., wheelchair, bedside commode, etc.): 3  Moving from lying on back to sitting on the side of the bed?: 3  Moving to and from a bed to a chair (including a wheelchair)?: 3  Need to walk in hospital room?: 3  Climbing 3-5 steps with a railing?: 2  Total Score: 18    AM-PAC Raw Score CMS G-Code Modifier Level of Impairment Assistance   6 % Total / Unable   7 - 9 CM 80 - 100% Maximal Assist   10 - 14 CL 60 - 80% Moderate Assist   15 - 19 CK 40 - 60% Moderate Assist   20 - 22 CJ 20 - 40% Minimal Assist   23 CI 1-20% SBA / CGA   24 CH 0% Independent/ Mod I     Patient left up in chair with all lines intact, call button in reach and pt's son and granddaughter present.    Assessment:  Adela Garay is a 89 y.o. female with a medical diagnosis of Acute on chronic diastolic heart failure and presents s/p pacemaker insertion with pacemaker precautions in place. Pt able to perform ambulation in hallway with SPC with no LOB or SOB noted. Pt initially with decreased O2 sats following ambulation on RA, but steadily increased to >90% with seated rest and cues for breathing technique. Pt would continue to benefit from skilled acute PT in order to address current deficits and progress functional mobility.     Rehab identified problem list/impairments: Rehab identified problem list/impairments: weakness, impaired functional mobilty, gait instability, impaired endurance, impaired balance, impaired self care skills, impaired cardiopulmonary response to activity, decreased upper extremity function, decreased lower extremity function    Rehab potential is good.    Activity tolerance: Good    Discharge recommendations: Discharge Facility/Level Of Care Needs: home  health PT     Barriers to discharge: Barriers to Discharge: None    Equipment recommendations: Equipment Needed After Discharge: none     GOALS:    Physical Therapy Goals        Problem: Physical Therapy Goal    Goal Priority Disciplines Outcome Goal Variances Interventions   Physical Therapy Goal     PT/OT, PT Ongoing (interventions implemented as appropriate)     Description:  Goals to be met by: 2017    Patient will increase functional independence with mobility by performin. Sit to stand transfer with Modified [place] using single point cane - not met  2. Gait  x 400 feet with Modified [place] using Single-point Cane . - not met  3. Lower extremity exercise program x15 reps per handout, with independence - not met                       PLAN:    Patient to be seen 3 x/week  to address the above listed problems via gait training, therapeutic activities, therapeutic exercises, neuromuscular re-education  Plan of Care expires: 17  Plan of Care reviewed with: patient, clint Garay, PT, DPT   11/10/2017  [phone]

## 2017-11-10 NOTE — PROGRESS NOTES
[facility] Hospital Medicine  Progress Note    Patient Name: Adela Garay  MRN: [id number]  Patient Class: IP- Inpatient   Admission Date: 10/30/2017  Length of Stay: 11 days  Attending Physician: Susannah Campbell MD  Primary Care Provider: Torrie Farrell MD    [facility] Medicine Team: [facility] J María Elena Euceda NP    Subjective:     Principal Problem:Acute on chronic diastolic heart failure    HPI:  Ms. Garay is a 89 year old  woman with past medical history of HTN, HLD, DM, aFib, HFpEF, and recent TAVR complicated by PPM placement for CHB (Portico Valve 29mm ) on 10/17/17 for severe AS who presents to the ED with complaints of leg swelling. She states that she has been having issues since she went home after the surgery. She endorses orthopnea, ESTRADA, new cough, leg swelling, and weight gain. Denies issues like this before. Denies Fever. Denies chest pain.     While in ED, chemistry was unremarkable, BNP elevated at 918, troponin negative and CBC stable. CXR with bilateral pleural effusion larger on the right side and atelectatic changes at the lung bases; EKG with atrial fibrillation with paced ventricular rhythm.    The patient was admitted to the Hospital Medicine Service for further evaluation and management.     Hospital Course:  Ms. Garay was admitted 10/30 s/p recent TAVR and PPM with acute on chronic diastolic heart failure and edema. She was evaluated by cardiology in ED and assessed by TAVR team in house. On admission her BNP was elevated at 918 and CXR with bilateral pleural effusion larger on the right side and atelectatic changes at the lung bases, this noted to be worsened from prior exam 2 weeks ago. Her diuresis was initiated with lasix 40 mg IVP BID, and titrated up to 60 mg IVP TID with occasional metolazone booster for improved diuresis. Repeat CXR with continued pulmonary edema/pleural fluid and repeat echo 11/7 with markedly increased central venous pressure,  RAP of 15, and trivial pericardial effusion, with chemistry continuing towards contraction alkalosis the metolazone was discontinued and lasix IVP decreased to BID >>> then transitioned to PO dosing, poor response, thus transitioned to bumex, will monitor for appropriate response. She thus far has diuresed well with net negative ~16 liters as of current and weight down 13 lbs since admission, she remains at 138 lbs, which is likely her new dry weight.  She is no longer oxygen dependant, available PRN, in addition to PRN duo-nebs, acapella, and IS therapies.    On 11/5 she developed a 101.9 fever and R knee pain.  Ortho was consulted, arthrocentesis performed and resulted in bloody fluid for analysis, however the specimen clotted so cell count not obtained. The bloody tap could be because of OAC with warfarin, however she did have a right septic knee a year ago which require surgical washout. Blood cultures and right knee fluid cultures with NGTD, will continue ceftriaxone coverage for appropriate course and until cultures are finalized and discontinued vanc. She developed glossitis most likely d/t ABX, continue nystatin and magic mouthwash as needed, reports symptoms near resolved.      Disposition plans: Home with home health and family care post transition to PO diuretics and evidence of toleration, educated at great length in regards to low sodium diet and fluid restriction. She will also be monitored with digital heart failure program. She follows outpt with home INR and warfarin clinic.      Interval History: Resting in recliner, family at bedside. She endorses low saturations this morning with oxygen requirement, however has since resolved, also felt her urine volume was decreased. She denies shortness of breath, chest pain, palpitations, or dizziness/light headiness, no acute events or distress overnight.     Review of Systems   Constitutional: Positive for fatigue (improving). Negative for activity change,  appetite change, chills, diaphoresis and fever.   HENT: Negative for congestion, mouth sores, sore throat and trouble swallowing.    Respiratory: Negative for cough, chest tightness, shortness of breath and wheezing.    Cardiovascular: Positive for leg swelling (much improved, R > L). Negative for chest pain and palpitations.   Gastrointestinal: Negative for abdominal distention, abdominal pain, constipation, diarrhea, nausea and vomiting.   Genitourinary: Negative for decreased urine volume, difficulty urinating, dysuria, enuresis and urgency.   Musculoskeletal: Negative for arthralgias, back pain, joint swelling (right knee swelling much improved overnight ) and myalgias.   Neurological: Positive for weakness (improving). Negative for dizziness, syncope, light-headedness and headaches.   Psychiatric/Behavioral: Negative for sleep disturbance.     Objective:     Vital Signs (Most Recent):  Temp: 98.2 °F (36.8 °C) (11/10/17 1120)  Pulse: 60 (11/10/17 1200)  Resp: 18 (11/10/17 1120)  BP: 125/60 (11/10/17 1120)  SpO2: (!) 89 % (11/10/17 1120) Vital Signs (24h Range):  Temp:  [97.6 °F (36.4 °C)-98.3 °F (36.8 °C)] 98.2 °F (36.8 °C)  Pulse:  [60-86] 60  Resp:  [16-18] 18  SpO2:  [88 %-97 %] 89 %  BP: (125-177)/(59-80) 125/60     Weight: 62.3 kg (137 lb 4.8 oz)  Body mass index is 22.85 kg/m².    Intake/Output Summary (Last 24 hours) at 11/10/17 1452  Last data filed at 11/10/17 0717   Gross per 24 hour   Intake                0 ml   Output              900 ml   Net             -900 ml      Physical Exam   Constitutional: She is oriented to person, place, and time. She appears well-developed and well-nourished.   HENT:   Head: Normocephalic and atraumatic.   Right Ear: External ear normal.   Left Ear: External ear normal.   Nose: Nose normal.   Mouth/Throat: Mucous membranes are normal. Normal dentition.   Eyes: Conjunctivae, EOM and lids are normal.   Neck: Normal range of motion. Neck supple. No JVD (+TR jet) present.    Cardiovascular: Normal rate, regular rhythm and intact distal pulses.  Exam reveals no gallop and no friction rub.    No murmur heard.  Pulmonary/Chest: Effort normal. She has rhonchi in the left lower field. She has no rales. She exhibits no tenderness.   Abdominal: Soft. Bowel sounds are normal. She exhibits no distension. There is no tenderness.   Musculoskeletal: Normal range of motion. She exhibits edema (1+ BLE improving). She exhibits no deformity.        Right knee: She exhibits normal range of motion and no swelling.   Neurological: She is alert and oriented to person, place, and time. No cranial nerve deficit or sensory deficit. She exhibits normal muscle tone. Coordination normal.   Skin: Skin is warm and dry. Capillary refill takes less than 2 seconds. No rash noted. No erythema.   Psychiatric: She has a normal mood and affect. Her behavior is normal. Judgment and thought content normal.   Nursing note and vitals reviewed.    Significant Labs:   BMP:   Recent Labs  Lab 11/10/17  0518   GLU 95   *   K 3.8   CL 88*   CO2 40*   BUN 22   CREATININE 0.7   CALCIUM 9.5   MG 2.1     CMP:   Recent Labs  Lab 11/08/17  2204 11/09/17  0500 11/09/17  1004 11/10/17  0518   NA  --  138  --  135*   K 3.3* 3.6  --  3.8   CL  --  89*  --  88*   CO2  --  40*  --  40*   GLU  --  109  --  95   BUN  --  24*  --  22   CREATININE  --  0.8  --  0.7   CALCIUM  --  9.8  --  9.5   ALBUMIN  --   --  2.9*  --    ANIONGAP  --  9  --  7*   EGFRNONAA  --  >60.0  --  >60.0     Magnesium:   Recent Labs  Lab 11/08/17 2204 11/09/17  0500 11/10/17  0518   MG 2.0 2.1 2.1     All pertinent labs within the past 24 hours have been reviewed.    Significant Imaging: I have reviewed all pertinent imaging results/findings within the past 24 hours.    Assessment/Plan:      * Acute on chronic diastolic heart failure    -on admission BNP elevated at 918, troponin negative, CBC/CMP stable, CXR with bilateral pleural effusion larger on the right  side and atelectatic changes at the lung bases, appears to have evolved over that past 2-3 weeks as CXR mid October noted to be much clearer   -BNP now down trending ~500  -EKG with atrial fibrillation with paced ventricular rhythm  -Cardiology evaluated in ED, Interventional TAVR team evaluated in house >>> will see outpt  -admission 2D echo EF 50% mod MR, severe TR, PAP 40, RAP 15  -repeat echo 11/7 with markedly increased CVP, RAP of 15, and trivial pericardial effusion  -repeat CXR 11/7 with continued pulmonary edema/pleural fluid, CXR 11/10 with same  -chemistry continues with contraction alkalosis >>> transitioned to PO lasix today, poor response, transitioned to bumex    -remains with fine intermittent rhonchi in LLL, dry cough, and BLE R > L edema  -diuresed ~14 liters since admission, weight down down 13 lbs, currently 138 lbs and holding steady, likely her new dry weight  -continue home BB and decrease digoxin due to elevated level >>> repeat 0.8  -hold triamterene/HCTZ for now, while using IV diuresis  -continue tele monitoring >>> no events thus far  -EKG PRN chest pain or arrhythmia  -outpt follow up with TAVR team post discharge  -educated on low sodium diet with fluid restriction >>> amenable to compliance now that she and daughter have better understanding  -will need home health for PT, will participate in digital heart failure program        Complete heart block, post-surgical    -st christian device placed post TAVR  -pacer programming and wound check done at bedside   -device/lead wnl   -Merlin home monitor device issued with instructions per arrhythmia tech  -outpt follow up in clinic in 3 mos with Dr. Delgado        S/P TAVR (transcatheter aortic valve replacement)    -see above  -TAVR team evaluated in house, will need to follow up outpt        Atrial fibrillation    -rate controlled and on warfarin   -INR 2.4 today, resume home warfarin dose of 4mg and repeat INR in AM   -dig level 1.2 on admission,  repeat 0.8, continue lower dose dig        Severe aortic stenosis by prior echocardiogram    -see above and HPI        Long-term (current) use of anticoagulants, INR goal 2.0-3.0    -as above  -at discharge will continue home checks and following with warfarin clinic        Non-productive cough    -cough has much improved since admission >>> tessalon pearls for comfort  -weaned off oxygen, shortness of breath resolved  -duo nebs q8H PRN  -IS and flutter encouraged        Coronary artery disease involving native coronary artery of native heart without angina pectoris    -chest pain free, EKG without acute ischemic changes  -continue ASA, BB, and statin         Benign hypertensive heart disease with congestive heart failure    -BP well controlled in house  -continue increased BB and decreased digoxin (dig level 1.2)  -hold triamterene/HCTZ with IV diuretics at this time  -goal -150        Hyperlipidemia    -continue statin         Controlled type 2 diabetes mellitus with microalbuminuria    -HgA1C 5.6 last in May 6.0  -diet controlled  -cardiac/diabetic diet in house        DDD (degenerative disc disease), cervical    -PRN tylenol and oxycodone        Mild major depression    -continue home trazodone  -no SI/HI        Hx-TIA (transient ischemic attack)    -continue ASA        Acute glossitis    -symptoms essentially resolved per patient   -most likely d/t recent addition of ABX  -continue nystatin PO and magic mouthwash PRN  -discontinue ABX therapy as soon as cultures result if possible         Right knee pain    -pain and swelling resolved, ROM WNL  -bloody effusion arthrocentesis per Ortho 11/5  -right knee cultures with NGTD, discontinued ceftraxione after 5 day course  -discontinued vanc as blood cultures are negative  -WBC WNL and no further fevers  -unable to ascertain cell count due to clot within synovial fluid          VTE Risk Mitigation         Ordered     warfarin (COUMADIN) tablet 4 mg  Daily      Route:  Oral        11/10/17 0847     Place sequential compression device  Until discontinued      11/07/17 1609     Medium Risk of VTE  Once      10/30/17 2109        María Elena Euceda NP  Department of Hospital Medicine   [facility]  Pager [phone]  [facility] [id number]

## 2017-11-10 NOTE — PLAN OF CARE
11/10/17 0829   Discharge Reassessment   Assessment Type Discharge Planning Reassessment   Do you have any problems affording any of your prescribed medications? No   Discharge Plan A Home;Home Health   Can the patient answer the patient profile reliably? Yes, cognitively intact   How does the patient rate their overall health at the present time? Fair   Describe the patient's ability to walk at the present time. Walks with the help of equipment   How often would a person be available to care for the patient? Whenever needed   Number of comorbid conditions (as recorded on the chart) Four   During the past month, has the patient often been bothered by feeling down, depressed or hopeless? No   During the past month, has the patient often been bothered by little interest or pleasure in doing things? No

## 2017-11-10 NOTE — ASSESSMENT & PLAN NOTE
-pain and swelling resolved, ROM WNL  -bloody effusion arthrocentesis per Ortho 11/5  -right knee cultures with NGTD, discontinued ceftraxione after 5 day course  -discontinued vanc as blood cultures are negative  -WBC WNL and no further fevers  -unable to ascertain cell count due to clot within synovial fluid

## 2017-11-10 NOTE — NURSING
Patient experiencing bloody stools. MD notified. Labs will be ordered by MD. Will continue to monitor.

## 2017-11-11 LAB
ALBUMIN SERPL BCP-MCNC: 3.1 G/DL
ALP SERPL-CCNC: 79 U/L
ALT SERPL W/O P-5'-P-CCNC: 14 U/L
ANION GAP SERPL CALC-SCNC: 10 MMOL/L
AST SERPL-CCNC: 23 U/L
BASOPHILS # BLD AUTO: 0.07 K/UL
BASOPHILS NFR BLD: 0.8 %
BILIRUB SERPL-MCNC: 0.8 MG/DL
BUN SERPL-MCNC: 24 MG/DL
CALCIUM SERPL-MCNC: 9.7 MG/DL
CHLORIDE SERPL-SCNC: 91 MMOL/L
CO2 SERPL-SCNC: 36 MMOL/L
CREAT SERPL-MCNC: 0.9 MG/DL
DIFFERENTIAL METHOD: ABNORMAL
EOSINOPHIL # BLD AUTO: 0.1 K/UL
EOSINOPHIL NFR BLD: 1.3 %
ERYTHROCYTE [DISTWIDTH] IN BLOOD BY AUTOMATED COUNT: 14.5 %
EST. GFR  (AFRICAN AMERICAN): >60 ML/MIN/1.73 M^2
EST. GFR  (NON AFRICAN AMERICAN): 56.5 ML/MIN/1.73 M^2
GLUCOSE SERPL-MCNC: 99 MG/DL
HCT VFR BLD AUTO: 33.7 %
HGB BLD-MCNC: 10.5 G/DL
IMM GRANULOCYTES # BLD AUTO: 0.04 K/UL
IMM GRANULOCYTES NFR BLD AUTO: 0.5 %
INR PPP: 2.3
LYMPHOCYTES # BLD AUTO: 1.2 K/UL
LYMPHOCYTES NFR BLD: 14.3 %
MAGNESIUM SERPL-MCNC: 2 MG/DL
MCH RBC QN AUTO: 28.8 PG
MCHC RBC AUTO-ENTMCNC: 31.2 G/DL
MCV RBC AUTO: 92 FL
MONOCYTES # BLD AUTO: 1.6 K/UL
MONOCYTES NFR BLD: 18.6 %
NEUTROPHILS # BLD AUTO: 5.4 K/UL
NEUTROPHILS NFR BLD: 64.5 %
NRBC BLD-RTO: 0 /100 WBC
PLATELET # BLD AUTO: 197 K/UL
PMV BLD AUTO: 10 FL
POTASSIUM SERPL-SCNC: 3.6 MMOL/L
PROT SERPL-MCNC: 6.7 G/DL
PROTHROMBIN TIME: 23 SEC
RBC # BLD AUTO: 3.65 M/UL
SODIUM SERPL-SCNC: 137 MMOL/L
WBC # BLD AUTO: 8.37 K/UL

## 2017-11-11 PROCEDURE — 36415 COLL VENOUS BLD VENIPUNCTURE: CPT

## 2017-11-11 PROCEDURE — 85610 PROTHROMBIN TIME: CPT

## 2017-11-11 PROCEDURE — 27000173 HC ACAPELLA DEVICE DH OR DM

## 2017-11-11 PROCEDURE — 25000003 PHARM REV CODE 250: Performed by: NURSE PRACTITIONER

## 2017-11-11 PROCEDURE — 94664 DEMO&/EVAL PT USE INHALER: CPT

## 2017-11-11 PROCEDURE — 20600001 HC STEP DOWN PRIVATE ROOM

## 2017-11-11 PROCEDURE — 99233 SBSQ HOSP IP/OBS HIGH 50: CPT | Mod: ,,, | Performed by: NURSE PRACTITIONER

## 2017-11-11 PROCEDURE — 99900035 HC TECH TIME PER 15 MIN (STAT)

## 2017-11-11 PROCEDURE — 83735 ASSAY OF MAGNESIUM: CPT

## 2017-11-11 PROCEDURE — 94799 UNLISTED PULMONARY SVC/PX: CPT

## 2017-11-11 PROCEDURE — A4216 STERILE WATER/SALINE, 10 ML: HCPCS | Performed by: NURSE PRACTITIONER

## 2017-11-11 PROCEDURE — 85025 COMPLETE CBC W/AUTO DIFF WBC: CPT

## 2017-11-11 PROCEDURE — 80053 COMPREHEN METABOLIC PANEL: CPT

## 2017-11-11 RX ORDER — POTASSIUM CHLORIDE 20 MEQ/1
40 TABLET, EXTENDED RELEASE ORAL ONCE
Status: COMPLETED | OUTPATIENT
Start: 2017-11-11 | End: 2017-11-11

## 2017-11-11 RX ORDER — TRIAMTERENE AND HYDROCHLOROTHIAZIDE 37.5; 25 MG/1; MG/1
1 CAPSULE ORAL DAILY
Status: DISCONTINUED | OUTPATIENT
Start: 2017-11-11 | End: 2017-11-12 | Stop reason: HOSPADM

## 2017-11-11 RX ORDER — BUMETANIDE 1 MG/1
4 TABLET ORAL 2 TIMES DAILY
Status: DISCONTINUED | OUTPATIENT
Start: 2017-11-11 | End: 2017-11-12

## 2017-11-11 RX ADMIN — POTASSIUM CHLORIDE 40 MEQ: 1500 TABLET, EXTENDED RELEASE ORAL at 09:11

## 2017-11-11 RX ADMIN — ASPIRIN 81 MG CHEWABLE TABLET 81 MG: 81 TABLET CHEWABLE at 09:11

## 2017-11-11 RX ADMIN — NYSTATIN 500000 UNITS: 500000 SUSPENSION ORAL at 09:11

## 2017-11-11 RX ADMIN — BUMETANIDE 4 MG: 1 TABLET ORAL at 09:11

## 2017-11-11 RX ADMIN — NYSTATIN 500000 UNITS: 500000 SUSPENSION ORAL at 08:11

## 2017-11-11 RX ADMIN — Medication 3 ML: at 09:11

## 2017-11-11 RX ADMIN — TRAZODONE HYDROCHLORIDE 100 MG: 50 TABLET ORAL at 12:11

## 2017-11-11 RX ADMIN — TRIAMTERENE AND HYDROCHLOROTHIAZIDE 1 CAPSULE: 25; 37.5 CAPSULE ORAL at 02:11

## 2017-11-11 RX ADMIN — NYSTATIN 500000 UNITS: 500000 SUSPENSION ORAL at 01:11

## 2017-11-11 RX ADMIN — Medication 3 ML: at 02:11

## 2017-11-11 RX ADMIN — SIMVASTATIN 20 MG: 20 TABLET, FILM COATED ORAL at 09:11

## 2017-11-11 RX ADMIN — WARFARIN SODIUM 4 MG: 4 TABLET ORAL at 06:11

## 2017-11-11 RX ADMIN — METOPROLOL TARTRATE 75 MG: 25 TABLET ORAL at 08:11

## 2017-11-11 RX ADMIN — NYSTATIN 500000 UNITS: 500000 SUSPENSION ORAL at 06:11

## 2017-11-11 RX ADMIN — DIGOXIN 0.12 MG: 0.12 TABLET ORAL at 09:11

## 2017-11-11 RX ADMIN — METOPROLOL TARTRATE 75 MG: 25 TABLET ORAL at 09:11

## 2017-11-11 RX ADMIN — RAMELTEON 8 MG: 8 TABLET, FILM COATED ORAL at 10:11

## 2017-11-11 NOTE — PROGRESS NOTES
[facility] Hospital Medicine  Progress Note    Patient Name: Adela Garay  MRN: [id number]  Patient Class: IP- Inpatient   Admission Date: 10/30/2017  Length of Stay: 12 days  Attending Physician: Susannah Campbell MD  Primary Care Provider: Torrie Farrell MD    [facility] Medicine Team: [facility] MED J María Elena Euceda NP    Subjective:     Principal Problem:Acute on chronic diastolic heart failure    HPI:  Ms. Garay is a 89 year old  woman with past medical history of HTN, HLD, DM, aFib, HFpEF, and recent TAVR complicated by PPM placement for CHB (Portico Valve 29mm ) on 10/17/17 for severe AS who presents to the ED with complaints of leg swelling. She states that she has been having issues since she went home after the surgery. She endorses orthopnea, ESTRADA, new cough, leg swelling, and weight gain. Denies issues like this before. Denies Fever. Denies chest pain.     While in ED, chemistry was unremarkable, BNP elevated at 918, troponin negative and CBC stable. CXR with bilateral pleural effusion larger on the right side and atelectatic changes at the lung bases; EKG with atrial fibrillation with paced ventricular rhythm.    The patient was admitted to the Hospital Medicine Service for further evaluation and management.     Hospital Course:  Ms. Garay was admitted 10/30 s/p recent TAVR and PPM with acute on chronic diastolic heart failure and edema. She was evaluated by cardiology in ED and assessed by TAVR team in house. On admission her BNP was elevated at 918 and CXR with bilateral pleural effusion larger on the right side and atelectatic changes at the lung bases, this noted to be worsened from prior exam 2 weeks ago. Her diuresis was initiated with lasix 40 mg IVP BID, and titrated up to 60 mg IVP TID with occasional metolazone booster for improved diuresis. Repeat CXR with continued pulmonary edema/pleural fluid and repeat echo 11/7 with markedly increased central venous pressure,  "RAP of 15, and trivial pericardial effusion, with chemistry continuing towards contraction alkalosis the metolazone was discontinued and lasix IVP decreased to BID >>> then transitioned to PO dosing, poor response, thus transitioned to bumex, responding well. She thus far has diuresed well with net negative ~16 liters as of current and weight down 13 lbs since admission, she remains at 138 lbs, which is likely her new dry weight.  She is no longer oxygen dependant, available PRN, in addition to PRN duo-nebs, acapella, and IS therapies.    On 11/5 she developed a 101.9 fever and R knee pain.  Ortho was consulted, arthrocentesis performed and resulted in bloody fluid for analysis, however the specimen clotted so cell count not obtained. The bloody tap could be because of OAC with warfarin, however she did have a right septic knee a year ago which require surgical washout. Blood cultures and right knee fluid cultures with NGTD, will continue ceftriaxone coverage for appropriate course and until cultures are finalized and discontinued vanc. She developed glossitis most likely d/t ABX, continue nystatin and magic mouthwash as needed, reports symptoms near resolved.      Disposition plans: Home with home health and family care likely in AM, educated at great length in regards to low sodium diet and fluid restriction. She will also be monitored with digital heart failure program. She follows outpt with home INR and warfarin clinic.  Outpt follows already scheduled with primary Cardiologist, TAVR team, and PCP.     Interval History: up in recliner, family at bedside, appears jovial and endorses "feeling very good", much improved weakness, ambulating independently with stand by assist. She denies chest pain, palpitations, or shortness of breath. Denies any acute events or distress overnight.     Review of Systems   Constitutional: Negative for activity change, appetite change, chills, diaphoresis, fatigue (improving) and " fever.   HENT: Negative for congestion, mouth sores, sore throat and trouble swallowing.    Respiratory: Negative for cough, chest tightness, shortness of breath and wheezing.    Cardiovascular: Positive for leg swelling (much improved, R > L). Negative for chest pain and palpitations.   Gastrointestinal: Negative for abdominal distention, abdominal pain, constipation, diarrhea, nausea and vomiting.   Genitourinary: Negative for decreased urine volume, difficulty urinating, dysuria, enuresis and urgency.   Musculoskeletal: Negative for arthralgias, back pain, joint swelling (resolved) and myalgias.   Neurological: Positive for weakness (resolving). Negative for dizziness, syncope, light-headedness and headaches.     Objective:     Vital Signs (Most Recent):  Temp: 96.6 °F (35.9 °C) (11/11/17 1122)  Pulse: 68 (11/11/17 1200)  Resp: 18 (11/11/17 1122)  BP: (!) 144/65 (11/11/17 1122)  SpO2: (!) 93 % (11/11/17 1122) Vital Signs (24h Range):  Temp:  [96.6 °F (35.9 °C)-98.2 °F (36.8 °C)] 96.6 °F (35.9 °C)  Pulse:  [60-77] 68  Resp:  [16-18] 18  SpO2:  [90 %-97 %] 93 %  BP: (101-155)/(43-70) 144/65     Weight: 63.4 kg (139 lb 12.4 oz)  Body mass index is 23.26 kg/m².    Intake/Output Summary (Last 24 hours) at 11/11/17 1256  Last data filed at 11/11/17 0948   Gross per 24 hour   Intake              720 ml   Output              550 ml   Net              170 ml      Physical Exam   Constitutional: She is oriented to person, place, and time. She appears well-developed and well-nourished.   HENT:   Head: Normocephalic and atraumatic.   Right Ear: External ear normal.   Left Ear: External ear normal.   Nose: Nose normal.   Mouth/Throat: Mucous membranes are normal. Normal dentition.   Eyes: Conjunctivae, EOM and lids are normal.   Neck: Normal range of motion. Neck supple. No JVD (+TR jet) present.   Cardiovascular: Normal rate, regular rhythm and intact distal pulses.  Exam reveals no gallop and no friction rub.    No murmur  heard.  Pulmonary/Chest: Effort normal. She has rhonchi in the left lower field. She has no rales. She exhibits no tenderness.   Abdominal: Soft. Bowel sounds are normal. She exhibits no distension. There is no tenderness.   Musculoskeletal: Normal range of motion. She exhibits edema (1+ BLE improving). She exhibits no deformity.        Right knee: She exhibits normal range of motion and no swelling.   Neurological: She is alert and oriented to person, place, and time. No cranial nerve deficit or sensory deficit. She exhibits normal muscle tone. Coordination normal.   Skin: Skin is warm and dry. Capillary refill takes less than 2 seconds. No rash noted. No erythema.   Psychiatric: She has a normal mood and affect. Her behavior is normal. Judgment and thought content normal.   Nursing note and vitals reviewed.    Significant Labs:   BMP:   Recent Labs  Lab 11/11/17  0338   GLU 99      K 3.6   CL 91*   CO2 36*   BUN 24*   CREATININE 0.9   CALCIUM 9.7   MG 2.0     CBC:   Recent Labs  Lab 11/11/17  1211   WBC 8.37   HGB 10.5*   HCT 33.7*        Magnesium:   Recent Labs  Lab 11/10/17  0518 11/11/17  0338   MG 2.1 2.0     All pertinent labs within the past 24 hours have been reviewed.    Significant Imaging: I have reviewed all pertinent imaging results/findings within the past 24 hours.    Assessment/Plan:      * Acute on chronic diastolic heart failure    -on admission BNP elevated at 918, troponin negative, CBC/CMP stable, CXR with bilateral pleural effusion larger on the right side and atelectatic changes at the lung bases, appears to have evolved over that past 2-3 weeks as CXR mid October noted to be much clearer   -BNP now down trending ~500  -EKG with atrial fibrillation with paced ventricular rhythm  -Cardiology evaluated in ED, Interventional TAVR team evaluated in house >>> will see outpt as scheduled  -admission 2D echo EF 50% mod MR, severe TR, PAP 40, RAP 15  -repeat echo 11/7 with markedly  increased CVP, RAP of 15, and trivial pericardial effusion  -repeat CXR 11/7 with continued pulmonary edema/pleural fluid, CXR 11/10 with same  -chemistry continues with contraction alkalosis >>> transitioned to PO lasix today, poor response, transitioned to bumex, responding well   -remains with fine intermittent rhonchi in LLL, dry cough, and BLE R > L edema  -diuresed ~14 liters since admission, weight down down 13 lbs, currently 138 lbs and holding steady, likely her new dry weight  -continue home BB and decreased digoxin due to elevated level >>> repeat 0.8  -resume hold triamterene/HCTZ   -continue tele monitoring >>> no events thus far  -EKG PRN chest pain or arrhythmia  -outpt follow up with TAVR team post discharge  -educated on low sodium diet with fluid restriction >>> amenable to compliance now that she and daughter have better understanding  -will need home health for PT, will participate in digital heart failure program        Complete heart block, post-surgical    -st christian device placed post TAVR  -pacer programming and wound check done at bedside   -device/lead wnl   -Merlin home monitor device issued with instructions per arrhythmia tech  -outpt follow up in clinic in 3 mos with Dr. Delgado        S/P TAVR (transcatheter aortic valve replacement)    -see above  -TAVR team evaluated in house, will follow up outpt as scheduled        Atrial fibrillation    -rate controlled and on warfarin   -INR 2.3 today, resume home warfarin dose of 4mg  -daily INR  -dig level 1.2 on admission, repeat 0.8, continue lower dose dig  -will return to home monitoring of INR via coumadin clinic upon discharge        Severe aortic stenosis by prior echocardiogram    -see above and HPI        Long-term (current) use of anticoagulants, INR goal 2.0-3.0    -as above        Non-productive cough    -cough has much improved since admission >>> tessalon pearls for comfort  -weaned off oxygen, shortness of breath resolved  -duo nebs  q8H PRN  -IS and flutter encouraged        Coronary artery disease involving native coronary artery of native heart without angina pectoris    -chest pain free, EKG without acute ischemic changes  -continue ASA, BB, and statin         Benign hypertensive heart disease with congestive heart failure    -BP mostly controlled in house  -continue increased BB and decreased digoxin (dig level 1.2)  -resume triamterene/HCTZ   -goal -150        Hyperlipidemia    -continue statin         Controlled type 2 diabetes mellitus with microalbuminuria    -HgA1C 5.6 last in May 6.0  -diet controlled  -cardiac/diabetic diet in house        DDD (degenerative disc disease), cervical    -PRN tylenol and oxycodone        Mild major depression    -continue home trazodone  -no SI/HI        Hx-TIA (transient ischemic attack)    -continue ASA        Acute glossitis    -symptoms resolved per patient   -most likely d/t recent addition of ABX >>> now discontinued  -continue nystatin PO and magic mouthwash PRN        Right knee pain    -pain and swelling resolved, ROM WNL  -bloody effusion arthrocentesis per Ortho 11/5  -right knee cultures with NGTD, discontinued ceftraxione after 5 day course  -discontinued vanc as blood cultures are negative  -WBC WNL and no further fevers  -unable to ascertain cell count due to clot within synovial fluid          VTE Risk Mitigation         Ordered     warfarin (COUMADIN) tablet 4 mg  Daily     Route:  Oral        11/10/17 0847     Place sequential compression device  Until discontinued      11/07/17 1609     Medium Risk of VTE  Once      10/30/17 2102        María Elena Euceda NP  Department of Hospital Medicine   [facility]  Pager [phone]  [facility] [id number]

## 2017-11-11 NOTE — PLAN OF CARE
Problem: Patient Care Overview  Goal: Plan of Care Review  Outcome: Ongoing (interventions implemented as appropriate)  AMBULATING IN HALLWAY WITH WALKER ACCOMPANIED BY PT/FLY, TOLERATING WELL DENIES COMPLAINTS. NO FALL RELATED INJURY, DIABETES CONTROLLED WITH DIET. POSSIBLE DISCHARGE ON TOMORROW WITH HOME HEALTH. PLAN OF CARE DISCUSSED WITH PT AND FLY.

## 2017-11-11 NOTE — PLAN OF CARE
Problem: Patient Care Overview  Goal: Plan of Care Review  Outcome: Ongoing (interventions implemented as appropriate)  Patient remains free of falls or injury. Patient denies pain. IV push lasix transitioned to PO bumex. Patient working with PT/OT. Plan of care reviewed with patient and daughter.

## 2017-11-11 NOTE — ASSESSMENT & PLAN NOTE
-symptoms resolved per patient   -most likely d/t recent addition of ABX >>> now discontinued  -continue nystatin PO and magic mouthwash PRN

## 2017-11-11 NOTE — ASSESSMENT & PLAN NOTE
-on admission BNP elevated at 918, troponin negative, CBC/CMP stable, CXR with bilateral pleural effusion larger on the right side and atelectatic changes at the lung bases, appears to have evolved over that past 2-3 weeks as CXR mid October noted to be much clearer   -BNP now down trending ~500  -EKG with atrial fibrillation with paced ventricular rhythm  -Cardiology evaluated in ED, Interventional TAVR team evaluated in house >>> will see outpt as scheduled  -admission 2D echo EF 50% mod MR, severe TR, PAP 40, RAP 15  -repeat echo 11/7 with markedly increased CVP, RAP of 15, and trivial pericardial effusion  -repeat CXR 11/7 with continued pulmonary edema/pleural fluid, CXR 11/10 with same  -chemistry continues with contraction alkalosis >>> transitioned to PO lasix today, poor response, transitioned to bumex, responding well   -remains with fine intermittent rhonchi in LLL, dry cough, and BLE R > L edema  -diuresed ~14 liters since admission, weight down down 13 lbs, currently 138 lbs and holding steady, likely her new dry weight  -continue home BB and decreased digoxin due to elevated level >>> repeat 0.8  -resume hold triamterene/HCTZ   -continue tele monitoring >>> no events thus far  -EKG PRN chest pain or arrhythmia  -outpt follow up with TAVR team post discharge  -educated on low sodium diet with fluid restriction >>> amenable to compliance now that she and daughter have better understanding  -will need home health for PT, will participate in digital heart failure program

## 2017-11-11 NOTE — ASSESSMENT & PLAN NOTE
-BP mostly controlled in house  -continue increased BB and decreased digoxin (dig level 1.2)  -resume triamterene/HCTZ   -goal -150

## 2017-11-11 NOTE — SUBJECTIVE & OBJECTIVE
"Interval History: up in recliner, family at bedside, appears jovial and endorses "feeling very good", much improved weakness, ambulating independently with stand by assist. She denies chest pain, palpitations, or shortness of breath. Denies any acute events or distress overnight.     Review of Systems   Constitutional: Negative for activity change, appetite change, chills, diaphoresis, fatigue (improving) and fever.   HENT: Negative for congestion, mouth sores, sore throat and trouble swallowing.    Respiratory: Negative for cough, chest tightness, shortness of breath and wheezing.    Cardiovascular: Positive for leg swelling (much improved, R > L). Negative for chest pain and palpitations.   Gastrointestinal: Negative for abdominal distention, abdominal pain, constipation, diarrhea, nausea and vomiting.   Genitourinary: Negative for decreased urine volume, difficulty urinating, dysuria, enuresis and urgency.   Musculoskeletal: Negative for arthralgias, back pain, joint swelling (resolved) and myalgias.   Neurological: Positive for weakness (resolving). Negative for dizziness, syncope, light-headedness and headaches.     Objective:     Vital Signs (Most Recent):  Temp: 96.6 °F (35.9 °C) (11/11/17 1122)  Pulse: 68 (11/11/17 1200)  Resp: 18 (11/11/17 1122)  BP: (!) 144/65 (11/11/17 1122)  SpO2: (!) 93 % (11/11/17 1122) Vital Signs (24h Range):  Temp:  [96.6 °F (35.9 °C)-98.2 °F (36.8 °C)] 96.6 °F (35.9 °C)  Pulse:  [60-77] 68  Resp:  [16-18] 18  SpO2:  [90 %-97 %] 93 %  BP: (101-155)/(43-70) 144/65     Weight: 63.4 kg (139 lb 12.4 oz)  Body mass index is 23.26 kg/m².    Intake/Output Summary (Last 24 hours) at 11/11/17 1256  Last data filed at 11/11/17 0948   Gross per 24 hour   Intake              720 ml   Output              550 ml   Net              170 ml      Physical Exam   Constitutional: She is oriented to person, place, and time. She appears well-developed and well-nourished.   HENT:   Head: Normocephalic and " atraumatic.   Right Ear: External ear normal.   Left Ear: External ear normal.   Nose: Nose normal.   Mouth/Throat: Mucous membranes are normal. Normal dentition.   Eyes: Conjunctivae, EOM and lids are normal.   Neck: Normal range of motion. Neck supple. No JVD (+TR jet) present.   Cardiovascular: Normal rate, regular rhythm and intact distal pulses.  Exam reveals no gallop and no friction rub.    No murmur heard.  Pulmonary/Chest: Effort normal. She has rhonchi in the left lower field. She has no rales. She exhibits no tenderness.   Abdominal: Soft. Bowel sounds are normal. She exhibits no distension. There is no tenderness.   Musculoskeletal: Normal range of motion. She exhibits edema (1+ BLE improving). She exhibits no deformity.        Right knee: She exhibits normal range of motion and no swelling.   Neurological: She is alert and oriented to person, place, and time. No cranial nerve deficit or sensory deficit. She exhibits normal muscle tone. Coordination normal.   Skin: Skin is warm and dry. Capillary refill takes less than 2 seconds. No rash noted. No erythema.   Psychiatric: She has a normal mood and affect. Her behavior is normal. Judgment and thought content normal.   Nursing note and vitals reviewed.    Significant Labs:   BMP:   Recent Labs  Lab 11/11/17  0338   GLU 99      K 3.6   CL 91*   CO2 36*   BUN 24*   CREATININE 0.9   CALCIUM 9.7   MG 2.0     CBC:   Recent Labs  Lab 11/11/17  1211   WBC 8.37   HGB 10.5*   HCT 33.7*        Magnesium:   Recent Labs  Lab 11/10/17  0518 11/11/17  0338   MG 2.1 2.0     All pertinent labs within the past 24 hours have been reviewed.    Significant Imaging: I have reviewed all pertinent imaging results/findings within the past 24 hours.

## 2017-11-12 VITALS
RESPIRATION RATE: 18 BRPM | BODY MASS INDEX: 22.73 KG/M2 | HEIGHT: 65 IN | HEART RATE: 60 BPM | DIASTOLIC BLOOD PRESSURE: 72 MMHG | OXYGEN SATURATION: 97 % | TEMPERATURE: 98 F | WEIGHT: 136.44 LBS | SYSTOLIC BLOOD PRESSURE: 140 MMHG

## 2017-11-12 PROBLEM — R05.8 NON-PRODUCTIVE COUGH: Status: RESOLVED | Noted: 2017-10-30 | Resolved: 2017-11-12

## 2017-11-12 PROBLEM — M25.561 RIGHT KNEE PAIN: Status: RESOLVED | Noted: 2017-11-05 | Resolved: 2017-11-12

## 2017-11-12 PROBLEM — K14.0: Status: RESOLVED | Noted: 2017-11-06 | Resolved: 2017-11-12

## 2017-11-12 LAB
ANION GAP SERPL CALC-SCNC: 9 MMOL/L
BUN SERPL-MCNC: 20 MG/DL
CALCIUM SERPL-MCNC: 9.7 MG/DL
CHLORIDE SERPL-SCNC: 92 MMOL/L
CO2 SERPL-SCNC: 37 MMOL/L
CREAT SERPL-MCNC: 0.9 MG/DL
EST. GFR  (AFRICAN AMERICAN): >60 ML/MIN/1.73 M^2
EST. GFR  (NON AFRICAN AMERICAN): 56.5 ML/MIN/1.73 M^2
GLUCOSE SERPL-MCNC: 110 MG/DL
INR PPP: 2
MAGNESIUM SERPL-MCNC: 2 MG/DL
POTASSIUM SERPL-SCNC: 3.1 MMOL/L
PROTHROMBIN TIME: 19.7 SEC
SODIUM SERPL-SCNC: 138 MMOL/L

## 2017-11-12 PROCEDURE — 83735 ASSAY OF MAGNESIUM: CPT

## 2017-11-12 PROCEDURE — 80048 BASIC METABOLIC PNL TOTAL CA: CPT

## 2017-11-12 PROCEDURE — 99239 HOSP IP/OBS DSCHRG MGMT >30: CPT | Mod: ,,, | Performed by: NURSE PRACTITIONER

## 2017-11-12 PROCEDURE — 85610 PROTHROMBIN TIME: CPT

## 2017-11-12 PROCEDURE — 36415 COLL VENOUS BLD VENIPUNCTURE: CPT

## 2017-11-12 PROCEDURE — 94799 UNLISTED PULMONARY SVC/PX: CPT

## 2017-11-12 PROCEDURE — 25000003 PHARM REV CODE 250: Performed by: NURSE PRACTITIONER

## 2017-11-12 PROCEDURE — 94664 DEMO&/EVAL PT USE INHALER: CPT

## 2017-11-12 PROCEDURE — 99900035 HC TECH TIME PER 15 MIN (STAT)

## 2017-11-12 PROCEDURE — 27000173 HC ACAPELLA DEVICE DH OR DM

## 2017-11-12 RX ORDER — POTASSIUM CHLORIDE 20 MEQ/1
40 TABLET, EXTENDED RELEASE ORAL
Status: COMPLETED | OUTPATIENT
Start: 2017-11-12 | End: 2017-11-12

## 2017-11-12 RX ORDER — TIZANIDINE 2 MG/1
2 TABLET ORAL EVERY 6 HOURS PRN
Qty: 30 TABLET | Refills: 0 | Status: SHIPPED | OUTPATIENT
Start: 2017-11-12 | End: 2017-11-22

## 2017-11-12 RX ORDER — BUMETANIDE 1 MG/1
2 TABLET ORAL 2 TIMES DAILY
Status: DISCONTINUED | OUTPATIENT
Start: 2017-11-12 | End: 2017-11-12 | Stop reason: HOSPADM

## 2017-11-12 RX ORDER — TRAZODONE HYDROCHLORIDE 100 MG/1
100 TABLET ORAL NIGHTLY PRN
Qty: 30 TABLET | Refills: 2 | Status: SHIPPED | OUTPATIENT
Start: 2017-11-12 | End: 2018-04-16 | Stop reason: SDUPTHER

## 2017-11-12 RX ORDER — DIGOXIN 125 MCG
0.12 TABLET ORAL DAILY
Qty: 30 TABLET | Refills: 11 | Status: SHIPPED | OUTPATIENT
Start: 2017-11-13 | End: 2018-04-26 | Stop reason: SDUPTHER

## 2017-11-12 RX ORDER — METOPROLOL TARTRATE 75 MG/1
75 TABLET, FILM COATED ORAL 2 TIMES DAILY
Qty: 60 TABLET | Refills: 2 | Status: SHIPPED | OUTPATIENT
Start: 2017-11-12 | End: 2018-04-16 | Stop reason: SDUPTHER

## 2017-11-12 RX ORDER — BENZONATATE 100 MG/1
100 CAPSULE ORAL 3 TIMES DAILY PRN
Qty: 30 CAPSULE | Refills: 0 | Status: SHIPPED | OUTPATIENT
Start: 2017-11-12 | End: 2017-11-22

## 2017-11-12 RX ORDER — BUMETANIDE 2 MG/1
2 TABLET ORAL 2 TIMES DAILY
Qty: 60 TABLET | Refills: 11 | Status: SHIPPED | OUTPATIENT
Start: 2017-11-12 | End: 2018-04-16 | Stop reason: SDUPTHER

## 2017-11-12 RX ADMIN — POTASSIUM CHLORIDE 40 MEQ: 1500 TABLET, EXTENDED RELEASE ORAL at 12:11

## 2017-11-12 RX ADMIN — BUMETANIDE 2 MG: 1 TABLET ORAL at 08:11

## 2017-11-12 RX ADMIN — TRIAMTERENE AND HYDROCHLOROTHIAZIDE 1 CAPSULE: 25; 37.5 CAPSULE ORAL at 08:11

## 2017-11-12 RX ADMIN — DIGOXIN 0.12 MG: 0.12 TABLET ORAL at 08:11

## 2017-11-12 RX ADMIN — POTASSIUM CHLORIDE 40 MEQ: 1500 TABLET, EXTENDED RELEASE ORAL at 09:11

## 2017-11-12 RX ADMIN — NYSTATIN 500000 UNITS: 500000 SUSPENSION ORAL at 08:11

## 2017-11-12 RX ADMIN — METOPROLOL TARTRATE 75 MG: 25 TABLET ORAL at 08:11

## 2017-11-12 RX ADMIN — ASPIRIN 81 MG CHEWABLE TABLET 81 MG: 81 TABLET CHEWABLE at 08:11

## 2017-11-12 NOTE — NURSING
12:00. Discharged to home with home health. Tele d/c'ed sl d/c'ed with catheter intact. Instructions re: diet, activity, meds  and f/u given to pt and fly. Verbalize understanding. Escorted to parking by family. No distress noted. Instructed to  new RX at [facility] on [address]. Verbalize understandng.

## 2017-11-12 NOTE — ASSESSMENT & PLAN NOTE
-chest pain free over hospital course, admission EKG without acute ischemic changes  -no events on tele   -continue ASA, BB, and statin

## 2017-11-12 NOTE — ASSESSMENT & PLAN NOTE
-rate controlled and on warfarin   -INR 2.0 today, 6 mg warfarin evening of discharge, then resume home dose  -dig level 1.2 on admission, repeat 0.8, continue lower dose dig  -will return to home monitoring of INR via coumadin clinic upon discharge >>> will notify of departure

## 2017-11-12 NOTE — ASSESSMENT & PLAN NOTE
-on admission BNP elevated at 918, troponin negative, CBC/CMP stable, CXR with bilateral pleural effusion larger on the right side and atelectatic changes at the lung bases, appears to have evolved over that past 2-3 weeks as CXR mid October noted to be much clearer   -BNP now down trending ~500  -on arrival EKG with atrial fibrillation with paced ventricular rhythm  -Cardiology evaluated in ED, Interventional TAVR team evaluated in house >>> will see outpt as scheduled  -admission 2D echo EF 50% mod MR, severe TR, PAP 40, RAP 15  -repeat echo 11/7 with markedly increased CVP, RAP of 15, and trivial pericardial effusion  -repeat CXR 11/7 with continued pulmonary edema/pleural fluid, CXR 11/10 with same  -chemistry continued with contraction alkalosis >>> transitioned to PO lasix, poor response, transitioned to bumex, responded well  -ESTRADA, SOB, BLE edema, and cough have all resolved  -diuresed ~16 liters since admission, weight down down 16 lbs, currently 136-138 lbs and holding steady, likely her new dry weight  -continue home BB and decreased digoxin due to elevated level >>> repeat 0.8  -resume hold triamterene/HCTZ   -no events on tele over hospital course  -outpt follow up with TAVR team post discharge  -educated on low sodium diet with fluid restriction >>> amenable to compliance now that she and daughter have better understanding  -home health for PT, will participate in digital heart failure program

## 2017-11-12 NOTE — PLAN OF CARE
[facility]    HOME HEALTH ORDERS  FACE TO FACE ENCOUNTER    Patient Name: Adela Garay  YOB: 1927    PCP: Torrie Farrell MD   PCP Address: [address] / [place] [address]  PCP Phone Number: [phone]  PCP Fax: [phone]    Encounter Date: 11/12/2017    Admit to Home Health    Diagnoses:  Active Hospital Problems    Diagnosis  POA    *Acute on chronic diastolic heart failure [I50.33]  Yes     Priority: 1 - High    Complete heart block, post-surgical [I44.2, Z98.890]  No     Priority: 2     S/P TAVR (transcatheter aortic valve replacement) [Z95.2]  Not Applicable     Priority: 2     Atrial fibrillation [I48.91]  Yes     Priority: 3      Chronic - on coumadin followed by the coumadin clinic  Followed by cardiology, Dr. Ivy      Long-term (current) use of anticoagulants, INR goal 2.0-3.0 [Z79.01]  Not Applicable     Priority: 4     Coronary artery disease involving native coronary artery of native heart without angina pectoris [I25.10]  Yes     Priority: 6     Benign hypertensive heart disease with congestive heart failure [I11.0, I50.9]  Yes     Priority: 6     Hyperlipidemia [E78.5]  Yes     Priority: 7     Controlled type 2 diabetes mellitus with microalbuminuria [E11.29, R80.9]  Yes     Priority: 8      - diet controlled      DDD (degenerative disc disease), cervical [M50.30]  Yes     Priority: 9     Mild major depression [F32.0]  Yes     Priority: 10     Hx-TIA (transient ischemic attack) [Z86.73]  Not Applicable     Priority: 11       Resolved Hospital Problems    Diagnosis Date Resolved POA    Non-productive cough [R05] 11/12/2017 Unknown     Priority: 5     Acute glossitis 11/12/2017 Unknown    Right knee pain [M25.561] 11/12/2017 Yes       Future Appointments  Date Time Provider Department Center   11/21/2017 8:40 AM LAB,  HOSPITAL [facility] LAB [facility] [place]   11/21/2017 2:30 PM Theo Ivy MD [facility] CARDIO [facility] [place]   11/29/2017 1:30  PM Elena Sharpe DPM [facility] POD [place]   11/29/2017 3:40 PM Torrie Farrell MD [facility] MED [place]     Follow-up Information     [facility]. Schedule an appointment as soon as possible for a visit in 2 weeks.    Specialty:  Cardiology  Why:  post pacemaker insertion follow up   Contact information:  [address]  [facility] [address] [phone]           Cesario Campbell MD. Schedule an appointment as soon as possible for a visit in 2 weeks.    Specialties:  Cardiology, INTERVENTIONAL CARDIOLOGY  Why:  post TAVR and heart failure hospitalization follow up   Contact information:  [address]  [facility] [id number] [phone]                 I have seen and examined this patient face to face today. My clinical findings that support the need for the home health skilled services and home bound status are the following:  Weakness/numbness causing balance and gait disturbance due to Heart Failure and Weakness/Debility making it taxing to leave home.  Requiring assistive device to leave home due to unsteady gait caused by  Heart Failure and Weakness/Debility.  Patient with medication mismanagement issues requiring home bound status as evidenced by  Poor understanding of medication regimen/dosage.    Allergies:  Review of patient's allergies indicates:   Allergen Reactions    Levaquin [levofloxacin]     Doxycycline hyclate Other (See Comments)     Unknown to patient    Iodine and iodide containing products     Neurontin  [gabapentin]      Other reaction(s): Unknown    Norpace  [disopyramide]      Other reaction(s): Hives    Phenytoin sodium extended      Other reaction(s): Muscle pain    Sulfamethoxazole-trimethoprim      Other reaction(s): Muscle cramps       Diet: cardiac diet, fluid restriction: 1500 mL and 2 gram sodium diet    Activities: activity as tolerated    Nursing:   SN to complete comprehensive assessment including routine vital signs. Instruct on  disease process and s/s of complications to report to MD. Review/verify medication list sent home with the patient at time of discharge  and instruct patient/caregiver as needed. Frequency may be adjusted depending on start of care date.    Notify MD if SBP > 160 or < 90; DBP > 90 or < 50; HR > 120 or < 50; Temp > 101    CONSULTS:    Physical Therapy to evaluate and treat. Evaluate for home safety and equipment needs; Establish/upgrade home exercise program. Perform / instruct on therapeutic exercises, gait training, transfer training, and Range of Motion.    MISCELLANEOUS CARE:    1. Collect CBC, CMP, Magnesium, and Digoxin level in one week and fax to office of Dr. Cesario Campbell.    2. Heart Failure:                  SN to instruct on the following:    Instruct on the definition of CHF.   Instruct on the signs/sympoms of CHF to be reported.   Instruct on and monitor daily weights.   Instruct on factors that cause exacerbation.   Instruct on action, dose, schedule, and side effects of medications.   Instruct on diet as prescribed.   Instruct on activity allowed.   Instruct on life-style modifications for life long management of CHF   SN to assess compliance with daily weights, diet, medications, fluid retention,    safety precautions, activities permitted and life-style modifications.   Additional 1-2 SN visits per week as needed for signs and symptoms     of CHF exacerbation.                   For Weight Gain > 2-3 lbs in 1 day or 4-6 lbs over 1 week notify PCP:               Increase bumetanide (oral diuretic) dose to 4 mg BID for 3 days temporarily               Obtain BMP lab test in 3 days               If weight does not decrease by 3-5 lbs after 5 days of increased diuretic usage: Notify PCP.    WOUND CARE ORDERS  n/a    Medications: Review discharge medications with patient and family and provide education.    Current Discharge Medication List      START taking these medications    Details   bumetanide  (BUMEX) 2 MG tablet Take 1 tablet (2 mg total) by mouth 2 (two) times daily.  Qty: 60 tablet, Refills: 11      tiZANidine (ZANAFLEX) 2 MG tablet Take 1 tablet (2 mg total) by mouth every 6 (six) hours as needed.  Qty: 30 tablet, Refills: 0         CONTINUE these medications which have CHANGED    Details   benzonatate (TESSALON) 100 MG capsule Take 1 capsule (100 mg total) by mouth 3 (three) times daily as needed for Cough.  Qty: 30 capsule, Refills: 0    Associated Diagnoses: Cough      digoxin (LANOXIN) 125 mcg tablet Take 1 tablet (0.125 mg total) by mouth once daily.  Qty: 30 tablet, Refills: 11      metoprolol tartrate 75 mg Tab Take 75 mg by mouth 2 (two) times daily.  Qty: 60 tablet, Refills: 2      traZODone (DESYREL) 100 MG tablet Take 1 tablet (100 mg total) by mouth nightly as needed for Insomnia.  Qty: 30 tablet, Refills: 2         CONTINUE these medications which have NOT CHANGED    Details   acetaminophen (TYLENOL) 325 MG tablet Take 325 mg by mouth every 6 (six) hours as needed for Pain.      alpha lipoic acid 600 mg Cap Take 600 mg by mouth Daily.  Qty: 100 each, Refills: 12      aspirin 81 mg Tab Take 81 mg by mouth once daily.       blood sugar diagnostic (BLOOD GLUCOSE TEST) Strp 1 strip by Misc.(Non-Drug; Combo Route) route once daily. Currently using Nova max plus meter.  Qty: 50 strip, Refills: 11      CINNAMON BARK (CINNAMON ORAL) Take 1,000 mg by mouth 2 (two) times daily.      docusate sodium (COLACE) 100 MG capsule Take 100 mg by mouth. 1 Capsule Oral Every day      simvastatin (ZOCOR) 20 MG tablet TAKE 1 TABLET EVERY EVENING  Qty: 90 tablet, Refills: 0      triamterene-hydrochlorothiazide 37.5-25 mg (DYAZIDE) 37.5-25 mg per capsule Take 1 capsule by mouth once daily.  Qty: 90 capsule, Refills: 1    Associated Diagnoses: Benign hypertensive heart disease with congestive heart failure      warfarin (COUMADIN) 2 MG tablet Take 2 pills by mouth daily or as directed per the [facility]  Qty:  175 tablet, Refills: 3    Associated Diagnoses: Paroxysmal atrial fibrillation      mupirocin (BACTROBAN) 2 % ointment Apply topically as needed.      triamcinolone acetonide 0.025% (KENALOG) 0.025 % cream Use bid as needed.  Qty: 80 g, Refills: 1    Associated Diagnoses: Rash         STOP taking these medications       furosemide (LASIX) 20 MG tablet Comments:   Reason for Stopping:               I certify that this patient is confined to her home and needs intermittent skilled nursing care and physical therapy.      María Elena Euceda NP  Department of Hospital Medicine   [facility]

## 2017-11-12 NOTE — PLAN OF CARE
Problem: Patient Care Overview  Goal: Plan of Care Review  Outcome: Ongoing (interventions implemented as appropriate)  Patient remained free from falls/injury throughout shift. No complaints of chest pain, SOB, or discomfort. VSS. Patient given PO bumex and UO has been adequate. Patient assisted with getting up to the bedside commode. Plan of care reviewed with patient and daughter. Plan is for possible discharge with home health tomorrow. All questions encouraged and answered. Patient and daughter verbalized understanding. Will continue to monitor.

## 2017-11-12 NOTE — ASSESSMENT & PLAN NOTE
-cough has much improved/resolved since admission >>> tessalon pearls for comfort  -weaned off oxygen, shortness of breath resolved  -IS and flutter encouraged at home

## 2017-11-12 NOTE — DISCHARGE SUMMARY
[facility] Hospital Medicine  Discharge Summary      Patient Name: Adela Garay  MRN: [id number]  Admission Date: 10/30/2017  Hospital Length of Stay: 13 days  Discharge Date and Time:  11/12/2017 1:02 PM  Attending Physician: Susannah aCmpbell MD   Discharging Provider: María Elena Euceda NP  Primary Care Provider: Torrie Farrell MD  [facility] Medicine Team: [facility] [place] [facility]  María Elena Euceda NP    HPI:   Ms. Garay is a 89 year old  woman with past medical history of HTN, HLD, DM, aFib, HFpEF, and recent TAVR complicated by PPM placement for CHB (Portico Valve 29mm ) on 10/17/17 for severe AS who presents to the ED with complaints of leg swelling. She states that she has been having issues since she went home after the surgery. She endorses orthopnea, ESTRADA, new cough, leg swelling, and weight gain. Denies issues like this before. Denies Fever. Denies chest pain.     While in ED, chemistry was unremarkable, BNP elevated at 918, troponin negative and CBC stable. CXR with bilateral pleural effusion larger on the right side and atelectatic changes at the lung bases; EKG with atrial fibrillation with paced ventricular rhythm.    The patient was admitted to the Hospital Medicine Service for further evaluation and management.     * No surgery found *      Hospital Course:   Ms. Garay was admitted 10/30 s/p recent TAVR and PPM with acute on chronic diastolic heart failure and edema. She was evaluated by cardiology in ED and assessed by TAVR team in house. On admission her BNP was elevated at 918 and CXR with bilateral pleural effusion larger on the right side and atelectatic changes at the lung bases, this noted to be worsened from prior exam 2 weeks ago. Her diuresis was initiated with lasix 40 mg IVP BID, and titrated up to 60 mg IVP TID with occasional metolazone booster for improved diuresis. Repeat CXR with continued pulmonary edema/pleural fluid and repeat echo 11/7 with markedly increased  central venous pressure, RAP of 15, and trivial pericardial effusion, with chemistry continued towards contraction alkalosis thus metolazone was discontinued and lasix IVP decreased to BID, responded appropriately and chemistry somewhat improved, admission symptoms were also resolving >>> then transitioned to PO lasix dosing, poor response, thus transitioned to bumex, to which she responded very well. She thus far has diuresed with net negative ~18 liters as of current and weight down 16 lbs since admission, she remains at 136-138 lbs, which is likely her new dry weight.  She is no longer oxygen dependant, is ambulating independently without ESTRADA, and all SOB has resolved.     On 11/5 she developed a 101.9 fever and R knee pain.  Ortho was consulted, arthrocentesis performed and resulted in bloody fluid for analysis, however the specimen clotted so cell count not obtained. The bloody tap could be because of OAC with warfarin, however she did have a right septic knee a year ago which require surgical washout. Blood cultures and right knee fluid cultures with NGTD, continued ceftriaxone coverage for appropriate course and discontinued vanc. She developed glossitis most likely d/t ABX, continue nystatin and magic mouthwash as needed, now reports symptoms resolved.      Disposition plans: Home with home health and family care, educated at great length in regards to low sodium diet and fluid restriction. She will also be monitored with digital heart failure program. She follows outpt with home INR and warfarin clinic. Home health to draw labs in one week and report to TAVR team and/or primary cardiologist, she will need repeat echo in approximately 3 months, or sooner if deemed appropriate by TAVR team. Outpt follows already scheduled with primary Cardiologist, TAVR team, and PCP.      Consults:   Consults         Status Ordering Provider     Inpatient consult to Cardiology  Once     Provider:  (Not yet assigned)     Completed RAUL KELLER     Inpatient consult to Interventional Cardiology  Once     Provider:  (Not yet assigned)    Completed ABILIO EMERSON     Inpatient consult to Orthopedic Surgery  Once     Provider:  (Not yet assigned)    Completed ALEJANDRO TEIXEIRA     IP consult to dietary  Once     Provider:  (Not yet assigned)    Completed MARIA LUISA WEAVER          * Acute on chronic diastolic heart failure    -on admission BNP elevated at 918, troponin negative, CBC/CMP stable, CXR with bilateral pleural effusion larger on the right side and atelectatic changes at the lung bases, appears to have evolved over that past 2-3 weeks as CXR mid October noted to be much clearer   -BNP now down trending ~500  -on arrival EKG with atrial fibrillation with paced ventricular rhythm  -Cardiology evaluated in ED, Interventional TAVR team evaluated in house >>> will see outpt as scheduled  -admission 2D echo EF 50% mod MR, severe TR, PAP 40, RAP 15  -repeat echo 11/7 with markedly increased CVP, RAP of 15, and trivial pericardial effusion  -repeat CXR 11/7 with continued pulmonary edema/pleural fluid, CXR 11/10 with same  -chemistry continued with contraction alkalosis >>> transitioned to PO lasix, poor response, transitioned to bumex, responded well  -ESTRADA, SOB, BLE edema, and cough have all resolved  -diuresed ~16 liters since admission, weight down down 16 lbs, currently 136-138 lbs and holding steady, likely her new dry weight  -continue home BB and decreased digoxin due to elevated level >>> repeat 0.8  -resume hold triamterene/HCTZ   -no events on tele over hospital course  -outpt follow up with TAVR team post discharge  -educated on low sodium diet with fluid restriction >>> amenable to compliance now that she and daughter have better understanding  -home health for PT, will participate in digital heart failure program        Complete heart block, post-surgical    -st christian device placed post TAVR  -pacer  programming and wound check done at bedside   -device/lead wnl   -Merlin home monitor device issued with instructions per arrhythmia tech  -outpt follow up in clinic in 3 mos with Dr. Delgado        S/P TAVR (transcatheter aortic valve replacement)    -see above  -TAVR team evaluated in house, will follow up outpt as scheduled        Atrial fibrillation    -rate controlled and on warfarin   -INR 2.0 today, 6 mg warfarin evening of discharge, then resume home dose  -dig level 1.2 on admission, repeat 0.8, continue lower dose dig  -will return to home monitoring of INR via coumadin clinic upon discharge >>> will notify of departure        Long-term (current) use of anticoagulants, INR goal 2.0-3.0    -as above        Non-productive cough-resolved as of 11/12/2017    -cough has much improved/resolved since admission >>> tessalon pearls for comfort  -weaned off oxygen, shortness of breath resolved  -IS and flutter encouraged at home        Coronary artery disease involving native coronary artery of native heart without angina pectoris    -chest pain free over hospital course, admission EKG without acute ischemic changes  -no events on tele   -continue ASA, BB, and statin         Benign hypertensive heart disease with congestive heart failure    -BP mostly controlled in house, goal -150  -continue increased BB and decreased digoxin (dig level 1.2)  -resume triamterene/HCTZ           Hyperlipidemia    -continue statin         Controlled type 2 diabetes mellitus with microalbuminuria    -HgA1C 5.6 last in May 6.0  -diet controlled  -cardiac/diabetic diet encouraged        DDD (degenerative disc disease), cervical    -PRN tylenol and xanaflex        Mild major depression    -continue increased trazodone  -no SI/HI        Hx-TIA (transient ischemic attack)    -continue ASA        Acute glossitis-resolved as of 11/12/2017    -symptoms resolved per patient   -most likely d/t recent addition of ABX >>> now discontinued         Right knee pain-resolved as of 11/12/2017    -pain and swelling resolved, ROM WNL  -bloody effusion arthrocentesis per Ortho 11/5  -right knee cultures with NGTD, discontinued ceftraxione after 5 day course  -discontinued vanc as blood cultures are negative  -WBC WNL and no further fevers  -unable to ascertain cell count due to clot within synovial fluid          Final Active Diagnoses:    Diagnosis Date Noted POA    PRINCIPAL PROBLEM:  Acute on chronic diastolic heart failure [I50.33] 10/31/2017 Yes    Complete heart block, post-surgical [I44.2, Z98.890] 10/31/2017 No    S/P TAVR (transcatheter aortic valve replacement) [Z95.2] 10/17/2017 Not Applicable    Atrial fibrillation [I48.91]  Yes    Long-term (current) use of anticoagulants, INR goal 2.0-3.0 [Z79.01] 09/19/2012 Not Applicable    Coronary artery disease involving native coronary artery of native heart without angina pectoris [I25.10] 08/22/2017 Yes    Benign hypertensive heart disease with congestive heart failure [I11.0, I50.9] 11/01/2012 Yes    Hyperlipidemia [E78.5]  Yes    Controlled type 2 diabetes mellitus with microalbuminuria [E11.29, R80.9]  Yes    DDD (degenerative disc disease), cervical [M50.30] 11/21/2013 Yes    Mild major depression [F32.0] 08/18/2014 Yes    Hx-TIA (transient ischemic attack) [Z86.73]  Not Applicable      Problems Resolved During this Admission:    Diagnosis Date Noted Date Resolved POA    Non-productive cough [R05] 10/30/2017 11/12/2017 Unknown    Acute glossitis 11/06/2017 11/12/2017 Unknown    Right knee pain [M25.561] 11/05/2017 11/12/2017 Yes       Discharged Condition: good    Disposition: Home or Self Care    Follow Up:  Follow-up Information     Cesario Campbell MD. Schedule an appointment as soon as possible for a visit in 2 weeks.    Specialties:  Cardiology, INTERVENTIONAL CARDIOLOGY  Why:  post TAVR and heart failure hospitalization follow up   Contact information:  [address]  [facility] [address]  [phone]             Ranulfo Delgado MD In 3 months.    Specialties:  Electrophysiology, Cardiovascular Disease  Why:  post pacemaker insertion follow up   Contact information:  [address]  [facility] [address]  [phone]                 Patient Instructions:     CBC auto differential   Standing Status: Future  Standing Exp. Date: 01/11/19     Comprehensive metabolic panel   Standing Status: Future  Standing Exp. Date: 01/11/19     MAGNESIUM   Standing Status: Future  Standing Exp. Date: 01/11/19     DIGOXIN LEVEL   Standing Status: Future  Standing Exp. Date: 01/11/19     Diet general   Order Specific Question Answer Comments   Fluid restriction: Fluid - 1500mL    Additional restrictions: Cardiac (Low Na/Chol)    Additional restrictions: Low Sodium,2gm      Activity as tolerated     Call MD for:  temperature >100.4     Call MD for:  persistent nausea and vomiting or diarrhea     Call MD for:  severe uncontrolled pain     Call MD for:  redness, tenderness, or signs of infection (pain, swelling, redness, odor or green/yellow discharge around incision site)     Call MD for:  difficulty breathing or increased cough     Call MD for:  severe persistent headache     Call MD for:  persistent dizziness, light-headedness, or visual disturbances     Call MD for:  increased confusion or weakness     Review of Systems   Constitutional: Negative for activity change, appetite change, chills, diaphoresis, fatigue (improving) and fever.   HENT: Negative for congestion, mouth sores, sore throat and trouble swallowing.    Respiratory: Negative for cough, chest tightness, shortness of breath and wheezing.    Cardiovascular: Positive for leg swelling (much improved, R > L). Negative for chest pain and palpitations.   Gastrointestinal: Negative for abdominal distention, abdominal pain, constipation, diarrhea, nausea and vomiting.   Genitourinary: Negative for decreased urine volume, difficulty urinating,  dysuria, enuresis and urgency.   Musculoskeletal: Negative for arthralgias, back pain, joint swelling (resolved) and myalgias.   Neurological: Positive for weakness (resolving). Negative for dizziness, syncope, light-headedness and headaches.     Physical Exam   Constitutional: She is oriented to person, place, and time. She appears well-developed and well-nourished.   HENT:   Head: Normocephalic and atraumatic.   Right Ear: External ear normal.   Left Ear: External ear normal.   Nose: Nose normal.   Mouth/Throat: Mucous membranes are normal. Normal dentition.   Eyes: Conjunctivae, EOM and lids are normal.   Neck: Normal range of motion. Neck supple. No JVD (+TR jet) present.   Cardiovascular: Normal rate, regular rhythm and intact distal pulses.  Exam reveals no gallop and no friction rub.    No murmur heard.  Pulmonary/Chest: Effort normal. She has rhonchi in the left lower field. She has no rales. She exhibits no tenderness.   Abdominal: Soft. Bowel sounds are normal. She exhibits no distension. There is no tenderness.   Musculoskeletal: Normal range of motion. She exhibits edema (1+ BLE improving). She exhibits no deformity.        Right knee: She exhibits normal range of motion and no swelling.   Neurological: She is alert and oriented to person, place, and time. No cranial nerve deficit or sensory deficit. She exhibits normal muscle tone. Coordination normal.   Skin: Skin is warm and dry. Capillary refill takes less than 2 seconds. No rash noted. No erythema.   Psychiatric: She has a normal mood and affect. Her behavior is normal. Judgment and thought content normal.   Nursing note and vitals reviewed.    Significant Diagnostic Studies: Labs:   BMP:   Recent Labs  Lab 11/11/17  0338 11/12/17  0513   GLU 99 110    138   K 3.6 3.1*   CL 91* 92*   CO2 36* 37*   BUN 24* 20   CREATININE 0.9 0.9   CALCIUM 9.7 9.7   MG 2.0 2.0   , CBC   Recent Labs  Lab 11/11/17  1211   WBC 8.37   HGB 10.5*   HCT 33.7*   PLT  197    and All labs within the past 24 hours have been reviewed    Pending Diagnostic Studies:     None         Medications:  Reconciled Home Medications:   Current Discharge Medication List      START taking these medications    Details   bumetanide (BUMEX) 2 MG tablet Take 1 tablet (2 mg total) by mouth 2 (two) times daily.  Qty: 60 tablet, Refills: 11      tiZANidine (ZANAFLEX) 2 MG tablet Take 1 tablet (2 mg total) by mouth every 6 (six) hours as needed.  Qty: 30 tablet, Refills: 0         CONTINUE these medications which have CHANGED    Details   benzonatate (TESSALON) 100 MG capsule Take 1 capsule (100 mg total) by mouth 3 (three) times daily as needed for Cough.  Qty: 30 capsule, Refills: 0    Associated Diagnoses: Cough      digoxin (LANOXIN) 125 mcg tablet Take 1 tablet (0.125 mg total) by mouth once daily.  Qty: 30 tablet, Refills: 11      metoprolol tartrate 75 mg Tab Take 75 mg by mouth 2 (two) times daily.  Qty: 60 tablet, Refills: 2      traZODone (DESYREL) 100 MG tablet Take 1 tablet (100 mg total) by mouth nightly as needed for Insomnia.  Qty: 30 tablet, Refills: 2         CONTINUE these medications which have NOT CHANGED    Details   acetaminophen (TYLENOL) 325 MG tablet Take 325 mg by mouth every 6 (six) hours as needed for Pain.      alpha lipoic acid 600 mg Cap Take 600 mg by mouth Daily.  Qty: 100 each, Refills: 12      aspirin 81 mg Tab Take 81 mg by mouth once daily.       blood sugar diagnostic (BLOOD GLUCOSE TEST) Strp 1 strip by Misc.(Non-Drug; Combo Route) route once daily. Currently using Nova max plus meter.  Qty: 50 strip, Refills: 11      CINNAMON BARK (CINNAMON ORAL) Take 1,000 mg by mouth 2 (two) times daily.      docusate sodium (COLACE) 100 MG capsule Take 100 mg by mouth. 1 Capsule Oral Every day      simvastatin (ZOCOR) 20 MG tablet TAKE 1 TABLET EVERY EVENING  Qty: 90 tablet, Refills: 0      triamterene-hydrochlorothiazide 37.5-25 mg (DYAZIDE) 37.5-25 mg per capsule Take 1  capsule by mouth once daily.  Qty: 90 capsule, Refills: 1    Associated Diagnoses: Benign hypertensive heart disease with congestive heart failure      warfarin (COUMADIN) 2 MG tablet Take 2 pills by mouth daily or as directed per the Coumadin Clinic  Qty: 175 tablet, Refills: 3    Associated Diagnoses: Paroxysmal atrial fibrillation      mupirocin (BACTROBAN) 2 % ointment Apply topically as needed.      triamcinolone acetonide 0.025% (KENALOG) 0.025 % cream Use bid as needed.  Qty: 80 g, Refills: 1    Associated Diagnoses: Rash         STOP taking these medications       furosemide (LASIX) 20 MG tablet Comments:   Reason for Stopping:               Indwelling Lines/Drains at time of discharge:   Lines/Drains/Airways     Drain                 Drain/Device  11/19/16 0927 Right knee collapsible closed device 358 days                Time spent on the discharge of patient: 45 minutes  Patient was seen and examined on the date of discharge and determined to be suitable for discharge.         María Elena Euceda NP  Department of Hospital Medicine  [facility]

## 2017-11-12 NOTE — ASSESSMENT & PLAN NOTE
-BP mostly controlled in house, goal -150  -continue increased BB and decreased digoxin (dig level 1.2)  -resume triamterene/HCTZ

## 2017-11-13 ENCOUNTER — TELEPHONE (OUTPATIENT)
Dept: OTHER | Facility: OTHER | Age: 82
End: 2017-11-13

## 2017-11-13 LAB
BACTERIA SPEC ANAEROBE CULT: NORMAL
INR PPP: 2.1

## 2017-11-13 NOTE — PT/OT/SLP DISCHARGE
Occupational Therapy Discharge Summary    Adela Garay  MRN: [id number]   Acute on chronic diastolic heart failure   Patient Discharged from acute Occupational Therapy on 11/13/2017  .  Please refer to prior OT note dated on 11/8/17 for functional status.     Assessment:   Patient appropriate for care in another setting.  GOALS:    Occupational Therapy Goals        Problem: Occupational Therapy Goal    Goal Priority Disciplines Outcome Interventions   Occupational Therapy Goal     OT, PT/OT Ongoing (interventions implemented as appropriate)    Description:  Goals to be met by: 7 days (11/7/17)     Patient will increase functional independence with ADLs by performing:    UE Dressing with Supervision.  LE Dressing with Supervision.  Grooming while standing at sink with Supervision.  Toileting from toilet with Supervision for hygiene and clothing management.   Supine to sit with [place].  Toilet transfer to toilet with Supervision.  Pt will perform functional mobility household distance with supervision and AD as needed.                  Pt progressing toward goals, but goals not achieved prior to hospital d/c.     Reasons for Discontinuation of Therapy Services  Transfer to alternate level of care.      Plan:  Patient Discharged to: Home with Home Health Service   LAUREANO Jaeger  11/13/2017  penny

## 2017-11-13 NOTE — PLAN OF CARE
11/13/17 0704   Final Note   Assessment Type Final Discharge Note   Discharge Disposition Home-Health   Hospital Follow Up  Appt(s) scheduled? Yes

## 2017-11-13 NOTE — PROGRESS NOTES
See notes.  The pt was recently admitted.  She was discharged with bumetanide and tizanidine.  See calendar for recent INRs and warfarin doses while admitted.  She will resume her past weekly dose and will check an INR for us today.

## 2017-11-13 NOTE — PROGRESS NOTES
Patient called this am 11/13/17 to inform us that she was discharged from ( [facility]) on 11/12/17). She was sent home on warfarin (4mg) daily. Och ROGER kaplan will do labs on next visit, please advise.

## 2017-11-13 NOTE — PROGRESS NOTES
Patient was admitted after presenting to ED on 10/30 for leg swelling and CHF exacerbation, patient had right knee pain and swelling while admitted as well. See calendar for INR and coumadin doses while in hospital. Patient was d/c 11/12  on new meds bumex 2mg bid  and zanaflex 2mg every 6hrs prn and lasix was discontinued.

## 2017-11-13 NOTE — TELEPHONE ENCOUNTER
Hospital Course:   Ms. Garay was admitted 10/30 s/p recent TAVR and PPM with acute on chronic diastolic heart failure and edema. She was evaluated by cardiology in ED and assessed by TAVR team in house. On admission her BNP was elevated at 918 and CXR with bilateral pleural effusion larger on the right side and atelectatic changes at the lung bases, this noted to be worsened from prior exam 2 weeks ago. Her diuresis was initiated with lasix 40 mg IVP BID, and titrated up to 60 mg IVP TID with occasional metolazone booster for improved diuresis. Repeat CXR with continued pulmonary edema/pleural fluid and repeat echo 11/7 with markedly increased central venous pressure, RAP of 15, and trivial pericardial effusion, with chemistry continued towards contraction alkalosis thus metolazone was discontinued and lasix IVP decreased to BID, responded appropriately and chemistry somewhat improved, admission symptoms were also resolving >>> then transitioned to PO lasix dosing, poor response, thus transitioned to bumex, to which she responded very well. She thus far has diuresed with net negative ~18 liters as of current and weight down 16 lbs since admission, she remains at 136-138 lbs, which is likely her new dry weight.  She is no longer oxygen dependant, is ambulating independently without ESTRADA, and all SOB has resolved.      On 11/5 she developed a 101.9 fever and R knee pain.  Ortho was consulted, arthrocentesis performed and resulted in bloody fluid for analysis, however the specimen clotted so cell count not obtained. The bloody tap could be because of OAC with warfarin, however she did have a right septic knee a year ago which require surgical washout. Blood cultures and right knee fluid cultures with NGTD, continued ceftriaxone coverage for appropriate course and discontinued vanc. She developed glossitis most likely d/t ABX, continue nystatin and magic mouthwash as needed, now reports symptoms resolved.        Disposition plans: Home with home health and family care, educated at great length in regards to low sodium diet and fluid restriction. She will also be monitored with digital heart failure program. She follows outpt with home INR and warfarin clinic. Home health to draw labs in one week and report to TAVR team and/or primary cardiologist, she will need repeat echo in approximately 3 months, or sooner if deemed appropriate by TAVR team. Outpt follows already scheduled with primary Cardiologist, TAVR team, and PCP.      Mrs Garay has been enrolled in the DMF program.  She reports feeling well today.  Denies any s/s of dehydration today with her lower weight today.  Reports a drop in U/O at home vs IV diuresis.  Urinating 5-6 x nightly in the hospital, now 2-3 times overnight.  Explained that this is likely due to drug absorption.  She denies any return on fluid this morning.  We discussed her weight drop, the scale differences from hospital to home and the need to establish a weight trend before we change any diuretics.  She has made it clear that she would like to remove the bumetanide from her med list sooner than later.  She wants to control her symptoms with Triamterne/HCTZ on which she still remains.  She is very in tune with her medication list.  We have reviewed her HF medications including digoxin and metoprolol doses that have been adjusted.  We discussed the reason for lowering her digoxin dose.  She will also inquire about this change at her cardiology follow-up.  She lives at home and that is the best number to reach her or one of her caregivers.  We discussed fluid intake which I asked her meet on a daily basis (1500 ml) and her son completely understands the changes needed regarding her sodium intake and her daily 1500 mg sodium restriction.  He has taken down our contact information today and understands to call with any changes or concerns.  We again reviewed the s/s of dehydration.  I have  asked her and her son to obtain a BP machine for additional monitoring.  We will check back in early this week or sooner if there continues to be aweight decline.  Weight: 59.9 kg (132 lb) (11/13/2017  7:22 AM)

## 2017-11-13 NOTE — PT/OT/SLP DISCHARGE
Physical Therapy Discharge Summary    Adela Garay  MRN: [id number]   Acute on chronic diastolic heart failure   Patient Discharged from acute Physical Therapy on 17.  Please refer to prior PT noted date on 11/10/17 for functional status.     Assessment:   Patient appropriate for care in another setting.  GOALS:    Physical Therapy Goals        Problem: Physical Therapy Goal    Goal Priority Disciplines Outcome Goal Variances Interventions   Physical Therapy Goal     PT/OT, PT Ongoing (interventions implemented as appropriate)     Description:  Goals to be met by: 2017    Patient will increase functional independence with mobility by performin. Sit to stand transfer with Modified [place] using single point cane - not met  2. Gait  x 400 feet with Modified [place] using Single-point Cane . - not met  3. Lower extremity exercise program x15 reps per handout, with independence - not met                     Reasons for Discontinuation of Therapy Services  Transfer to alternate level of care.      Plan:  Patient Discharged to: Home with Home Health Service.    Laina Garay, PT, DPT   2017  [phone]

## 2017-11-14 ENCOUNTER — ANTI-COAG VISIT (OUTPATIENT)
Dept: CARDIOLOGY | Facility: CLINIC | Age: 82
End: 2017-11-14

## 2017-11-14 ENCOUNTER — TELEPHONE (OUTPATIENT)
Dept: OTHER | Facility: OTHER | Age: 82
End: 2017-11-14

## 2017-11-14 DIAGNOSIS — Z79.01 LONG-TERM (CURRENT) USE OF ANTICOAGULANTS, INR GOAL 2.0-3.0: ICD-10-CM

## 2017-11-14 DIAGNOSIS — Z86.73 HX-TIA (TRANSIENT ISCHEMIC ATTACK): ICD-10-CM

## 2017-11-14 NOTE — TELEPHONE ENCOUNTER
Mrs Garay has lost an additional 1.5 lbs today.  She is now approximately 6 lbs lower that her noted discharge weight.  She will reduce her bumetanide dose by eliminating her evening dose.  She will begin Bumex 2 mg QD unless there is a weight change or any report of symptom return.  I am concerned with her renal function and previous development of contraction alkalosis in the hospital after metolazone dosing.  I have again reviewed the s/s of dehydration and acute HF exacerbation.  Her son was very vigilant with her diet yesterday (1100 mg of sodium per his calculation) and he had her meet the 1500 ml fluid requirement.  She also remains on dyazide per d/c instructions.  She denies any PND, orthopnea, SOB or YUNIEL.  She has ahd steady output, reports nocturia and would appreciate removing the evening dose of bumetanide.  She understands as well as her son to please call at any point for CHF symptoms regardless of her weight.  Weight: 59.2 kg (130 lb 9.6 oz) (11/14/2017  7:52 AM)

## 2017-11-16 ENCOUNTER — DOCUMENTATION ONLY (OUTPATIENT)
Dept: CARDIAC CATH/INVASIVE PROCEDURES | Facility: HOSPITAL | Age: 82
End: 2017-11-16

## 2017-11-16 NOTE — PROGRESS NOTES
" Ms. Garay is a 89 year old  woman with past medical history of HTN, HLD, DM, aFib (on coumadin), HFpEF, and most recently s/p TAVR (RTF 29 Portico) 10/17/2017 with CHB following procedure (pre-existing RBBB) requiring PPM placement who presents to the ED with complaints of leg swelling. She states that she had been having "issues" since she went home after the surgery. She c/o orthopnea, ESTRADA, new cough, leg swelling, and weight gain. She denied chest pain. She had not been taking Lasix as prescribed as it "is not working". She had been sleeping in her recliner due to back spasms/pain when lying supine. Patient and family called [facility] many times since discharge regarding a myriad of complaints. Pt called our office 10/30 with the above complaints and was advised to present to ED for further evaluation and treatment.      While in ED, chemistry was unremarkable, BNP elevated at 918, troponin negative and CBC stable. CXR with bilateral pleural effusion larger on the right side and atelectatic changes at the lung bases; EKG with atrial fibrillation with paced ventricular rhythm. The patient was admitted to the Hospital Medicine Service for further evaluation and management.     She was diuresed throughout her hospitalization with improvement in SOB. She was discharged to home 11/12 on Bumex 2mg BID with ESTRADA, SOB and bilateraly LE edema all resolved. As we saw and followed the patient throughout her hospitalization 10/31-11/12 and performed all required follow-up tests we will not bring her back for previously scheduled clinic appointment next week.     NYHA Class II sx, CCS Class I    Aparna Yoo NP  Interventional Cardiology   "

## 2017-11-17 ENCOUNTER — TELEPHONE (OUTPATIENT)
Dept: OTHER | Facility: OTHER | Age: 82
End: 2017-11-17

## 2017-11-17 NOTE — TELEPHONE ENCOUNTER
"Ms. Chapman's weight is down 2lbs even after lowering her Bumex dose 2 days ago.  She denies LH, dizziness or near syncope but does admit that she is not "back to herself yet'.  She denies any symptoms related to fluid imbalance and feels that her weight may be lower b/c of her dietary changes alone.  In reviewing her diet, we found she was not meeting her daily liquid intake goal and was barely taking in 36oz of fluid in a day.  I discussed her goal of 50oz fluid intake with she and her daughter in law and she will focus on correcting this today.  I instructed her to hold her bumex tomorrow morning if she notices another weight drop and that we will follow up with her tomorrow if that occurs.  She confirms making all of her other medication changes and feels that her palpitations have gotten under control with the higher metoprolol dose.  She was encouraged to call with any questions or concerns and contact information was provided. [facility] is scheduled to see her and obtain labs on Monday.  Weight: 58.2 kg (128 lb 3.2 oz) (11/17/2017  8:18 AM)    "

## 2017-11-18 ENCOUNTER — TELEPHONE (OUTPATIENT)
Dept: OTHER | Facility: OTHER | Age: 82
End: 2017-11-18

## 2017-11-18 NOTE — TELEPHONE ENCOUNTER
Ms. Garay's weight has returned to baseline today. Called to verify that she will take her regularly scheduled dose of bumex 2 mg QD today. [facility] on both home and mobile numbers.     Weight: 59.1 kg (130 lb 6.4 oz) (11/18/2017  7:54 AM)

## 2017-11-18 NOTE — TELEPHONE ENCOUNTER
"Ms. Garay has returned my call along with her daughter in law, Dolly. She has not taken any Bumex this morning as she was awaiting our instructions. Instructed Ms. Garay to continue taking Bumex 2 mg QD going forward. She says she slept well overnight and has no complaints of SOB or YUNIEL today. Dolly said they have "worked out a system" to ensure Ms. Garay is getting adequate fluids each day. She did meet the 50 oz recommendation yesterday. Dolly asks if her "goal weight" should be around 130 lbs. I confirmed that 130 lbs seems to be a stable weight for Ms. Garay based on symptoms and Bumex dose to date. We will reassess this once she sees Dr. Ivy and has labs done this week.     Weight: 59.1 kg (130 lb 6.4 oz) (11/18/2017  7:54 AM)    "

## 2017-11-20 ENCOUNTER — ANTI-COAG VISIT (OUTPATIENT)
Dept: CARDIOLOGY | Facility: CLINIC | Age: 82
End: 2017-11-20

## 2017-11-20 ENCOUNTER — LAB VISIT (OUTPATIENT)
Dept: LAB | Facility: HOSPITAL | Age: 82
End: 2017-11-20
Attending: INTERNAL MEDICINE
Payer: MEDICARE

## 2017-11-20 DIAGNOSIS — Z79.01 LONG-TERM (CURRENT) USE OF ANTICOAGULANTS, INR GOAL 2.0-3.0: ICD-10-CM

## 2017-11-20 DIAGNOSIS — I50.33 ACUTE ON CHRONIC DIASTOLIC HEART FAILURE: Primary | ICD-10-CM

## 2017-11-20 DIAGNOSIS — Z86.73 HX-TIA (TRANSIENT ISCHEMIC ATTACK): ICD-10-CM

## 2017-11-20 LAB
ALBUMIN SERPL BCP-MCNC: 4 G/DL
ALP SERPL-CCNC: 83 U/L
ALT SERPL W/O P-5'-P-CCNC: 17 U/L
ANION GAP SERPL CALC-SCNC: 11 MMOL/L
AST SERPL-CCNC: 26 U/L
BASOPHILS # BLD AUTO: 0.05 K/UL
BASOPHILS NFR BLD: 0.4 %
BILIRUB SERPL-MCNC: 1.1 MG/DL
BUN SERPL-MCNC: 24 MG/DL
CALCIUM SERPL-MCNC: 10.3 MG/DL
CHLORIDE SERPL-SCNC: 88 MMOL/L
CO2 SERPL-SCNC: 38 MMOL/L
CREAT SERPL-MCNC: 0.8 MG/DL
DIFFERENTIAL METHOD: ABNORMAL
DIGOXIN SERPL-MCNC: 0.4 NG/ML
EOSINOPHIL # BLD AUTO: 0.1 K/UL
EOSINOPHIL NFR BLD: 0.8 %
ERYTHROCYTE [DISTWIDTH] IN BLOOD BY AUTOMATED COUNT: 14.6 %
EST. GFR  (AFRICAN AMERICAN): >60 ML/MIN/1.73 M^2
EST. GFR  (NON AFRICAN AMERICAN): >60 ML/MIN/1.73 M^2
GLUCOSE SERPL-MCNC: 96 MG/DL
HCT VFR BLD AUTO: 37.7 %
HGB BLD-MCNC: 12.1 G/DL
INR PPP: 2.6
LYMPHOCYTES # BLD AUTO: 1.3 K/UL
LYMPHOCYTES NFR BLD: 10.5 %
MAGNESIUM SERPL-MCNC: 2.3 MG/DL
MCH RBC QN AUTO: 29.1 PG
MCHC RBC AUTO-ENTMCNC: 32.1 G/DL
MCV RBC AUTO: 91 FL
MONOCYTES # BLD AUTO: 2.1 K/UL
MONOCYTES NFR BLD: 17.6 %
NEUTROPHILS # BLD AUTO: 8.4 K/UL
NEUTROPHILS NFR BLD: 70.7 %
PLATELET # BLD AUTO: 352 K/UL
PMV BLD AUTO: 9.9 FL
POTASSIUM SERPL-SCNC: 3.1 MMOL/L
PROT SERPL-MCNC: 8.6 G/DL
RBC # BLD AUTO: 4.16 M/UL
SODIUM SERPL-SCNC: 137 MMOL/L
WBC # BLD AUTO: 11.86 K/UL

## 2017-11-20 PROCEDURE — 85025 COMPLETE CBC W/AUTO DIFF WBC: CPT

## 2017-11-20 PROCEDURE — 80162 ASSAY OF DIGOXIN TOTAL: CPT

## 2017-11-20 PROCEDURE — 36415 COLL VENOUS BLD VENIPUNCTURE: CPT

## 2017-11-20 PROCEDURE — 80053 COMPREHEN METABOLIC PANEL: CPT

## 2017-11-20 PROCEDURE — 83735 ASSAY OF MAGNESIUM: CPT

## 2017-11-21 ENCOUNTER — TELEPHONE (OUTPATIENT)
Dept: OTHER | Facility: OTHER | Age: 82
End: 2017-11-21

## 2017-11-21 ENCOUNTER — OFFICE VISIT (OUTPATIENT)
Dept: CARDIOLOGY | Facility: CLINIC | Age: 82
End: 2017-11-21
Payer: MEDICARE

## 2017-11-21 VITALS
HEIGHT: 65 IN | HEART RATE: 68 BPM | DIASTOLIC BLOOD PRESSURE: 68 MMHG | WEIGHT: 131.81 LBS | BODY MASS INDEX: 21.96 KG/M2 | SYSTOLIC BLOOD PRESSURE: 146 MMHG | OXYGEN SATURATION: 94 %

## 2017-11-21 DIAGNOSIS — I97.89 COMPLETE HEART BLOCK, POST-SURGICAL: ICD-10-CM

## 2017-11-21 DIAGNOSIS — E78.5 HYPERLIPIDEMIA, UNSPECIFIED HYPERLIPIDEMIA TYPE: ICD-10-CM

## 2017-11-21 DIAGNOSIS — Z95.2 S/P TAVR (TRANSCATHETER AORTIC VALVE REPLACEMENT): ICD-10-CM

## 2017-11-21 DIAGNOSIS — Z95.0 PACEMAKER: ICD-10-CM

## 2017-11-21 DIAGNOSIS — I35.0 SEVERE AORTIC STENOSIS BY PRIOR ECHOCARDIOGRAM: ICD-10-CM

## 2017-11-21 DIAGNOSIS — I44.2 COMPLETE HEART BLOCK, POST-SURGICAL: ICD-10-CM

## 2017-11-21 DIAGNOSIS — I25.10 CORONARY ARTERY DISEASE INVOLVING NATIVE CORONARY ARTERY OF NATIVE HEART WITHOUT ANGINA PECTORIS: ICD-10-CM

## 2017-11-21 DIAGNOSIS — I27.20 PULMONARY HYPERTENSION: Primary | ICD-10-CM

## 2017-11-21 PROCEDURE — 99214 OFFICE O/P EST MOD 30 MIN: CPT | Mod: PBBFAC | Performed by: INTERNAL MEDICINE

## 2017-11-21 PROCEDURE — 99999 PR PBB SHADOW E&M-EST. PATIENT-LVL IV: CPT | Mod: PBBFAC,,, | Performed by: INTERNAL MEDICINE

## 2017-11-21 PROCEDURE — 99214 OFFICE O/P EST MOD 30 MIN: CPT | Mod: S$PBB,,, | Performed by: INTERNAL MEDICINE

## 2017-11-21 RX ORDER — POTASSIUM CHLORIDE 750 MG/1
10 TABLET, EXTENDED RELEASE ORAL DAILY
Qty: 30 TABLET | Refills: 11 | Status: SHIPPED | OUTPATIENT
Start: 2017-11-21 | End: 2017-11-22 | Stop reason: SDUPTHER

## 2017-11-21 NOTE — TELEPHONE ENCOUNTER
Mrs Garay's weight has stabilized.  She has had no change in HF symptoms today.  We reviewed her lab work together, noting the lower k.  She does not have KCL supplements at home.  I have reviewed foods high in K and asked her to eat some of those items this morning.  Digoxin level lower range after dose reduction.  She is concerned about this and will review with Dr Iyv today.  Renal function is stable.  She remains on bumetanide 2 mg QD, a lower dose adjusted by the DMHF program.  She wishes to do away with bumetanide altogether.  We will review notes and recommendations from clinic today and contact her regarding any changes.  Weight: 58.6 kg (129 lb 3.2 oz) (11/21/2017  7:54 AM)

## 2017-11-21 NOTE — PROGRESS NOTES
Subjective:    Patient ID:  Adela Garay is a 89 y.o. female who presents for follow-up of Valvular Heart Disease      HPI     Severe AS - s/p TAVR 10/17/17, complete heart block - [facility] single PPM 10/17/17  No CAD by [facility] 8/22/17  Chronic A-fib - rate controlled on coumadin, HTN, DM, Hx TIA    Re-admitted after TAVR with CHF 10/30-11/12/17    Ms. Garay is a 89 year old  woman with past medical history of HTN, HLD, DM, aFib, HFpEF, and recent TAVR complicated by PPM placement for CHB (Portico Valve 29mm ) on 10/17/17 for severe AS who presents to the ED with complaints of leg swelling. She states that she has been having issues since she went home after the surgery. She endorses orthopnea, ESTRADA, new cough, leg swelling, and weight gain. Denies issues like this before. Denies Fever. Denies chest pain.      While in ED, chemistry was unremarkable, BNP elevated at 918, troponin negative and CBC stable. CXR with bilateral pleural effusion larger on the right side and atelectatic changes at the lung bases; EKG with atrial fibrillation with paced ventricular rhythm.    Ms. Garay was admitted 10/30 s/p recent TAVR and PPM with acute on chronic diastolic heart failure and edema. She was evaluated by cardiology in ED and assessed by TAVR team in house. On admission her BNP was elevated at 918 and CXR with bilateral pleural effusion larger on the right side and atelectatic changes at the lung bases, this noted to be worsened from prior exam 2 weeks ago. Her diuresis was initiated with lasix 40 mg IVP BID, and titrated up to 60 mg IVP TID with occasional metolazone booster for improved diuresis. Repeat CXR with continued pulmonary edema/pleural fluid and repeat echo 11/7 with markedly increased central venous pressure, RAP of 15, and trivial pericardial effusion, with chemistry continued towards contraction alkalosis thus metolazone was discontinued and lasix IVP decreased to BID, responded appropriately and  chemistry somewhat improved, admission symptoms were also resolving >>> then transitioned to PO lasix dosing, poor response, thus transitioned to bumex, to which she responded very well. She thus far has diuresed with net negative ~18 liters as of current and weight down 16 lbs since admission, she remains at 136-138 lbs, which is likely her new dry weight.  She is no longer oxygen dependant, is ambulating independently without ESTRADA, and all SOB has resolved.      On 11/5 she developed a 101.9 fever and R knee pain.  Ortho was consulted, arthrocentesis performed and resulted in bloody fluid for analysis, however the specimen clotted so cell count not obtained. The bloody tap could be because of OAC with warfarin, however she did have a right septic knee a year ago which require surgical washout. Blood cultures and right knee fluid cultures with NGTD, continued ceftriaxone coverage for appropriate course and discontinued vanc. She developed glossitis most likely d/t ABX, continue nystatin and magic mouthwash as needed, now reports symptoms resolved.       Disposition plans: Home with home health and family care, educated at great length in regards to low sodium diet and fluid restriction. She will also be monitored with digital heart failure program. She follows outpt with home INR and warfarin clinic. Home health to draw labs in one week and report to TAVR team and/or primary cardiologist, she will need repeat echo in approximately 3 months, or sooner if deemed appropriate by TAVR team. Outpt follows already scheduled with primary Cardiologist, TAVR team, and PCP.      Echo 11/7/17    1 - Mild left ventricular enlargement.     2 - Normal left ventricular systolic function (EF 60-65%).     3 - Right ventricular enlargement with moderately depressed systolic function.     4 - Severe biatrial enlargement.     5 - Severe tricuspid regurgitation.     6 - Moderate to severe mitral regurgitation.     7 - Pulmonary  hypertension. The estimated PA systolic pressure is at least 59 mmHg.     8 - Markedly increased central venous pressure (the IVC is 37mm in diameter and does not collapse at all).     9 - Normally functioning 29mm Portico bioprosthesis in the aortic position with trivial AI    Feeling better since discharge       Review of Systems   Constitution: Negative for decreased appetite.   HENT: Negative for ear discharge.    Eyes: Negative for blurred vision.   Respiratory: Negative for hemoptysis.    Endocrine: Negative for polyphagia.   Hematologic/Lymphatic: Negative for adenopathy.   Skin: Negative for color change.   Musculoskeletal: Negative for joint swelling.   Neurological: Negative for brief paralysis.   Psychiatric/Behavioral: Negative for hallucinations.        Objective:    Physical Exam   Constitutional: She is oriented to person, place, and time. She appears well-developed and well-nourished.   HENT:   Head: Normocephalic and atraumatic.   Eyes: Pupils are equal, round, and reactive to light.   Neck: Normal range of motion. No JVD present.   Cardiovascular: Normal rate and regular rhythm.    Murmur heard.  Pulmonary/Chest: Effort normal and breath sounds normal. She has no wheezes. She has no rales.   Abdominal: Soft. Bowel sounds are normal. She exhibits no distension. There is no tenderness.   Neurological: She is alert and oriented to person, place, and time.   Skin: Skin is warm and dry.         Assessment:       1. Pulmonary hypertension    2. Hyperlipidemia, unspecified hyperlipidemia type    3. Severe aortic stenosis by prior echocardiogram    4. Coronary artery disease involving native coronary artery of native heart without angina pectoris    5. S/P TAVR (transcatheter aortic valve replacement)    6. Complete heart block, post-surgical    7. Pacemaker         Plan:       Add KCL 10 meq qd   OV 1 month with BNP, BMP             decreased strength/impaired postural control/cognition

## 2017-11-22 ENCOUNTER — TELEPHONE (OUTPATIENT)
Dept: OTHER | Facility: OTHER | Age: 82
End: 2017-11-22

## 2017-11-22 ENCOUNTER — OUTPATIENT CASE MANAGEMENT (OUTPATIENT)
Dept: ADMINISTRATIVE | Facility: OTHER | Age: 82
End: 2017-11-22

## 2017-11-22 DIAGNOSIS — E87.6 HYPOKALEMIA DUE TO LOSS OF POTASSIUM: Primary | ICD-10-CM

## 2017-11-22 RX ORDER — POTASSIUM CHLORIDE 750 MG/1
10 TABLET, EXTENDED RELEASE ORAL DAILY
Qty: 30 TABLET | Refills: 0 | Status: SHIPPED | OUTPATIENT
Start: 2017-11-22 | End: 2019-03-14 | Stop reason: SDUPTHER

## 2017-11-22 NOTE — TELEPHONE ENCOUNTER
Plan:       Add KCL 10 meq qd   OV 1 month with BNP, BMP    Mrs Garay has submitted a stable weight.  She denies any return of HF symptoms.  She states that her KCL Rx was sent to [facility].  Will send an separate Rx to [facility] for  today so that she can begin her oral KCL supplement.  No changes to bumetanide or dyazide yesterday.  Her only complaint today is shoulder pain.  She states that she thinks she slept funny last night and that it is already improving.  No changes from DMHF required today.  Weight: 58.7 kg (129 lb 6.4 oz) (11/22/2017  8:08 AM)

## 2017-11-25 ENCOUNTER — TELEPHONE (OUTPATIENT)
Dept: OTHER | Facility: OTHER | Age: 82
End: 2017-11-25

## 2017-11-25 DIAGNOSIS — I50.32 CHRONIC DIASTOLIC CONGESTIVE HEART FAILURE: Primary | ICD-10-CM

## 2017-11-25 NOTE — TELEPHONE ENCOUNTER
Mrs Garay has continued to lose weight, now down to Weight: 57.7 kg (127 lb 3.2 oz) (11/25/2017  9:38 AM) from 128.6 lbs yesterday.  Her BP yesterday was 130/60, no signs of fluid rentention from her nurse report.  She does still have a slight cough that I could hear as we spoke.  She denies any SOB or ESTRADA today.  She deneis YUNIEL.  Since we have had weight loss for the past 2 days, we will hold bumetanide today and review her weight again tomorrow.  She does report that she has a pill cutter and would be willing to cut her Bumex in half if required.  We will first determine if her dose should be altered or stopped altogether as of tomorrow.  She will expect my call in the morning.  She reports strict diet and fluid complaince.  She confirms that she is taking in 50 oz of fluid per day.  We may need to re-check labs as her KCL supplement has just been started for a K of 3.1  She remains on dyazide as well.  I will put in for lab work today(BMP) but will send at the appropriate time to [facility] or see if she will report to [place] lab when she visits podiatry (depending on her choice when we speak tomorrow).  Holding Bumex today only, continue KCL today, will review weight in am.

## 2017-11-26 ENCOUNTER — TELEPHONE (OUTPATIENT)
Dept: OTHER | Facility: OTHER | Age: 82
End: 2017-11-26

## 2017-11-26 NOTE — TELEPHONE ENCOUNTER
"Mrs Garay has returned to her baseline weight after holding bumex 2 mg yesterday.  She feels that she has "stablized."  She will reduce her bumetanide dose to 1 mg QD starting today as her weight did improve off the bumex yesterday altogether.  I explained that we are attempting to keep her balanced, no major weight loss and no weight gain.  She denies any SOB, PND or orthopnea overnight.  Still has knee pain, but no issues with swelling on her knee.  She has agreed to go to lab on Wednesday in between appointments to determine if the bumex + KCL combo should continue or if she will be able to maintain her K without a supplement and dyazide.  She will take Bumex 1 mg QD until further notice.  She will also hold her dose in the morning Monday with a larger than 1 lb weight decrease and will wait for our instruction.  We discussed using bumex PRN only if her weight declines further and she remains symptom free.  Weight: 58.2 kg (128 lb 6.4 oz) (11/26/2017  7:30 AM)    "

## 2017-11-27 ENCOUNTER — ANTI-COAG VISIT (OUTPATIENT)
Dept: CARDIOLOGY | Facility: CLINIC | Age: 82
End: 2017-11-27

## 2017-11-27 DIAGNOSIS — Z79.01 LONG-TERM (CURRENT) USE OF ANTICOAGULANTS, INR GOAL 2.0-3.0: ICD-10-CM

## 2017-11-27 DIAGNOSIS — Z86.73 HX-TIA (TRANSIENT ISCHEMIC ATTACK): ICD-10-CM

## 2017-11-27 LAB — INR PPP: 2.1

## 2017-11-28 ENCOUNTER — TELEPHONE (OUTPATIENT)
Dept: OTHER | Facility: OTHER | Age: 82
End: 2017-11-28

## 2017-11-28 NOTE — TELEPHONE ENCOUNTER
Mrs. Garay's weight continues to remain stable.  She denies LLE, SOB, abdominal bloating or SOB.  She says she is feeling stronger every day.  She confirms bumex 1mg QD but is still eager to reduce this.  Currently, she has remained very stable using this dose so we will continue it as is for now.  She understands she can call us with any questions or concerns and she is planning to attend all follow up appointments tomorrow.   Weight: 58.6 kg (129 lb 3.2 oz) (11/28/2017  7:06 AM)

## 2017-11-29 ENCOUNTER — OFFICE VISIT (OUTPATIENT)
Dept: PODIATRY | Facility: CLINIC | Age: 82
End: 2017-11-29
Payer: MEDICARE

## 2017-11-29 ENCOUNTER — LAB VISIT (OUTPATIENT)
Dept: LAB | Facility: HOSPITAL | Age: 82
End: 2017-11-29
Payer: MEDICARE

## 2017-11-29 ENCOUNTER — OFFICE VISIT (OUTPATIENT)
Dept: FAMILY MEDICINE | Facility: CLINIC | Age: 82
End: 2017-11-29
Payer: MEDICARE

## 2017-11-29 VITALS
HEART RATE: 61 BPM | SYSTOLIC BLOOD PRESSURE: 120 MMHG | HEIGHT: 65 IN | DIASTOLIC BLOOD PRESSURE: 72 MMHG | TEMPERATURE: 98 F | OXYGEN SATURATION: 97 % | WEIGHT: 132.25 LBS | BODY MASS INDEX: 22.03 KG/M2

## 2017-11-29 VITALS
BODY MASS INDEX: 21.83 KG/M2 | WEIGHT: 131 LBS | SYSTOLIC BLOOD PRESSURE: 150 MMHG | DIASTOLIC BLOOD PRESSURE: 64 MMHG | HEIGHT: 65 IN

## 2017-11-29 DIAGNOSIS — I50.32 CHRONIC DIASTOLIC CONGESTIVE HEART FAILURE: ICD-10-CM

## 2017-11-29 DIAGNOSIS — L84 PRE-ULCERATIVE CALLUSES: ICD-10-CM

## 2017-11-29 DIAGNOSIS — I11.0 BENIGN HYPERTENSIVE HEART DISEASE WITH CONGESTIVE HEART FAILURE: ICD-10-CM

## 2017-11-29 DIAGNOSIS — Z95.2 S/P AORTIC VALVE REPLACEMENT: ICD-10-CM

## 2017-11-29 DIAGNOSIS — B35.1 ONYCHOMYCOSIS DUE TO DERMATOPHYTE: ICD-10-CM

## 2017-11-29 DIAGNOSIS — I35.0 SEVERE AORTIC STENOSIS BY PRIOR ECHOCARDIOGRAM: Primary | ICD-10-CM

## 2017-11-29 DIAGNOSIS — I83.90 VARICOSITIES OF LEG: ICD-10-CM

## 2017-11-29 DIAGNOSIS — E11.40 CONTROLLED TYPE 2 DIABETES WITH NEUROPATHY: ICD-10-CM

## 2017-11-29 DIAGNOSIS — R60.0 BILATERAL LEG EDEMA: ICD-10-CM

## 2017-11-29 DIAGNOSIS — L84 CORN OR CALLUS: ICD-10-CM

## 2017-11-29 DIAGNOSIS — E87.6 HYPOKALEMIA: ICD-10-CM

## 2017-11-29 DIAGNOSIS — Z79.01 ANTICOAGULATED ON COUMADIN: ICD-10-CM

## 2017-11-29 DIAGNOSIS — R05.3 CHRONIC COUGH: ICD-10-CM

## 2017-11-29 DIAGNOSIS — G60.9 IDIOPATHIC PERIPHERAL NEUROPATHY: Primary | ICD-10-CM

## 2017-11-29 PROBLEM — I97.89 COMPLETE HEART BLOCK, POST-SURGICAL: Status: RESOLVED | Noted: 2017-10-31 | Resolved: 2017-11-29

## 2017-11-29 PROBLEM — I50.33 ACUTE ON CHRONIC DIASTOLIC HEART FAILURE: Status: RESOLVED | Noted: 2017-10-31 | Resolved: 2017-11-29

## 2017-11-29 PROBLEM — I44.2 COMPLETE HEART BLOCK, POST-SURGICAL: Status: RESOLVED | Noted: 2017-10-31 | Resolved: 2017-11-29

## 2017-11-29 LAB
ANION GAP SERPL CALC-SCNC: 10 MMOL/L
BUN SERPL-MCNC: 23 MG/DL
CALCIUM SERPL-MCNC: 9.7 MG/DL
CHLORIDE SERPL-SCNC: 91 MMOL/L
CO2 SERPL-SCNC: 36 MMOL/L
CREAT SERPL-MCNC: 0.8 MG/DL
EST. GFR  (AFRICAN AMERICAN): >60 ML/MIN/1.73 M^2
EST. GFR  (NON AFRICAN AMERICAN): >60 ML/MIN/1.73 M^2
GLUCOSE SERPL-MCNC: 104 MG/DL
POTASSIUM SERPL-SCNC: 3.6 MMOL/L
SODIUM SERPL-SCNC: 137 MMOL/L

## 2017-11-29 PROCEDURE — 99213 OFFICE O/P EST LOW 20 MIN: CPT | Mod: PBBFAC,PO | Performed by: PODIATRIST

## 2017-11-29 PROCEDURE — 11056 PARNG/CUTG B9 HYPRKR LES 2-4: CPT | Mod: Q9,PBBFAC,PO | Performed by: PODIATRIST

## 2017-11-29 PROCEDURE — 99499 UNLISTED E&M SERVICE: CPT | Mod: S$PBB,,, | Performed by: PODIATRIST

## 2017-11-29 PROCEDURE — 11721 DEBRIDE NAIL 6 OR MORE: CPT | Mod: 59,Q9,S$PBB, | Performed by: PODIATRIST

## 2017-11-29 PROCEDURE — 11056 PARNG/CUTG B9 HYPRKR LES 2-4: CPT | Mod: Q9,S$PBB,, | Performed by: PODIATRIST

## 2017-11-29 PROCEDURE — 99999 PR PBB SHADOW E&M-EST. PATIENT-LVL III: CPT | Mod: PBBFAC,,, | Performed by: PODIATRIST

## 2017-11-29 PROCEDURE — 99213 OFFICE O/P EST LOW 20 MIN: CPT | Mod: PBBFAC,27,PO | Performed by: INTERNAL MEDICINE

## 2017-11-29 PROCEDURE — 99999 PR PBB SHADOW E&M-EST. PATIENT-LVL III: CPT | Mod: PBBFAC,,, | Performed by: INTERNAL MEDICINE

## 2017-11-29 PROCEDURE — 36415 COLL VENOUS BLD VENIPUNCTURE: CPT | Mod: PO

## 2017-11-29 PROCEDURE — 99214 OFFICE O/P EST MOD 30 MIN: CPT | Mod: S$PBB,,, | Performed by: INTERNAL MEDICINE

## 2017-11-29 PROCEDURE — 80048 BASIC METABOLIC PNL TOTAL CA: CPT

## 2017-11-29 PROCEDURE — 11721 DEBRIDE NAIL 6 OR MORE: CPT | Mod: Q9,PBBFAC,PO | Performed by: PODIATRIST

## 2017-11-29 RX ORDER — TIZANIDINE 2 MG/1
TABLET ORAL
COMMUNITY
Start: 2017-11-12 | End: 2018-03-06 | Stop reason: SDUPTHER

## 2017-11-29 RX ORDER — BENZONATATE 100 MG/1
CAPSULE ORAL
COMMUNITY
Start: 2017-11-12 | End: 2019-02-11

## 2017-11-29 NOTE — PROGRESS NOTES
Subjective:      Patient ID: Adela Garay is a 89 y.o. female.    Chief Complaint: Foot Problem (Neuropathy pcp Dr. Farrell 8-28-17)    Adela is a 89 y.o. female who presents to the clinic for evaluation and treatment of high risk feet. Adela has a past medical history of Anticoagulant long-term use; Anticoagulated on Coumadin; Arthritis; Atrial fibrillation; Atrial fibrillation; Carotid artery occlusion; Chronic rhinosinusitis; Coronary artery disease; Granulomatous lung disease; Heart failure; Hyperlipidemia; Hypertension; Hypertensive heart disease without CHF (congestive heart failure); Mitral valve prolapse; PN (peripheral neuropathy); Severe aortic stenosis by prior echocardiogram; TIA (transient ischemic attack); Type 2 diabetes mellitus; and Type II or unspecified type diabetes mellitus without mention of complication, not stated as uncontrolled. The patient's chief complaint is long, thick toenails. This patient has documented high risk feet requiring routine maintenance secondary to peripheral neuropathy.    Patient relates that the callus on her left 5th toe is larger and more red than usual    PCP: Torrie Farrell MD    Date Last Seen by PCP:   Chief Complaint   Patient presents with    Foot Problem     Neuropathy pcp Dr. Farrell 8-28-17       Current shoe gear:  SAS shoes, several years old    Hemoglobin A1C   Date Value Ref Range Status   10/30/2017 5.6 4.0 - 5.6 % Final     Comment:     According to ADA guidelines, hemoglobin A1c <7.0% represents  optimal control in non-pregnant diabetic patients. Different  metrics may apply to specific patient populations.   Standards of Medical Care in Diabetes-2016.  For the purpose of screening for the presence of diabetes:  <5.7%     Consistent with the absence of diabetes  5.7-6.4%  Consistent with increasing risk for diabetes   (prediabetes)  >or=6.5%  Consistent with diabetes  Currently, no consensus exists for use of hemoglobin A1c  for diagnosis  of diabetes for children.  This Hemoglobin A1c assay has significant interference with fetal   hemoglobin   (HbF). The results are invalid for patients with abnormal amounts of   HbF,   including those with known Hereditary Persistence   of Fetal Hemoglobin. Heterozygous hemoglobin variants (HbAS, HbAC,   HbAD, HbAE, HbA2) do not significantly interfere with this assay;   however, presence of multiple variants in a sample may impact the %   interference.     05/31/2017 6.0 4.5 - 6.2 % Final     Comment:     According to ADA guidelines, hemoglobin A1C <7.0% represents  optimal control in non-pregnant diabetic patients.  Different  metrics may apply to specific populations.   Standards of Medical Care in Diabetes - 2016.  For the purpose of screening for the presence of diabetes:  <5.7%     Consistent with the absence of diabetes  5.7-6.4%  Consistent with increasing risk for diabetes   (prediabetes)  >or=6.5%  Consistent with diabetes  Currently no consensus exists for use of hemoglobin A1C  for diagnosis of diabetes for children.     09/09/2016 6.2 4.5 - 6.2 % Final     Comment:     According to ADA guidelines, hemoglobin A1C <7.0% represents  optimal control in non-pregnant diabetic patients.  Different  metrics may apply to specific populations.   Standards of Medical Care in Diabetes - 2016.  For the purpose of screening for the presence of diabetes:  <5.7%     Consistent with the absence of diabetes  5.7-6.4%  Consistent with increasing risk for diabetes   (prediabetes)  >or=6.5%  Consistent with diabetes  Currently no consensus exists for use of hemoglobin A1C  for diagnosis of diabetes for children.           Patient Active Problem List   Diagnosis    Hyperlipidemia    Coronary artery disease    Hx-TIA (transient ischemic attack)    Severe aortic stenosis by prior echocardiogram    Long-term (current) use of anticoagulants, INR goal 2.0-3.0    Benign hypertensive heart disease with congestive heart  failure    Pulmonary hypertension    Carotid arterial disease    Carotid aneurysm, left    Atrial fibrillation    Controlled type 2 diabetes mellitus with microalbuminuria    DDD (degenerative disc disease), cervical    Lumbar disc disease    Multiple lung nodules    Mild major depression    Idiopathic peripheral neuropathy    Atherosclerosis of aorta    Postinflammatory pulmonary fibrosis    Chronic cough    Coronary artery disease involving native coronary artery of native heart without angina pectoris    Controlled type 2 diabetes with neuropathy    Aortic valve stenosis, unspecified etiology    S/P TAVR (transcatheter aortic valve replacement)    Edema    Acute on chronic diastolic heart failure    Complete heart block, post-surgical    Pacemaker       Current Outpatient Prescriptions on File Prior to Visit   Medication Sig Dispense Refill    acetaminophen (TYLENOL) 325 MG tablet Take 325 mg by mouth every 6 (six) hours as needed for Pain.      alpha lipoic acid 600 mg Cap Take 600 mg by mouth Daily. (Patient taking differently: Take 100 mg by mouth Daily. ) 100 each 12    aspirin 81 mg Tab Take 81 mg by mouth once daily.       blood sugar diagnostic (BLOOD GLUCOSE TEST) Strp 1 strip by Misc.(Non-Drug; Combo Route) route once daily. Currently using Nova max plus meter. 50 strip 11    bumetanide (BUMEX) 2 MG tablet Take 1 tablet (2 mg total) by mouth 2 (two) times daily. (Patient taking differently: Take 2 mg by mouth once daily. ) 60 tablet 11    CINNAMON BARK (CINNAMON ORAL) Take 1,000 mg by mouth 2 (two) times daily.      digoxin (LANOXIN) 125 mcg tablet Take 1 tablet (0.125 mg total) by mouth once daily. 30 tablet 11    docusate sodium (COLACE) 100 MG capsule Take 100 mg by mouth. 1 Capsule Oral Every day      metoprolol tartrate 75 mg Tab Take 75 mg by mouth 2 (two) times daily. 60 tablet 2    mupirocin (BACTROBAN) 2 % ointment Apply topically as needed.      potassium  chloride (KLOR-CON) 10 MEQ TbSR Take 1 tablet (10 mEq total) by mouth once daily. 30 tablet 0    simvastatin (ZOCOR) 20 MG tablet TAKE 1 TABLET EVERY EVENING 90 tablet 0    traZODone (DESYREL) 100 MG tablet Take 1 tablet (100 mg total) by mouth nightly as needed for Insomnia. 30 tablet 2    triamcinolone acetonide 0.025% (KENALOG) 0.025 % cream Use bid as needed. 80 g 1    triamterene-hydrochlorothiazide 37.5-25 mg (DYAZIDE) 37.5-25 mg per capsule Take 1 capsule by mouth once daily. 90 capsule 1    warfarin (COUMADIN) 2 MG tablet Take 2 pills by mouth daily or as directed per the [facility] 175 tablet 3     No current facility-administered medications on file prior to visit.        Review of patient's allergies indicates:   Allergen Reactions    Levaquin [levofloxacin]     Doxycycline hyclate Other (See Comments)     Unknown to patient    Iodine and iodide containing products     Neurontin  [gabapentin]      Other reaction(s): Unknown    Norpace  [disopyramide]      Other reaction(s): Hives    Phenytoin sodium extended      Other reaction(s): Muscle pain    Sulfamethoxazole-trimethoprim      Other reaction(s): Muscle cramps       Past Surgical History:   Procedure Laterality Date    SINUS SURGERY      tonsillectomy      TONSILLECTOMY         Family History   Problem Relation Age of Onset    Heart disease Father      49    Heart disease Mother     Thyroid disease Sister     Atrial fibrillation Sister     Atrial fibrillation Sister      neice    Lymphoma Sister 29    Atrial fibrillation Sister     Asthma Neg Hx     Emphysema Neg Hx        Social History     Social History    Marital status:      Spouse name: N/A    Number of children: 6    Years of education: 2 college     Occupational History    retired housewife      Social History Main Topics    Smoking status: Never Smoker    Smokeless tobacco: Never Used    Alcohol use 0.0 oz/week      Comment: rare    Drug use: No     "Sexual activity: No     Other Topics Concern    Not on file     Social History Narrative    No narrative on file     Review of Systems   Constitutional: Negative for chills and fever.   Respiratory: Negative for cough.    Cardiovascular: Positive for leg swelling.   Gastrointestinal: Negative for nausea and vomiting.   Musculoskeletal: Positive for joint pain and myalgias. Negative for falls.   Skin: Negative for itching and rash.   Neurological: Positive for tingling and sensory change.         Objective:       Vitals:    11/29/17 1341   BP: (!) 150/64   Weight: 59.4 kg (131 lb)   Height: 5' 5" (1.651 m)   PainSc: 0-No pain       Physical Exam   Constitutional:  Non-toxic appearance. She does not have a sickly appearance. No distress.   Pt. is well-developed, well-nourished, appears stated age, in no acute distress, alert and oriented x 3. No evidence of depression, anxiety, or agitation. Calm, cooperative, and communicative. Appropriate interactions and affect.   Cardiovascular:   Pulses:       Dorsalis pedis pulses are 1+ on the right side, and 1+ on the left side.        Posterior tibial pulses are 1+ on the right side, and 1+ on the left side.   Dorsalis pedis and posterior tibial pulses are diminished Bilaterally. Mild pitting edema, skin is atrophic, decreased digital hair, feet slightly cool, nails thickened, mild plantar rubor     Pulmonary/Chest: No respiratory distress.   Musculoskeletal:        Right ankle: No tenderness. No lateral malleolus, no medial malleolus, no AITFL, no CF ligament and no posterior TFL tenderness found. Achilles tendon exhibits no pain, no defect and normal Harman's test results.        Left ankle: No tenderness. No lateral malleolus, no medial malleolus, no AITFL, no CF ligament and no posterior TFL tenderness found. Achilles tendon exhibits no pain, no defect and normal Harman's test results.        Right foot: There is no tenderness and no bony tenderness.        Left " foot: There is no tenderness and no bony tenderness.   Patient has hammertoes of digits 2-5 bilateral partially reducible without symptom today.    2nd toes adduct against great toe    TA function absent L on muscle testing    Visible and palpable bunion without pain at dorsomedial 1st metatarsal head right and left.  Hallux abducted right and left partially reducible, tracks laterally without being track bound.  No ecchymosis, erythema, edema, or cardinal signs infection or signs of trauma same foot.     Lymphadenopathy:   No lymphatic streaking    Negative lymphadenopathy bilateral popliteal fossa and tarsal tunnel.     Neurological:   [place]-Jakub 5.07 monofilamant testing is diminished Moreno feet. Decreased/absent vibratory sensation bilateral feet to 128Hz tuning fork.    Skin: Skin is warm, dry and intact. No abrasion, no bruising, no ecchymosis and no rash noted. She is not diaphoretic. No cyanosis. No pallor. Nails show no clubbing.   Toenails 1-5 bilaterally are elongated by 2-3 mm, thickened by 2-3 mm, discolored/yellowed, dystrophic, brittle with subungual debris.     Focal hyperkeratotic lesion consisting entirely of hyperkeratotic tissue without open skin, drainage, pus, fluctuance, malodor: bilateral 5th digit PIPJ with localized erythema  Psychiatric: Her mood appears not anxious. Her affect is not inappropriate. Her speech is not slurred. She is not combative. She is communicative. She is attentive.   Nursing note reviewed.        Assessment:       Encounter Diagnoses   Name Primary?    Idiopathic peripheral neuropathy Yes    Bilateral leg edema     Varicosities of leg     Onychomycosis due to dermatophyte     Corn or callus     Anticoagulated on Coumadin     Pre-ulcerative calluses - Left Foot          Plan:       Adela was seen today for foot problem.    Diagnoses and all orders for this visit:    Idiopathic peripheral neuropathy    Bilateral leg edema    Varicosities of  leg    Onychomycosis due to dermatophyte    Corn or callus    Anticoagulated on Coumadin    Pre-ulcerative calluses - Left Foot      I counseled the patient on her conditions, their implications and medical management.    - Shoe inspection. Patient instructed on proper foot hygeine. We discussed wearing proper shoe gear, daily foot inspections, never walking without protective shoe gear, never putting sharp instruments to feet.      - Discussed proper shoe type and fit to address deformities.  Advised patient to purchase a pair of extra depth shoes with adequate toe room to avoid pressure to 5th digits and prevent ulceration.     - With patient's permission, nails were aggressively reduced and debrided x 10 to their soft tissue attachment mechanically and with electric , removing all offending nail and debris. Patient relates relief following the procedure. After cleansing the  area w/ alcohol prep pad the above mentioned hyperkeratosis was trimmed utilizing No 15 scapel, to a smooth base with out incident. Patient tolerated this  well and reported comfort to the area of: 5th digit PIPJ marcial    - She will continue to monitor the areas daily, inspect her feet, wear protective shoe gear when ambulatory, moisturizer to maintain skin integrity and follow in this office in approximately 2-3 months, sooner p.r.n.

## 2017-11-29 NOTE — PATIENT INSTRUCTIONS
Recommend lotions: eucerin, aquaphor, A&D ointment, gold bond for diabetics, sween; urea 40 with aloe (found on amazon.com)    Shoe recommendations: (try 6pm.com, zappos.com , nordstromrack.com, or shoes.com for discounted prices) you can visit DSW shoes in [place] as well    Asics (GT 2000 or gel foundations), new balance, saucony (stabil c3),  Whitney (GTS or Beast or transcend), vionic, propet (tennis shoe)    sofft brand, clarks, crocs, aerosoles, naturalizers, SAS, ecco, prashant, nehemiah chew, rockports (dress shoes)    Vionic, burkenstocks, fitflops, propet (sandals)    Nike comfort thong sandals, crocs, propet (house shoes)    Nail Home remedy:  Vicks Vapor rub to nails for easier managability    Occasional soaks for 15-20 mins in luke warm water with 1 cup of listerine and 1 cup of apple cider vinegar are ok You may add several drops of oil of oregano or tea tree oil as well          Wearing Proper Shoes                    You walk on your feet every day, forcing them to support the weight of your body. Repeated stress on your feet can cause damage over time. The right shoes can help protect your feet. The wrong shoes can cause more foot problems. Read the information below to help you find a shoe that fits your foot needs.     A good shoe fit will cover your foot outline.       A shoe that doesnt cover the outline is a bad fit.      Whats your foot shape?  To get a good fit, you need to know the shape of your foot. Do this simple test: While standing, place your foot on a piece of paper and trace around it. Is your foot straight or curved? Do you have a foot problem, such as a bunion, that causes your foot outline to show a bulge on the side of your big toe?  Finding your fit  Bring your foot outline to the shoe store to help you find the right shoe. Place a shoe you like on top of the outline to see if it matches the shape. The shoe should cover the outline. (If you have a bunion, the shoe may not cover the  bulge on the outline. Look for soft leather shoes to stretch over the bunion.) Once youve found a pair of proper shoes, put them on. Walk around. Be sure the shoes dont rub or pinch. If the shoes feel good, youve found your fit!  The right shoe for you  A good shoe has features that provide comfort and support. It must also be the right size and shape for your feet. Look for a shoe made of breathable fabric and lining, such as leather or canvas. Be sure that shoes have enough tread to prevent slipping. Go to a good shoe store for help finding the right shoe.  Good shoe features  An ideal shoe has the following:  · Laces for support. If tying laces is a problem for you, try shoes with Velcro fasteners or radha.  · A front of the shoe (toe box) with ½ inch space in front of your longest toes.  · An arch shape that supports your foot.  · No more than 1½ inches of heel.  · A stiff, snug back of the shoe to keep your foot from sliding around.  · A smooth lining with no rough seams.  Shoe shopping tips  Below are some dos and donts for when you go to the shoe store.  Do:  · Select the shoes that feel right. Wear them around the house. Then bring them to your foot healthcare provider to check for fit. If they dont fit well, return them.  · Shop late in the day, when your feet will be slightly bigger.  · Each time you buy shoes, have both your feet measured while you are standing. Foot size changes with time.  · Pick shoes to suit their purpose. High heels are OK for an occasional night on the town. But for everyday wear, choose a more sensible shoe.  · Try on shoes while wearing any inserts specially made for your feet (orthoses).  · Try on both the right and left shoes. If your feet are different sizes, pick a pair that fits the larger foot.  Dont:  · Dont buy shoes based on shoe size alone. Always try on shoes, as sizes differ from brand to brand and within brands.  · Dont expect shoes to break in. If they  dont fit at the store, dont buy them.  · Dont buy a shoe that doesnt match your foot shape.  What about socks?  Always wear socks with shoes. Socks help absorb sweat and reduce friction and blistering. When shopping for shoes, choose soft, padded socks with seams that dont irritate your feet.  If you have foot problems  Some foot problems cause deformities. This can make it hard to find a good fit. Look for shoes made of soft leather to stretch over the deformity. If you have bunions, buy shoes with a wider toe box. To fit hammertoes, look for shoes with a tall toe box. If you have arch problems, you may need inserts. In some cases, youll need to have custom footwear or orthoses made for your feet.  Suggested footwear  Ask your healthcare provider what kind of footwear you need. He or she may recommend a certain brand or shoe store.  Date Last Reviewed: 8/1/2016 © 2000-2016 [employer]. [address] [place] [address]. All rights reserved. This information is not intended as a substitute for professional medical care. Always follow your healthcare professional's instructions.            Understanding Peripheral Arterial Disease    Peripheral arteries deliver oxygen-rich blood to the tissues outside the heart. As you age, your arteries become stiffer and thicker. In addition, risk factors, such as smoking and high cholesterol, can damage the artery lining. This allows a buildup of fat and other materials (plaque) to form within the artery walls. The buildup of plaque narrows the space inside the artery and sometimes blocks blood flow. Peripheral arterial disease (PAD) happens when blood flow through the arteries is reduced because of plaque buildup. It often happens in the legs and feet, but can also happen elsewhere in the body. If this buildup happens in the a large artery in the neck (carotid artery), it can be a major contributor to stroke.  A healthy artery  An artery is a muscular  tube that carries oxgen rich blood and nutrients from the heart to the rest of the body. It has a smooth lining and flexible walls that allow blood to pass freely. When active, muscles need more oxygen. This increases blood flow. Healthy arteries can adapt to meet this need.  A damaged artery    PAD begins when the lining of an artery is damaged. This is often because of risk factors, such as smoking, older age, or diabetes. Plaque then starts to form within the artery wall. At this stage, blood flows normally, so youre not likely to have symptoms.  A narrowed artery    If plaque continues to build up, the space inside the artery narrows. The artery walls become less able to expand. The artery still provides enough blood and oxygen to your muscles during rest. But when youre active, the increased demand for blood cant be met. As a result, your leg may cramp or ache when you walk.  A blocked artery    An artery can become blocked by plaque or by a blood clot lodged in a narrowed section. When this happens, oxygen cant reach the muscle below the blockage. Then you may feel pain when lying down or when you are not active (rest pain). This type of pain is especially common at night when youre lying flat. In time, the affected tissue can die. This can lead to the loss of a toe or foot.  Date Last Reviewed: 5/1/2016  © 9394-6422 [employer]. [address] [place] [address], [place] [address]. All rights reserved. This information is not intended as a substitute for professional medical care. Always follow your healthcare professional's instructions.

## 2017-11-30 ENCOUNTER — TELEPHONE (OUTPATIENT)
Dept: FAMILY MEDICINE | Facility: CLINIC | Age: 82
End: 2017-11-30

## 2017-11-30 NOTE — PROGRESS NOTES
HISTORY OF PRESENT ILLNESS:  Adela Garay is a 89 y.o. female who presents to the clinic today for Hyperlipidemia (3 month f/u ) and Hypertension  .  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  She recently had an aortic valve replacement with pacemaker placement.  She was hospitalized after the surgery for congestive heart failure.  She now is also on Bumex.  She has been having some problems with hypokalemia.  She had blood work drawn today to get this rechecked.  She states blood pressures have been well controlled.  She was having a persistent severe cough but this seems to be slowly improving.  She denies any pain today.  She reports that her shortness of breath overall has improved.  She is slowly getting her strength back.  She denies abdominal pain or temperature intolerance.      PAST MEDICAL HISTORY:  Past Medical History:   Diagnosis Date    Anticoagulant long-term use     Anticoagulated on Coumadin     Aortic valve stenosis     - s/p total aortic valve replacement    Arthritis     Atrial fibrillation     Carotid artery occlusion     Chronic rhinosinusitis     Coronary artery disease     Granulomatous lung disease     Heart failure     Hyperlipidemia     Hypertension     Hypertensive heart disease without CHF (congestive heart failure)     Mitral valve prolapse     PN (peripheral neuropathy)     hereditary?(sister has it also)/idiopathic?/patient thinks from [employer]    Severe aortic stenosis by prior echocardiogram     TIA (transient ischemic attack)     Type 2 diabetes mellitus     Type II or unspecified type diabetes mellitus without mention of complication, not stated as uncontrolled        PAST SURGICAL HISTORY:  Past Surgical History:   Procedure Laterality Date    CARDIAC PACEMAKER PLACEMENT  11/2017    CARDIAC VALVE REPLACEMENT  10/17/2017    aorta    SINUS SURGERY       tonsillectomy      TONSILLECTOMY         SOCIAL HISTORY:  Social History     Social History    Marital status:      Spouse name: N/A    Number of children: 6    Years of education: 2 college     Occupational History    retired housewife      Social History Main Topics    Smoking status: Never Smoker    Smokeless tobacco: Never Used    Alcohol use 0.0 oz/week      Comment: rare    Drug use: No    Sexual activity: No     Other Topics Concern    Not on file     Social History Narrative    No narrative on file       FAMILY HISTORY:  Family History   Problem Relation Age of Onset    Heart disease Father      49    Heart disease Mother     Thyroid disease Sister     Atrial fibrillation Sister     Atrial fibrillation Sister      neice    Lymphoma Sister 29    Atrial fibrillation Sister     Asthma Neg Hx     Emphysema Neg Hx        ALLERGIES AND MEDICATIONS: updated and reviewed.  Review of patient's allergies indicates:   Allergen Reactions    Levaquin [levofloxacin]     Doxycycline hyclate Other (See Comments)     Unknown to patient    Iodine and iodide containing products     Neurontin  [gabapentin]      Other reaction(s): Unknown    Norpace  [disopyramide]      Other reaction(s): Hives    Phenytoin sodium extended      Other reaction(s): Muscle pain    Sulfamethoxazole-trimethoprim      Other reaction(s): Muscle cramps     Medication List with Changes/Refills   Current Medications    ACETAMINOPHEN (TYLENOL) 325 MG TABLET    Take 325 mg by mouth every 6 (six) hours as needed for Pain.    ALPHA LIPOIC ACID 600 MG CAP    Take 600 mg by mouth Daily.    ASPIRIN 81 MG TAB    Take 81 mg by mouth once daily.     BENZONATATE (TESSALON) 100 MG CAPSULE        BLOOD SUGAR DIAGNOSTIC (BLOOD GLUCOSE TEST) STRP    1 strip by Misc.(Non-Drug; Combo Route) route once daily. Currently using Nova max plus meter.    BUMETANIDE (BUMEX) 2 MG TABLET    Take 1 tablet (2 mg total) by mouth 2 (two) times daily.     CINNAMON BARK (CINNAMON ORAL)    Take 1,000 mg by mouth 2 (two) times daily.    DIGOXIN (LANOXIN) 125 MCG TABLET    Take 1 tablet (0.125 mg total) by mouth once daily.    DOCUSATE SODIUM (COLACE) 100 MG CAPSULE    Take 100 mg by mouth. 1 Capsule Oral Every day    METOPROLOL TARTRATE 75 MG TAB    Take 75 mg by mouth 2 (two) times daily.    MUPIROCIN (BACTROBAN) 2 % OINTMENT    Apply topically as needed.    POTASSIUM CHLORIDE (KLOR-CON) 10 MEQ TBSR    Take 1 tablet (10 mEq total) by mouth once daily.    SIMVASTATIN (ZOCOR) 20 MG TABLET    TAKE 1 TABLET EVERY EVENING    TIZANIDINE (ZANAFLEX) 2 MG TABLET        TRAZODONE (DESYREL) 100 MG TABLET    Take 1 tablet (100 mg total) by mouth nightly as needed for Insomnia.    TRIAMCINOLONE ACETONIDE 0.025% (KENALOG) 0.025 % CREAM    Use bid as needed.    TRIAMTERENE-HYDROCHLOROTHIAZIDE 37.5-25 MG (DYAZIDE) 37.5-25 MG PER CAPSULE    Take 1 capsule by mouth once daily.    WARFARIN (COUMADIN) 2 MG TABLET    Take 2 pills by mouth daily or as directed per the [facility]          CARE TEAM:  Patient Care Team:  Torrie Farrell MD as PCP - General (Internal Medicine)  Elyse Medina RN as Outpatient          REVIEW OF SYSTEMS:  General ROS: negative for - chills, fever or malaise  Psychological ROS: negative for - memory difficulties, obsessive thoughts or suicidal ideation  Ophthalmic ROS: negative for - blurry vision or eye pain  ENT ROS: negative for - epistaxis, oral lesions or sore throat  Allergy and Immunology ROS: negative for - hives  Hematological and Lymphatic ROS: negative for - swollen lymph nodes  Endocrine ROS: negative for - polydipsia/polyuria or temperature intolerance  Respiratory ROS: negative for - hemoptysis, sputum changes or wheezing  Cardiovascular ROS: negative for - chest pain or palpitations  Gastrointestinal ROS: no abdominal pain, change in bowel habits, or black or bloody stools  Dermatological ROS: negative  Musculoskeletal ROS:  "negative  Neurological ROS: no TIA or stroke symptoms      PHYSICAL EXAM:   Vitals:    11/29/17 1510   BP: 120/72   Pulse: 61   Temp: 97.8 °F (36.6 °C)     Weight: 60 kg (132 lb 4.4 oz)   Height: 5' 5" (165.1 cm)   Body mass index is 22.01 kg/m².     General appearance - alert, well appearing, and in no distress and normal appearing weight  Mental status - alert, oriented to person, place, and time, normal mood, behavior, speech, dress, motor activity, and thought processes  Eyes - pupils equal and reactive, extraocular eye movements intact, sclera anicteric  Mouth - mucous membranes moist, pharynx normal without lesions  Chest - clear to auscultation, no wheezes, rales or rhonchi, symmetric air entry  Heart - normal rate and regular rhythm, no gallops noted  Neurological - alert, oriented, normal speech, no focal findings or movement disorder noted, cranial nerves II through XII intact  Musculoskeletal - no muscular tenderness noted, Mild osteoarthritic changes noted to both knee joints. No joint effusions noted.   Extremities - peripheral pulses normal, no pedal edema, no clubbing or cyanosis  Skin - normal coloration and turgor, no rashes, no suspicious skin lesions noted      ASSESSMENT AND PLAN:  1. Severe aortic stenosis by prior echocardiogram/2. S/P aortic valve replacement  Slowly getting her strength back. The current medical regimen is effective;  continue present plan and medications. Followed by cardiology.    3. Benign hypertensive heart disease with congestive heart failure  Discussed sodium restriction, maintaining ideal body weight and regular exercise program as physiologic means to achieve blood pressure control. The patient will strive towards this. The current medical regimen is effective;  continue present plan and medications. Recommended patient to check home readings to monitor and see me for followup as scheduled or sooner as needed. Patient was educated that both decongestant and " anti-inflammatory medication may raise blood pressure.     4. Hypokalemia  Blood work today pending.    5. Chronic cough  Improving. Cough medicine prn.    6. Controlled type 2 diabetes with neuropathy  Diabetes currently is controlled. We discussed diabetic diet and regular exercise. We discussed home blood sugar monitoring, if appropriate. The current medical regimen is effective;  continue present plan and medications.            Return in about 4 months (around 3/29/2018), or if symptoms worsen or fail to improve, for follow up chronic medical conditions.. or sooner as needed.

## 2017-11-30 NOTE — TELEPHONE ENCOUNTER
Spoke with patient. Advised her that her potassium has improved to 3.6 (lower end of normal range). She will continue on her current dose of potassium supplement.

## 2017-12-01 ENCOUNTER — TELEPHONE (OUTPATIENT)
Dept: OTHER | Facility: OTHER | Age: 82
End: 2017-12-01

## 2017-12-01 NOTE — TELEPHONE ENCOUNTER
Mrs. Garay's weight continues to remain stable for the DMF monitoring program.  She was seen in clinic yesterday and no changes were made.  Labs stable, K improving and she is to remain on potassium supplementation for now.  She confirms bumex 1mg daily with adequate UOP.  She denies LLE, SOB, abdominal bloating, LH, dizziness or weakness.  She feels she is doing well and has no complaints today.  She knows to contact us over the weekend with any changes or concerns.   Weight: 58.8 kg (129 lb 9.6 oz) (12/1/2017  7:38 AM)

## 2017-12-03 ENCOUNTER — TELEPHONE (OUTPATIENT)
Dept: OTHER | Facility: OTHER | Age: 82
End: 2017-12-03

## 2017-12-03 NOTE — TELEPHONE ENCOUNTER
Mrs. Garay's weight has dropped 2lbs in 2 days.  Her weights were sitting steady at 129lbs for the past week and now she is beginning to lose weight again.  Her bumex dose was decreased last week to 1mg QD, which was keeping her weight steady but now may be too much.  She denies LLE, SOB or abdominal bloating; she feels she is ambulating better each day.  She denies LH, dizziness and is feeling stronger as time progresses. She has been eager to stop her Bumex, as she is more comfortable remaining on the dyazide which kept her stable for years.  Since she has dropped 1lb for the past two days, I will stop her bumex/potassium at this time.  She knows that should she begin gaining weight again or present with any s/s of HF, we will resume her Bumex at 1mg QD.  She is expecting a visit from  this week as well and will continue to provide us with her daily weights.   Weight: 57.9 kg (127 lb 9.6 oz) (12/3/2017  8:00 AM)

## 2017-12-05 ENCOUNTER — TELEPHONE (OUTPATIENT)
Dept: OTHER | Facility: OTHER | Age: 82
End: 2017-12-05

## 2017-12-05 ENCOUNTER — OUTPATIENT CASE MANAGEMENT (OUTPATIENT)
Dept: ADMINISTRATIVE | Facility: OTHER | Age: 82
End: 2017-12-05

## 2017-12-05 NOTE — TELEPHONE ENCOUNTER
"Mrs Garay's weight has increased since stopping her bumetanide.  Although she has gained 3 lbs, she is closer to her baseline euvolemic weight.  She denies YUNIEL, SOB, PND or orthopnea.  She does have a cough that seems to be giving her more trouble than previously noted, in particular while speaking for a long time.  She denies mucus production, fever, SOB at rest.  She feels that her cough "comes and goes," and it not an acute change for her.  We will consider another bumetanide pulse or returning to 1 mg QD if her weight climbs again tomorrow.  For now she feels that she is controlling her symptoms well with diet and fluid restrictions.  No changes today.  Weight: 59.2 kg (130 lb 9.6 oz) (12/5/2017  8:14 AM)    "

## 2017-12-05 NOTE — PROGRESS NOTES
Follow up with pt. Pt states she is feeling better. She is enrolled in the [facility] program. She states her weight was the same the last 2 days. She denies edema. She continues with a dry cough that worsens with conversation. Pt is cared for by her family. One of her children sleeps at her home every night.     Interventions performed:   Continue CHF education  Discuss red flags of CHF    Follow up:   understanding of education on CHF

## 2017-12-06 LAB — FUNGUS SPEC CULT: NORMAL

## 2017-12-07 ENCOUNTER — TELEPHONE (OUTPATIENT)
Dept: OTHER | Facility: OTHER | Age: 82
End: 2017-12-07

## 2017-12-07 NOTE — TELEPHONE ENCOUNTER
Mrs. Garay's weight continues to hover 129-130lb without Bumex on board.  She does still have her cough but it is sporadic and seems to be better today.  She denies LLE, SOB or abdominal bloating and is managing her diet much better than prior to admission.  She denies weakness, LH or dizziness.  She will remain only on dyazide at this point and knows to call us with any changes.   Weight: 59.1 kg (130 lb 3.2 oz) (12/7/2017  7:00 AM)

## 2017-12-09 ENCOUNTER — TELEPHONE (OUTPATIENT)
Dept: OTHER | Facility: OTHER | Age: 82
End: 2017-12-09

## 2017-12-09 NOTE — TELEPHONE ENCOUNTER
"Ms. Garay's weight is up 1 lb today. She is OFF of Bumex, so called to assess clinical status. She denies any change in symptoms. She continues to get good U/O. She says her cough "comes and goes." She feels it is due to allergies/sinuses. She is asking about program end date. Advised her final day in the program is Tuesday and we will check in once more to complete exit interview. She knows to call with any concerns or change in symptoms.    Weight: 59.4 kg (131 lb) (12/9/2017  7:20 AM)    "

## 2017-12-11 ENCOUNTER — TELEPHONE (OUTPATIENT)
Dept: OTHER | Facility: OTHER | Age: 82
End: 2017-12-11

## 2017-12-11 ENCOUNTER — ANTI-COAG VISIT (OUTPATIENT)
Dept: CARDIOLOGY | Facility: CLINIC | Age: 82
End: 2017-12-11
Payer: MEDICARE

## 2017-12-11 DIAGNOSIS — Z79.01 LONG-TERM (CURRENT) USE OF ANTICOAGULANTS, INR GOAL 2.0-3.0: ICD-10-CM

## 2017-12-11 DIAGNOSIS — Z86.73 HX-TIA (TRANSIENT ISCHEMIC ATTACK): ICD-10-CM

## 2017-12-11 LAB — INR PPP: 2.1

## 2017-12-11 PROCEDURE — G0250 MD INR TEST REVIE INTER MGMT: HCPCS | Mod: ,,,

## 2017-12-11 NOTE — TELEPHONE ENCOUNTER
Mrs Garay will finish our program tomorrow, 12/12/17.  Her symptoms and weight have been stable since stopping her bumex and KCL for weight decline, she remains 5 lbs lower than discharge weight.  She will use those PRN for a 3 lbs weight gain or the onset of HF symptoms which we reviewed today.  She will use 1/2 of a Bumex tablet (1mg) + KCL should she begin to have SOB or YUNIEL.  She was instructed to also call Dr Ivy for further advice.  She remains on Dyazide and is having good U/O.  She feels well controlled with her diet and fluid intake.  She will continue to weigh on her home scale.  She will submit a final weight tomorrow.  I have asked that she return all of her equipment to the [facility]- info desk on 12/22/17.  She will follow up with Dr Ivy on that day as well.  Exit interview complete.  Weight: 59.5 kg (131 lb 3.2 oz) (12/11/2017  8:52 AM)

## 2017-12-12 ENCOUNTER — TELEPHONE (OUTPATIENT)
Dept: OTHER | Facility: OTHER | Age: 82
End: 2017-12-12

## 2017-12-12 NOTE — TELEPHONE ENCOUNTER
Mrs Garay has returned my call.  She still has a cough while speaking over the phone today, she had left her home early to go to a study group. She acknowledges her weight gain but is not complaining of any change in SOB, YUNIEL, ABD fullness, increased fatigue or PND.  She does notice that her U/O has dropped off and repeats this to me twice during our conversation.  She will use a 1 mg Bumex dose now with KCL 10 meq x 1 today.  May suggest a 1 mg QOD dose for her to maintain her fluid balance without too much weight loss.  I will call her tomorrow although she officially leaves our program today.  We will work together to determine a new diuretic dosing scheme and will review any symptom and/or weight improvements.

## 2017-12-12 NOTE — TELEPHONE ENCOUNTER
Mrs Garay's weight has climbed 1+ lb today.  LVM for a return call to discuss a bumex dose.  Weight: 60.1 kg (132 lb 9.6 oz) (12/12/2017  9:16 AM)

## 2017-12-13 ENCOUNTER — TELEPHONE (OUTPATIENT)
Dept: OTHER | Facility: OTHER | Age: 82
End: 2017-12-13

## 2017-12-13 NOTE — TELEPHONE ENCOUNTER
Mrs Garay has officially completed out program as of yesterday, however I am following up with her today regarding her pulse with bumetanide and KCL.  She has lost 3 + lbs overnight after one dose.  Her cough has improved tremendously and she feels that her overall fluid status has improved after increased U/O.  She understands now the importance of weight gain and s/s of HF, in particular, the return of her productive cough that may be indicative of fluid return.  Athough we had been holding bumetanide for her weight loss since 12/3, she had slowly gained the weight back along with a cough that caused her to become SOB while speaking. We will resume a QOD dosing regimen to keep her fluid balanced until she sees Dr Ivy on 12/22/17.  She will use 1/2 tablet (1mg) of bumex with KCL 10 meq every other day going forward.  I will forward note to Dr Ivy's staff per her request to notify them of the plan and their consideration of lab work prior to her appointment.  She will continue to eat a low sodium diet and maintain a 1500 ml fluid restriction.  She will weigh daily and hold her bumex if her weight drops to or below 127 lbs as this is where we held her dose during our program secondary to loss of fluid.  She confirms understanding and does NOT feel that QOD dosing will be too cumbersome.  I have updated her medication list.  I have asked her to call us with any concerns or issues that arise between now and her visit with Dr Ivy.  Weight: 58.7 kg (129 lb 6.4 oz) (12/13/2017  9:32 AM)

## 2017-12-14 ENCOUNTER — OUTPATIENT CASE MANAGEMENT (OUTPATIENT)
Dept: ADMINISTRATIVE | Facility: OTHER | Age: 82
End: 2017-12-14

## 2017-12-15 NOTE — PROGRESS NOTES
Spoke with pt. Pt states she has been discharged from the Digital CHF program.       Interventions performed:   Discuss daily weights  Reinforce teaching on low sodium diet  Review new orders with pt.    Plan: Follow up on weight and understanding of education materials

## 2017-12-19 ENCOUNTER — LAB VISIT (OUTPATIENT)
Dept: LAB | Facility: HOSPITAL | Age: 82
End: 2017-12-19
Attending: INTERNAL MEDICINE
Payer: MEDICARE

## 2017-12-19 DIAGNOSIS — I44.2 COMPLETE HEART BLOCK, POST-SURGICAL: ICD-10-CM

## 2017-12-19 DIAGNOSIS — Z95.0 PACEMAKER: ICD-10-CM

## 2017-12-19 DIAGNOSIS — I35.0 SEVERE AORTIC STENOSIS BY PRIOR ECHOCARDIOGRAM: ICD-10-CM

## 2017-12-19 DIAGNOSIS — I25.10 CORONARY ARTERY DISEASE INVOLVING NATIVE CORONARY ARTERY OF NATIVE HEART WITHOUT ANGINA PECTORIS: ICD-10-CM

## 2017-12-19 DIAGNOSIS — E78.5 HYPERLIPIDEMIA, UNSPECIFIED HYPERLIPIDEMIA TYPE: ICD-10-CM

## 2017-12-19 DIAGNOSIS — Z95.2 S/P TAVR (TRANSCATHETER AORTIC VALVE REPLACEMENT): ICD-10-CM

## 2017-12-19 DIAGNOSIS — I97.89 COMPLETE HEART BLOCK, POST-SURGICAL: ICD-10-CM

## 2017-12-19 DIAGNOSIS — I27.20 PULMONARY HYPERTENSION: ICD-10-CM

## 2017-12-19 LAB
ANION GAP SERPL CALC-SCNC: 8 MMOL/L
BNP SERPL-MCNC: 272 PG/ML
BUN SERPL-MCNC: 24 MG/DL
CALCIUM SERPL-MCNC: 9.9 MG/DL
CHLORIDE SERPL-SCNC: 100 MMOL/L
CO2 SERPL-SCNC: 32 MMOL/L
CREAT SERPL-MCNC: 0.8 MG/DL
EST. GFR  (AFRICAN AMERICAN): >60 ML/MIN/1.73 M^2
EST. GFR  (NON AFRICAN AMERICAN): >60 ML/MIN/1.73 M^2
GLUCOSE SERPL-MCNC: 138 MG/DL
POTASSIUM SERPL-SCNC: 4.1 MMOL/L
SODIUM SERPL-SCNC: 140 MMOL/L

## 2017-12-19 PROCEDURE — 83880 ASSAY OF NATRIURETIC PEPTIDE: CPT

## 2017-12-19 PROCEDURE — 36415 COLL VENOUS BLD VENIPUNCTURE: CPT | Mod: PO

## 2017-12-19 PROCEDURE — 80048 BASIC METABOLIC PNL TOTAL CA: CPT

## 2017-12-22 ENCOUNTER — OFFICE VISIT (OUTPATIENT)
Dept: CARDIOLOGY | Facility: CLINIC | Age: 82
End: 2017-12-22
Payer: MEDICARE

## 2017-12-22 VITALS
HEART RATE: 71 BPM | BODY MASS INDEX: 22 KG/M2 | DIASTOLIC BLOOD PRESSURE: 62 MMHG | SYSTOLIC BLOOD PRESSURE: 130 MMHG | HEIGHT: 65 IN | OXYGEN SATURATION: 97 % | WEIGHT: 132.06 LBS

## 2017-12-22 DIAGNOSIS — E11.29 CONTROLLED TYPE 2 DIABETES MELLITUS WITH MICROALBUMINURIA, WITHOUT LONG-TERM CURRENT USE OF INSULIN: ICD-10-CM

## 2017-12-22 DIAGNOSIS — E78.5 HYPERLIPIDEMIA, UNSPECIFIED HYPERLIPIDEMIA TYPE: Primary | ICD-10-CM

## 2017-12-22 DIAGNOSIS — R80.9 CONTROLLED TYPE 2 DIABETES MELLITUS WITH MICROALBUMINURIA, WITHOUT LONG-TERM CURRENT USE OF INSULIN: ICD-10-CM

## 2017-12-22 DIAGNOSIS — I25.10 CORONARY ARTERY DISEASE INVOLVING NATIVE CORONARY ARTERY OF NATIVE HEART WITHOUT ANGINA PECTORIS: ICD-10-CM

## 2017-12-22 DIAGNOSIS — I77.9 CAROTID ARTERY DISEASE, UNSPECIFIED LATERALITY: ICD-10-CM

## 2017-12-22 DIAGNOSIS — I11.0 BENIGN HYPERTENSIVE HEART DISEASE WITH CONGESTIVE HEART FAILURE: ICD-10-CM

## 2017-12-22 DIAGNOSIS — Z95.0 PACEMAKER: ICD-10-CM

## 2017-12-22 DIAGNOSIS — Z95.2 S/P TAVR (TRANSCATHETER AORTIC VALVE REPLACEMENT): ICD-10-CM

## 2017-12-22 PROCEDURE — 99999 PR PBB SHADOW E&M-EST. PATIENT-LVL IV: CPT | Mod: PBBFAC,,, | Performed by: INTERNAL MEDICINE

## 2017-12-22 PROCEDURE — 99214 OFFICE O/P EST MOD 30 MIN: CPT | Mod: PBBFAC | Performed by: INTERNAL MEDICINE

## 2017-12-22 PROCEDURE — 99213 OFFICE O/P EST LOW 20 MIN: CPT | Mod: S$PBB,,, | Performed by: INTERNAL MEDICINE

## 2017-12-22 NOTE — PROGRESS NOTES
Subjective:    Patient ID:  Adela Garay is a 89 y.o. female who presents for follow-up of Results      HPI     Severe AS - s/p TAVR 10/17/17, complete heart block - [facility] single PPM 10/17/17  No CAD by [facility] 8/22/17  Chronic A-fib - rate controlled on coumadin, HTN, DM, Hx TIA     Re-admitted after TAVR with CHF 10/30-11/12/17     Ms. Garay is a 89 year old  woman with past medical history of HTN, HLD, DM, aFib, HFpEF, and recent TAVR complicated by PPM placement for CHB (Portico Valve 29mm ) on 10/17/17 for severe AS who presents to the ED with complaints of leg swelling. She states that she has been having issues since she went home after the surgery. She endorses orthopnea, ESTRADA, new cough, leg swelling, and weight gain. Denies issues like this before. Denies Fever. Denies chest pain.      While in ED, chemistry was unremarkable, BNP elevated at 918, troponin negative and CBC stable. CXR with bilateral pleural effusion larger on the right side and atelectatic changes at the lung bases; EKG with atrial fibrillation with paced ventricular rhythm.     Ms. Garay was admitted 10/30 s/p recent TAVR and PPM with acute on chronic diastolic heart failure and edema. She was evaluated by cardiology in ED and assessed by TAVR team in house. On admission her BNP was elevated at 918 and CXR with bilateral pleural effusion larger on the right side and atelectatic changes at the lung bases, this noted to be worsened from prior exam 2 weeks ago. Her diuresis was initiated with lasix 40 mg IVP BID, and titrated up to 60 mg IVP TID with occasional metolazone booster for improved diuresis. Repeat CXR with continued pulmonary edema/pleural fluid and repeat echo 11/7 with markedly increased central venous pressure, RAP of 15, and trivial pericardial effusion, with chemistry continued towards contraction alkalosis thus metolazone was discontinued and lasix IVP decreased to BID, responded appropriately and chemistry somewhat  improved, admission symptoms were also resolving >>> then transitioned to PO lasix dosing, poor response, thus transitioned to bumex, to which she responded very well. She thus far has diuresed with net negative ~18 liters as of current and weight down 16 lbs since admission, she remains at 136-138 lbs, which is likely her new dry weight.  She is no longer oxygen dependant, is ambulating independently without ESTRADA, and all SOB has resolved.      On 11/5 she developed a 101.9 fever and R knee pain.  Ortho was consulted, arthrocentesis performed and resulted in bloody fluid for analysis, however the specimen clotted so cell count not obtained. The bloody tap could be because of OAC with warfarin, however she did have a right septic knee a year ago which require surgical washout. Blood cultures and right knee fluid cultures with NGTD, continued ceftriaxone coverage for appropriate course and discontinued vanc. She developed glossitis most likely d/t ABX, continue nystatin and magic mouthwash as needed, now reports symptoms resolved.       Disposition plans: Home with home health and family care, educated at great length in regards to low sodium diet and fluid restriction. She will also be monitored with digital heart failure program. She follows outpt with home INR and warfarin clinic. Home health to draw labs in one week and report to TAVR team and/or primary cardiologist, she will need repeat echo in approximately 3 months, or sooner if deemed appropriate by TAVR team. Outpt follows already scheduled with primary Cardiologist, TAVR team, and PCP.       Echo 11/7/17    1 - Mild left ventricular enlargement.     2 - Normal left ventricular systolic function (EF 60-65%).     3 - Right ventricular enlargement with moderately depressed systolic function.     4 - Severe biatrial enlargement.     5 - Severe tricuspid regurgitation.     6 - Moderate to severe mitral regurgitation.     7 - Pulmonary hypertension. The estimated  PA systolic pressure is at least 59 mmHg.     8 - Markedly increased central venous pressure (the IVC is 37mm in diameter and does not collapse at all).     9 - Normally functioning 29mm Portico bioprosthesis in the aortic position with trivial AI    Labs 12/9/17  K 4.1  Cr 0.8      Doing well  Getting her energy back    Review of Systems   Constitution: Negative for decreased appetite.   HENT: Negative for ear discharge.    Eyes: Negative for blurred vision.   Respiratory: Negative for hemoptysis.    Endocrine: Negative for polyphagia.   Hematologic/Lymphatic: Negative for adenopathy.   Skin: Negative for color change.   Musculoskeletal: Negative for joint swelling.   Neurological: Negative for brief paralysis.   Psychiatric/Behavioral: Negative for hallucinations.        Objective:    Physical Exam   Constitutional: She is oriented to person, place, and time. She appears well-developed and well-nourished.   HENT:   Head: Normocephalic and atraumatic.   Eyes: Pupils are equal, round, and reactive to light.   Neck: Normal range of motion. No JVD present.   Cardiovascular: Normal rate and regular rhythm.    Murmur heard.  Pulmonary/Chest: Effort normal and breath sounds normal. She has no wheezes. She has no rales.   Abdominal: Soft. Bowel sounds are normal. She exhibits no distension. There is no tenderness.   Neurological: She is alert and oriented to person, place, and time.   Skin: Skin is warm and dry.         Assessment:       1. Hyperlipidemia, unspecified hyperlipidemia type    2. S/P TAVR (transcatheter aortic valve replacement)    3. Pacemaker    4. Coronary artery disease involving native coronary artery of native heart without angina pectoris    5. Carotid artery disease, unspecified laterality    6. Benign hypertensive heart disease with congestive heart failure    7. Controlled type 2 diabetes mellitus with microalbuminuria, without long-term current use of insulin         Plan:       Cardiac  stable  OV 3 months with BNP, BMP, Digoxin level

## 2017-12-26 ENCOUNTER — ANTI-COAG VISIT (OUTPATIENT)
Dept: CARDIOLOGY | Facility: CLINIC | Age: 82
End: 2017-12-26

## 2017-12-26 DIAGNOSIS — Z79.01 LONG-TERM (CURRENT) USE OF ANTICOAGULANTS, INR GOAL 2.0-3.0: ICD-10-CM

## 2017-12-26 DIAGNOSIS — Z86.73 HX-TIA (TRANSIENT ISCHEMIC ATTACK): ICD-10-CM

## 2017-12-26 LAB — INR PPP: 2.4

## 2018-01-04 ENCOUNTER — OFFICE VISIT (OUTPATIENT)
Dept: PULMONOLOGY | Facility: CLINIC | Age: 83
End: 2018-01-04
Payer: MEDICARE

## 2018-01-04 VITALS
OXYGEN SATURATION: 98 % | WEIGHT: 137.38 LBS | BODY MASS INDEX: 22.89 KG/M2 | HEIGHT: 65 IN | RESPIRATION RATE: 16 BRPM | DIASTOLIC BLOOD PRESSURE: 78 MMHG | SYSTOLIC BLOOD PRESSURE: 122 MMHG | HEART RATE: 67 BPM

## 2018-01-04 DIAGNOSIS — I35.0 SEVERE AORTIC STENOSIS BY PRIOR ECHOCARDIOGRAM: ICD-10-CM

## 2018-01-04 DIAGNOSIS — Z79.01 LONG-TERM (CURRENT) USE OF ANTICOAGULANTS, INR GOAL 2.0-3.0: ICD-10-CM

## 2018-01-04 DIAGNOSIS — Z95.2 S/P TAVR (TRANSCATHETER AORTIC VALVE REPLACEMENT): ICD-10-CM

## 2018-01-04 DIAGNOSIS — I27.20 PULMONARY HYPERTENSION: Primary | ICD-10-CM

## 2018-01-04 DIAGNOSIS — R91.8 MULTIPLE LUNG NODULES: ICD-10-CM

## 2018-01-04 PROCEDURE — 99213 OFFICE O/P EST LOW 20 MIN: CPT | Mod: PBBFAC | Performed by: INTERNAL MEDICINE

## 2018-01-04 PROCEDURE — 99999 PR PBB SHADOW E&M-EST. PATIENT-LVL III: CPT | Mod: PBBFAC,,, | Performed by: INTERNAL MEDICINE

## 2018-01-04 PROCEDURE — 99214 OFFICE O/P EST MOD 30 MIN: CPT | Mod: S$PBB,,, | Performed by: INTERNAL MEDICINE

## 2018-01-04 NOTE — PROGRESS NOTES
"Subjective:       Patient ID: Adela Garay is a 89 y.o. female.    Chief Complaint: Pulmonary Nodules    HPI Mrs. Garay is a 89-year-old nonsmoker who comes for what she describes as her   "annual visit."  She has been followed in this section of the clinic to monitor   multiple pulmonary nodules.  These have been stable over time except for 1   lesion in the right lower lobe.  This abnormality has shown the pattern of very   gradual enlargement during a period of several years.  The radiographic   appearance and its behavior are suggestive of a possible hamartoma or carcinoid   tumor.    In October of last year, Mrs. Garay underwent a TAVR procedure for treatment of   severe aortic stenosis.  In the early post-procedure period, she developed   apparent congestive heart failure.  Her chest x-ray at that time showed   bilateral pleural effusions with pulmonary vascular congestion.  She was treated   with increased diuretics for a period of time and her hemodynamic status seems   to have stabilized.    Today, Mrs. Garay reports that she has not had any prominent ongoing   respiratory symptoms.  She is not currently taking any prescribed medications   for respiratory problems.  She is on long-term anticoagulation therapy with   Coumadin.  Her echocardiogram done in November was notable for the finding of   significant right ventricular enlargement with systolic dysfunction and an   elevated pulmonary artery pressure.    DATA:  I have reviewed the images from her chest x-ray done in November.  This   was around the time of her increasing shortness of breath.  This film shows the   cardiac silhouette is markedly enlarged.  There were modest bilateral pleural   effusions.  Note is made of a pacemaker now present with the battery on the   left.  Several pulmonary nodules can be seen in this chest radiograph.  The   density at the right lung base does not appear obviously changed in size in   comparison with a " prior film from 2015.  A second density within the left lung   appears calcified and is stable in size.      CB/IN  dd: 01/04/2018 20:48:05 (CST)  td: 01/05/2018 08:56:58 (CST)  Doc ID   #[id number]  Job ID #[id number]    CC:       Review of Systems   Constitutional: Positive for fatigue. Negative for fever.   HENT: Negative for postnasal drip, sinus pressure, voice change and congestion.    Respiratory: Positive for dyspnea on extertion. Negative for cough, sputum production, shortness of breath and wheezing.    Cardiovascular: Positive for leg swelling. Negative for chest pain.   Genitourinary: Negative for difficulty urinating.   Musculoskeletal: Negative for arthralgias and back pain.   Skin: Negative for rash.   Gastrointestinal: Negative for abdominal pain and acid reflux.   Neurological: Negative for dizziness and weakness.   Hematological: Negative for adenopathy.       Objective:      Physical Exam   Constitutional: She is oriented to person, place, and time. She appears well-developed and well-nourished.   HENT:   Head: Normocephalic.   Nose: Nose normal.   Mouth/Throat: No oropharyngeal exudate.   Neck: Normal range of motion. No JVD present. No tracheal deviation present. No thyromegaly present.   Cardiovascular: Normal rate and regular rhythm.    Murmur (2-3/6 syst M at apex) heard.  Pulmonary/Chest: Symmetric chest wall expansion. No stridor. She has no wheezes. She has no rhonchi. She has rales (bibasilar rales). She exhibits no tenderness.   Abdominal: Soft. There is no tenderness.   Musculoskeletal: She exhibits no edema.   Lymphadenopathy:     She has no cervical adenopathy.   Neurological: She is alert and oriented to person, place, and time.   Skin: Skin is warm and dry. No rash noted. No erythema. Nails show no clubbing.   Psychiatric: She has a normal mood and affect.   Vitals reviewed.    Personal Diagnostic Review    No flowsheet data found.      Assessment:       1. Pulmonary hypertension    2. S/P  TAVR (transcatheter aortic valve replacement)    3. Severe aortic stenosis by prior echocardiogram    4. Long-term (current) use of anticoagulants, INR goal 2.0-3.0    5. Multiple lung nodules        Outpatient Encounter Prescriptions as of 1/4/2018   Medication Sig Dispense Refill    acetaminophen (TYLENOL) 325 MG tablet Take 325 mg by mouth every 6 (six) hours as needed for Pain.      alpha lipoic acid 600 mg Cap Take 600 mg by mouth Daily. (Patient taking differently: Take 100 mg by mouth Daily. ) 100 each 12    aspirin 81 mg Tab Take 81 mg by mouth once daily.       benzonatate (TESSALON) 100 MG capsule       blood sugar diagnostic (BLOOD GLUCOSE TEST) Strp 1 strip by Misc.(Non-Drug; Combo Route) route once daily. Currently using Nova max plus meter. 50 strip 11    bumetanide (BUMEX) 2 MG tablet Take 1 tablet (2 mg total) by mouth 2 (two) times daily. (Patient taking differently: Take 1 mg by mouth every other day. ) 60 tablet 11    CINNAMON BARK (CINNAMON ORAL) Take 1,000 mg by mouth 2 (two) times daily.      digoxin (LANOXIN) 125 mcg tablet Take 1 tablet (0.125 mg total) by mouth once daily. 30 tablet 11    docusate sodium (COLACE) 100 MG capsule Take 100 mg by mouth. 1 Capsule Oral Every day      metoprolol tartrate 75 mg Tab Take 75 mg by mouth 2 (two) times daily. 60 tablet 2    mupirocin (BACTROBAN) 2 % ointment Apply topically as needed.      potassium chloride (KLOR-CON) 10 MEQ TbSR Take 1 tablet (10 mEq total) by mouth once daily. (Patient taking differently: Take 10 mEq by mouth every other day. ) 30 tablet 0    simvastatin (ZOCOR) 20 MG tablet TAKE 1 TABLET EVERY EVENING 90 tablet 0    tiZANidine (ZANAFLEX) 2 MG tablet       traZODone (DESYREL) 100 MG tablet Take 1 tablet (100 mg total) by mouth nightly as needed for Insomnia. 30 tablet 2    triamcinolone acetonide 0.025% (KENALOG) 0.025 % cream Use bid as needed. 80 g 1    triamterene-hydrochlorothiazide 37.5-25 mg (DYAZIDE) 37.5-25  mg per capsule Take 1 capsule by mouth once daily. 90 capsule 1    warfarin (COUMADIN) 2 MG tablet Take 2 pills by mouth daily or as directed per the [facility] 175 tablet 3     No facility-administered encounter medications on file as of 1/4/2018.      Orders Placed This Encounter   Procedures    X-Ray Chest PA And Lateral     Standing Status:   Future     Standing Expiration Date:   1/4/2019     Scheduling Instructions:      Requests [facility] for xray.     Plan:    No specific treatment recommendations at present.  Repeat CXR in 6 months (phone report).  Continue monitoring weight/peripheral edema and diuretic therapy per cardiology.

## 2018-01-04 NOTE — PATIENT INSTRUCTIONS
No specific treatment recommendations at present.  Repeat CXR in 6 months (phone report).  Continue monitoring weight/peripheral edema and diuretic therapy per cardiology.

## 2018-01-07 LAB
ACID FAST MOD KINY STN SPEC: NORMAL
MYCOBACTERIUM SPEC QL CULT: NORMAL

## 2018-01-08 ENCOUNTER — OUTPATIENT CASE MANAGEMENT (OUTPATIENT)
Dept: ADMINISTRATIVE | Facility: OTHER | Age: 83
End: 2018-01-08

## 2018-01-08 ENCOUNTER — TELEPHONE (OUTPATIENT)
Dept: FAMILY MEDICINE | Facility: CLINIC | Age: 83
End: 2018-01-08

## 2018-01-08 ENCOUNTER — ANTI-COAG VISIT (OUTPATIENT)
Dept: CARDIOLOGY | Facility: CLINIC | Age: 83
End: 2018-01-08

## 2018-01-08 ENCOUNTER — HOSPITAL ENCOUNTER (EMERGENCY)
Facility: HOSPITAL | Age: 83
Discharge: HOME | End: 2018-01-09
Attending: EMERGENCY MEDICINE
Payer: MEDICARE

## 2018-01-08 DIAGNOSIS — Z86.73 HX-TIA (TRANSIENT ISCHEMIC ATTACK): ICD-10-CM

## 2018-01-08 DIAGNOSIS — Z79.01 LONG-TERM (CURRENT) USE OF ANTICOAGULANTS, INR GOAL 2.0-3.0: ICD-10-CM

## 2018-01-08 DIAGNOSIS — S09.90XA INJURY OF HEAD, INITIAL ENCOUNTER: Primary | ICD-10-CM

## 2018-01-08 DIAGNOSIS — M25.561 ACUTE PAIN OF RIGHT KNEE: ICD-10-CM

## 2018-01-08 DIAGNOSIS — M25.562 LEFT KNEE PAIN: ICD-10-CM

## 2018-01-08 DIAGNOSIS — Z95.3 S/P TAVR (TRANSCATHETER AORTIC VALVE REPLACEMENT), BIOPROSTHETIC: Primary | ICD-10-CM

## 2018-01-08 DIAGNOSIS — R52 PAIN: ICD-10-CM

## 2018-01-08 DIAGNOSIS — S00.03XA HEMATOMA OF SCALP, INITIAL ENCOUNTER: ICD-10-CM

## 2018-01-08 DIAGNOSIS — Z00.6 EXAMINATION OF PARTICIPANT IN CLINICAL TRIAL: ICD-10-CM

## 2018-01-08 LAB — INR PPP: 2.2

## 2018-01-08 PROCEDURE — 99284 EMERGENCY DEPT VISIT MOD MDM: CPT | Mod: 25

## 2018-01-08 PROCEDURE — 12011 RPR F/E/E/N/L/M 2.5 CM/<: CPT

## 2018-01-08 NOTE — TELEPHONE ENCOUNTER
----- Message from Elyse Medina RN sent at 1/8/2018  2:13 PM CST -----  Mrs. Garay is calling the clinic now. She fell and hit her head. She is on Coumadin. She wants Dr. Farrell's advise before going to the ED.     Thanks,     Elyse Medina RN, [place] [facility]  Outpatient Case Management  [facility]

## 2018-01-08 NOTE — TELEPHONE ENCOUNTER
Spoke w/Jarrod, informed of 's recommendation to go to the ED. Patient state she will head to [facility].

## 2018-01-08 NOTE — PROGRESS NOTES
Received call from pt. She states she had a fall and hit her head. She denies LOC. Pt is taking Aspirin and Coumadin. Pt states she wants to get Dr. Farrell's advice before going to the hospital. She is calling the clinic now. High priority message sent to Dr. Farrell and staff to inform them of pt's fall and she is calling the clinic. Noted that pt was advised to go to the ED. She is going to [facility]. Will follow up.

## 2018-01-08 NOTE — TELEPHONE ENCOUNTER
----- Message from Elyse Medina RN sent at 1/8/2018  2:13 PM CST -----  Mrs. Garay is calling the clinic now. She fell and hit her head. She is on Coumadin. She wants Dr. Farrell's advise before going to the ED.     Thanks,     Elyse Medina RN, [facility]  Outpatient Case Management  [facility]

## 2018-01-09 ENCOUNTER — OUTPATIENT CASE MANAGEMENT (OUTPATIENT)
Dept: ADMINISTRATIVE | Facility: OTHER | Age: 83
End: 2018-01-09

## 2018-01-09 VITALS
WEIGHT: 134 LBS | BODY MASS INDEX: 22.33 KG/M2 | OXYGEN SATURATION: 95 % | SYSTOLIC BLOOD PRESSURE: 152 MMHG | HEART RATE: 68 BPM | RESPIRATION RATE: 18 BRPM | TEMPERATURE: 97 F | HEIGHT: 65 IN | DIASTOLIC BLOOD PRESSURE: 72 MMHG

## 2018-01-09 LAB — INR PPP: 1.8

## 2018-01-09 PROCEDURE — 12011 RPR F/E/E/N/L/M 2.5 CM/<: CPT

## 2018-01-09 PROCEDURE — 25000003 PHARM REV CODE 250: Performed by: PHYSICIAN ASSISTANT

## 2018-01-09 RX ORDER — MUPIROCIN 20 MG/G
1 OINTMENT TOPICAL
Status: COMPLETED | OUTPATIENT
Start: 2018-01-09 | End: 2018-01-09

## 2018-01-09 RX ORDER — ACETAMINOPHEN 500 MG
500 TABLET ORAL EVERY 4 HOURS PRN
Qty: 20 TABLET | Refills: 0 | Status: SHIPPED | OUTPATIENT
Start: 2018-01-09 | End: 2018-01-16

## 2018-01-09 RX ORDER — MUPIROCIN 20 MG/G
OINTMENT TOPICAL 3 TIMES DAILY
Qty: 15 G | Refills: 0 | Status: SHIPPED | OUTPATIENT
Start: 2018-01-09 | End: 2018-01-16

## 2018-01-09 RX ADMIN — MUPIROCIN 22 G: 20 OINTMENT TOPICAL at 01:01

## 2018-01-09 NOTE — PROGRESS NOTES
Patient called to report that she tested again today and INR is 1.8 -did not call [facility] with today's result, held coumadin 1/08, no more bleeding since midnight, no med changes, no other changes

## 2018-01-09 NOTE — ED TRIAGE NOTES
Pt. Stated that she fell today ( pt. Takes coumadin and stated that her INR was 2.0 this AM). Pt. Denies any lOC. Pt. Stated that she fell forward and hit her head and bilat knee. Denies any pain in her hip.   Pt. Noted to have a hematoma to the left side of her head and bruise to her left eye. Pain is a 8/10 on the pain scale.

## 2018-01-09 NOTE — ED PROVIDER NOTES
Encounter Date: 1/8/2018    SCRIBE #1 NOTE: I, Cordell Terry II, am scribing for, and in the presence of,  Danisha Kaur PA-C. I have scribed the following portions of the note - Other sections scribed: HPI and ROS.       History     Chief Complaint   Patient presents with    Fall     states after foot caught on a rug, hit head and has lac to left side of head and hematoma to Left forehead, also has  Right knee pain denies LOC. Takes coumadin and had a pacemaker and a heart valve placed in October 2017     CC: Fall     HPI: This 89 y.o. female with HTN, HLD, TIA, CAD on chronic anticoagulation due to valve replacement presents to the ED for emergent evaluation of head injury with associated lacerations and knee pain s/p mechanical slip and fall during which pt's tripped over a rug around 1100 this morning. Pt reports she hit the left temporal and parietal region. Denies LOC, NV, changes in vision or HA, dizziness, lightheadedness. Tetanus is UTD. Pt's daughter arrived a few minutes after the incident and has been with the pt since. Pt's daughter denies confusion, lethargy, difficulty speaking. Pt is on Coumadin and states she checks her INR daily and it was 2.2 this morning. No alleviating factors.        The history is provided by the patient. No  was used.     Review of patient's allergies indicates:   Allergen Reactions    Levaquin [levofloxacin]     Doxycycline hyclate Other (See Comments)     Unknown to patient    Iodine and iodide containing products     Neurontin  [gabapentin]      Other reaction(s): Unknown    Norpace  [disopyramide]      Other reaction(s): Hives    Phenytoin sodium extended      Other reaction(s): Muscle pain    Sulfamethoxazole-trimethoprim      Other reaction(s): Muscle cramps     Past Medical History:   Diagnosis Date    Anticoagulant long-term use     Anticoagulated on Coumadin     Aortic valve stenosis     - s/p total aortic valve replacement     Arthritis     Atrial fibrillation     Carotid artery occlusion     Chronic rhinosinusitis     Coronary artery disease     Granulomatous lung disease     Heart failure     Hyperlipidemia     Hypertension     Hypertensive heart disease without CHF (congestive heart failure)     Mitral valve prolapse     PN (peripheral neuropathy)     hereditary?(sister has it also)/idiopathic?/patient thinks from Zocor    Severe aortic stenosis by prior echocardiogram     TIA (transient ischemic attack)     Type 2 diabetes mellitus     Type II or unspecified type diabetes mellitus without mention of complication, not stated as uncontrolled      Past Surgical History:   Procedure Laterality Date    CARDIAC PACEMAKER PLACEMENT  11/2017    CARDIAC VALVE REPLACEMENT  10/17/2017    aorta    SINUS SURGERY      tonsillectomy      TONSILLECTOMY       Family History   Problem Relation Age of Onset    Heart disease Father      49    Heart disease Mother     Thyroid disease Sister     Atrial fibrillation Sister     Atrial fibrillation Sister      neice    Lymphoma Sister 29    Atrial fibrillation Sister     Asthma Neg Hx     Emphysema Neg Hx      Social History   Substance Use Topics    Smoking status: Never Smoker    Smokeless tobacco: Never Used    Alcohol use 0.0 oz/week      Comment: rare     Review of Systems   Constitutional: Negative for activity change, chills, fatigue and fever.   HENT: Negative for ear discharge, ear pain, hearing loss, rhinorrhea and trouble swallowing.    Eyes: Negative for pain, redness and visual disturbance.   Respiratory: Negative for cough and shortness of breath.    Cardiovascular: Negative for chest pain.   Gastrointestinal: Negative for abdominal pain, diarrhea, nausea and vomiting.   Musculoskeletal: Positive for arthralgias (bilateral knees) and joint swelling. Negative for back pain and neck pain.   Skin: Positive for wound. Negative for rash.   Neurological: Negative for  dizziness, weakness, numbness and headaches.   Psychiatric/Behavioral: Negative for confusion.       Physical Exam     Initial Vitals [01/08/18 1519]   BP Pulse Resp Temp SpO2   (!) 168/71 70 16 96.9 °F (36.1 °C) 95 %      MAP       103.33         Physical Exam    Nursing note and vitals reviewed.  Constitutional: She appears well-developed and well-nourished. No distress.   HENT:   Right Ear: External ear normal.   Left Ear: External ear normal.   Nose: Nose normal. No mucosal edema or rhinorrhea. No epistaxis.   Mouth/Throat: Oropharynx is clear and moist.   Mild bruising around superior aspect of pt's left eye with frontal hematoma and temporal hematoma surrounding laceration. No mastoid bruising. No raccoon eyes.    Eyes: Conjunctivae and EOM are normal. Pupils are equal, round, and reactive to light.   Neck: Normal range of motion.   Cardiovascular: Normal rate and regular rhythm. Exam reveals no gallop and no friction rub.    No murmur heard.  Pulmonary/Chest: Breath sounds normal. She has no wheezes. She has no rhonchi. She has no rales.   Musculoskeletal: Normal range of motion.   Significant edema to R medial knee with TTP. TTP to anterior left knee with mild edema. FROM of bilateral knees. No hip TTP. No midline TTP of spine or paraspinal musculature.    Neurological: She is alert and oriented to person, place, and time. She has normal strength. A sensory deficit (bilateral lower extremities (chronic) ) is present. No cranial nerve deficit. Coordination normal.   Skin: Skin is warm and dry.   0.5 cm superficial laceration to left frontal region. 2 cm superficial laceration of left temporal region. No active bleeding     Psychiatric: She has a normal mood and affect.         ED Course   Lac Repair  Date/Time: 1/9/2018 2:30 AM  Performed by: RODOLFO TEJADA.  Authorized by: HEVER DICK   Body area: head/neck  Location details: forehead  Laceration length: 0.5 cm  Foreign bodies: no foreign  bodies  Skin closure: glue        Labs Reviewed - No data to display          Medical Decision Making:   Initial Assessment:   Patient is a 89-year-old female with history of hypertension, diabetes on chronic anticoagulation for heart valve replacement who presents for evaluation of head injury occurred at 1100 AM this morning. Pt denies LOC, NV, changes in vision or hearing. Pt's daughter denies confusion, changes in behavior. Pt is afebrile, well appearing in no distress.  Exam findings as above.  Initial CT head negative for intracranial bleed or skull fracture.  CT C-spine negative for fracture or dislocation.  X-rays of bilateral knees negative for fracture or dislocation. Laceration of frontal region repaired with dermabond per procedure note. Temporal laceration superficial and will not need repair.  Due to patient's chronic anticoagulation use, repeat CT ordered and negative for intracranial bleed. Pt was observed in the ED for approximately 10 hours. Vital signs were stable. She was well appearing. INR was 2.2 prior to injury according to pt who seems to be very compliant. Instructed her to discontinue coumadin for tonight and follow up with coumadin clinic first thing in the morning. PCP follow up. ER return precautions discussed cindlugin worsening symptoms or as needed. Discussed pt with Dr. Ferrari who agrees with assessment and plan.             Scribe Attestation:   Scribe #1: I performed the above scribed service and the documentation accurately describes the services I performed. I attest to the accuracy of the note.    Attending Attestation:           Physician Attestation for Scribe:  Physician Attestation Statement for Scribe #1: I, Danisha Kaur PA-C, reviewed documentation, as scribed by Cordell Pollard II in my presence, and it is both accurate and complete.                 ED Course      Clinical Impression:   The primary encounter diagnosis was Injury of head, initial encounter. Diagnoses  of Pain, Left knee pain, Acute pain of right knee, and Hematoma of scalp, initial encounter were also pertinent to this visit.                           Danisha Kaur PA-C  01/10/18 0223

## 2018-01-09 NOTE — DISCHARGE INSTRUCTIONS
Follow up in Coumadin clinic tomorrow and primary care.     Apply Bactroban as prescribed. Keep scalp lacerations clean, dry and covered.     Return to ER for worsening symptoms, new symptoms, fever, nausea, vomiting, changes in vision, or as needed.

## 2018-01-09 NOTE — PROGRESS NOTES
Patient called 01/09/18 this am to inform us that she had a (Fall) on yesterday. She hit her (Head) the scalp was cut and (Bled). Went to the ER at ( [facility] the Dr controlled the (Bleeding). She held coumadin on her own. She now need to know do she continue her coumadin. Please advise

## 2018-01-11 NOTE — PROGRESS NOTES
Called to follow up with pt. Pt was treated in the ED and discharged to home. Pt states she does have a small laceration but the CT was normal and she is feeling fine. She states she does not feel she needs to have a follow up visit but if she has any problems, she will call for any appt. Case discussed with Dr. Farrell.       Interventions performed:   Review education materials on fall/safety precautions  Review case with Dr. Farrell    Plan:   Follow up and plan to dis-enroll if no further needs.

## 2018-01-12 ENCOUNTER — OUTPATIENT CASE MANAGEMENT (OUTPATIENT)
Dept: ADMINISTRATIVE | Facility: OTHER | Age: 83
End: 2018-01-12

## 2018-01-12 NOTE — PROGRESS NOTES
Returned missed call to pt. Pt is requesting an ED follow up visit. She states she feels ok but wants to have the wound checked. She would like to schedule for next week. Called to schedule appt for pt on Monday 1/15 at 9:40 with Dr. FARIBA Samuel. Pt states her daughter will be off on Monday and will drive her.     Interventions performed:   Facilitate medical appt    PLan:   Meet with pt at clinic appt.

## 2018-01-15 ENCOUNTER — OFFICE VISIT (OUTPATIENT)
Dept: FAMILY MEDICINE | Facility: CLINIC | Age: 83
End: 2018-01-15
Payer: MEDICARE

## 2018-01-15 ENCOUNTER — ANTI-COAG VISIT (OUTPATIENT)
Dept: CARDIOLOGY | Facility: CLINIC | Age: 83
End: 2018-01-15

## 2018-01-15 VITALS
BODY MASS INDEX: 22.73 KG/M2 | SYSTOLIC BLOOD PRESSURE: 140 MMHG | TEMPERATURE: 98 F | HEIGHT: 65 IN | HEART RATE: 63 BPM | WEIGHT: 136.44 LBS | DIASTOLIC BLOOD PRESSURE: 70 MMHG | OXYGEN SATURATION: 98 %

## 2018-01-15 DIAGNOSIS — Z79.01 LONG-TERM (CURRENT) USE OF ANTICOAGULANTS, INR GOAL 2.0-3.0: ICD-10-CM
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
## 2018-01-15 DIAGNOSIS — M25.569 KNEE PAIN, UNSPECIFIED CHRONICITY, UNSPECIFIED LATERALITY: ICD-10-CM

## 2018-01-15 DIAGNOSIS — Z86.73 HX-TIA (TRANSIENT ISCHEMIC ATTACK): ICD-10-CM

## 2018-01-15 DIAGNOSIS — T14.8XXA HEMATOMA: ICD-10-CM

## 2018-01-15 LAB — INR PPP: 2.2

## 2018-01-15 PROCEDURE — 99999 PR PBB SHADOW E&M-EST. PATIENT-LVL III: CPT | Mod: PBBFAC,,, | Performed by: FAMILY MEDICINE

## 2018-01-15 PROCEDURE — 99214 OFFICE O/P EST MOD 30 MIN: CPT | Mod: S$PBB,,, | Performed by: FAMILY MEDICINE

## 2018-01-15 PROCEDURE — 99213 OFFICE O/P EST LOW 20 MIN: CPT | Mod: PBBFAC,PO | Performed by: FAMILY MEDICINE

## 2018-01-15 NOTE — PROGRESS NOTES
Office Visit    Patient Name: Adela Garay    : 1927  MRN: [id number]      Assessment/Plan:  Adela Garay is a 89 y.o. female who presents today for :    Fall, subsequent encounter  Hematoma  Knee pain, unspecified chronicity, unspecified laterality    -expectant management  -discussed fall precautions with patient and review of med list  -knee swelling improved, may use compress and Tylenol as needed for pain. Knee stretches discussed.  -INR therapeutic, discussed close monitoring       ED XRs of knees, and CT head and Cervical spine reviewed (18)    Follow-up for worsening Sx. Urgent care/ED precautions provided, any evaluation as needed.     This note was created by combination of typed  and Dragon dictation.  Transcription errors may be present.  If there are any questions, please contact me.    At least 25 minutes were spent today with the patient in the office, which more than half the time was spent in counseling regarding fall precautions and safety and resolution of hematoma. Pt voices understanding of what was discussed and has no other questions at this time.      ----------------------------------------------------------------------------------------------------------------------      HPI:  Adela is a 89 y.o. female who presents today for:    Hospital Follow Up        This patient has multiple medical diagnoses as noted below.    This patient is new to me - she is here with her daughter in law  Patient is here today for Hospital Follow Up  s/p fall - she was evaluated in the ED on 17 for head injury and R knee pain after she fell at home due getting her foot caught in her rug. She lives alone and uses a rolling walker at home. Her PCP has already had pt enrolled in case management.  Since ED visit, pt has not had any more falls. She states her R knee pain is less swollen and pain is getting better and tolerable. She still has bruising over the L eye,     Pt has PMH  of HTN, HLD, TIA, CAD on chronic anticoagulation due to valve replacement in fall 2017 - no vaginal/rectal/gum bleeding - her last INR this morning is 2.2 per home log.      Denies LOC, NV, changes in vision or HA, dizziness, lightheadedness. Tetanus is UTD. Pt's daughter arrived a few minutes after the incident and has been with the pt since. Pt's daughter denies confusion, lethargy, difficulty speaking. Pt is on Coumadin and states she checks her INR daily and it was 2.2 this morning. No alleviating factors.    Denies any HA/F/C/N/V/palpitations/CP/ESTRADA/SOB/vision changes/photophobia or phonophobia/syncope/facial or muscular weakness/neck stiffness/tingling/numbness/abd pain/swelling/bowel or bladder changes.    NO slurring of speech/confusion/falling or gait changes in the past week.      ED XR of knees, and CT head and Cervical spine reviewed (1/8/18)        Additional ROS  No vision changes  No F/C/wt changes/fatigue  No dysphagia/sore throat/rhinorrhea  No CP/SOB/palpitations/swelling  No cough/wheezing/SOB  No nausea/vomiting/abd pain/no diarrhea, no constipation, blood in stool  + chronic left and right knee swelling   No rashes  No weakness/HA/tingling/numbness  No anxiety/depression      Patient Active Problem List   Diagnosis    Hyperlipidemia    Coronary artery disease    Hx-TIA (transient ischemic attack)    Long-term (current) use of anticoagulants, INR goal 2.0-3.0    Benign hypertensive heart disease with congestive heart failure    Pulmonary hypertension    Carotid arterial disease    Carotid aneurysm, left    Atrial fibrillation    Controlled type 2 diabetes mellitus with microalbuminuria    DDD (degenerative disc disease), cervical    Lumbar disc disease    Multiple lung nodules    Mild major depression    Idiopathic peripheral neuropathy    Atherosclerosis of aorta    Postinflammatory pulmonary fibrosis    Chronic cough    Coronary artery disease involving native coronary artery  of native heart without angina pectoris    Controlled type 2 diabetes with neuropathy    S/P TAVR (transcatheter aortic valve replacement)    Edema    Pacemaker       Current Medications  Medications reviewed and updated.       Current Outpatient Prescriptions:     acetaminophen (TYLENOL) 500 MG tablet, Take 1 tablet (500 mg total) by mouth every 4 (four) hours as needed for Pain., Disp: 20 tablet, Rfl: 0    alpha lipoic acid 600 mg Cap, Take 600 mg by mouth Daily. (Patient taking differently: Take 100 mg by mouth Daily. ), Disp: 100 each, Rfl: 12    aspirin 81 mg Tab, Take 81 mg by mouth once daily. , Disp: , Rfl:     benzonatate (TESSALON) 100 MG capsule, , Disp: , Rfl:     blood sugar diagnostic (BLOOD GLUCOSE TEST) Strp, 1 strip by Misc.(Non-Drug; Combo Route) route once daily. Currently using Nova max plus meter., Disp: 50 strip, Rfl: 11    bumetanide (BUMEX) 2 MG tablet, Take 1 tablet (2 mg total) by mouth 2 (two) times daily. (Patient taking differently: Take 1 mg by mouth every other day. ), Disp: 60 tablet, Rfl: 11    CINNAMON BARK (CINNAMON ORAL), Take 1,000 mg by mouth 2 (two) times daily., Disp: , Rfl:     digoxin (LANOXIN) 125 mcg tablet, Take 1 tablet (0.125 mg total) by mouth once daily., Disp: 30 tablet, Rfl: 11    docusate sodium (COLACE) 100 MG capsule, Take 100 mg by mouth. 1 Capsule Oral Every day, Disp: , Rfl:     metoprolol tartrate 75 mg Tab, Take 75 mg by mouth 2 (two) times daily., Disp: 60 tablet, Rfl: 2    mupirocin (BACTROBAN) 2 % ointment, Apply topically 3 (three) times daily., Disp: 15 g, Rfl: 0    potassium chloride (KLOR-CON) 10 MEQ TbSR, Take 1 tablet (10 mEq total) by mouth once daily. (Patient taking differently: Take 10 mEq by mouth every other day. ), Disp: 30 tablet, Rfl: 0    simvastatin (ZOCOR) 20 MG tablet, TAKE 1 TABLET EVERY EVENING, Disp: 90 tablet, Rfl: 0    tiZANidine (ZANAFLEX) 2 MG tablet, , Disp: , Rfl:     traZODone (DESYREL) 100 MG tablet, Take 1  tablet (100 mg total) by mouth nightly as needed for Insomnia., Disp: 30 tablet, Rfl: 2    triamcinolone acetonide 0.025% (KENALOG) 0.025 % cream, Use bid as needed., Disp: 80 g, Rfl: 1    triamterene-hydrochlorothiazide 37.5-25 mg (DYAZIDE) 37.5-25 mg per capsule, Take 1 capsule by mouth once daily., Disp: 90 capsule, Rfl: 1    warfarin (COUMADIN) 2 MG tablet, Take 2 pills by mouth daily or as directed per the [facility], Disp: 175 tablet, Rfl: 3    Past Surgical History:   Procedure Laterality Date    CARDIAC PACEMAKER PLACEMENT  11/2017    CARDIAC VALVE REPLACEMENT  10/17/2017    aorta    SINUS SURGERY      tonsillectomy      TONSILLECTOMY         Family History   Problem Relation Age of Onset    Heart disease Father      49    Heart disease Mother     Thyroid disease Sister     Atrial fibrillation Sister     Atrial fibrillation Sister      neice    Lymphoma Sister 29    Atrial fibrillation Sister     Asthma Neg Hx     Emphysema Neg Hx        Social History     Social History    Marital status:      Spouse name: N/A    Number of children: 6    Years of education: 2 college     Occupational History    retired housewife      Social History Main Topics    Smoking status: Never Smoker    Smokeless tobacco: Never Used    Alcohol use 0.0 oz/week      Comment: rare    Drug use: No    Sexual activity: No     Other Topics Concern    Not on file     Social History Narrative    No narrative on file           Allergies   Review of patient's allergies indicates:   Allergen Reactions    Levaquin [levofloxacin]     Doxycycline hyclate Other (See Comments)     Unknown to patient    Iodine and iodide containing products     Neurontin  [gabapentin]      Other reaction(s): Unknown    Norpace  [disopyramide]      Other reaction(s): Hives    Phenytoin sodium extended      Other reaction(s): Muscle pain    Sulfamethoxazole-trimethoprim      Other reaction(s): Muscle cramps  "            Review of Systems  See HPI      Physical Exam  BP (!) 140/70   Pulse 63   Temp 97.5 °F (36.4 °C) (Oral)   Ht 5' 5" (1.651 m)   Wt 61.9 kg (136 lb 7.4 oz)   SpO2 98%   BMI 22.71 kg/m²     GEN: NAD, well developed, pleasant, well nourished  HEENT: NCAT, PERRLA, EOMI, sclera clear, anicteric, O/P clear, MMM with no lesions. Very mild bruise noted around left eye with Left frontal hematoma and Left temporal hematoma - no active bleeding. No evidence of mastoid bruising nor raccoon eyes.   NECK: normal, supple with midline trachea, no LAD, no thyromegaly  LUNGS: CTAB, no w/r/r, no increased work of breathing   HEART: RRR, normal S1 and S2, no m/r/g, no edema  ABD: s/nt/nd, NABS  SKIN: normal turgor, no rashes  PSYCH: AOx3, appropriate mood and affect  MSK: warm/well perfused, normal ROM in all 4 extremities, no c/c.  Moderate effusion of R medial knee - patient states this is her normal baseline before fall - with mild TTP. Left knee with mild baseline edema per patient. On my exam, pt has FROM of both knees - no hip TTP. Normal ROM of spine with no TTP.        "

## 2018-01-18 ENCOUNTER — OUTPATIENT CASE MANAGEMENT (OUTPATIENT)
Dept: ADMINISTRATIVE | Facility: OTHER | Age: 83
End: 2018-01-18

## 2018-01-18 NOTE — PROGRESS NOTES
Follow up completed with pt. Pt states she still has a lump on her head. She denies bruising. She continues to apply to the area. She states the swelling has gone down in her knee. Emergency preparedness assessment for cold weather advisory completed.     Interventions performed:    Review fall safety precautions and head trauma precautions  Complete emergency preparedness assessment    Plan:   Follow up on hematoma to forehead  Continue education    [x] Please be sure to have a supply of water, non-perishable food items, flashlights and batteries with you in your house.   [x] Please be sure you bring all of your medications with you. Bring at least a five day supply. It is best to bring your medication bottles, in case you are displaced for a longer period of time, therefore you can get your medication refilled from your temporary location.   [x] Please bring sure to bring any DME that you may need. This includes walkers, wheelchairs, shower chairs, nebulizer machine, etc.   [] If you are a diabetic- be sure to bring your glucometer and all glucometer monitoring supplies.   [] If you have high blood pressure- be sure to bring your blood pressure cuff so you can continue to monitor.   [] If you have CHF-be sure to bring your scale so you can continue to monitor.  [] If on PEG feeding- be sure to bring tube feedings and feeding supplies.  [] If on oxygen- be sure to bring all of your oxygen supplies: Cannula, portable tanks, concentrator, etc. Also contact you Oxygen Supply Company to find out the nearest location of an oxygen supply company to where you will be located. ([employer]: [phone], [facility]: [phone], [facility]:[phone], [employer]: [phone]).   [] If you receive hemodialysis- you should already have a plan in place with your dialysis center. If you do not know where you need to evacuate to in order to be close to a dialysis center- reach out to your dialysis center for  further direction. ([facility] [employer] service line: [phone], [facility] [employer] service line [phone])  [] If receiving treatment at an infusion center- please contact the infusion center to find out which infusion center they have a contract with. Also ask your infusion center for location of the infusion center they are in contract with, so if need be you can evacuate to that area.   Office of Emergency Preparedness Phone number:  [] [facility]: [phone]  [x] [facility]: [phone]  [] [facility]: [phone]  [] [facility]: [phone]  [] [facility]: [phone]  [] [facility]: [phone]  [] [facility]: [phone]  [] [facility]: [phone]  [] [facility]: [phone]  [] [facility]: [phone]  [] [facility]: [phone]  [] [facility]: [phone]  [] [facility]: [phone]    [] [facility]:  [phone]   [] [facility]:  [phone]   [] [facility]:  [phone]   [] [facility]:  [phone]   [] [facility]:  [phone]   [] [facility]: [phone]   [] [facility]:  [phone]   [] [facility]:  [phone]   [] [facility]:  [phone]   [] [facility]:  [phone]   [] [facility]:  [phone]   [] [facility]:  [phone]   [] [facility]:  [phone]   [] [facility]:  [phone]   [] [facility]:  [phone]   [] [facility]: [phone]   [] [facility]:  [phone]   [] [place]: [phone]   [] [facility]: [phone]

## 2018-01-26 ENCOUNTER — TELEPHONE (OUTPATIENT)
Dept: CARDIOLOGY | Facility: CLINIC | Age: 83
End: 2018-01-26

## 2018-01-26 DIAGNOSIS — I11.0 BENIGN HYPERTENSIVE HEART DISEASE WITH CONGESTIVE HEART FAILURE: Primary | ICD-10-CM

## 2018-01-29 ENCOUNTER — ANTI-COAG VISIT (OUTPATIENT)
Dept: CARDIOLOGY | Facility: CLINIC | Age: 83
End: 2018-01-29
Payer: MEDICARE

## 2018-01-29 DIAGNOSIS — Z79.01 LONG-TERM (CURRENT) USE OF ANTICOAGULANTS, INR GOAL 2.0-3.0: ICD-10-CM

## 2018-01-29 DIAGNOSIS — Z86.73 HX-TIA (TRANSIENT ISCHEMIC ATTACK): ICD-10-CM

## 2018-01-29 LAB — INR PPP: 2.6

## 2018-01-29 PROCEDURE — G0250 MD INR TEST REVIE INTER MGMT: HCPCS | Mod: ,,,

## 2018-01-30 ENCOUNTER — LAB VISIT (OUTPATIENT)
Dept: LAB | Facility: HOSPITAL | Age: 83
End: 2018-01-30
Payer: MEDICARE

## 2018-01-30 DIAGNOSIS — I11.0 BENIGN HYPERTENSIVE HEART DISEASE WITH CONGESTIVE HEART FAILURE: ICD-10-CM

## 2018-01-30 LAB
ANION GAP SERPL CALC-SCNC: 8 MMOL/L
BUN SERPL-MCNC: 24 MG/DL
CALCIUM SERPL-MCNC: 9.8 MG/DL
CHLORIDE SERPL-SCNC: 101 MMOL/L
CO2 SERPL-SCNC: 32 MMOL/L
CREAT SERPL-MCNC: 0.8 MG/DL
EST. GFR  (AFRICAN AMERICAN): >60 ML/MIN/1.73 M^2
EST. GFR  (NON AFRICAN AMERICAN): >60 ML/MIN/1.73 M^2
GLUCOSE SERPL-MCNC: 93 MG/DL
POTASSIUM SERPL-SCNC: 4 MMOL/L
SODIUM SERPL-SCNC: 141 MMOL/L

## 2018-01-30 PROCEDURE — 80048 BASIC METABOLIC PNL TOTAL CA: CPT

## 2018-01-30 PROCEDURE — 36415 COLL VENOUS BLD VENIPUNCTURE: CPT | Mod: PO

## 2018-02-01 ENCOUNTER — OUTPATIENT CASE MANAGEMENT (OUTPATIENT)
Dept: ADMINISTRATIVE | Facility: OTHER | Age: 83
End: 2018-02-01

## 2018-02-01 NOTE — PROGRESS NOTES
Pt returned call. She states she is doing fine. Reviewed all upcoming appts. Denies falls. Hematoma has reviewed. Reviewed all education materials. Pt's last Inr was 2.6 on Monday. Pt is compliant with all meds and appts.  Pt denies any further needs.       Plan:   Pt dis-enrolled from [facility]

## 2018-02-01 NOTE — PROGRESS NOTES
Attempted to contact pt for follow up. No answer and unable to leave message.       Plan:   Follow up on hematoma to forehead  Continue education

## 2018-02-05 ENCOUNTER — HOSPITAL ENCOUNTER (OUTPATIENT)
Dept: CARDIOLOGY | Facility: CLINIC | Age: 83
Discharge: HOME | End: 2018-02-05
Payer: MEDICARE

## 2018-02-05 ENCOUNTER — CLINICAL SUPPORT (OUTPATIENT)
Dept: ELECTROPHYSIOLOGY | Facility: CLINIC | Age: 83
End: 2018-02-05
Attending: INTERNAL MEDICINE
Payer: MEDICARE

## 2018-02-05 ENCOUNTER — OFFICE VISIT (OUTPATIENT)
Dept: ELECTROPHYSIOLOGY | Facility: CLINIC | Age: 83
End: 2018-02-05
Payer: MEDICARE

## 2018-02-05 VITALS
DIASTOLIC BLOOD PRESSURE: 60 MMHG | HEART RATE: 69 BPM | SYSTOLIC BLOOD PRESSURE: 140 MMHG | WEIGHT: 136.25 LBS | HEIGHT: 65 IN | BODY MASS INDEX: 22.7 KG/M2

## 2018-02-05 DIAGNOSIS — I25.10 CORONARY ARTERY DISEASE INVOLVING NATIVE CORONARY ARTERY OF NATIVE HEART WITHOUT ANGINA PECTORIS: ICD-10-CM

## 2018-02-05 DIAGNOSIS — Z95.0 CARDIAC PACEMAKER IN SITU: ICD-10-CM

## 2018-02-05 DIAGNOSIS — I45.9 HB (HEART BLOCK): ICD-10-CM

## 2018-02-05 DIAGNOSIS — Z86.73 HX-TIA (TRANSIENT ISCHEMIC ATTACK): ICD-10-CM

## 2018-02-05 DIAGNOSIS — I48.19 PERSISTENT ATRIAL FIBRILLATION: Primary | ICD-10-CM

## 2018-02-05 DIAGNOSIS — R80.9 CONTROLLED TYPE 2 DIABETES MELLITUS WITH MICROALBUMINURIA, WITHOUT LONG-TERM CURRENT USE OF INSULIN: ICD-10-CM

## 2018-02-05 DIAGNOSIS — I27.20 PULMONARY HYPERTENSION: ICD-10-CM

## 2018-02-05 DIAGNOSIS — E11.29 CONTROLLED TYPE 2 DIABETES MELLITUS WITH MICROALBUMINURIA, WITHOUT LONG-TERM CURRENT USE OF INSULIN: ICD-10-CM

## 2018-02-05 DIAGNOSIS — Z95.2 S/P TAVR (TRANSCATHETER AORTIC VALVE REPLACEMENT): ICD-10-CM

## 2018-02-05 DIAGNOSIS — Z79.01 LONG-TERM (CURRENT) USE OF ANTICOAGULANTS, INR GOAL 2.0-3.0: ICD-10-CM

## 2018-02-05 DIAGNOSIS — I45.9 HEART BLOCK: ICD-10-CM

## 2018-02-05 PROCEDURE — 99214 OFFICE O/P EST MOD 30 MIN: CPT | Mod: S$PBB,,, | Performed by: NURSE PRACTITIONER

## 2018-02-05 PROCEDURE — 1159F MED LIST DOCD IN RCRD: CPT | Mod: ,,, | Performed by: NURSE PRACTITIONER

## 2018-02-05 PROCEDURE — 93010 ELECTROCARDIOGRAM REPORT: CPT | Mod: S$PBB,,, | Performed by: INTERNAL MEDICINE

## 2018-02-05 PROCEDURE — 93005 ELECTROCARDIOGRAM TRACING: CPT | Mod: PBBFAC | Performed by: INTERNAL MEDICINE

## 2018-02-05 PROCEDURE — 1126F AMNT PAIN NOTED NONE PRSNT: CPT | Mod: ,,, | Performed by: NURSE PRACTITIONER

## 2018-02-05 PROCEDURE — 99999 PR PBB SHADOW E&M-EST. PATIENT-LVL III: CPT | Mod: PBBFAC,,, | Performed by: NURSE PRACTITIONER

## 2018-02-05 PROCEDURE — 99213 OFFICE O/P EST LOW 20 MIN: CPT | Mod: PBBFAC | Performed by: NURSE PRACTITIONER

## 2018-02-05 PROCEDURE — 93279 PRGRMG DEV EVAL PM/LDLS PM: CPT | Mod: PBBFAC | Performed by: INTERNAL MEDICINE

## 2018-02-05 NOTE — PROGRESS NOTES
Subjective:    Patient ID:  Adela Garay is a 89 y.o. female who presents for a Pacemaker Check.     Adela Garay is a patient of Dr. Delgado.    HPI     Ms Garay is the  of a WWII vet ([employer]), with diet-controlled DM, chronic AF (on Coumadin), a remote hx of TIA, HTN, who was referred by Dr. Ivy for evaluation of severe AS. She had developed increased SOB/ESTRADA. Had an existing RBBB pre-procedure.   Underwent successful placement of a 29 mm Portico TAVR (10/17/17) via R TF access>>developed CHB post-TAVR>>underwent successful SC PPM placement that afternoon    Since the procedures, Ms. Garay reports feeling well>>she notes (baseline) positional LH>>occasional>>w/episodes lasting seconds in duration. Since discharge, she reports that she is no longer SOB/, and she notes significant improved in her energy level. She denies chest pain, overt dizziness, palpitations, or syncope. She experienced a mechanical fall (18) fro which, she presented to the ED>>managed conservatively and discharged home>>no further fall recurrence.     Recent cardiac studies:  Echo (17) CONCLUSIONS:     1 - Mild left ventricular enlargement.     2 - Normal left ventricular systolic function (EF 60-65%).     3 - Right ventricular enlargement with moderately depressed systolic function.     4 - Severe biatrial enlargement.     5 - Severe tricuspid regurgitation.     6 - Moderate to severe mitral regurgitation.     7 - Pulmonary hypertension. The estimated PA systolic pressure is at least 59 mmHg.     8 - Markedly increased central venous pressure (the IVC is 37mm in diameter and does not collapse at all).     9 - Normally functioning 29mm Portico bioprosthesis in the aortic position with trivial AI    Device Interrogation (18) reveals stable lead and device function. No NSVT noted. She paces 40% in the RV. Estimated battery longevity >10 years.     I reviewed today's ECG which demonstrated AF  w/demand pacing at 69 bpm; , and QTc 514.    Review of Systems   Constitution: Negative for diaphoresis and malaise/fatigue.   HENT: Negative for nosebleeds.    Eyes: Negative for double vision.   Cardiovascular: Negative for chest pain, dyspnea on exertion, irregular heartbeat, near-syncope, palpitations and syncope.   Respiratory: Negative for shortness of breath.    Skin: Negative.    Musculoskeletal: Positive for falls and stiffness.   Gastrointestinal: Negative for hematemesis and hematochezia.   Genitourinary: Negative for hematuria.   Neurological: Positive for light-headedness (postional; occasional; short-lived). Negative for dizziness.   Psychiatric/Behavioral: Negative for altered mental status.        Objective:    Physical Exam   Constitutional: She is oriented to person, place, and time. She appears well-developed and well-nourished.   HENT:   Head: Normocephalic and atraumatic.   Eyes: Pupils are equal, round, and reactive to light.   Cardiovascular: Normal rate.    Pulmonary/Chest: Effort normal.   Neurological: She is alert and oriented to person, place, and time.   Skin: She is not diaphoretic.   Vitals reviewed.        Assessment:       1. Persistent atrial fibrillation    2. Pulmonary hypertension    3. Coronary artery disease involving native coronary artery of native heart without angina pectoris    4. Controlled type 2 diabetes mellitus with microalbuminuria, without long-term current use of insulin    5. Hx-TIA (transient ischemic attack)    6. Long-term (current) use of anticoagulants, INR goal 2.0-3.0    7. Cardiac pacemaker in situ    8. S/P TAVR (transcatheter aortic valve replacement)         Plan:       Ms. Garay is doing well from a device perspective with stable lead and device function without ventricular arrhythmia noted>>chronic AF; rate-controlled on Lopressor and anticoagulated on warfarin.     Continue current medication regimen and device settings.   Follow up with   [facility] (Cardiology) as directed.   Follow up in device clinic as scheduled.   Follow up in EP clinic in 1 year, sooner as needed.     Tiffanie Duran, MN, APRN, FNP-C          (A copy of today's note was sent to Porfirio Ivy and Sandy.)

## 2018-02-06 ENCOUNTER — TELEPHONE (OUTPATIENT)
Dept: ELECTROPHYSIOLOGY | Facility: CLINIC | Age: 83
End: 2018-02-06

## 2018-02-06 DIAGNOSIS — I45.9 HEART BLOCK: Primary | ICD-10-CM

## 2018-02-12 ENCOUNTER — ANTI-COAG VISIT (OUTPATIENT)
Dept: CARDIOLOGY | Facility: CLINIC | Age: 83
End: 2018-02-12

## 2018-02-12 DIAGNOSIS — Z79.01 LONG-TERM (CURRENT) USE OF ANTICOAGULANTS, INR GOAL 2.0-3.0: ICD-10-CM

## 2018-02-12 DIAGNOSIS — Z86.73 HX-TIA (TRANSIENT ISCHEMIC ATTACK): ICD-10-CM

## 2018-02-12 LAB — INR PPP: 2.3

## 2018-02-22 ENCOUNTER — TELEPHONE (OUTPATIENT)
Dept: ELECTROPHYSIOLOGY | Facility: CLINIC | Age: 83
End: 2018-02-22

## 2018-02-22 NOTE — TELEPHONE ENCOUNTER
----- Message from Sreekanth Dean sent at 2/22/2018  8:36 AM CST -----  Contact: patient  Please call pt at [phone] or [phone]. Patient is not having any problems but has a device question    Thank you

## 2018-02-22 NOTE — TELEPHONE ENCOUNTER
Returned the patient's call on this morning.  Patient was calling to find out if it was okay for her to wear a Medic Alert necklace as it relates to her pacemaker.  Informed the patient this should not interfere with her Pacemaker.  Patient reminded of not to have or use items with magnets near her Pacemaker.  Patient verbalized understanding to all of the above.

## 2018-02-26 ENCOUNTER — ANTI-COAG VISIT (OUTPATIENT)
Dept: CARDIOLOGY | Facility: CLINIC | Age: 83
End: 2018-02-26

## 2018-02-26 DIAGNOSIS — Z86.73 HX-TIA (TRANSIENT ISCHEMIC ATTACK): ICD-10-CM

## 2018-02-26 DIAGNOSIS — Z79.01 LONG-TERM (CURRENT) USE OF ANTICOAGULANTS, INR GOAL 2.0-3.0: ICD-10-CM

## 2018-02-26 LAB — INR PPP: 2.7

## 2018-03-05 NOTE — TELEPHONE ENCOUNTER
----- Message from Leeann Green sent at 3/5/2018  3:36 PM CST -----  Contact: self  Patient has had muscle spasm in her back.  Patient has be taking Ticanigine 2mg. Who should patient see for muscle spasm in her back? Patient can be reached at [phone].      Thanks,

## 2018-03-06 RX ORDER — TIZANIDINE 2 MG/1
2 TABLET ORAL EVERY 6 HOURS
Qty: 30 TABLET | Refills: 0 | Status: SHIPPED | OUTPATIENT
Start: 2018-03-06 | End: 2018-04-16 | Stop reason: SDUPTHER

## 2018-03-06 NOTE — TELEPHONE ENCOUNTER
Refill request. Patient states she take every six hours and as needed. Patient declined pain management referral, states the spasms are not that bad. States she just wanted to know if there are some exercises she can do.

## 2018-03-06 NOTE — TELEPHONE ENCOUNTER
I am happy to refill this medication. We can give her a back booklet which has some back exercises in it that she can do on a regular basis.

## 2018-03-12 ENCOUNTER — ANTI-COAG VISIT (OUTPATIENT)
Dept: CARDIOLOGY | Facility: CLINIC | Age: 83
End: 2018-03-12

## 2018-03-12 DIAGNOSIS — Z95.0 CARDIAC PACEMAKER IN SITU: Primary | ICD-10-CM

## 2018-03-12 DIAGNOSIS — Z86.73 HX-TIA (TRANSIENT ISCHEMIC ATTACK): ICD-10-CM

## 2018-03-12 DIAGNOSIS — Z79.01 LONG-TERM (CURRENT) USE OF ANTICOAGULANTS, INR GOAL 2.0-3.0: ICD-10-CM

## 2018-03-12 LAB — INR PPP: 2.9

## 2018-03-15 ENCOUNTER — CLINICAL SUPPORT (OUTPATIENT)
Dept: FAMILY MEDICINE | Facility: CLINIC | Age: 83
End: 2018-03-15
Payer: MEDICARE

## 2018-03-15 ENCOUNTER — LAB VISIT (OUTPATIENT)
Dept: LAB | Facility: HOSPITAL | Age: 83
End: 2018-03-15
Attending: INTERNAL MEDICINE
Payer: MEDICARE

## 2018-03-15 VITALS — DIASTOLIC BLOOD PRESSURE: 84 MMHG | SYSTOLIC BLOOD PRESSURE: 150 MMHG

## 2018-03-15 DIAGNOSIS — E11.29 CONTROLLED TYPE 2 DIABETES MELLITUS WITH MICROALBUMINURIA, WITHOUT LONG-TERM CURRENT USE OF INSULIN: ICD-10-CM

## 2018-03-15 DIAGNOSIS — E78.5 HYPERLIPIDEMIA, UNSPECIFIED HYPERLIPIDEMIA TYPE: ICD-10-CM

## 2018-03-15 DIAGNOSIS — R80.9 CONTROLLED TYPE 2 DIABETES MELLITUS WITH MICROALBUMINURIA, WITHOUT LONG-TERM CURRENT USE OF INSULIN: ICD-10-CM

## 2018-03-15 DIAGNOSIS — I25.10 CORONARY ARTERY DISEASE INVOLVING NATIVE CORONARY ARTERY OF NATIVE HEART WITHOUT ANGINA PECTORIS: ICD-10-CM

## 2018-03-15 DIAGNOSIS — I27.20 PULMONARY HYPERTENSION: Primary | ICD-10-CM

## 2018-03-15 DIAGNOSIS — Z95.2 S/P TAVR (TRANSCATHETER AORTIC VALVE REPLACEMENT): ICD-10-CM

## 2018-03-15 DIAGNOSIS — Z95.0 PACEMAKER: ICD-10-CM

## 2018-03-15 DIAGNOSIS — Z00.6 EXAMINATION OF PARTICIPANT IN CLINICAL TRIAL: ICD-10-CM

## 2018-03-15 LAB
ANION GAP SERPL CALC-SCNC: 7 MMOL/L
BASOPHILS # BLD AUTO: 0.05 K/UL
BASOPHILS NFR BLD: 0.9 %
BNP SERPL-MCNC: 544 PG/ML
BUN SERPL-MCNC: 16 MG/DL
CALCIUM SERPL-MCNC: 9.9 MG/DL
CHLORIDE SERPL-SCNC: 99 MMOL/L
CO2 SERPL-SCNC: 31 MMOL/L
CREAT SERPL-MCNC: 0.8 MG/DL
DIFFERENTIAL METHOD: ABNORMAL
DIGOXIN SERPL-MCNC: 0.2 NG/ML
EOSINOPHIL # BLD AUTO: 0.2 K/UL
EOSINOPHIL NFR BLD: 2.8 %
ERYTHROCYTE [DISTWIDTH] IN BLOOD BY AUTOMATED COUNT: 15.4 %
EST. GFR  (AFRICAN AMERICAN): >60 ML/MIN/1.73 M^2
EST. GFR  (NON AFRICAN AMERICAN): >60 ML/MIN/1.73 M^2
GLUCOSE SERPL-MCNC: 98 MG/DL
HCT VFR BLD AUTO: 37 %
HGB BLD-MCNC: 11.4 G/DL
IMM GRANULOCYTES # BLD AUTO: 0.01 K/UL
IMM GRANULOCYTES NFR BLD AUTO: 0.2 %
LYMPHOCYTES # BLD AUTO: 1.2 K/UL
LYMPHOCYTES NFR BLD: 21.2 %
MCH RBC QN AUTO: 27.3 PG
MCHC RBC AUTO-ENTMCNC: 30.8 G/DL
MCV RBC AUTO: 89 FL
MONOCYTES # BLD AUTO: 0.9 K/UL
MONOCYTES NFR BLD: 16.1 %
NEUTROPHILS # BLD AUTO: 3.4 K/UL
NEUTROPHILS NFR BLD: 58.8 %
NRBC BLD-RTO: 0 /100 WBC
PLATELET # BLD AUTO: 174 K/UL
PMV BLD AUTO: 10.9 FL
POTASSIUM SERPL-SCNC: 3.7 MMOL/L
RBC # BLD AUTO: 4.18 M/UL
SODIUM SERPL-SCNC: 137 MMOL/L
WBC # BLD AUTO: 5.79 K/UL

## 2018-03-15 PROCEDURE — 80048 BASIC METABOLIC PNL TOTAL CA: CPT

## 2018-03-15 PROCEDURE — 80162 ASSAY OF DIGOXIN TOTAL: CPT

## 2018-03-15 PROCEDURE — 85025 COMPLETE CBC W/AUTO DIFF WBC: CPT

## 2018-03-15 PROCEDURE — 36415 COLL VENOUS BLD VENIPUNCTURE: CPT | Mod: PO

## 2018-03-15 PROCEDURE — 83880 ASSAY OF NATRIURETIC PEPTIDE: CPT

## 2018-03-15 NOTE — PROGRESS NOTES
Adela Garay 89 y.o. female is here today for Blood Pressure check.   History of HTN yes.    Review of patient's allergies indicates:   Allergen Reactions    Levaquin [levofloxacin]     Doxycycline hyclate Other (See Comments)     Unknown to patient    Iodine and iodide containing products     Neurontin  [gabapentin]      Other reaction(s): Unknown    Norpace  [disopyramide]      Other reaction(s): Hives    Phenytoin sodium extended      Other reaction(s): Muscle pain    Sulfamethoxazole-trimethoprim      Other reaction(s): Muscle cramps     Creatinine   Date Value Ref Range Status   01/30/2018 0.8 0.5 - 1.4 mg/dL Final     Sodium   Date Value Ref Range Status   01/30/2018 141 136 - 145 mmol/L Final     Potassium   Date Value Ref Range Status   01/30/2018 4.0 3.5 - 5.1 mmol/L Final   ]  Patient verifies taking blood pressure medications on a regular basis at the same time of the day.     Current Outpatient Prescriptions:     alpha lipoic acid 600 mg Cap, Take 600 mg by mouth Daily. (Patient taking differently: Take 100 mg by mouth Daily. ), Disp: 100 each, Rfl: 12    aspirin 81 mg Tab, Take 81 mg by mouth once daily. , Disp: , Rfl:     benzonatate (TESSALON) 100 MG capsule, , Disp: , Rfl:     blood sugar diagnostic (BLOOD GLUCOSE TEST) Strp, 1 strip by Misc.(Non-Drug; Combo Route) route once daily. Currently using Nova max plus meter., Disp: 50 strip, Rfl: 11    bumetanide (BUMEX) 2 MG tablet, Take 1 tablet (2 mg total) by mouth 2 (two) times daily. (Patient taking differently: Take 1 mg by mouth every other day. ), Disp: 60 tablet, Rfl: 11    CINNAMON BARK (CINNAMON ORAL), Take 1,000 mg by mouth 2 (two) times daily., Disp: , Rfl:     digoxin (LANOXIN) 125 mcg tablet, Take 1 tablet (0.125 mg total) by mouth once daily., Disp: 30 tablet, Rfl: 11    docusate sodium (COLACE) 100 MG capsule, Take 100 mg by mouth. 1 Capsule Oral Every day, Disp: , Rfl:     metoprolol tartrate 75 mg Tab, Take 75 mg  by mouth 2 (two) times daily., Disp: 60 tablet, Rfl: 2    potassium chloride (KLOR-CON) 10 MEQ TbSR, Take 1 tablet (10 mEq total) by mouth once daily. (Patient taking differently: Take 10 mEq by mouth every other day. ), Disp: 30 tablet, Rfl: 0    simvastatin (ZOCOR) 20 MG tablet, TAKE 1 TABLET EVERY EVENING, Disp: 90 tablet, Rfl: 0    tiZANidine (ZANAFLEX) 2 MG tablet, Take 1 tablet (2 mg total) by mouth every 6 (six) hours. and as need, Disp: 30 tablet, Rfl: 0    traZODone (DESYREL) 100 MG tablet, Take 1 tablet (100 mg total) by mouth nightly as needed for Insomnia., Disp: 30 tablet, Rfl: 2    triamcinolone acetonide 0.025% (KENALOG) 0.025 % cream, Use bid as needed., Disp: 80 g, Rfl: 1    triamterene-hydrochlorothiazide 37.5-25 mg (DYAZIDE) 37.5-25 mg per capsule, Take 1 capsule by mouth once daily., Disp: 90 capsule, Rfl: 1    warfarin (COUMADIN) 2 MG tablet, Take 2 pills by mouth daily or as directed per the [facility]madin Clinic, Disp: 175 tablet, Rfl: 3  Does patient have record of home blood pressure readings no. Readings have been averaging not done.   Last dose of blood pressure medication was taken at yesterday, had labs done this morning so did not take yet.  Patient is asymptomatic.   Complains of no complaints.    Vitals:    03/15/18 0830   BP: (!) 150/84   BP Location: Right arm   Patient Position: Sitting   BP Method: Medium (Manual)         Dr. Farrell notified.

## 2018-03-22 ENCOUNTER — OFFICE VISIT (OUTPATIENT)
Dept: CARDIOLOGY | Facility: CLINIC | Age: 83
End: 2018-03-22
Payer: MEDICARE

## 2018-03-22 VITALS
BODY MASS INDEX: 22.77 KG/M2 | OXYGEN SATURATION: 97 % | SYSTOLIC BLOOD PRESSURE: 153 MMHG | WEIGHT: 136.69 LBS | HEIGHT: 65 IN | DIASTOLIC BLOOD PRESSURE: 70 MMHG | HEART RATE: 60 BPM

## 2018-03-22 DIAGNOSIS — E11.29 CONTROLLED TYPE 2 DIABETES MELLITUS WITH MICROALBUMINURIA, WITHOUT LONG-TERM CURRENT USE OF INSULIN: ICD-10-CM

## 2018-03-22 DIAGNOSIS — I27.20 PULMONARY HYPERTENSION: Primary | ICD-10-CM

## 2018-03-22 DIAGNOSIS — E78.5 HYPERLIPIDEMIA, UNSPECIFIED HYPERLIPIDEMIA TYPE: ICD-10-CM

## 2018-03-22 DIAGNOSIS — I72.0 CAROTID ANEURYSM, LEFT: ICD-10-CM

## 2018-03-22 DIAGNOSIS — I11.0 BENIGN HYPERTENSIVE HEART DISEASE WITH CONGESTIVE HEART FAILURE: ICD-10-CM

## 2018-03-22 DIAGNOSIS — I25.10 CORONARY ARTERY DISEASE INVOLVING NATIVE CORONARY ARTERY OF NATIVE HEART WITHOUT ANGINA PECTORIS: ICD-10-CM

## 2018-03-22 DIAGNOSIS — Z95.2 S/P TAVR (TRANSCATHETER AORTIC VALVE REPLACEMENT): ICD-10-CM

## 2018-03-22 DIAGNOSIS — Z95.0 CARDIAC PACEMAKER IN SITU: ICD-10-CM

## 2018-03-22 DIAGNOSIS — R80.9 CONTROLLED TYPE 2 DIABETES MELLITUS WITH MICROALBUMINURIA, WITHOUT LONG-TERM CURRENT USE OF INSULIN: ICD-10-CM

## 2018-03-22 PROCEDURE — 99999 PR PBB SHADOW E&M-EST. PATIENT-LVL IV: CPT | Mod: PBBFAC,,, | Performed by: INTERNAL MEDICINE

## 2018-03-22 PROCEDURE — 99214 OFFICE O/P EST MOD 30 MIN: CPT | Mod: PBBFAC | Performed by: INTERNAL MEDICINE

## 2018-03-22 PROCEDURE — 99214 OFFICE O/P EST MOD 30 MIN: CPT | Mod: S$PBB,,, | Performed by: INTERNAL MEDICINE

## 2018-03-22 RX ORDER — LISINOPRIL 2.5 MG/1
2.5 TABLET ORAL DAILY
Qty: 90 TABLET | Refills: 3 | Status: SHIPPED | OUTPATIENT
Start: 2018-03-22 | End: 2019-10-05

## 2018-03-22 NOTE — PROGRESS NOTES
Subjective:    Patient ID:  Adela Garay is a 89 y.o. female who presents for follow-up of Valvular Heart Disease      HPI     Severe AS - s/p TAVR 10/17/17, complete heart block - [facility] single PPM 10/17/17  No CAD by [facility] 8/22/17  Chronic A-fib - rate controlled on coumadin, HTN, DM, Hx TIA     Re-admitted after TAVR with CHF 10/30-11/12/17     Ms. Garay is a 89 year old  woman with past medical history of HTN, HLD, DM, aFib, HFpEF, and recent TAVR complicated by PPM placement for CHB (Portico Valve 29mm ) on 10/17/17 for severe AS who presents to the ED with complaints of leg swelling. She states that she has been having issues since she went home after the surgery. She endorses orthopnea, ESTRADA, new cough, leg swelling, and weight gain. Denies issues like this before. Denies Fever. Denies chest pain.      While in ED, chemistry was unremarkable, BNP elevated at 918, troponin negative and CBC stable. CXR with bilateral pleural effusion larger on the right side and atelectatic changes at the lung bases; EKG with atrial fibrillation with paced ventricular rhythm.     Ms. Garay was admitted 10/30 s/p recent TAVR and PPM with acute on chronic diastolic heart failure and edema. She was evaluated by cardiology in ED and assessed by TAVR team in house. On admission her BNP was elevated at 918 and CXR with bilateral pleural effusion larger on the right side and atelectatic changes at the lung bases, this noted to be worsened from prior exam 2 weeks ago. Her diuresis was initiated with lasix 40 mg IVP BID, and titrated up to 60 mg IVP TID with occasional metolazone booster for improved diuresis. Repeat CXR with continued pulmonary edema/pleural fluid and repeat echo 11/7 with markedly increased central venous pressure, RAP of 15, and trivial pericardial effusion, with chemistry continued towards contraction alkalosis thus metolazone was discontinued and lasix IVP decreased to BID, responded appropriately and  chemistry somewhat improved, admission symptoms were also resolving >>> then transitioned to PO lasix dosing, poor response, thus transitioned to bumex, to which she responded very well. She thus far has diuresed with net negative ~18 liters as of current and weight down 16 lbs since admission, she remains at 136-138 lbs, which is likely her new dry weight.  She is no longer oxygen dependant, is ambulating independently without ESTRADA, and all SOB has resolved.      On 11/5 she developed a 101.9 fever and R knee pain.  Ortho was consulted, arthrocentesis performed and resulted in bloody fluid for analysis, however the specimen clotted so cell count not obtained. The bloody tap could be because of OAC with warfarin, however she did have a right septic knee a year ago which require surgical washout. Blood cultures and right knee fluid cultures with NGTD, continued ceftriaxone coverage for appropriate course and discontinued vanc. She developed glossitis most likely d/t ABX, continue nystatin and magic mouthwash as needed, now reports symptoms resolved.       Disposition plans: Home with home health and family care, educated at great length in regards to low sodium diet and fluid restriction. She will also be monitored with digital heart failure program. She follows outpt with home INR and warfarin clinic. Home health to draw labs in one week and report to TAVR team and/or primary cardiologist, she will need repeat echo in approximately 3 months, or sooner if deemed appropriate by TAVR team. Outpt follows already scheduled with primary Cardiologist, TAVR team, and PCP.      Device Interrogation (02/05/18) reveals stable lead and device function. No NSVT noted. She paces 40% in the RV. Estimated battery longevity >10 years.         Echo 11/7/17    1 - Mild left ventricular enlargement.     2 - Normal left ventricular systolic function (EF 60-65%).     3 - Right ventricular enlargement with moderately depressed systolic  function.     4 - Severe biatrial enlargement.     5 - Severe tricuspid regurgitation.     6 - Moderate to severe mitral regurgitation.     7 - Pulmonary hypertension. The estimated PA systolic pressure is at least 59 mmHg.     8 - Markedly increased central venous pressure (the IVC is 37mm in diameter and does not collapse at all).     9 - Normally functioning 29mm Portico bioprosthesis in the aortic position with trivial AI    Labs 3/15/18  K 3.7  Cr 0.8   up from 272  Dig 0.2    Went to the ER 1/8/18  HPI: This 89 y.o. female with HTN, HLD, TIA, CAD on chronic anticoagulation due to valve replacement presents to the ED for emergent evaluation of head injury with associated lacerations and knee pain s/p mechanical slip and fall during which pt's tripped over a rug around 1100 this morning. Pt reports she hit the left temporal and parietal region. Denies LOC, NV, changes in vision or HA, dizziness, lightheadedness. Tetanus is UTD. Pt's daughter arrived a few minutes after the incident and has been with the pt since. Pt's daughter denies confusion, lethargy, difficulty speaking. Pt is on Coumadin and states she checks her INR daily and it was 2.2 this morning. No alleviating factors.    Patient is a 89-year-old female with history of hypertension, diabetes on chronic anticoagulation for heart valve replacement who presents for evaluation of head injury occurred at 1100 AM this morning. Pt denies LOC, NV, changes in vision or hearing. Pt's daughter denies confusion, changes in behavior. Pt is afebrile, well appearing in no distress.  Exam findings as above.  Initial CT head negative for intracranial bleed or skull fracture.  CT C-spine negative for fracture or dislocation.  X-rays of bilateral knees negative for fracture or dislocation. Laceration of frontal region repaired with dermabond per procedure note. Temporal laceration superficial and will not need repair.  Due to patient's chronic anticoagulation  use, repeat CT ordered and negative for intracranial bleed. Pt was observed in the ED for approximately 10 hours. Vital signs were stable. She was well appearing. INR was 2.2 prior to injury according to pt who seems to be very compliant. Instructed her to discontinue coumadin for tonight and follow up with coumadin clinic first thing in the morning. PCP follow up. ER return precautions discussed cindlugin worsening symptoms or as needed. Discussed pt with Dr. Ferrari who agrees with assessment and plan.       Mild worsening ESTRADA, BP up some  Takes bumex 1 mg about once per week       Review of Systems   Constitution: Negative for decreased appetite.   HENT: Negative for ear discharge.    Eyes: Negative for blurred vision.   Respiratory: Negative for hemoptysis.    Endocrine: Negative for polyphagia.   Hematologic/Lymphatic: Negative for adenopathy.   Skin: Negative for color change.   Musculoskeletal: Negative for joint swelling.   Neurological: Negative for brief paralysis.   Psychiatric/Behavioral: Negative for hallucinations.        Objective:    Physical Exam   Constitutional: She is oriented to person, place, and time. She appears well-developed and well-nourished.   HENT:   Head: Normocephalic and atraumatic.   Eyes: Pupils are equal, round, and reactive to light.   Neck: Normal range of motion. No JVD present.   Cardiovascular: Normal rate and regular rhythm.    Murmur heard.  Pulmonary/Chest: Effort normal and breath sounds normal. She has no wheezes. She has no rales.   Abdominal: Soft. Bowel sounds are normal. She exhibits no distension. There is no tenderness.   Neurological: She is alert and oriented to person, place, and time.   Skin: Skin is warm and dry.         Assessment:       1. Pulmonary hypertension    2. S/P TAVR (transcatheter aortic valve replacement)    3. Coronary artery disease involving native coronary artery of native heart without angina pectoris    4. Cardiac pacemaker in situ    5.  Benign hypertensive heart disease with congestive heart failure    6. Hyperlipidemia, unspecified hyperlipidemia type    7. Carotid aneurysm, left    8. Controlled type 2 diabetes mellitus with microalbuminuria, without long-term current use of insulin         Plan:       Increase bumex to 1mg twice weekly  Add lisinopril 2.5 qd  OV 1 month with BNP, BMP

## 2018-03-23 ENCOUNTER — TELEPHONE (OUTPATIENT)
Dept: FAMILY MEDICINE | Facility: CLINIC | Age: 83
End: 2018-03-23

## 2018-03-23 NOTE — TELEPHONE ENCOUNTER
Spoke with patient, I told her I don't see where any one called her.  .Patient verbalized understandings.

## 2018-03-26 ENCOUNTER — ANTI-COAG VISIT (OUTPATIENT)
Dept: CARDIOLOGY | Facility: CLINIC | Age: 83
End: 2018-03-26
Payer: MEDICARE

## 2018-03-26 DIAGNOSIS — Z86.73 HX-TIA (TRANSIENT ISCHEMIC ATTACK): ICD-10-CM

## 2018-03-26 DIAGNOSIS — Z79.01 LONG-TERM (CURRENT) USE OF ANTICOAGULANTS, INR GOAL 2.0-3.0: ICD-10-CM

## 2018-03-26 LAB — INR PPP: 2.5

## 2018-03-26 PROCEDURE — G0250 MD INR TEST REVIE INTER MGMT: HCPCS | Mod: ,,,

## 2018-03-27 ENCOUNTER — TELEPHONE (OUTPATIENT)
Dept: FAMILY MEDICINE | Facility: CLINIC | Age: 83
End: 2018-03-27

## 2018-03-27 NOTE — TELEPHONE ENCOUNTER
----- Message from Bindu Marin sent at 3/27/2018  1:20 PM CDT -----  Contact: Self  Pt states she missed a [phone]

## 2018-03-27 NOTE — TELEPHONE ENCOUNTER
Spoke with pt, she states there is messages from .  I didn't see any encounters where  call this patient.  Patient verbalized understandings.

## 2018-03-28 ENCOUNTER — TELEPHONE (OUTPATIENT)
Dept: FAMILY MEDICINE | Facility: CLINIC | Age: 83
End: 2018-03-28

## 2018-03-28 NOTE — TELEPHONE ENCOUNTER
Spoke with this patient for the 3 time in a week.   didn't call.  I asked the doctor while on the phone with the pt.  She didn't call.

## 2018-03-28 NOTE — TELEPHONE ENCOUNTER
----- Message from Yuli So sent at 3/28/2018 10:39 AM CDT -----  Contact: self  Patient requesting a call back. Please contact her at [phone].    Thanks!

## 2018-03-28 NOTE — TELEPHONE ENCOUNTER
I attempted calling patient x 2 with no answer. OKn today I was attempting to send patient a letter & noticed patient seen by Dr. Ivy  On 3/22/2018. KARL

## 2018-03-29 ENCOUNTER — TELEPHONE (OUTPATIENT)
Dept: FAMILY MEDICINE | Facility: CLINIC | Age: 83
End: 2018-03-29

## 2018-03-29 ENCOUNTER — TELEPHONE (OUTPATIENT)
Dept: CARDIOLOGY | Facility: CLINIC | Age: 83
End: 2018-03-29

## 2018-03-29 NOTE — TELEPHONE ENCOUNTER
----- Message from Hannah Malloy sent at 3/29/2018  9:09 AM CDT -----  Contact: Self   Patient would like to know if she can be seen sooner than scheduled appt. Please call at [phone].

## 2018-04-02 ENCOUNTER — TELEPHONE (OUTPATIENT)
Dept: PULMONOLOGY | Facility: CLINIC | Age: 83
End: 2018-04-02

## 2018-04-02 NOTE — TELEPHONE ENCOUNTER
----- Message from Ny Hollingsworth sent at 4/2/2018  4:20 PM CDT -----  Contact: self [phone]  Patient is calling to discuss her appointment please call back to discuss

## 2018-04-02 NOTE — TELEPHONE ENCOUNTER
Spoke with pt. The pt stated that when she went to her cardiologist that he heard fluid in her lungs. The pt wanted to know if Dr. Saldivar could check for that. I advised the pt that if Dr. Castano's sees fit he would get CXR if needed. The pt stated that she would like to keep her appointment on the 13th.

## 2018-04-04 ENCOUNTER — TELEPHONE (OUTPATIENT)
Dept: CARDIOLOGY | Facility: CLINIC | Age: 83
End: 2018-04-04

## 2018-04-09 ENCOUNTER — ANTI-COAG VISIT (OUTPATIENT)
Dept: CARDIOLOGY | Facility: CLINIC | Age: 83
End: 2018-04-09

## 2018-04-09 DIAGNOSIS — Z79.01 LONG-TERM (CURRENT) USE OF ANTICOAGULANTS, INR GOAL 2.0-3.0: ICD-10-CM

## 2018-04-09 DIAGNOSIS — Z86.73 HX-TIA (TRANSIENT ISCHEMIC ATTACK): ICD-10-CM

## 2018-04-09 LAB — INR PPP: 3.5

## 2018-04-09 NOTE — PROGRESS NOTES
"Subjective:    Patient ID:  Adela Garay is a 89 y.o. female who presents for follow-up of Aortic Stenosis      HPI    Ms. Garay is a 89 year old woman with past medical history of HTN, HLD, DM, aFib (on coumadin), HFpEF, and recently s/p TAVR (RTF 29 Portico) 10/17/2017 with CHB following procedure (pre-existing RBBB) requiring PPM placement who presents to the ED with complaints of leg swelling. Patient and family called [facility] many times since discharge regarding a myriad of complaints. Pt called our office 10/30 with the above complaints and was advised to present to ED for further evaluation and treatment. While in ED, chemistry was unremarkable, BNP elevated at 918, troponin negative and CBC stable. CXR with bilateral pleural effusion larger on the right side and atelectatic changes at the lung bases; EKG with atrial fibrillation with paced ventricular rhythm. The patient was admitted to the Hospital Medicine Service for further evaluation and management. She was diuresed throughout her hospitalization with improvement in SOB. She was discharged to home 11/12 on Bumex 2mg BID with ESTRADA, SOB and bilateraly LE edema all resolved.     Since that hospitalization patient reports resolution of Le edema and SOB. She has been weighing daily on her home scale and weight has been within 1-2 lbs each day. She has since decreased her bumex down to 1mg daily from 2 mg BID. On exam today she has crackles bilaterally and BNP is 1028. She has an appointment with Dr. Saldivar, pulmonologist, on Friday for "fluid on her lungs" per patient. She denies Cp, SOB, ESTRADA, syncope, palpitations.      NYHA Class II sx, CCS Class I    Review of Systems   Constitution: Negative for chills, diaphoresis, fever, weakness, weight gain and weight loss.   HENT: Negative for sore throat.    Eyes: Negative for blurred vision, vision loss in left eye, vision loss in right eye and visual disturbance.   Cardiovascular: Negative for chest pain, " claudication, dyspnea on exertion, leg swelling, near-syncope, orthopnea, palpitations, paroxysmal nocturnal dyspnea and syncope.   Respiratory: Negative for cough, hemoptysis, shortness of breath, sputum production and wheezing.    Endocrine: Negative for cold intolerance and heat intolerance.   Hematologic/Lymphatic: Negative for adenopathy. Does not bruise/bleed easily.   Skin: Negative for rash.   Musculoskeletal: Negative for falls, muscle weakness and myalgias.   Gastrointestinal: Negative for abdominal pain, change in bowel habit, constipation, diarrhea, melena and nausea.   Genitourinary: Negative for bladder incontinence.   Neurological: Negative for dizziness, focal weakness, headaches, light-headedness and numbness.   Psychiatric/Behavioral: Negative for altered mental status.         Past Medical History:   Diagnosis Date    Anticoagulant long-term use     Anticoagulated on Coumadin     Aortic valve stenosis     - s/p total aortic valve replacement    Arthritis     Atrial fibrillation     Carotid artery occlusion     Chronic rhinosinusitis     Coronary artery disease     Granulomatous lung disease     Heart failure     Hyperlipidemia     Hypertension     Hypertensive heart disease without CHF (congestive heart failure)     Mitral valve prolapse     PN (peripheral neuropathy)     hereditary?(sister has it also)/idiopathic?/patient thinks from Zocor    Severe aortic stenosis by prior echocardiogram     TIA (transient ischemic attack)     Type 2 diabetes mellitus     Type II or unspecified type diabetes mellitus without mention of complication, not stated as uncontrolled      Current Outpatient Prescriptions on File Prior to Visit   Medication Sig Dispense Refill    alpha lipoic acid 600 mg Cap Take 600 mg by mouth Daily. (Patient taking differently: Take 100 mg by mouth Daily. ) 100 each 12    aspirin 81 mg Tab Take 81 mg by mouth once daily.       benzonatate (TESSALON) 100 MG  capsule       blood sugar diagnostic (BLOOD GLUCOSE TEST) Strp 1 strip by Misc.(Non-Drug; Combo Route) route once daily. Currently using Nova max plus meter. 50 strip 11    bumetanide (BUMEX) 2 MG tablet Take 1 tablet (2 mg total) by mouth 2 (two) times daily. (Patient taking differently: Take 1 mg by mouth every other day. ) 60 tablet 11    CINNAMON BARK (CINNAMON ORAL) Take 1,000 mg by mouth 2 (two) times daily.      digoxin (LANOXIN) 125 mcg tablet Take 1 tablet (0.125 mg total) by mouth once daily. 30 tablet 11    docusate sodium (COLACE) 100 MG capsule Take 100 mg by mouth. 1 Capsule Oral Every day      lisinopril (PRINIVIL,ZESTRIL) 2.5 MG tablet Take 1 tablet (2.5 mg total) by mouth once daily. 90 tablet 3    metoprolol tartrate 75 mg Tab Take 75 mg by mouth 2 (two) times daily. 60 tablet 2    potassium chloride (KLOR-CON) 10 MEQ TbSR Take 1 tablet (10 mEq total) by mouth once daily. (Patient taking differently: Take 10 mEq by mouth every other day. ) 30 tablet 0    simvastatin (ZOCOR) 20 MG tablet TAKE 1 TABLET EVERY EVENING 90 tablet 0    tiZANidine (ZANAFLEX) 2 MG tablet Take 1 tablet (2 mg total) by mouth every 6 (six) hours. and as need 30 tablet 0    traZODone (DESYREL) 100 MG tablet Take 1 tablet (100 mg total) by mouth nightly as needed for Insomnia. 30 tablet 2    triamcinolone acetonide 0.025% (KENALOG) 0.025 % cream Use bid as needed. 80 g 1    triamterene-hydrochlorothiazide 37.5-25 mg (DYAZIDE) 37.5-25 mg per capsule Take 1 capsule by mouth once daily. 90 capsule 1    warfarin (COUMADIN) 2 MG tablet Take 2 pills by mouth daily or as directed per the [facility] 175 tablet 3     No current facility-administered medications on file prior to visit.      Vitals:    04/10/18 1156 04/10/18 1158   BP: (!) 164/73 (!) 160/82   BP Location: Right arm Left arm   Patient Position: Sitting Sitting   BP Method: Large (Automatic) Large (Automatic)   Pulse: 60 60   SpO2: 98%    Weight: 62.4 kg  "(137 lb 9.1 oz)    Height: 5' 5" (1.651 m)      Body mass index is 22.89 kg/m².      Objective:    Physical Exam   Constitutional: She is oriented to person, place, and time. She appears well-developed and well-nourished. No distress.   HENT:   Head: Normocephalic and atraumatic.   Mouth/Throat: Oropharynx is clear and moist.   Eyes: Conjunctivae and EOM are normal. Pupils are equal, round, and reactive to light. No scleral icterus.   Neck: Neck supple. JVD present. No tracheal deviation present.   Cardiovascular: Normal rate and regular rhythm.  Exam reveals no gallop and no friction rub.    Murmur heard.  Pulmonary/Chest: Effort normal. No respiratory distress. She has no wheezes. She has rales. She exhibits no tenderness.   Abdominal: Soft. Bowel sounds are normal. She exhibits no distension. There is no hepatosplenomegaly. There is no tenderness.   Musculoskeletal: She exhibits no edema or tenderness.   Neurological: She is alert and oriented to person, place, and time.   Skin: Skin is warm and dry. No rash noted. No erythema.   Psychiatric: She has a normal mood and affect. Her behavior is normal.       CONCLUSIONS     1 - Low normal to mildly depressed left ventricular systolic function (EF 50-55%).     2 - Right ventricular enlargement with moderately depressed systolic function.     3 - The estimated PA systolic pressure is 31 mmHg. PA pressure cannot be estimated in the setting of severe wide open TR    4 - Trivial aortic regurgitation.     5 - Moderate to severe mitral regurgitation.     6 - Increased central venous pressure. ivc measures 4.6 cm RA pressure is atleast 20 mmHG.     7 - Biatrial enlargement.     8 - No wall motion abnormalities.     9 - Eccentric hypertrophy.     10 - S/P transcatheter AVR, DILAN = 2.79 cm2, AVAi = 1.66 cm2/m2, peak velocity = 1.3 m/s, mean gradient = 4 mmHg.    11- severe TR  Assessment:       Atherosclerosis of aorta  Of Asc Ao and Ao arch on CTA   On ASA/statin     Atrial " fibrillation  -rate controlled on dig/metoprolol and on warfarin   -INR 2.7 today      Cardiac pacemaker in situ  -st christian device placed post TAVR  -Follows with Dr. Delgado    Coronary artery disease  Non obstructive [facility] in 8/2014  -continue ASA, BB, and statin     S/P TAVR (transcatheter aortic valve replacement)  s/p TAVR (RTF 29 Portico) 10/17/2017  On ASA alone as patient is also on warfarin     Hyperlipidemia  Stable on statin     Long-term (current) use of anticoagulants, INR goal 2.0-3.0  On warfarin for A Fib     Controlled type 2 diabetes mellitus with microalbuminuria  -HgA1C 5.6 last in May 6.0  -diet controlled  -cardiac/diabetic diet encouraged    Acute on chronic diastolic heart failure  Crackles bilaterally  BNP 1028   Increase Bumex to 2mg daily (from 1 mg daily) for next 3 days until appointment with Dr. Saldivar   Continue dyazide     Benign hypertensive heart disease with congestive heart failure  Uncontrolled (SBP 160s) on metoprolol, lisinopril, bumex prn, dyazide   Will increase diuretic as below and defer further management to Dr. Ivy        Plan:       F/u Dr. Torrie Farrell, Dr. Saldivar, pulmonologist, Dr. Ivy, cardiologist as scheduled  F/u in 6 months 1 year f/u (10/2018) per protocol  SBEP for life  ASA indefinitely, patient also on coumadin   Increase bumex to 2 mg daily (from 1 mg daily prn) for acute on chronic HF symptoms until Friday. F/u with Dr. Saldivar Friday

## 2018-04-09 NOTE — PROGRESS NOTES
Verbal result taken from __Patient _______. PT/INR __3.5_____ Date drawn__4/9/18______ Hardcopy to be faxed.

## 2018-04-10 ENCOUNTER — TELEPHONE (OUTPATIENT)
Dept: FAMILY MEDICINE | Facility: CLINIC | Age: 83
End: 2018-04-10

## 2018-04-10 ENCOUNTER — ANTI-COAG VISIT (OUTPATIENT)
Dept: CARDIOLOGY | Facility: CLINIC | Age: 83
End: 2018-04-10

## 2018-04-10 ENCOUNTER — OFFICE VISIT (OUTPATIENT)
Dept: CARDIOLOGY | Facility: CLINIC | Age: 83
End: 2018-04-10
Payer: MEDICARE

## 2018-04-10 ENCOUNTER — HOSPITAL ENCOUNTER (OUTPATIENT)
Dept: CARDIOLOGY | Facility: CLINIC | Age: 83
Discharge: HOME | End: 2018-04-10
Payer: MEDICARE

## 2018-04-10 ENCOUNTER — RESEARCH ENCOUNTER (OUTPATIENT)
Dept: CARDIOLOGY | Facility: CLINIC | Age: 83
End: 2018-04-10

## 2018-04-10 ENCOUNTER — HOSPITAL ENCOUNTER (OUTPATIENT)
Dept: PULMONOLOGY | Facility: CLINIC | Age: 83
Discharge: HOME | End: 2018-04-10
Payer: MEDICARE

## 2018-04-10 VITALS
WEIGHT: 137.56 LBS | OXYGEN SATURATION: 98 % | HEIGHT: 65 IN | SYSTOLIC BLOOD PRESSURE: 160 MMHG | DIASTOLIC BLOOD PRESSURE: 82 MMHG | HEART RATE: 60 BPM | BODY MASS INDEX: 22.92 KG/M2

## 2018-04-10 VITALS — BODY MASS INDEX: 22.82 KG/M2 | HEIGHT: 65 IN | WEIGHT: 137 LBS

## 2018-04-10 DIAGNOSIS — R80.9 CONTROLLED TYPE 2 DIABETES MELLITUS WITH MICROALBUMINURIA, WITHOUT LONG-TERM CURRENT USE OF INSULIN: ICD-10-CM

## 2018-04-10 DIAGNOSIS — Z00.6 EXAMINATION OF PARTICIPANT IN CLINICAL TRIAL: ICD-10-CM

## 2018-04-10 DIAGNOSIS — I50.33 ACUTE ON CHRONIC DIASTOLIC HEART FAILURE: ICD-10-CM

## 2018-04-10 DIAGNOSIS — Z95.0 CARDIAC PACEMAKER IN SITU: ICD-10-CM

## 2018-04-10 DIAGNOSIS — I70.0 ATHEROSCLEROSIS OF AORTA: Primary | ICD-10-CM

## 2018-04-10 DIAGNOSIS — Z95.3 S/P TAVR (TRANSCATHETER AORTIC VALVE REPLACEMENT), BIOPROSTHETIC: ICD-10-CM

## 2018-04-10 DIAGNOSIS — Z79.01 LONG-TERM (CURRENT) USE OF ANTICOAGULANTS, INR GOAL 2.0-3.0: ICD-10-CM

## 2018-04-10 DIAGNOSIS — Z86.73 HX-TIA (TRANSIENT ISCHEMIC ATTACK): ICD-10-CM

## 2018-04-10 DIAGNOSIS — E78.5 HYPERLIPIDEMIA, UNSPECIFIED HYPERLIPIDEMIA TYPE: ICD-10-CM

## 2018-04-10 DIAGNOSIS — I11.0 BENIGN HYPERTENSIVE HEART DISEASE WITH CONGESTIVE HEART FAILURE: ICD-10-CM

## 2018-04-10 DIAGNOSIS — I25.10 CORONARY ARTERY DISEASE INVOLVING NATIVE CORONARY ARTERY OF NATIVE HEART WITHOUT ANGINA PECTORIS: ICD-10-CM

## 2018-04-10 DIAGNOSIS — E78.5 HYPERLIPIDEMIA, UNSPECIFIED HYPERLIPIDEMIA TYPE: Primary | ICD-10-CM

## 2018-04-10 DIAGNOSIS — I48.19 PERSISTENT ATRIAL FIBRILLATION: ICD-10-CM

## 2018-04-10 DIAGNOSIS — Z95.2 S/P TAVR (TRANSCATHETER AORTIC VALVE REPLACEMENT): ICD-10-CM

## 2018-04-10 DIAGNOSIS — E11.29 CONTROLLED TYPE 2 DIABETES MELLITUS WITH MICROALBUMINURIA, WITHOUT LONG-TERM CURRENT USE OF INSULIN: ICD-10-CM

## 2018-04-10 LAB
AORTIC VALVE REGURGITATION: ABNORMAL
ESTIMATED PA SYSTOLIC PRESSURE: 31.16
MITRAL VALVE REGURGITATION: ABNORMAL
RETIRED EF AND QEF - SEE NOTES: 50 (ref 55–65)
TRICUSPID VALVE REGURGITATION: ABNORMAL

## 2018-04-10 PROCEDURE — 93005 ELECTROCARDIOGRAM TRACING: CPT | Mod: PBBFAC | Performed by: INTERNAL MEDICINE

## 2018-04-10 PROCEDURE — 93010 ELECTROCARDIOGRAM REPORT: CPT | Mod: S$PBB,,, | Performed by: INTERNAL MEDICINE

## 2018-04-10 PROCEDURE — 93306 TTE W/DOPPLER COMPLETE: CPT | Mod: PBBFAC | Performed by: INTERNAL MEDICINE

## 2018-04-10 PROCEDURE — 94618 PULMONARY STRESS TESTING: CPT | Mod: 26,S$PBB,, | Performed by: INTERNAL MEDICINE

## 2018-04-10 PROCEDURE — 94618 PULMONARY STRESS TESTING: CPT | Mod: PBBFAC | Performed by: INTERNAL MEDICINE

## 2018-04-10 PROCEDURE — 99214 OFFICE O/P EST MOD 30 MIN: CPT | Mod: S$PBB,,, | Performed by: INTERNAL MEDICINE

## 2018-04-10 PROCEDURE — 99999 PR PBB SHADOW E&M-EST. PATIENT-LVL III: CPT | Mod: PBBFAC,,,

## 2018-04-10 PROCEDURE — 99213 OFFICE O/P EST LOW 20 MIN: CPT | Mod: Q1,PBBFAC

## 2018-04-10 NOTE — ASSESSMENT & PLAN NOTE
Uncontrolled (SBP 160s) on metoprolol, lisinopril, bumex prn, dyazide   Will increase diuretic as below and defer further management to Dr. Ivy

## 2018-04-10 NOTE — PROCEDURES
Adela Garay is a 89 y.o.  female patient, who presents for a 6 minute walk test ordered by MD. Adrian.  The diagnosis is Pulmonary Hypertension.  The patient's BMI is 22.8 kg/m2.  Predicted distance (lower limit of normal) is 211.03 meters.      Test Results:    The test was completed without stopping.    The total time walked was 360 seconds.  During walking, the patient reported:  No complaints. The patient used a walker  during testing.     04/10/2018---------Distance: 274.32 meters (900 feet)     O2 Sat % Supplemental Oxygen Heart Rate Blood Pressure Nura Scale   Pre-exercise  (Resting) 94 % Room Air 61 bpm 140/63 mmHg 0   During Exercise 92 % Room Air 61 bpm 139/60 mmHg 0.5   Post-exercise  (Recovery) 98 % Room Air  60 bpm   mmHg       Recovery Time: 91 seconds    Performing nurse/tech: AHMET Rivera.      PREVIOUS STUDY:   The patient had a previous study.  8/22/2017---------Distance: 243.84 meters (800 feet)       O2 Sat % Supplemental Oxygen Heart Rate Blood Pressure Nura Scale   Pre-exercise  (Resting) 97 % Room Air 67 bpm 154/70 mmHg 0   During Exercise 96 % Room Air 75 bpm 183/80 mmHg 1   Post-exercise  (Recovery) 97 % Room Air  66 bpm 175/81 mmHg          CLINICAL INTERPRETATION:  Six minute walk distance is 274.32 meters (900 feet) with very, very light dyspnea.  During exercise, there was no significant desaturation while breathing room air.  Both blood pressure and heart rate remained stable with walking.  The patient did not report non-pulmonary symptoms during exercise.  Since the previous study in August 2017, exercise capacity may be somewhat improved.  Based upon age and body mass index, exercise capacity is normal.

## 2018-04-10 NOTE — TELEPHONE ENCOUNTER
Spoke with pt, scheduled fasting labs for this Thursday at [place] [facility].  Pended the labs.  Patient verbalized understandings.

## 2018-04-10 NOTE — ASSESSMENT & PLAN NOTE
Crackles bilaterally  BNP 1028   Increase Bumex to 2mg daily (from 1 mg daily) for next 3 days until appointment with Dr. Saldivar   Continue dyazide

## 2018-04-10 NOTE — TELEPHONE ENCOUNTER
----- Message from Fiorella Berry sent at 4/10/2018  3:37 PM CDT -----  Contact: self  Pt calling to schedule routine labs and a HbA1C. Please call pt at [phone]

## 2018-04-10 NOTE — PROGRESS NOTES
Study: Portico TAVR  Sponsor: [employer]  Follow-up Visit: 6 Months  Date of Visit: April 10, 2018    Patient wishes to continue in study: Yes  All study protocol required CRFs completed: Yes    The subject is here for the follow up visit. Current list of medications obtained and verified; She denies any hospitalizations, ED visits, or any other adverse events since previous study follow-up. All questions answered to her satisfaction and she will contact research staff if she has any questions or concerns. Next follow up visit is the 1 Year visit    The following tests and procedures are related to research study:  Labs, TTE, EKG, 6 MWT, QOL, Barthel, mRS, NIHSS, Frailty

## 2018-04-10 NOTE — TELEPHONE ENCOUNTER
----- Message from Fiorella Berry sent at 4/10/2018  3:37 PM CDT -----  Contact: self  Pt calling to schedule routine labs and a HbA1C. Please call pt at [phone]

## 2018-04-12 ENCOUNTER — LAB VISIT (OUTPATIENT)
Dept: LAB | Facility: HOSPITAL | Age: 83
End: 2018-04-12
Attending: INTERNAL MEDICINE
Payer: MEDICARE

## 2018-04-12 DIAGNOSIS — E78.5 HYPERLIPIDEMIA, UNSPECIFIED HYPERLIPIDEMIA TYPE: ICD-10-CM

## 2018-04-12 DIAGNOSIS — E11.29 CONTROLLED TYPE 2 DIABETES MELLITUS WITH MICROALBUMINURIA, WITHOUT LONG-TERM CURRENT USE OF INSULIN: ICD-10-CM

## 2018-04-12 DIAGNOSIS — R80.9 CONTROLLED TYPE 2 DIABETES MELLITUS WITH MICROALBUMINURIA, WITHOUT LONG-TERM CURRENT USE OF INSULIN: ICD-10-CM

## 2018-04-12 LAB
ALBUMIN SERPL BCP-MCNC: 4.5 G/DL
ALP SERPL-CCNC: 88 U/L
ALT SERPL W/O P-5'-P-CCNC: 24 U/L
ANION GAP SERPL CALC-SCNC: 10 MMOL/L
AST SERPL-CCNC: 28 U/L
BILIRUB SERPL-MCNC: 1.6 MG/DL
BUN SERPL-MCNC: 17 MG/DL
CALCIUM SERPL-MCNC: 10.6 MG/DL
CHLORIDE SERPL-SCNC: 94 MMOL/L
CHOLEST SERPL-MCNC: 156 MG/DL
CHOLEST/HDLC SERPL: 3.3 {RATIO}
CO2 SERPL-SCNC: 33 MMOL/L
CREAT SERPL-MCNC: 1 MG/DL
EST. GFR  (AFRICAN AMERICAN): 57.3 ML/MIN/1.73 M^2
EST. GFR  (NON AFRICAN AMERICAN): 49.7 ML/MIN/1.73 M^2
ESTIMATED AVG GLUCOSE: 120 MG/DL
GLUCOSE SERPL-MCNC: 114 MG/DL
HBA1C MFR BLD HPLC: 5.8 %
HDLC SERPL-MCNC: 48 MG/DL
HDLC SERPL: 30.8 %
LDLC SERPL CALC-MCNC: 92.6 MG/DL
NONHDLC SERPL-MCNC: 108 MG/DL
POTASSIUM SERPL-SCNC: 4 MMOL/L
PROT SERPL-MCNC: 8.2 G/DL
SODIUM SERPL-SCNC: 137 MMOL/L
TRIGL SERPL-MCNC: 77 MG/DL

## 2018-04-12 PROCEDURE — 80053 COMPREHEN METABOLIC PANEL: CPT

## 2018-04-12 PROCEDURE — 80061 LIPID PANEL: CPT

## 2018-04-12 PROCEDURE — 83036 HEMOGLOBIN GLYCOSYLATED A1C: CPT

## 2018-04-12 PROCEDURE — 36415 COLL VENOUS BLD VENIPUNCTURE: CPT | Mod: PO

## 2018-04-13 ENCOUNTER — OFFICE VISIT (OUTPATIENT)
Dept: PULMONOLOGY | Facility: CLINIC | Age: 83
End: 2018-04-13
Payer: MEDICARE

## 2018-04-13 VITALS
BODY MASS INDEX: 21.67 KG/M2 | SYSTOLIC BLOOD PRESSURE: 128 MMHG | WEIGHT: 130.06 LBS | DIASTOLIC BLOOD PRESSURE: 60 MMHG | RESPIRATION RATE: 16 BRPM | OXYGEN SATURATION: 95 % | HEIGHT: 65 IN | HEART RATE: 60 BPM

## 2018-04-13 DIAGNOSIS — R91.8 MULTIPLE LUNG NODULES: ICD-10-CM

## 2018-04-13 DIAGNOSIS — Z79.01 LONG-TERM (CURRENT) USE OF ANTICOAGULANTS, INR GOAL 2.0-3.0: Primary | ICD-10-CM

## 2018-04-13 DIAGNOSIS — Z95.2 S/P TAVR (TRANSCATHETER AORTIC VALVE REPLACEMENT): ICD-10-CM

## 2018-04-13 DIAGNOSIS — I50.33 ACUTE ON CHRONIC DIASTOLIC HEART FAILURE: ICD-10-CM

## 2018-04-13 PROCEDURE — 99213 OFFICE O/P EST LOW 20 MIN: CPT | Mod: PBBFAC | Performed by: INTERNAL MEDICINE

## 2018-04-13 PROCEDURE — 99213 OFFICE O/P EST LOW 20 MIN: CPT | Mod: S$PBB,,, | Performed by: INTERNAL MEDICINE

## 2018-04-13 PROCEDURE — 99999 PR PBB SHADOW E&M-EST. PATIENT-LVL III: CPT | Mod: PBBFAC,,, | Performed by: INTERNAL MEDICINE

## 2018-04-13 NOTE — PATIENT INSTRUCTIONS
Continue diuretics per cardiology (decreases Bumex to 1 mg daily tomorrow).  F/U with cardiology later this month as scheduled.  Call/return here as needed.

## 2018-04-13 NOTE — PROGRESS NOTES
"Subjective:       Patient ID: Adela Garay is a 89 y.o. female.    Chief Complaint: Shortness of Breath    HPI HISTORY OF PRESENT ILLNESS:  Mrs. Garay is a 89-year-old nonsmoker, who comes   for assessment of "fluid in my lungs."  She is status post TAVR procedure for   treatment of severe aortic stenosis.  She had a visit in the Cardiology Clinic   earlier this week and was noted to have bilateral rales on chest exam.  She also   acknowledges an interval increase in her weight.  She was begun on an increased   dose of Bumex  (2 mg daily).  Since making that change in her diuretic three   days ago, she has noted a decline in weight of approximately 7 pounds.  She   had not been aware of any peripheral edema, chest pain, or wheezing.  Recent laboratory studies have shown a significant interval increase in the BNP.    Renal function and electrolytes appear stable.      CB/HN  dd: 04/13/2018 20:41:50 (CDT)  td: 04/14/2018 12:57:01 (CDT)  Doc ID   #[id number]  Job ID #[id number]    CC:       Review of Systems   Constitutional: Negative for fever and fatigue.   HENT: Negative for postnasal drip, sinus pressure, voice change and congestion.    Respiratory: Positive for cough, shortness of breath and dyspnea on extertion. Negative for sputum production and wheezing.    Cardiovascular: Negative for chest pain and leg swelling.   Genitourinary: Negative for difficulty urinating.   Musculoskeletal: Positive for back pain. Negative for arthralgias.   Skin: Negative for rash.   Gastrointestinal: Negative for abdominal pain and acid reflux.   Neurological: Negative for dizziness and weakness.   Hematological: Negative for adenopathy.       Objective:      Physical Exam   Constitutional: She is oriented to person, place, and time. She appears well-developed and well-nourished.   HENT:   Head: Normocephalic.   Nose: Nose normal.   Mouth/Throat: No oropharyngeal exudate.   Neck: Normal range of motion. No JVD present. No tracheal " deviation present. No thyromegaly present.   Cardiovascular: Normal rate, regular rhythm and normal heart sounds.    No murmur heard.  Pulmonary/Chest: No stridor. She has no wheezes. She has no rhonchi. She has rales (rales at R base). She exhibits no tenderness.   Abdominal: Soft. There is no tenderness.   Musculoskeletal: She exhibits no edema.   Lymphadenopathy:     She has no cervical adenopathy.   Neurological: She is alert and oriented to person, place, and time.   Skin: Skin is warm and dry. No rash noted. No erythema. Nails show no clubbing.   Psychiatric: She has a normal mood and affect.   Vitals reviewed.    Personal Diagnostic Review    No flowsheet data found.      Assessment:       1. Long-term (current) use of anticoagulants, INR goal 2.0-3.0    2. Multiple lung nodules    3. S/P TAVR (transcatheter aortic valve replacement)    4. Acute on chronic diastolic heart failure        Outpatient Encounter Prescriptions as of 4/13/2018   Medication Sig Dispense Refill    alpha lipoic acid 600 mg Cap Take 600 mg by mouth Daily. (Patient taking differently: Take 100 mg by mouth Daily. ) 100 each 12    aspirin 81 mg Tab Take 81 mg by mouth once daily.       benzonatate (TESSALON) 100 MG capsule       blood sugar diagnostic (BLOOD GLUCOSE TEST) Strp 1 strip by Misc.(Non-Drug; Combo Route) route once daily. Currently using Nova max plus meter. 50 strip 11    bumetanide (BUMEX) 2 MG tablet Take 1 tablet (2 mg total) by mouth 2 (two) times daily. (Patient taking differently: Take 1 mg by mouth every other day. ) 60 tablet 11    CINNAMON BARK (CINNAMON ORAL) Take 1,000 mg by mouth 2 (two) times daily.      digoxin (LANOXIN) 125 mcg tablet Take 1 tablet (0.125 mg total) by mouth once daily. 30 tablet 11    docusate sodium (COLACE) 100 MG capsule Take 100 mg by mouth. 1 Capsule Oral Every day      lisinopril (PRINIVIL,ZESTRIL) 2.5 MG tablet Take 1 tablet (2.5 mg total) by mouth once daily. 90 tablet 3     metoprolol tartrate 75 mg Tab Take 75 mg by mouth 2 (two) times daily. 60 tablet 2    potassium chloride (KLOR-CON) 10 MEQ TbSR Take 1 tablet (10 mEq total) by mouth once daily. (Patient taking differently: Take 10 mEq by mouth every other day. ) 30 tablet 0    simvastatin (ZOCOR) 20 MG tablet TAKE 1 TABLET EVERY EVENING 90 tablet 0    tiZANidine (ZANAFLEX) 2 MG tablet Take 1 tablet (2 mg total) by mouth every 6 (six) hours. and as need 30 tablet 0    traZODone (DESYREL) 100 MG tablet Take 1 tablet (100 mg total) by mouth nightly as needed for Insomnia. 30 tablet 2    triamcinolone acetonide 0.025% (KENALOG) 0.025 % cream Use bid as needed. 80 g 1    triamterene-hydrochlorothiazide 37.5-25 mg (DYAZIDE) 37.5-25 mg per capsule Take 1 capsule by mouth once daily. 90 capsule 1    warfarin (COUMADIN) 2 MG tablet Take 2 pills by mouth daily or as directed per the [facility] 175 tablet 3     No facility-administered encounter medications on file as of 4/13/2018.      No orders of the defined types were placed in this encounter.    Plan:     Continue diuretics per cardiology (decreases Bumex to 1 mg daily tomorrow).  F/U with cardiology later this month as scheduled.  Call/return here as needed.

## 2018-04-16 ENCOUNTER — OFFICE VISIT (OUTPATIENT)
Dept: FAMILY MEDICINE | Facility: CLINIC | Age: 83
End: 2018-04-16
Payer: MEDICARE

## 2018-04-16 ENCOUNTER — ANTI-COAG VISIT (OUTPATIENT)
Dept: CARDIOLOGY | Facility: CLINIC | Age: 83
End: 2018-04-16

## 2018-04-16 ENCOUNTER — OFFICE VISIT (OUTPATIENT)
Dept: PODIATRY | Facility: CLINIC | Age: 83
End: 2018-04-16
Payer: MEDICARE

## 2018-04-16 VITALS
HEIGHT: 65 IN | BODY MASS INDEX: 21.16 KG/M2 | DIASTOLIC BLOOD PRESSURE: 60 MMHG | SYSTOLIC BLOOD PRESSURE: 134 MMHG | WEIGHT: 127 LBS

## 2018-04-16 VITALS
HEIGHT: 65 IN | RESPIRATION RATE: 18 BRPM | SYSTOLIC BLOOD PRESSURE: 134 MMHG | HEART RATE: 60 BPM | BODY MASS INDEX: 21.29 KG/M2 | DIASTOLIC BLOOD PRESSURE: 60 MMHG | WEIGHT: 127.75 LBS | TEMPERATURE: 98 F | OXYGEN SATURATION: 96 %

## 2018-04-16 DIAGNOSIS — E11.40 CONTROLLED TYPE 2 DIABETES WITH NEUROPATHY: ICD-10-CM

## 2018-04-16 DIAGNOSIS — M54.5 LOW BACK PAIN, UNSPECIFIED BACK PAIN LATERALITY, UNSPECIFIED CHRONICITY, WITH SCIATICA PRESENCE UNSPECIFIED: ICD-10-CM

## 2018-04-16 DIAGNOSIS — I77.9 CAROTID ARTERY DISEASE, UNSPECIFIED LATERALITY: ICD-10-CM

## 2018-04-16 DIAGNOSIS — Z71.89 COUNSELING REGARDING END OF LIFE DECISION MAKING: ICD-10-CM

## 2018-04-16 DIAGNOSIS — J84.10 POSTINFLAMMATORY PULMONARY FIBROSIS: ICD-10-CM

## 2018-04-16 DIAGNOSIS — Z86.73 HX-TIA (TRANSIENT ISCHEMIC ATTACK): ICD-10-CM

## 2018-04-16 DIAGNOSIS — F32.0 MILD MAJOR DEPRESSION: ICD-10-CM

## 2018-04-16 DIAGNOSIS — B35.1 ONYCHOMYCOSIS DUE TO DERMATOPHYTE: ICD-10-CM

## 2018-04-16 DIAGNOSIS — I70.0 ATHEROSCLEROSIS OF AORTA: ICD-10-CM

## 2018-04-16 DIAGNOSIS — E11.29 CONTROLLED TYPE 2 DIABETES MELLITUS WITH MICROALBUMINURIA, WITHOUT LONG-TERM CURRENT USE OF INSULIN: ICD-10-CM

## 2018-04-16 DIAGNOSIS — G60.9 IDIOPATHIC PERIPHERAL NEUROPATHY: Primary | ICD-10-CM

## 2018-04-16 DIAGNOSIS — I27.20 PULMONARY HYPERTENSION: ICD-10-CM

## 2018-04-16 DIAGNOSIS — G47.00 INSOMNIA, UNSPECIFIED TYPE: ICD-10-CM

## 2018-04-16 DIAGNOSIS — R80.9 CONTROLLED TYPE 2 DIABETES MELLITUS WITH MICROALBUMINURIA, WITHOUT LONG-TERM CURRENT USE OF INSULIN: ICD-10-CM

## 2018-04-16 DIAGNOSIS — Z00.00 ROUTINE MEDICAL EXAM: Primary | ICD-10-CM

## 2018-04-16 DIAGNOSIS — L84 CORN OR CALLUS: ICD-10-CM

## 2018-04-16 DIAGNOSIS — I48.19 PERSISTENT ATRIAL FIBRILLATION: ICD-10-CM

## 2018-04-16 DIAGNOSIS — I11.0 BENIGN HYPERTENSIVE HEART DISEASE WITH CONGESTIVE HEART FAILURE: ICD-10-CM

## 2018-04-16 DIAGNOSIS — Z79.01 LONG-TERM (CURRENT) USE OF ANTICOAGULANTS, INR GOAL 2.0-3.0: ICD-10-CM

## 2018-04-16 DIAGNOSIS — E78.5 HYPERLIPIDEMIA, UNSPECIFIED HYPERLIPIDEMIA TYPE: ICD-10-CM

## 2018-04-16 PROBLEM — I50.33 ACUTE ON CHRONIC DIASTOLIC HEART FAILURE: Status: RESOLVED | Noted: 2017-10-31 | Resolved: 2018-04-16

## 2018-04-16 PROBLEM — R60.9 EDEMA: Status: RESOLVED | Noted: 2017-10-30 | Resolved: 2018-04-16

## 2018-04-16 LAB — INR PPP: 2.9

## 2018-04-16 PROCEDURE — 11721 DEBRIDE NAIL 6 OR MORE: CPT | Mod: Q9,PBBFAC,PO | Performed by: PODIATRIST

## 2018-04-16 PROCEDURE — 99214 OFFICE O/P EST MOD 30 MIN: CPT | Mod: PBBFAC,PO | Performed by: INTERNAL MEDICINE

## 2018-04-16 PROCEDURE — 99497 ADVNCD CARE PLAN 30 MIN: CPT | Mod: PBBFAC,59,PO | Performed by: INTERNAL MEDICINE

## 2018-04-16 PROCEDURE — 99497 ADVNCD CARE PLAN 30 MIN: CPT | Mod: S$PBB,,, | Performed by: INTERNAL MEDICINE

## 2018-04-16 PROCEDURE — 99999 PR PBB SHADOW E&M-EST. PATIENT-LVL III: CPT | Mod: PBBFAC,,, | Performed by: PODIATRIST

## 2018-04-16 PROCEDURE — 99499 UNLISTED E&M SERVICE: CPT | Mod: S$PBB,,, | Performed by: PODIATRIST

## 2018-04-16 PROCEDURE — 99999 PR PBB SHADOW E&M-EST. PATIENT-LVL IV: CPT | Mod: PBBFAC,,, | Performed by: INTERNAL MEDICINE

## 2018-04-16 PROCEDURE — 11721 DEBRIDE NAIL 6 OR MORE: CPT | Mod: 59,Q9,S$PBB, | Performed by: PODIATRIST

## 2018-04-16 PROCEDURE — 11056 PARNG/CUTG B9 HYPRKR LES 2-4: CPT | Mod: Q9,S$PBB,, | Performed by: PODIATRIST

## 2018-04-16 PROCEDURE — 99213 OFFICE O/P EST LOW 20 MIN: CPT | Mod: PBBFAC,27,PO,25 | Performed by: PODIATRIST

## 2018-04-16 PROCEDURE — 11056 PARNG/CUTG B9 HYPRKR LES 2-4: CPT | Mod: Q9,PBBFAC,PO | Performed by: PODIATRIST

## 2018-04-16 PROCEDURE — 99397 PER PM REEVAL EST PAT 65+ YR: CPT | Mod: S$PBB,25,, | Performed by: INTERNAL MEDICINE

## 2018-04-16 RX ORDER — TRAZODONE HYDROCHLORIDE 100 MG/1
100 TABLET ORAL NIGHTLY PRN
Qty: 90 TABLET | Refills: 1 | Status: SHIPPED | OUTPATIENT
Start: 2018-04-16 | End: 2018-12-21

## 2018-04-16 RX ORDER — TRIAMTERENE AND HYDROCHLOROTHIAZIDE 37.5; 25 MG/1; MG/1
1 CAPSULE ORAL DAILY
Qty: 90 CAPSULE | Refills: 1 | Status: SHIPPED | OUTPATIENT
Start: 2018-04-16 | End: 2018-10-03 | Stop reason: SDUPTHER

## 2018-04-16 RX ORDER — TIZANIDINE 2 MG/1
2 TABLET ORAL DAILY PRN
Qty: 90 TABLET | Refills: 1 | Status: SHIPPED | OUTPATIENT
Start: 2018-04-16 | End: 2019-02-11

## 2018-04-16 RX ORDER — SIMVASTATIN 20 MG/1
20 TABLET, FILM COATED ORAL NIGHTLY
Qty: 90 TABLET | Refills: 1 | Status: SHIPPED | OUTPATIENT
Start: 2018-04-16 | End: 2018-10-03 | Stop reason: SDUPTHER

## 2018-04-16 RX ORDER — METOPROLOL TARTRATE 75 MG/1
75 TABLET, FILM COATED ORAL 2 TIMES DAILY
Qty: 180 TABLET | Refills: 1 | Status: SHIPPED | OUTPATIENT
Start: 2018-04-16 | End: 2018-08-31

## 2018-04-16 RX ORDER — BUMETANIDE 2 MG/1
TABLET ORAL
Qty: 90 TABLET | Refills: 1 | Status: SHIPPED | OUTPATIENT
Start: 2018-04-16 | End: 2018-04-26 | Stop reason: SDUPTHER

## 2018-04-16 NOTE — PATIENT INSTRUCTIONS
Recommend lotions: eucerin, eucerin for diabetics, aquaphor, A&D ointment, gold bond for diabetics, sween, [place]'s Bees all purpose baby ointment,  urea 40 with aloe (found on amazon.com)    Shoe recommendations: (try 6pm.com, zappos.Snapshot Interactive , nordstromrack.Snapshot Interactive, or shoes.Snapshot Interactive for discounted prices) you can visit DSW shoes in [place]  or [employer] in the [facility] (there are also several shoe brand outlets in the [facility])    Asics (GT 2000 or gel foundations), new balance stability type shoes, saucony (stabil c3),  Whitney (GTS or Beast or transcend), vionic, propet (tennis shoe)    sofft brand, clarks, crocs, aerosoles, naturalizers, SAS, ecco, born, nehemiah chew, rockports (dress shoes)    Vionic, burkenstocks, fitflops, propet (sandals)  Nike comfort thong sandals, crocs, propet (house shoes)    Nail Home remedy:  Vicks Vapor rub to nails for easier managability    Occasional soaks for 15-20 mins in luke warm water with 1 cup of listerine and 1 cup of apple cider vinegar are ok You may add several drops of oil of oregano or tea tree oil as well

## 2018-04-16 NOTE — PROGRESS NOTES
HISTORY OF PRESENT ILLNESS:  Adela Garay is a 89 y.o. female who presents to the clinic today for a routine medical physical exam. Her last physical exam was approximately 1 years(s) ago.        PAST MEDICAL HISTORY:  Past Medical History:   Diagnosis Date    Anticoagulant long-term use     Anticoagulated on Coumadin     Aortic valve stenosis     - s/p total aortic valve replacement    Arthritis     Atrial fibrillation     Carotid artery occlusion     Chronic rhinosinusitis     Coronary artery disease     Granulomatous lung disease     Heart failure     Hyperlipidemia     Hypertension     Hypertensive heart disease without CHF (congestive heart failure)     Mitral valve prolapse     PN (peripheral neuropathy)     hereditary?(sister has it also)/idiopathic?/patient thinks from Zocor    Severe aortic stenosis by prior echocardiogram     TIA (transient ischemic attack)     Type 2 diabetes mellitus     Type II or unspecified type diabetes mellitus without mention of complication, not stated as uncontrolled        PAST SURGICAL HISTORY:  Past Surgical History:   Procedure Laterality Date    CARDIAC PACEMAKER PLACEMENT  11/2017    CARDIAC VALVE REPLACEMENT  10/17/2017    aorta    SINUS SURGERY      tonsillectomy      TONSILLECTOMY         SOCIAL HISTORY:  Social History     Social History    Marital status:      Spouse name: N/A    Number of children: 6    Years of education: 2 college     Occupational History    retired housewife      Social History Main Topics    Smoking status: Never Smoker    Smokeless tobacco: Never Used    Alcohol use 0.0 oz/week      Comment: rare    Drug use: No    Sexual activity: No     Other Topics Concern    Not on file     Social History Narrative    No narrative on file       FAMILY HISTORY:  Family History   Problem Relation Age of Onset    Heart disease Father      49    Heart disease Mother     Thyroid disease Sister     Atrial  fibrillation Sister     Atrial fibrillation Sister      neice    Lymphoma Sister 29    Atrial fibrillation Sister     Asthma Neg Hx     Emphysema Neg Hx        ALLERGIES AND MEDICATIONS: updated and reviewed.  Review of patient's allergies indicates:   Allergen Reactions    Levaquin [levofloxacin]     Doxycycline hyclate Other (See Comments)     Unknown to patient    Iodine and iodide containing products     Neurontin  [gabapentin]      Other reaction(s): Unknown    Norpace  [disopyramide]      Other reaction(s): Hives    Phenytoin sodium extended      Other reaction(s): Muscle pain    Sulfamethoxazole-trimethoprim      Other reaction(s): Muscle cramps     Medication List with Changes/Refills   Current Medications    ALPHA LIPOIC ACID 600 MG CAP    Take 600 mg by mouth Daily.    ASPIRIN 81 MG TAB    Take 81 mg by mouth once daily.     BENZONATATE (TESSALON) 100 MG CAPSULE        BLOOD SUGAR DIAGNOSTIC (BLOOD GLUCOSE TEST) STRP    1 strip by Misc.(Non-Drug; Combo Route) route once daily. Currently using Nova max plus meter.    CINNAMON BARK (CINNAMON ORAL)    Take 1,000 mg by mouth 2 (two) times daily.    DIGOXIN (LANOXIN) 125 MCG TABLET    Take 1 tablet (0.125 mg total) by mouth once daily.    DOCUSATE SODIUM (COLACE) 100 MG CAPSULE    Take 100 mg by mouth. 1 Capsule Oral Every day    LISINOPRIL (PRINIVIL,ZESTRIL) 2.5 MG TABLET    Take 1 tablet (2.5 mg total) by mouth once daily.    POTASSIUM CHLORIDE (KLOR-CON) 10 MEQ TBSR    Take 1 tablet (10 mEq total) by mouth once daily.    TRIAMCINOLONE ACETONIDE 0.025% (KENALOG) 0.025 % CREAM    Use bid as needed.    WARFARIN (COUMADIN) 2 MG TABLET    Take 2 pills by mouth daily or as directed per the Coumadin Clinic   Changed and/or Refilled Medications    Modified Medication Previous Medication    BUMETANIDE (BUMEX) 2 MG TABLET bumetanide (BUMEX) 2 MG tablet       Take 1/2 tablet daily. Take additional 1/2 tablet with weight gain > 3 lbs.    Take 1 tablet (2 mg  total) by mouth 2 (two) times daily.    METOPROLOL TARTRATE 75 MG TAB metoprolol tartrate 75 mg Tab       Take 75 mg by mouth 2 (two) times daily.    Take 75 mg by mouth 2 (two) times daily.    SIMVASTATIN (ZOCOR) 20 MG TABLET simvastatin (ZOCOR) 20 MG tablet       Take 1 tablet (20 mg total) by mouth every evening.    TAKE 1 TABLET EVERY EVENING    TIZANIDINE (ZANAFLEX) 2 MG TABLET tiZANidine (ZANAFLEX) 2 MG tablet       Take 1 tablet (2 mg total) by mouth daily as needed. and as need    Take 1 tablet (2 mg total) by mouth every 6 (six) hours. and as need    TRAZODONE (DESYREL) 100 MG TABLET traZODone (DESYREL) 100 MG tablet       Take 1 tablet (100 mg total) by mouth nightly as needed for Insomnia.    Take 1 tablet (100 mg total) by mouth nightly as needed for Insomnia.    TRIAMTERENE-HYDROCHLOROTHIAZIDE 37.5-25 MG (DYAZIDE) 37.5-25 MG PER CAPSULE triamterene-hydrochlorothiazide 37.5-25 mg (DYAZIDE) 37.5-25 mg per capsule       Take 1 capsule by mouth once daily.    Take 1 capsule by mouth once daily.         CARE TEAM:  Patient Care Team:  Torrie Farrell MD as PCP - General (Internal Medicine)  Torrie Farrell MD as PCP - [facility] Attributed           SCREENING HISTORY:  Health Maintenance       Date Due Completion Date    Eye Exam 11/09/2017 11/9/2016 (Done)    Override on 11/9/2016: Done (no retinopathy - [facility] (Dr. Hylton))    Override on 10/21/2015: Done (no retinopathy per patient)    Override on 5/5/2014: Done    Override on 5/13/2013: Done    Hemoglobin A1c 10/12/2018 4/12/2018    Override on 5/31/2017: Done    Foot Exam 11/29/2018 11/29/2017 (Done)    Override on 11/29/2017: Done    Override on 10/19/2016: Done    Override on 8/10/2016: Done (patient has idiopathic neuropathy)    Override on 3/23/2015: Done (Mr. Rebolledo)    Lipid Panel 04/12/2019 4/12/2018    TETANUS VACCINE 11/13/2026 11/13/2016            REVIEW OF SYSTEMS:   The patient reports good dietary habits.  The patient  "does not exercise regularly.  Review of Systems   Constitutional: Negative for chills, fatigue, fever and unexpected weight change.        - about 10 lb weight loss mostly due to fluid loss   HENT: Negative for congestion, ear discharge, ear pain and postnasal drip.    Eyes: Negative for photophobia, pain and visual disturbance.   Respiratory: Positive for cough. Negative for shortness of breath and wheezing.    Cardiovascular: Negative for chest pain, palpitations and leg swelling.   Gastrointestinal: Negative for abdominal pain, constipation, diarrhea, nausea and vomiting.   Genitourinary: Negative for dysuria, frequency, urgency and vaginal discharge.   Musculoskeletal: Positive for back pain (- left lower - doing PT with [facility]). Negative for joint swelling and neck stiffness.   Skin: Negative for rash.   Neurological: Negative for weakness and headaches.   Psychiatric/Behavioral: Negative for dysphoric mood and sleep disturbance. The patient is not nervous/anxious.      Breast ROS: negative for breast lumps             Physical Examination:   Vitals:    04/16/18 0808   BP: 134/60   Pulse: 60   Resp: 18   Temp: 97.8 °F (36.6 °C)     Weight: 58 kg (127 lb 12.1 oz)   Height: 5' 5" (165.1 cm)   Body mass index is 21.26 kg/m².    General appearance - alert, well appearing, and in no distress and normal appearing weight  Mental status - alert, oriented to person, place, and time, normal mood, behavior, speech, dress, motor activity, and thought processes  Eyes - pupils equal and reactive, extraocular eye movements intact, sclera anicteric  Mouth - mucous membranes moist, pharynx normal without lesions; raspy voice (baseline)  Neck - supple, no significant adenopathy, carotids upstroke normal bilaterally, no bruits, thyroid exam: thyroid is normal in size without nodules or tenderness  Lymphatics - no palpable lymphadenopathy  Chest - clear to auscultation, no wheezes, rales or rhonchi, symmetric air " entry  Heart - normal rate and regular rhythm, no gallops noted  Back exam - limited range of motion, mild pain with motion noted during exam  Neurological - alert, oriented, normal speech, no focal findings or movement disorder noted, cranial nerves II through XII intact  Musculoskeletal - no muscular tenderness noted, she walked with a cane  Extremities - no pedal edema noted, varicose veins noted  Skin - normal coloration and turgor, no rashes, no suspicious skin lesions noted      Results for orders placed or performed in visit on 04/12/18   Comprehensive metabolic panel   Result Value Ref Range    Sodium 137 136 - 145 mmol/L    Potassium 4.0 3.5 - 5.1 mmol/L    Chloride 94 (L) 95 - 110 mmol/L    CO2 33 (H) 23 - 29 mmol/L    Glucose 114 (H) 70 - 110 mg/dL    BUN, Bld 17 8 - 23 mg/dL    Creatinine 1.0 0.5 - 1.4 mg/dL    Calcium 10.6 (H) 8.7 - 10.5 mg/dL    Total Protein 8.2 6.0 - 8.4 g/dL    Albumin 4.5 3.5 - 5.2 g/dL    Total Bilirubin 1.6 (H) 0.1 - 1.0 mg/dL    Alkaline Phosphatase 88 55 - 135 U/L    AST 28 10 - 40 U/L    ALT 24 10 - 44 U/L    Anion Gap 10 8 - 16 mmol/L    eGFR if African American 57.3 (A) >60 mL/min/1.73 m^2    eGFR if non  49.7 (A) >60 mL/min/1.73 m^2   Lipid panel   Result Value Ref Range    Cholesterol 156 120 - 199 mg/dL    Triglycerides 77 30 - 150 mg/dL    HDL 48 40 - 75 mg/dL    LDL Cholesterol 92.6 63.0 - 159.0 mg/dL    HDL/Chol Ratio 30.8 20.0 - 50.0 %    Total Cholesterol/HDL Ratio 3.3 2.0 - 5.0    Non-HDL Cholesterol 108 mg/dL   Hemoglobin A1c   Result Value Ref Range    Hemoglobin A1C 5.8 (H) 4.0 - 5.6 %    Estimated Avg Glucose 120 68 - 131 mg/dL     *Note: Due to a large number of results and/or encounters for the requested time period, some results have not been displayed. A complete set of results can be found in Results Review.          ASSESSMENT AND PLAN:  1. Routine medical exam  Counseled on age appropriate medical preventative services including age  appropriate cancer screenings, age appropriate eye and dental exams, over all nutritional health, need for a consistent exercise regimen, and an over all push towards maintaining a vigorous and active lifestyle.  Counseled on age appropriate vaccines and discussed upcoming health care needs based on age/gender. Discussed good sleep hygiene and stress management.     2. Controlled type 2 diabetes mellitus with microalbuminuria, without long-term current use of insulin/3. Controlled type 2 diabetes with neuropathy  Diabetes currently is controlled for age and comorbid conditions. We discussed diabetic diet and regular exercise. We discussed home blood sugar monitoring, if appropriate. Stable. We discussed low sugar/low carbohydrate diet and regular exercise to prevent progression. No need for prescription medication at this time.      4. Benign hypertensive heart disease with congestive heart failure  The current medical regimen is effective;  continue present plan and medications. Followed by cardiology.  - triamterene-hydrochlorothiazide 37.5-25 mg (DYAZIDE) 37.5-25 mg per capsule; Take 1 capsule by mouth once daily.  Dispense: 90 capsule; Refill: 1  - bumetanide (BUMEX) 2 MG tablet; Take 1/2 tablet daily. Take additional 1/2 tablet with weight gain > 3 lbs.  Dispense: 90 tablet; Refill: 1    5. Hyperlipidemia, unspecified hyperlipidemia type  We discussed low fat diet and regular exercise.The current medical regimen is effective;  continue present plan and medications.    - simvastatin (ZOCOR) 20 MG tablet; Take 1 tablet (20 mg total) by mouth every evening.  Dispense: 90 tablet; Refill: 1    6. Pulmonary hypertension  Stable. Observe.    7. Persistent atrial fibrillation  Currently in NSR. Followed by cardiology.  - metoprolol tartrate 75 mg Tab; Take 75 mg by mouth 2 (two) times daily.  Dispense: 180 tablet; Refill: 1    8. Carotid artery disease, unspecified laterality  Stable. Observe.    9. Atherosclerosis of  aorta  Patient with Atherosclerosis of the Aorta.  Stable/asymptomatic. Currently stable on lipid lowering medication and b/p monitoring.     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 Observe.     11. Mild major depression/12. Insomnia, unspecified type  The current medical regimen is effective;  continue present plan and medications.   - traZODone (DESYREL) 100 MG tablet; Take 1 tablet (100 mg total) by mouth nightly as needed for Insomnia.  Dispense: 90 tablet; Refill: 1    13. Low back pain, unspecified back pain laterality, unspecified chronicity, with sciatica presence unspecified  Stable. Medication as needed. Continue PT.  - tiZANidine (ZANAFLEX) 2 MG tablet; Take 1 tablet (2 mg total) by mouth daily as needed. and as need  Dispense: 90 tablet; Refill: 1    14. Counseling regarding end of life decision making  I had a discussion today with the patient, family, and/or surrogate regarding advanced directives. Paperwork already on file for living will and medical POA. Katie was completed. Approximately 5 minute(s) spent on counseling/discussion regarding end of life decision making.             Follow-up in about 6 months (around 10/16/2018), or if symptoms worsen or fail to improve, for follow up chronic medical conditions.. or sooner as needed.

## 2018-04-17 DIAGNOSIS — M54.5 LOW BACK PAIN, UNSPECIFIED BACK PAIN LATERALITY, UNSPECIFIED CHRONICITY, WITH SCIATICA PRESENCE UNSPECIFIED: ICD-10-CM

## 2018-04-17 NOTE — PROGRESS NOTES
Subjective:      Patient ID: Adela Garay is a 89 y.o. female.    Chief Complaint: Peripheral Neuropathy (pcp Dr. Farrell 04/16/2018)    Adela is a 89 y.o. female who presents to the clinic for evaluation and treatment of high risk feet. Adela has a past medical history of Anticoagulant long-term use; Anticoagulated on Coumadin; Aortic valve stenosis; Arthritis; Atrial fibrillation; Carotid artery occlusion; Chronic rhinosinusitis; Coronary artery disease; Granulomatous lung disease; Heart failure; Hyperlipidemia; Hypertension; Hypertensive heart disease without CHF (congestive heart failure); Mitral valve prolapse; PN (peripheral neuropathy); Severe aortic stenosis by prior echocardiogram; TIA (transient ischemic attack); Type 2 diabetes mellitus; and Type II or unspecified type diabetes mellitus without mention of complication, not stated as uncontrolled. The patient's chief complaint is long, thick toenails. This patient has documented high risk feet requiring routine maintenance secondary to peripheral neuropathy.      PCP: Torrie Farrell MD    Date Last Seen by PCP:   Chief Complaint   Patient presents with    Peripheral Neuropathy     pcp Dr. Farrell 04/16/2018       Current shoe gear:  SAS shoe    Hemoglobin A1C   Date Value Ref Range Status   04/12/2018 5.8 (H) 4.0 - 5.6 % Final     Comment:     According to ADA guidelines, hemoglobin A1c <7.0% represents  optimal control in non-pregnant diabetic patients. Different  metrics may apply to specific patient populations.   Standards of Medical Care in Diabetes-2016.  For the purpose of screening for the presence of diabetes:  <5.7%     Consistent with the absence of diabetes  5.7-6.4%  Consistent with increasing risk for diabetes   (prediabetes)  >or=6.5%  Consistent with diabetes  Currently, no consensus exists for use of hemoglobin A1c  for diagnosis of diabetes for children.  This Hemoglobin A1c assay has significant interference with fetal    hemoglobin   (HbF). The results are invalid for patients with abnormal amounts of   HbF,   including those with known Hereditary Persistence   of Fetal Hemoglobin. Heterozygous hemoglobin variants (HbAS, HbAC,   HbAD, HbAE, HbA2) do not significantly interfere with this assay;   however, presence of multiple variants in a sample may impact the %   interference.     10/30/2017 5.6 4.0 - 5.6 % Final     Comment:     According to ADA guidelines, hemoglobin A1c <7.0% represents  optimal control in non-pregnant diabetic patients. Different  metrics may apply to specific patient populations.   Standards of Medical Care in Diabetes-2016.  For the purpose of screening for the presence of diabetes:  <5.7%     Consistent with the absence of diabetes  5.7-6.4%  Consistent with increasing risk for diabetes   (prediabetes)  >or=6.5%  Consistent with diabetes  Currently, no consensus exists for use of hemoglobin A1c  for diagnosis of diabetes for children.  This Hemoglobin A1c assay has significant interference with fetal   hemoglobin   (HbF). The results are invalid for patients with abnormal amounts of   HbF,   including those with known Hereditary Persistence   of Fetal Hemoglobin. Heterozygous hemoglobin variants (HbAS, HbAC,   HbAD, HbAE, HbA2) do not significantly interfere with this assay;   however, presence of multiple variants in a sample may impact the %   interference.     05/31/2017 6.0 4.5 - 6.2 % Final     Comment:     According to ADA guidelines, hemoglobin A1C <7.0% represents  optimal control in non-pregnant diabetic patients.  Different  metrics may apply to specific populations.   Standards of Medical Care in Diabetes - 2016.  For the purpose of screening for the presence of diabetes:  <5.7%     Consistent with the absence of diabetes  5.7-6.4%  Consistent with increasing risk for diabetes   (prediabetes)  >or=6.5%  Consistent with diabetes  Currently no consensus exists for use of hemoglobin A1C  for  diagnosis of diabetes for children.           Patient Active Problem List   Diagnosis    Hyperlipidemia    Coronary artery disease    Hx-TIA (transient ischemic attack)    Long-term (current) use of anticoagulants, INR goal 2.0-3.0    Benign hypertensive heart disease with congestive heart failure    Pulmonary hypertension    Carotid arterial disease    Carotid aneurysm, left    Atrial fibrillation    Controlled type 2 diabetes mellitus with microalbuminuria    DDD (degenerative disc disease), cervical    Lumbar disc disease    Multiple lung nodules    Mild major depression    Idiopathic peripheral neuropathy    Atherosclerosis of aorta    Postinflammatory pulmonary fibrosis    Chronic cough    Coronary artery disease involving native coronary artery of native heart without angina pectoris    Controlled type 2 diabetes with neuropathy    S/P TAVR (transcatheter aortic valve replacement)    Cardiac pacemaker in situ       Current Outpatient Prescriptions on File Prior to Visit   Medication Sig Dispense Refill    alpha lipoic acid 600 mg Cap Take 600 mg by mouth Daily. (Patient taking differently: Take 100 mg by mouth Daily. ) 100 each 12    aspirin 81 mg Tab Take 81 mg by mouth once daily.       benzonatate (TESSALON) 100 MG capsule       blood sugar diagnostic (BLOOD GLUCOSE TEST) Strp 1 strip by Misc.(Non-Drug; Combo Route) route once daily. Currently using Nova max plus meter. 50 strip 11    bumetanide (BUMEX) 2 MG tablet Take 1/2 tablet daily. Take additional 1/2 tablet with weight gain > 3 lbs. 90 tablet 1    CINNAMON BARK (CINNAMON ORAL) Take 1,000 mg by mouth 2 (two) times daily.      digoxin (LANOXIN) 125 mcg tablet Take 1 tablet (0.125 mg total) by mouth once daily. 30 tablet 11    docusate sodium (COLACE) 100 MG capsule Take 100 mg by mouth. 1 Capsule Oral Every day      lisinopril (PRINIVIL,ZESTRIL) 2.5 MG tablet Take 1 tablet (2.5 mg total) by mouth once daily. 90 tablet 3     metoprolol tartrate 75 mg Tab Take 75 mg by mouth 2 (two) times daily. 180 tablet 1    potassium chloride (KLOR-CON) 10 MEQ TbSR Take 1 tablet (10 mEq total) by mouth once daily. (Patient taking differently: Take 10 mEq by mouth every other day. ) 30 tablet 0    simvastatin (ZOCOR) 20 MG tablet Take 1 tablet (20 mg total) by mouth every evening. 90 tablet 1    tiZANidine (ZANAFLEX) 2 MG tablet Take 1 tablet (2 mg total) by mouth daily as needed. and as need 90 tablet 1    traZODone (DESYREL) 100 MG tablet Take 1 tablet (100 mg total) by mouth nightly as needed for Insomnia. 90 tablet 1    triamcinolone acetonide 0.025% (KENALOG) 0.025 % cream Use bid as needed. 80 g 1    triamterene-hydrochlorothiazide 37.5-25 mg (DYAZIDE) 37.5-25 mg per capsule Take 1 capsule by mouth once daily. 90 capsule 1    warfarin (COUMADIN) 2 MG tablet Take 2 pills by mouth daily or as directed per the [facility] 175 tablet 3    [DISCONTINUED] bumetanide (BUMEX) 2 MG tablet Take 1 tablet (2 mg total) by mouth 2 (two) times daily. (Patient taking differently: Take 1 mg by mouth every other day. ) 60 tablet 11    [DISCONTINUED] metoprolol tartrate 75 mg Tab Take 75 mg by mouth 2 (two) times daily. 60 tablet 2    [DISCONTINUED] simvastatin (ZOCOR) 20 MG tablet TAKE 1 TABLET EVERY EVENING 90 tablet 0    [DISCONTINUED] tiZANidine (ZANAFLEX) 2 MG tablet Take 1 tablet (2 mg total) by mouth every 6 (six) hours. and as need 30 tablet 0    [DISCONTINUED] traZODone (DESYREL) 100 MG tablet Take 1 tablet (100 mg total) by mouth nightly as needed for Insomnia. 30 tablet 2    [DISCONTINUED] triamterene-hydrochlorothiazide 37.5-25 mg (DYAZIDE) 37.5-25 mg per capsule Take 1 capsule by mouth once daily. 90 capsule 1     No current facility-administered medications on file prior to visit.        Review of patient's allergies indicates:   Allergen Reactions    Levaquin [levofloxacin]     Doxycycline hyclate Other (See Comments)      "Unknown to patient    Iodine and iodide containing products     Neurontin  [gabapentin]      Other reaction(s): Unknown    Norpace  [disopyramide]      Other reaction(s): Hives    Phenytoin sodium extended      Other reaction(s): Muscle pain    Sulfamethoxazole-trimethoprim      Other reaction(s): Muscle cramps       Past Surgical History:   Procedure Laterality Date    CARDIAC PACEMAKER PLACEMENT  11/2017    CARDIAC VALVE REPLACEMENT  10/17/2017    aorta    SINUS SURGERY      tonsillectomy      TONSILLECTOMY         Family History   Problem Relation Age of Onset    Heart disease Father      49    Heart disease Mother     Thyroid disease Sister     Atrial fibrillation Sister     Atrial fibrillation Sister      neice    Lymphoma Sister 29    Atrial fibrillation Sister     Asthma Neg Hx     Emphysema Neg Hx        Social History     Social History    Marital status:      Spouse name: N/A    Number of children: 6    Years of education: 2 college     Occupational History    retired housewife      Social History Main Topics    Smoking status: Never Smoker    Smokeless tobacco: Never Used    Alcohol use 0.0 oz/week      Comment: rare    Drug use: No    Sexual activity: No     Other Topics Concern    Not on file     Social History Narrative    No narrative on file     Review of Systems   Constitutional: Negative for chills and fever.   Respiratory: Negative for cough.    Cardiovascular: Positive for leg swelling.   Gastrointestinal: Negative for nausea and vomiting.   Musculoskeletal: Positive for joint pain and myalgias. Negative for falls.   Skin: Negative for itching and rash.   Neurological: Positive for tingling and sensory change.         Objective:       Vitals:    04/16/18 0918   BP: 134/60   Weight: 57.6 kg (127 lb)   Height: 5' 5" (1.651 m)   PainSc: 0-No pain       Physical Exam   Constitutional:  Non-toxic appearance. She does not have a sickly appearance. No distress.   Pt. " is well-developed, well-nourished, appears stated age, in no acute distress, alert and oriented x 3. No evidence of depression, anxiety, or agitation. Calm, cooperative, and communicative. Appropriate interactions and affect.   Cardiovascular:   Pulses:       Dorsalis pedis pulses are 1+ on the right side, and 1+ on the left side.        Posterior tibial pulses are 1+ on the right side, and 1+ on the left side.   Dorsalis pedis and posterior tibial pulses are diminished Bilaterally. Mild pitting edema, skin is atrophic, decreased digital hair, feet slightly cool, nails thickened, mild plantar rubor     Pulmonary/Chest: No respiratory distress.   Musculoskeletal:        Right ankle: No tenderness. No lateral malleolus, no medial malleolus, no AITFL, no CF ligament and no posterior TFL tenderness found. Achilles tendon exhibits no pain, no defect and normal Harman's test results.        Left ankle: No tenderness. No lateral malleolus, no medial malleolus, no AITFL, no CF ligament and no posterior TFL tenderness found. Achilles tendon exhibits no pain, no defect and normal Harman's test results.        Right foot: There is no tenderness and no bony tenderness.        Left foot: There is no tenderness and no bony tenderness.   Patient has hammertoes of digits 2-5 bilateral partially reducible without symptom today.    2nd toes adduct against great toe    TA function absent L on muscle testing    Visible and palpable bunion without pain at dorsomedial 1st metatarsal head right and left.  Hallux abducted right and left partially reducible, tracks laterally without being track bound.  No ecchymosis, erythema, edema, or cardinal signs infection or signs of trauma same foot.     Lymphadenopathy:   No lymphatic streaking    Negative lymphadenopathy bilateral popliteal fossa and tarsal tunnel.     Neurological:   [place]-Jakub 5.07 monofilamant testing is diminished Moreno feet. Decreased/absent vibratory sensation  bilateral feet to 128Hz tuning fork.    Skin: Skin is warm, dry and intact. No abrasion, no bruising, no ecchymosis and no rash noted. She is not diaphoretic. No cyanosis. No pallor. Nails show no clubbing.   Toenails 1-5 bilaterally are elongated by 2-3 mm, thickened by 2-3 mm, discolored/yellowed, dystrophic, brittle with subungual debris.     Focal hyperkeratotic lesion consisting entirely of hyperkeratotic tissue without open skin, drainage, pus, fluctuance, malodor: bilateral 5th digit PIPJ with localized erythema  Psychiatric: Her mood appears not anxious. Her affect is not inappropriate. Her speech is not slurred. She is not combative. She is communicative. She is attentive.   Nursing note reviewed.        Assessment:       Encounter Diagnoses   Name Primary?    Idiopathic peripheral neuropathy Yes    Onychomycosis due to dermatophyte     Corn or callus          Plan:       Adela was seen today for peripheral neuropathy.    Diagnoses and all orders for this visit:    Idiopathic peripheral neuropathy    Onychomycosis due to dermatophyte    Corn or callus      I counseled the patient on her conditions, their implications and medical management.    - Shoe inspection. Patient instructed on proper foot hygeine. We discussed wearing proper shoe gear, daily foot inspections, never walking without protective shoe gear, never putting sharp instruments to feet.      - With patient's permission, nails were aggressively reduced and debrided x 10 to their soft tissue attachment mechanically and with electric , removing all offending nail and debris. Patient relates relief following the procedure. After cleansing the  area w/ alcohol prep pad the above mentioned hyperkeratosis was trimmed utilizing No 15 scapel, to a smooth base with out incident. Patient tolerated this  well and reported comfort to the area of: 5th digit PIPJ marcial    - She will continue to monitor the areas daily, inspect her feet, wear  protective shoe gear when ambulatory, moisturizer to maintain skin integrity and follow in this office in approximately 2-3 months, sooner p.r.n.

## 2018-04-23 ENCOUNTER — ANTI-COAG VISIT (OUTPATIENT)
Dept: CARDIOLOGY | Facility: CLINIC | Age: 83
End: 2018-04-23

## 2018-04-23 DIAGNOSIS — Z86.73 HX-TIA (TRANSIENT ISCHEMIC ATTACK): ICD-10-CM

## 2018-04-23 DIAGNOSIS — Z79.01 LONG-TERM (CURRENT) USE OF ANTICOAGULANTS, INR GOAL 2.0-3.0: ICD-10-CM

## 2018-04-23 LAB — INR PPP: 3.2

## 2018-04-24 ENCOUNTER — TELEPHONE (OUTPATIENT)
Dept: ADMINISTRATIVE | Facility: HOSPITAL | Age: 83
End: 2018-04-24

## 2018-04-24 ENCOUNTER — LAB VISIT (OUTPATIENT)
Dept: LAB | Facility: HOSPITAL | Age: 83
End: 2018-04-24
Attending: INTERNAL MEDICINE
Payer: MEDICARE

## 2018-04-24 ENCOUNTER — TELEPHONE (OUTPATIENT)
Dept: FAMILY MEDICINE | Facility: CLINIC | Age: 83
End: 2018-04-24

## 2018-04-24 DIAGNOSIS — I27.20 PULMONARY HYPERTENSION: ICD-10-CM

## 2018-04-24 DIAGNOSIS — Z95.2 S/P TAVR (TRANSCATHETER AORTIC VALVE REPLACEMENT): ICD-10-CM

## 2018-04-24 DIAGNOSIS — I25.10 CORONARY ARTERY DISEASE INVOLVING NATIVE CORONARY ARTERY OF NATIVE HEART WITHOUT ANGINA PECTORIS: ICD-10-CM

## 2018-04-24 DIAGNOSIS — I72.0 CAROTID ANEURYSM, LEFT: ICD-10-CM

## 2018-04-24 DIAGNOSIS — E11.29 CONTROLLED TYPE 2 DIABETES MELLITUS WITH MICROALBUMINURIA, WITHOUT LONG-TERM CURRENT USE OF INSULIN: ICD-10-CM

## 2018-04-24 DIAGNOSIS — R80.9 CONTROLLED TYPE 2 DIABETES MELLITUS WITH MICROALBUMINURIA, WITHOUT LONG-TERM CURRENT USE OF INSULIN: ICD-10-CM

## 2018-04-24 DIAGNOSIS — E78.5 HYPERLIPIDEMIA, UNSPECIFIED HYPERLIPIDEMIA TYPE: ICD-10-CM

## 2018-04-24 DIAGNOSIS — Z95.0 CARDIAC PACEMAKER IN SITU: ICD-10-CM

## 2018-04-24 DIAGNOSIS — I11.0 BENIGN HYPERTENSIVE HEART DISEASE WITH CONGESTIVE HEART FAILURE: ICD-10-CM

## 2018-04-24 LAB
ANION GAP SERPL CALC-SCNC: 12 MMOL/L
BNP SERPL-MCNC: 208 PG/ML
BUN SERPL-MCNC: 38 MG/DL
CALCIUM SERPL-MCNC: 11.1 MG/DL
CHLORIDE SERPL-SCNC: 96 MMOL/L
CO2 SERPL-SCNC: 30 MMOL/L
CREAT SERPL-MCNC: 0.9 MG/DL
EST. GFR  (AFRICAN AMERICAN): >60 ML/MIN/1.73 M^2
EST. GFR  (NON AFRICAN AMERICAN): 56.5 ML/MIN/1.73 M^2
GLUCOSE SERPL-MCNC: 113 MG/DL
POTASSIUM SERPL-SCNC: 4.4 MMOL/L
SODIUM SERPL-SCNC: 138 MMOL/L

## 2018-04-24 PROCEDURE — 80048 BASIC METABOLIC PNL TOTAL CA: CPT

## 2018-04-24 PROCEDURE — 83880 ASSAY OF NATRIURETIC PEPTIDE: CPT

## 2018-04-24 PROCEDURE — 36415 COLL VENOUS BLD VENIPUNCTURE: CPT | Mod: PO

## 2018-04-24 NOTE — TELEPHONE ENCOUNTER
----- Message from Ashia Fan sent at 4/24/2018 10:14 AM CDT -----  Contact: Sandy/ [facility]/ [phone]  Calling to clarify directions for: tiZANidine (ZANAFLEX) 2 MG tablet. Thank you.

## 2018-04-24 NOTE — TELEPHONE ENCOUNTER
The patient's records were received in our office. Health Maintenance was updated today.   A request was sent today to Dr. Hylton's office for a copy of the patient's last diabetic eye exam. Left message for Candie to call our office.

## 2018-04-24 NOTE — TELEPHONE ENCOUNTER
Spoke w/[facility] pharmacist Alex, requesting clarification of Tizanidine directions, has two directions..    Take 1 tablet (2 mg total) by mouth daily as needed. and as need. Please advise.

## 2018-04-26 ENCOUNTER — OFFICE VISIT (OUTPATIENT)
Dept: CARDIOLOGY | Facility: CLINIC | Age: 83
End: 2018-04-26
Payer: MEDICARE

## 2018-04-26 VITALS
WEIGHT: 129.88 LBS | HEART RATE: 59 BPM | BODY MASS INDEX: 21.64 KG/M2 | SYSTOLIC BLOOD PRESSURE: 144 MMHG | DIASTOLIC BLOOD PRESSURE: 67 MMHG | HEIGHT: 65 IN | OXYGEN SATURATION: 100 %

## 2018-04-26 DIAGNOSIS — I11.0 BENIGN HYPERTENSIVE HEART DISEASE WITH CONGESTIVE HEART FAILURE: ICD-10-CM

## 2018-04-26 DIAGNOSIS — Z95.0 CARDIAC PACEMAKER IN SITU: ICD-10-CM

## 2018-04-26 DIAGNOSIS — Z95.2 S/P TAVR (TRANSCATHETER AORTIC VALVE REPLACEMENT): ICD-10-CM

## 2018-04-26 DIAGNOSIS — E11.29 CONTROLLED TYPE 2 DIABETES MELLITUS WITH MICROALBUMINURIA, WITHOUT LONG-TERM CURRENT USE OF INSULIN: ICD-10-CM

## 2018-04-26 DIAGNOSIS — I25.10 CORONARY ARTERY DISEASE INVOLVING NATIVE CORONARY ARTERY OF NATIVE HEART WITHOUT ANGINA PECTORIS: ICD-10-CM

## 2018-04-26 DIAGNOSIS — R80.9 CONTROLLED TYPE 2 DIABETES MELLITUS WITH MICROALBUMINURIA, WITHOUT LONG-TERM CURRENT USE OF INSULIN: ICD-10-CM

## 2018-04-26 DIAGNOSIS — E78.5 HYPERLIPIDEMIA, UNSPECIFIED HYPERLIPIDEMIA TYPE: ICD-10-CM

## 2018-04-26 DIAGNOSIS — I27.20 PULMONARY HYPERTENSION: Primary | ICD-10-CM

## 2018-04-26 PROCEDURE — 99999 PR PBB SHADOW E&M-EST. PATIENT-LVL IV: CPT | Mod: PBBFAC,,, | Performed by: INTERNAL MEDICINE

## 2018-04-26 PROCEDURE — 99214 OFFICE O/P EST MOD 30 MIN: CPT | Mod: PBBFAC | Performed by: INTERNAL MEDICINE

## 2018-04-26 PROCEDURE — 99213 OFFICE O/P EST LOW 20 MIN: CPT | Mod: S$PBB,,, | Performed by: INTERNAL MEDICINE

## 2018-04-26 RX ORDER — DIGOXIN 125 MCG
0.12 TABLET ORAL DAILY
Qty: 90 TABLET | Refills: 3 | Status: SHIPPED | OUTPATIENT
Start: 2018-04-26 | End: 2019-02-26 | Stop reason: SDUPTHER

## 2018-04-26 RX ORDER — BUMETANIDE 2 MG/1
TABLET ORAL
Qty: 90 TABLET | Refills: 3 | Status: SHIPPED | OUTPATIENT
Start: 2018-04-26 | End: 2018-09-26

## 2018-04-26 NOTE — PROGRESS NOTES
Subjective:    Patient ID:  Adela Garay is a 89 y.o. female who presents for follow-up of Congestive Heart Failure      HPI     Severe AS - s/p TAVR 10/17/17, complete heart block - [facility] single PPM 10/17/17  No CAD by [facility] 8/22/17  Chronic A-fib - rate controlled on coumadin, HTN, DM, Hx TIA  Diastolic CHF  Re-admitted after TAVR with CHF 10/30-11/12/17     Ms. Garay is a 89 year old  woman with past medical history of HTN, HLD, DM, aFib, HFpEF, and recent TAVR complicated by PPM placement for CHB ([place] Valve 29mm ) on 10/17/17 for severe AS who presents to the ED with complaints of leg swelling. She states that she has been having issues since she went home after the surgery. She endorses orthopnea, ESTRADA, new cough, leg swelling, and weight gain. Denies issues like this before. Denies Fever. Denies chest pain.      While in ED, chemistry was unremarkable, BNP elevated at 918, troponin negative and CBC stable. CXR with bilateral pleural effusion larger on the right side and atelectatic changes at the lung bases; EKG with atrial fibrillation with paced ventricular rhythm.     Ms. Garay was admitted 10/30 s/p recent TAVR and PPM with acute on chronic diastolic heart failure and edema. She was evaluated by cardiology in ED and assessed by TAVR team in house. On admission her BNP was elevated at 918 and CXR with bilateral pleural effusion larger on the right side and atelectatic changes at the lung bases, this noted to be worsened from prior exam 2 weeks ago. Her diuresis was initiated with lasix 40 mg IVP BID, and titrated up to 60 mg IVP TID with occasional metolazone booster for improved diuresis. Repeat CXR with continued pulmonary edema/pleural fluid and repeat echo 11/7 with markedly increased central venous pressure, RAP of 15, and trivial pericardial effusion, with chemistry continued towards contraction alkalosis thus metolazone was discontinued and lasix IVP decreased to BID, responded  appropriately and chemistry somewhat improved, admission symptoms were also resolving >>> then transitioned to PO lasix dosing, poor response, thus transitioned to bumex, to which she responded very well. She thus far has diuresed with net negative ~18 liters as of current and weight down 16 lbs since admission, she remains at 136-138 lbs, which is likely her new dry weight.  She is no longer oxygen dependant, is ambulating independently without ESTRADA, and all SOB has resolved.      On 11/5 she developed a 101.9 fever and R knee pain.  Ortho was consulted, arthrocentesis performed and resulted in bloody fluid for analysis, however the specimen clotted so cell count not obtained. The bloody tap could be because of OAC with warfarin, however she did have a right septic knee a year ago which require surgical washout. Blood cultures and right knee fluid cultures with NGTD, continued ceftriaxone coverage for appropriate course and discontinued vanc. She developed glossitis most likely d/t ABX, continue nystatin and magic mouthwash as needed, now reports symptoms resolved.       Disposition plans: Home with home health and family care, educated at great length in regards to low sodium diet and fluid restriction. She will also be monitored with digital heart failure program. She follows outpt with home INR and warfarin clinic. Home health to draw labs in one week and report to TAVR team and/or primary cardiologist, she will need repeat echo in approximately 3 months, or sooner if deemed appropriate by TAVR team. Outpt follows already scheduled with primary Cardiologist, TAVR team, and PCP.       Device Interrogation (02/05/18) reveals stable lead and device function. No NSVT noted. She paces 40% in the RV. Estimated battery longevity >10 years.         Echo 4/10/18    1 - Low normal to mildly depressed left ventricular systolic function (EF 50-55%).     2 - Right ventricular enlargement with moderately depressed systolic  function.     3 - The estimated PA systolic pressure is 31 mmHg. PA pressure cannot be estimated in the setting of severe wide open TR    4 - Trivial aortic regurgitation.     5 - Moderate to severe mitral regurgitation.     6 - Increased central venous pressure. ivc measures 4.6 cm RA pressure is atleast 20 mmHG.     7 - Biatrial enlargement.     8 - No wall motion abnormalities.     9 - Eccentric hypertrophy.     10 - S/P transcatheter AVR, DILAN = 2.79 cm2, AVAi = 1.66 cm2/m2, peak velocity = 1.3 m/s, mean gradient = 4 mmHg.    11- severe TR     Labs 4/24/18  K 4.4  Cr 0.9   down from 1028    ESTRADA stable  LE edema resolved    Review of Systems   Constitution: Negative for decreased appetite.   HENT: Negative for ear discharge.    Eyes: Negative for blurred vision.   Respiratory: Negative for hemoptysis.    Endocrine: Negative for polyphagia.   Hematologic/Lymphatic: Negative for adenopathy.   Skin: Negative for color change.   Musculoskeletal: Negative for joint swelling.   Neurological: Negative for brief paralysis.   Psychiatric/Behavioral: Negative for hallucinations.        Objective:    Physical Exam   Constitutional: She is oriented to person, place, and time. She appears well-developed and well-nourished.   HENT:   Head: Normocephalic and atraumatic.   Eyes: Pupils are equal, round, and reactive to light.   Neck: Normal range of motion. No JVD present.   Cardiovascular: Normal rate and regular rhythm.    Murmur heard.  Pulmonary/Chest: Effort normal and breath sounds normal. She has no wheezes. She has no rales.   Abdominal: Soft. Bowel sounds are normal. She exhibits no distension. There is no tenderness.   Neurological: She is alert and oriented to person, place, and time.   Skin: Skin is warm and dry.         Assessment:       1. Pulmonary hypertension    2. Hyperlipidemia, unspecified hyperlipidemia type    3. Coronary artery disease involving native coronary artery of native heart without angina  pectoris    4. Benign hypertensive heart disease with congestive heart failure    5. S/P TAVR (transcatheter aortic valve replacement)    6. Cardiac pacemaker in situ    7. Controlled type 2 diabetes mellitus with microalbuminuria, without long-term current use of insulin         Plan:       BUN climbing - may be mildly dehydrated - she will self adjust bumex  OV 1 month with BNP, BMP, digoxin level

## 2018-04-30 ENCOUNTER — ANTI-COAG VISIT (OUTPATIENT)
Dept: CARDIOLOGY | Facility: CLINIC | Age: 83
End: 2018-04-30
Payer: MEDICARE

## 2018-04-30 DIAGNOSIS — Z86.73 HX-TIA (TRANSIENT ISCHEMIC ATTACK): ICD-10-CM

## 2018-04-30 DIAGNOSIS — Z79.01 LONG-TERM (CURRENT) USE OF ANTICOAGULANTS, INR GOAL 2.0-3.0: ICD-10-CM

## 2018-04-30 LAB — INR PPP: 2.2

## 2018-04-30 PROCEDURE — G0250 MD INR TEST REVIE INTER MGMT: HCPCS | Mod: ,,,

## 2018-05-04 ENCOUNTER — TELEPHONE (OUTPATIENT)
Dept: FAMILY MEDICINE | Facility: CLINIC | Age: 83
End: 2018-05-04

## 2018-05-04 NOTE — TELEPHONE ENCOUNTER
Please clarify with patient what she would like the urine test for.  Patient is due for eye exam (or has she had one recently?).

## 2018-05-04 NOTE — TELEPHONE ENCOUNTER
Please clarify with patient what she would like the urine test for.  Patient is due for eye exam (or has she had one recently?).    Spoke with the pt, she didn't remember having a urine test done.  She states she may have a little burning, while urinating.  She is not sure that she will need a urine test.  She will monitor the situation over the weekend and give us a call back on Monday.  Patient verbalized understandings.

## 2018-05-04 NOTE — TELEPHONE ENCOUNTER
----- Message from Fiorella Berry sent at 5/4/2018 11:23 AM CDT -----  Contact: self  Pt calling to find out if she can take a u/a on Monday. Please call pt to [phone].    Creatine urine test

## 2018-05-07 ENCOUNTER — ANTI-COAG VISIT (OUTPATIENT)
Dept: CARDIOLOGY | Facility: CLINIC | Age: 83
End: 2018-05-07

## 2018-05-07 ENCOUNTER — CLINICAL SUPPORT (OUTPATIENT)
Dept: ELECTROPHYSIOLOGY | Facility: CLINIC | Age: 83
End: 2018-05-07
Attending: INTERNAL MEDICINE
Payer: MEDICARE

## 2018-05-07 DIAGNOSIS — Z95.0 CARDIAC PACEMAKER IN SITU: ICD-10-CM

## 2018-05-07 DIAGNOSIS — Z79.01 LONG-TERM (CURRENT) USE OF ANTICOAGULANTS, INR GOAL 2.0-3.0: ICD-10-CM

## 2018-05-07 DIAGNOSIS — Z86.73 HX-TIA (TRANSIENT ISCHEMIC ATTACK): ICD-10-CM

## 2018-05-07 LAB — INR PPP: 1.9

## 2018-05-07 PROCEDURE — 93296 REM INTERROG EVL PM/IDS: CPT | Mod: PBBFAC | Performed by: INTERNAL MEDICINE

## 2018-05-07 PROCEDURE — 93294 REM INTERROG EVL PM/LDLS PM: CPT | Mod: ,,, | Performed by: INTERNAL MEDICINE

## 2018-05-08 ENCOUNTER — TELEPHONE (OUTPATIENT)
Dept: CARDIOLOGY | Facility: CLINIC | Age: 83
End: 2018-05-08

## 2018-05-09 ENCOUNTER — TELEPHONE (OUTPATIENT)
Dept: FAMILY MEDICINE | Facility: CLINIC | Age: 83
End: 2018-05-09

## 2018-05-09 DIAGNOSIS — R30.0 DYSURIA: Primary | ICD-10-CM

## 2018-05-09 NOTE — TELEPHONE ENCOUNTER
----- Message from Trisha Armenta sent at 5/9/2018  4:50 PM CDT -----  Contact: self  Pt requesting urinalysis. States she's experiencing vaginal discomfort. Contact pt [phone].

## 2018-05-09 NOTE — TELEPHONE ENCOUNTER
----- Message from Trisha Armenta sent at 5/9/2018  4:50 PM CDT -----  Contact: self  Pt requesting urinalysis. States she's experiencing vaginal discomfort. Contact pt [phone].

## 2018-05-09 NOTE — TELEPHONE ENCOUNTER
Spoke with pt, she is requesting a order for urinalysis.  Pt isn't experiencing any burning, but she states it's some what uncomfortable.  She would like to have the orders send to [facility].

## 2018-05-14 ENCOUNTER — ANTI-COAG VISIT (OUTPATIENT)
Dept: CARDIOLOGY | Facility: CLINIC | Age: 83
End: 2018-05-14

## 2018-05-14 DIAGNOSIS — Z86.73 HX-TIA (TRANSIENT ISCHEMIC ATTACK): ICD-10-CM

## 2018-05-14 DIAGNOSIS — Z79.01 LONG-TERM (CURRENT) USE OF ANTICOAGULANTS, INR GOAL 2.0-3.0: ICD-10-CM

## 2018-05-14 LAB — INR PPP: 1.7

## 2018-05-16 ENCOUNTER — PES CALL (OUTPATIENT)
Dept: ADMINISTRATIVE | Facility: CLINIC | Age: 83
End: 2018-05-16

## 2018-05-21 ENCOUNTER — ANTI-COAG VISIT (OUTPATIENT)
Dept: CARDIOLOGY | Facility: CLINIC | Age: 83
End: 2018-05-21

## 2018-05-21 DIAGNOSIS — Z86.73 HX-TIA (TRANSIENT ISCHEMIC ATTACK): ICD-10-CM

## 2018-05-21 DIAGNOSIS — Z79.01 LONG-TERM (CURRENT) USE OF ANTICOAGULANTS, INR GOAL 2.0-3.0: ICD-10-CM

## 2018-05-21 LAB — INR PPP: 1.9

## 2018-05-25 ENCOUNTER — TELEPHONE (OUTPATIENT)
Dept: ELECTROPHYSIOLOGY | Facility: CLINIC | Age: 83
End: 2018-05-25

## 2018-05-25 NOTE — TELEPHONE ENCOUNTER
Ms Garay contacted the clinic on Wednesday with concerns that her device had shifted. Informed patient that her device is sewed into a pectoral pocket in which it can move. Patient stated she lost weight over the past few months due to valve replacement surgery. Patient was informed to have her grandson to send a picture of t device shifting. Patient grandson acknowledged.

## 2018-05-25 NOTE — TELEPHONE ENCOUNTER
----- Message from Sreekanth Dean sent at 5/25/2018 10:39 AM CDT -----  Contact: patient  Please call pt at [phone] immediately if possible per pt. Questions regarding her pacemaker    Thank you

## 2018-05-28 ENCOUNTER — ANTI-COAG VISIT (OUTPATIENT)
Dept: CARDIOLOGY | Facility: CLINIC | Age: 83
End: 2018-05-28

## 2018-05-28 DIAGNOSIS — Z79.01 LONG-TERM (CURRENT) USE OF ANTICOAGULANTS, INR GOAL 2.0-3.0: ICD-10-CM

## 2018-05-28 DIAGNOSIS — Z86.73 HX-TIA (TRANSIENT ISCHEMIC ATTACK): ICD-10-CM

## 2018-05-28 LAB — INR PPP: 2.7

## 2018-05-29 ENCOUNTER — LAB VISIT (OUTPATIENT)
Dept: LAB | Facility: HOSPITAL | Age: 83
End: 2018-05-29
Attending: INTERNAL MEDICINE
Payer: MEDICARE

## 2018-05-29 DIAGNOSIS — E11.29 CONTROLLED TYPE 2 DIABETES MELLITUS WITH MICROALBUMINURIA, WITHOUT LONG-TERM CURRENT USE OF INSULIN: ICD-10-CM

## 2018-05-29 DIAGNOSIS — R80.9 CONTROLLED TYPE 2 DIABETES MELLITUS WITH MICROALBUMINURIA, WITHOUT LONG-TERM CURRENT USE OF INSULIN: ICD-10-CM

## 2018-05-29 DIAGNOSIS — Z95.2 S/P TAVR (TRANSCATHETER AORTIC VALVE REPLACEMENT): ICD-10-CM

## 2018-05-29 DIAGNOSIS — Z95.0 CARDIAC PACEMAKER IN SITU: ICD-10-CM

## 2018-05-29 DIAGNOSIS — I11.0 BENIGN HYPERTENSIVE HEART DISEASE WITH CONGESTIVE HEART FAILURE: ICD-10-CM

## 2018-05-29 DIAGNOSIS — E78.5 HYPERLIPIDEMIA, UNSPECIFIED HYPERLIPIDEMIA TYPE: ICD-10-CM

## 2018-05-29 DIAGNOSIS — I25.10 CORONARY ARTERY DISEASE INVOLVING NATIVE CORONARY ARTERY OF NATIVE HEART WITHOUT ANGINA PECTORIS: ICD-10-CM

## 2018-05-29 DIAGNOSIS — I27.20 PULMONARY HYPERTENSION: ICD-10-CM

## 2018-05-29 LAB
ANION GAP SERPL CALC-SCNC: 7 MMOL/L
BNP SERPL-MCNC: 551 PG/ML
BUN SERPL-MCNC: 26 MG/DL
CALCIUM SERPL-MCNC: 10.1 MG/DL
CHLORIDE SERPL-SCNC: 98 MMOL/L
CO2 SERPL-SCNC: 30 MMOL/L
CREAT SERPL-MCNC: 0.8 MG/DL
DIGOXIN SERPL-MCNC: 0.8 NG/ML
EST. GFR  (AFRICAN AMERICAN): >60 ML/MIN/1.73 M^2
EST. GFR  (NON AFRICAN AMERICAN): >60 ML/MIN/1.73 M^2
GLUCOSE SERPL-MCNC: 145 MG/DL
POTASSIUM SERPL-SCNC: 4.3 MMOL/L
SODIUM SERPL-SCNC: 135 MMOL/L

## 2018-05-29 PROCEDURE — 80048 BASIC METABOLIC PNL TOTAL CA: CPT

## 2018-05-29 PROCEDURE — 83880 ASSAY OF NATRIURETIC PEPTIDE: CPT

## 2018-05-29 PROCEDURE — 80162 ASSAY OF DIGOXIN TOTAL: CPT

## 2018-05-29 PROCEDURE — 36415 COLL VENOUS BLD VENIPUNCTURE: CPT | Mod: PO

## 2018-05-31 ENCOUNTER — OFFICE VISIT (OUTPATIENT)
Dept: CARDIOLOGY | Facility: CLINIC | Age: 83
End: 2018-05-31
Payer: MEDICARE

## 2018-05-31 VITALS
HEART RATE: 60 BPM | BODY MASS INDEX: 21.89 KG/M2 | SYSTOLIC BLOOD PRESSURE: 162 MMHG | HEIGHT: 65 IN | WEIGHT: 131.38 LBS | RESPIRATION RATE: 15 BRPM | DIASTOLIC BLOOD PRESSURE: 71 MMHG | OXYGEN SATURATION: 98 %

## 2018-05-31 DIAGNOSIS — E11.29 CONTROLLED TYPE 2 DIABETES MELLITUS WITH MICROALBUMINURIA, WITHOUT LONG-TERM CURRENT USE OF INSULIN: ICD-10-CM

## 2018-05-31 DIAGNOSIS — E78.5 HYPERLIPIDEMIA, UNSPECIFIED HYPERLIPIDEMIA TYPE: ICD-10-CM

## 2018-05-31 DIAGNOSIS — I27.20 PULMONARY HYPERTENSION: Primary | ICD-10-CM

## 2018-05-31 DIAGNOSIS — Z95.0 CARDIAC PACEMAKER IN SITU: ICD-10-CM

## 2018-05-31 DIAGNOSIS — I25.10 CORONARY ARTERY DISEASE INVOLVING NATIVE CORONARY ARTERY OF NATIVE HEART WITHOUT ANGINA PECTORIS: ICD-10-CM

## 2018-05-31 DIAGNOSIS — I48.19 PERSISTENT ATRIAL FIBRILLATION: ICD-10-CM

## 2018-05-31 DIAGNOSIS — Z95.2 S/P TAVR (TRANSCATHETER AORTIC VALVE REPLACEMENT): ICD-10-CM

## 2018-05-31 DIAGNOSIS — R80.9 CONTROLLED TYPE 2 DIABETES MELLITUS WITH MICROALBUMINURIA, WITHOUT LONG-TERM CURRENT USE OF INSULIN: ICD-10-CM

## 2018-05-31 PROCEDURE — 99214 OFFICE O/P EST MOD 30 MIN: CPT | Mod: PBBFAC | Performed by: INTERNAL MEDICINE

## 2018-05-31 PROCEDURE — 99213 OFFICE O/P EST LOW 20 MIN: CPT | Mod: S$PBB,,, | Performed by: INTERNAL MEDICINE

## 2018-05-31 PROCEDURE — 99999 PR PBB SHADOW E&M-EST. PATIENT-LVL IV: CPT | Mod: PBBFAC,,, | Performed by: INTERNAL MEDICINE

## 2018-05-31 NOTE — PROGRESS NOTES
Subjective:    Patient ID:  Adela Garay is a 89 y.o. female who presents for follow-up of Congestive Heart Failure      HPI     Severe AS - s/p TAVR 10/17/17, complete heart block - [facility] single PPM 10/17/17  No CAD by [facility] 8/22/17  Chronic A-fib - rate controlled on coumadin, HTN, DM, Hx TIA  Diastolic CHF  Re-admitted after TAVR with CHF 10/30-11/12/17     Ms. Garay is a 89 year old  woman with past medical history of HTN, HLD, DM, aFib, HFpEF, and recent TAVR complicated by PPM placement for CHB ([place]ico Valve 29mm ) on 10/17/17 for severe AS who presents to the ED with complaints of leg swelling. She states that she has been having issues since she went home after the surgery. She endorses orthopnea, ESTRADA, new cough, leg swelling, and weight gain. Denies issues like this before. Denies Fever. Denies chest pain.      While in ED, chemistry was unremarkable, BNP elevated at 918, troponin negative and CBC stable. CXR with bilateral pleural effusion larger on the right side and atelectatic changes at the lung bases; EKG with atrial fibrillation with paced ventricular rhythm.     Ms. Garay was admitted 10/30 s/p recent TAVR and PPM with acute on chronic diastolic heart failure and edema. She was evaluated by cardiology in ED and assessed by TAVR team in house. On admission her BNP was elevated at 918 and CXR with bilateral pleural effusion larger on the right side and atelectatic changes at the lung bases, this noted to be worsened from prior exam 2 weeks ago. Her diuresis was initiated with lasix 40 mg IVP BID, and titrated up to 60 mg IVP TID with occasional metolazone booster for improved diuresis. Repeat CXR with continued pulmonary edema/pleural fluid and repeat echo 11/7 with markedly increased central venous pressure, RAP of 15, and trivial pericardial effusion, with chemistry continued towards contraction alkalosis thus metolazone was discontinued and lasix IVP decreased to BID, responded  appropriately and chemistry somewhat improved, admission symptoms were also resolving >>> then transitioned to PO lasix dosing, poor response, thus transitioned to bumex, to which she responded very well. She thus far has diuresed with net negative ~18 liters as of current and weight down 16 lbs since admission, she remains at 136-138 lbs, which is likely her new dry weight.  She is no longer oxygen dependant, is ambulating independently without ESTRADA, and all SOB has resolved.      On 11/5 she developed a 101.9 fever and R knee pain.  Ortho was consulted, arthrocentesis performed and resulted in bloody fluid for analysis, however the specimen clotted so cell count not obtained. The bloody tap could be because of OAC with warfarin, however she did have a right septic knee a year ago which require surgical washout. Blood cultures and right knee fluid cultures with NGTD, continued ceftriaxone coverage for appropriate course and discontinued vanc. She developed glossitis most likely d/t ABX, continue nystatin and magic mouthwash as needed, now reports symptoms resolved.       Disposition plans: Home with home health and family care, educated at great length in regards to low sodium diet and fluid restriction. She will also be monitored with digital heart failure program. She follows outpt with home INR and warfarin clinic. Home health to draw labs in one week and report to TAVR team and/or primary cardiologist, she will need repeat echo in approximately 3 months, or sooner if deemed appropriate by TAVR team. Outpt follows already scheduled with primary Cardiologist, TAVR team, and PCP.       Device Interrogation (02/05/18) reveals stable lead and device function. No NSVT noted. She paces 40% in the RV. Estimated battery longevity >10 years.         Echo 4/10/18    1 - Low normal to mildly depressed left ventricular systolic function (EF 50-55%).     2 - Right ventricular enlargement with moderately depressed systolic  function.     3 - The estimated PA systolic pressure is 31 mmHg. PA pressure cannot be estimated in the setting of severe wide open TR    4 - Trivial aortic regurgitation.     5 - Moderate to severe mitral regurgitation.     6 - Increased central venous pressure. ivc measures 4.6 cm RA pressure is atleast 20 mmHG.     7 - Biatrial enlargement.     8 - No wall motion abnormalities.     9 - Eccentric hypertrophy.     10 - S/P transcatheter AVR, DILAN = 2.79 cm2, AVAi = 1.66 cm2/m2, peak velocity = 1.3 m/s, mean gradient = 4 mmHg.    11- severe TR     Labs 5/29/18  K 4.3  Cr 0.8  BUN 26 down from 38   up from 208  Dig 0.8     Denies CP or SOB    Review of Systems   Constitution: Negative for decreased appetite.   HENT: Negative for ear discharge.    Eyes: Negative for blurred vision.   Respiratory: Negative for hemoptysis.    Endocrine: Negative for polyphagia.   Hematologic/Lymphatic: Negative for adenopathy.   Skin: Negative for color change.   Musculoskeletal: Negative for joint swelling.   Neurological: Negative for brief paralysis.   Psychiatric/Behavioral: Negative for hallucinations.        Objective:    Physical Exam   Constitutional: She is oriented to person, place, and time. She appears well-developed and well-nourished.   HENT:   Head: Normocephalic and atraumatic.   Eyes: Pupils are equal, round, and reactive to light.   Neck: Normal range of motion. No JVD present.   Cardiovascular: Normal rate and regular rhythm.    Murmur heard.  Pulmonary/Chest: Effort normal and breath sounds normal. She has no wheezes. She has no rales.   Abdominal: Soft. Bowel sounds are normal. She exhibits no distension. There is no tenderness.   Neurological: She is alert and oriented to person, place, and time.   Skin: Skin is warm and dry.         Assessment:       1. Pulmonary hypertension    2. Hyperlipidemia, unspecified hyperlipidemia type    3. Persistent atrial fibrillation    4. Coronary artery disease involving  native coronary artery of native heart without angina pectoris    5. S/P TAVR (transcatheter aortic valve replacement)    6. Cardiac pacemaker in situ    7. Controlled type 2 diabetes mellitus with microalbuminuria, without long-term current use of insulin         Plan:       Cardiac stable  OV 2 months with BNP, BMP

## 2018-06-04 ENCOUNTER — ANTI-COAG VISIT (OUTPATIENT)
Dept: CARDIOLOGY | Facility: CLINIC | Age: 83
End: 2018-06-04
Payer: MEDICARE

## 2018-06-04 DIAGNOSIS — Z79.01 LONG-TERM (CURRENT) USE OF ANTICOAGULANTS, INR GOAL 2.0-3.0: ICD-10-CM

## 2018-06-04 DIAGNOSIS — Z86.73 HX-TIA (TRANSIENT ISCHEMIC ATTACK): ICD-10-CM

## 2018-06-04 LAB — INR PPP: 2.6

## 2018-06-04 PROCEDURE — G0250 MD INR TEST REVIE INTER MGMT: HCPCS | Mod: ,,, | Performed by: INTERNAL MEDICINE

## 2018-06-18 ENCOUNTER — ANTI-COAG VISIT (OUTPATIENT)
Dept: CARDIOLOGY | Facility: CLINIC | Age: 83
End: 2018-06-18

## 2018-06-18 DIAGNOSIS — Z79.01 LONG-TERM (CURRENT) USE OF ANTICOAGULANTS, INR GOAL 2.0-3.0: ICD-10-CM

## 2018-06-18 DIAGNOSIS — Z86.73 HX-TIA (TRANSIENT ISCHEMIC ATTACK): ICD-10-CM

## 2018-06-18 LAB — INR PPP: 2.1

## 2018-07-02 ENCOUNTER — ANTI-COAG VISIT (OUTPATIENT)
Dept: CARDIOLOGY | Facility: CLINIC | Age: 83
End: 2018-07-02

## 2018-07-02 DIAGNOSIS — Z79.01 LONG-TERM (CURRENT) USE OF ANTICOAGULANTS, INR GOAL 2.0-3.0: ICD-10-CM

## 2018-07-02 DIAGNOSIS — Z86.73 HX-TIA (TRANSIENT ISCHEMIC ATTACK): ICD-10-CM

## 2018-07-02 LAB — INR PPP: 2.3

## 2018-07-16 ENCOUNTER — ANTI-COAG VISIT (OUTPATIENT)
Dept: CARDIOLOGY | Facility: CLINIC | Age: 83
End: 2018-07-16
Payer: MEDICARE

## 2018-07-16 ENCOUNTER — TELEPHONE (OUTPATIENT)
Dept: CARDIOLOGY | Facility: CLINIC | Age: 83
End: 2018-07-16

## 2018-07-16 DIAGNOSIS — Z86.73 HX-TIA (TRANSIENT ISCHEMIC ATTACK): ICD-10-CM

## 2018-07-16 DIAGNOSIS — Z79.01 LONG-TERM (CURRENT) USE OF ANTICOAGULANTS, INR GOAL 2.0-3.0: ICD-10-CM

## 2018-07-16 LAB — INR PPP: 2.2

## 2018-07-16 PROCEDURE — G0250 MD INR TEST REVIE INTER MGMT: HCPCS | Mod: ,,, | Performed by: INTERNAL MEDICINE

## 2018-07-26 ENCOUNTER — LAB VISIT (OUTPATIENT)
Dept: LAB | Facility: HOSPITAL | Age: 83
End: 2018-07-26
Attending: INTERNAL MEDICINE
Payer: MEDICARE

## 2018-07-26 DIAGNOSIS — Z95.2 S/P TAVR (TRANSCATHETER AORTIC VALVE REPLACEMENT): ICD-10-CM

## 2018-07-26 DIAGNOSIS — Z95.0 CARDIAC PACEMAKER IN SITU: ICD-10-CM

## 2018-07-26 DIAGNOSIS — R80.9 CONTROLLED TYPE 2 DIABETES MELLITUS WITH MICROALBUMINURIA, WITHOUT LONG-TERM CURRENT USE OF INSULIN: ICD-10-CM

## 2018-07-26 DIAGNOSIS — E78.5 HYPERLIPIDEMIA, UNSPECIFIED HYPERLIPIDEMIA TYPE: ICD-10-CM

## 2018-07-26 DIAGNOSIS — I48.19 PERSISTENT ATRIAL FIBRILLATION: ICD-10-CM

## 2018-07-26 DIAGNOSIS — I25.10 CORONARY ARTERY DISEASE INVOLVING NATIVE CORONARY ARTERY OF NATIVE HEART WITHOUT ANGINA PECTORIS: ICD-10-CM

## 2018-07-26 DIAGNOSIS — E11.29 CONTROLLED TYPE 2 DIABETES MELLITUS WITH MICROALBUMINURIA, WITHOUT LONG-TERM CURRENT USE OF INSULIN: ICD-10-CM

## 2018-07-26 DIAGNOSIS — I27.20 PULMONARY HYPERTENSION: ICD-10-CM

## 2018-07-26 LAB
ANION GAP SERPL CALC-SCNC: 8 MMOL/L
BNP SERPL-MCNC: 455 PG/ML
BUN SERPL-MCNC: 16 MG/DL
CALCIUM SERPL-MCNC: 9.9 MG/DL
CHLORIDE SERPL-SCNC: 93 MMOL/L
CO2 SERPL-SCNC: 29 MMOL/L
CREAT SERPL-MCNC: 0.7 MG/DL
EST. GFR  (AFRICAN AMERICAN): >60 ML/MIN/1.73 M^2
EST. GFR  (NON AFRICAN AMERICAN): >60 ML/MIN/1.73 M^2
GLUCOSE SERPL-MCNC: 104 MG/DL
POTASSIUM SERPL-SCNC: 4.5 MMOL/L
SODIUM SERPL-SCNC: 130 MMOL/L

## 2018-07-26 PROCEDURE — 36415 COLL VENOUS BLD VENIPUNCTURE: CPT | Mod: PO

## 2018-07-26 PROCEDURE — 83880 ASSAY OF NATRIURETIC PEPTIDE: CPT

## 2018-07-26 PROCEDURE — 80048 BASIC METABOLIC PNL TOTAL CA: CPT

## 2018-07-27 ENCOUNTER — OFFICE VISIT (OUTPATIENT)
Dept: CARDIOLOGY | Facility: CLINIC | Age: 83
End: 2018-07-27
Payer: MEDICARE

## 2018-07-27 VITALS
DIASTOLIC BLOOD PRESSURE: 60 MMHG | HEIGHT: 65 IN | RESPIRATION RATE: 15 BRPM | WEIGHT: 131.19 LBS | OXYGEN SATURATION: 98 % | BODY MASS INDEX: 21.86 KG/M2 | SYSTOLIC BLOOD PRESSURE: 116 MMHG | HEART RATE: 60 BPM

## 2018-07-27 DIAGNOSIS — Z95.0 CARDIAC PACEMAKER IN SITU: ICD-10-CM

## 2018-07-27 DIAGNOSIS — Z95.2 S/P TAVR (TRANSCATHETER AORTIC VALVE REPLACEMENT): ICD-10-CM

## 2018-07-27 DIAGNOSIS — E11.29 CONTROLLED TYPE 2 DIABETES MELLITUS WITH MICROALBUMINURIA, WITHOUT LONG-TERM CURRENT USE OF INSULIN: ICD-10-CM

## 2018-07-27 DIAGNOSIS — I25.10 CORONARY ARTERY DISEASE INVOLVING NATIVE CORONARY ARTERY OF NATIVE HEART WITHOUT ANGINA PECTORIS: ICD-10-CM

## 2018-07-27 DIAGNOSIS — I77.9 CAROTID ARTERY DISEASE, UNSPECIFIED LATERALITY: ICD-10-CM

## 2018-07-27 DIAGNOSIS — I27.20 PULMONARY HYPERTENSION: Primary | ICD-10-CM

## 2018-07-27 DIAGNOSIS — E78.5 HYPERLIPIDEMIA, UNSPECIFIED HYPERLIPIDEMIA TYPE: ICD-10-CM

## 2018-07-27 DIAGNOSIS — R80.9 CONTROLLED TYPE 2 DIABETES MELLITUS WITH MICROALBUMINURIA, WITHOUT LONG-TERM CURRENT USE OF INSULIN: ICD-10-CM

## 2018-07-27 PROCEDURE — 99213 OFFICE O/P EST LOW 20 MIN: CPT | Mod: S$PBB,,, | Performed by: INTERNAL MEDICINE

## 2018-07-27 PROCEDURE — 99213 OFFICE O/P EST LOW 20 MIN: CPT | Mod: PBBFAC | Performed by: INTERNAL MEDICINE

## 2018-07-27 PROCEDURE — 99999 PR PBB SHADOW E&M-EST. PATIENT-LVL III: CPT | Mod: PBBFAC,,, | Performed by: INTERNAL MEDICINE

## 2018-07-27 NOTE — PROGRESS NOTES
Subjective:    Patient ID:  Adela Garay is a 89 y.o. female who presents for follow-up of Atrial Fibrillation      HPI     Severe AS - s/p TAVR 10/17/17, complete heart block - [facility] Turner single PPM 10/17/17  No CAD by [facility] 8/22/17  Chronic A-fib - rate controlled on coumadin, HTN, DM, Hx TIA  Diastolic CHF  Re-admitted after TAVR with CHF 10/30-11/12/17    Echo 4/10/18    1 - Low normal to mildly depressed left ventricular systolic function (EF 50-55%).     2 - Right ventricular enlargement with moderately depressed systolic function.     3 - The estimated PA systolic pressure is 31 mmHg. PA pressure cannot be estimated in the setting of severe wide open TR    4 - Trivial aortic regurgitation.     5 - Moderate to severe mitral regurgitation.     6 - Increased central venous pressure. ivc measures 4.6 cm RA pressure is atleast 20 mmHG.     7 - Biatrial enlargement.     8 - No wall motion abnormalities.     9 - Eccentric hypertrophy.     10 - S/P transcatheter AVR, DILAN = 2.79 cm2, AVAi = 1.66 cm2/m2, peak velocity = 1.3 m/s, mean gradient = 4 mmHg.    11- severe TR    Labs 7/26/18  K 4.5  Cr 0.7   down from 551    Doing well  Denies CP or significant SOB          Review of Systems   Constitution: Negative for decreased appetite.   HENT: Negative for ear discharge.    Eyes: Negative for blurred vision.   Respiratory: Negative for hemoptysis.    Endocrine: Negative for polyphagia.   Hematologic/Lymphatic: Negative for adenopathy.   Skin: Negative for color change.   Musculoskeletal: Negative for joint swelling.   Neurological: Negative for brief paralysis.   Psychiatric/Behavioral: Negative for hallucinations.        Objective:    Physical Exam   Constitutional: She is oriented to person, place, and time. She appears well-developed and well-nourished.   HENT:   Head: Normocephalic and atraumatic.   Eyes: Pupils are equal, round, and reactive to light.   Neck: Normal range of motion. No JVD present.    Cardiovascular: Normal rate and regular rhythm.    Murmur heard.  Pulmonary/Chest: Effort normal and breath sounds normal. She has no wheezes. She has no rales.   Abdominal: Soft. Bowel sounds are normal. She exhibits no distension. There is no tenderness.   Neurological: She is alert and oriented to person, place, and time.   Skin: Skin is warm and dry.         Assessment:       1. Pulmonary hypertension    2. Coronary artery disease involving native coronary artery of native heart without angina pectoris    3. Hyperlipidemia, unspecified hyperlipidemia type    4. Carotid artery disease, unspecified laterality    5. S/P TAVR (transcatheter aortic valve replacement)    6. Cardiac pacemaker in situ    7. Controlled type 2 diabetes mellitus with microalbuminuria, without long-term current use of insulin         Plan:       Cardiac stable  OV 3 months with BNP, BMP

## 2018-07-30 ENCOUNTER — ANTI-COAG VISIT (OUTPATIENT)
Dept: CARDIOLOGY | Facility: CLINIC | Age: 83
End: 2018-07-30

## 2018-07-30 DIAGNOSIS — Z86.73 HX-TIA (TRANSIENT ISCHEMIC ATTACK): ICD-10-CM

## 2018-07-30 DIAGNOSIS — Z79.01 LONG-TERM (CURRENT) USE OF ANTICOAGULANTS, INR GOAL 2.0-3.0: ICD-10-CM

## 2018-07-30 LAB — INR PPP: 2

## 2018-07-31 DIAGNOSIS — Z00.6 EXAMINATION OF PARTICIPANT IN CLINICAL TRIAL: ICD-10-CM

## 2018-07-31 DIAGNOSIS — Z95.2 S/P TAVR (TRANSCATHETER AORTIC VALVE REPLACEMENT): Primary | ICD-10-CM

## 2018-08-07 ENCOUNTER — CLINICAL SUPPORT (OUTPATIENT)
Dept: ELECTROPHYSIOLOGY | Facility: CLINIC | Age: 83
End: 2018-08-07
Payer: MEDICARE

## 2018-08-07 DIAGNOSIS — Z95.0 CARDIAC PACEMAKER IN SITU: ICD-10-CM

## 2018-08-07 PROCEDURE — 93294 REM INTERROG EVL PM/LDLS PM: CPT | Mod: ,,, | Performed by: INTERNAL MEDICINE

## 2018-08-07 PROCEDURE — 93296 REM INTERROG EVL PM/IDS: CPT | Mod: PBBFAC | Performed by: INTERNAL MEDICINE

## 2018-08-08 ENCOUNTER — TELEPHONE (OUTPATIENT)
Dept: FAMILY MEDICINE | Facility: CLINIC | Age: 83
End: 2018-08-08

## 2018-08-08 ENCOUNTER — TELEPHONE (OUTPATIENT)
Dept: CARDIOLOGY | Facility: CLINIC | Age: 83
End: 2018-08-08

## 2018-08-08 NOTE — TELEPHONE ENCOUNTER
Spoke with patient who states she has enough meds to last through September and will call back when needed.

## 2018-08-08 NOTE — TELEPHONE ENCOUNTER
----- Message from Dread Gallardo sent at 8/7/2018  3:29 PM CDT -----  Contact: Self/[phone]  Patient would like to speak to the staff in regards to her medication    Thank you

## 2018-08-08 NOTE — TELEPHONE ENCOUNTER
----- Message from Manuela Morales MA sent at 8/7/2018  2:11 PM CDT -----  Contact: self      ----- Message -----  From: Aleena Vieira  Sent: 8/7/2018   1:57 PM  To: [facility] Staff    Pt is not having fluid rentention so she wanted to know if she can skip a day of her meds, please call at [phone]

## 2018-08-13 ENCOUNTER — ANTI-COAG VISIT (OUTPATIENT)
Dept: CARDIOLOGY | Facility: CLINIC | Age: 83
End: 2018-08-13

## 2018-08-13 DIAGNOSIS — Z79.01 LONG-TERM (CURRENT) USE OF ANTICOAGULANTS, INR GOAL 2.0-3.0: ICD-10-CM

## 2018-08-13 DIAGNOSIS — Z86.73 HX-TIA (TRANSIENT ISCHEMIC ATTACK): ICD-10-CM

## 2018-08-13 LAB — INR PPP: 2.6

## 2018-08-20 ENCOUNTER — OFFICE VISIT (OUTPATIENT)
Dept: PODIATRY | Facility: CLINIC | Age: 83
End: 2018-08-20
Payer: MEDICARE

## 2018-08-20 VITALS
DIASTOLIC BLOOD PRESSURE: 62 MMHG | WEIGHT: 131 LBS | SYSTOLIC BLOOD PRESSURE: 116 MMHG | HEIGHT: 65 IN | BODY MASS INDEX: 21.83 KG/M2

## 2018-08-20 DIAGNOSIS — R80.9 CONTROLLED TYPE 2 DIABETES MELLITUS WITH MICROALBUMINURIA, WITHOUT LONG-TERM CURRENT USE OF INSULIN: ICD-10-CM

## 2018-08-20 DIAGNOSIS — M20.41 HAMMER TOES OF BOTH FEET: ICD-10-CM

## 2018-08-20 DIAGNOSIS — B35.1 ONYCHOMYCOSIS DUE TO DERMATOPHYTE: ICD-10-CM

## 2018-08-20 DIAGNOSIS — M20.5X1 HALLUX LIMITUS, ACQUIRED, RIGHT: ICD-10-CM

## 2018-08-20 DIAGNOSIS — Z79.01 ANTICOAGULATED ON COUMADIN: ICD-10-CM

## 2018-08-20 DIAGNOSIS — M20.5X2 HALLUX LIMITUS, ACQUIRED, LEFT: ICD-10-CM

## 2018-08-20 DIAGNOSIS — R60.0 BILATERAL LEG EDEMA: ICD-10-CM

## 2018-08-20 DIAGNOSIS — I83.90 VARICOSITIES OF LEG: ICD-10-CM

## 2018-08-20 DIAGNOSIS — M20.42 HAMMER TOES OF BOTH FEET: ICD-10-CM

## 2018-08-20 DIAGNOSIS — L84 CORN OR CALLUS: ICD-10-CM

## 2018-08-20 DIAGNOSIS — G60.9 IDIOPATHIC PERIPHERAL NEUROPATHY: Primary | ICD-10-CM

## 2018-08-20 DIAGNOSIS — E11.29 CONTROLLED TYPE 2 DIABETES MELLITUS WITH MICROALBUMINURIA, WITHOUT LONG-TERM CURRENT USE OF INSULIN: ICD-10-CM

## 2018-08-20 PROCEDURE — 11721 DEBRIDE NAIL 6 OR MORE: CPT | Performed by: PODIATRIST

## 2018-08-20 PROCEDURE — 11056 PARNG/CUTG B9 HYPRKR LES 2-4: CPT | Mod: Q9,S$PBB,, | Performed by: PODIATRIST

## 2018-08-20 PROCEDURE — 99999 PR PBB SHADOW E&M-EST. PATIENT-LVL III: CPT | Mod: PBBFAC,,, | Performed by: PODIATRIST

## 2018-08-20 PROCEDURE — 99499 UNLISTED E&M SERVICE: CPT | Mod: S$PBB,,, | Performed by: PODIATRIST

## 2018-08-20 PROCEDURE — 11721 DEBRIDE NAIL 6 OR MORE: CPT | Mod: 59,Q9,S$PBB, | Performed by: PODIATRIST

## 2018-08-20 PROCEDURE — 11056 PARNG/CUTG B9 HYPRKR LES 2-4: CPT | Mod: Q9,PBBFAC,PO | Performed by: PODIATRIST

## 2018-08-20 PROCEDURE — 99213 OFFICE O/P EST LOW 20 MIN: CPT | Mod: PBBFAC,PO | Performed by: PODIATRIST

## 2018-08-20 NOTE — PROGRESS NOTES
Subjective:      Patient ID: Adela Garay is a 89 y.o. female.    Chief Complaint: Diabetes Mellitus (4/16/18 Jami); Diabetic Foot Exam; and Nail Care    Adela is a 89 y.o. female who presents to the clinic for evaluation and treatment of high risk feet. Adela has a past medical history of Anticoagulant long-term use, Anticoagulated on Coumadin, Aortic valve stenosis, Arthritis, Atrial fibrillation, Carotid artery occlusion, Chronic rhinosinusitis, Coronary artery disease, Granulomatous lung disease, Heart failure, Hyperlipidemia, Hypertension, Hypertensive heart disease without CHF (congestive heart failure), Mitral valve prolapse, PN (peripheral neuropathy), Severe aortic stenosis by prior echocardiogram, TIA (transient ischemic attack), Type 2 diabetes mellitus, and Type II or unspecified type diabetes mellitus without mention of complication, not stated as uncontrolled. The patient's chief complaint is long, thick toenails. This patient has documented high risk feet requiring routine maintenance secondary to peripheral neuropathy.      PCP: Torrie Farrell MD    Date Last Seen by PCP:   Chief Complaint   Patient presents with    Diabetes Mellitus     4/16/18 Jami    Diabetic Foot Exam    Nail Care       Current shoe gear:  SAS shoe    Hemoglobin A1C   Date Value Ref Range Status   04/12/2018 5.8 (H) 4.0 - 5.6 % Final     Comment:     According to ADA guidelines, hemoglobin A1c <7.0% represents  optimal control in non-pregnant diabetic patients. Different  metrics may apply to specific patient populations.   Standards of Medical Care in Diabetes-2016.  For the purpose of screening for the presence of diabetes:  <5.7%     Consistent with the absence of diabetes  5.7-6.4%  Consistent with increasing risk for diabetes   (prediabetes)  >or=6.5%  Consistent with diabetes  Currently, no consensus exists for use of hemoglobin A1c  for diagnosis of diabetes for children.  This Hemoglobin A1c assay has  significant interference with fetal   hemoglobin   (HbF). The results are invalid for patients with abnormal amounts of   HbF,   including those with known Hereditary Persistence   of Fetal Hemoglobin. Heterozygous hemoglobin variants (HbAS, HbAC,   HbAD, HbAE, HbA2) do not significantly interfere with this assay;   however, presence of multiple variants in a sample may impact the %   interference.     10/30/2017 5.6 4.0 - 5.6 % Final     Comment:     According to ADA guidelines, hemoglobin A1c <7.0% represents  optimal control in non-pregnant diabetic patients. Different  metrics may apply to specific patient populations.   Standards of Medical Care in Diabetes-2016.  For the purpose of screening for the presence of diabetes:  <5.7%     Consistent with the absence of diabetes  5.7-6.4%  Consistent with increasing risk for diabetes   (prediabetes)  >or=6.5%  Consistent with diabetes  Currently, no consensus exists for use of hemoglobin A1c  for diagnosis of diabetes for children.  This Hemoglobin A1c assay has significant interference with fetal   hemoglobin   (HbF). The results are invalid for patients with abnormal amounts of   HbF,   including those with known Hereditary Persistence   of Fetal Hemoglobin. Heterozygous hemoglobin variants (HbAS, HbAC,   HbAD, HbAE, HbA2) do not significantly interfere with this assay;   however, presence of multiple variants in a sample may impact the %   interference.     05/31/2017 6.0 4.5 - 6.2 % Final     Comment:     According to ADA guidelines, hemoglobin A1C <7.0% represents  optimal control in non-pregnant diabetic patients.  Different  metrics may apply to specific populations.   Standards of Medical Care in Diabetes - 2016.  For the purpose of screening for the presence of diabetes:  <5.7%     Consistent with the absence of diabetes  5.7-6.4%  Consistent with increasing risk for diabetes   (prediabetes)  >or=6.5%  Consistent with diabetes  Currently no consensus exists  for use of hemoglobin A1C  for diagnosis of diabetes for children.           Patient Active Problem List   Diagnosis    Hyperlipidemia    Coronary artery disease    Hx-TIA (transient ischemic attack)    Long-term (current) use of anticoagulants, INR goal 2.0-3.0    Benign hypertensive heart disease with congestive heart failure    Pulmonary hypertension    Carotid arterial disease    Carotid aneurysm, left    Atrial fibrillation    Controlled type 2 diabetes mellitus with microalbuminuria    DDD (degenerative disc disease), cervical    Lumbar disc disease    Multiple lung nodules    Mild major depression    Idiopathic peripheral neuropathy    Atherosclerosis of aorta    Postinflammatory pulmonary fibrosis    Chronic cough    Coronary artery disease involving native coronary artery of native heart without angina pectoris    Controlled type 2 diabetes with neuropathy    S/P TAVR (transcatheter aortic valve replacement)    Cardiac pacemaker in situ       Current Outpatient Medications on File Prior to Visit   Medication Sig Dispense Refill    alpha lipoic acid 600 mg Cap Take 600 mg by mouth Daily. (Patient taking differently: Take 100 mg by mouth Daily. ) 100 each 12    aspirin 81 mg Tab Take 81 mg by mouth once daily.       benzonatate (TESSALON) 100 MG capsule       blood sugar diagnostic (BLOOD GLUCOSE TEST) Strp 1 strip by Misc.(Non-Drug; Combo Route) route once daily. Currently using Nova max plus meter. 50 strip 11    bumetanide (BUMEX) 2 MG tablet Take 1/2 tablet daily. Take additional 1/2 tablet with weight gain > 3 lbs. 90 tablet 3    CINNAMON BARK (CINNAMON ORAL) Take 1,000 mg by mouth 2 (two) times daily.      digoxin (LANOXIN) 125 mcg tablet Take 1 tablet (0.125 mg total) by mouth once daily. 90 tablet 3    docusate sodium (COLACE) 100 MG capsule Take 100 mg by mouth. 1 Capsule Oral Every day      lisinopril (PRINIVIL,ZESTRIL) 2.5 MG tablet Take 1 tablet (2.5 mg total) by  mouth once daily. 90 tablet 3    metoprolol tartrate 75 mg Tab Take 75 mg by mouth 2 (two) times daily. 180 tablet 1    potassium chloride (KLOR-CON) 10 MEQ TbSR Take 1 tablet (10 mEq total) by mouth once daily. (Patient taking differently: Take 10 mEq by mouth every other day. ) 30 tablet 0    simvastatin (ZOCOR) 20 MG tablet Take 1 tablet (20 mg total) by mouth every evening. 90 tablet 1    tiZANidine (ZANAFLEX) 2 MG tablet Take 1 tablet (2 mg total) by mouth daily as needed. and as need 90 tablet 1    traZODone (DESYREL) 100 MG tablet Take 1 tablet (100 mg total) by mouth nightly as needed for Insomnia. 90 tablet 1    triamcinolone acetonide 0.025% (KENALOG) 0.025 % cream Use bid as needed. 80 g 1    triamterene-hydrochlorothiazide 37.5-25 mg (DYAZIDE) 37.5-25 mg per capsule Take 1 capsule by mouth once daily. 90 capsule 1    warfarin (COUMADIN) 2 MG tablet Take 2 pills by mouth daily or as directed per the [facility] 175 tablet 3     No current facility-administered medications on file prior to visit.        Review of patient's allergies indicates:   Allergen Reactions    Levaquin [levofloxacin]     Doxycycline hyclate Other (See Comments)     Unknown to patient    Iodine and iodide containing products     Neurontin  [gabapentin]      Other reaction(s): Unknown    Norpace  [disopyramide]      Other reaction(s): Hives    Phenytoin sodium extended      Other reaction(s): Muscle pain    Sulfamethoxazole-trimethoprim      Other reaction(s): Muscle cramps       Past Surgical History:   Procedure Laterality Date    CARDIAC PACEMAKER PLACEMENT  11/2017    CARDIAC VALVE REPLACEMENT  10/17/2017    aorta    SINUS SURGERY      tonsillectomy      TONSILLECTOMY         Family History   Problem Relation Age of Onset    Heart disease Father         49    Heart disease Mother     Thyroid disease Sister     Atrial fibrillation Sister     Atrial fibrillation Sister         neice    Lymphoma Sister  "29    Atrial fibrillation Sister     Asthma Neg Hx     Emphysema Neg Hx        Social History     Socioeconomic History    Marital status:      Spouse name: Not on file    Number of children: 6    Years of education: 2 college    Highest education level: Not on file   Social Needs    Financial resource strain: Not on file    Food insecurity - worry: Not on file    Food insecurity - inability: Not on file    Transportation needs - medical: Not on file    Transportation needs - non-medical: Not on file   Occupational History    Occupation: retired housewife   Tobacco Use    Smoking status: Never Smoker    Smokeless tobacco: Never Used   Substance and Sexual Activity    Alcohol use: Yes     Alcohol/week: 0.0 oz     Comment: rare    Drug use: No    Sexual activity: No   Other Topics Concern    Not on file   Social History Narrative    Not on file     Review of Systems   Constitutional: Negative for chills and fever.   Respiratory: Negative for cough.    Cardiovascular: Positive for leg swelling.   Gastrointestinal: Negative for nausea and vomiting.   Musculoskeletal: Positive for joint pain and myalgias. Negative for falls.   Skin: Negative for itching and rash.   Neurological: Positive for tingling and sensory change.         Objective:       Vitals:    08/20/18 0856   BP: 116/62   Weight: 59.4 kg (131 lb)   Height: 5' 5" (1.651 m)   PainSc: 0-No pain       Physical Exam   Constitutional:  Non-toxic appearance. She does not have a sickly appearance. No distress.   Pt. is well-developed, well-nourished, appears stated age, in no acute distress, alert and oriented x 3. No evidence of depression, anxiety, or agitation. Calm, cooperative, and communicative. Appropriate interactions and affect.   Cardiovascular:   Pulses:       Dorsalis pedis pulses are 1+ on the right side, and 1+ on the left side.        Posterior tibial pulses are 1+ on the right side, and 1+ on the left side.   Dorsalis pedis " and posterior tibial pulses are diminished Bilaterally. 1+ pitting edema, skin is atrophic, decreased digital hair, feet slightly cool, nails thickened, mild plantar rubor     Pulmonary/Chest: No respiratory distress.   Musculoskeletal:        Right ankle: No tenderness. No lateral malleolus, no medial malleolus, no AITFL, no CF ligament and no posterior TFL tenderness found. Achilles tendon exhibits no pain, no defect and normal Harman's test results.        Left ankle: No tenderness. No lateral malleolus, no medial malleolus, no AITFL, no CF ligament and no posterior TFL tenderness found. Achilles tendon exhibits no pain, no defect and normal Harman's test results.        Right foot: There is no tenderness and no bony tenderness.        Left foot: There is no tenderness and no bony tenderness.   Patient has hammertoes of digits 2-5 bilateral partially reducible without symptom today.    2nd toes adduct against great toe    TA function absent L on muscle testing    Visible and palpable bunion without pain at dorsomedial 1st metatarsal head right and left.  Hallux abducted right and left partially reducible, tracks laterally without being track bound.  No ecchymosis, erythema, edema, or cardinal signs infection or signs of trauma same foot.     Lymphadenopathy:   No lymphatic streaking    Negative lymphadenopathy bilateral popliteal fossa and tarsal tunnel.     Neurological:   George-Jakub 5.07 monofilamant testing is diminished Moreno feet. Decreased/absent vibratory sensation bilateral feet to 128Hz tuning fork.    Skin: Skin is warm, dry and intact. No abrasion, no bruising, no ecchymosis and no rash noted. She is not diaphoretic. No cyanosis. No pallor. Nails show no clubbing.   Toenails 1-5 bilaterally are elongated by 2-3 mm, thickened by 2-3 mm, discolored/yellowed, dystrophic, brittle with subungual debris.     Focal hyperkeratotic lesion consisting entirely of hyperkeratotic tissue without open skin,  drainage, pus, fluctuance, malodor: bilateral 5th digit PIPJ with localized erythema  Psychiatric: Her mood appears not anxious. Her affect is not inappropriate. Her speech is not slurred. She is not combative. She is communicative. She is attentive.   Nursing note reviewed.        Assessment:       Encounter Diagnoses   Name Primary?    Idiopathic peripheral neuropathy Yes    Onychomycosis due to dermatophyte     Corn or callus     Bilateral leg edema     Varicosities of leg     Anticoagulated on Coumadin     Controlled type 2 diabetes mellitus with microalbuminuria, without long-term current use of insulin     Hallux limitus, acquired, left     Hallux limitus, acquired, right     Hammer toes of both feet          Plan:       Adela was seen today for diabetes mellitus, diabetic foot exam and nail care.    Diagnoses and all orders for this visit:    Idiopathic peripheral neuropathy    Onychomycosis due to dermatophyte    Corn or callus    Bilateral leg edema    Varicosities of leg    Anticoagulated on Coumadin    Controlled type 2 diabetes mellitus with microalbuminuria, without long-term current use of insulin  -     DIABETIC SHOES FOR HOME USE    Hallux limitus, acquired, left  -     DIABETIC SHOES FOR HOME USE    Hallux limitus, acquired, right  -     DIABETIC SHOES FOR HOME USE    Hammer toes of both feet  -     DIABETIC SHOES FOR HOME USE      I counseled the patient on her conditions, their implications and medical management.    - Shoe inspection. Patient instructed on proper foot hygeine. We discussed wearing proper shoe gear, daily foot inspections, never walking without protective shoe gear, never putting sharp instruments to feet.      - Rx diabetic shoes for protection and support    - With patient's permission, nails were aggressively reduced and debrided x 10 to their soft tissue attachment mechanically and with electric , removing all offending nail and debris. Patient relates relief  following the procedure. After cleansing the  area w/ alcohol prep pad the above mentioned hyperkeratosis was trimmed utilizing No 15 scapel, to a smooth base with out incident. Patient tolerated this  well and reported comfort to the area of: 5th digit PIPJ marcial    - She will continue to monitor the areas daily, inspect her feet, wear protective shoe gear when ambulatory, moisturizer to maintain skin integrity and follow in this office in approximately 2-3 months, sooner p.r.n.

## 2018-08-20 NOTE — PATIENT INSTRUCTIONS
Recommend lotions: eucerin, eucerin for diabetics, aquaphor, A&D ointment, gold bond for diabetics, sween, Zenia's Bees all purpose baby ointment,  urea 40 with aloe (found on amazon.com)    Shoe recommendations: (try 6pm.com, zappos.com , nordstromrack.Cloudant, or shoes.Cloudant for discounted prices) you can visit [employer] shoes in [place]  or codetag [facility] in the [facility] (there are also several shoe brand outlets in the [facility])    Asics (GT 2000 or gel foundations), new balance stability type shoes, saucony (stabil c3),  Whitney (GTS or Beast or transcend), vionic, propet (tennis shoe)    sofft brand, clarks, crocs, aerosoles, naturalizers, SAS, ecco, born, nehemiah chew, rockports (dress shoes)    Vionic, burkenstocks, fitflops, propet (sandals)  Nike comfort thong sandals, crocs, propet (house shoes)    Nail Home remedy:  Vicks Vapor rub to nails for easier managability    Occasional soaks for 15-20 mins in luke warm water with 1 cup of listerine and 1 cup of apple cider vinegar are ok You may add several drops of oil of oregano or tea tree oil as well        Diabetes: Inspecting Your Feet  Diabetes increases your chances of developing foot problems. So inspect your feet every day. This helps you find small skin irritations before they become serious infections. If you have trouble seeing the bottoms of your feet, use a mirror or ask a family member or friend to help.     Pressure spots on the bottom of the foot are common areas where problems develop.   How to check your feet  Below are tips to help you look for foot problems. Try to check your feet at the same time each day, such as when you get out of bed in the morning:  · Check the top of each foot. The tops of toes, back of the heel, and outer edge of the foot can get a lot of rubbing from poor-fitting shoes.  · Check the bottom of each foot. Daily wear and tear often leads to problems at pressure spots.  · Check the toes and nails. Fungal infections often occur  between toes. Toenail problems can also be a sign of fungal infections or lead to breaks in the skin.  · Check your shoes, too. Loose objects inside a shoe can injure the foot. Use your hand to feel inside your shoes for things like katelyn, loose stitching, or rough areas that could irritate your skin.  Warning signs  Look for any color changes in the foot. Redness with streaks can signal a severe infection, which needs immediate medical attention. Tell your doctor right away if you have any of these problems:  · Swelling, sometimes with color changes, may be a sign of poor blood flow or infection. Symptoms include tenderness and an increase in the size of your foot.  · Warm or hot areas on your feet may be signs of infection. A foot that is cold may not be getting enough blood.  · Sensations such as burning, tingling, or pins and needles can be signs of a problem. Also check for areas that may be numb.  · Hot spots are caused by friction or pressure. Look for hot spots in areas that get a lot of rubbing. Hot spots can turn into blisters, calluses, or sores.  · Cracks and sores are caused by dry or irritated skin. They are a sign that the skin is breaking down, which can lead to infection.  · Toenail problems to watch for include nails growing into the skin (ingrown toenail) and causing redness or pain. Thick, yellow, or discolored nails can signal a fungal infection.  · Drainage and odor can develop from untreated sores and ulcers. Call your doctor right away if you notice white or yellow drainage, bleeding, or unpleasant odor.   © 3080-8670 [employer]. [address] [place] [address]. All rights reserved. This information is not intended as a substitute for professional medical care. Always follow your healthcare professional's instructions.        Step-by-Step:  Inspecting Your Feet (Diabetes)    Date Last Reviewed: 10/1/2016  © 8479-3169 [employer]. [address] [place] [address]  Road, [place] [address]. All rights reserved. This information is not intended as a substitute for professional medical care. Always follow your healthcare professional's instructions.

## 2018-08-21 ENCOUNTER — TELEPHONE (OUTPATIENT)
Dept: CARDIOLOGY | Facility: CLINIC | Age: 83
End: 2018-08-21

## 2018-08-29 ENCOUNTER — ANTI-COAG VISIT (OUTPATIENT)
Dept: CARDIOLOGY | Facility: CLINIC | Age: 83
End: 2018-08-29

## 2018-08-29 DIAGNOSIS — I48.19 PERSISTENT ATRIAL FIBRILLATION: Primary | ICD-10-CM

## 2018-08-29 DIAGNOSIS — Z79.01 LONG-TERM (CURRENT) USE OF ANTICOAGULANTS, INR GOAL 2.0-3.0: ICD-10-CM

## 2018-08-29 DIAGNOSIS — Z86.73 HX-TIA (TRANSIENT ISCHEMIC ATTACK): ICD-10-CM

## 2018-08-29 LAB — INR PPP: 2.9

## 2018-08-29 NOTE — TELEPHONE ENCOUNTER
Spoke with [facility] pharmacy states Metoprolol 75 mg has been discontinued.  States Metoprolol tartrate  25mg tid is covered.

## 2018-08-29 NOTE — TELEPHONE ENCOUNTER
----- Message from Anna Grant sent at 8/29/2018 10:25 AM CDT -----  Contact: Self  Pt is calling to speak with staff regarding a medication that cant be filled metoprolol tartrate 75 mg Tab. Needs the 50 mg called in. Insurance cant supply 75mg. Please call pt at [phone].

## 2018-08-30 ENCOUNTER — TELEPHONE (OUTPATIENT)
Dept: FAMILY MEDICINE | Facility: CLINIC | Age: 83
End: 2018-08-30

## 2018-08-30 RX ORDER — METOPROLOL TARTRATE 75 MG/1
25 TABLET, FILM COATED ORAL 3 TIMES DAILY
Qty: 180 TABLET | Refills: 1 | Status: CANCELLED | OUTPATIENT
Start: 2018-08-30 | End: 2019-08-30

## 2018-08-30 NOTE — TELEPHONE ENCOUNTER
Spoke with pt advised that she would be taking 3 25mg tabs bid. Pt verbalized that she understands and would like rx sent to [facility].

## 2018-08-30 NOTE — TELEPHONE ENCOUNTER
----- Message from Dilcia Thomas sent at 8/30/2018 12:20 PM CDT -----  Contact: self   [phone]  Pt is returning your office call. Pls call pt [phone]. Thanks...............Natacha

## 2018-08-31 RX ORDER — METOPROLOL TARTRATE 25 MG/1
75 TABLET, FILM COATED ORAL 2 TIMES DAILY
Qty: 540 TABLET | Refills: 3 | Status: SHIPPED | OUTPATIENT
Start: 2018-08-31 | End: 2019-02-26 | Stop reason: SDUPTHER

## 2018-08-31 RX ORDER — METOPROLOL TARTRATE 25 MG/1
25 TABLET, FILM COATED ORAL 3 TIMES DAILY
Qty: 270 TABLET | Refills: 3 | Status: SHIPPED | OUTPATIENT
Start: 2018-08-31 | End: 2018-08-31

## 2018-09-07 ENCOUNTER — TELEPHONE (OUTPATIENT)
Dept: FAMILY MEDICINE | Facility: CLINIC | Age: 83
End: 2018-09-07

## 2018-09-07 NOTE — TELEPHONE ENCOUNTER
----- Message from Hannah Malloy sent at 9/7/2018  2:52 PM CDT -----  Contact: Self   Pt would like to speak with the office at your earliest convenience. Please call at [phone].

## 2018-09-07 NOTE — TELEPHONE ENCOUNTER
Spoke with pt states she was calling to verify information about her prescription. State she called her mail order pharmacy and clarification was given.

## 2018-09-10 ENCOUNTER — ANTI-COAG VISIT (OUTPATIENT)
Dept: CARDIOLOGY | Facility: CLINIC | Age: 83
End: 2018-09-10
Payer: MEDICARE

## 2018-09-10 DIAGNOSIS — Z79.01 LONG-TERM (CURRENT) USE OF ANTICOAGULANTS, INR GOAL 2.0-3.0: ICD-10-CM

## 2018-09-10 DIAGNOSIS — Z86.73 HX-TIA (TRANSIENT ISCHEMIC ATTACK): ICD-10-CM

## 2018-09-10 LAB — INR PPP: 2.9

## 2018-09-10 PROCEDURE — G0250 MD INR TEST REVIE INTER MGMT: HCPCS | Mod: ,,, | Performed by: INTERNAL MEDICINE

## 2018-09-19 ENCOUNTER — OFFICE VISIT (OUTPATIENT)
Dept: FAMILY MEDICINE | Facility: CLINIC | Age: 83
End: 2018-09-19
Payer: MEDICARE

## 2018-09-19 VITALS
SYSTOLIC BLOOD PRESSURE: 130 MMHG | HEART RATE: 60 BPM | HEIGHT: 65 IN | OXYGEN SATURATION: 97 % | WEIGHT: 132.5 LBS | DIASTOLIC BLOOD PRESSURE: 60 MMHG | BODY MASS INDEX: 22.08 KG/M2 | TEMPERATURE: 98 F

## 2018-09-19 DIAGNOSIS — I48.0 PAROXYSMAL ATRIAL FIBRILLATION: ICD-10-CM

## 2018-09-19 DIAGNOSIS — I11.0 BENIGN HYPERTENSIVE HEART DISEASE WITH CONGESTIVE HEART FAILURE: ICD-10-CM

## 2018-09-19 DIAGNOSIS — E11.29 CONTROLLED TYPE 2 DIABETES MELLITUS WITH MICROALBUMINURIA, WITHOUT LONG-TERM CURRENT USE OF INSULIN: ICD-10-CM

## 2018-09-19 DIAGNOSIS — E78.5 HYPERLIPIDEMIA, UNSPECIFIED HYPERLIPIDEMIA TYPE: ICD-10-CM

## 2018-09-19 DIAGNOSIS — R80.9 CONTROLLED TYPE 2 DIABETES MELLITUS WITH MICROALBUMINURIA, WITHOUT LONG-TERM CURRENT USE OF INSULIN: ICD-10-CM

## 2018-09-19 DIAGNOSIS — E11.40 CONTROLLED TYPE 2 DIABETES WITH NEUROPATHY: Primary | ICD-10-CM

## 2018-09-19 DIAGNOSIS — I70.0 ATHEROSCLEROSIS OF AORTA: ICD-10-CM

## 2018-09-19 DIAGNOSIS — Z23 FLU VACCINE NEED: ICD-10-CM

## 2018-09-19 PROCEDURE — 99213 OFFICE O/P EST LOW 20 MIN: CPT | Mod: PBBFAC,PO | Performed by: INTERNAL MEDICINE

## 2018-09-19 PROCEDURE — 99215 OFFICE O/P EST HI 40 MIN: CPT | Mod: S$PBB,,, | Performed by: INTERNAL MEDICINE

## 2018-09-19 PROCEDURE — 90662 IIV NO PRSV INCREASED AG IM: CPT | Mod: PBBFAC,PO

## 2018-09-19 PROCEDURE — 99999 PR PBB SHADOW E&M-EST. PATIENT-LVL III: CPT | Mod: PBBFAC,,, | Performed by: INTERNAL MEDICINE

## 2018-09-19 RX ORDER — WARFARIN 2 MG/1
TABLET ORAL
Qty: 175 TABLET | Refills: 3 | Status: SHIPPED | OUTPATIENT
Start: 2018-09-19 | End: 2019-07-20 | Stop reason: SDUPTHER

## 2018-09-19 NOTE — PROGRESS NOTES
HISTORY OF PRESENT ILLNESS:  Adela Garay is a 89 y.o. female who presents to the clinic today for Diabetes and form (requesting more care)  .   The patient presents to clinic today with her daughter-in-law for follow-up of her type 2 diabetes mellitus, hypertension with CHF, and atrial fibrillation.  The patient reports that she is needing more care at home.  They bring in a form today to be filled out regarding some additional [facility] that [facility] as her  was a .    Diabetes: The patient reports that they  do not check blood sugars at home. The patient  denies any problems with low blood sugars. The patient  reports that they have been complaint with current treatment plan (diet, exercise; currently no need for prescription medication).  Hypertension: The patient reports that they check their blood sugars at home  regularly and blood pressure is generally well controlled (<= 139/89).  The patient  is not enrolled in the digital hypertension program. The patient denies  cardiac chest pain, lower extremity edema, headaches and side effects of medication. The patient reports problems with  shortness of breath.  The patient  does follow a low salt diet. The patient  has been compliant with the current medication regimen.  She is followed by Cardiology.        PAST MEDICAL HISTORY:  Past Medical History:   Diagnosis Date    Anticoagulant long-term use     Anticoagulated on Coumadin     Aortic valve stenosis     - s/p total aortic valve replacement    Arthritis     Atrial fibrillation     Carotid artery occlusion     Chronic rhinosinusitis     Coronary artery disease     Granulomatous lung disease     Heart failure     Hyperlipidemia     Hypertension     Hypertensive heart disease without CHF (congestive heart failure)     Mitral valve prolapse     PN (peripheral neuropathy)     hereditary?(sister has it also)/idiopathic?/patient thinks from Zocor    Severe  aortic stenosis by prior echocardiogram     TIA (transient ischemic attack)     Type 2 diabetes mellitus     Type II or unspecified type diabetes mellitus without mention of complication, not stated as uncontrolled        PAST SURGICAL HISTORY:  Past Surgical History:   Procedure Laterality Date    CARDIAC PACEMAKER PLACEMENT  11/2017    CARDIAC VALVE REPLACEMENT  10/17/2017    aorta    HEART CATH-LEFT N/A 8/22/2017    [facility] LAB    INCISION AND DRAINAGE (I & D) righ knee Right 11/19/2016    [facility]    INSERTION-PACEMAKER-DUAL Left 10/17/2017    [facility] CATH LAB    REPLACEMENT-VALVE-AORTIC N/A 10/17/2017    [facility] CATH LAB    SINUS SURGERY      tonsillectomy      TONSILLECTOMY         SOCIAL HISTORY:  Social History     Socioeconomic History    Marital status:      Spouse name: Not on file    Number of children: 6    Years of education: 2 college    Highest education level: Not on file   Social Needs    Financial resource strain: Not on file    Food insecurity - worry: Not on file    Food insecurity - inability: Not on file    Transportation needs - medical: Not on file    Transportation needs - non-medical: Not on file   Occupational History    Occupation: retired housewife   Tobacco Use    Smoking status: Never Smoker    Smokeless tobacco: Never Used   Substance and Sexual Activity    Alcohol use: Yes     Alcohol/week: 0.0 oz     Comment: rare    Drug use: No    Sexual activity: No   Other Topics Concern    Not on file   Social History Narrative    Not on file       FAMILY HISTORY:  Family History   Problem Relation Age of Onset    Heart disease Father         49    Heart disease Mother     Thyroid disease Sister     Atrial fibrillation Sister     Atrial fibrillation Sister         neice    Lymphoma Sister 29    Atrial fibrillation  Sister     Asthma Neg Hx     Emphysema Neg Hx        ALLERGIES AND MEDICATIONS: updated and reviewed.  Review of patient's allergies indicates:   Allergen Reactions    Levaquin [levofloxacin]     Doxycycline hyclate Other (See Comments)     Unknown to patient    Iodine and iodide containing products     Neurontin  [gabapentin]      Other reaction(s): Unknown    Norpace  [disopyramide]      Other reaction(s): Hives    Phenytoin sodium extended      Other reaction(s): Muscle pain    Sulfamethoxazole-trimethoprim      Other reaction(s): Muscle cramps        Medication List           Accurate as of 9/19/18  6:34 PM. If you have any questions, ask your nurse or doctor.               CHANGE how you take these medications    alpha lipoic acid 600 mg Cap  Take 600 mg by mouth Daily.  What changed:  how much to take     potassium chloride 10 MEQ Tbsr  Commonly known as:  KLOR-CON  Take 1 tablet (10 mEq total) by mouth once daily.  What changed:  when to take this        CONTINUE taking these medications    aspirin 81 mg Tab     benzonatate 100 MG capsule  Commonly known as:  TESSALON     blood sugar diagnostic Strp  Commonly known as:  BLOOD GLUCOSE TEST  1 strip by Misc.(Non-Drug; Combo Route) route once daily. Currently using Nova max plus meter.     bumetanide 2 MG tablet  Commonly known as:  BUMEX  Take 1/2 tablet daily. Take additional 1/2 tablet with weight gain > 3 lbs.     CINNAMON ORAL     COLACE 100 MG capsule  Generic drug:  docusate sodium     digoxin 125 mcg tablet  Commonly known as:  LANOXIN  Take 1 tablet (0.125 mg total) by mouth once daily.     lisinopril 2.5 MG tablet  Commonly known as:  PRINIVIL,ZESTRIL  Take 1 tablet (2.5 mg total) by mouth once daily.     metoprolol tartrate 25 MG tablet  Commonly known as:  LOPRESSOR  Take 3 tablets (75 mg total) by mouth 2 (two) times daily.     simvastatin 20 MG tablet  Commonly known as:  ZOCOR  Take 1 tablet (20 mg total) by mouth every evening.      tiZANidine 2 MG tablet  Commonly known as:  ZANAFLEX  Take 1 tablet (2 mg total) by mouth daily as needed. and as need     traZODone 100 MG tablet  Commonly known as:  DESYREL  Take 1 tablet (100 mg total) by mouth nightly as needed for Insomnia.     triamcinolone acetonide 0.025% 0.025 % cream  Commonly known as:  KENALOG  Use bid as needed.     triamterene-hydrochlorothiazide 37.5-25 mg 37.5-25 mg per capsule  Commonly known as:  DYAZIDE  Take 1 capsule by mouth once daily.     warfarin 2 MG tablet  Commonly known as:  COUMADIN  Take 2 pills by mouth daily or as directed per the [facility]mad[facility] Clinic           Where to Get Your Medications      These medications were sent to [facility] - [facility] [address]  [address], [facility] [address]    Phone:  [phone]   · warfarin 2 MG tablet            CARE TEAM:  Patient Care Team:  Torrie Farrell MD as PCP - General (Internal Medicine)  Torrie Farrell MD as PCP - MSSP Attributed         REVIEW OF SYSTEMS:  Review of Systems   Constitutional: Negative for chills, fatigue, fever and unexpected weight change.   HENT: Negative for congestion and postnasal drip.    Eyes: Negative for pain and visual disturbance.   Respiratory: Positive for shortness of breath (- stable; chronic). Negative for cough and wheezing.    Cardiovascular: Negative for chest pain, palpitations and leg swelling.   Gastrointestinal: Negative for abdominal pain, constipation, diarrhea, nausea and vomiting.   Genitourinary: Negative for dysuria.   Musculoskeletal: Positive for arthralgias and gait problem (- getting more unsteady on her feet). Negative for back pain.   Skin: Negative for rash.   Neurological: Negative for weakness and headaches.   Psychiatric/Behavioral: Negative for dysphoric mood and sleep disturbance. The patient is not nervous/anxious.          PHYSICAL EXAM:   Vitals:    09/19/18 1006   BP: 130/60   Pulse: 60   Temp: 98.1 °F (36.7 °C)  "    Weight: 60.1 kg (132 lb 7.9 oz)   Height: 5' 5" (165.1 cm)   Body mass index is 22.05 kg/m².     General appearance - alert, well appearing, and in no distress, normal appearing weight and very pleasant elderly female accompanied by her daughter in law  Mental status - alert, oriented to person, place, and time, normal mood, behavior, speech, dress, motor activity, and thought processes  Eyes - pupils equal and reactive, extraocular eye movements intact, sclera anicteric  Mouth - mucous membranes moist, pharynx normal without lesions  Neck - supple, no significant adenopathy, carotids upstroke normal bilaterally, no bruits, thyroid exam: thyroid is normal in size without nodules or tenderness  Lymphatics - no palpable lymphadenopathy  Chest - clear to auscultation, no wheezes, rales or rhonchi, symmetric air entry  Heart - normal rate and regular rhythm  Back exam - limited range of motion, in depth exam deferred  Neurological - alert, oriented, normal speech, no focal findings or movement disorder noted, cranial nerves II through XII intact  Musculoskeletal - no muscular tenderness noted, Mild-Moderate osteoarthritic changes noted to both knee joints. No joint effusions noted. Walked with assistance  Extremities - no pedal edema noted  Skin - normal coloration and turgor, no rashes, no suspicious skin lesions noted      ASSESSMENT AND PLAN:  1. Controlled type 2 diabetes with neuropathy/2. Controlled type 2 diabetes mellitus with microalbuminuria, without long-term current use of insulin  Diabetes currently is controlled for age and comorbid conditions. We discussed diabetic diet and regular exercise. We discussed home blood sugar monitoring, if appropriate. We discussed low sugar/low carbohydrate diet and regular exercise to prevent progression. No need for prescription medication at this time.  Diabetic complications addressed: Neuropathy pain controlled.  Patient was counseled on the need for yearly diabetic " retinopathy exam and yearly diabetic foot exam.   I discussed with the patient that about 4 years ago her A1c was greater than 6.5 which is why she has the diagnosis of diabetes.  She has seen Podiatry who wanted to prescribe her diabetic shoes, but she declines.    3. Benign hypertensive heart disease with congestive heart failure  Discussed sodium restriction, maintaining ideal body weight and regular exercise program as physiologic means to achieve blood pressure control. The patient will strive towards this. The current medical regimen is effective;  continue present plan and medications. Recommended patient to check home readings to monitor and see me for followup as scheduled or sooner as needed. Patient was educated that both decongestant and anti-inflammatory medication may raise blood pressure. Followed by cardiology.  - Ambulatory referral to Palliative Care    4. Paroxysmal atrial fibrillation  The current medical regimen is effective;  continue present plan and medications. Followed by the coumadin clinic.  - warfarin (COUMADIN) 2 MG tablet; Take 2 pills by mouth daily or as directed per the Coumadin Clinic  Dispense: 175 tablet; Refill: 3    5. Hyperlipidemia, unspecified hyperlipidemia type  We discussed low fat diet and regular exercise.The current medical regimen is effective;  continue present plan and medications.      6. Atherosclerosis of aorta  Patient with Atherosclerosis of the Aorta.  Stable/asymptomatic. Currently stable on lipid lowering medication and b/p monitoring.     7. Flu vaccine need    - Influenza - High Dose (65+) (PF) (IM)     I completed paperwork today [facility].  I have also referred her to palliative care for help with management of her chronic medical conditions.      Follow-up in about 6 months (around 3/19/2019), or if symptoms worsen or fail to improve, for annual exam. or sooner as needed.

## 2018-09-24 ENCOUNTER — LAB VISIT (OUTPATIENT)
Dept: LAB | Facility: HOSPITAL | Age: 83
End: 2018-09-24
Attending: INTERNAL MEDICINE
Payer: MEDICARE

## 2018-09-24 ENCOUNTER — ANTI-COAG VISIT (OUTPATIENT)
Dept: CARDIOLOGY | Facility: CLINIC | Age: 83
End: 2018-09-24

## 2018-09-24 DIAGNOSIS — I77.9 CAROTID ARTERY DISEASE, UNSPECIFIED LATERALITY: ICD-10-CM

## 2018-09-24 DIAGNOSIS — E11.29 CONTROLLED TYPE 2 DIABETES MELLITUS WITH MICROALBUMINURIA, WITHOUT LONG-TERM CURRENT USE OF INSULIN: ICD-10-CM

## 2018-09-24 DIAGNOSIS — E78.5 HYPERLIPIDEMIA, UNSPECIFIED HYPERLIPIDEMIA TYPE: ICD-10-CM

## 2018-09-24 DIAGNOSIS — I27.20 PULMONARY HYPERTENSION: ICD-10-CM

## 2018-09-24 DIAGNOSIS — R80.9 CONTROLLED TYPE 2 DIABETES MELLITUS WITH MICROALBUMINURIA, WITHOUT LONG-TERM CURRENT USE OF INSULIN: ICD-10-CM

## 2018-09-24 DIAGNOSIS — Z79.01 LONG-TERM (CURRENT) USE OF ANTICOAGULANTS, INR GOAL 2.0-3.0: ICD-10-CM

## 2018-09-24 DIAGNOSIS — Z95.0 CARDIAC PACEMAKER IN SITU: ICD-10-CM

## 2018-09-24 DIAGNOSIS — Z86.73 HX-TIA (TRANSIENT ISCHEMIC ATTACK): ICD-10-CM

## 2018-09-24 DIAGNOSIS — I25.10 CORONARY ARTERY DISEASE INVOLVING NATIVE CORONARY ARTERY OF NATIVE HEART WITHOUT ANGINA PECTORIS: ICD-10-CM

## 2018-09-24 DIAGNOSIS — Z95.2 S/P TAVR (TRANSCATHETER AORTIC VALVE REPLACEMENT): ICD-10-CM

## 2018-09-24 LAB
ANION GAP SERPL CALC-SCNC: 8 MMOL/L
BNP SERPL-MCNC: 860 PG/ML
BUN SERPL-MCNC: 16 MG/DL
CALCIUM SERPL-MCNC: 9.9 MG/DL
CHLORIDE SERPL-SCNC: 92 MMOL/L
CO2 SERPL-SCNC: 28 MMOL/L
CREAT SERPL-MCNC: 0.8 MG/DL
EST. GFR  (AFRICAN AMERICAN): >60 ML/MIN/1.73 M^2
EST. GFR  (NON AFRICAN AMERICAN): >60 ML/MIN/1.73 M^2
GLUCOSE SERPL-MCNC: 136 MG/DL
INR PPP: 3.3
POTASSIUM SERPL-SCNC: 4.5 MMOL/L
SODIUM SERPL-SCNC: 128 MMOL/L

## 2018-09-24 PROCEDURE — 36415 COLL VENOUS BLD VENIPUNCTURE: CPT | Mod: PO

## 2018-09-24 PROCEDURE — 80048 BASIC METABOLIC PNL TOTAL CA: CPT

## 2018-09-24 PROCEDURE — 83880 ASSAY OF NATRIURETIC PEPTIDE: CPT

## 2018-09-26 ENCOUNTER — HOSPITAL ENCOUNTER (OUTPATIENT)
Dept: CARDIOLOGY | Facility: CLINIC | Age: 83
Discharge: HOME | End: 2018-09-26
Payer: MEDICARE

## 2018-09-26 ENCOUNTER — RESEARCH ENCOUNTER (OUTPATIENT)
Dept: CARDIOLOGY | Facility: CLINIC | Age: 83
End: 2018-09-26

## 2018-09-26 ENCOUNTER — OFFICE VISIT (OUTPATIENT)
Dept: CARDIOLOGY | Facility: CLINIC | Age: 83
End: 2018-09-26
Payer: MEDICARE

## 2018-09-26 ENCOUNTER — HOSPITAL ENCOUNTER (OUTPATIENT)
Dept: PULMONOLOGY | Facility: CLINIC | Age: 83
Discharge: HOME | End: 2018-09-26
Payer: MEDICARE

## 2018-09-26 ENCOUNTER — TELEPHONE (OUTPATIENT)
Dept: CARDIOLOGY | Facility: CLINIC | Age: 83
End: 2018-09-26

## 2018-09-26 VITALS
HEART RATE: 60 BPM | BODY MASS INDEX: 22.19 KG/M2 | DIASTOLIC BLOOD PRESSURE: 68 MMHG | SYSTOLIC BLOOD PRESSURE: 150 MMHG | HEIGHT: 65 IN | WEIGHT: 133.19 LBS | OXYGEN SATURATION: 96 %

## 2018-09-26 VITALS — BODY MASS INDEX: 21.99 KG/M2 | WEIGHT: 132 LBS | HEIGHT: 65 IN

## 2018-09-26 DIAGNOSIS — I48.91 ATRIAL FIBRILLATION, UNSPECIFIED TYPE: ICD-10-CM

## 2018-09-26 DIAGNOSIS — Z00.6 EXAMINATION OF PARTICIPANT IN CLINICAL TRIAL: ICD-10-CM

## 2018-09-26 DIAGNOSIS — Z95.2 S/P TAVR (TRANSCATHETER AORTIC VALVE REPLACEMENT): ICD-10-CM

## 2018-09-26 DIAGNOSIS — I25.10 CORONARY ARTERY DISEASE, ANGINA PRESENCE UNSPECIFIED, UNSPECIFIED VESSEL OR LESION TYPE, UNSPECIFIED WHETHER NATIVE OR TRANSPLANTED HEART: ICD-10-CM

## 2018-09-26 DIAGNOSIS — I11.0 BENIGN HYPERTENSIVE HEART DISEASE WITH CONGESTIVE HEART FAILURE: ICD-10-CM

## 2018-09-26 DIAGNOSIS — I50.33 ACUTE ON CHRONIC DIASTOLIC CHF (CONGESTIVE HEART FAILURE): ICD-10-CM

## 2018-09-26 DIAGNOSIS — Z95.2 S/P TAVR (TRANSCATHETER AORTIC VALVE REPLACEMENT): Primary | ICD-10-CM

## 2018-09-26 DIAGNOSIS — E78.5 HYPERLIPIDEMIA, UNSPECIFIED HYPERLIPIDEMIA TYPE: ICD-10-CM

## 2018-09-26 LAB
AORTIC VALVE REGURGITATION: ABNORMAL
ESTIMATED PA SYSTOLIC PRESSURE: 34.01
MITRAL VALVE MOBILITY: NORMAL
MITRAL VALVE REGURGITATION: ABNORMAL
RETIRED EF AND QEF - SEE NOTES: 50 (ref 55–65)
TRICUSPID VALVE REGURGITATION: ABNORMAL

## 2018-09-26 PROCEDURE — 93306 TTE W/DOPPLER COMPLETE: CPT | Mod: PBBFAC | Performed by: INTERNAL MEDICINE

## 2018-09-26 PROCEDURE — 94618 PULMONARY STRESS TESTING: CPT | Mod: 26,S$PBB,, | Performed by: INTERNAL MEDICINE

## 2018-09-26 PROCEDURE — 99214 OFFICE O/P EST MOD 30 MIN: CPT | Mod: Q1,S$PBB,, | Performed by: INTERNAL MEDICINE

## 2018-09-26 PROCEDURE — 94618 PULMONARY STRESS TESTING: CPT | Mod: PBBFAC | Performed by: INTERNAL MEDICINE

## 2018-09-26 PROCEDURE — 99213 OFFICE O/P EST LOW 20 MIN: CPT | Mod: PBBFAC,25

## 2018-09-26 PROCEDURE — 99999 PR PBB SHADOW E&M-EST. PATIENT-LVL III: CPT | Mod: PBBFAC,,,

## 2018-09-26 PROCEDURE — 93005 ELECTROCARDIOGRAM TRACING: CPT | Mod: PBBFAC | Performed by: INTERNAL MEDICINE

## 2018-09-26 PROCEDURE — 93010 ELECTROCARDIOGRAM REPORT: CPT | Mod: S$PBB,,, | Performed by: INTERNAL MEDICINE

## 2018-09-26 RX ORDER — BUMETANIDE 2 MG/1
2 TABLET ORAL 2 TIMES DAILY
Qty: 14 TABLET | Refills: 0 | Status: SHIPPED | OUTPATIENT
Start: 2018-09-26 | End: 2019-04-24 | Stop reason: SDUPTHER

## 2018-09-26 NOTE — TELEPHONE ENCOUNTER
Patient was tired from all day appointments and left clinic after fellow did initial visit.   would like her to take Bumex 4mg twice a day for one week then repeat BMP.  Spoke to son on phone and gave instructions.  He will also have daughter call to verify.

## 2018-09-26 NOTE — PROGRESS NOTES
Study: Beny  Sponsor: [employer]  Follow-up Visit: 1 Year  Date of Visit: 9-26-18    Patient wishes to continue in study: Yes  All study protocol required CRFs completed: Yes    The subject is here for the one year  follow up visit. Current list of medications obtained and verified; She denies any hospitalizations, ED visits, or any other adverse events since previous study follow-up. All questions answered to her satisfaction and she will contact research staff if she has any questions or concerns. Next follow up visit is the two year visit    The following tests and procedures are related to research study:  6MWT, TTE, EKG, Labs, QOL, MMSE, Barthel, mRS, frailty.

## 2018-09-26 NOTE — PROGRESS NOTES
Interventional Cardiology Clinic Note  Reason for Visit: Aortic Stenosis s/p TAVR    HPI:   89 year old woman with past medical history of HTN, HLD, DM, aFib (on coumadin), HFpEF, and recently s/p TAVR (RTF 29 Portico) 10/17/2017 with CHB following procedure (pre-existing RBBB) requiring PPM placement.  She reports a dry weight of 129 which has been stable, can walk 400 ft with walker at a time but if needed can walk more (no ESTRADA), no anginal symptoms on any level of exertion.  No issues with fluid retention, PND, worsening orthopnea.  Overall well and asymptomatic.     A1c 5.6  INR 3.3  Albumin 3.7  Trop 0.015    Creat 0.8  Hg 10.2  Plt 249      NYHA Class I   CCS Class 0  ASA only  SBEP: Augmentin for Dental Procedures  Primary Cardiologist Dr Theo Ivy    Echo Today:  EF 50%  Sig diastolic dysfunction  Mild PVL  Severe TR  Mild-moderate MR  RAP 15    ROS:    Review of Systems   Constitution: Negative.   HENT: Negative.    Eyes: Negative.    Cardiovascular: Negative.    Respiratory: Negative.    Endocrine: Negative.    Skin: Negative.    Musculoskeletal: Negative.    Gastrointestinal: Negative.    Genitourinary: Negative.    Neurological: Negative.      PMH:     Past Medical History:   Diagnosis Date    Anticoagulant long-term use     Anticoagulated on Coumadin     Aortic valve stenosis     - s/p total aortic valve replacement    Arthritis     Atrial fibrillation     Carotid artery occlusion     Chronic rhinosinusitis     Coronary artery disease     Granulomatous lung disease     Heart failure     Hyperlipidemia     Hypertension     Hypertensive heart disease without CHF (congestive heart failure)     Mitral valve prolapse     PN (peripheral neuropathy)     hereditary?(sister has it also)/idiopathic?/patient thinks from Zocor    Severe aortic stenosis by prior echocardiogram     TIA (transient ischemic attack)     Type 2 diabetes mellitus     Type II or unspecified type diabetes  mellitus without mention of complication, not stated as uncontrolled      Past Surgical History:   Procedure Laterality Date    CARDIAC PACEMAKER PLACEMENT  11/2017    CARDIAC VALVE REPLACEMENT  10/17/2017    aorta    HEART CATH-LEFT N/A 8/22/2017    Performed [facility] at [facility] CATH LAB    INCISION AND DRAINAGE (I & D) righ knee Right 11/19/2016    [facility] OR    INSERTION-PACEMAKER-DUAL Left 10/17/2017    Performed by Ranulfo Delgado MD at [facility] CATH LAB    REPLACEMENT-VALVE-AORTIC N/A 10/17/2017    Performed by [facility] Campbell [facility] at [facility] CATH LAB    SINUS SURGERY      tonsillectomy      TONSILLECTOMY       Allergies:     Review of patient's allergies indicates:   Allergen Reactions    Levaquin [levofloxacin]     Doxycycline hyclate Other (See Comments)     Unknown to patient    Iodine and iodide containing products     Neurontin  [gabapentin]      Other reaction(s): Unknown    Norpace  [disopyramide]      Other reaction(s): Hives    Phenytoin sodium extended      Other reaction(s): Muscle pain    Sulfamethoxazole-trimethoprim      Other reaction(s): Muscle cramps     Medications:     Current Outpatient Medications on File Prior to Visit   Medication Sig Dispense Refill    aspirin 81 mg Tab Take 81 mg by mouth once daily.       benzonatate (TESSALON) 100 MG capsule       CINNAMON BARK (CINNAMON ORAL) Take 1,000 mg by mouth 2 (two) times daily.      digoxin (LANOXIN) 125 mcg tablet Take 1 tablet (0.125 mg total) by mouth once daily. 90 tablet 3    lisinopril (PRINIVIL,ZESTRIL) 2.5 MG tablet Take 1 tablet (2.5 mg total) by mouth once daily. 90 tablet 3    metoprolol tartrate (LOPRESSOR) 25 MG tablet Take 3 tablets (75 mg total) by mouth 2 (two) times daily. 540 tablet 3    simvastatin (ZOCOR) 20 MG tablet Take 1 tablet (20 mg total) by mouth every evening. 90 tablet 1    tiZANidine (ZANAFLEX) 2 MG tablet Take 1 tablet (2 mg total) by mouth daily  as needed. and as need 90 tablet 1    traZODone (DESYREL) 100 MG tablet Take 1 tablet (100 mg total) by mouth nightly as needed for Insomnia. 90 tablet 1    triamcinolone acetonide 0.025% (KENALOG) 0.025 % cream Use bid as needed. 80 g 1    triamterene-hydrochlorothiazide 37.5-25 mg (DYAZIDE) 37.5-25 mg per capsule Take 1 capsule by mouth once daily. 90 capsule 1    warfarin (COUMADIN) 2 MG tablet Take 2 pills by mouth daily or as directed per the [facility] 175 tablet 3    alpha lipoic acid 600 mg Cap Take 600 mg by mouth Daily. (Patient taking differently: Take 100 mg by mouth Daily. ) 100 each 12    blood sugar diagnostic (BLOOD GLUCOSE TEST) Strp 1 strip by Misc.(Non-Drug; Combo Route) route once daily. Currently using Nova max plus meter. 50 strip 11    bumetanide (BUMEX) 2 MG tablet Take 1/2 tablet daily. Take additional 1/2 tablet with weight gain > 3 lbs. 90 tablet 3    docusate sodium (COLACE) 100 MG capsule Take 100 mg by mouth. 1 Capsule Oral Every day      potassium chloride (KLOR-CON) 10 MEQ TbSR Take 1 tablet (10 mEq total) by mouth once daily. (Patient taking differently: Take 10 mEq by mouth every other day. ) 30 tablet 0     No current facility-administered medications on file prior to visit.      Social History:     Social History     Tobacco Use    Smoking status: Never Smoker    Smokeless tobacco: Never Used   Substance Use Topics    Alcohol use: Yes     Alcohol/week: 0.6 oz     Types: 1 Shots of liquor per week     Comment: rare     Family History:     Family History   Problem Relation Age of Onset    Heart disease Father         49    Heart disease Mother     Thyroid disease Sister     Atrial fibrillation Sister     Atrial fibrillation Sister         neice    Lymphoma Sister 29    Atrial fibrillation Sister     Asthma Neg Hx     Emphysema Neg Hx      Physical Exam:   There were no vitals taken for this visit.   Physical Exam   Constitutional: She is oriented to person,  place, and time. She appears well-developed and well-nourished.   HENT:   Head: Normocephalic and atraumatic.   Eyes: Conjunctivae and EOM are normal. Pupils are equal, round, and reactive to light.   Neck: Normal range of motion. Neck supple. No JVD present.   Cardiovascular: Normal rate and regular rhythm. Exam reveals no gallop and no friction rub.   Murmur (2/6 pansystolic murmur loudest at RSIS with rads throughout) heard.  Pulmonary/Chest: Effort normal and breath sounds normal. No respiratory distress. She has no wheezes. She has no rales. She exhibits no tenderness.   Abdominal: Soft. Bowel sounds are normal. She exhibits no distension. There is no tenderness.   Musculoskeletal: Normal range of motion. She exhibits edema (trace bilateral pitting -- patient does not count this as swelling per her account). She exhibits no tenderness.   Neurological: She is alert and oriented to person, place, and time.   Skin: Skin is warm and dry. No erythema. No pallor.       Labs:     Lab Results   Component Value Date     (L) 09/24/2018    K 4.5 09/24/2018    CL 92 (L) 09/24/2018    CO2 28 09/24/2018    BUN 24 09/26/2018    CREATININE 0.8 09/26/2018    ANIONGAP 8 09/24/2018     Lab Results   Component Value Date    HGBA1C 5.6 09/26/2018     Lab Results   Component Value Date     (H) 09/26/2018     (H) 09/24/2018     (H) 07/26/2018    Lab Results   Component Value Date    WBC 7.41 09/26/2018    HGB 10.2 (L) 09/26/2018    HCT 32.7 (L) 09/26/2018     09/26/2018     09/26/2018    GRAN 5.1 09/26/2018    GRAN 69.4 09/26/2018     Lab Results   Component Value Date    CHOL 156 04/12/2018    HDL 48 04/12/2018    LDLCALC 92.6 04/12/2018    TRIG 77 04/12/2018          Imaging:   Echo Pending    EKG: none on this visit  Assessment:     1. S/P TAVR (transcatheter aortic valve replacement)    2. Coronary artery disease, angina presence unspecified, unspecified vessel or lesion type, unspecified  whether native or transplanted heart    3. Hyperlipidemia, unspecified hyperlipidemia type    4. Atrial fibrillation, unspecified type    5.      Acute on Chronic Diastolic CHF      Plan:   S/P TAVR (transcatheter aortic valve replacement)  Continue ASA  Asymptomatic at this time    Coronary artery disease, angina presence unspecified, unspecified vessel or lesion type, unspecified whether native or transplanted heart  Continue asa/simvastatin  No anginal symptoms    Hyperlipidemia, unspecified hyperlipidemia type  Continue simvastatin  Last LDL 92  Consider increase in intensity    Atrial fibrillation, unspecified type  Continue warfarin -- managed by  center  INR currently 3.3    Acute on Chronic Diastolic CHF  Bumex 2mg po BID  Dig 125mcg po daily  Lisinopril 2.5mg po daily  Lopressor 25mg po BID  Daily weight  Continued sodium restriction  Check BMP in 1 week  F/U with Dr Ivy    Discussed with Dr. Epps. RTC in 1 year.     Signed:  Renato Florez MD  9/26/2018 1:11 PM

## 2018-09-26 NOTE — PROCEDURES
Adela Garay is a 89 y.o.  female patient, who presents for a 6 minute walk test ordered by Cesario Campbell MD.  The diagnosis is Aortic Valve Replacement.  The patient's BMI is 22 kg/m2.  Predicted distance (lower limit of normal) is 210.19 meters.      Test Results:    The test was completed without stopping.  The total time walked was 360 seconds.  During walking, the patient reported:  No complaints.  The patient used a walker for assistance during testing.     09/26/2018---------Distance: 182.88 meters (600 feet)     O2 Sat % Supplemental Oxygen Heart Rate Blood Pressure Nura Scale   Pre-exercise  (Resting) 98 % Room Air 60 bpm 161/68 mmHg 0   During Exercise 90 % Room Air 60 bpm 160/66 mmHg 0   Post-exercise  (Recovery) 98 % Room Air  66 bpm       Recovery Time:  74 seconds    Performing nurse/tech:  Gemini LEO      PREVIOUS STUDY:   04/10/2018---------Distance: 274.32 meters (900 feet)       O2 Sat % Supplemental Oxygen Heart Rate Blood Pressure Nura Scale   Pre-exercise  (Resting) 94 % Room Air 61 bpm 140/63 mmHg 0   During Exercise 92 % Room Air 61 bpm 139/60 mmHg 0.5   Post-exercise  (Recovery) 98 % Room Air  60 bpm   mmHg          CLINICAL INTERPRETATION:  Six minute walk distance is 182.88 meters (600 feet) with no dyspnea.  During exercise, there was significant desaturation while breathing room air.  Both blood pressure and heart rate remained stable with walking.  Hypertension was present prior to exercise.  The patient did not report non-pulmonary symptoms during exercise.  Significant exercise impairment is likely due to cardiovascular causes and subjective symptoms.  Since the previous study in April 2018, exercise capacity is significantly worse.  Based upon age and body mass index, exercise capacity is less than predicted.

## 2018-09-27 ENCOUNTER — TELEPHONE (OUTPATIENT)
Dept: FAMILY MEDICINE | Facility: CLINIC | Age: 83
End: 2018-09-27

## 2018-09-27 ENCOUNTER — OFFICE VISIT (OUTPATIENT)
Dept: CARDIOLOGY | Facility: CLINIC | Age: 83
End: 2018-09-27
Payer: MEDICARE

## 2018-09-27 VITALS
WEIGHT: 132.5 LBS | HEIGHT: 65 IN | OXYGEN SATURATION: 98 % | DIASTOLIC BLOOD PRESSURE: 67 MMHG | SYSTOLIC BLOOD PRESSURE: 144 MMHG | BODY MASS INDEX: 22.08 KG/M2 | HEART RATE: 60 BPM

## 2018-09-27 DIAGNOSIS — E11.29 CONTROLLED TYPE 2 DIABETES MELLITUS WITH MICROALBUMINURIA, WITHOUT LONG-TERM CURRENT USE OF INSULIN: ICD-10-CM

## 2018-09-27 DIAGNOSIS — I11.0 BENIGN HYPERTENSIVE HEART DISEASE WITH CONGESTIVE HEART FAILURE: ICD-10-CM

## 2018-09-27 DIAGNOSIS — Z86.73 HX-TIA (TRANSIENT ISCHEMIC ATTACK): ICD-10-CM

## 2018-09-27 DIAGNOSIS — I25.10 CORONARY ARTERY DISEASE INVOLVING NATIVE CORONARY ARTERY OF NATIVE HEART WITHOUT ANGINA PECTORIS: ICD-10-CM

## 2018-09-27 DIAGNOSIS — I77.9 CAROTID ARTERY DISEASE, UNSPECIFIED LATERALITY: ICD-10-CM

## 2018-09-27 DIAGNOSIS — Z95.2 S/P TAVR (TRANSCATHETER AORTIC VALVE REPLACEMENT): Primary | ICD-10-CM

## 2018-09-27 DIAGNOSIS — I48.91 ATRIAL FIBRILLATION, UNSPECIFIED TYPE: ICD-10-CM

## 2018-09-27 DIAGNOSIS — R80.9 CONTROLLED TYPE 2 DIABETES MELLITUS WITH MICROALBUMINURIA, WITHOUT LONG-TERM CURRENT USE OF INSULIN: ICD-10-CM

## 2018-09-27 DIAGNOSIS — Z95.0 CARDIAC PACEMAKER IN SITU: ICD-10-CM

## 2018-09-27 PROCEDURE — 99214 OFFICE O/P EST MOD 30 MIN: CPT | Mod: S$PBB,,, | Performed by: INTERNAL MEDICINE

## 2018-09-27 PROCEDURE — 99999 PR PBB SHADOW E&M-EST. PATIENT-LVL IV: CPT | Mod: PBBFAC,,, | Performed by: INTERNAL MEDICINE

## 2018-09-27 PROCEDURE — 99214 OFFICE O/P EST MOD 30 MIN: CPT | Mod: PBBFAC | Performed by: INTERNAL MEDICINE

## 2018-09-27 NOTE — TELEPHONE ENCOUNTER
Fax received by clinic requesting to have pts sodium and fluid intake addressed by PCP. States pt would not listen to the nurse or family members.   States that has become fixated w/low sodium and fluid diet. States consumes less that 800 mg of sodium and 32 oz. of fluid per day. States pt has shown frequent bouts of confusion and generalized weakness. State pt has also become very confrontational. Please advise.

## 2018-09-27 NOTE — PROGRESS NOTES
Subjective:    Patient ID:  Adela Garay is a 89 y.o. female who presents for follow-up of Valvular Heart Disease      HPI     Severe AS - s/p TAVR 10/17/17, complete heart block - [facility] single PPM 10/17/17  No CAD by [facility] 8/22/17  Chronic A-fib - rate controlled on coumadin, HTN, DM, Hx TIA  Diastolic CHF  Re-admitted after TAVR with CHF 10/30-11/12/17     Echo 9/26/18    1 - Low normal to mildly depressed left ventricular systolic function (EF 50-55%).     2 - Wall motion abnormalities.     3 - Eccentric hypertrophy.     4 - Biatrial enlargement.     5 - Mildly enlarged ascending aorta.     6 - Right ventricular enlargement with moderately depressed systolic function.     7 - The estimated PA systolic pressure is 34 mmHg.     8 - S/P transcatheter AVR, DILAN = 2.12 cm2, AVAi = 1.28 cm2/m2, peak velocity = 1.6 m/s, mean gradient = 6 mmHg.     9 - Trivial paravalvular aortic regurgitation.     10 - Mild to moderate mitral regurgitation.     11 - Severe tricuspid regurgitation.     12 - Increased central venous pressure.     13 - S/p 29mm Portico TF TAVR.      Labs 9/24/18  K 4.5  Cr 0.8      Denies CP or SOB           Review of Systems   Constitution: Negative for decreased appetite.   HENT: Negative for ear discharge.    Eyes: Negative for blurred vision.   Respiratory: Negative for hemoptysis.    Endocrine: Negative for polyphagia.   Hematologic/Lymphatic: Negative for adenopathy.   Skin: Negative for color change.   Musculoskeletal: Negative for joint swelling.   Neurological: Negative for brief paralysis.   Psychiatric/Behavioral: Negative for hallucinations.        Objective:    Physical Exam   Constitutional: She is oriented to person, place, and time. She appears well-developed and well-nourished.   HENT:   Head: Normocephalic and atraumatic.   Eyes: Pupils are equal, round, and reactive to light.   Neck: Normal range of motion. No JVD present.   Cardiovascular: Normal rate and regular rhythm.    Murmur heard.  Pulmonary/Chest: Effort normal and breath sounds normal. She has no wheezes. She has no rales.   Abdominal: Soft. Bowel sounds are normal. She exhibits no distension. There is no tenderness.   Neurological: She is alert and oriented to person, place, and time.   Skin: Skin is warm and dry.         Assessment:       1. S/P TAVR (transcatheter aortic valve replacement)    2. Cardiac pacemaker in situ    3. Coronary artery disease involving native coronary artery of native heart without angina pectoris    4. Atrial fibrillation, unspecified type    5. Benign hypertensive heart disease with congestive heart failure    6. Carotid artery disease, unspecified laterality    7. Hx-TIA (transient ischemic attack)    8. Controlled type 2 diabetes mellitus with microalbuminuria, without long-term current use of insulin         Plan:       Mild increase in troponin - stable symptoms  OV 2 months with BNP, BMP

## 2018-09-28 ENCOUNTER — TELEPHONE (OUTPATIENT)
Dept: CARDIOLOGY | Facility: CLINIC | Age: 83
End: 2018-09-28

## 2018-09-28 NOTE — TELEPHONE ENCOUNTER
I had a visit with patient today. Her volume status appears stable and we discussed keeping sodium < 2000 mg/day and fluid intake < 1.5 L/day

## 2018-09-28 NOTE — TELEPHONE ENCOUNTER
Ms. Garay called to discuss her medications.  She stated that she will only allow Porfirio Ivy and Jami to change, stop or alter any medications.  She was adamant about this and does not want to take Bumex.  She stated that she saw Dr. Ivy yesterday and he is fine with her current therapy.  Dr. Epps notified of this conversation.

## 2018-10-01 ENCOUNTER — ANTI-COAG VISIT (OUTPATIENT)
Dept: CARDIOLOGY | Facility: CLINIC | Age: 83
End: 2018-10-01

## 2018-10-01 ENCOUNTER — TELEPHONE (OUTPATIENT)
Dept: FAMILY MEDICINE | Facility: CLINIC | Age: 83
End: 2018-10-01

## 2018-10-01 DIAGNOSIS — Z79.01 LONG-TERM (CURRENT) USE OF ANTICOAGULANTS, INR GOAL 2.0-3.0: ICD-10-CM

## 2018-10-01 DIAGNOSIS — Z86.73 HX-TIA (TRANSIENT ISCHEMIC ATTACK): ICD-10-CM

## 2018-10-01 LAB — INR PPP: 3.2

## 2018-10-01 NOTE — TELEPHONE ENCOUNTER
Spoke with patient and her daughter.  We discussed the need to not just have an upper limit of normal on her salt intake but also a lower limit.  They will make up a diet for her that will keep her sodium intake between 1200 and 1500mg daily.  They will call me if there become any problems with fluid overload or swelling or shortness of breath.

## 2018-10-01 NOTE — TELEPHONE ENCOUNTER
palliative care update   Received: Today   Message Contents   Adilia Farrell MD   Phone Number: [phone]             Chief Complaint:   Rapid Weight Loss and Confusion   89 year old female with a history of aortic valve disease and HF.  She was placed on a low sodium diet with fluid restrictions and has reduced her sodium to extreme levels per families concern.  She is taking less than 800 mg of sodium per day and less than 30 oz of fluids.  The concern is her weight loss, confusion and confrontational behavior over the past few months.  The family has seen a decline in her mental state since her valve replacement.  In addition, she has a history of CAD, DDD, HLD, TIA, DM, Pulmonary HTN, Carotid artery disease, Atrial Fibrillation, Depression, PND, and Pulmonary Fibrosis.     Review of Systems   All systems normal EXCEPT the following:   [place] Symptom Assessment Scale:  The patient denied pain, constipation, dyspnea, anxiety, nausea, depression, anorexia, fatigue, insomnia, restlessness, agitation and diarrhea.   Constitutional:  The patient complained of weight loss but denied fever, malaise and anorexia.   Eyes:  The patient denied vision change, contacts and glasses.   Ears/Nose/Throat/Neck:  The patient denied dysphagia and hearing loss.   Cardiovascular:  The patient denied chest pain/pressure, edema, palpitations, dyspnea, fatigue, orthopnea, paroxysmal nocturnal dyspnea and syncope.   Respiratory:  The patient denied cough, chest congestion, dyspnea and orthopnea.   Gastrointestinal:  The patient denied abdominal pain and bowel incontinence.   Genitourinary/Nephrology:  The patient denied dysuria, nocturia and urinary incontinence.   Musculoskeletal:  The patient denied muscle weakness, myalgias, stiffness and swelling.   Dermatologic:  The patient denied itching and rash.   Neurologic:  The patient denied dizziness, headache, memory loss and neck pain.   Psychiatric:  The  patient denied suicidality.   Hematologic/Lymphatic:  The patient denied abnormal bleeding and bruising.                   Physical Exam           BP:  128/60 mmHg Right Arm Sitting manual  O2:  96 % room air   RR:21 bpm quiet   HR:60 bpm Sitting auscultation regular     Constitutional:   general appearance- overall: well developed and in no acute distress; development: appears stated age   Eyes:   ophthalmoscopic exam- overall: non-icteric sclerae   Ears/Nose/Throat:   external ear- overall: normal appearance, no masses and normal mastoids   Neck:   thyroid- overall: no bruits, no mass lesions, nontender, normal consistency and normal size   Cardiovascular:   auscultation of heart- overall: no murmurs, regular rate and regular sinus rhythm   Abdomen:   abdominal exam- overall: normal bowel sounds, no tenderness and soft bowel sounds X 4 quadrants   Musculoskeletal:   head and neck- overall: cervical spine benign, head atraumatic and TMJ benign   Integument:   inspection of skin- overall: no rash, lesions and warm   Neurologic:   orientation/consciousness- overall: alert and oriented to person, place and time   Psychiatric:   mood and affect- overall: aggression present The patient became extremely agitated and confrontational which the daughter states is occuring more and more. 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     Instructed patient to drink an additional nutritional supplement.  She is also only taking in 30 oz of fluid per day.  Her Diuretics have already been adjusted due to  the low sodium intake.   Palliative care team to follow for disease progression and comfort care.   A return visit is indicated in 1 month.     Patient Instructions   The patient and her daughter were present during the evaluation.  The families concern was that the patients behavior has changed over the past 6 months.  She has become obsessed with lowering her sodium to the point of weight loss, confusion, and agitation.  Despite, educating the patient on her low sodium diet she will not increase the sodium until the physician tells her to do so, as per patient.     Services Performed   [id number] HOME VISIT NEW PATIENT                   09/27/2018 12:56 PM       Clary Cooper.

## 2018-10-03 DIAGNOSIS — E78.5 HYPERLIPIDEMIA, UNSPECIFIED HYPERLIPIDEMIA TYPE: ICD-10-CM

## 2018-10-03 DIAGNOSIS — I11.0 BENIGN HYPERTENSIVE HEART DISEASE WITH CONGESTIVE HEART FAILURE: ICD-10-CM

## 2018-10-03 RX ORDER — SIMVASTATIN 20 MG/1
TABLET, FILM COATED ORAL
Qty: 90 TABLET | Refills: 1 | Status: SHIPPED | OUTPATIENT
Start: 2018-10-03 | End: 2019-02-26 | Stop reason: SDUPTHER

## 2018-10-03 RX ORDER — TRIAMTERENE AND HYDROCHLOROTHIAZIDE 37.5; 25 MG/1; MG/1
CAPSULE ORAL
Qty: 90 CAPSULE | Refills: 1 | Status: SHIPPED | OUTPATIENT
Start: 2018-10-03 | End: 2019-10-05

## 2018-10-08 ENCOUNTER — TELEPHONE (OUTPATIENT)
Dept: CARDIOLOGY | Facility: CLINIC | Age: 83
End: 2018-10-08

## 2018-10-08 ENCOUNTER — ANTI-COAG VISIT (OUTPATIENT)
Dept: CARDIOLOGY | Facility: CLINIC | Age: 83
End: 2018-10-08

## 2018-10-08 DIAGNOSIS — Z86.73 HX-TIA (TRANSIENT ISCHEMIC ATTACK): ICD-10-CM

## 2018-10-08 DIAGNOSIS — Z79.01 LONG-TERM (CURRENT) USE OF ANTICOAGULANTS, INR GOAL 2.0-3.0: ICD-10-CM

## 2018-10-08 LAB — INR PPP: 3.4

## 2018-10-08 NOTE — PROGRESS NOTES
INR still elevated despite dose reduction last week and the week prior.  Recent chart notes indicate patient has become intently focused on keeping her sodium and fluid intake controlled for management of her HF, however she seems to be overdoing it and is now experiencing confusion and generalized weakness.  She is not reporting any other changes, but poor nutrition can certainly explain her INR as it is showing today and the few weeks prior.  Will continue to lower her weekly dose.  Of note, patient continues to deny bleeding; confirms only routine bruising.

## 2018-10-15 ENCOUNTER — LAB VISIT (OUTPATIENT)
Dept: LAB | Facility: HOSPITAL | Age: 83
End: 2018-10-15
Attending: INTERNAL MEDICINE
Payer: MEDICARE

## 2018-10-15 ENCOUNTER — TELEPHONE (OUTPATIENT)
Dept: FAMILY MEDICINE | Facility: CLINIC | Age: 83
End: 2018-10-15

## 2018-10-15 ENCOUNTER — ANTI-COAG VISIT (OUTPATIENT)
Dept: CARDIOLOGY | Facility: CLINIC | Age: 83
End: 2018-10-15
Payer: MEDICARE

## 2018-10-15 DIAGNOSIS — Z86.73 HX-TIA (TRANSIENT ISCHEMIC ATTACK): ICD-10-CM

## 2018-10-15 DIAGNOSIS — E87.1 HYPONATREMIA: Primary | ICD-10-CM

## 2018-10-15 DIAGNOSIS — R41.82 ALTERED MENTAL STATUS, UNSPECIFIED ALTERED MENTAL STATUS TYPE: Primary | ICD-10-CM

## 2018-10-15 DIAGNOSIS — Z79.01 LONG-TERM (CURRENT) USE OF ANTICOAGULANTS, INR GOAL 2.0-3.0: ICD-10-CM

## 2018-10-15 DIAGNOSIS — R41.82 ALTERED MENTAL STATUS, UNSPECIFIED ALTERED MENTAL STATUS TYPE: ICD-10-CM

## 2018-10-15 LAB
ANION GAP SERPL CALC-SCNC: 7 MMOL/L
BUN SERPL-MCNC: 21 MG/DL
CALCIUM SERPL-MCNC: 10.2 MG/DL
CHLORIDE SERPL-SCNC: 91 MMOL/L
CO2 SERPL-SCNC: 30 MMOL/L
CREAT SERPL-MCNC: 0.8 MG/DL
EST. GFR  (AFRICAN AMERICAN): >60 ML/MIN/1.73 M^2
EST. GFR  (NON AFRICAN AMERICAN): >60 ML/MIN/1.73 M^2
GLUCOSE SERPL-MCNC: 108 MG/DL
INR PPP: 2
POTASSIUM SERPL-SCNC: 4.5 MMOL/L
SODIUM SERPL-SCNC: 128 MMOL/L

## 2018-10-15 PROCEDURE — 80048 BASIC METABOLIC PNL TOTAL CA: CPT

## 2018-10-15 PROCEDURE — G0250 MD INR TEST REVIE INTER MGMT: HCPCS | Mod: ,,, | Performed by: INTERNAL MEDICINE

## 2018-10-15 PROCEDURE — 36415 COLL VENOUS BLD VENIPUNCTURE: CPT

## 2018-10-19 ENCOUNTER — HOSPITAL ENCOUNTER (OUTPATIENT)
Dept: RADIOLOGY | Facility: HOSPITAL | Age: 83
Discharge: HOME | End: 2018-10-19
Attending: FAMILY MEDICINE
Payer: MEDICARE

## 2018-10-19 DIAGNOSIS — R41.82 ALTERED MENTAL STATUS: Primary | ICD-10-CM

## 2018-10-19 DIAGNOSIS — R41.82 ALTERED MENTAL STATUS: ICD-10-CM

## 2018-10-19 PROCEDURE — 70450 CT HEAD/BRAIN W/O DYE: CPT | Mod: 26,,, | Performed by: RADIOLOGY

## 2018-10-19 PROCEDURE — 70450 CT HEAD/BRAIN W/O DYE: CPT | Mod: TC

## 2018-10-22 ENCOUNTER — LAB VISIT (OUTPATIENT)
Dept: LAB | Facility: HOSPITAL | Age: 83
End: 2018-10-22
Attending: INTERNAL MEDICINE
Payer: MEDICARE

## 2018-10-22 ENCOUNTER — TELEPHONE (OUTPATIENT)
Dept: FAMILY MEDICINE | Facility: CLINIC | Age: 83
End: 2018-10-22

## 2018-10-22 ENCOUNTER — ANTI-COAG VISIT (OUTPATIENT)
Dept: CARDIOLOGY | Facility: CLINIC | Age: 83
End: 2018-10-22
Payer: MEDICARE

## 2018-10-22 DIAGNOSIS — Z86.73 HX-TIA (TRANSIENT ISCHEMIC ATTACK): ICD-10-CM

## 2018-10-22 DIAGNOSIS — Z79.01 LONG-TERM (CURRENT) USE OF ANTICOAGULANTS, INR GOAL 2.0-3.0: ICD-10-CM

## 2018-10-22 DIAGNOSIS — E87.1 HYPONATREMIA: ICD-10-CM

## 2018-10-22 DIAGNOSIS — E87.1 HYPONATREMIA: Primary | ICD-10-CM

## 2018-10-22 LAB
ANION GAP SERPL CALC-SCNC: 5 MMOL/L
BUN SERPL-MCNC: 22 MG/DL
CALCIUM SERPL-MCNC: 9.5 MG/DL
CHLORIDE SERPL-SCNC: 95 MMOL/L
CO2 SERPL-SCNC: 28 MMOL/L
CREAT SERPL-MCNC: 0.8 MG/DL
EST. GFR  (AFRICAN AMERICAN): >60 ML/MIN/1.73 M^2
EST. GFR  (NON AFRICAN AMERICAN): >60 ML/MIN/1.73 M^2
GLUCOSE SERPL-MCNC: 89 MG/DL
INR PPP: 1.9
POTASSIUM SERPL-SCNC: 4.6 MMOL/L
SODIUM SERPL-SCNC: 128 MMOL/L

## 2018-10-22 PROCEDURE — 80048 BASIC METABOLIC PNL TOTAL CA: CPT

## 2018-10-22 PROCEDURE — 36415 COLL VENOUS BLD VENIPUNCTURE: CPT | Mod: PO

## 2018-10-22 PROCEDURE — 99211 OFF/OP EST MAY X REQ PHY/QHP: CPT | Mod: S$PBB,,,

## 2018-10-22 NOTE — TELEPHONE ENCOUNTER
Spoke with patient's daughter.  She has not been herself all week.  Very poor memory.  They saw Neurology last week and she is having test done but nothing so far.  Sodium today is still low at 128.  I will refer her to Nephrology.  Family reports they have been less strict on salt restriction and fluid restriction.  I will also ask her cardiologist, Dr. Ivy, for further input.  Family will keep me posted on patient's condition.

## 2018-10-22 NOTE — PROGRESS NOTES
Patient presents for meter follow-up appointment.  Patient demonstrated proper use of meter.  Patient denies any difficulty using home monitor or reporting results.  INR results in meter are consistent with those reported and are without discrepancies.  Patient denies self-adjusting diet or dose based on readings.  Reminded patient to continue to contact clinic with any new medications, changes in health, or changes in diet. Patient will continue monitoring INR weekly and return to clinic in 12 months.  Patient advised to contact clinic with any changes, questions, or concerns.    INR low today. Patient reports no bleeding, bruising or other changes. Will increase weekly dose until follow up in 1 week. Advised patient to call with any changes or concerns. Care Plan made with Hanna Rubio, Pharm D.

## 2018-10-25 ENCOUNTER — LAB VISIT (OUTPATIENT)
Dept: LAB | Facility: HOSPITAL | Age: 83
End: 2018-10-25
Attending: INTERNAL MEDICINE
Payer: MEDICARE

## 2018-10-25 DIAGNOSIS — E87.1 HYPONATREMIA: ICD-10-CM

## 2018-10-25 DIAGNOSIS — E87.1 HYPONATREMIA: Primary | ICD-10-CM

## 2018-10-25 LAB
OSMOLALITY SERPL: 291 MOSM/KG
URATE SERPL-MCNC: 3.8 MG/DL

## 2018-10-25 PROCEDURE — 83930 ASSAY OF BLOOD OSMOLALITY: CPT

## 2018-10-25 PROCEDURE — 84550 ASSAY OF BLOOD/URIC ACID: CPT

## 2018-10-25 PROCEDURE — 36415 COLL VENOUS BLD VENIPUNCTURE: CPT | Mod: PO

## 2018-10-25 NOTE — TELEPHONE ENCOUNTER
Feel the patient has already done too much fluid restriction.  Spoke with patient's daughter.  She had her EEG this morning with Neurology.  She will go today to the lab to check some additional urine and serum testing to see if we can figure out the cause of the hyponatremia.

## 2018-10-26 ENCOUNTER — TELEPHONE (OUTPATIENT)
Dept: FAMILY MEDICINE | Facility: CLINIC | Age: 83
End: 2018-10-26

## 2018-10-26 NOTE — TELEPHONE ENCOUNTER
Spoke with the patient and her son and explained to them that I sent a message to Dr. Perez's staff for an appointment. They are no available appointments in [facility] for Nephrology. Thanks.

## 2018-10-26 NOTE — TELEPHONE ENCOUNTER
Spoke with daughter.  The patient has not taken any fluid pills at all for 2 weeks.  Mental status is still off.  She does not remember some long-time friends.  She has an appointment this afternoon for follow-up with Neurology to get results of the EEG that was done earlier this week.  She has been eating more.  She has gained a little weight.  Family is concerned that maybe she is developing some fluid overload, although the patient denies any shortness of breath.  No significant lower extremity edema.  Advised family to continue to monitor over the weekend.  They will give her 1/2 to 1 Bumex if they feel that she really becomes fluid overloaded.  They will let me know the results of their visit with Neurology.  We will check on them Monday to see how things are going.

## 2018-10-28 ENCOUNTER — PATIENT MESSAGE (OUTPATIENT)
Dept: FAMILY MEDICINE | Facility: CLINIC | Age: 83
End: 2018-10-28

## 2018-10-29 ENCOUNTER — ANTI-COAG VISIT (OUTPATIENT)
Dept: CARDIOLOGY | Facility: CLINIC | Age: 83
End: 2018-10-29

## 2018-10-29 DIAGNOSIS — Z79.01 LONG-TERM (CURRENT) USE OF ANTICOAGULANTS, INR GOAL 2.0-3.0: ICD-10-CM

## 2018-10-29 DIAGNOSIS — Z86.73 HX-TIA (TRANSIENT ISCHEMIC ATTACK): ICD-10-CM

## 2018-10-29 LAB — INR PPP: 2.2

## 2018-11-01 ENCOUNTER — TELEPHONE (OUTPATIENT)
Dept: NEPHROLOGY | Facility: CLINIC | Age: 83
End: 2018-11-01

## 2018-11-01 NOTE — TELEPHONE ENCOUNTER
----- Message from Matthew Perez MD sent at 11/1/2018  9:40 AM CDT -----  Can see in Nov/Dec at [facility], otherwise needs to see me here while I'm on service next week, thank you.     ----- Message -----  From: Homa Roth LPN  Sent: 10/26/2018   1:15 PM  To: Matthew Perez MD    Please review chart /to see how soon you would need to see new pt (carmella)  ----- Message -----  From: Adrianna Hill  Sent: 10/26/2018  12:01 PM  To: [facility] Staff    Pt has a Nephrology referral for Hyponatremia. Please Call to schedule. Thanks      Scheduled and mailed

## 2018-11-02 ENCOUNTER — HOSPITAL ENCOUNTER (OUTPATIENT)
Dept: RADIOLOGY | Facility: HOSPITAL | Age: 83
Discharge: HOME | End: 2018-11-02
Attending: PSYCHIATRY & NEUROLOGY
Payer: MEDICARE

## 2018-11-02 DIAGNOSIS — I50.9 HEART FAILURE, UNSPECIFIED: ICD-10-CM

## 2018-11-02 DIAGNOSIS — I50.9 HEART FAILURE, UNSPECIFIED: Primary | ICD-10-CM

## 2018-11-02 PROCEDURE — 71046 X-RAY EXAM CHEST 2 VIEWS: CPT | Mod: 26,,, | Performed by: RADIOLOGY

## 2018-11-02 PROCEDURE — 71046 X-RAY EXAM CHEST 2 VIEWS: CPT | Mod: TC,FY

## 2018-11-05 ENCOUNTER — ANTI-COAG VISIT (OUTPATIENT)
Dept: CARDIOLOGY | Facility: CLINIC | Age: 83
End: 2018-11-05

## 2018-11-05 ENCOUNTER — LAB VISIT (OUTPATIENT)
Dept: LAB | Facility: HOSPITAL | Age: 83
End: 2018-11-05
Attending: INTERNAL MEDICINE
Payer: MEDICARE

## 2018-11-05 DIAGNOSIS — Z79.01 LONG-TERM (CURRENT) USE OF ANTICOAGULANTS, INR GOAL 2.0-3.0: ICD-10-CM

## 2018-11-05 DIAGNOSIS — Z86.73 HX-TIA (TRANSIENT ISCHEMIC ATTACK): ICD-10-CM

## 2018-11-05 DIAGNOSIS — Z79.01 LONG-TERM (CURRENT) USE OF ANTICOAGULANTS, INR GOAL 2.0-3.0: Primary | ICD-10-CM

## 2018-11-05 LAB
INR PPP: 2.7
INR PPP: 3
PROTHROMBIN TIME: 26 SEC

## 2018-11-05 PROCEDURE — 85610 PROTHROMBIN TIME: CPT

## 2018-11-05 PROCEDURE — 36415 COLL VENOUS BLD VENIPUNCTURE: CPT | Mod: PO

## 2018-11-05 NOTE — PROGRESS NOTES
Daughter in law advised to have pt get (labs) done at [facility] today. Daughter in law verbalized understanding.

## 2018-11-05 NOTE — PROGRESS NOTES
Patient/family made aware of home meter test strip recall and agree to have venipuncture lab this week. Will maintain current plan and f/u lab INR as patient typically in range and not reporting changes at this time.

## 2018-11-06 NOTE — PROGRESS NOTES
Patients daughter advised pt to MAINTAIN coumadin dose as planned. Also to re test INR on  11/19. Patients daug verbalized understanding.

## 2018-11-07 ENCOUNTER — OFFICE VISIT (OUTPATIENT)
Dept: CARDIOLOGY | Facility: CLINIC | Age: 83
End: 2018-11-07
Payer: MEDICARE

## 2018-11-07 ENCOUNTER — CLINICAL SUPPORT (OUTPATIENT)
Dept: CARDIOLOGY | Facility: HOSPITAL | Age: 83
End: 2018-11-07
Attending: INTERNAL MEDICINE
Payer: MEDICARE

## 2018-11-07 VITALS
HEART RATE: 60 BPM | HEIGHT: 65 IN | OXYGEN SATURATION: 95 % | BODY MASS INDEX: 21.99 KG/M2 | WEIGHT: 132 LBS | DIASTOLIC BLOOD PRESSURE: 58 MMHG | SYSTOLIC BLOOD PRESSURE: 118 MMHG

## 2018-11-07 DIAGNOSIS — Z86.73 HX-TIA (TRANSIENT ISCHEMIC ATTACK): Primary | ICD-10-CM

## 2018-11-07 DIAGNOSIS — Z95.0 CARDIAC PACEMAKER IN SITU: ICD-10-CM

## 2018-11-07 DIAGNOSIS — I10 HYPERTENSION: ICD-10-CM

## 2018-11-07 DIAGNOSIS — I27.20 PULMONARY HYPERTENSION: ICD-10-CM

## 2018-11-07 DIAGNOSIS — I48.91 ATRIAL FIBRILLATION, UNSPECIFIED TYPE: ICD-10-CM

## 2018-11-07 DIAGNOSIS — E11.29 CONTROLLED TYPE 2 DIABETES MELLITUS WITH MICROALBUMINURIA, WITHOUT LONG-TERM CURRENT USE OF INSULIN: ICD-10-CM

## 2018-11-07 DIAGNOSIS — R80.9 CONTROLLED TYPE 2 DIABETES MELLITUS WITH MICROALBUMINURIA, WITHOUT LONG-TERM CURRENT USE OF INSULIN: ICD-10-CM

## 2018-11-07 DIAGNOSIS — E78.5 HYPERLIPIDEMIA, UNSPECIFIED HYPERLIPIDEMIA TYPE: ICD-10-CM

## 2018-11-07 DIAGNOSIS — Z95.2 S/P TAVR (TRANSCATHETER AORTIC VALVE REPLACEMENT): ICD-10-CM

## 2018-11-07 PROCEDURE — 93010 ELECTROCARDIOGRAM REPORT: CPT | Mod: S$PBB,,, | Performed by: INTERNAL MEDICINE

## 2018-11-07 PROCEDURE — 99214 OFFICE O/P EST MOD 30 MIN: CPT | Mod: PBBFAC,PO,25 | Performed by: INTERNAL MEDICINE

## 2018-11-07 PROCEDURE — 93005 ELECTROCARDIOGRAM TRACING: CPT | Mod: PBBFAC,PO | Performed by: INTERNAL MEDICINE

## 2018-11-07 PROCEDURE — 99999 PR PBB SHADOW E&M-EST. PATIENT-LVL IV: CPT | Mod: PBBFAC,,, | Performed by: INTERNAL MEDICINE

## 2018-11-07 PROCEDURE — 93296 REM INTERROG EVL PM/IDS: CPT

## 2018-11-07 PROCEDURE — 99213 OFFICE O/P EST LOW 20 MIN: CPT | Mod: S$PBB,,, | Performed by: INTERNAL MEDICINE

## 2018-11-07 NOTE — PROGRESS NOTES
Subjective:    Patient ID:  Adela Garay is a 89 y.o. female who presents for follow-up of Hypertension      HPI     Severe AS - s/p TAVR 10/17/17, complete heart block - [facility] single PPM 10/17/17  No CAD by [facility] 8/22/17  Chronic A-fib - rate controlled on coumadin, HTN, DM, Hx TIA  Diastolic CHF  Re-admitted after TAVR with CHF 10/30-11/12/17     Echo 9/26/18    1 - Low normal to mildly depressed left ventricular systolic function (EF 50-55%).     2 - Wall motion abnormalities.     3 - Eccentric hypertrophy.     4 - Biatrial enlargement.     5 - Mildly enlarged ascending aorta.     6 - Right ventricular enlargement with moderately depressed systolic function.     7 - The estimated PA systolic pressure is 34 mmHg.     8 - S/P transcatheter AVR, DILAN = 2.12 cm2, AVAi = 1.28 cm2/m2, peak velocity = 1.6 m/s, mean gradient = 6 mmHg.     9 - Trivial paravalvular aortic regurgitation.     10 - Mild to moderate mitral regurgitation.     11 - Severe tricuspid regurgitation.     12 - Increased central venous pressure.     13 - S/p 29mm [place] TF TAVR.      Labs 11/2/18  Na 138  K 4.6  Cr 0.8  BNP 1035  CXR There is no focal pulmonary consolidation.  There are small bilateral pleural effusions.               Had been holding diuretics but became increasingly confused with LE edema  Took bumex 2 days in a row with improvement  Mainly with fatigue and poor appetite        Review of Systems   Constitution: Negative for decreased appetite.   HENT: Negative for ear discharge.    Eyes: Negative for blurred vision.   Respiratory: Negative for hemoptysis.    Endocrine: Negative for polyphagia.   Hematologic/Lymphatic: Negative for adenopathy.   Skin: Negative for color change.   Musculoskeletal: Negative for joint swelling.   Neurological: Negative for brief paralysis.   Psychiatric/Behavioral: Negative for hallucinations.        Objective:    Physical Exam   Constitutional: She is oriented to person, place, and time. She  appears well-developed and well-nourished.   HENT:   Head: Normocephalic and atraumatic.   Eyes: Pupils are equal, round, and reactive to light.   Neck: Normal range of motion. No JVD present.   Cardiovascular: Normal rate and regular rhythm.   Murmur heard.  Pulmonary/Chest: Effort normal and breath sounds normal. She has no wheezes. She has no rales.   Abdominal: Soft. Bowel sounds are normal. She exhibits no distension. There is no tenderness.   Neurological: She is alert and oriented to person, place, and time.   Skin: Skin is warm and dry.         Assessment:       1. Hx-TIA (transient ischemic attack)    2. Pulmonary hypertension    3. Hyperlipidemia, unspecified hyperlipidemia type    4. Atrial fibrillation, unspecified type    5. S/P TAVR (transcatheter aortic valve replacement)    6. Cardiac pacemaker in situ    7. Controlled type 2 diabetes mellitus with microalbuminuria, without long-term current use of insulin         Plan:       Will try dosing bumex 0.5 mg MWF  OV 2 weeks as scheduled with repeat BNP, BMP

## 2018-11-09 DIAGNOSIS — Z95.0 CARDIAC PACEMAKER IN SITU: Primary | ICD-10-CM

## 2018-11-12 ENCOUNTER — TELEPHONE (OUTPATIENT)
Dept: NEPHROLOGY | Facility: CLINIC | Age: 83
End: 2018-11-12

## 2018-11-12 ENCOUNTER — TELEPHONE (OUTPATIENT)
Dept: PODIATRY | Facility: CLINIC | Age: 83
End: 2018-11-12

## 2018-11-12 NOTE — TELEPHONE ENCOUNTER
----- Message from Ny Hollingsworth sent at 11/12/2018 11:17 AM CST -----  Contact: self [phone]  ..Needs Advice    Reason for call:        Communication Preference: phone     Additional Information: pt wants to know if is okay if she can changed her apt to a week before thanksgiving please call back to discuss    Called pt and she will keep appt

## 2018-11-13 NOTE — TELEPHONE ENCOUNTER
----- Message from Sera Brennan sent at 11/12/2018  4:23 PM CST -----  Contact: self - [phone]  Pt would like to know if Dr. Sharpe would like her to take any labs prior to appt tomorrow.

## 2018-11-19 ENCOUNTER — OFFICE VISIT (OUTPATIENT)
Dept: PODIATRY | Facility: CLINIC | Age: 83
End: 2018-11-19
Payer: MEDICARE

## 2018-11-19 ENCOUNTER — ANTI-COAG VISIT (OUTPATIENT)
Dept: CARDIOLOGY | Facility: CLINIC | Age: 83
End: 2018-11-19
Payer: MEDICARE

## 2018-11-19 VITALS
BODY MASS INDEX: 21.99 KG/M2 | WEIGHT: 132 LBS | SYSTOLIC BLOOD PRESSURE: 124 MMHG | DIASTOLIC BLOOD PRESSURE: 60 MMHG | HEIGHT: 65 IN

## 2018-11-19 DIAGNOSIS — L84 CORN OR CALLUS: ICD-10-CM

## 2018-11-19 DIAGNOSIS — R60.0 BILATERAL LEG EDEMA: ICD-10-CM

## 2018-11-19 DIAGNOSIS — B35.1 ONYCHOMYCOSIS DUE TO DERMATOPHYTE: ICD-10-CM

## 2018-11-19 DIAGNOSIS — E11.9 COMPREHENSIVE DIABETIC FOOT EXAMINATION, TYPE 2 DM, ENCOUNTER FOR: ICD-10-CM

## 2018-11-19 DIAGNOSIS — Z79.01 ANTICOAGULATED ON COUMADIN: ICD-10-CM

## 2018-11-19 DIAGNOSIS — G60.9 IDIOPATHIC PERIPHERAL NEUROPATHY: Primary | ICD-10-CM

## 2018-11-19 DIAGNOSIS — Z86.73 HX-TIA (TRANSIENT ISCHEMIC ATTACK): ICD-10-CM

## 2018-11-19 DIAGNOSIS — Z79.01 LONG-TERM (CURRENT) USE OF ANTICOAGULANTS, INR GOAL 2.0-3.0: ICD-10-CM

## 2018-11-19 LAB — INR PPP: 2.8

## 2018-11-19 PROCEDURE — 99999 PR PBB SHADOW E&M-EST. PATIENT-LVL III: CPT | Mod: PBBFAC,,, | Performed by: PODIATRIST

## 2018-11-19 PROCEDURE — G0250 MD INR TEST REVIE INTER MGMT: HCPCS | Mod: ,,, | Performed by: INTERNAL MEDICINE

## 2018-11-19 PROCEDURE — 11721 DEBRIDE NAIL 6 OR MORE: CPT | Mod: 59,Q9,S$PBB, | Performed by: PODIATRIST

## 2018-11-19 PROCEDURE — 11056 PARNG/CUTG B9 HYPRKR LES 2-4: CPT | Mod: Q9,S$PBB,, | Performed by: PODIATRIST

## 2018-11-19 PROCEDURE — 99499 UNLISTED E&M SERVICE: CPT | Mod: S$PBB,,, | Performed by: PODIATRIST

## 2018-11-19 PROCEDURE — 11056 PARNG/CUTG B9 HYPRKR LES 2-4: CPT | Mod: Q9,PBBFAC,PO | Performed by: PODIATRIST

## 2018-11-19 PROCEDURE — 99213 OFFICE O/P EST LOW 20 MIN: CPT | Mod: PBBFAC,PO | Performed by: PODIATRIST

## 2018-11-19 PROCEDURE — 11721 DEBRIDE NAIL 6 OR MORE: CPT | Mod: Q9,PBBFAC,PO | Performed by: PODIATRIST

## 2018-11-19 NOTE — PROGRESS NOTES
INR drawn today with home meter and unaffected test strips. Level is within range so will continue current dose and monitoring plan.

## 2018-11-19 NOTE — PROGRESS NOTES
Subjective:      Patient ID: Adela Garay is a 89 y.o. female.    Chief Complaint: Foot Problem (Pcp Dr. Farrell ) and Peripheral Neuropathy    Adela is a 89 y.o. female who presents to the clinic for evaluation and treatment of high risk feet. Adela has a past medical history of Anticoagulant long-term use, Anticoagulated on Coumadin, Aortic valve stenosis, Arthritis, Atrial fibrillation, Carotid artery occlusion, Chronic rhinosinusitis, Coronary artery disease, Granulomatous lung disease, Heart failure, Hyperlipidemia, Hypertension, Hypertensive heart disease without CHF (congestive heart failure), Mitral valve prolapse, PN (peripheral neuropathy), Severe aortic stenosis by prior echocardiogram, TIA (transient ischemic attack), Type 2 diabetes mellitus, and Type II or unspecified type diabetes mellitus without mention of complication, not stated as uncontrolled. The patient's chief complaint is long, thick toenails. This patient has documented high risk feet requiring routine maintenance secondary to peripheral neuropathy.      PCP: Torrie Farrell MD    Date Last Seen by PCP:   Chief Complaint   Patient presents with    Foot Problem     Pcp Dr. Farrell     Peripheral Neuropathy       Current shoe gear:  SAS shoe    Hemoglobin A1C   Date Value Ref Range Status   09/26/2018 5.6 4.0 - 5.6 % Final     Comment:     ADA Screening Guidelines:  5.7-6.4%  Consistent with prediabetes  >or=6.5%  Consistent with diabetes  High levels of fetal hemoglobin interfere with the HbA1C  assay. Heterozygous hemoglobin variants (HbS, HgC, etc)do  not significantly interfere with this assay.   However, presence of multiple variants may affect accuracy.     04/12/2018 5.8 (H) 4.0 - 5.6 % Final     Comment:     According to ADA guidelines, hemoglobin A1c <7.0% represents  optimal control in non-pregnant diabetic patients. Different  metrics may apply to specific patient populations.   Standards of Medical Care in  Diabetes-2016.  For the purpose of screening for the presence of diabetes:  <5.7%     Consistent with the absence of diabetes  5.7-6.4%  Consistent with increasing risk for diabetes   (prediabetes)  >or=6.5%  Consistent with diabetes  Currently, no consensus exists for use of hemoglobin A1c  for diagnosis of diabetes for children.  This Hemoglobin A1c assay has significant interference with fetal   hemoglobin   (HbF). The results are invalid for patients with abnormal amounts of   HbF,   including those with known Hereditary Persistence   of Fetal Hemoglobin. Heterozygous hemoglobin variants (HbAS, HbAC,   HbAD, HbAE, HbA2) do not significantly interfere with this assay;   however, presence of multiple variants in a sample may impact the %   interference.     10/30/2017 5.6 4.0 - 5.6 % Final     Comment:     According to ADA guidelines, hemoglobin A1c <7.0% represents  optimal control in non-pregnant diabetic patients. Different  metrics may apply to specific patient populations.   Standards of Medical Care in Diabetes-2016.  For the purpose of screening for the presence of diabetes:  <5.7%     Consistent with the absence of diabetes  5.7-6.4%  Consistent with increasing risk for diabetes   (prediabetes)  >or=6.5%  Consistent with diabetes  Currently, no consensus exists for use of hemoglobin A1c  for diagnosis of diabetes for children.  This Hemoglobin A1c assay has significant interference with fetal   hemoglobin   (HbF). The results are invalid for patients with abnormal amounts of   HbF,   including those with known Hereditary Persistence   of Fetal Hemoglobin. Heterozygous hemoglobin variants (HbAS, HbAC,   HbAD, HbAE, HbA2) do not significantly interfere with this assay;   however, presence of multiple variants in a sample may impact the %   interference.           Patient Active Problem List   Diagnosis    Hyperlipidemia    Coronary artery disease    Hx-TIA (transient ischemic attack)    Long-term  (current) use of anticoagulants, INR goal 2.0-3.0    Benign hypertensive heart disease with congestive heart failure    Pulmonary hypertension    Carotid arterial disease    Carotid aneurysm, left    Atrial fibrillation    Controlled type 2 diabetes mellitus with microalbuminuria    DDD (degenerative disc disease), cervical    Lumbar disc disease    Multiple lung nodules    Mild major depression    Idiopathic peripheral neuropathy    Atherosclerosis of aorta    Postinflammatory pulmonary fibrosis    Chronic cough    Coronary artery disease involving native coronary artery of native heart without angina pectoris    Controlled type 2 diabetes with neuropathy    S/P TAVR (transcatheter aortic valve replacement)    Cardiac pacemaker in situ    Examination of participant in clinical trial       Current Outpatient Medications on File Prior to Visit   Medication Sig Dispense Refill    alpha lipoic acid 600 mg Cap Take 600 mg by mouth Daily. (Patient taking differently: Take 100 mg by mouth Daily. ) 100 each 12    aspirin 81 mg Tab Take 81 mg by mouth once daily.       benzonatate (TESSALON) 100 MG capsule       blood sugar diagnostic (BLOOD GLUCOSE TEST) Strp 1 strip by Misc.(Non-Drug; Combo Route) route once daily. Currently using Nova max plus meter. 50 strip 11    CINNAMON BARK (CINNAMON ORAL) Take 1,000 mg by mouth 2 (two) times daily.      digoxin (LANOXIN) 125 mcg tablet Take 1 tablet (0.125 mg total) by mouth once daily. 90 tablet 3    docusate sodium (COLACE) 100 MG capsule Take 100 mg by mouth. 1 Capsule Oral Every day      lisinopril (PRINIVIL,ZESTRIL) 2.5 MG tablet Take 1 tablet (2.5 mg total) by mouth once daily. 90 tablet 3    metoprolol tartrate (LOPRESSOR) 25 MG tablet Take 3 tablets (75 mg total) by mouth 2 (two) times daily. 540 tablet 3    potassium chloride (KLOR-CON) 10 MEQ TbSR Take 1 tablet (10 mEq total) by mouth once daily. (Patient taking differently: Take 10 mEq by  mouth every other day. ) 30 tablet 0    simvastatin (ZOCOR) 20 MG tablet TAKE 1 TABLET EVERY EVENING 90 tablet 1    tiZANidine (ZANAFLEX) 2 MG tablet Take 1 tablet (2 mg total) by mouth daily as needed. and as need 90 tablet 1    traZODone (DESYREL) 100 MG tablet Take 1 tablet (100 mg total) by mouth nightly as needed for Insomnia. 90 tablet 1    triamcinolone acetonide 0.025% (KENALOG) 0.025 % cream Use bid as needed. 80 g 1    triamterene-hydrochlorothiazide 37.5-25 mg (DYAZIDE) 37.5-25 mg per capsule TAKE 1 CAPSULE EVERY DAY 90 capsule 1    warfarin (COUMADIN) 2 MG tablet Take 2 pills by mouth daily or as directed per the [facility] 175 tablet 3    bumetanide (BUMEX) 2 MG tablet Take 1 tablet (2 mg total) by mouth 2 (two) times daily. Take additional 1/2 tablet with weight gain > 3 lbs.  After 1 week, decrease to bumex 1mg po daily for 7 days 14 tablet 0     No current facility-administered medications on file prior to visit.        Review of patient's allergies indicates:   Allergen Reactions    Levaquin [levofloxacin]     Doxycycline hyclate Other (See Comments)     Unknown to patient    Iodine and iodide containing products     Neurontin  [gabapentin]      Other reaction(s): Unknown    Norpace  [disopyramide]      Other reaction(s): Hives    Phenytoin sodium extended      Other reaction(s): Muscle pain    Sulfamethoxazole-trimethoprim      Other reaction(s): Muscle cramps       Past Surgical History:   Procedure Laterality Date    CARDIAC PACEMAKER PLACEMENT  11/2017    CARDIAC VALVE REPLACEMENT  10/17/2017    aorta    HEART CATH-LEFT N/A 8/22/2017    [facility] C[facility] LAB    INCISION AND DRAINAGE (I & D) righ knee Right 11/19/2016    [facility]    INSERTION-PACEMAKER-DUAL Left 10/17/2017    Performed by Ranulfo Delgado [facility] at [facility] CATH LAB    REPLACEMENT-VALVE-AORTIC N/A 10/17/2017    [facility] at  [facility] C[facility] LAB    SINUS SURGERY      tonsillectomy      TONSILLECTOMY         Family History   Problem Relation Age of Onset    Heart disease Father         49    Heart disease Mother     Thyroid disease Sister     Atrial fibrillation Sister     Atrial fibrillation Sister         neice    Lymphoma Sister 29    Atrial fibrillation Sister     Asthma Neg Hx     Emphysema Neg Hx        Social History     Socioeconomic History    Marital status:      Spouse name: Not on file    Number of children: 6    Years of education: 2 college    Highest education level: Not on file   Social Needs    Financial resource strain: Not on file    Food insecurity - worry: Not on file    Food insecurity - inability: Not on file    Transportation needs - medical: Not on file    Transportation needs - non-medical: Not on file   Occupational History    Occupation: retired housewife   Tobacco Use    Smoking status: Never Smoker    Smokeless tobacco: Never Used   Substance and Sexual Activity    Alcohol use: Yes     Alcohol/week: 0.6 oz     Types: 1 Shots of liquor per week     Comment: rare    Drug use: No    Sexual activity: No   Other Topics Concern    Not on file   Social History Narrative    Not on file     Review of Systems   Constitutional: Negative for chills and fever.   Respiratory: Negative for cough.    Cardiovascular: Positive for leg swelling.   Gastrointestinal: Negative for nausea and vomiting.   Musculoskeletal: Positive for joint pain and myalgias. Negative for falls.   Skin: Negative for itching and rash.   Neurological: Positive for tingling and sensory change.         Objective:       Vitals:    11/19/18 0749   BP: 124/60   Weight: 59.9 kg (132 lb)   Height: 5' 5" (1.651 m)   PainSc: 0-No pain       Physical Exam   Constitutional:  Non-toxic appearance. She does not have a sickly appearance. No distress.   Pt. is well-developed, well-nourished, appears stated age, in no acute distress, " alert and oriented x 3. No evidence of depression, anxiety, or agitation. Calm, cooperative, and communicative. Appropriate interactions and affect.   Cardiovascular:   Pulses:       Dorsalis pedis pulses are 1+ on the right side, and 1+ on the left side.        Posterior tibial pulses are 1+ on the right side, and 1+ on the left side.   Dorsalis pedis and posterior tibial pulses are diminished Bilaterally. 1+ pitting edema, skin is atrophic, decreased digital hair, feet slightly cool, nails thickened, mild plantar rubor     Pulmonary/Chest: No respiratory distress.   Musculoskeletal:        Right ankle: No tenderness. No lateral malleolus, no medial malleolus, no AITFL, no CF ligament and no posterior TFL tenderness found. Achilles tendon exhibits no pain, no defect and normal Harman's test results.        Left ankle: No tenderness. No lateral malleolus, no medial malleolus, no AITFL, no CF ligament and no posterior TFL tenderness found. Achilles tendon exhibits no pain, no defect and normal Harman's test results.        Right foot: There is no tenderness and no bony tenderness.        Left foot: There is no tenderness and no bony tenderness.   Patient has hammertoes of digits 2-5 bilateral partially reducible without symptom today.    2nd toes adduct against great toe    TA function absent L on muscle testing    Visible and palpable bunion without pain at dorsomedial 1st metatarsal head right and left.  Hallux abducted right and left partially reducible, tracks laterally without being track bound.  No ecchymosis, erythema, edema, or cardinal signs infection or signs of trauma same foot.     Lymphadenopathy:   No lymphatic streaking    Negative lymphadenopathy bilateral popliteal fossa and tarsal tunnel.     Neurological:   Sidell-Jakub 5.07 monofilamant testing is diminished Moreno feet. Decreased/absent vibratory sensation bilateral feet to 128Hz tuning fork.    Skin: Skin is warm, dry and intact. No  abrasion, no bruising, no ecchymosis and no rash noted. She is not diaphoretic. No cyanosis. No pallor. Nails show no clubbing.   Toenails 1-5 bilaterally are elongated by 2-3 mm, thickened by 2-3 mm, discolored/yellowed, dystrophic, brittle with subungual debris.     Focal hyperkeratotic lesion consisting entirely of hyperkeratotic tissue without open skin, drainage, pus, fluctuance, malodor: bilateral 5th digit PIPJ with localized erythema  Psychiatric: Her mood appears not anxious. Her affect is not inappropriate. Her speech is not slurred. She is not combative. She is communicative. She is attentive.   Nursing note reviewed.        Assessment:       Encounter Diagnoses   Name Primary?    Idiopathic peripheral neuropathy Yes    Onychomycosis due to dermatophyte     Corn or callus     Bilateral leg edema     Anticoagulated on Coumadin     Comprehensive diabetic foot examination, type 2 DM, encounter for          Plan:       Adela was seen today for foot problem and peripheral neuropathy.    Diagnoses and all orders for this visit:    Idiopathic peripheral neuropathy    Onychomycosis due to dermatophyte    Corn or callus    Bilateral leg edema    Anticoagulated on Coumadin    Comprehensive diabetic foot examination, type 2 DM, encounter for      I counseled the patient on her conditions, their implications and medical management.      Greater than 50% of this visit spent on counseling and coordination of care.    Education about the diabetic foot, neuropathy, and prevention of limb loss.    Shoe inspection. Diabetic Foot Education. Patient reminded of the importance of good nutrition/healthy diet/weight management and blood sugar control to help prevent podiatric complications of diabetes. Patient instructed on proper foot hygeine. Wear comfortable, proper fitting shoes. Wash feet daily. Dry well. After drying, apply moisturizer to feet (no lotion to webspaces). Inspect feet daily for skin breaks, blisters,  swelling, or redness. Wear cotton socks (preferably white)  Change socks every day. Do NOT walk barefoot. Do NOT use heating pads or hot water soaks. We discussed wearing proper shoe gear, daily foot inspections, never walking without protective shoe gear.     Discussed edema control and the importance of daily moisturizer to the feet such as Gold bonds diabetic foot cream    Recommend applying vicks vaporub to thick abnormal toenails daily x 6 months to treat fungal nail infection.    With patient's permission, nails were aggressively reduced and debrided x 10 to their soft tissue attachment mechanically and with electric , removing all offending nail and debris. Patient relates relief following the procedure.     After cleansing the  area w/ alcohol prep pad the above mentioned hyperkeratosis was trimmed utilizing No 15 scapel, to a smooth base with out incident. Patient tolerated this  well and reported comfort to the area of  5th digit PIPJ marcial    She will continue to monitor the areas daily, inspect her feet, wear protective shoe gear when ambulatory, moisturizer to maintain skin integrity and follow in this office in approximately 3-5 months, sooner p.r.n.

## 2018-11-21 ENCOUNTER — OFFICE VISIT (OUTPATIENT)
Dept: NEPHROLOGY | Facility: CLINIC | Age: 83
End: 2018-11-21
Payer: MEDICARE

## 2018-11-21 VITALS
OXYGEN SATURATION: 84 % | DIASTOLIC BLOOD PRESSURE: 60 MMHG | SYSTOLIC BLOOD PRESSURE: 130 MMHG | WEIGHT: 139.13 LBS | BODY MASS INDEX: 23.18 KG/M2 | HEIGHT: 65 IN | HEART RATE: 60 BPM

## 2018-11-21 DIAGNOSIS — E87.1 HYPONATREMIA: Primary | ICD-10-CM

## 2018-11-21 PROCEDURE — 99499 UNLISTED E&M SERVICE: CPT | Mod: S$PBB,,, | Performed by: INTERNAL MEDICINE

## 2018-11-21 PROCEDURE — 99213 OFFICE O/P EST LOW 20 MIN: CPT | Mod: PBBFAC,PO | Performed by: INTERNAL MEDICINE

## 2018-11-21 PROCEDURE — 99999 PR PBB SHADOW E&M-EST. PATIENT-LVL III: CPT | Mod: PBBFAC,,, | Performed by: INTERNAL MEDICINE

## 2018-11-21 NOTE — LETTER
November 27, 2018      Torrie Farrell MD  [address] [facility]  [place] [address]           [facility]  [address] [facility]  [place] [address]  Phone: [phone]          Patient: Adela Garay   MR Number: [id number]   YOB: 1927   Date of Visit: 11/21/2018       Dear Dr. Torrie Farrell:    Thank you for referring Adela Garay to me for evaluation. Attached you will find relevant portions of my assessment and plan of care.    If you have questions, please do not hesitate to call me. I look forward to following Adela Garay along with you.    Sincerely,    Matthew Perez MD    Enclosure  CC:  No Recipients    If you would like to receive this communication electronically, please contact [email or web address] or [phone] to request more information on [employer] access.    For providers and/or their staff who would like to refer a patient to [facility], please contact us through our one-stop-shop provider referral line, [facility], at [phone].    If you feel you have received this communication in error or would no longer like to receive these types of communications, please e-mail [email or web address]

## 2018-11-26 ENCOUNTER — HOSPITAL ENCOUNTER (OUTPATIENT)
Dept: RADIOLOGY | Facility: HOSPITAL | Age: 83
Discharge: HOME | End: 2018-11-26
Attending: INTERNAL MEDICINE
Payer: MEDICARE

## 2018-11-26 DIAGNOSIS — R91.8 MULTIPLE LUNG NODULES: ICD-10-CM

## 2018-11-26 PROCEDURE — 71046 X-RAY EXAM CHEST 2 VIEWS: CPT | Mod: 26,,, | Performed by: RADIOLOGY

## 2018-11-26 PROCEDURE — 71046 X-RAY EXAM CHEST 2 VIEWS: CPT | Mod: TC,FY,PO

## 2018-11-28 NOTE — PROGRESS NOTES
Ms. Garay is a 89 year old woman with medical history diastolic heart failure, atrial fibrillation, s/p TAVR presenting for evaluation of hyponatremia, which is currently resolved.  Patient accompanied by her son who assisted with history.  Suspect prior hyponatremia due to recent increase in diuretics to manage heart failure symptoms, would hold HCTZ (likely main culprit).  Agree with adjusting bumetanide dose based on daily weights/symptoms, patient has close follow up with Cardiology.  Will repeat electrolytes to trend, further recommendations pending results.  Patient/son agreeable to plan as noted.

## 2018-11-29 ENCOUNTER — OFFICE VISIT (OUTPATIENT)
Dept: CARDIOLOGY | Facility: CLINIC | Age: 83
End: 2018-11-29
Payer: MEDICARE

## 2018-11-29 VITALS
HEIGHT: 65 IN | WEIGHT: 138.88 LBS | BODY MASS INDEX: 23.14 KG/M2 | OXYGEN SATURATION: 95 % | SYSTOLIC BLOOD PRESSURE: 153 MMHG | HEART RATE: 60 BPM | DIASTOLIC BLOOD PRESSURE: 69 MMHG

## 2018-11-29 DIAGNOSIS — I11.0 BENIGN HYPERTENSIVE HEART DISEASE WITH CONGESTIVE HEART FAILURE: ICD-10-CM

## 2018-11-29 DIAGNOSIS — Z95.2 S/P TAVR (TRANSCATHETER AORTIC VALVE REPLACEMENT): ICD-10-CM

## 2018-11-29 DIAGNOSIS — E11.29 CONTROLLED TYPE 2 DIABETES MELLITUS WITH MICROALBUMINURIA, WITHOUT LONG-TERM CURRENT USE OF INSULIN: ICD-10-CM

## 2018-11-29 DIAGNOSIS — I25.10 CORONARY ARTERY DISEASE INVOLVING NATIVE CORONARY ARTERY OF NATIVE HEART WITHOUT ANGINA PECTORIS: ICD-10-CM

## 2018-11-29 DIAGNOSIS — Z86.73 HX-TIA (TRANSIENT ISCHEMIC ATTACK): Primary | ICD-10-CM

## 2018-11-29 DIAGNOSIS — R80.9 CONTROLLED TYPE 2 DIABETES MELLITUS WITH MICROALBUMINURIA, WITHOUT LONG-TERM CURRENT USE OF INSULIN: ICD-10-CM

## 2018-11-29 DIAGNOSIS — Z95.0 CARDIAC PACEMAKER IN SITU: ICD-10-CM

## 2018-11-29 DIAGNOSIS — E78.5 HYPERLIPIDEMIA, UNSPECIFIED HYPERLIPIDEMIA TYPE: ICD-10-CM

## 2018-11-29 DIAGNOSIS — I48.91 ATRIAL FIBRILLATION, UNSPECIFIED TYPE: ICD-10-CM

## 2018-11-29 PROCEDURE — 99999 PR PBB SHADOW E&M-EST. PATIENT-LVL IV: CPT | Mod: PBBFAC,,, | Performed by: INTERNAL MEDICINE

## 2018-11-29 PROCEDURE — 99213 OFFICE O/P EST LOW 20 MIN: CPT | Mod: S$PBB,,, | Performed by: INTERNAL MEDICINE

## 2018-11-29 PROCEDURE — 99214 OFFICE O/P EST MOD 30 MIN: CPT | Mod: PBBFAC | Performed by: INTERNAL MEDICINE

## 2018-11-29 NOTE — PROGRESS NOTES
Subjective:    Patient ID:  Adela Garay is a 89 y.o. female who presents for follow-up of Congestive Heart Failure      HPI     Severe AS - s/p TAVR 10/17/17, complete heart block - [facility] single PPM 10/17/17  No CAD by [facility] 8/22/17  Chronic A-fib - rate controlled on coumadin, HTN, DM, Hx TIA  Diastolic CHF  Re-admitted after TAVR with CHF 10/30-11/12/17     Echo 9/26/18    1 - Low normal to mildly depressed left ventricular systolic function (EF 50-55%).     2 - Wall motion abnormalities.     3 - Eccentric hypertrophy.     4 - Biatrial enlargement.     5 - Mildly enlarged ascending aorta.     6 - Right ventricular enlargement with moderately depressed systolic function.     7 - The estimated PA systolic pressure is 34 mmHg.     8 - S/P transcatheter AVR, DILAN = 2.12 cm2, AVAi = 1.28 cm2/m2, peak velocity = 1.6 m/s, mean gradient = 6 mmHg.     9 - Trivial paravalvular aortic regurgitation.     10 - Mild to moderate mitral regurgitation.     11 - Severe tricuspid regurgitation.     12 - Increased central venous pressure.     13 - S/p 29mm Portico TF TAVR.      Labs 11/26/18  Na 137  K 4.4  Cr 0.8  BNP 1065.                    11/7/18 Had been holding diuretics but became increasingly confused with LE edema  Took bumex 2 days in a row with improvement  Mainly with fatigue and poor appetite  Will try dosing bumex 0.5 mg MWF    Breathing and weight have been stable  Needs an extra dose of bumex on occasion          Review of Systems   Constitution: Negative for decreased appetite.   HENT: Negative for ear discharge.    Eyes: Negative for blurred vision.   Respiratory: Negative for hemoptysis.    Endocrine: Negative for polyphagia.   Hematologic/Lymphatic: Negative for adenopathy.   Skin: Negative for color change.   Musculoskeletal: Negative for joint swelling.   Neurological: Negative for brief paralysis.   Psychiatric/Behavioral: Negative for hallucinations.        Objective:    Physical Exam   Constitutional:  She is oriented to person, place, and time. She appears well-developed and well-nourished.   HENT:   Head: Normocephalic and atraumatic.   Eyes: Pupils are equal, round, and reactive to light.   Neck: Normal range of motion. No JVD present.   Cardiovascular: Normal rate and regular rhythm.   Murmur heard.  Pulmonary/Chest: Effort normal and breath sounds normal. She has no wheezes. She has no rales.   Abdominal: Soft. Bowel sounds are normal. She exhibits no distension. There is no tenderness.   Neurological: She is alert and oriented to person, place, and time.   Skin: Skin is warm and dry.         Assessment:       1. Hx-TIA (transient ischemic attack)    2. Atrial fibrillation, unspecified type    3. S/P TAVR (transcatheter aortic valve replacement)    4. Cardiac pacemaker in situ    5. Coronary artery disease involving native coronary artery of native heart without angina pectoris    6. Benign hypertensive heart disease with congestive heart failure    7. Hyperlipidemia, unspecified hyperlipidemia type    8. Controlled type 2 diabetes mellitus with microalbuminuria, without long-term current use of insulin         Plan:       Doing well  OV 1 month with BNP, BMP

## 2018-12-03 ENCOUNTER — ANTI-COAG VISIT (OUTPATIENT)
Dept: CARDIOLOGY | Facility: CLINIC | Age: 83
End: 2018-12-03

## 2018-12-03 DIAGNOSIS — Z79.01 LONG-TERM (CURRENT) USE OF ANTICOAGULANTS, INR GOAL 2.0-3.0: ICD-10-CM

## 2018-12-03 DIAGNOSIS — Z86.73 HX-TIA (TRANSIENT ISCHEMIC ATTACK): ICD-10-CM

## 2018-12-03 LAB — INR PPP: 3.6

## 2018-12-05 RX ORDER — LISINOPRIL 2.5 MG/1
TABLET ORAL
Qty: 90 TABLET | Refills: 3 | Status: SHIPPED | OUTPATIENT
Start: 2018-12-05 | End: 2019-02-11 | Stop reason: SDUPTHER

## 2018-12-10 ENCOUNTER — ANTI-COAG VISIT (OUTPATIENT)
Dept: CARDIOLOGY | Facility: CLINIC | Age: 83
End: 2018-12-10

## 2018-12-10 DIAGNOSIS — Z86.73 HX-TIA (TRANSIENT ISCHEMIC ATTACK): ICD-10-CM

## 2018-12-10 DIAGNOSIS — Z79.01 LONG-TERM (CURRENT) USE OF ANTICOAGULANTS, INR GOAL 2.0-3.0: ICD-10-CM

## 2018-12-10 LAB — INR PPP: 3.3

## 2018-12-17 ENCOUNTER — ANTI-COAG VISIT (OUTPATIENT)
Dept: CARDIOLOGY | Facility: CLINIC | Age: 83
End: 2018-12-17

## 2018-12-17 DIAGNOSIS — Z79.01 LONG-TERM (CURRENT) USE OF ANTICOAGULANTS, INR GOAL 2.0-3.0: ICD-10-CM

## 2018-12-17 DIAGNOSIS — Z86.73 HX-TIA (TRANSIENT ISCHEMIC ATTACK): ICD-10-CM

## 2018-12-17 LAB — INR PPP: 2.3

## 2018-12-24 ENCOUNTER — ANTI-COAG VISIT (OUTPATIENT)
Dept: CARDIOLOGY | Facility: CLINIC | Age: 83
End: 2018-12-24

## 2018-12-24 DIAGNOSIS — Z79.01 LONG-TERM (CURRENT) USE OF ANTICOAGULANTS, INR GOAL 2.0-3.0: ICD-10-CM

## 2018-12-24 DIAGNOSIS — Z86.73 HX-TIA (TRANSIENT ISCHEMIC ATTACK): ICD-10-CM

## 2018-12-24 LAB — INR PPP: 2.6

## 2018-12-31 ENCOUNTER — ANTI-COAG VISIT (OUTPATIENT)
Dept: CARDIOLOGY | Facility: CLINIC | Age: 83
End: 2018-12-31
Payer: MEDICARE

## 2018-12-31 DIAGNOSIS — Z86.73 HX-TIA (TRANSIENT ISCHEMIC ATTACK): ICD-10-CM

## 2018-12-31 DIAGNOSIS — Z79.01 LONG-TERM (CURRENT) USE OF ANTICOAGULANTS, INR GOAL 2.0-3.0: ICD-10-CM

## 2018-12-31 LAB — INR PPP: 2.7

## 2018-12-31 PROCEDURE — G0250 MD INR TEST REVIE INTER MGMT: HCPCS | Mod: ,,,

## 2018-12-31 NOTE — PROGRESS NOTES
Verbal result taken from Pt called in_________. PT/INR _2.7______ Date drawn_12/31/18._______ Hardcopy to be faxed.

## 2019-01-02 ENCOUNTER — LAB VISIT (OUTPATIENT)
Dept: LAB | Facility: HOSPITAL | Age: 84
End: 2019-01-02
Attending: INTERNAL MEDICINE
Payer: MEDICARE

## 2019-01-02 DIAGNOSIS — E11.29 CONTROLLED TYPE 2 DIABETES MELLITUS WITH MICROALBUMINURIA, WITHOUT LONG-TERM CURRENT USE OF INSULIN: ICD-10-CM

## 2019-01-02 DIAGNOSIS — I11.0 BENIGN HYPERTENSIVE HEART DISEASE WITH CONGESTIVE HEART FAILURE: ICD-10-CM

## 2019-01-02 DIAGNOSIS — I48.91 ATRIAL FIBRILLATION, UNSPECIFIED TYPE: ICD-10-CM

## 2019-01-02 DIAGNOSIS — Z95.0 CARDIAC PACEMAKER IN SITU: ICD-10-CM

## 2019-01-02 DIAGNOSIS — E78.5 HYPERLIPIDEMIA, UNSPECIFIED HYPERLIPIDEMIA TYPE: ICD-10-CM

## 2019-01-02 DIAGNOSIS — Z86.73 HX-TIA (TRANSIENT ISCHEMIC ATTACK): ICD-10-CM

## 2019-01-02 DIAGNOSIS — R80.9 CONTROLLED TYPE 2 DIABETES MELLITUS WITH MICROALBUMINURIA, WITHOUT LONG-TERM CURRENT USE OF INSULIN: ICD-10-CM

## 2019-01-02 DIAGNOSIS — I25.10 CORONARY ARTERY DISEASE INVOLVING NATIVE CORONARY ARTERY OF NATIVE HEART WITHOUT ANGINA PECTORIS: ICD-10-CM

## 2019-01-02 DIAGNOSIS — Z95.2 S/P TAVR (TRANSCATHETER AORTIC VALVE REPLACEMENT): ICD-10-CM

## 2019-01-02 LAB
ANION GAP SERPL CALC-SCNC: 8 MMOL/L
BNP SERPL-MCNC: 888 PG/ML
BUN SERPL-MCNC: 28 MG/DL
CALCIUM SERPL-MCNC: 9.6 MG/DL
CHLORIDE SERPL-SCNC: 100 MMOL/L
CO2 SERPL-SCNC: 28 MMOL/L
CREAT SERPL-MCNC: 0.8 MG/DL
EST. GFR  (AFRICAN AMERICAN): >60 ML/MIN/1.73 M^2
EST. GFR  (NON AFRICAN AMERICAN): >60 ML/MIN/1.73 M^2
GLUCOSE SERPL-MCNC: 109 MG/DL
POTASSIUM SERPL-SCNC: 4.2 MMOL/L
SODIUM SERPL-SCNC: 136 MMOL/L

## 2019-01-02 PROCEDURE — 80048 BASIC METABOLIC PNL TOTAL CA: CPT

## 2019-01-02 PROCEDURE — 36415 COLL VENOUS BLD VENIPUNCTURE: CPT | Mod: PO

## 2019-01-02 PROCEDURE — 83880 ASSAY OF NATRIURETIC PEPTIDE: CPT

## 2019-01-04 PROCEDURE — 93294 REM INTERROG EVL PM/LDLS PM: CPT | Mod: ,,, | Performed by: INTERNAL MEDICINE

## 2019-01-07 ENCOUNTER — ANTI-COAG VISIT (OUTPATIENT)
Dept: CARDIOLOGY | Facility: CLINIC | Age: 84
End: 2019-01-07

## 2019-01-07 DIAGNOSIS — Z79.01 LONG-TERM (CURRENT) USE OF ANTICOAGULANTS, INR GOAL 2.0-3.0: ICD-10-CM

## 2019-01-07 DIAGNOSIS — Z86.73 HX-TIA (TRANSIENT ISCHEMIC ATTACK): ICD-10-CM

## 2019-01-07 LAB — INR PPP: 2.1

## 2019-01-09 ENCOUNTER — OFFICE VISIT (OUTPATIENT)
Dept: CARDIOLOGY | Facility: CLINIC | Age: 84
End: 2019-01-09
Payer: MEDICARE

## 2019-01-09 VITALS
SYSTOLIC BLOOD PRESSURE: 127 MMHG | BODY MASS INDEX: 23.14 KG/M2 | DIASTOLIC BLOOD PRESSURE: 62 MMHG | OXYGEN SATURATION: 98 % | WEIGHT: 138.88 LBS | HEIGHT: 65 IN | HEART RATE: 59 BPM

## 2019-01-09 DIAGNOSIS — Z95.0 CARDIAC PACEMAKER IN SITU: ICD-10-CM

## 2019-01-09 DIAGNOSIS — E11.40 CONTROLLED TYPE 2 DIABETES WITH NEUROPATHY: ICD-10-CM

## 2019-01-09 DIAGNOSIS — I25.10 CORONARY ARTERY DISEASE, ANGINA PRESENCE UNSPECIFIED, UNSPECIFIED VESSEL OR LESION TYPE, UNSPECIFIED WHETHER NATIVE OR TRANSPLANTED HEART: ICD-10-CM

## 2019-01-09 DIAGNOSIS — Z86.73 HX-TIA (TRANSIENT ISCHEMIC ATTACK): Primary | ICD-10-CM

## 2019-01-09 DIAGNOSIS — Z95.2 S/P TAVR (TRANSCATHETER AORTIC VALVE REPLACEMENT): ICD-10-CM

## 2019-01-09 DIAGNOSIS — I25.10 CORONARY ARTERY DISEASE INVOLVING NATIVE CORONARY ARTERY OF NATIVE HEART WITHOUT ANGINA PECTORIS: ICD-10-CM

## 2019-01-09 DIAGNOSIS — E78.5 HYPERLIPIDEMIA, UNSPECIFIED HYPERLIPIDEMIA TYPE: ICD-10-CM

## 2019-01-09 PROCEDURE — 99999 PR PBB SHADOW E&M-EST. PATIENT-LVL IV: CPT | Mod: PBBFAC,,, | Performed by: INTERNAL MEDICINE

## 2019-01-09 PROCEDURE — 99213 PR OFFICE/OUTPT VISIT, EST, LEVL III, 20-29 MIN: ICD-10-PCS | Mod: S$PBB,,, | Performed by: INTERNAL MEDICINE

## 2019-01-09 PROCEDURE — 99214 OFFICE O/P EST MOD 30 MIN: CPT | Mod: PBBFAC | Performed by: INTERNAL MEDICINE

## 2019-01-09 PROCEDURE — 99999 PR PBB SHADOW E&M-EST. PATIENT-LVL IV: ICD-10-PCS | Mod: PBBFAC,,, | Performed by: INTERNAL MEDICINE

## 2019-01-09 PROCEDURE — 99213 OFFICE O/P EST LOW 20 MIN: CPT | Mod: S$PBB,,, | Performed by: INTERNAL MEDICINE

## 2019-01-09 NOTE — PROGRESS NOTES
Subjective:    Patient ID:  Adela Garay is a 89 y.o. female who presents for follow-up of Congestive Heart Failure      HPI     Severe AS - s/p TAVR 10/17/17, complete heart block - [facility] single PPM 10/17/17  No CAD by [facility] 8/22/17  Chronic A-fib - rate controlled on coumadin, HTN, DM, Hx TIA  Diastolic CHF  Re-admitted after TAVR with CHF 10/30-11/12/17     Echo 9/26/18    1 - Low normal to mildly depressed left ventricular systolic function (EF 50-55%).     2 - Wall motion abnormalities.     3 - Eccentric hypertrophy.     4 - Biatrial enlargement.     5 - Mildly enlarged ascending aorta.     6 - Right ventricular enlargement with moderately depressed systolic function.     7 - The estimated PA systolic pressure is 34 mmHg.     8 - S/P transcatheter AVR, DILAN = 2.12 cm2, AVAi = 1.28 cm2/m2, peak velocity = 1.6 m/s, mean gradient = 6 mmHg.     9 - Trivial paravalvular aortic regurgitation.     10 - Mild to moderate mitral regurgitation.     11 - Severe tricuspid regurgitation.     12 - Increased central venous pressure.     13 - S/p 29mm Portico TF TAVR.                    11/7/18 Had been holding diuretics but became increasingly confused with LE edema  Took bumex 2 days in a row with improvement  Mainly with fatigue and poor appetite  Will try dosing bumex 0.5 mg MWF     11/29/18Breathing and weight have been stable  Needs an extra dose of bumex on occasion    Labs 1/2/19  Na 136  K 4.2  Cr 0.8   down from 1065    Overall doing well. More active  Weight has gone up a few pounds in recent days  Denies CP  Some postural dizziness      Review of Systems   Constitution: Negative for decreased appetite.   HENT: Negative for ear discharge.    Eyes: Negative for blurred vision.   Respiratory: Negative for hemoptysis.    Endocrine: Negative for polyphagia.   Hematologic/Lymphatic: Negative for adenopathy.   Skin: Negative for color change.   Musculoskeletal: Negative for joint swelling.   Neurological:  Negative for brief paralysis.   Psychiatric/Behavioral: Negative for hallucinations.        Objective:    Physical Exam   Constitutional: She is oriented to person, place, and time. She appears well-developed and well-nourished.   HENT:   Head: Normocephalic and atraumatic.   Eyes: Pupils are equal, round, and reactive to light.   Neck: Normal range of motion. No JVD present.   Cardiovascular: Normal rate and regular rhythm.   Murmur heard.  Pulmonary/Chest: Effort normal. She has no wheezes. She has rales.   Abdominal: Soft. Bowel sounds are normal. She exhibits no distension. There is no tenderness.   Neurological: She is alert and oriented to person, place, and time.   Skin: Skin is warm and dry.         Assessment:       1. Hx-TIA (transient ischemic attack)    2. Coronary artery disease, angina presence unspecified, unspecified vessel or lesion type, unspecified whether native or transplanted heart    3. Hyperlipidemia, unspecified hyperlipidemia type    4. Coronary artery disease involving native coronary artery of native heart without angina pectoris    5. S/P TAVR (transcatheter aortic valve replacement)    6. Cardiac pacemaker in situ    7. Controlled type 2 diabetes with neuropathy         Plan:       Ok to increase bumex prn  OV 2 months with BNP, BMP

## 2019-01-14 ENCOUNTER — ANTI-COAG VISIT (OUTPATIENT)
Dept: CARDIOLOGY | Facility: CLINIC | Age: 84
End: 2019-01-14

## 2019-01-14 DIAGNOSIS — Z79.01 LONG-TERM (CURRENT) USE OF ANTICOAGULANTS, INR GOAL 2.0-3.0: ICD-10-CM

## 2019-01-14 DIAGNOSIS — Z86.73 HX-TIA (TRANSIENT ISCHEMIC ATTACK): ICD-10-CM

## 2019-01-14 LAB — INR PPP: 2.5

## 2019-01-15 ENCOUNTER — PES CALL (OUTPATIENT)
Dept: ADMINISTRATIVE | Facility: CLINIC | Age: 84
End: 2019-01-15

## 2019-01-21 ENCOUNTER — ANTI-COAG VISIT (OUTPATIENT)
Dept: CARDIOLOGY | Facility: CLINIC | Age: 84
End: 2019-01-21

## 2019-01-21 DIAGNOSIS — Z79.01 LONG-TERM (CURRENT) USE OF ANTICOAGULANTS, INR GOAL 2.0-3.0: ICD-10-CM

## 2019-01-21 DIAGNOSIS — Z86.73 HX-TIA (TRANSIENT ISCHEMIC ATTACK): ICD-10-CM

## 2019-01-21 LAB — INR PPP: 2.3

## 2019-01-21 NOTE — PROGRESS NOTES
Verbal result taken from Patient called in/CPS_________. PT/INR _2.3______ Date drawn_01/21/19_______ Hardcopy to be faxed. ([facility] is closed today.

## 2019-01-28 ENCOUNTER — ANTI-COAG VISIT (OUTPATIENT)
Dept: CARDIOLOGY | Facility: CLINIC | Age: 84
End: 2019-01-28

## 2019-01-28 DIAGNOSIS — Z86.73 HX-TIA (TRANSIENT ISCHEMIC ATTACK): ICD-10-CM

## 2019-01-28 DIAGNOSIS — Z79.01 LONG-TERM (CURRENT) USE OF ANTICOAGULANTS, INR GOAL 2.0-3.0: ICD-10-CM

## 2019-01-28 LAB — INR PPP: 2.3

## 2019-01-28 NOTE — PROGRESS NOTES
Patient was advised have nutritional supplement as she wishes but to be consistent, she states she will do the supplement every other day, patient was also reminded to call 12/31/18 and 1/21/19 result to [facility] and re test -2/04/19, Patient states understanding

## 2019-02-04 ENCOUNTER — ANTI-COAG VISIT (OUTPATIENT)
Dept: CARDIOLOGY | Facility: CLINIC | Age: 84
End: 2019-02-04
Payer: MEDICARE

## 2019-02-04 DIAGNOSIS — Z86.73 HX-TIA (TRANSIENT ISCHEMIC ATTACK): ICD-10-CM

## 2019-02-04 DIAGNOSIS — Z79.01 LONG-TERM (CURRENT) USE OF ANTICOAGULANTS, INR GOAL 2.0-3.0: ICD-10-CM

## 2019-02-04 LAB — INR PPP: 2.1

## 2019-02-04 PROCEDURE — G0250 MD INR TEST REVIE INTER MGMT: HCPCS | Mod: ,,, | Performed by: INTERNAL MEDICINE

## 2019-02-04 PROCEDURE — G0250 PR MD REVIEW INTERPRET OF TEST: ICD-10-PCS | Mod: ,,, | Performed by: INTERNAL MEDICINE

## 2019-02-11 ENCOUNTER — ANTI-COAG VISIT (OUTPATIENT)
Dept: CARDIOLOGY | Facility: CLINIC | Age: 84
End: 2019-02-11

## 2019-02-11 ENCOUNTER — CLINICAL SUPPORT (OUTPATIENT)
Dept: CARDIOLOGY | Facility: HOSPITAL | Age: 84
End: 2019-02-11
Attending: INTERNAL MEDICINE
Payer: MEDICARE

## 2019-02-11 ENCOUNTER — HOSPITAL ENCOUNTER (OUTPATIENT)
Dept: CARDIOLOGY | Facility: CLINIC | Age: 84
Discharge: HOME | End: 2019-02-11
Attending: INTERNAL MEDICINE
Payer: MEDICARE

## 2019-02-11 ENCOUNTER — OFFICE VISIT (OUTPATIENT)
Dept: ELECTROPHYSIOLOGY | Facility: CLINIC | Age: 84
End: 2019-02-11
Attending: INTERNAL MEDICINE
Payer: MEDICARE

## 2019-02-11 VITALS
WEIGHT: 147 LBS | SYSTOLIC BLOOD PRESSURE: 118 MMHG | BODY MASS INDEX: 24.49 KG/M2 | HEIGHT: 65 IN | DIASTOLIC BLOOD PRESSURE: 50 MMHG | HEART RATE: 60 BPM

## 2019-02-11 DIAGNOSIS — I48.20 CHRONIC ATRIAL FIBRILLATION: ICD-10-CM

## 2019-02-11 DIAGNOSIS — I27.20 PULMONARY HYPERTENSION: ICD-10-CM

## 2019-02-11 DIAGNOSIS — Z86.73 HX-TIA (TRANSIENT ISCHEMIC ATTACK): ICD-10-CM

## 2019-02-11 DIAGNOSIS — Z95.0 CARDIAC PACEMAKER IN SITU: ICD-10-CM

## 2019-02-11 DIAGNOSIS — Z79.01 LONG-TERM (CURRENT) USE OF ANTICOAGULANTS, INR GOAL 2.0-3.0: ICD-10-CM

## 2019-02-11 DIAGNOSIS — Z95.0 CARDIAC PACEMAKER IN SITU: Primary | ICD-10-CM

## 2019-02-11 LAB — INR PPP: 2.2

## 2019-02-11 PROCEDURE — 99214 PR OFFICE/OUTPT VISIT, EST, LEVL IV, 30-39 MIN: ICD-10-PCS | Mod: S$PBB,,, | Performed by: NURSE PRACTITIONER

## 2019-02-11 PROCEDURE — 99213 OFFICE O/P EST LOW 20 MIN: CPT | Mod: PBBFAC,25 | Performed by: NURSE PRACTITIONER

## 2019-02-11 PROCEDURE — 93279 PRGRMG DEV EVAL PM/LDLS PM: CPT

## 2019-02-11 PROCEDURE — 93010 RHYTHM STRIP: ICD-10-PCS | Mod: S$PBB,,, | Performed by: INTERNAL MEDICINE

## 2019-02-11 PROCEDURE — 93005 ELECTROCARDIOGRAM TRACING: CPT | Mod: PBBFAC,59 | Performed by: INTERNAL MEDICINE

## 2019-02-11 PROCEDURE — 99214 OFFICE O/P EST MOD 30 MIN: CPT | Mod: S$PBB,,, | Performed by: NURSE PRACTITIONER

## 2019-02-11 PROCEDURE — 93010 ELECTROCARDIOGRAM REPORT: CPT | Mod: S$PBB,,, | Performed by: INTERNAL MEDICINE

## 2019-02-11 PROCEDURE — 99999 PR PBB SHADOW E&M-EST. PATIENT-LVL III: CPT | Mod: PBBFAC,,, | Performed by: NURSE PRACTITIONER

## 2019-02-11 PROCEDURE — 99999 PR PBB SHADOW E&M-EST. PATIENT-LVL III: ICD-10-PCS | Mod: PBBFAC,,, | Performed by: NURSE PRACTITIONER

## 2019-02-11 NOTE — PROGRESS NOTES
Verbal result taken from Patient/ [facility] pt _________. PT/INR _2.2______ Date drawn_02/11/19_______ Hardcopy to be faxed.

## 2019-02-11 NOTE — PROGRESS NOTES
Ms. Garay is a patient of Dr. Delgado and was last seen in clinic 2/5/2018.      Subjective:   Patient ID:  Adela Garay is a 89 y.o. female who presents for follow-up of Atrial Fibrillation  .     HPI:    Ms. Garay is a 89 y.o. female with chronic AF, TIA, HTN, DM, AS s/p TAVR (10/2017), HTN, HLD, CHB s/p PPM (10/2017) here for annual follow up.     Background:    Ms Garay is the  of a WWII vet ([employer]), with diet-controlled DM, chronic AF (on Coumadin), a remote hx of TIA, HTN, who was referred by Dr. Ivy for evaluation of severe AS. She had developed increased SOB/ESTRADA. Had an existing RBBB pre-procedure.   Underwent successful placement of a 29 mm Portico TAVR (10/17/17) via R TF access>>developed CHB post-TAVR>>underwent successful SC PPM placement that afternoon    Update (02/11/2019):    Today she says that she has had some episodic light-headedness, and did fall the day after [place] (denies LOC). She does use a walker to ambulate. Chronic ESTRADA which has not worsened. Denies palps, CP, syncope.    She is currently taking coumadin for stroke prophylaxis and denies significant bleeding episodes. She is currently being treated with digoxin 125mcg daily and metoprolol tartrate 75mg BID for HR control.  Kidney function is stable, with a creatinine of 0.8 on 1/2/2019.    Device Interrogation (2/11/2019) reveals an intrinsic AF with CHB (no escape) with stable lead and device function. HVRx1, 3 seconds. She paces 98%in the RV. Estimated battery longevity 11 years.     I have personally reviewed the patient's EKG today, which shows AF with ventricular pacing at 60bpm. QRS is 168ms. QTc is 474.    Recent Cardiac Tests:    2D Echo (9/26/2018):  CONCLUSIONS     1 - Low normal to mildly depressed left ventricular systolic function (EF 50-55%).     2 - Wall motion abnormalities.     3 - Eccentric hypertrophy.     4 - Biatrial enlargement.     5 - Mildly enlarged ascending aorta.     6 - Right  ventricular enlargement with moderately depressed systolic function.     7 - The estimated PA systolic pressure is 34 mmHg.     8 - S/P transcatheter AVR, DILAN = 2.12 cm2, AVAi = 1.28 cm2/m2, peak velocity = 1.6 m/s, mean gradient = 6 mmHg.     9 - Trivial paravalvular aortic regurgitation.     10 - Mild to moderate mitral regurgitation.     11 - Severe tricuspid regurgitation.     12 - Increased central venous pressure.     13 - S/p 29mm Portico TF TAVR.     Current Outpatient Medications   Medication Sig    blood sugar diagnostic (BLOOD GLUCOSE TEST) Strp 1 strip by Misc.(Non-Drug; Combo Route) route once daily. Currently using Nova max plus meter.    bumetanide (BUMEX) 2 MG tablet Take 1 tablet (2 mg total) by mouth 2 (two) times daily. Take additional 1/2 tablet with weight gain > 3 lbs.  After 1 week, decrease to bumex 1mg po daily for 7 days    digoxin (LANOXIN) 125 mcg tablet Take 1 tablet (0.125 mg total) by mouth once daily.    lisinopril (PRINIVIL,ZESTRIL) 2.5 MG tablet Take 1 tablet (2.5 mg total) by mouth once daily.    metoprolol tartrate (LOPRESSOR) 25 MG tablet Take 3 tablets (75 mg total) by mouth 2 (two) times daily.    potassium chloride (KLOR-CON) 10 MEQ TbSR Take 1 tablet (10 mEq total) by mouth once daily. (Patient taking differently: Take 10 mEq by mouth every other day. )    simvastatin (ZOCOR) 20 MG tablet TAKE 1 TABLET EVERY EVENING    triamterene-hydrochlorothiazide 37.5-25 mg (DYAZIDE) 37.5-25 mg per capsule TAKE 1 CAPSULE EVERY DAY    warfarin (COUMADIN) 2 MG tablet Take 2 pills by mouth daily or as directed per the [facility]     No current facility-administered medications for this visit.        Review of Systems   Constitution: Negative for malaise/fatigue.   Cardiovascular: Negative for chest pain, dyspnea on exertion, irregular heartbeat, leg swelling and palpitations.   Respiratory: Negative for shortness of breath.    Hematologic/Lymphatic: Negative for bleeding  "problem.   Skin: Negative for rash.   Musculoskeletal: Negative for myalgias.   Gastrointestinal: Negative for hematemesis, hematochezia and nausea.   Genitourinary: Negative for hematuria.   Neurological: Positive for light-headedness.   Psychiatric/Behavioral: Negative for altered mental status.   Allergic/Immunologic: Negative for persistent infections.     Objective:        BP (!) 118/50   Pulse 60   Ht 5' 5" (1.651 m)   Wt 66.7 kg (147 lb)   BMI 24.46 kg/m²     Physical Exam   Constitutional: She is oriented to person, place, and time. She appears well-developed and well-nourished.   HENT:   Head: Normocephalic.   Nose: Nose normal.   Eyes: Pupils are equal, round, and reactive to light.   Cardiovascular: Normal rate, regular rhythm, S1 normal and S2 normal.   Murmur heard.   Harsh midsystolic murmur is present at the upper right sternal border radiating to the neck.  Pulses:       Radial pulses are 2+ on the right side, and 2+ on the left side.   Pulmonary/Chest: No respiratory distress. She has decreased breath sounds in the right lower field and the left lower field.   Device to LUCW.   Abdominal: Normal appearance.   Musculoskeletal: Normal range of motion. She exhibits no edema.   Neurological: She is alert and oriented to person, place, and time.   Skin: Skin is warm and dry. No erythema.   Psychiatric: She has a normal mood and affect. Her speech is normal and behavior is normal.   Nursing note and vitals reviewed.    Lab Results   Component Value Date     01/02/2019    K 4.2 01/02/2019    MG 2.4 11/26/2018    BUN 28 01/02/2019    CREATININE 0.8 01/02/2019    ALT 15 10/19/2018    AST 24 10/19/2018    HGB 10.2 (L) 09/26/2018    HCT 32.7 (L) 09/26/2018    TSH 1.582 10/19/2018    LDLCALC 92.6 04/12/2018       Recent Labs   Lab 01/14/19 01/21/19 01/28/19 02/04/19   INR 2.5 2.3 2.3 2.1       Assessment:     1. Cardiac pacemaker in situ    2. Chronic atrial fibrillation    3. Pulmonary hypertension  "   4. Hx-TIA (transient ischemic attack)    5. Long-term (current) use of anticoagulants, INR goal 2.0-3.0      Plan:     In summary, Ms. Garay is a 89 y.o. female with chronic AF, TIA, HTN, DM, AS s/p TAVR (10/2017), HTN, HLD, CHB s/p PPM (10/2017) here for annual follow up.   Ms. Garay is doing well from a device perspective with stable lead and device function. No significant ventricular arrhythmia noted. Chronic AF -  on coumadin for CVA prophylaxis. 99% ventricular pacing. Echo from 9/2018 shows stable EF at 50%. Her CHF is managed by her general cardiologist Dr. Ivy. She has some episodic light-headedness which may be hypotension/dehydration (she is on bumex and dyazide with a fluid restriction).     Continue current medication regimen and device settings.   Follow up in device clinic as scheduled.   Follow up in EP clinic in 1 year, sooner as needed.     *A copy of this note has been sent to Dr. Delgado*    Follow-up in about 1 year (around 2/11/2020).    ------------------------------------------------------------------    QUITA Tee, NP-C  [facility]

## 2019-02-11 NOTE — LETTER
February 11, 2019      Ranulfo Delgado MD  [address]  [facility] [address]           [facility] - Arrhythmia  [address]  [facility] [address]  Phone: [phone]  Fax: [phone]          Patient: Adela Garay   MR Number: [id number]   YOB: 1927   Date of Visit: 2/11/2019       Dear Dr. Ranulfo Delgado:    Thank you for referring Adela Garay to me for evaluation. Attached you will find relevant portions of my assessment and plan of care.    If you have questions, please do not hesitate to call me. I look forward to following Adela Garay along with you.    Sincerely,    Homa Clark, ALYSSA    Enclosure  CC:  No Recipients    If you would like to receive this communication electronically, please contact [email or web address] or [phone] to request more information on [employer] access.    For providers and/or their staff who would like to refer a patient to [facility], please contact us through our one-stop-shop provider referral line, [facility], at [phone].    If you feel you have received this communication in error or would no longer like to receive these types of communications, please e-mail [email or web address]

## 2019-02-18 ENCOUNTER — ANTI-COAG VISIT (OUTPATIENT)
Dept: CARDIOLOGY | Facility: CLINIC | Age: 84
End: 2019-02-18

## 2019-02-18 ENCOUNTER — OFFICE VISIT (OUTPATIENT)
Dept: PODIATRY | Facility: CLINIC | Age: 84
End: 2019-02-18
Payer: MEDICARE

## 2019-02-18 VITALS — WEIGHT: 147 LBS | BODY MASS INDEX: 24.49 KG/M2 | HEIGHT: 65 IN

## 2019-02-18 DIAGNOSIS — R60.0 BILATERAL LEG EDEMA: ICD-10-CM

## 2019-02-18 DIAGNOSIS — B35.1 ONYCHOMYCOSIS DUE TO DERMATOPHYTE: ICD-10-CM

## 2019-02-18 DIAGNOSIS — Z79.01 ANTICOAGULATED ON COUMADIN: ICD-10-CM

## 2019-02-18 DIAGNOSIS — Z79.01 LONG-TERM (CURRENT) USE OF ANTICOAGULANTS, INR GOAL 2.0-3.0: ICD-10-CM

## 2019-02-18 DIAGNOSIS — G60.9 IDIOPATHIC PERIPHERAL NEUROPATHY: Primary | ICD-10-CM

## 2019-02-18 DIAGNOSIS — L84 CORN OR CALLUS: ICD-10-CM

## 2019-02-18 DIAGNOSIS — Z86.73 HX-TIA (TRANSIENT ISCHEMIC ATTACK): ICD-10-CM

## 2019-02-18 LAB — INR PPP: 2.4

## 2019-02-18 PROCEDURE — 11721 DEBRIDE NAIL 6 OR MORE: CPT | Mod: 59,Q9,S$PBB, | Performed by: PODIATRIST

## 2019-02-18 PROCEDURE — 99499 NO LOS: ICD-10-PCS | Mod: S$PBB,,, | Performed by: PODIATRIST

## 2019-02-18 PROCEDURE — 11721 DEBRIDE NAIL 6 OR MORE: CPT | Mod: Q9,PBBFAC,PO | Performed by: PODIATRIST

## 2019-02-18 PROCEDURE — 99499 UNLISTED E&M SERVICE: CPT | Mod: S$PBB,,, | Performed by: PODIATRIST

## 2019-02-18 PROCEDURE — 11721 PR DEBRIDEMENT OF NAILS, 6 OR MORE: ICD-10-PCS | Mod: 59,Q9,S$PBB, | Performed by: PODIATRIST

## 2019-02-18 PROCEDURE — 11056 PR TRIM BENIGN HYPERKERATOTIC SKIN LESION,2-4: ICD-10-PCS | Mod: Q9,S$PBB,, | Performed by: PODIATRIST

## 2019-02-18 PROCEDURE — 99999 PR PBB SHADOW E&M-EST. PATIENT-LVL III: ICD-10-PCS | Mod: PBBFAC,,, | Performed by: PODIATRIST

## 2019-02-18 PROCEDURE — 99999 PR PBB SHADOW E&M-EST. PATIENT-LVL III: CPT | Mod: PBBFAC,,, | Performed by: PODIATRIST

## 2019-02-18 PROCEDURE — 11056 PARNG/CUTG B9 HYPRKR LES 2-4: CPT | Mod: Q9,PBBFAC,PO | Performed by: PODIATRIST

## 2019-02-18 PROCEDURE — 99213 OFFICE O/P EST LOW 20 MIN: CPT | Mod: PBBFAC,PO | Performed by: PODIATRIST

## 2019-02-18 PROCEDURE — 11056 PARNG/CUTG B9 HYPRKR LES 2-4: CPT | Mod: Q9,S$PBB,, | Performed by: PODIATRIST

## 2019-02-18 NOTE — PROGRESS NOTES
"Subjective:      Patient ID: Adela Garay is a 89 y.o. female.    Chief Complaint: Peripheral Neuropathy (swelling Pcp Dr. Farrell 9/19/19)    Adela is a 89 y.o. female who presents to the clinic for evaluation and treatment of high risk feet. Adela has a past medical history of Anticoagulant long-term use, Anticoagulated on Coumadin, Aortic valve stenosis, Arthritis, Atrial fibrillation, Carotid artery occlusion, Chronic rhinosinusitis, Coronary artery disease, Granulomatous lung disease, Heart failure, Hyperlipidemia, Hypertension, Hypertensive heart disease without CHF (congestive heart failure), Mitral valve prolapse, PN (peripheral neuropathy), Severe aortic stenosis by prior echocardiogram, TIA (transient ischemic attack), Type 2 diabetes mellitus, and Type II or unspecified type diabetes mellitus without mention of complication, not stated as uncontrolled. The patient's chief complaint is long, thick toenails. This patient has documented high risk feet requiring routine maintenance secondary to peripheral neuropathy.    Per her son, she has been having "issues with fluid."    PCP: Torrie Farrell MD    Date Last Seen by PCP:   Chief Complaint   Patient presents with    Peripheral Neuropathy     swelling Pcp Dr. Farrell 9/19/19       Current shoe gear:  SAS shoe    Hemoglobin A1C   Date Value Ref Range Status   09/26/2018 5.6 4.0 - 5.6 % Final     Comment:     ADA Screening Guidelines:  5.7-6.4%  Consistent with prediabetes  >or=6.5%  Consistent with diabetes  High levels of fetal hemoglobin interfere with the HbA1C  assay. Heterozygous hemoglobin variants (HbS, HgC, etc)do  not significantly interfere with this assay.   However, presence of multiple variants may affect accuracy.     04/12/2018 5.8 (H) 4.0 - 5.6 % Final     Comment:     According to ADA guidelines, hemoglobin A1c <7.0% represents  optimal control in non-pregnant diabetic patients. Different  metrics may apply to specific patient " populations.   Standards of Medical Care in Diabetes-2016.  For the purpose of screening for the presence of diabetes:  <5.7%     Consistent with the absence of diabetes  5.7-6.4%  Consistent with increasing risk for diabetes   (prediabetes)  >or=6.5%  Consistent with diabetes  Currently, no consensus exists for use of hemoglobin A1c  for diagnosis of diabetes for children.  This Hemoglobin A1c assay has significant interference with fetal   hemoglobin   (HbF). The results are invalid for patients with abnormal amounts of   HbF,   including those with known Hereditary Persistence   of Fetal Hemoglobin. Heterozygous hemoglobin variants (HbAS, HbAC,   HbAD, HbAE, HbA2) do not significantly interfere with this assay;   however, presence of multiple variants in a sample may impact the %   interference.     10/30/2017 5.6 4.0 - 5.6 % Final     Comment:     According to ADA guidelines, hemoglobin A1c <7.0% represents  optimal control in non-pregnant diabetic patients. Different  metrics may apply to specific patient populations.   Standards of Medical Care in Diabetes-2016.  For the purpose of screening for the presence of diabetes:  <5.7%     Consistent with the absence of diabetes  5.7-6.4%  Consistent with increasing risk for diabetes   (prediabetes)  >or=6.5%  Consistent with diabetes  Currently, no consensus exists for use of hemoglobin A1c  for diagnosis of diabetes for children.  This Hemoglobin A1c assay has significant interference with fetal   hemoglobin   (HbF). The results are invalid for patients with abnormal amounts of   HbF,   including those with known Hereditary Persistence   of Fetal Hemoglobin. Heterozygous hemoglobin variants (HbAS, HbAC,   HbAD, HbAE, HbA2) do not significantly interfere with this assay;   however, presence of multiple variants in a sample may impact the %   interference.           Patient Active Problem List   Diagnosis    Hyperlipidemia    Coronary artery disease    Hx-TIA  (transient ischemic attack)    Long-term (current) use of anticoagulants, INR goal 2.0-3.0    Benign hypertensive heart disease with congestive heart failure    Pulmonary hypertension    Carotid arterial disease    Carotid aneurysm, left    Atrial fibrillation    Controlled type 2 diabetes mellitus with microalbuminuria    DDD (degenerative disc disease), cervical    Lumbar disc disease    Multiple lung nodules    Mild major depression    Idiopathic peripheral neuropathy    Atherosclerosis of aorta    Postinflammatory pulmonary fibrosis    Chronic cough    Coronary artery disease involving native coronary artery of native heart without angina pectoris    Controlled type 2 diabetes with neuropathy    S/P TAVR (transcatheter aortic valve replacement)    Cardiac pacemaker in situ    Examination of participant in clinical trial       Current Outpatient Medications on File Prior to Visit   Medication Sig Dispense Refill    blood sugar diagnostic (BLOOD GLUCOSE TEST) Strp 1 strip by Misc.(Non-Drug; Combo Route) route once daily. Currently using Nova max plus meter. 50 strip 11    digoxin (LANOXIN) 125 mcg tablet Take 1 tablet (0.125 mg total) by mouth once daily. 90 tablet 3    lisinopril (PRINIVIL,ZESTRIL) 2.5 MG tablet Take 1 tablet (2.5 mg total) by mouth once daily. 90 tablet 3    metoprolol tartrate (LOPRESSOR) 25 MG tablet Take 3 tablets (75 mg total) by mouth 2 (two) times daily. 540 tablet 3    potassium chloride (KLOR-CON) 10 MEQ TbSR Take 1 tablet (10 mEq total) by mouth once daily. (Patient taking differently: Take 10 mEq by mouth every other day. ) 30 tablet 0    simvastatin (ZOCOR) 20 MG tablet TAKE 1 TABLET EVERY EVENING 90 tablet 1    triamterene-hydrochlorothiazide 37.5-25 mg (DYAZIDE) 37.5-25 mg per capsule TAKE 1 CAPSULE EVERY DAY 90 capsule 1    warfarin (COUMADIN) 2 MG tablet Take 2 pills by mouth daily or as directed per the [facility] 175 tablet 3    bumetanide  (BUMEX) 2 MG tablet Take 1 tablet (2 mg total) by mouth 2 (two) times daily. Take additional 1/2 tablet with weight gain > 3 lbs.  After 1 week, decrease to bumex 1mg po daily for 7 days 14 tablet 0     No current facility-administered medications on file prior to visit.        Review of patient's allergies indicates:   Allergen Reactions    Levaquin [levofloxacin]     Doxycycline hyclate Other (See Comments)     Unknown to patient    Iodine and iodide containing products     Neurontin  [gabapentin]      Other reaction(s): Unknown    Norpace  [disopyramide]      Other reaction(s): Hives    Phenytoin sodium extended      Other reaction(s): Muscle pain    Sulfamethoxazole-trimethoprim      Other reaction(s): Muscle cramps       Past Surgical History:   Procedure Laterality Date    CARDIAC PACEMAKER PLACEMENT  11/2017    CARDIAC VALVE REPLACEMENT  10/17/2017    aorta    HEART CATH-LEFT N/A 8/22/2017    Per[facility] Campbell [facility] at [facility]    INCISION AND DRAINAGE (I & D) righ knee Right 11/19/2016    [facility]    INSERTION-PACEMAKER-DUAL Left 10/17/2017    Performed by Ranulfo Delgado MD at [facility] C[facility]    REPLACEMENT-VALVE-AORTIC N/A 10/17/2017    Performed by [facility] Campbell [facility] at [facility] C[facility]    SINUS SURGERY      tonsillectomy      TONSILLECTOMY         Family History   Problem Relation Age of Onset    Heart disease Father         49    Heart disease Mother     Thyroid disease Sister     Atrial fibrillation Sister     Atrial fibrillation Sister         neice    Lymphoma Sister 29    Atrial fibrillation Sister     Asthma Neg Hx     Emphysema Neg Hx        Social History     Socioeconomic History    Marital status:      Spouse name: Not on file    Number of children: 6    Years of education: 2 college    Highest education level: Not on file   Social Needs    Financial resource strain: Not on file    Food insecurity - worry: Not  "on file    Food insecurity - inability: Not on file    Transportation needs - medical: Not on file    Transportation needs - non-medical: Not on file   Occupational History    Occupation: retired housewife   Tobacco Use    Smoking status: Never Smoker    Smokeless tobacco: Never Used   Substance and Sexual Activity    Alcohol use: Yes     Alcohol/week: 0.6 oz     Types: 1 Shots of liquor per week     Comment: rare    Drug use: No    Sexual activity: No   Other Topics Concern    Not on file   Social History Narrative    Not on file     Review of Systems   Constitutional: Negative for chills and fever.   Respiratory: Negative for cough.    Cardiovascular: Positive for leg swelling.   Gastrointestinal: Negative for nausea and vomiting.   Musculoskeletal: Positive for joint pain and myalgias. Negative for falls.   Skin: Negative for itching and rash.   Neurological: Positive for tingling and sensory change.         Objective:       Vitals:    02/18/19 1542   Weight: 66.7 kg (147 lb)   Height: 5' 5" (1.651 m)   PainSc:   4   PainLoc: Knee       Physical Exam   Constitutional:  Non-toxic appearance. She does not have a sickly appearance. No distress.   Pt. is well-developed, well-nourished, appears stated age, in no acute distress, alert and oriented x 3. No evidence of depression, anxiety, or agitation. Calm, cooperative, and communicative. Appropriate interactions and affect.   Cardiovascular:   Pulses:       Dorsalis pedis pulses are 1+ on the right side, and 1+ on the left side.        Posterior tibial pulses are 1+ on the right side, and 1+ on the left side.   Dorsalis pedis and posterior tibial pulses are diminished Bilaterally. 1+ pitting edema, skin is atrophic, decreased digital hair, feet slightly cool, nails thickened, mild plantar rubor     Pulmonary/Chest: No respiratory distress.   Musculoskeletal:        Right ankle: No tenderness. No lateral malleolus, no medial malleolus, no AITFL, no CF " ligament and no posterior TFL tenderness found. Achilles tendon exhibits no pain, no defect and normal Harman's test results.        Left ankle: No tenderness. No lateral malleolus, no medial malleolus, no AITFL, no CF ligament and no posterior TFL tenderness found. Achilles tendon exhibits no pain, no defect and normal Harman's test results.        Right foot: There is no tenderness and no bony tenderness.        Left foot: There is no tenderness and no bony tenderness.   Patient has hammertoes of digits 2-5 bilateral partially reducible without symptom today.    2nd toes adduct against great toe    TA function absent L on muscle testing    Visible and palpable bunion without pain at dorsomedial 1st metatarsal head right and left.  Hallux abducted right and left partially reducible, tracks laterally without being track bound.  No ecchymosis, erythema, edema, or cardinal signs infection or signs of trauma same foot.     Lymphadenopathy:   No lymphatic streaking    Negative lymphadenopathy bilateral popliteal fossa and tarsal tunnel.     Neurological:   [place]-J[place]ub 5.07 monofilamant testing is diminished Moreno feet. Decreased/absent vibratory sensation bilateral feet to 128Hz tuning fork.    Skin: Skin is warm, dry and intact. No abrasion, no bruising, no ecchymosis and no rash noted. She is not diaphoretic. No cyanosis. No pallor. Nails show no clubbing.   Toenails 1-5 bilaterally are elongated by 2-3 mm, thickened by 2-3 mm, discolored/yellowed, dystrophic, brittle with subungual debris.     Focal hyperkeratotic lesion consisting entirely of hyperkeratotic tissue without open skin, drainage, pus, fluctuance, malodor: bilateral 5th digit PIPJ with localized erythema    Intact blister noted to anterior right leg (pictured)    Psychiatric: Her mood appears not anxious. Her affect is not inappropriate. Her speech is not slurred. She is not combative. She is communicative. She is attentive.   Nursing note  reviewed.      Right Leg              Assessment:       Encounter Diagnoses   Name Primary?    Idiopathic peripheral neuropathy Yes    Onychomycosis due to dermatophyte     Corn or callus     Bilateral leg edema     Anticoagulated on Coumadin          Plan:       Adela was seen today for peripheral neuropathy.    Diagnoses and all orders for this visit:    Idiopathic peripheral neuropathy    Onychomycosis due to dermatophyte    Corn or callus    Bilateral leg edema    Anticoagulated on Coumadin      I counseled the patient on her conditions, their implications and medical management.      Greater than 50% of this visit spent on counseling and coordination of care.    Education about the diabetic foot, neuropathy, and prevention of limb loss.    Shoe inspection. Diabetic Foot Education. Patient reminded of the importance of good nutrition/healthy diet/weight management and blood sugar control to help prevent podiatric complications of diabetes. Patient instructed on proper foot hygeine. Wear comfortable, proper fitting shoes. Wash feet daily. Dry well. After drying, apply moisturizer to feet (no lotion to webspaces). Inspect feet daily for skin breaks, blisters, swelling, or redness. Wear cotton socks (preferably white)  Change socks every day. Do NOT walk barefoot. Do NOT use heating pads or hot water soaks. We discussed wearing proper shoe gear, daily foot inspections, never walking without protective shoe gear.     Discussed edema control and the importance of daily moisturizer to the feet such as Gold bonds diabetic foot cream    Tubigrips to BLE; patient is to elevate legs. When sleeping, place a pillow under lower extremities. When sitting, support the legs so that they are level with the waist.    Recommend applying vicks vaporub to thick abnormal toenails daily x 6 months to treat fungal nail infection.    With patient's permission, nails were aggressively reduced and debrided x 10 to their soft tissue  attachment mechanically and with electric , removing all offending nail and debris. Patient relates relief following the procedure.     After cleansing the  area w/ alcohol prep pad the above mentioned hyperkeratosis was trimmed utilizing No 15 scapel, to a smooth base with out incident. Patient tolerated this  well and reported comfort to the area of  5th digit PIPJ marcial    She will continue to monitor the areas daily, inspect her feet, wear protective shoe gear when ambulatory, moisturizer to maintain skin integrity and follow in this office in approximately 3-5 months, sooner p.r.n.

## 2019-02-25 ENCOUNTER — ANTI-COAG VISIT (OUTPATIENT)
Dept: CARDIOLOGY | Facility: CLINIC | Age: 84
End: 2019-02-25

## 2019-02-25 DIAGNOSIS — Z79.01 LONG-TERM (CURRENT) USE OF ANTICOAGULANTS, INR GOAL 2.0-3.0: ICD-10-CM

## 2019-02-25 DIAGNOSIS — Z86.73 HX-TIA (TRANSIENT ISCHEMIC ATTACK): ICD-10-CM

## 2019-02-25 LAB — INR PPP: 2.6

## 2019-02-26 DIAGNOSIS — I48.19 PERSISTENT ATRIAL FIBRILLATION: ICD-10-CM

## 2019-02-26 DIAGNOSIS — E78.5 HYPERLIPIDEMIA, UNSPECIFIED HYPERLIPIDEMIA TYPE: ICD-10-CM

## 2019-02-26 RX ORDER — SIMVASTATIN 20 MG/1
TABLET, FILM COATED ORAL
Qty: 90 TABLET | Refills: 1 | Status: SHIPPED | OUTPATIENT
Start: 2019-02-26 | End: 2019-07-20 | Stop reason: SDUPTHER

## 2019-02-26 RX ORDER — DIGOXIN 125 MCG
TABLET ORAL
Qty: 90 TABLET | Refills: 3 | Status: ON HOLD | OUTPATIENT
Start: 2019-02-26 | End: 2019-10-06 | Stop reason: HOSPADM

## 2019-02-26 RX ORDER — METOPROLOL TARTRATE 25 MG/1
75 TABLET, FILM COATED ORAL 2 TIMES DAILY
Qty: 270 TABLET | Refills: 1 | Status: SHIPPED | OUTPATIENT
Start: 2019-02-26 | End: 2019-07-20 | Stop reason: SDUPTHER

## 2019-02-28 ENCOUNTER — PATIENT MESSAGE (OUTPATIENT)
Dept: FAMILY MEDICINE | Facility: CLINIC | Age: 84
End: 2019-02-28

## 2019-03-01 ENCOUNTER — OFFICE VISIT (OUTPATIENT)
Dept: FAMILY MEDICINE | Facility: CLINIC | Age: 84
End: 2019-03-01
Payer: MEDICARE

## 2019-03-01 VITALS
HEIGHT: 65 IN | TEMPERATURE: 98 F | WEIGHT: 148.25 LBS | SYSTOLIC BLOOD PRESSURE: 132 MMHG | HEART RATE: 59 BPM | OXYGEN SATURATION: 99 % | BODY MASS INDEX: 24.7 KG/M2 | DIASTOLIC BLOOD PRESSURE: 78 MMHG

## 2019-03-01 DIAGNOSIS — I11.0 BENIGN HYPERTENSIVE HEART DISEASE WITH CONGESTIVE HEART FAILURE: ICD-10-CM

## 2019-03-01 DIAGNOSIS — E11.29 CONTROLLED TYPE 2 DIABETES MELLITUS WITH MICROALBUMINURIA, WITHOUT LONG-TERM CURRENT USE OF INSULIN: ICD-10-CM

## 2019-03-01 DIAGNOSIS — R80.9 CONTROLLED TYPE 2 DIABETES MELLITUS WITH MICROALBUMINURIA, WITHOUT LONG-TERM CURRENT USE OF INSULIN: ICD-10-CM

## 2019-03-01 DIAGNOSIS — Z79.01 LONG-TERM (CURRENT) USE OF ANTICOAGULANTS, INR GOAL 2.0-3.0: ICD-10-CM

## 2019-03-01 DIAGNOSIS — J84.10 POSTINFLAMMATORY PULMONARY FIBROSIS: ICD-10-CM

## 2019-03-01 DIAGNOSIS — I48.20 CHRONIC ATRIAL FIBRILLATION: ICD-10-CM

## 2019-03-01 DIAGNOSIS — Z99.3 WHEELCHAIR DEPENDENCE: ICD-10-CM

## 2019-03-01 DIAGNOSIS — R41.3 MILD MEMORY DISTURBANCE: ICD-10-CM

## 2019-03-01 DIAGNOSIS — I70.0 ATHEROSCLEROSIS OF AORTA: ICD-10-CM

## 2019-03-01 DIAGNOSIS — E78.5 HYPERLIPIDEMIA, UNSPECIFIED HYPERLIPIDEMIA TYPE: ICD-10-CM

## 2019-03-01 DIAGNOSIS — Z00.00 ROUTINE MEDICAL EXAM: Primary | ICD-10-CM

## 2019-03-01 DIAGNOSIS — F32.0 MILD MAJOR DEPRESSION: ICD-10-CM

## 2019-03-01 PROCEDURE — 99397 PR PREVENTIVE VISIT,EST,65 & OVER: ICD-10-PCS | Mod: S$PBB,,, | Performed by: INTERNAL MEDICINE

## 2019-03-01 PROCEDURE — 99999 PR PBB SHADOW E&M-EST. PATIENT-LVL III: CPT | Mod: PBBFAC,,, | Performed by: INTERNAL MEDICINE

## 2019-03-01 PROCEDURE — 99213 OFFICE O/P EST LOW 20 MIN: CPT | Mod: PBBFAC,PO | Performed by: INTERNAL MEDICINE

## 2019-03-01 PROCEDURE — 99999 PR PBB SHADOW E&M-EST. PATIENT-LVL III: ICD-10-PCS | Mod: PBBFAC,,, | Performed by: INTERNAL MEDICINE

## 2019-03-01 PROCEDURE — 99397 PER PM REEVAL EST PAT 65+ YR: CPT | Mod: S$PBB,,, | Performed by: INTERNAL MEDICINE

## 2019-03-01 NOTE — PROGRESS NOTES
HISTORY OF PRESENT ILLNESS:  Adela Garay is a 89 y.o. female who presents to the clinic today for a routine medical physical exam. Her last physical exam was approximately 1 years(s) ago.  She presents to the clinic with her daughter in law.        PAST MEDICAL HISTORY:  Past Medical History:   Diagnosis Date    Anticoagulant long-term use     Anticoagulated on Coumadin     Aortic valve stenosis     - s/p total aortic valve replacement    Arthritis     Atrial fibrillation     Carotid artery occlusion     Chronic rhinosinusitis     Coronary artery disease     Granulomatous lung disease     Heart failure     Hyperlipidemia     Hypertension     Hypertensive heart disease without CHF (congestive heart failure)     Mitral valve prolapse     PN (peripheral neuropathy)     hereditary?(sister has it also)/idiopathic?/patient thinks from [place]    Severe aortic stenosis by prior echocardiogram     TIA (transient ischemic attack)     Type 2 diabetes mellitus     Type II or unspecified type diabetes mellitus without mention of complication, not stated as uncontrolled        PAST SURGICAL HISTORY:  Past Surgical History:   Procedure Laterality Date    CARDIAC PACEMAKER PLACEMENT  11/2017    CARDIAC VALVE REPLACEMENT  10/17/2017    aorta    HEART CATH-LEFT N/A 8/22/2017    [facility] CATH LAB    INCISION AND DRAINAGE (I & D) righ knee Right 11/19/2016    [facility]    INSERTION-PACEMAKER-DUAL Left 10/17/2017    Performed by Ranulfo Delgado [facility] CATH LAB    REPLACEMENT-VALVE-AORTIC N/A 10/17/2017    [facility] by [facility] CATH LAB    SINUS SURGERY      tonsillectomy      TONSILLECTOMY         SOCIAL HISTORY:  Social History     Socioeconomic History    Marital status:      Spouse name: Not on file    Number of children: 6    Years of education: 2 college    Highest education level: Not on file    Social Needs    Financial resource strain: Not on file    Food insecurity - worry: Not on file    Food insecurity - inability: Not on file    Transportation needs - medical: Not on file    Transportation needs - non-medical: Not on file   Occupational History    Occupation: retired housewife   Tobacco Use    Smoking status: Never Smoker    Smokeless tobacco: Never Used   Substance and Sexual Activity    Alcohol use: Yes     Alcohol/week: 0.6 oz     Types: 1 Shots of liquor per week     Comment: rare    Drug use: No    Sexual activity: No   Other Topics Concern    Not on file   Social History Narrative    Not on file       FAMILY HISTORY:  Family History   Problem Relation Age of Onset    Heart disease Father         49    Heart disease Mother     Thyroid disease Sister     Atrial fibrillation Sister     Atrial fibrillation Sister         neice    Lymphoma Sister 29    Atrial fibrillation Sister     Asthma Neg Hx     Emphysema Neg Hx        ALLERGIES AND MEDICATIONS: updated and reviewed.  Review of patient's allergies indicates:   Allergen Reactions    Levaquin [levofloxacin]     Doxycycline hyclate Other (See Comments)     Unknown to patient    Iodine and iodide containing products     Neurontin  [gabapentin]      Other reaction(s): Unknown    Norpace  [disopyramide]      Other reaction(s): Hives    Phenytoin sodium extended      Other reaction(s): Muscle pain    Sulfamethoxazole-trimethoprim      Other reaction(s): Muscle cramps     Medication List with Changes/Refills   Current Medications    BLOOD SUGAR DIAGNOSTIC (BLOOD GLUCOSE TEST) STRP    1 strip by Misc.(Non-Drug; Combo Route) route once daily. Currently using Nova max plus meter.    BUMETANIDE (BUMEX) 2 MG TABLET    Take 1 tablet (2 mg total) by mouth 2 (two) times daily. Take additional 1/2 tablet with weight gain > 3 lbs.  After 1 week, decrease to bumex 1mg po daily for 7 days    DIGOXIN (LANOXIN) 125 MCG TABLET    TAKE 1  TABLET (0.125 MG TOTAL) ONCE DAILY.    LISINOPRIL (PRINIVIL,ZESTRIL) 2.5 MG TABLET    Take 1 tablet (2.5 mg total) by mouth once daily.    METOPROLOL TARTRATE (LOPRESSOR) 25 MG TABLET    Take 3 tablets (75 mg total) by mouth 2 (two) times daily.    POTASSIUM CHLORIDE (KLOR-CON) 10 MEQ TBSR    Take 1 tablet (10 mEq total) by mouth once daily.    SIMVASTATIN (ZOCOR) 20 MG TABLET    TAKE 1 TABLET EVERY EVENING    TRIAMTERENE-HYDROCHLOROTHIAZIDE 37.5-25 MG (DYAZIDE) 37.5-25 MG PER CAPSULE    TAKE 1 CAPSULE EVERY DAY    WARFARIN (COUMADIN) 2 MG TABLET    Take 2 pills by mouth daily or as directed per the [facility]         CARE TEAM:  Patient Care Team:  Torrie Farrell MD as PCP - General (Internal Medicine)  Charanjit Samuel MD as PCP - MSSP Attributed  Theo Ivy MD as Consulting Physician (Cardiology)           SCREENING HISTORY:  Health Maintenance       Date Due Completion Date    Hemoglobin A1c 03/26/2019 9/26/2018    Override on 5/31/2017: Done    Lipid Panel 04/12/2019 4/12/2018    Eye Exam 08/07/2019 8/7/2018    Override on 11/9/2016: Done (no retinopathy - [facility] (Dr. Hylton))    Override on 10/21/2015: Done (no retinopathy per patient)    Override on 5/5/2014: Done    Override on 5/13/2013: Done    Foot Exam 11/19/2019 11/19/2018 (Done)    Override on 11/19/2018: Done    Override on 11/29/2017: Done    Override on 10/19/2016: Done    Override on 8/10/2016: Done (patient has idiopathic neuropathy)    Override on 3/23/2015: Done (Mr. Rebolledo)    TETANUS VACCINE 11/13/2026 11/13/2016            REVIEW OF SYSTEMS:   The patient reports: good dietary habits.  The patient reports : that they are unable to exercise because  of orthopaedic limitations.  Review of Systems   Constitutional: Positive for activity change, fatigue and unexpected weight change. Negative for chills and fever.   HENT: Negative for congestion, hearing loss, postnasal drip, rhinorrhea and trouble swallowing.   "  Eyes: Negative for pain, discharge and visual disturbance.   Respiratory: Negative for cough, chest tightness, shortness of breath and wheezing.    Cardiovascular: Negative for chest pain, palpitations and leg swelling.   Gastrointestinal: Negative for abdominal pain, blood in stool, constipation, diarrhea, nausea and vomiting.   Endocrine: Negative for polydipsia and polyuria.   Genitourinary: Negative for difficulty urinating, dysuria, hematuria and menstrual problem.   Musculoskeletal: Positive for arthralgias and joint swelling. Negative for back pain and neck pain.   Skin: Negative for rash.   Neurological: Positive for weakness (- generalized) and headaches.        She is concerned about her memory.   Psychiatric/Behavioral: Positive for confusion and dysphoric mood. Negative for sleep disturbance. The patient is not nervous/anxious.      Breast ROS: negative for breast lumps             Physical Examination:   Vitals:    03/01/19 0836   BP: 132/78   Pulse: (!) 59   Temp: 97.6 °F (36.4 °C)     Weight: 67.3 kg (148 lb 4.2 oz)   Height: 5' 5" (165.1 cm)   Body mass index is 24.67 kg/m².      Patient did not require to have a chaperone present during the exam today.  General appearance - alert, well appearing, and in no distress, normal appearing weight and pleasant elderly female seated in a wheelchair  Mental status - alert, oriented to person, place, and time, normal mood, behavior, speech, dress, motor activity, and thought processes  Eyes - pupils equal and reactive, extraocular eye movements intact, sclera anicteric  Mouth - mucous membranes moist, pharynx normal without lesions  Neck - supple, no significant adenopathy, carotids upstroke normal bilaterally, no bruits  Lymphatics - no palpable lymphadenopathy  Chest - clear to auscultation, no wheezes, rales or rhonchi, symmetric air entry  Heart - normal rate and regular rhythm, no gallops noted  Abdomen - not examined  Breasts - not examined  Back exam " - not examined  Neurological - alert, oriented, normal speech, no focal findings or movement disorder noted, cranial nerves II through XII intact  Musculoskeletal - no muscular tenderness noted, Moderate osteoarthritic changes noted to both knee joints. No joint effusions noted.   Extremities - no pedal edema noted  Skin - normal coloration and turgor, no rashes, no suspicious skin lesions noted      ASSESSMENT AND PLAN:  1. Routine medical exam  Counseled on age appropriate medical preventative services including age appropriate cancer screenings, age appropriate eye and dental exams, over all nutritional health, need for a consistent exercise regimen, and an over all push towards maintaining a vigorous and active lifestyle.  Counseled on age appropriate vaccines and discussed upcoming health care needs based on age/gender. Discussed good sleep hygiene and stress management.     2. Controlled type 2 diabetes mellitus with microalbuminuria, without long-term current use of insulin  Diabetes currently is controlled for age and comorbid conditions. We discussed diabetic diet and regular exercise. We discussed home blood sugar monitoring, if appropriate. We discussed low sugar/low carbohydrate diet and regular exercise to prevent progression. No need for prescription medication at this time.  Diabetic complications addressed: Not applicable.  Patient was counseled on the need for yearly diabetic retinopathy exam and yearly diabetic foot exam.     3. Benign hypertensive heart disease with congestive heart failure/4. Chronic atrial fibrillation/5. Long-term (current) use of anticoagulants, INR goal 2.0-3.0  The current medical regimen is effective;  continue present plan and medications. Followed by cardiology.    6. Hyperlipidemia, unspecified hyperlipidemia type  We discussed low fat diet and regular exercise.The current medical regimen is effective;  continue present plan and medications.      7. Mild major  "depression  Stable. Observe.     8. Atherosclerosis of aorta  Patient with Atherosclerosis of the Aorta.  Stable/asymptomatic. Currently stable on lipid lowering medication and b/p monitoring.     9. Postinflammatory pulmonary fibrosis  Stable. Observe.    10. Mild memory disturbance  MMSE 3/1/2019   What is the (year), (season), (date), (day), (month)? 5   Where are we (state), (country), (town or city), (hospital), (floor)? 5   Name 3 common objects (eg. "apple", "table", "shikha"). Take 1 second to say each. Then ask the patient to repeat all 3. Give 1 point for each correct answer. Then repeat them until he/she learns all 3. Count trials and record. 3   Serial 7's backwards. Stop after 5 answers. (100,93,86,79,72) or alternatively  spell "WORLD" backwards. (D..L..R..O..W). The score is the number of letters in correct order. 5   Ask for the 3 common objects named earlier in the exam. Give 1 point for each correct answer. 2   Name a "pencil" and "watch." 2   Repeat the following: "No ifs, ands, or buts." 1   Follow a 3-stage command: "Take a paper in your right hand, fold it in half, & put it on the floor." 3   Read and obey the following: (see paper exam) 1   Write a sentence. 1   Copy the following design: (see paper exam) 1   Total MMSE Score 29   Some recent data might be hidden     Patient is very concerned about her memory.  Her mini-mental status exam showed 29/30.  I think her concerns are more related to just aging.  She is concerned that she did something to cause her to have some difficulties with in her memory.  I reassured her that she is doing everything she can to stay as healthy as possible for her age and comorbid conditions.            Follow-up in about 6 months (around 9/1/2019), or if symptoms worsen or fail to improve, for follow up chronic medical conditions.. or sooner as needed.    "

## 2019-03-07 NOTE — ASSESSMENT & PLAN NOTE
CERTIFICATE OF WORK    March 7, 2019      Jose Alfredo Fischer  [address]  [facility] [place], [place] [address]                        This is to certify that Jose Alfredo Fischer has been under my care from 3/7/2019 and can return to regular work on 3/9/19.        REMARKS: Patient was positive for strep throat and must be on antibiotics for 24 hours before returning to work.         SIGNATURE:___________________________________________,   3/7/2019        Amanda Seymour NP  [address] [place] [address]  [phone]   -continue ASA

## 2019-03-11 ENCOUNTER — ANTI-COAG VISIT (OUTPATIENT)
Dept: CARDIOLOGY | Facility: CLINIC | Age: 84
End: 2019-03-11
Payer: MEDICARE

## 2019-03-11 DIAGNOSIS — Z79.01 LONG-TERM (CURRENT) USE OF ANTICOAGULANTS, INR GOAL 2.0-3.0: ICD-10-CM

## 2019-03-11 DIAGNOSIS — Z86.73 HX-TIA (TRANSIENT ISCHEMIC ATTACK): ICD-10-CM

## 2019-03-11 LAB — INR PPP: 2.6

## 2019-03-11 PROCEDURE — G0250 PR MD REVIEW INTERPRET OF TEST: ICD-10-PCS | Mod: ,,, | Performed by: INTERNAL MEDICINE

## 2019-03-11 PROCEDURE — G0250 MD INR TEST REVIE INTER MGMT: HCPCS | Mod: ,,, | Performed by: INTERNAL MEDICINE

## 2019-03-13 ENCOUNTER — APPOINTMENT (OUTPATIENT)
Dept: LAB | Facility: HOSPITAL | Age: 84
End: 2019-03-13
Attending: INTERNAL MEDICINE
Payer: MEDICARE

## 2019-03-14 ENCOUNTER — OFFICE VISIT (OUTPATIENT)
Dept: CARDIOLOGY | Facility: CLINIC | Age: 84
End: 2019-03-14
Payer: MEDICARE

## 2019-03-14 VITALS
OXYGEN SATURATION: 100 % | WEIGHT: 145.5 LBS | HEIGHT: 65 IN | SYSTOLIC BLOOD PRESSURE: 131 MMHG | BODY MASS INDEX: 24.24 KG/M2 | DIASTOLIC BLOOD PRESSURE: 73 MMHG | HEART RATE: 60 BPM

## 2019-03-14 DIAGNOSIS — Z95.2 S/P TAVR (TRANSCATHETER AORTIC VALVE REPLACEMENT): ICD-10-CM

## 2019-03-14 DIAGNOSIS — I25.10 CORONARY ARTERY DISEASE, ANGINA PRESENCE UNSPECIFIED, UNSPECIFIED VESSEL OR LESION TYPE, UNSPECIFIED WHETHER NATIVE OR TRANSPLANTED HEART: ICD-10-CM

## 2019-03-14 DIAGNOSIS — E87.6 HYPOKALEMIA DUE TO LOSS OF POTASSIUM: ICD-10-CM

## 2019-03-14 DIAGNOSIS — Z95.0 CARDIAC PACEMAKER IN SITU: ICD-10-CM

## 2019-03-14 DIAGNOSIS — I27.20 PULMONARY HYPERTENSION: Primary | ICD-10-CM

## 2019-03-14 DIAGNOSIS — E78.5 HYPERLIPIDEMIA, UNSPECIFIED HYPERLIPIDEMIA TYPE: ICD-10-CM

## 2019-03-14 PROCEDURE — 99213 OFFICE O/P EST LOW 20 MIN: CPT | Mod: PBBFAC | Performed by: INTERNAL MEDICINE

## 2019-03-14 PROCEDURE — 99999 PR PBB SHADOW E&M-EST. PATIENT-LVL III: CPT | Mod: PBBFAC,,, | Performed by: INTERNAL MEDICINE

## 2019-03-14 PROCEDURE — 99214 PR OFFICE/OUTPT VISIT, EST, LEVL IV, 30-39 MIN: ICD-10-PCS | Mod: S$PBB,,, | Performed by: INTERNAL MEDICINE

## 2019-03-14 PROCEDURE — 99999 PR PBB SHADOW E&M-EST. PATIENT-LVL III: ICD-10-PCS | Mod: PBBFAC,,, | Performed by: INTERNAL MEDICINE

## 2019-03-14 PROCEDURE — 99214 OFFICE O/P EST MOD 30 MIN: CPT | Mod: S$PBB,,, | Performed by: INTERNAL MEDICINE

## 2019-03-14 RX ORDER — POTASSIUM CHLORIDE 750 MG/1
10 TABLET, EXTENDED RELEASE ORAL 2 TIMES DAILY PRN
Qty: 60 TABLET | Refills: 11 | Status: ON HOLD | OUTPATIENT
Start: 2019-03-14 | End: 2019-10-06 | Stop reason: HOSPADM

## 2019-03-14 NOTE — PROGRESS NOTES
Subjective:    Patient ID:  Adela Garay is a 89 y.o. female who presents for follow-up of Congestive Heart Failure      HPI     Severe AS - s/p TAVR 10/17/17, complete heart block - [facility] single PPM 10/17/17  No CAD by [facility] 8/22/17  Chronic A-fib - rate controlled on coumadin, HTN, DM, Hx TIA  Diastolic CHF  Re-admitted after TAVR with CHF 10/30-11/12/17     Echo 9/26/18    1 - Low normal to mildly depressed left ventricular systolic function (EF 50-55%).     2 - Wall motion abnormalities.     3 - Eccentric hypertrophy.     4 - Biatrial enlargement.     5 - Mildly enlarged ascending aorta.     6 - Right ventricular enlargement with moderately depressed systolic function.     7 - The estimated PA systolic pressure is 34 mmHg.     8 - S/P transcatheter AVR, DILAN = 2.12 cm2, AVAi = 1.28 cm2/m2, peak velocity = 1.6 m/s, mean gradient = 6 mmHg.     9 - Trivial paravalvular aortic regurgitation.     10 - Mild to moderate mitral regurgitation.     11 - Severe tricuspid regurgitation.     12 - Increased central venous pressure.     13 - S/p 29mm [place] TF TAVR.                    11/7/18 Had been holding diuretics but became increasingly confused with LE edema  Took bumex 2 days in a row with improvement  Mainly with fatigue and poor appetite  Will try dosing bumex 0.5 mg MWF     11/29/18Breathing and weight have been stable  Needs an extra dose of bumex on occasion    1/9/19 Overall doing well. More active  Weight has gone up a few pounds in recent days  Denies CP  Some postural dizziness  Ok to increase bumex prn  OV 2 months with BNP, BMP     Labs 3/13/19  K 3.7  Cr 0.9  BNP 1450 up from 888    Weight and LE edema up some  ESTRADA has been stable  Uses walker at home less      Review of Systems   Constitution: Negative for decreased appetite.   HENT: Negative for ear discharge.    Eyes: Negative for blurred vision.   Respiratory: Negative for hemoptysis.    Endocrine: Negative for polyphagia.    Hematologic/Lymphatic: Negative for adenopathy.   Skin: Negative for color change.   Musculoskeletal: Negative for joint swelling.   Genitourinary: Negative for bladder incontinence.   Neurological: Negative for brief paralysis.   Psychiatric/Behavioral: Negative for hallucinations.   Allergic/Immunologic: Negative for hives.        Objective:    Physical Exam   Constitutional: She is oriented to person, place, and time. She appears well-developed and well-nourished.   HENT:   Head: Normocephalic.   Nose: Nose normal.   Eyes: Pupils are equal, round, and reactive to light.   Cardiovascular: Normal rate, regular rhythm, S1 normal and S2 normal.   Murmur heard.   Harsh midsystolic murmur is present at the upper right sternal border radiating to the neck.  Pulses:       Radial pulses are 2+ on the right side, and 2+ on the left side.   Pulmonary/Chest: No respiratory distress. She has decreased breath sounds in the right lower field and the left lower field.   Device to LUCW.   Abdominal: Normal appearance.   Musculoskeletal: Normal range of motion. She exhibits no edema.   Neurological: She is alert and oriented to person, place, and time.   Skin: Skin is warm and dry. No erythema.   Psychiatric: She has a normal mood and affect. Her speech is normal and behavior is normal.   Nursing note and vitals reviewed.        Assessment:       1. Pulmonary hypertension    2. Hyperlipidemia, unspecified hyperlipidemia type    3. Coronary artery disease, angina presence unspecified, unspecified vessel or lesion type, unspecified whether native or transplanted heart    4. S/P TAVR (transcatheter aortic valve replacement)    5. Cardiac pacemaker in situ         Plan:       Increase bumex to bid until LE edema improved  OV 1 month with BNP, BMP

## 2019-03-25 ENCOUNTER — ANTI-COAG VISIT (OUTPATIENT)
Dept: CARDIOLOGY | Facility: CLINIC | Age: 84
End: 2019-03-25

## 2019-03-25 DIAGNOSIS — Z79.01 LONG-TERM (CURRENT) USE OF ANTICOAGULANTS, INR GOAL 2.0-3.0: ICD-10-CM

## 2019-03-25 DIAGNOSIS — Z86.73 HX-TIA (TRANSIENT ISCHEMIC ATTACK): ICD-10-CM

## 2019-03-25 LAB — INR PPP: 3.6

## 2019-04-01 ENCOUNTER — ANTI-COAG VISIT (OUTPATIENT)
Dept: CARDIOLOGY | Facility: CLINIC | Age: 84
End: 2019-04-01

## 2019-04-01 DIAGNOSIS — Z86.73 HX-TIA (TRANSIENT ISCHEMIC ATTACK): ICD-10-CM

## 2019-04-01 DIAGNOSIS — Z79.01 LONG-TERM (CURRENT) USE OF ANTICOAGULANTS, INR GOAL 2.0-3.0: ICD-10-CM

## 2019-04-01 LAB — INR PPP: 3.3

## 2019-04-03 ENCOUNTER — TELEPHONE (OUTPATIENT)
Dept: FAMILY MEDICINE | Facility: CLINIC | Age: 84
End: 2019-04-03

## 2019-04-03 NOTE — TELEPHONE ENCOUNTER
Spoke with daughter - pt is asymptomatic for 24 hours now. I do not think a CT scan is indicated at this time. Advised in future after a fall if we are concerned she should have a CT scan ASAP. Would recommend they take her to ER if she develops nausea/vomiting or severe headache. She voiced understanding. Will observe.  She will contact cardiology to discuss fluid overload concerns and possibly adding medication to help.

## 2019-04-03 NOTE — TELEPHONE ENCOUNTER
----- Message from Aruna Castro sent at 4/3/2019  1:35 PM CDT -----  Contact: Adela [phone] or Galina [phone]  Type: Patient Call Back    Who called:Galina or Adela    What is the request in detail: the patient's daughter returned a call on her behalf. The patient reports she only wants to see     Can the clinic reply by [facility]?no    Would the patient rather a call back or a response via [facility]? Call back    Best call back number:Adela [phone] or Galina [phone]

## 2019-04-03 NOTE — TELEPHONE ENCOUNTER
Patient's daughter Mayte requesting an order for a CT scan for patient for a fall. Patient hit her head and has a bump on her head from the fall. Patient's daughter states she doesn't want to bring her mother to the Dr for a CT scan, she would just like the orders placed. Patient states Dr Farrell is aware of her mother's situation and her mobility issues. Patient is refusing to go to the ED for evaluation. Patient's daughter is requesting a call from Dr. Farrell. Please advise.

## 2019-04-03 NOTE — TELEPHONE ENCOUNTER
Called pt daughter Mayte and asked when pt had fall . Daughter stated yesterday and a NP came to her house to evaluate her mom . Mother didn't want to go to ER , daughter said they communicated with Dr. Farrell and everything was ok. CT scan and xray was to be ordered , no orders weren't placed. Daughter upset that apt was scheduled incorrectly . Advised daughter that NP Brandy and Nurse Susana to take pt to ER .

## 2019-04-03 NOTE — TELEPHONE ENCOUNTER
Spoke to Dr. Farrell who advised patient can be scheduled with any provider today for evaluation of fall which occurred on yesterday. I called patient's daughter Galina no answer left message on answering machine to call our office.

## 2019-04-03 NOTE — TELEPHONE ENCOUNTER
Patient's daughter states patient fell and hit her had. Patient was advised to go to the ED for evaluation. Per ALYSSA Nava.

## 2019-04-03 NOTE — TELEPHONE ENCOUNTER
Notified daughter of provider's with charted recommendation. Daughter is not happy with this and wants Dr Farrell to give a return call. She said that if she does not hear from provider; she will take patient to [facility] and find another provider for patient's care.

## 2019-04-04 ENCOUNTER — HOSPITAL ENCOUNTER (OUTPATIENT)
Dept: RADIOLOGY | Facility: HOSPITAL | Age: 84
Discharge: HOME | End: 2019-04-04
Attending: PSYCHIATRY & NEUROLOGY
Payer: MEDICARE

## 2019-04-04 DIAGNOSIS — S09.90XA HEAD INJURY: Primary | ICD-10-CM
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
## 2019-04-04 DIAGNOSIS — S09.90XA HEAD INJURY: ICD-10-CM

## 2019-04-04 PROCEDURE — 70450 CT HEAD/BRAIN W/O DYE: CPT | Mod: 26,,, | Performed by: RADIOLOGY

## 2019-04-04 PROCEDURE — 70450 CT HEAD/BRAIN W/O DYE: CPT | Mod: TC

## 2019-04-04 PROCEDURE — 70450 CT HEAD WITHOUT CONTRAST: ICD-10-PCS | Mod: 26,,, | Performed by: RADIOLOGY

## 2019-04-08 ENCOUNTER — TELEPHONE (OUTPATIENT)
Dept: FAMILY MEDICINE | Facility: CLINIC | Age: 84
End: 2019-04-08

## 2019-04-08 ENCOUNTER — OFFICE VISIT (OUTPATIENT)
Dept: PODIATRY | Facility: CLINIC | Age: 84
End: 2019-04-08
Payer: MEDICARE

## 2019-04-08 ENCOUNTER — ANTI-COAG VISIT (OUTPATIENT)
Dept: CARDIOLOGY | Facility: CLINIC | Age: 84
End: 2019-04-08

## 2019-04-08 VITALS
DIASTOLIC BLOOD PRESSURE: 66 MMHG | WEIGHT: 143.19 LBS | BODY MASS INDEX: 23.86 KG/M2 | SYSTOLIC BLOOD PRESSURE: 118 MMHG | HEIGHT: 65 IN

## 2019-04-08 DIAGNOSIS — G60.9 IDIOPATHIC PERIPHERAL NEUROPATHY: Primary | ICD-10-CM

## 2019-04-08 DIAGNOSIS — Z86.73 HX-TIA (TRANSIENT ISCHEMIC ATTACK): ICD-10-CM

## 2019-04-08 DIAGNOSIS — Z79.01 LONG-TERM (CURRENT) USE OF ANTICOAGULANTS, INR GOAL 2.0-3.0: ICD-10-CM

## 2019-04-08 DIAGNOSIS — Z79.01 ANTICOAGULATED ON COUMADIN: ICD-10-CM

## 2019-04-08 DIAGNOSIS — L97.922 TRAUMATIC LEG ULCER, LEFT, WITH FAT LAYER EXPOSED: ICD-10-CM

## 2019-04-08 DIAGNOSIS — R80.9 CONTROLLED TYPE 2 DIABETES MELLITUS WITH MICROALBUMINURIA, WITHOUT LONG-TERM CURRENT USE OF INSULIN: Primary | ICD-10-CM

## 2019-04-08 DIAGNOSIS — E11.29 CONTROLLED TYPE 2 DIABETES MELLITUS WITH MICROALBUMINURIA, WITHOUT LONG-TERM CURRENT USE OF INSULIN: Primary | ICD-10-CM

## 2019-04-08 LAB — INR PPP: 1.8

## 2019-04-08 PROCEDURE — 99999 PR PBB SHADOW E&M-EST. PATIENT-LVL III: ICD-10-PCS | Mod: PBBFAC,,, | Performed by: PODIATRIST

## 2019-04-08 PROCEDURE — 99214 OFFICE O/P EST MOD 30 MIN: CPT | Mod: S$PBB,,, | Performed by: PODIATRIST

## 2019-04-08 PROCEDURE — 99214 PR OFFICE/OUTPT VISIT, EST, LEVL IV, 30-39 MIN: ICD-10-PCS | Mod: S$PBB,,, | Performed by: PODIATRIST

## 2019-04-08 PROCEDURE — 87075 CULTR BACTERIA EXCEPT BLOOD: CPT

## 2019-04-08 PROCEDURE — 99999 PR PBB SHADOW E&M-EST. PATIENT-LVL III: CPT | Mod: PBBFAC,,, | Performed by: PODIATRIST

## 2019-04-08 PROCEDURE — 99213 OFFICE O/P EST LOW 20 MIN: CPT | Mod: PBBFAC,PO | Performed by: PODIATRIST

## 2019-04-08 PROCEDURE — 87070 CULTURE OTHR SPECIMN AEROBIC: CPT

## 2019-04-08 NOTE — TELEPHONE ENCOUNTER
----- Message from Carmenza Ramos sent at 4/8/2019 12:30 PM CDT -----  Contact: Daughter: [phone]  Patient would like orders place to get A1C lab as well when she comes in on 04/17/2019 to get other labs done. Please call to advise patients daughter, Thank you.

## 2019-04-08 NOTE — PROGRESS NOTES
Patient was advised of coumadin instructions: 2mg daily except 4mg-Sunday and re test the INR 4/15/19, Patient stated understanding

## 2019-04-08 NOTE — PROGRESS NOTES
Subjective:      Patient ID: Adela Garay is a 89 y.o. female.    Chief Complaint: Wound Care (left leg Pcp Dr. Farrell  3/1/19)    Adela is a 89 y.o. female who presents to the clinic for evaluation and treatment of high risk feet. Adela has a past medical history of Anticoagulant long-term use, Anticoagulated on Coumadin, Aortic valve stenosis, Arthritis, Atrial fibrillation, Carotid artery occlusion, Chronic rhinosinusitis, Coronary artery disease, Granulomatous lung disease, Heart failure, Hyperlipidemia, Hypertension, Hypertensive heart disease without CHF (congestive heart failure), Mitral valve prolapse, PN (peripheral neuropathy), Severe aortic stenosis by prior echocardiogram, TIA (transient ischemic attack), Type 2 diabetes mellitus, and Type II or unspecified type diabetes mellitus without mention of complication, not stated as uncontrolled. The patient's chief complaint is long, thick toenails. This patient has documented high risk feet requiring routine maintenance secondary to peripheral neuropathy.    Per her son, patient had a fall that resulted in left leg ulcer.      PCP: Torrie Farrell MD    Date Last Seen by PCP:   Chief Complaint   Patient presents with    Wound Care     left leg Pcp Dr. Farrell  3/1/19       Current shoe gear:  SAS shoe    Hemoglobin A1C   Date Value Ref Range Status   09/26/2018 5.6 4.0 - 5.6 % Final     Comment:     ADA Screening Guidelines:  5.7-6.4%  Consistent with prediabetes  >or=6.5%  Consistent with diabetes  High levels of fetal hemoglobin interfere with the HbA1C  assay. Heterozygous hemoglobin variants (HbS, HgC, etc)do  not significantly interfere with this assay.   However, presence of multiple variants may affect accuracy.     04/12/2018 5.8 (H) 4.0 - 5.6 % Final     Comment:     According to ADA guidelines, hemoglobin A1c <7.0% represents  optimal control in non-pregnant diabetic patients. Different  metrics may apply to specific patient populations.    Standards of Medical Care in Diabetes-2016.  For the purpose of screening for the presence of diabetes:  <5.7%     Consistent with the absence of diabetes  5.7-6.4%  Consistent with increasing risk for diabetes   (prediabetes)  >or=6.5%  Consistent with diabetes  Currently, no consensus exists for use of hemoglobin A1c  for diagnosis of diabetes for children.  This Hemoglobin A1c assay has significant interference with fetal   hemoglobin   (HbF). The results are invalid for patients with abnormal amounts of   HbF,   including those with known Hereditary Persistence   of Fetal Hemoglobin. Heterozygous hemoglobin variants (HbAS, HbAC,   HbAD, HbAE, HbA2) do not significantly interfere with this assay;   however, presence of multiple variants in a sample may impact the %   interference.     10/30/2017 5.6 4.0 - 5.6 % Final     Comment:     According to ADA guidelines, hemoglobin A1c <7.0% represents  optimal control in non-pregnant diabetic patients. Different  metrics may apply to specific patient populations.   Standards of Medical Care in Diabetes-2016.  For the purpose of screening for the presence of diabetes:  <5.7%     Consistent with the absence of diabetes  5.7-6.4%  Consistent with increasing risk for diabetes   (prediabetes)  >or=6.5%  Consistent with diabetes  Currently, no consensus exists for use of hemoglobin A1c  for diagnosis of diabetes for children.  This Hemoglobin A1c assay has significant interference with fetal   hemoglobin   (HbF). The results are invalid for patients with abnormal amounts of   HbF,   including those with known Hereditary Persistence   of Fetal Hemoglobin. Heterozygous hemoglobin variants (HbAS, HbAC,   HbAD, HbAE, HbA2) do not significantly interfere with this assay;   however, presence of multiple variants in a sample may impact the %   interference.           Patient Active Problem List   Diagnosis    Hyperlipidemia    Coronary artery disease    Hx-TIA (transient  ischemic attack)    Long-term (current) use of anticoagulants, INR goal 2.0-3.0    Benign hypertensive heart disease with congestive heart failure    Pulmonary hypertension    Carotid arterial disease    Carotid aneurysm, left    Atrial fibrillation    Controlled type 2 diabetes mellitus with microalbuminuria    DDD (degenerative disc disease), cervical    Lumbar disc disease    Multiple lung nodules    Mild major depression    Idiopathic peripheral neuropathy    Atherosclerosis of aorta    Postinflammatory pulmonary fibrosis    Chronic cough    Coronary artery disease involving native coronary artery of native heart without angina pectoris    Controlled type 2 diabetes with neuropathy    S/P TAVR (transcatheter aortic valve replacement)    Cardiac pacemaker in situ    Examination of participant in clinical trial    Wheelchair dependence       Current Outpatient Medications on File Prior to Visit   Medication Sig Dispense Refill    blood sugar diagnostic (BLOOD GLUCOSE TEST) Strp 1 strip by Misc.(Non-Drug; Combo Route) route once daily. Currently using Nova max plus meter. 50 strip 11    digoxin (LANOXIN) 125 mcg tablet TAKE 1 TABLET (0.125 MG TOTAL) ONCE DAILY. 90 tablet 3    metoprolol tartrate (LOPRESSOR) 25 MG tablet Take 3 tablets (75 mg total) by mouth 2 (two) times daily. 270 tablet 1    potassium chloride (KLOR-CON) 10 MEQ TbSR Take 1 tablet (10 mEq total) by mouth 2 (two) times daily as needed (take when taking bumex). 60 tablet 11    simvastatin (ZOCOR) 20 MG tablet TAKE 1 TABLET EVERY EVENING 90 tablet 1    triamterene-hydrochlorothiazide 37.5-25 mg (DYAZIDE) 37.5-25 mg per capsule TAKE 1 CAPSULE EVERY DAY 90 capsule 1    warfarin (COUMADIN) 2 MG tablet Take 2 pills by mouth daily or as directed per the [facility] 175 tablet 3    bumetanide (BUMEX) 2 MG tablet Take 1 tablet (2 mg total) by mouth 2 (two) times daily. Take additional 1/2 tablet with weight gain > 3  lbs.  After 1 week, decrease to bumex 1mg po daily for 7 days 14 tablet 0    lisinopril (PRINIVIL,ZESTRIL) 2.5 MG tablet Take 1 tablet (2.5 mg total) by mouth once daily. 90 tablet 3     No current facility-administered medications on file prior to visit.        Review of patient's allergies indicates:   Allergen Reactions    Levaquin [levofloxacin]     Doxycycline hyclate Other (See Comments)     Unknown to patient    Iodine and iodide containing products     Neurontin  [gabapentin]      Other reaction(s): Unknown    Norpace  [disopyramide]      Other reaction(s): Hives    Phenytoin sodium extended      Other reaction(s): Muscle pain    Sulfamethoxazole-trimethoprim      Other reaction(s): Muscle cramps       Past Surgical History:   Procedure Laterality Date    CARDIAC PACEMAKER PLACEMENT  11/2017    CARDIAC VALVE REPLACEMENT  10/17/2017    aorta    HEART CATH-LEFT N/A 8/22/2017    [facility]    INCISION AND DRAINAGE (I & D) righ knee Right 11/19/2016    [facility]    INSERTION-PACEMAKER-DUAL Left 10/17/2017    [facility] CATH LAB    REPLACEMENT-VALVE-AORTIC N/A 10/17/2017    [facility] CATH LAB    SINUS SURGERY      tonsillectomy      TONSILLECTOMY         Family History   Problem Relation Age of Onset    Heart disease Father         49    Heart disease Mother     Thyroid disease Sister     Atrial fibrillation Sister     Atrial fibrillation Sister         neice    Lymphoma Sister 29    Atrial fibrillation Sister     Asthma Neg Hx     Emphysema Neg Hx        Social History     Socioeconomic History    Marital status:      Spouse name: Not on file    Number of children: 6    Years of education: 2 college    Highest education level: Not on file   Occupational History    Occupation: retired housewife   Social Needs    Financial resource strain: Not  "on file    Food insecurity:     Worry: Not on file     Inability: Not on file    Transportation needs:     Medical: Not on file     Non-medical: Not on file   Tobacco Use    Smoking status: Never Smoker    Smokeless tobacco: Never Used   Substance and Sexual Activity    Alcohol use: Yes     Alcohol/week: 0.6 oz     Types: 1 Shots of liquor per week     Comment: rare    Drug use: No    Sexual activity: Never   Lifestyle    Physical activity:     Days per week: Not on file     Minutes per session: Not on file    Stress: Not on file   Relationships    Social connections:     Talks on phone: Not on file     Gets together: Not on file     Attends Yazdanism service: Not on file     Active member of club or organization: Not on file     Attends meetings of clubs or organizations: Not on file     Relationship status: Not on file   Other Topics Concern    Not on file   Social History Narrative    Not on file     Review of Systems   Constitutional: Negative for chills and fever.   Respiratory: Negative for cough.    Cardiovascular: Positive for leg swelling.   Gastrointestinal: Negative for nausea and vomiting.   Musculoskeletal: Positive for falls, joint pain and myalgias.   Skin: Negative for itching and rash.   Neurological: Positive for tingling and sensory change.         Objective:       Vitals:    04/08/19 0943   BP: 118/66   Weight: 65 kg (143 lb 3.2 oz)   Height: 5' 5" (1.651 m)   PainSc: 0-No pain       Physical Exam   Constitutional:  Non-toxic appearance. She does not have a sickly appearance. No distress.   Pt. is well-developed, well-nourished, appears stated age, in no acute distress, alert and oriented x 3. No evidence of depression, anxiety, or agitation. Calm, cooperative, and communicative. Appropriate interactions and affect.   Cardiovascular:   Pulses:       Dorsalis pedis pulses are 1+ on the right side, and 1+ on the left side.        Posterior tibial pulses are 1+ on the right side, and 1+ " on the left side.   Dorsalis pedis and posterior tibial pulses are diminished Bilaterally. 1+ pitting edema, skin is atrophic, decreased digital hair, feet slightly cool, nails thickened, mild plantar rubor     Pulmonary/Chest: No respiratory distress.   Musculoskeletal:        Right ankle: No tenderness. No lateral malleolus, no medial malleolus, no AITFL, no CF ligament and no posterior TFL tenderness found. Achilles tendon exhibits no pain, no defect and normal Harman's test results.        Left ankle: No tenderness. No lateral malleolus, no medial malleolus, no AITFL, no CF ligament and no posterior TFL tenderness found. Achilles tendon exhibits no pain, no defect and normal Harman's test results.        Right foot: There is no tenderness and no bony tenderness.        Left foot: There is no tenderness and no bony tenderness.   Patient has hammertoes of digits 2-5 bilateral partially reducible without symptom today.    2nd toes adduct against great toe    TA function absent L on muscle testing    Visible and palpable bunion without pain at dorsomedial 1st metatarsal head right and left.  Hallux abducted right and left partially reducible, tracks laterally without being track bound.  No ecchymosis, erythema, edema, or cardinal signs infection or signs of trauma same foot.     Lymphadenopathy:   No lymphatic streaking    Negative lymphadenopathy bilateral popliteal fossa and tarsal tunnel.     Neurological:   [place]-Jakub 5.07 monofilamant testing is diminished Moreno feet. Decreased/absent vibratory sensation bilateral feet to 128Hz tuning fork.    Skin: Skin is warm, dry and intact.  She is not diaphoretic. No cyanosis. No pallor. Nails show no clubbing.   Toenails 1-5 bilaterally are thickened by 2-3 mm, discolored/yellowed, dystrophic, brittle with subungual debris.     Focal hyperkeratotic lesion consisting entirely of hyperkeratotic tissue without open skin, drainage, pus, fluctuance, malodor: bilateral  5th digit PIPJ with localized erythema    Ulcer location: left anterior leg  Measurements : 1.3x0.7x0.3 cm   Signs of infection: local edema and erythema  Drainage: Sero-Sanguinous  Purulence: no  Crepitus/fluctuance: no  Periwound: Reddened  Base: Fibrotic slough  Depth: subcutaneous tissue  Probe to bone: no    Psychiatric: Her mood appears not anxious. Her affect is not inappropriate. Her speech is not slurred. She is not combative. She is communicative. She is attentive.   Nursing note reviewed.      Left Leg          Right Leg              Assessment:       Encounter Diagnoses   Name Primary?    Idiopathic peripheral neuropathy Yes    Anticoagulated on Coumadin     Traumatic leg ulcer, left, with fat layer exposed          Plan:       Adela was seen today for wound care.    Diagnoses and all orders for this visit:    Idiopathic peripheral neuropathy  -     Aerobic culture  -     Culture, Anaerobic    Anticoagulated on Coumadin  -     Aerobic culture  -     Culture, Anaerobic    Traumatic leg ulcer, left, with fat layer exposed  -     Aerobic culture  -     Culture, Anaerobic      I counseled the patient on her conditions, their implications and medical management.      Greater than 50% of this visit spent on counseling and coordination of care.    Education about neuropathy and prevention of limb loss.    Discussed wound healing cycle, skin integrity, ways to care for skin.Counseled patient on the effects of PVD  on healing. She verbalizes understanding that it can increase the chances of delayed healing and this prolonged exposure leads to infection or progression of infection which subsequently can result in loss of limb.    Adequate vitamin supplementation, protein intake, and hydration - discussed with patient    The wound is cleansed of foreign material as much as possible and the base inspected for bone or abscess. Base is fibrogranular and without bone nor joint exposure. Aerobic and anerobic  cultures swabs taken    Dressings:promogram   Offloading: unna boot    Detailed home care instructions dispensed.  rx professional arts abx based on cultures to be delivered to her home.  Son will aid in care    Follow-up: 2 weeks but should call [facility] immediately if any signs of infection, such as fever, chills, sweats, increased redness or pain.    Short-term goals include maintaining good offloading and minimizing bioburden, promoting granulation and epithelialization to healing.  Long-term goals include keeping the wound healed by good offloading and medical management under the direction of internist.

## 2019-04-08 NOTE — PATIENT INSTRUCTIONS
Wound care Instructions:   · Once a day beginning in 24 hours::   ¨ Clean the toe separate from your body with warm running water and antibacterial soap such as dial soap  ¨ Clean any remaining crust away with soap and water using a cotton-tipped applicator.  ¨ DRY COMPLETELY  ¨ Apply gentian violet wound  ¨ Cover with a breathable bandage until there is no more drainage or open flesh.  · Change the dressing daily, or whenever it becomes wet or dirty.  · If you were prescribed antibiotics, take them as directed until they are all gone.  · You may use acetaminophen or ibuprofen to control pain, unless another medicine was prescribed. If you have chronic liver or kidney disease or ever had a stomach ulcer or GI bleeding, talk with your doctor before using these medicines.      When to seek medical advice  Call your health care provider right away if any of the following occur:  · Increasing redness, pain or swelling of the toe  · Red streaks in the skin leading away from the wound  · Continued pus or fluid drainage for more than 24 hours  · Fever of 100.4º F (38º C) or higher, or as directed by your health care provider

## 2019-04-10 LAB — BACTERIA SPEC AEROBE CULT: NORMAL

## 2019-04-12 ENCOUNTER — TELEPHONE (OUTPATIENT)
Dept: PODIATRY | Facility: CLINIC | Age: 84
End: 2019-04-12

## 2019-04-12 NOTE — TELEPHONE ENCOUNTER
----- Message from Trisha Armenta sent at 4/12/2019 11:10 AM CDT -----  Contact: [facility]  Name of Who is Calling:[facility]        What is the request in detail: Pharmacy requesting pt's demographics faxed. Thanks-          Can the clinic reply by [facility]:     [facility] fax [phone]

## 2019-04-15 ENCOUNTER — TELEPHONE (OUTPATIENT)
Dept: PODIATRY | Facility: CLINIC | Age: 84
End: 2019-04-15

## 2019-04-15 ENCOUNTER — ANTI-COAG VISIT (OUTPATIENT)
Dept: CARDIOLOGY | Facility: CLINIC | Age: 84
End: 2019-04-15
Payer: MEDICARE

## 2019-04-15 DIAGNOSIS — Z86.73 HX-TIA (TRANSIENT ISCHEMIC ATTACK): ICD-10-CM

## 2019-04-15 DIAGNOSIS — Z79.01 LONG-TERM (CURRENT) USE OF ANTICOAGULANTS, INR GOAL 2.0-3.0: ICD-10-CM

## 2019-04-15 LAB — INR PPP: 1.6

## 2019-04-15 PROCEDURE — 93793 PR ANTICOAGULANT MGMT FOR PT TAKING WARFARIN: ICD-10-PCS | Mod: ,,,

## 2019-04-15 PROCEDURE — 93793 ANTICOAG MGMT PT WARFARIN: CPT | Mod: ,,,

## 2019-04-15 NOTE — PROGRESS NOTES
INR not at goal. Medications, chart, and patient findings reviewed. See calendar for adjustments to dose and follow up plan.

## 2019-04-15 NOTE — TELEPHONE ENCOUNTER
----- Message from Madonna Stern sent at 4/15/2019 11:49 AM CDT -----  Contact: [facility]   Name of Who is Calling:[facility]           What is the request in detail: Pharmacy requesting pt's demographics faxed to [phone]. Thanks-              Can the clinic reply by [facility]:      What Number to Call Back if not in [facility]: fax [phone]

## 2019-04-17 ENCOUNTER — LAB VISIT (OUTPATIENT)
Dept: LAB | Facility: HOSPITAL | Age: 84
End: 2019-04-17
Attending: INTERNAL MEDICINE
Payer: MEDICARE

## 2019-04-17 DIAGNOSIS — Z95.0 CARDIAC PACEMAKER IN SITU: ICD-10-CM

## 2019-04-17 DIAGNOSIS — I27.20 PULMONARY HYPERTENSION: ICD-10-CM

## 2019-04-17 DIAGNOSIS — R80.9 CONTROLLED TYPE 2 DIABETES MELLITUS WITH MICROALBUMINURIA, WITHOUT LONG-TERM CURRENT USE OF INSULIN: ICD-10-CM

## 2019-04-17 DIAGNOSIS — I25.10 CORONARY ARTERY DISEASE, ANGINA PRESENCE UNSPECIFIED, UNSPECIFIED VESSEL OR LESION TYPE, UNSPECIFIED WHETHER NATIVE OR TRANSPLANTED HEART: ICD-10-CM

## 2019-04-17 DIAGNOSIS — Z95.2 S/P TAVR (TRANSCATHETER AORTIC VALVE REPLACEMENT): ICD-10-CM

## 2019-04-17 DIAGNOSIS — E87.6 HYPOKALEMIA DUE TO LOSS OF POTASSIUM: ICD-10-CM

## 2019-04-17 DIAGNOSIS — E78.5 HYPERLIPIDEMIA, UNSPECIFIED HYPERLIPIDEMIA TYPE: ICD-10-CM

## 2019-04-17 DIAGNOSIS — E11.29 CONTROLLED TYPE 2 DIABETES MELLITUS WITH MICROALBUMINURIA, WITHOUT LONG-TERM CURRENT USE OF INSULIN: ICD-10-CM

## 2019-04-17 LAB
ANION GAP SERPL CALC-SCNC: 8 MMOL/L (ref 8–16)
BACTERIA SPEC ANAEROBE CULT: NORMAL
BNP SERPL-MCNC: 1499 PG/ML (ref 0–99)
BUN SERPL-MCNC: 31 MG/DL (ref 10–30)
CALCIUM SERPL-MCNC: 10 MG/DL (ref 8.7–10.5)
CHLORIDE SERPL-SCNC: 100 MMOL/L (ref 95–110)
CO2 SERPL-SCNC: 33 MMOL/L (ref 23–29)
CREAT SERPL-MCNC: 0.9 MG/DL (ref 0.5–1.4)
EST. GFR  (AFRICAN AMERICAN): >60 ML/MIN/1.73 M^2
EST. GFR  (NON AFRICAN AMERICAN): 56.1 ML/MIN/1.73 M^2
ESTIMATED AVG GLUCOSE: 120 MG/DL (ref 68–131)
GLUCOSE SERPL-MCNC: 83 MG/DL (ref 70–110)
HBA1C MFR BLD HPLC: 5.8 % (ref 4–5.6)
POTASSIUM SERPL-SCNC: 5 MMOL/L (ref 3.5–5.1)
SODIUM SERPL-SCNC: 141 MMOL/L (ref 136–145)

## 2019-04-17 PROCEDURE — 80048 BASIC METABOLIC PNL TOTAL CA: CPT

## 2019-04-17 PROCEDURE — 36415 COLL VENOUS BLD VENIPUNCTURE: CPT | Mod: PO

## 2019-04-17 PROCEDURE — 83036 HEMOGLOBIN GLYCOSYLATED A1C: CPT

## 2019-04-17 PROCEDURE — 83880 ASSAY OF NATRIURETIC PEPTIDE: CPT

## 2019-04-18 ENCOUNTER — OFFICE VISIT (OUTPATIENT)
Dept: CARDIOLOGY | Facility: CLINIC | Age: 84
End: 2019-04-18
Payer: MEDICARE

## 2019-04-18 VITALS
OXYGEN SATURATION: 96 % | BODY MASS INDEX: 23.14 KG/M2 | HEART RATE: 59 BPM | DIASTOLIC BLOOD PRESSURE: 63 MMHG | SYSTOLIC BLOOD PRESSURE: 136 MMHG | WEIGHT: 138.88 LBS | HEIGHT: 65 IN

## 2019-04-18 DIAGNOSIS — Z86.73 HX-TIA (TRANSIENT ISCHEMIC ATTACK): Primary | ICD-10-CM

## 2019-04-18 DIAGNOSIS — I48.91 ATRIAL FIBRILLATION: ICD-10-CM

## 2019-04-18 DIAGNOSIS — Z95.0 CARDIAC PACEMAKER IN SITU: ICD-10-CM

## 2019-04-18 DIAGNOSIS — I48.20 CHRONIC ATRIAL FIBRILLATION: ICD-10-CM

## 2019-04-18 DIAGNOSIS — E11.40 CONTROLLED TYPE 2 DIABETES WITH NEUROPATHY: ICD-10-CM

## 2019-04-18 DIAGNOSIS — Z95.2 S/P TAVR (TRANSCATHETER AORTIC VALVE REPLACEMENT): ICD-10-CM

## 2019-04-18 DIAGNOSIS — I25.10 CORONARY ARTERY DISEASE INVOLVING NATIVE CORONARY ARTERY OF NATIVE HEART WITHOUT ANGINA PECTORIS: ICD-10-CM

## 2019-04-18 PROCEDURE — 93010 ELECTROCARDIOGRAM REPORT: CPT | Mod: S$PBB,,, | Performed by: INTERNAL MEDICINE

## 2019-04-18 PROCEDURE — 93010 EKG 12-LEAD: ICD-10-PCS | Mod: S$PBB,,, | Performed by: INTERNAL MEDICINE

## 2019-04-18 PROCEDURE — 99999 PR PBB SHADOW E&M-EST. PATIENT-LVL III: ICD-10-PCS | Mod: PBBFAC,,, | Performed by: INTERNAL MEDICINE

## 2019-04-18 PROCEDURE — 99999 PR PBB SHADOW E&M-EST. PATIENT-LVL III: CPT | Mod: PBBFAC,,, | Performed by: INTERNAL MEDICINE

## 2019-04-18 PROCEDURE — 99213 OFFICE O/P EST LOW 20 MIN: CPT | Mod: PBBFAC,25 | Performed by: INTERNAL MEDICINE

## 2019-04-18 PROCEDURE — 99213 PR OFFICE/OUTPT VISIT, EST, LEVL III, 20-29 MIN: ICD-10-PCS | Mod: S$PBB,,, | Performed by: INTERNAL MEDICINE

## 2019-04-18 PROCEDURE — 93005 ELECTROCARDIOGRAM TRACING: CPT | Mod: PBBFAC | Performed by: INTERNAL MEDICINE

## 2019-04-18 PROCEDURE — 99213 OFFICE O/P EST LOW 20 MIN: CPT | Mod: S$PBB,,, | Performed by: INTERNAL MEDICINE

## 2019-04-18 NOTE — PROGRESS NOTES
Subjective:    Patient ID:  Adela Garay is a 89 y.o. female who presents for follow-up of Congestive Heart Failure      HPI     Severe AS - s/p TAVR 10/17/17, complete heart block - [facility] single PPM 10/17/17  No CAD by [facility] 8/22/17  Chronic A-fib - rate controlled on coumadin, HTN, DM, Hx TIA  Diastolic CHF  Re-admitted after TAVR with CHF 10/30-11/12/17     Echo 9/26/18    1 - Low normal to mildly depressed left ventricular systolic function (EF 50-55%).     2 - Wall motion abnormalities.     3 - Eccentric hypertrophy.     4 - Biatrial enlargement.     5 - Mildly enlarged ascending aorta.     6 - Right ventricular enlargement with moderately depressed systolic function.     7 - The estimated PA systolic pressure is 34 mmHg.     8 - S/P transcatheter AVR, DILAN = 2.12 cm2, AVAi = 1.28 cm2/m2, peak velocity = 1.6 m/s, mean gradient = 6 mmHg.     9 - Trivial paravalvular aortic regurgitation.     10 - Mild to moderate mitral regurgitation.     11 - Severe tricuspid regurgitation.     12 - Increased central venous pressure.     13 - S/p 29mm [place] TF TAVR.                    11/7/18 Had been holding diuretics but became increasingly confused with LE edema  Took bumex 2 days in a row with improvement  Mainly with fatigue and poor appetite  Will try dosing bumex 0.5 mg MWF     11/29/18Breathing and weight have been stable  Needs an extra dose of bumex on occasion     1/9/19 Overall doing well. More active  Weight has gone up a few pounds in recent days  Denies CP  Some postural dizziness  Ok to increase bumex prn  OV 2 months with BNP, BMP     Weight and LE edema up some  ESTRADA has been stable  Uses walker at home less   Increase bumex to bid until LE edema improved  OV 1 month with BNP, BMP    Labs 4/17/19  K 5.0  Cr 0.9  BNP 1499 up from 1450    LE was doing well until yesterday when she ate too much salt  Less SOB  Denies CP  EKG A-sensed V-paced        Review of Systems   Constitution: Negative for decreased  appetite.   HENT: Negative for ear discharge.    Eyes: Negative for blurred vision.   Respiratory: Negative for hemoptysis.    Endocrine: Negative for polyphagia.   Hematologic/Lymphatic: Negative for adenopathy.   Skin: Negative for color change.   Musculoskeletal: Negative for joint swelling.   Genitourinary: Negative for bladder incontinence.   Neurological: Negative for brief paralysis.   Psychiatric/Behavioral: Negative for hallucinations.   Allergic/Immunologic: Negative for hives.        Objective:    Physical Exam   Constitutional: She is oriented to person, place, and time. She appears well-developed and well-nourished.   HENT:   Head: Normocephalic.   Nose: Nose normal.   Eyes: Pupils are equal, round, and reactive to light.   Cardiovascular: Normal rate, regular rhythm, S1 normal and S2 normal.   Murmur heard.   Harsh midsystolic murmur is present at the upper right sternal border radiating to the neck.  Pulses:       Radial pulses are 2+ on the right side, and 2+ on the left side.   Pulmonary/Chest: No respiratory distress. She has decreased breath sounds in the right lower field and the left lower field.   Device to LUCW.   Abdominal: Normal appearance.   Musculoskeletal: Normal range of motion. She exhibits no edema.   Neurological: She is alert and oriented to person, place, and time.   Skin: Skin is warm and dry. No erythema.   Psychiatric: She has a normal mood and affect. Her speech is normal and behavior is normal.   Nursing note and vitals reviewed.        Assessment:       1. Hx-TIA (transient ischemic attack)    2. Chronic atrial fibrillation    3. Coronary artery disease involving native coronary artery of native heart without angina pectoris    4. S/P TAVR (transcatheter aortic valve replacement)    5. Cardiac pacemaker in situ    6. Controlled type 2 diabetes with neuropathy         Plan:       Overall stable on current diuretic regimen  OV 1 month with BNP, BMP

## 2019-04-22 ENCOUNTER — ANTI-COAG VISIT (OUTPATIENT)
Dept: CARDIOLOGY | Facility: CLINIC | Age: 84
End: 2019-04-22
Payer: MEDICARE

## 2019-04-22 ENCOUNTER — OFFICE VISIT (OUTPATIENT)
Dept: PODIATRY | Facility: CLINIC | Age: 84
End: 2019-04-22
Payer: MEDICARE

## 2019-04-22 VITALS — HEIGHT: 65 IN | WEIGHT: 138 LBS | BODY MASS INDEX: 22.99 KG/M2

## 2019-04-22 DIAGNOSIS — Z79.01 ANTICOAGULATED ON COUMADIN: ICD-10-CM

## 2019-04-22 DIAGNOSIS — G60.9 IDIOPATHIC PERIPHERAL NEUROPATHY: Primary | ICD-10-CM

## 2019-04-22 DIAGNOSIS — L97.922 TRAUMATIC LEG ULCER, LEFT, WITH FAT LAYER EXPOSED: ICD-10-CM

## 2019-04-22 LAB — INR PPP: 3

## 2019-04-22 PROCEDURE — 99213 PR OFFICE/OUTPT VISIT, EST, LEVL III, 20-29 MIN: ICD-10-PCS | Mod: S$PBB,,, | Performed by: PODIATRIST

## 2019-04-22 PROCEDURE — 99999 PR PBB SHADOW E&M-EST. PATIENT-LVL II: ICD-10-PCS | Mod: PBBFAC,,, | Performed by: PODIATRIST

## 2019-04-22 PROCEDURE — 99212 OFFICE O/P EST SF 10 MIN: CPT | Mod: PBBFAC,PO | Performed by: PODIATRIST

## 2019-04-22 PROCEDURE — 99999 PR PBB SHADOW E&M-EST. PATIENT-LVL II: CPT | Mod: PBBFAC,,, | Performed by: PODIATRIST

## 2019-04-22 PROCEDURE — 99213 OFFICE O/P EST LOW 20 MIN: CPT | Mod: S$PBB,,, | Performed by: PODIATRIST

## 2019-04-22 PROCEDURE — 93793 ANTICOAG MGMT PT WARFARIN: CPT | Mod: ,,,

## 2019-04-22 PROCEDURE — 93793 PR ANTICOAGULANT MGMT FOR PT TAKING WARFARIN: ICD-10-PCS | Mod: ,,,

## 2019-04-22 NOTE — PROGRESS NOTES
Subjective:      Patient ID: Adela Garay is a 89 y.o. female.    Chief Complaint: Peripheral Neuropathy (left leg Pcp Dr. Farrell 3/1/19) and Wound Care    Adela is a 89 y.o. female who presents to the clinic for evaluation and treatment of high risk feet. Adela has a past medical history of Anticoagulant long-term use, Anticoagulated on Coumadin, Aortic valve stenosis, Arthritis, Atrial fibrillation, Carotid artery occlusion, Chronic rhinosinusitis, Coronary artery disease, Granulomatous lung disease, Heart failure, Hyperlipidemia, Hypertension, Hypertensive heart disease without CHF (congestive heart failure), Mitral valve prolapse, PN (peripheral neuropathy), Severe aortic stenosis by prior echocardiogram, TIA (transient ischemic attack), Type 2 diabetes mellitus, and Type II or unspecified type diabetes mellitus without mention of complication, not stated as uncontrolled. The patient's chief complaint is long, thick toenails. This patient has documented high risk feet requiring routine maintenance secondary to peripheral neuropathy.    Per her son, patient had a fall that resulted in left leg ulcer.    04/22/19 Patient's son has been caring for leg as instructed.  Subjective improvement to wound and edema.  She has been elevating and watching sodium intake. Denies pain.  No new pedal complaints    PCP: Torrie Farrell MD    Date Last Seen by PCP:   Chief Complaint   Patient presents with    Peripheral Neuropathy     left leg Pcp Dr. Farrell 3/1/19    Wound Care       Current shoe gear:  SAS shoe    Hemoglobin A1C   Date Value Ref Range Status   04/17/2019 5.8 (H) 4.0 - 5.6 % Final     Comment:     ADA Screening Guidelines:  5.7-6.4%  Consistent with prediabetes  >or=6.5%  Consistent with diabetes  High levels of fetal hemoglobin interfere with the HbA1C  assay. Heterozygous hemoglobin variants (HbS, HgC, etc)do  not significantly interfere with this assay.   However, presence of multiple variants  may affect accuracy.     09/26/2018 5.6 4.0 - 5.6 % Final     Comment:     ADA Screening Guidelines:  5.7-6.4%  Consistent with prediabetes  >or=6.5%  Consistent with diabetes  High levels of fetal hemoglobin interfere with the HbA1C  assay. Heterozygous hemoglobin variants (HbS, HgC, etc)do  not significantly interfere with this assay.   However, presence of multiple variants may affect accuracy.     04/12/2018 5.8 (H) 4.0 - 5.6 % Final     Comment:     According to ADA guidelines, hemoglobin A1c <7.0% represents  optimal control in non-pregnant diabetic patients. Different  metrics may apply to specific patient populations.   Standards of Medical Care in Diabetes-2016.  For the purpose of screening for the presence of diabetes:  <5.7%     Consistent with the absence of diabetes  5.7-6.4%  Consistent with increasing risk for diabetes   (prediabetes)  >or=6.5%  Consistent with diabetes  Currently, no consensus exists for use of hemoglobin A1c  for diagnosis of diabetes for children.  This Hemoglobin A1c assay has significant interference with fetal   hemoglobin   (HbF). The results are invalid for patients with abnormal amounts of   HbF,   including those with known Hereditary Persistence   of Fetal Hemoglobin. Heterozygous hemoglobin variants (HbAS, HbAC,   HbAD, HbAE, HbA2) do not significantly interfere with this assay;   however, presence of multiple variants in a sample may impact the %   interference.           Patient Active Problem List   Diagnosis    Hyperlipidemia    Coronary artery disease    Hx-TIA (transient ischemic attack)    Long-term (current) use of anticoagulants, INR goal 2.0-3.0    Benign hypertensive heart disease with congestive heart failure    Pulmonary hypertension    Carotid arterial disease    Carotid aneurysm, left    Atrial fibrillation    Controlled type 2 diabetes mellitus with microalbuminuria    DDD (degenerative disc disease), cervical    Lumbar disc disease     Multiple lung nodules    Mild major depression    Idiopathic peripheral neuropathy    Atherosclerosis of aorta    Postinflammatory pulmonary fibrosis    Chronic cough    Coronary artery disease involving native coronary artery of native heart without angina pectoris    Controlled type 2 diabetes with neuropathy    S/P TAVR (transcatheter aortic valve replacement)    Cardiac pacemaker in situ    Examination of participant in clinical trial    Wheelchair dependence       Current Outpatient Medications on File Prior to Visit   Medication Sig Dispense Refill    blood sugar diagnostic (BLOOD GLUCOSE TEST) Strp 1 strip by Misc.(Non-Drug; Combo Route) route once daily. Currently using Nova max plus meter. 50 strip 11    digoxin (LANOXIN) 125 mcg tablet TAKE 1 TABLET (0.125 MG TOTAL) ONCE DAILY. 90 tablet 3    metoprolol tartrate (LOPRESSOR) 25 MG tablet Take 3 tablets (75 mg total) by mouth 2 (two) times daily. 270 tablet 1    potassium chloride (KLOR-CON) 10 MEQ TbSR Take 1 tablet (10 mEq total) by mouth 2 (two) times daily as needed (take when taking bumex). 60 tablet 11    simvastatin (ZOCOR) 20 MG tablet TAKE 1 TABLET EVERY EVENING 90 tablet 1    triamterene-hydrochlorothiazide 37.5-25 mg (DYAZIDE) 37.5-25 mg per capsule TAKE 1 CAPSULE EVERY DAY 90 capsule 1    warfarin (COUMADIN) 2 MG tablet Take 2 pills by mouth daily or as directed per the [facility] 175 tablet 3    bumetanide (BUMEX) 2 MG tablet Take 1 tablet (2 mg total) by mouth 2 (two) times daily. Take additional 1/2 tablet with weight gain > 3 lbs.  After 1 week, decrease to bumex 1mg po daily for 7 days 14 tablet 0    lisinopril (PRINIVIL,ZESTRIL) 2.5 MG tablet Take 1 tablet (2.5 mg total) by mouth once daily. 90 tablet 3     No current facility-administered medications on file prior to visit.        Review of patient's allergies indicates:   Allergen Reactions    Levaquin [levofloxacin]     Doxycycline hyclate Other (See Comments)      Unknown to patient    Iodine and iodide containing products     Neurontin  [gabapentin]      Other reaction(s): Unknown    Norpace  [disopyramide]      Other reaction(s): Hives    Phenytoin sodium extended      Other reaction(s): Muscle pain    Sulfamethoxazole-trimethoprim      Other reaction(s): Muscle cramps       Past Surgical History:   Procedure Laterality Date    CARDIAC PACEMAKER PLACEMENT  11/2017    CARDIAC VALVE REPLACEMENT  10/17/2017    aorta    HEART CATH-LEFT N/A 8/22/2017    [facility]    INCISION AND DRAINAGE (I & D) righ knee Right 11/19/2016    Performed by [facility] at [facility] OR    INSERTION-PACEMAKER-DUAL Left 10/17/2017    [facility] CATH LAB    REPLACEMENT-VALVE-AORTIC N/A 10/17/2017    [facility] CATH LAB    SINUS SURGERY      tonsillectomy      TONSILLECTOMY         Family History   Problem Relation Age of Onset    Heart disease Father         49    Heart disease Mother     Thyroid disease Sister     Atrial fibrillation Sister     Atrial fibrillation Sister         neice    Lymphoma Sister 29    Atrial fibrillation Sister     Asthma Neg Hx     Emphysema Neg Hx        Social History     Socioeconomic History    Marital status:      Spouse name: Not on file    Number of children: 6    Years of education: 2 college    Highest education level: Not on file   Occupational History    Occupation: retired housewife   Social Needs    Financial resource strain: Not on file    Food insecurity:     Worry: Not on file     Inability: Not on file    Transportation needs:     Medical: Not on file     Non-medical: Not on file   Tobacco Use    Smoking status: Never Smoker    Smokeless tobacco: Never Used   Substance and Sexual Activity    Alcohol use: Yes     Alcohol/week: 0.6 oz     Types: 1 Shots of liquor per week     Comment: rare    Drug use: No     "Sexual activity: Never   Lifestyle    Physical activity:     Days per week: Not on file     Minutes per session: Not on file    Stress: Not on file   Relationships    Social connections:     Talks on phone: Not on file     Gets together: Not on file     Attends Mandaen service: Not on file     Active member of club or organization: Not on file     Attends meetings of clubs or organizations: Not on file     Relationship status: Not on file   Other Topics Concern    Not on file   Social History Narrative    Not on file     Review of Systems   Constitutional: Negative for chills and fever.   Respiratory: Negative for cough.    Cardiovascular: Positive for leg swelling.   Gastrointestinal: Negative for nausea and vomiting.   Musculoskeletal: Positive for falls, joint pain and myalgias.   Skin: Negative for itching and rash.   Neurological: Positive for tingling and sensory change.         Objective:       Vitals:    04/22/19 0927   Weight: 62.6 kg (138 lb)   Height: 5' 5" (1.651 m)   PainSc: 0-No pain       Physical Exam   Constitutional:  Non-toxic appearance. She does not have a sickly appearance. No distress.   Pt. is well-developed, well-nourished, appears stated age, in no acute distress, alert and oriented x 3. No evidence of depression, anxiety, or agitation. Calm, cooperative, and communicative. Appropriate interactions and affect.   Cardiovascular:   Pulses:       Dorsalis pedis pulses are 1+ on the right side, and 1+ on the left side.        Posterior tibial pulses are 1+ on the right side, and 1+ on the left side.   Dorsalis pedis and posterior tibial pulses are diminished Bilaterally. 1+ pitting edema, skin is atrophic, decreased digital hair, feet slightly cool, nails thickened, mild plantar rubor     Pulmonary/Chest: No respiratory distress.   Musculoskeletal:        Right ankle: No tenderness. No lateral malleolus, no medial malleolus, no AITFL, no CF ligament and no posterior TFL tenderness found. " Achilles tendon exhibits no pain, no defect and normal Harman's test results.        Left ankle: No tenderness. No lateral malleolus, no medial malleolus, no AITFL, no CF ligament and no posterior TFL tenderness found. Achilles tendon exhibits no pain, no defect and normal Harman's test results.        Right foot: There is no tenderness and no bony tenderness.        Left foot: There is no tenderness and no bony tenderness.   Patient has hammertoes of digits 2-5 bilateral partially reducible without symptom today.    2nd toes adduct against great toe    TA function absent L on muscle testing    Visible and palpable bunion without pain at dorsomedial 1st metatarsal head right and left.  Hallux abducted right and left partially reducible, tracks laterally without being track bound.  No ecchymosis, erythema, edema, or cardinal signs infection or signs of trauma same foot.     Lymphadenopathy:   No lymphatic streaking    Negative lymphadenopathy bilateral popliteal fossa and tarsal tunnel.     Neurological:   [place]ub 5.07 monofilamant testing is diminished Moreno feet. Decreased/absent vibratory sensation bilateral feet to 128Hz tuning fork.    Skin: Skin is warm, dry and intact.  She is not diaphoretic. No cyanosis. No pallor. Nails show no clubbing.   Toenails 1-5 bilaterally are thickened by 2-3 mm, discolored/yellowed, dystrophic, brittle with subungual debris.     Focal hyperkeratotic lesion consisting entirely of hyperkeratotic tissue without open skin, drainage, pus, fluctuance, malodor: bilateral 5th digit PIPJ with localized erythema. And sub 1st MTPJ of the left foot    Ulcer location: left anterior leg  Measurements : 1.0x0.4x0.2 cm   Signs of infection: local edema and erythema  Drainage: Sero-Sanguinous  Purulence: no  Crepitus/fluctuance: no  Periwound: Reddened  Base: Fibrotic slough  Depth: subcutaneous tissue  Probe to bone: no    Psychiatric: Her mood appears not anxious. Her affect is not  inappropriate. Her speech is not slurred. She is not combative. She is communicative. She is attentive.   Nursing note reviewed.    04/22/19 04/08  Left Leg          Right Leg              Assessment:       Encounter Diagnoses   Name Primary?    Idiopathic peripheral neuropathy Yes    Anticoagulated on Coumadin     Traumatic leg ulcer, left, with fat layer exposed          Plan:       Adela was seen today for peripheral neuropathy and wound care.    Diagnoses and all orders for this visit:    Idiopathic peripheral neuropathy    Anticoagulated on Coumadin    Traumatic leg ulcer, left, with fat layer exposed      I counseled the patient on her conditions, their implications and medical management.      Greater than 50% of this visit spent on counseling and coordination of care.    Education about neuropathy and prevention of limb loss.    Discussed wound healing cycle, skin integrity, ways to care for skin.Counseled patient on the effects of PVD  on healing. She verbalizes understanding that it can increase the chances of delayed healing and this prolonged exposure leads to infection or progression of infection which subsequently can result in loss of limb.    Adequate vitamin supplementation, protein intake, and hydration - discussed with patient    The wound is cleansed of foreign material as much as possible and the base inspected for bone or abscess. Base is fibrogranular and without bone nor joint exposure. Aerobic and anerobic cultures swabs taken    Dressings: bola  Offloading: unna boot    Detailed home care instructions dispensed.  Son will aid in care    Follow-up: 2 weeks but should call [facility] immediately if any signs of infection, such as fever, chills, sweats, increased redness or pain.    Short-term goals include maintaining good offloading and minimizing bioburden, promoting granulation and epithelialization to healing.  Long-term goals include keeping the wound healed by good  offloading and medical management under the direction of internist.

## 2019-04-22 NOTE — PROGRESS NOTES
INR at goal. Medications and chart reviewed. No changes noted to necessitate adjustment of warfarin.  Note: dose increased 4/16. Continue to monitor pt closely.

## 2019-04-24 DIAGNOSIS — I11.0 BENIGN HYPERTENSIVE HEART DISEASE WITH CONGESTIVE HEART FAILURE: ICD-10-CM

## 2019-04-24 RX ORDER — BUMETANIDE 2 MG/1
2 TABLET ORAL 2 TIMES DAILY PRN
Qty: 60 TABLET | Refills: 11 | Status: SHIPPED | OUTPATIENT
Start: 2019-04-24 | End: 2019-10-05

## 2019-04-29 ENCOUNTER — ANTI-COAG VISIT (OUTPATIENT)
Dept: CARDIOLOGY | Facility: CLINIC | Age: 84
End: 2019-04-29
Payer: MEDICARE

## 2019-04-29 DIAGNOSIS — Z79.01 LONG-TERM (CURRENT) USE OF ANTICOAGULANTS, INR GOAL 2.0-3.0: ICD-10-CM

## 2019-04-29 DIAGNOSIS — Z86.73 HX-TIA (TRANSIENT ISCHEMIC ATTACK): ICD-10-CM

## 2019-04-29 LAB — INR PPP: 2.9

## 2019-04-29 PROCEDURE — 93793 ANTICOAG MGMT PT WARFARIN: CPT | Mod: ,,,

## 2019-04-29 PROCEDURE — 93793 PR ANTICOAGULANT MGMT FOR PT TAKING WARFARIN: ICD-10-PCS | Mod: ,,,

## 2019-05-06 ENCOUNTER — ANTI-COAG VISIT (OUTPATIENT)
Dept: CARDIOLOGY | Facility: CLINIC | Age: 84
End: 2019-05-06
Payer: MEDICARE

## 2019-05-06 DIAGNOSIS — Z86.73 HX-TIA (TRANSIENT ISCHEMIC ATTACK): ICD-10-CM

## 2019-05-06 DIAGNOSIS — Z79.01 LONG-TERM (CURRENT) USE OF ANTICOAGULANTS, INR GOAL 2.0-3.0: ICD-10-CM

## 2019-05-06 LAB — INR PPP: 3.2

## 2019-05-06 PROCEDURE — 93793 PR ANTICOAGULANT MGMT FOR PT TAKING WARFARIN: ICD-10-PCS | Mod: ,,,

## 2019-05-06 PROCEDURE — 93793 ANTICOAG MGMT PT WARFARIN: CPT | Mod: ,,,

## 2019-05-06 NOTE — PROGRESS NOTES
Verbal result taken from Pt called in_________. PT/INR _3.2______ Date drawn_05/06/19_______ Hardcopy to be faxed.

## 2019-05-09 ENCOUNTER — OFFICE VISIT (OUTPATIENT)
Dept: PODIATRY | Facility: CLINIC | Age: 84
End: 2019-05-09
Payer: MEDICARE

## 2019-05-09 VITALS
DIASTOLIC BLOOD PRESSURE: 69 MMHG | WEIGHT: 138 LBS | BODY MASS INDEX: 22.99 KG/M2 | HEIGHT: 65 IN | SYSTOLIC BLOOD PRESSURE: 129 MMHG

## 2019-05-09 DIAGNOSIS — L97.922 TRAUMATIC LEG ULCER, LEFT, WITH FAT LAYER EXPOSED: ICD-10-CM

## 2019-05-09 DIAGNOSIS — R60.0 BILATERAL LEG EDEMA: ICD-10-CM

## 2019-05-09 DIAGNOSIS — Z79.01 ANTICOAGULATED ON COUMADIN: ICD-10-CM

## 2019-05-09 DIAGNOSIS — G60.9 IDIOPATHIC PERIPHERAL NEUROPATHY: Primary | ICD-10-CM

## 2019-05-09 PROCEDURE — 99214 OFFICE O/P EST MOD 30 MIN: CPT | Mod: S$PBB,,, | Performed by: PODIATRIST

## 2019-05-09 PROCEDURE — 99999 PR PBB SHADOW E&M-EST. PATIENT-LVL III: CPT | Mod: PBBFAC,,, | Performed by: PODIATRIST

## 2019-05-09 PROCEDURE — 99213 OFFICE O/P EST LOW 20 MIN: CPT | Mod: PBBFAC,PO | Performed by: PODIATRIST

## 2019-05-09 PROCEDURE — 99214 PR OFFICE/OUTPT VISIT, EST, LEVL IV, 30-39 MIN: ICD-10-PCS | Mod: S$PBB,,, | Performed by: PODIATRIST

## 2019-05-09 PROCEDURE — 99999 PR PBB SHADOW E&M-EST. PATIENT-LVL III: ICD-10-PCS | Mod: PBBFAC,,, | Performed by: PODIATRIST

## 2019-05-09 NOTE — PROGRESS NOTES
Subjective:      Patient ID: Adela Garay is a 89 y.o. female.    Chief Complaint: No chief complaint on file.    Adela is a 89 y.o. female who presents to the clinic for evaluation and treatment of high risk feet. Adela has a past medical history of Anticoagulant long-term use, Anticoagulated on Coumadin, Aortic valve stenosis, Arthritis, Atrial fibrillation, Carotid artery occlusion, Chronic rhinosinusitis, Coronary artery disease, Granulomatous lung disease, Heart failure, Hyperlipidemia, Hypertension, Hypertensive heart disease without CHF (congestive heart failure), Mitral valve prolapse, PN (peripheral neuropathy), Severe aortic stenosis by prior echocardiogram, TIA (transient ischemic attack), Type 2 diabetes mellitus, and Type II or unspecified type diabetes mellitus without mention of complication, not stated as uncontrolled. The patient's chief complaint is long, thick toenails. This patient has documented high risk feet requiring routine maintenance secondary to peripheral neuropathy.    Per her son, patient had a fall that resulted in left leg ulcer.    04/22/19 Patient's son has been caring for leg as instructed.  Subjective improvement to wound and edema.  She has been elevating and watching sodium intake. Denies pain.  No new pedal complaints    05/9/19 Patient's son has been caring for leg as instructed.  Subjective improvement to wound.  She has been watching sodium intake. She relates that she has been sitting with her legs down more often. Patient admits to barefoot walking often in and around home. Denies pain.  No new pedal complaints    PCP: Torrie Farrell MD    Date Last Seen by PCP:   No chief complaint on file.      Current shoe gear:  SAS shoe, worn    Hemoglobin A1C   Date Value Ref Range Status   04/17/2019 5.8 (H) 4.0 - 5.6 % Final     Comment:     ADA Screening Guidelines:  5.7-6.4%  Consistent with prediabetes  >or=6.5%  Consistent with diabetes  High levels of fetal  hemoglobin interfere with the HbA1C  assay. Heterozygous hemoglobin variants (HbS, HgC, etc)do  not significantly interfere with this assay.   However, presence of multiple variants may affect accuracy.     09/26/2018 5.6 4.0 - 5.6 % Final     Comment:     ADA Screening Guidelines:  5.7-6.4%  Consistent with prediabetes  >or=6.5%  Consistent with diabetes  High levels of fetal hemoglobin interfere with the HbA1C  assay. Heterozygous hemoglobin variants (HbS, HgC, etc)do  not significantly interfere with this assay.   However, presence of multiple variants may affect accuracy.     04/12/2018 5.8 (H) 4.0 - 5.6 % Final     Comment:     According to ADA guidelines, hemoglobin A1c <7.0% represents  optimal control in non-pregnant diabetic patients. Different  metrics may apply to specific patient populations.   Standards of Medical Care in Diabetes-2016.  For the purpose of screening for the presence of diabetes:  <5.7%     Consistent with the absence of diabetes  5.7-6.4%  Consistent with increasing risk for diabetes   (prediabetes)  >or=6.5%  Consistent with diabetes  Currently, no consensus exists for use of hemoglobin A1c  for diagnosis of diabetes for children.  This Hemoglobin A1c assay has significant interference with fetal   hemoglobin   (HbF). The results are invalid for patients with abnormal amounts of   HbF,   including those with known Hereditary Persistence   of Fetal Hemoglobin. Heterozygous hemoglobin variants (HbAS, HbAC,   HbAD, HbAE, HbA2) do not significantly interfere with this assay;   however, presence of multiple variants in a sample may impact the %   interference.           Patient Active Problem List   Diagnosis    Hyperlipidemia    Coronary artery disease    Hx-TIA (transient ischemic attack)    Long-term (current) use of anticoagulants, INR goal 2.0-3.0    Benign hypertensive heart disease with congestive heart failure    Pulmonary hypertension    Carotid arterial disease    Carotid  aneurysm, left    Atrial fibrillation    Controlled type 2 diabetes mellitus with microalbuminuria    DDD (degenerative disc disease), cervical    Lumbar disc disease    Multiple lung nodules    Mild major depression    Idiopathic peripheral neuropathy    Atherosclerosis of aorta    Postinflammatory pulmonary fibrosis    Chronic cough    Coronary artery disease involving native coronary artery of native heart without angina pectoris    Controlled type 2 diabetes with neuropathy    S/P TAVR (transcatheter aortic valve replacement)    Cardiac pacemaker in situ    Examination of participant in clinical trial    Wheelchair dependence       Current Outpatient Medications on File Prior to Visit   Medication Sig Dispense Refill    blood sugar diagnostic (BLOOD GLUCOSE TEST) Strp 1 strip by Misc.(Non-Drug; Combo Route) route once daily. Currently using Nova max plus meter. 50 strip 11    bumetanide (BUMEX) 2 MG tablet Take 1 tablet (2 mg total) by mouth 2 (two) times daily as needed (Edema or kennedi gain). 60 tablet 11    digoxin (LANOXIN) 125 mcg tablet TAKE 1 TABLET (0.125 MG TOTAL) ONCE DAILY. 90 tablet 3    lisinopril (PRINIVIL,ZESTRIL) 2.5 MG tablet Take 1 tablet (2.5 mg total) by mouth once daily. 90 tablet 3    metoprolol tartrate (LOPRESSOR) 25 MG tablet Take 3 tablets (75 mg total) by mouth 2 (two) times daily. 270 tablet 1    potassium chloride (KLOR-CON) 10 MEQ TbSR Take 1 tablet (10 mEq total) by mouth 2 (two) times daily as needed (take when taking bumex). 60 tablet 11    simvastatin (ZOCOR) 20 MG tablet TAKE 1 TABLET EVERY EVENING 90 tablet 1    triamterene-hydrochlorothiazide 37.5-25 mg (DYAZIDE) 37.5-25 mg per capsule TAKE 1 CAPSULE EVERY DAY 90 capsule 1    warfarin (COUMADIN) 2 MG tablet Take 2 pills by mouth daily or as directed per the [facility] 175 tablet 3     No current facility-administered medications on file prior to visit.        Review of patient's allergies  indicates:   Allergen Reactions    Levaquin [levofloxacin]     Doxycycline hyclate Other (See Comments)     Unknown to patient    Iodine and iodide containing products     Neurontin  [gabapentin]      Other reaction(s): Unknown    Norpace  [disopyramide]      Other reaction(s): Hives    Phenytoin sodium extended      Other reaction(s): Muscle pain    Sulfamethoxazole-trimethoprim      Other reaction(s): Muscle cramps       Past Surgical History:   Procedure Laterality Date    CARDIAC PACEMAKER PLACEMENT  11/2017    CARDIAC VALVE REPLACEMENT  10/17/2017    aorta    HEART CATH-LEFT N/A 8/22/2017    [facility]    INCISION AND DRAINAGE (I & D) righ knee Right 11/19/2016    [facility]    INSERTION-PACEMAKER-DUAL Left 10/17/2017    [facility] at [facility] CATH LAB    REPLACEMENT-VALVE-AORTIC N/A 10/17/2017    [facility] CATH LAB    SINUS SURGERY      tonsillectomy      TONSILLECTOMY         Family History   Problem Relation Age of Onset    Heart disease Father         49    Heart disease Mother     Thyroid disease Sister     Atrial fibrillation Sister     Atrial fibrillation Sister         neice    Lymphoma Sister 29    Atrial fibrillation Sister     Asthma Neg Hx     Emphysema Neg Hx        Social History     Socioeconomic History    Marital status:      Spouse name: Not on file    Number of children: 6    Years of education: 2 college    Highest education level: Not on file   Occupational History    Occupation: retired housewife   Social Needs    Financial resource strain: Not on file    Food insecurity:     Worry: Not on file     Inability: Not on file    Transportation needs:     Medical: Not on file     Non-medical: Not on file   Tobacco Use    Smoking status: Never Smoker    Smokeless tobacco: Never Used   Substance and Sexual Activity    Alcohol use: Yes  "    Alcohol/week: 0.6 oz     Types: 1 Shots of liquor per week     Comment: rare    Drug use: No    Sexual activity: Never   Lifestyle    Physical activity:     Days per week: Not on file     Minutes per session: Not on file    Stress: Not on file   Relationships    Social connections:     Talks on phone: Not on file     Gets together: Not on file     Attends Mosque service: Not on file     Active member of club or organization: Not on file     Attends meetings of clubs or organizations: Not on file     Relationship status: Not on file   Other Topics Concern    Not on file   Social History Narrative    Not on file     Review of Systems   Constitutional: Negative for chills and fever.   Respiratory: Negative for cough.    Cardiovascular: Positive for leg swelling.   Gastrointestinal: Negative for nausea and vomiting.   Musculoskeletal: Positive for falls, joint pain and myalgias.   Skin: Negative for itching and rash.   Neurological: Positive for tingling and sensory change.         Objective:       Vitals:    05/09/19 0933   BP: 129/69   Weight: 62.6 kg (138 lb 0.1 oz)   Height: 5' 5" (1.651 m)   PainSc: 0-No pain       Physical Exam   Constitutional:  Non-toxic appearance. She does not have a sickly appearance. No distress.   Pt. is well-developed, well-nourished, appears stated age, in no acute distress, alert and oriented x 3. No evidence of depression, anxiety, or agitation. Calm, cooperative, and communicative. Appropriate interactions and affect.   Cardiovascular:   Pulses:       Dorsalis pedis pulses are 1+ on the right side, and 1+ on the left side.        Posterior tibial pulses are 1+ on the right side, and 1+ on the left side.   Dorsalis pedis and posterior tibial pulses are diminished Bilaterally. 1+ pitting edema, skin is atrophic, decreased digital hair, feet slightly cool, nails thickened, mild plantar rubor     Pulmonary/Chest: No respiratory distress.   Musculoskeletal:        Right ankle: " No tenderness. No lateral malleolus, no medial malleolus, no AITFL, no CF ligament and no posterior TFL tenderness found. Achilles tendon exhibits no pain, no defect and normal Harman's test results.        Left ankle: No tenderness. No lateral malleolus, no medial malleolus, no AITFL, no CF ligament and no posterior TFL tenderness found. Achilles tendon exhibits no pain, no defect and normal Harman's test results.        Right foot: There is no tenderness and no bony tenderness.        Left foot: There is no tenderness and no bony tenderness.   Patient has hammertoes of digits 2-5 bilateral partially reducible without symptom today.    2nd toes adduct against great toe    TA function absent L on muscle testing    Visible and palpable bunion without pain at dorsomedial 1st metatarsal head right and left.  Hallux abducted right and left partially reducible, tracks laterally without being track bound.  No ecchymosis, erythema, edema, or cardinal signs infection or signs of trauma same foot.     Lymphadenopathy:   No lymphatic streaking    Negative lymphadenopathy bilateral popliteal fossa and tarsal tunnel.     Neurological:   [facility] 5.07 monofilamant testing is diminished Moreno feet. Decreased/absent vibratory sensation bilateral feet to 128Hz tuning fork.    Skin: Skin is warm, dry and intact.  She is not diaphoretic. No cyanosis. No pallor. Nails show no clubbing.   Toenails 1-5 bilaterally are thickened by 2-3 mm, discolored/yellowed, dystrophic, brittle with subungual debris.     Focal hyperkeratotic lesion consisting entirely of hyperkeratotic tissue without open skin, drainage, pus, fluctuance, malodor: bilateral 5th digit PIPJ with localized erythema. And sub 1st MTPJ of the left foot    Ulcer location: left anterior leg  Measurements : 1.0x0.4x0.2 cm   Signs of infection: local edema and erythema  Drainage: Serous  Purulence: no  Crepitus/fluctuance: no  Periwound: Reddened  Base:  Thin epithelial  tissue    Psychiatric: Her mood appears not anxious. Her affect is not inappropriate. Her speech is not slurred. She is not combative. She is communicative. She is attentive.   Nursing note reviewed.      05/09/19 04/22/19 04/08  Left Leg          Right Leg              Assessment:       Encounter Diagnoses   Name Primary?    Idiopathic peripheral neuropathy Yes    Anticoagulated on Coumadin     Traumatic leg ulcer, left, with fat layer exposed     Bilateral leg edema          Plan:       Diagnoses and all orders for this visit:    Idiopathic peripheral neuropathy    Anticoagulated on Coumadin    Traumatic leg ulcer, left, with fat layer exposed    Bilateral leg edema      I counseled the patient on her conditions, their implications and medical management.      Greater than 50% of this visit spent on counseling and coordination of care.    Education about neuropathy and prevention of limb loss.    Discussed wound healing cycle, skin integrity, ways to care for skin.Counseled patient on the effects of PVD  on healing. She verbalizes understanding that it can increase the chances of delayed healing and this prolonged exposure leads to infection or progression of infection which subsequently can result in loss of limb.    Adequate vitamin supplementation, protein intake, and hydration - discussed with patient    The wound is cleansed of foreign material as much as possible and the base inspected for bone or abscess. Base is fibrogranular and without bone nor joint exposure. Aerobic and anerobic cultures swabs taken    Dressings:  endoform    Tubigrips to BLE; patient is to elevate legs. When sleeping, place a pillow under lower extremities. When sitting, support the legs so that they are level with the waist.    Detailed home care instructions dispensed.  Son will aid in care    Follow-up: 4-6 weeks but [facility] if any signs of infection, such as fever, chills, sweats,  increased redness or pain.    Short-term goals include maintaining good offloading and minimizing bioburden, promoting granulation and epithelialization to healing.  Long-term goals include keeping the wound healed by good offloading and medical management under the direction of internist.

## 2019-05-09 NOTE — PATIENT INSTRUCTIONS
Wound has healed well with no signs of continued skin breakdown noted   Ok to transition into normal shoe gear at this time. Check feet daily for signs of drainage or lesion re-opening   Use of daily foot moisturizer to feet, avoiding the webspace's    Recommend lotions: eucerin, eucerin for diabetics, aquaphor, A&D ointment, gold bond for diabetics, sween, [place]'s Bees all purpose baby ointment,  urea 40 with aloe (found on amazon.com)    Shoe recommendations: (try 6pm.com, zappos.com , nordstromrack.Advanced-Tec, or shoes.com for discounted prices) you can visit [employer]SW shoes in [place]  or [facility] in the [facility] (there are also several shoe brand outlets in the [facility])    Asics (GT 2000 or gel foundations), new balance stability type shoes (such as the 940 series), saucony (stabil c3),  Whitney (GTS or Beast or transcend), propet (tennis shoe)    Rene Jacobs (women) Jaxson&Bari (men), clarks, crocs, aerosoles, naturalizers, SAS, ecco, born, nehemiah chew, rockports (dress shoes)    Vionic, burkenstocks, fitflops, propet (sandals)  Nike comfort thong sandals, crocs, propet (house shoes)    Nail Home remedy:  Vicks Vapor rub to nails for easier manageability      Wearing Proper Shoes                    You walk on your feet every day, forcing them to support the weight of your body. Repeated stress on your feet can cause damage over time. The right shoes can help protect your feet. The wrong shoes can cause more foot problems. Read the information below to help you find a shoe that fits your foot needs.      A good shoe fit will cover your foot outline. A shoe that doesnt cover the outline is a bad fit.   Whats your foot shape?  To get a good fit, you need to know the shape of your foot. Do this simple test: While standing, place your foot on a piece of paper and trace around it. Is your foot straight or curved? Do you have a foot problem, such as a bunion, that causes your foot outline to show a bulge on the side  of your big toe?  Finding your fit  Bring your foot outline to the shoe store to help you find the right shoe. Place a shoe you like on top of the outline to see if it matches the shape. The shoe should cover the outline. (If you have a bunion, the shoe may not cover the bulge on the outline. Look for soft leather shoes to stretch over the bunion.) Once youve found a pair of proper shoes, put them on. Walk around. Be sure the shoes dont rub or pinch. If the shoes feel good, youve found your fit!  The right shoe for you  A good shoe has features that provide comfort and support. It must also be the right size and shape for your feet. Look for a shoe made of breathable fabric and lining, such as leather or canvas. Be sure that shoes have enough tread to prevent slipping. Go to a good shoe store for help finding the right shoe.  Good shoe features  An ideal shoe has the following:  · Laces for support. If tying laces is a problem for you, try shoes with Velcro fasteners or radha.  · A front of the shoe (toe box) with ½ inch space in front of your longest toes.  · An arch shape that supports your foot.  · No more than 1½ inches of heel.  · A stiff, snug back of the shoe to keep your foot from sliding around.  · A smooth lining with no rough seams.  Shoe shopping tips  Below are some dos and donts for when you go to the shoe store.  Do:  · Select the shoes that feel right. Wear them around the house. Then bring them to your foot healthcare provider to check for fit. If they dont fit well, return them.  · Shop late in the day, when your feet will be slightly bigger.  · Each time you buy shoes, have both your feet measured while you are standing. Foot size changes with time.  · Pick shoes to suit their purpose. High heels are OK for an occasional night on the town. But for everyday wear, choose a more sensible shoe.  · Try on shoes while wearing any inserts specially made for your feet (orthoses).  · Try on both  the right and left shoes. If your feet are different sizes, pick a pair that fits the larger foot.  Dont:  · Dont buy shoes based on shoe size alone. Always try on shoes, as sizes differ from brand to brand and within brands.  · Dont expect shoes to break in. If they dont fit at the store, dont buy them.  · Dont buy a shoe that doesnt match your foot shape.  What about socks?  Always wear socks with shoes. Socks help absorb sweat and reduce friction and blistering. When shopping for shoes, choose soft, padded socks with seams that dont irritate your feet.  If you have foot problems  Some foot problems cause deformities. This can make it hard to find a good fit. Look for shoes made of soft leather to stretch over the deformity. If you have bunions, buy shoes with a wider toe box. To fit hammertoes, look for shoes with a tall toe box. If you have arch problems, you may need inserts. In some cases, youll need to have custom footwear or orthoses made for your feet.  Suggested footwear  Ask your healthcare provider what kind of footwear you need. He or she may recommend a certain brand or shoe store.  Date Last Reviewed: 8/1/2016  © 0382-1451 [employer]. [address] [place] [address]. All rights reserved. This information is not intended as a substitute for professional medical care. Always follow your healthcare professional's instructions.        Step-by-Step:  Inspecting Your Feet   Date Last Reviewed: 10/1/2016  © 8012-2058 [employer]. [address] [place] [address]. All rights reserved. This information is not intended as a substitute for professional medical care. Always follow your healthcare professional's instructions.

## 2019-05-10 DIAGNOSIS — Z95.0 CARDIAC PACEMAKER IN SITU: Primary | ICD-10-CM

## 2019-05-13 ENCOUNTER — ANTI-COAG VISIT (OUTPATIENT)
Dept: CARDIOLOGY | Facility: CLINIC | Age: 84
End: 2019-05-13
Payer: MEDICARE

## 2019-05-13 DIAGNOSIS — Z79.01 LONG-TERM (CURRENT) USE OF ANTICOAGULANTS, INR GOAL 2.0-3.0: ICD-10-CM

## 2019-05-13 DIAGNOSIS — Z86.73 HX-TIA (TRANSIENT ISCHEMIC ATTACK): ICD-10-CM

## 2019-05-13 LAB — INR PPP: 2.5

## 2019-05-13 PROCEDURE — 93793 ANTICOAG MGMT PT WARFARIN: CPT | Mod: ,,,

## 2019-05-13 PROCEDURE — 93793 PR ANTICOAGULANT MGMT FOR PT TAKING WARFARIN: ICD-10-PCS | Mod: ,,,

## 2019-05-14 ENCOUNTER — CLINICAL SUPPORT (OUTPATIENT)
Dept: CARDIOLOGY | Facility: HOSPITAL | Age: 84
End: 2019-05-14
Attending: INTERNAL MEDICINE
Payer: MEDICARE

## 2019-05-14 ENCOUNTER — TELEPHONE (OUTPATIENT)
Dept: CARDIOLOGY | Facility: HOSPITAL | Age: 84
End: 2019-05-14

## 2019-05-14 DIAGNOSIS — Z95.0 CARDIAC PACEMAKER IN SITU: ICD-10-CM

## 2019-05-15 ENCOUNTER — ANTI-COAG VISIT (OUTPATIENT)
Dept: CARDIOLOGY | Facility: CLINIC | Age: 84
End: 2019-05-15

## 2019-05-15 ENCOUNTER — OFFICE VISIT (OUTPATIENT)
Dept: CARDIOLOGY | Facility: CLINIC | Age: 84
End: 2019-05-15
Payer: MEDICARE

## 2019-05-15 ENCOUNTER — LAB VISIT (OUTPATIENT)
Dept: LAB | Facility: HOSPITAL | Age: 84
End: 2019-05-15
Attending: INTERNAL MEDICINE
Payer: MEDICARE

## 2019-05-15 VITALS
DIASTOLIC BLOOD PRESSURE: 62 MMHG | OXYGEN SATURATION: 96 % | BODY MASS INDEX: 22.41 KG/M2 | HEIGHT: 65 IN | HEART RATE: 61 BPM | WEIGHT: 134.5 LBS | SYSTOLIC BLOOD PRESSURE: 136 MMHG

## 2019-05-15 DIAGNOSIS — R80.9 CONTROLLED TYPE 2 DIABETES MELLITUS WITH MICROALBUMINURIA, WITHOUT LONG-TERM CURRENT USE OF INSULIN: ICD-10-CM

## 2019-05-15 DIAGNOSIS — I25.10 CORONARY ARTERY DISEASE INVOLVING NATIVE CORONARY ARTERY OF NATIVE HEART WITHOUT ANGINA PECTORIS: ICD-10-CM

## 2019-05-15 DIAGNOSIS — Z86.73 HX-TIA (TRANSIENT ISCHEMIC ATTACK): Primary | ICD-10-CM

## 2019-05-15 DIAGNOSIS — Z95.0 CARDIAC PACEMAKER IN SITU: ICD-10-CM

## 2019-05-15 DIAGNOSIS — Z95.2 S/P TAVR (TRANSCATHETER AORTIC VALVE REPLACEMENT): ICD-10-CM

## 2019-05-15 DIAGNOSIS — I48.20 CHRONIC ATRIAL FIBRILLATION: ICD-10-CM

## 2019-05-15 DIAGNOSIS — E11.29 CONTROLLED TYPE 2 DIABETES MELLITUS WITH MICROALBUMINURIA, WITHOUT LONG-TERM CURRENT USE OF INSULIN: ICD-10-CM

## 2019-05-15 DIAGNOSIS — E78.2 MIXED HYPERLIPIDEMIA: ICD-10-CM

## 2019-05-15 DIAGNOSIS — Z79.01 LONG-TERM (CURRENT) USE OF ANTICOAGULANTS, INR GOAL 2.0-3.0: ICD-10-CM

## 2019-05-15 DIAGNOSIS — Z86.73 HX-TIA (TRANSIENT ISCHEMIC ATTACK): ICD-10-CM

## 2019-05-15 DIAGNOSIS — I27.20 PULMONARY HYPERTENSION: ICD-10-CM

## 2019-05-15 DIAGNOSIS — E11.40 CONTROLLED TYPE 2 DIABETES WITH NEUROPATHY: ICD-10-CM

## 2019-05-15 LAB
ANION GAP SERPL CALC-SCNC: 7 MMOL/L (ref 8–16)
BNP SERPL-MCNC: 1846 PG/ML (ref 0–99)
BUN SERPL-MCNC: 25 MG/DL (ref 10–30)
CALCIUM SERPL-MCNC: 10.1 MG/DL (ref 8.7–10.5)
CHLORIDE SERPL-SCNC: 96 MMOL/L (ref 95–110)
CO2 SERPL-SCNC: 31 MMOL/L (ref 23–29)
CREAT SERPL-MCNC: 1 MG/DL (ref 0.5–1.4)
EST. GFR  (AFRICAN AMERICAN): 56.9 ML/MIN/1.73 M^2
EST. GFR  (NON AFRICAN AMERICAN): 49.4 ML/MIN/1.73 M^2
GLUCOSE SERPL-MCNC: 110 MG/DL (ref 70–110)
INR PPP: 2.4 (ref 0.8–1.2)
POTASSIUM SERPL-SCNC: 4.8 MMOL/L (ref 3.5–5.1)
PROTHROMBIN TIME: 23.6 SEC (ref 9–12.5)
SODIUM SERPL-SCNC: 134 MMOL/L (ref 136–145)

## 2019-05-15 PROCEDURE — 99999 PR PBB SHADOW E&M-EST. PATIENT-LVL III: CPT | Mod: PBBFAC,,, | Performed by: INTERNAL MEDICINE

## 2019-05-15 PROCEDURE — 99999 PR PBB SHADOW E&M-EST. PATIENT-LVL III: ICD-10-PCS | Mod: PBBFAC,,, | Performed by: INTERNAL MEDICINE

## 2019-05-15 PROCEDURE — 85610 PROTHROMBIN TIME: CPT

## 2019-05-15 PROCEDURE — 99213 OFFICE O/P EST LOW 20 MIN: CPT | Mod: PBBFAC | Performed by: INTERNAL MEDICINE

## 2019-05-15 PROCEDURE — 99213 OFFICE O/P EST LOW 20 MIN: CPT | Mod: S$PBB,,, | Performed by: INTERNAL MEDICINE

## 2019-05-15 PROCEDURE — 80048 BASIC METABOLIC PNL TOTAL CA: CPT

## 2019-05-15 PROCEDURE — 99213 PR OFFICE/OUTPT VISIT, EST, LEVL III, 20-29 MIN: ICD-10-PCS | Mod: S$PBB,,, | Performed by: INTERNAL MEDICINE

## 2019-05-15 PROCEDURE — 36415 COLL VENOUS BLD VENIPUNCTURE: CPT | Mod: PO

## 2019-05-15 PROCEDURE — 83880 ASSAY OF NATRIURETIC PEPTIDE: CPT

## 2019-05-15 NOTE — PROGRESS NOTES
Subjective:    Patient ID:  Adela Garay is a 89 y.o. female who presents for follow-up of Congestive Heart Failure      HPI     Severe AS - s/p TAVR 10/17/17, complete heart block - [facility] single PPM 10/17/17  No CAD by [facility] 8/22/17  Chronic A-fib - rate controlled on coumadin, HTN, DM, Hx TIA  Diastolic CHF  Re-admitted after TAVR with CHF 10/30-11/12/17     Echo 9/26/18    1 - Low normal to mildly depressed left ventricular systolic function (EF 50-55%).     2 - Wall motion abnormalities.     3 - Eccentric hypertrophy.     4 - Biatrial enlargement.     5 - Mildly enlarged ascending aorta.     6 - Right ventricular enlargement with moderately depressed systolic function.     7 - The estimated PA systolic pressure is 34 mmHg.     8 - S/P transcatheter AVR, DILAN = 2.12 cm2, AVAi = 1.28 cm2/m2, peak velocity = 1.6 m/s, mean gradient = 6 mmHg.     9 - Trivial paravalvular aortic regurgitation.     10 - Mild to moderate mitral regurgitation.     11 - Severe tricuspid regurgitation.     12 - Increased central venous pressure.     13 - S/p 29mm [place] TF TAVR.                    11/7/18 Had been holding diuretics but became increasingly confused with LE edema  Took bumex 2 days in a row with improvement  Mainly with fatigue and poor appetite  Will try dosing bumex 0.5 mg MWF     11/29/18Breathing and weight have been stable  Needs an extra dose of bumex on occasion     1/9/19 Overall doing well. More active  Weight has gone up a few pounds in recent days  Denies CP  Some postural dizziness  Ok to increase bumex prn  OV 2 months with BNP, BMP     3/14/19 Weight and LE edema up some  ESTRADA has been stable  Uses walker at home less   Increase bumex to bid until LE edema improved  OV 1 month with BNP, BMP     4/18/19 LE was doing well until yesterday when she ate too much salt  Less SOB  Denies CP  EKG A-sensed V-paced     Labs 5/15/19  K 4.8  Cr 1.0  BNP 1846 up from 1499    Feels good  Weight, SOB and LE edema have  been stable        Review of Systems   Constitution: Negative for decreased appetite.   HENT: Negative for ear discharge.    Eyes: Negative for blurred vision.   Respiratory: Negative for hemoptysis.    Endocrine: Negative for polyphagia.   Hematologic/Lymphatic: Negative for adenopathy.   Skin: Negative for color change.   Musculoskeletal: Negative for joint swelling.   Genitourinary: Negative for bladder incontinence.   Neurological: Negative for brief paralysis.   Psychiatric/Behavioral: Negative for hallucinations.   Allergic/Immunologic: Negative for hives.        Objective:    Physical Exam   Constitutional: She is oriented to person, place, and time. She appears well-developed and well-nourished.   HENT:   Head: Normocephalic and atraumatic.   Nose: Nose normal.   Eyes: Pupils are equal, round, and reactive to light. Conjunctivae are normal.   Neck: Normal range of motion. Neck supple.   Cardiovascular: Normal rate, regular rhythm, S1 normal and S2 normal.   Murmur heard.   Harsh midsystolic murmur is present at the upper right sternal border radiating to the neck.  Pulses:       Radial pulses are 2+ on the right side, and 2+ on the left side.   Pulmonary/Chest: Effort normal. No respiratory distress. She has decreased breath sounds in the right lower field and the left lower field.   Device to LUCW.   Abdominal: Soft. Normal appearance and bowel sounds are normal.   Musculoskeletal: Normal range of motion. She exhibits no edema.   Neurological: She is alert and oriented to person, place, and time.   Skin: Skin is warm and dry. No erythema.   Psychiatric: She has a normal mood and affect. Her speech is normal and behavior is normal.   Nursing note and vitals reviewed.        Assessment:       1. Hx-TIA (transient ischemic attack)    2. Pulmonary hypertension    3. Mixed hyperlipidemia    4. Coronary artery disease involving native coronary artery of native heart without angina pectoris    5. S/P TAVR  (transcatheter aortic valve replacement)    6. Cardiac pacemaker in situ    7. Controlled type 2 diabetes mellitus with microalbuminuria, without long-term current use of insulin         Plan:       CHF clinically stable  OV 2 months with BNP, BMP

## 2019-05-20 ENCOUNTER — ANTI-COAG VISIT (OUTPATIENT)
Dept: CARDIOLOGY | Facility: CLINIC | Age: 84
End: 2019-05-20
Payer: MEDICARE

## 2019-05-20 DIAGNOSIS — Z79.01 LONG-TERM (CURRENT) USE OF ANTICOAGULANTS, INR GOAL 2.0-3.0: ICD-10-CM

## 2019-05-20 DIAGNOSIS — Z86.73 HX-TIA (TRANSIENT ISCHEMIC ATTACK): ICD-10-CM

## 2019-05-20 LAB — INR PPP: 2.2

## 2019-05-20 PROCEDURE — 93793 ANTICOAG MGMT PT WARFARIN: CPT | Mod: ,,,

## 2019-05-20 PROCEDURE — 93793 PR ANTICOAGULANT MGMT FOR PT TAKING WARFARIN: ICD-10-PCS | Mod: ,,,

## 2019-05-27 ENCOUNTER — ANTI-COAG VISIT (OUTPATIENT)
Dept: CARDIOLOGY | Facility: CLINIC | Age: 84
End: 2019-05-27
Payer: MEDICARE

## 2019-05-27 DIAGNOSIS — Z86.73 HX-TIA (TRANSIENT ISCHEMIC ATTACK): ICD-10-CM

## 2019-05-27 DIAGNOSIS — Z79.01 LONG-TERM (CURRENT) USE OF ANTICOAGULANTS, INR GOAL 2.0-3.0: ICD-10-CM

## 2019-05-27 LAB — INR PPP: 2

## 2019-05-27 PROCEDURE — 93793 PR ANTICOAGULANT MGMT FOR PT TAKING WARFARIN: ICD-10-PCS | Mod: ,,,

## 2019-05-27 PROCEDURE — 93793 ANTICOAG MGMT PT WARFARIN: CPT | Mod: ,,,

## 2019-06-03 ENCOUNTER — ANTI-COAG VISIT (OUTPATIENT)
Dept: CARDIOLOGY | Facility: CLINIC | Age: 84
End: 2019-06-03
Payer: MEDICARE

## 2019-06-03 DIAGNOSIS — Z79.01 LONG-TERM (CURRENT) USE OF ANTICOAGULANTS, INR GOAL 2.0-3.0: ICD-10-CM

## 2019-06-03 DIAGNOSIS — Z86.73 HX-TIA (TRANSIENT ISCHEMIC ATTACK): ICD-10-CM

## 2019-06-03 LAB — INR PPP: 2.2

## 2019-06-03 PROCEDURE — 93793 ANTICOAG MGMT PT WARFARIN: CPT | Mod: ,,,

## 2019-06-03 PROCEDURE — 93793 PR ANTICOAGULANT MGMT FOR PT TAKING WARFARIN: ICD-10-PCS | Mod: ,,,

## 2019-06-06 ENCOUNTER — OFFICE VISIT (OUTPATIENT)
Dept: PODIATRY | Facility: CLINIC | Age: 84
End: 2019-06-06
Payer: MEDICARE

## 2019-06-06 VITALS
HEIGHT: 65 IN | WEIGHT: 134 LBS | SYSTOLIC BLOOD PRESSURE: 122 MMHG | DIASTOLIC BLOOD PRESSURE: 67 MMHG | BODY MASS INDEX: 22.33 KG/M2

## 2019-06-06 DIAGNOSIS — G60.9 IDIOPATHIC PERIPHERAL NEUROPATHY: Primary | ICD-10-CM

## 2019-06-06 DIAGNOSIS — Z79.01 ANTICOAGULATED ON COUMADIN: ICD-10-CM

## 2019-06-06 DIAGNOSIS — R60.0 BILATERAL LEG EDEMA: ICD-10-CM

## 2019-06-06 PROCEDURE — 99999 PR PBB SHADOW E&M-EST. PATIENT-LVL III: ICD-10-PCS | Mod: PBBFAC,,, | Performed by: PODIATRIST

## 2019-06-06 PROCEDURE — 99999 PR PBB SHADOW E&M-EST. PATIENT-LVL III: CPT | Mod: PBBFAC,,, | Performed by: PODIATRIST

## 2019-06-06 PROCEDURE — 99213 OFFICE O/P EST LOW 20 MIN: CPT | Mod: S$PBB,,, | Performed by: PODIATRIST

## 2019-06-06 PROCEDURE — 99213 OFFICE O/P EST LOW 20 MIN: CPT | Mod: PBBFAC,PO | Performed by: PODIATRIST

## 2019-06-06 PROCEDURE — 99213 PR OFFICE/OUTPT VISIT, EST, LEVL III, 20-29 MIN: ICD-10-PCS | Mod: S$PBB,,, | Performed by: PODIATRIST

## 2019-06-06 NOTE — PROGRESS NOTES
Subjective:      Patient ID: Adela Garay is a 89 y.o. female.    Chief Complaint: Foot Pain (peripheral neuropathy left leg (Pcp Dr. Farrell 3/1/19)) and Wound Check    Adela is a 89 y.o. female who presents to the clinic for evaluation and treatment of high risk feet. Adela has a past medical history of Anticoagulant long-term use, Anticoagulated on Coumadin, Aortic valve stenosis, Arthritis, Atrial fibrillation, Carotid artery occlusion, Chronic rhinosinusitis, Coronary artery disease, Granulomatous lung disease, Heart failure, Hyperlipidemia, Hypertension, Hypertensive heart disease without CHF (congestive heart failure), Mitral valve prolapse, PN (peripheral neuropathy), Severe aortic stenosis by prior echocardiogram, TIA (transient ischemic attack), Type 2 diabetes mellitus, and Type II or unspecified type diabetes mellitus without mention of complication, not stated as uncontrolled. The patient's chief complaint is long, thick toenails. This patient has documented high risk feet requiring routine maintenance secondary to peripheral neuropathy.    Per her son, patient had a fall that resulted in left leg ulcer.    04/22/19 Patient's son has been caring for leg as instructed.  Subjective improvement to wound and edema.  She has been elevating and watching sodium intake. Denies pain.  No new pedal complaints    05/9/19 Patient's son has been caring for leg as instructed.  Subjective improvement to wound.  She has been watching sodium intake. She relates that she has been sitting with her legs down more often. Patient admits to barefoot walking often in and around home. Denies pain.  No new pedal complaints    06/06/19  Patient's son has been caring for leg as instructed.  Subjective healed wound.  Denies pain.  No new pedal complaints    PCP: Torrie Farrell MD    Date Last Seen by PCP:   Chief Complaint   Patient presents with    Foot Pain     peripheral neuropathy left leg (Pcp Dr. Farrell 3/1/19)     Wound Check       Current shoe gear:  WADE shoe, new fitted    Hemoglobin A1C   Date Value Ref Range Status   04/17/2019 5.8 (H) 4.0 - 5.6 % Final     Comment:     ADA Screening Guidelines:  5.7-6.4%  Consistent with prediabetes  >or=6.5%  Consistent with diabetes  High levels of fetal hemoglobin interfere with the HbA1C  assay. Heterozygous hemoglobin variants (HbS, HgC, etc)do  not significantly interfere with this assay.   However, presence of multiple variants may affect accuracy.     09/26/2018 5.6 4.0 - 5.6 % Final     Comment:     ADA Screening Guidelines:  5.7-6.4%  Consistent with prediabetes  >or=6.5%  Consistent with diabetes  High levels of fetal hemoglobin interfere with the HbA1C  assay. Heterozygous hemoglobin variants (HbS, HgC, etc)do  not significantly interfere with this assay.   However, presence of multiple variants may affect accuracy.     04/12/2018 5.8 (H) 4.0 - 5.6 % Final     Comment:     According to ADA guidelines, hemoglobin A1c <7.0% represents  optimal control in non-pregnant diabetic patients. Different  metrics may apply to specific patient populations.   Standards of Medical Care in Diabetes-2016.  For the purpose of screening for the presence of diabetes:  <5.7%     Consistent with the absence of diabetes  5.7-6.4%  Consistent with increasing risk for diabetes   (prediabetes)  >or=6.5%  Consistent with diabetes  Currently, no consensus exists for use of hemoglobin A1c  for diagnosis of diabetes for children.  This Hemoglobin A1c assay has significant interference with fetal   hemoglobin   (HbF). The results are invalid for patients with abnormal amounts of   HbF,   including those with known Hereditary Persistence   of Fetal Hemoglobin. Heterozygous hemoglobin variants (HbAS, HbAC,   HbAD, HbAE, HbA2) do not significantly interfere with this assay;   however, presence of multiple variants in a sample may impact the %   interference.           Patient Active Problem List    Diagnosis    Hyperlipidemia    Coronary artery disease    Hx-TIA (transient ischemic attack)    Long-term (current) use of anticoagulants, INR goal 2.0-3.0    Benign hypertensive heart disease with congestive heart failure    Pulmonary hypertension    Carotid arterial disease    Carotid aneurysm, left    Atrial fibrillation    Controlled type 2 diabetes mellitus with microalbuminuria    DDD (degenerative disc disease), cervical    Lumbar disc disease    Multiple lung nodules    Mild major depression    Idiopathic peripheral neuropathy    Atherosclerosis of aorta    Postinflammatory pulmonary fibrosis    Chronic cough    Coronary artery disease involving native coronary artery of native heart without angina pectoris    Controlled type 2 diabetes with neuropathy    S/P TAVR (transcatheter aortic valve replacement)    Cardiac pacemaker in situ    Examination of participant in clinical trial    Wheelchair dependence       Current Outpatient Medications on File Prior to Visit   Medication Sig Dispense Refill    blood sugar diagnostic (BLOOD GLUCOSE TEST) Strp 1 strip by Misc.(Non-Drug; Combo Route) route once daily. Currently using Nova max plus meter. 50 strip 11    digoxin (LANOXIN) 125 mcg tablet TAKE 1 TABLET (0.125 MG TOTAL) ONCE DAILY. 90 tablet 3    metoprolol tartrate (LOPRESSOR) 25 MG tablet Take 3 tablets (75 mg total) by mouth 2 (two) times daily. 270 tablet 1    potassium chloride (KLOR-CON) 10 MEQ TbSR Take 1 tablet (10 mEq total) by mouth 2 (two) times daily as needed (take when taking bumex). 60 tablet 11    simvastatin (ZOCOR) 20 MG tablet TAKE 1 TABLET EVERY EVENING 90 tablet 1    triamterene-hydrochlorothiazide 37.5-25 mg (DYAZIDE) 37.5-25 mg per capsule TAKE 1 CAPSULE EVERY DAY 90 capsule 1    warfarin (COUMADIN) 2 MG tablet Take 2 pills by mouth daily or as directed per the [facility] 175 tablet 3    bumetanide (BUMEX) 2 MG tablet Take 1 tablet (2 mg total) by  mouth 2 (two) times daily as needed (Edema or kennedi gain). 60 tablet 11    lisinopril (PRINIVIL,ZESTRIL) 2.5 MG tablet Take 1 tablet (2.5 mg total) by mouth once daily. 90 tablet 3     No current facility-administered medications on file prior to visit.        Review of patient's allergies indicates:   Allergen Reactions    Levaquin [levofloxacin]     Doxycycline hyclate Other (See Comments)     Unknown to patient    Iodine and iodide containing products     Neurontin  [gabapentin]      Other reaction(s): Unknown    Norpace  [disopyramide]      Other reaction(s): Hives    Phenytoin sodium extended      Other reaction(s): Muscle pain    Sulfamethoxazole-trimethoprim      Other reaction(s): Muscle cramps       Past Surgical History:   Procedure Laterality Date    CARDIAC PACEMAKER PLACEMENT  11/2017    CARDIAC VALVE REPLACEMENT  10/17/2017    aorta    HEART CATH-LEFT N/A 8/22/2017    [facility]    INCISION AND DRAINAGE (I & D) righ knee Right 11/19/2016    [facility]    INSERTION-PACEMAKER-DUAL Left 10/17/2017    [facility] at [facility] CATH LAB    REPLACEMENT-VALVE-AORTIC N/A 10/17/2017    [facility] CATH LAB    SINUS SURGERY      tonsillectomy      TONSILLECTOMY         Family History   Problem Relation Age of Onset    Heart disease Father         49    Heart disease Mother     Thyroid disease Sister     Atrial fibrillation Sister     Atrial fibrillation Sister         neice    Lymphoma Sister 29    Atrial fibrillation Sister     Asthma Neg Hx     Emphysema Neg Hx        Social History     Socioeconomic History    Marital status:      Spouse name: Not on file    Number of children: 6    Years of education: 2 college    Highest education level: Not on file   Occupational History    Occupation: retired housewife   Social Needs    Financial resource strain: Not  "on file    Food insecurity:     Worry: Not on file     Inability: Not on file    Transportation needs:     Medical: Not on file     Non-medical: Not on file   Tobacco Use    Smoking status: Never Smoker    Smokeless tobacco: Never Used   Substance and Sexual Activity    Alcohol use: Yes     Alcohol/week: 0.6 oz     Types: 1 Shots of liquor per week     Comment: rare    Drug use: No    Sexual activity: Never   Lifestyle    Physical activity:     Days per week: Not on file     Minutes per session: Not on file    Stress: Not on file   Relationships    Social connections:     Talks on phone: Not on file     Gets together: Not on file     Attends Shinto service: Not on file     Active member of club or organization: Not on file     Attends meetings of clubs or organizations: Not on file     Relationship status: Not on file   Other Topics Concern    Not on file   Social History Narrative    Not on file     Review of Systems   Constitutional: Negative for chills and fever.   Respiratory: Negative for cough.    Cardiovascular: Positive for leg swelling.   Gastrointestinal: Negative for nausea and vomiting.   Musculoskeletal: Positive for falls, joint pain and myalgias.   Skin: Negative for itching and rash.   Neurological: Positive for tingling and sensory change.         Objective:       Vitals:    06/06/19 0935   BP: 122/67   Weight: 60.8 kg (134 lb)   Height: 5' 5" (1.651 m)   PainSc: 0-No pain       Physical Exam   Constitutional:  Non-toxic appearance. She does not have a sickly appearance. No distress.   Pt. is well-developed, well-nourished, appears stated age, in no acute distress, alert and oriented x 3. No evidence of depression, anxiety, or agitation. Calm, cooperative, and communicative. Appropriate interactions and affect.   Cardiovascular:   Pulses:       Dorsalis pedis pulses are 1+ on the right side, and 1+ on the left side.        Posterior tibial pulses are 1+ on the right side, and 1+ on " the left side.   Dorsalis pedis and posterior tibial pulses are diminished Bilaterally. 1+ pitting edema, skin is atrophic, decreased digital hair, feet slightly cool, nails thickened, mild plantar rubor     Pulmonary/Chest: No respiratory distress.   Musculoskeletal:        Right ankle: No tenderness. No lateral malleolus, no medial malleolus, no AITFL, no CF ligament and no posterior TFL tenderness found. Achilles tendon exhibits no pain, no defect and normal Harman's test results.        Left ankle: No tenderness. No lateral malleolus, no medial malleolus, no AITFL, no CF ligament and no posterior TFL tenderness found. Achilles tendon exhibits no pain, no defect and normal Harman's test results.        Right foot: There is no tenderness and no bony tenderness.        Left foot: There is no tenderness and no bony tenderness.   Patient has hammertoes of digits 2-5 bilateral partially reducible without symptom today.    2nd toes adduct against great toe    TA function absent L on muscle testing    Visible and palpable bunion without pain at dorsomedial 1st metatarsal head right and left.  Hallux abducted right and left partially reducible, tracks laterally without being track bound.  No ecchymosis, erythema, edema, or cardinal signs infection or signs of trauma same foot.     Lymphadenopathy:   No lymphatic streaking    Negative lymphadenopathy bilateral popliteal fossa and tarsal tunnel.     Neurological:   [place]-Jakub 5.07 monofilamant testing is diminished Moreno feet. Decreased/absent vibratory sensation bilateral feet to 128Hz tuning fork.    Skin: Skin is warm, dry and intact.  She is not diaphoretic. No cyanosis. No pallor. Nails show no clubbing.   Toenails 1-5 bilaterally are thickened by 2-3 mm, discolored/yellowed, dystrophic, brittle with subungual debris.     Focal hyperkeratotic lesion consisting entirely of hyperkeratotic tissue without open skin, drainage, pus, fluctuance, malodor: bilateral  5th digit PIPJ with localized erythema. And sub 1st MTPJ of the left foot    Ulcer location: left anterior leg  Measurements : 0.0x0.0x0.0 cm   Signs of infection: local edema   Drainage: none  Purulence: no  Crepitus/fluctuance: no  Periwound: Reddened  Base:  Thin epithelial tissue    Psychiatric: Her mood appears not anxious. Her affect is not inappropriate. Her speech is not slurred. She is not combative. She is communicative. She is attentive.   Nursing note reviewed.    05/09/19 04/22/19 04/08  Left Leg          Right Leg              Assessment:       Encounter Diagnoses   Name Primary?    Idiopathic peripheral neuropathy Yes    Anticoagulated on Coumadin     Bilateral leg edema          Plan:       Adela was seen today for foot pain and wound check.    Diagnoses and all orders for this visit:    Idiopathic peripheral neuropathy    Anticoagulated on Coumadin    Bilateral leg edema      I counseled the patient on her conditions, their implications and medical management.      Greater than 50% of this visit spent on counseling and coordination of care.    Education about neuropathy and prevention of limb loss.    Discussed wound healing cycle, skin integrity, ways to care for skin.Counseled patient on the effects of PVD  on healing. She verbalizes understanding that it can increase the chances of delayed healing and this prolonged exposure leads to infection or progression of infection which subsequently can result in loss of limb.    Adequate vitamin supplementation, protein intake, and hydration - discussed with patient    Wound has healed well with no signs of continued skin breakdown noted   I advised patient to check skin daily for signs of drainage or lesion re-opening   Discussed use of daily foot moisturizer to feet, avoiding the webspace's    Tubigrips to BLE; patient is to elevate legs. When sleeping, place a pillow under lower extremities. When sitting, support the legs so  that they are level with the waist.    Detailed home care instructions dispensed.  Son will aid in care    Follow-up: 8-12 weeks but should call [facility] immediately if any signs of infection, such as fever, chills, sweats, increased redness or pain.     Long-term goals include keeping the wound healed by good offloading and medical management under the direction of internist.

## 2019-06-10 ENCOUNTER — ANTI-COAG VISIT (OUTPATIENT)
Dept: CARDIOLOGY | Facility: CLINIC | Age: 84
End: 2019-06-10
Payer: MEDICARE

## 2019-06-10 DIAGNOSIS — Z79.01 LONG-TERM (CURRENT) USE OF ANTICOAGULANTS, INR GOAL 2.0-3.0: ICD-10-CM

## 2019-06-10 DIAGNOSIS — Z86.73 HX-TIA (TRANSIENT ISCHEMIC ATTACK): ICD-10-CM

## 2019-06-10 LAB — INR PPP: 2

## 2019-06-10 PROCEDURE — 93793 ANTICOAG MGMT PT WARFARIN: CPT | Mod: ,,,

## 2019-06-10 PROCEDURE — 93793 PR ANTICOAGULANT MGMT FOR PT TAKING WARFARIN: ICD-10-PCS | Mod: ,,,

## 2019-06-10 NOTE — PROGRESS NOTES
Patient was called regarding result, verified correct dose of coumadin, Patient reports no changes, Patient was advised to continue same dose of coumadin and to start testing every other Monday and given next lab date, Patient verbalized understanding

## 2019-06-24 ENCOUNTER — ANTI-COAG VISIT (OUTPATIENT)
Dept: CARDIOLOGY | Facility: CLINIC | Age: 84
End: 2019-06-24
Payer: MEDICARE

## 2019-06-24 DIAGNOSIS — Z86.73 HX-TIA (TRANSIENT ISCHEMIC ATTACK): ICD-10-CM

## 2019-06-24 DIAGNOSIS — Z79.01 LONG-TERM (CURRENT) USE OF ANTICOAGULANTS, INR GOAL 2.0-3.0: ICD-10-CM

## 2019-06-24 LAB — INR PPP: 1.8

## 2019-06-24 PROCEDURE — 93793 PR ANTICOAGULANT MGMT FOR PT TAKING WARFARIN: ICD-10-PCS | Mod: ,,,

## 2019-06-24 PROCEDURE — 93793 ANTICOAG MGMT PT WARFARIN: CPT | Mod: ,,,

## 2019-06-24 NOTE — PROGRESS NOTES
Patient was advised of coumadin instructions: 6/24 -4mg then resume 2mg daily except 4mg -Wednesday and Saturday and re test the INR -7/08/19, patient was also advised to maintain ensure 1-2 per week and if and when she decides to increase the ensure amount to call coumadin clinic so her coumadin can be worked around the ensure increase, patient verbalized understanding

## 2019-06-24 NOTE — PROGRESS NOTES
INR low due to diet change. Patient started drinking Ensure daily since last INR. She reports she will stop it. She can have Ensure in moderation ~ like 1-2 per week. If she decides to do more that that we will need to increase her dose. Please have her call us if she wants to change her diet with Ensure. No other changes pertinent to INR. Boost dose today then resume maintenance dose.

## 2019-07-05 DIAGNOSIS — Z95.2 S/P TAVR (TRANSCATHETER AORTIC VALVE REPLACEMENT): Primary | ICD-10-CM

## 2019-07-05 DIAGNOSIS — Z00.6 EXAMINATION OF PARTICIPANT IN CLINICAL TRIAL: ICD-10-CM

## 2019-07-08 ENCOUNTER — ANTI-COAG VISIT (OUTPATIENT)
Dept: CARDIOLOGY | Facility: CLINIC | Age: 84
End: 2019-07-08
Payer: MEDICARE

## 2019-07-08 DIAGNOSIS — Z86.73 HX-TIA (TRANSIENT ISCHEMIC ATTACK): ICD-10-CM

## 2019-07-08 DIAGNOSIS — Z79.01 LONG-TERM (CURRENT) USE OF ANTICOAGULANTS, INR GOAL 2.0-3.0: ICD-10-CM

## 2019-07-08 LAB — INR PPP: 1.9

## 2019-07-08 PROCEDURE — 93793 PR ANTICOAGULANT MGMT FOR PT TAKING WARFARIN: ICD-10-PCS | Mod: ,,,

## 2019-07-08 PROCEDURE — 93793 ANTICOAG MGMT PT WARFARIN: CPT | Mod: ,,,

## 2019-07-15 ENCOUNTER — ANTI-COAG VISIT (OUTPATIENT)
Dept: CARDIOLOGY | Facility: CLINIC | Age: 84
End: 2019-07-15
Payer: MEDICARE

## 2019-07-15 DIAGNOSIS — Z79.01 LONG-TERM (CURRENT) USE OF ANTICOAGULANTS, INR GOAL 2.0-3.0: ICD-10-CM

## 2019-07-15 DIAGNOSIS — Z86.73 HX-TIA (TRANSIENT ISCHEMIC ATTACK): ICD-10-CM

## 2019-07-15 LAB — INR PPP: 2.5

## 2019-07-15 PROCEDURE — 93793 PR ANTICOAGULANT MGMT FOR PT TAKING WARFARIN: ICD-10-PCS | Mod: ,,,

## 2019-07-15 PROCEDURE — 93793 ANTICOAG MGMT PT WARFARIN: CPT | Mod: ,,,

## 2019-07-15 NOTE — PROGRESS NOTES
Daughter in law advised to maintain dosage of warfarin. Limit (Ensure) 1-2 per week. Daughter in law verbalized understanding.

## 2019-07-20 DIAGNOSIS — I48.19 PERSISTENT ATRIAL FIBRILLATION: ICD-10-CM

## 2019-07-20 DIAGNOSIS — I48.0 PAROXYSMAL ATRIAL FIBRILLATION: ICD-10-CM

## 2019-07-20 DIAGNOSIS — E78.5 HYPERLIPIDEMIA, UNSPECIFIED HYPERLIPIDEMIA TYPE: ICD-10-CM

## 2019-07-21 RX ORDER — WARFARIN 2 MG/1
TABLET ORAL
Qty: 175 TABLET | Refills: 0 | Status: SHIPPED | OUTPATIENT
Start: 2019-07-21 | End: 2019-09-21 | Stop reason: SDUPTHER

## 2019-07-21 RX ORDER — SIMVASTATIN 20 MG/1
TABLET, FILM COATED ORAL
Qty: 90 TABLET | Refills: 0 | Status: SHIPPED | OUTPATIENT
Start: 2019-07-21 | End: 2019-09-21 | Stop reason: SDUPTHER

## 2019-07-21 RX ORDER — METOPROLOL TARTRATE 25 MG/1
75 TABLET, FILM COATED ORAL 2 TIMES DAILY
Qty: 540 TABLET | Refills: 0 | Status: SHIPPED | OUTPATIENT
Start: 2019-07-21 | End: 2019-09-21 | Stop reason: SDUPTHER

## 2019-07-22 ENCOUNTER — ANTI-COAG VISIT (OUTPATIENT)
Dept: CARDIOLOGY | Facility: CLINIC | Age: 84
End: 2019-07-22
Payer: MEDICARE

## 2019-07-22 DIAGNOSIS — Z86.73 HX-TIA (TRANSIENT ISCHEMIC ATTACK): ICD-10-CM

## 2019-07-22 DIAGNOSIS — Z79.01 LONG-TERM (CURRENT) USE OF ANTICOAGULANTS, INR GOAL 2.0-3.0: ICD-10-CM

## 2019-07-22 LAB — INR PPP: 2.3

## 2019-07-22 PROCEDURE — 93793 ANTICOAG MGMT PT WARFARIN: CPT | Mod: ,,,

## 2019-07-22 PROCEDURE — 93793 PR ANTICOAGULANT MGMT FOR PT TAKING WARFARIN: ICD-10-PCS | Mod: ,,,

## 2019-07-29 ENCOUNTER — TELEPHONE (OUTPATIENT)
Dept: FAMILY MEDICINE | Facility: CLINIC | Age: 84
End: 2019-07-29

## 2019-07-29 DIAGNOSIS — Z00.6 EXAMINATION OF PARTICIPANT IN CLINICAL TRIAL: ICD-10-CM

## 2019-07-29 DIAGNOSIS — Z95.2 S/P TAVR (TRANSCATHETER AORTIC VALVE REPLACEMENT): Primary | ICD-10-CM

## 2019-07-29 NOTE — TELEPHONE ENCOUNTER
"----- Message from Clau Charli sent at 7/29/2019  8:51 AM CDT -----  Contact:  Yesenia "Daughter in Law" [phone]  Type: Patient Call Back    Who called:Yesenia "daughter in law"    What is the request in detail: is calling in regards to pt palliative care because they have not heard from them in a few months and she was wondering who is assigned to the pt palliative care and their phone number    Can the clinic reply by [facility]? Call back    Would the patient rather a call back or a response via [facility]? Call back    Best call back number:[phone]      "

## 2019-07-29 NOTE — TELEPHONE ENCOUNTER
Spoke with Yesenia requesting pts previous palliative company. [facility] contact information given. Joanie states she will give us a call if any further information is needed.

## 2019-08-05 ENCOUNTER — ANTI-COAG VISIT (OUTPATIENT)
Dept: CARDIOLOGY | Facility: CLINIC | Age: 84
End: 2019-08-05
Payer: MEDICARE

## 2019-08-05 DIAGNOSIS — Z79.01 LONG-TERM (CURRENT) USE OF ANTICOAGULANTS, INR GOAL 2.0-3.0: ICD-10-CM

## 2019-08-05 DIAGNOSIS — Z86.73 HX-TIA (TRANSIENT ISCHEMIC ATTACK): ICD-10-CM

## 2019-08-05 LAB — INR PPP: 2.3

## 2019-08-05 PROCEDURE — 93793 PR ANTICOAGULANT MGMT FOR PT TAKING WARFARIN: ICD-10-PCS | Mod: ,,,

## 2019-08-05 PROCEDURE — 93793 ANTICOAG MGMT PT WARFARIN: CPT | Mod: ,,,

## 2019-08-18 ENCOUNTER — PATIENT MESSAGE (OUTPATIENT)
Dept: CARDIOLOGY | Facility: HOSPITAL | Age: 84
End: 2019-08-18

## 2019-08-19 ENCOUNTER — ANTI-COAG VISIT (OUTPATIENT)
Dept: CARDIOLOGY | Facility: CLINIC | Age: 84
End: 2019-08-19
Payer: MEDICARE

## 2019-08-19 DIAGNOSIS — Z79.01 LONG-TERM (CURRENT) USE OF ANTICOAGULANTS, INR GOAL 2.0-3.0: ICD-10-CM

## 2019-08-19 DIAGNOSIS — Z86.73 HX-TIA (TRANSIENT ISCHEMIC ATTACK): ICD-10-CM

## 2019-08-19 LAB — INR PPP: 2.3

## 2019-08-19 PROCEDURE — 93793 ANTICOAG MGMT PT WARFARIN: CPT | Mod: ,,,

## 2019-08-19 PROCEDURE — 93793 PR ANTICOAGULANT MGMT FOR PT TAKING WARFARIN: ICD-10-PCS | Mod: ,,,

## 2019-08-20 ENCOUNTER — PATIENT MESSAGE (OUTPATIENT)
Dept: CARDIOLOGY | Facility: HOSPITAL | Age: 84
End: 2019-08-20

## 2019-09-02 LAB — INR PPP: 2

## 2019-09-03 ENCOUNTER — ANTI-COAG VISIT (OUTPATIENT)
Dept: CARDIOLOGY | Facility: CLINIC | Age: 84
End: 2019-09-03
Payer: MEDICARE

## 2019-09-03 DIAGNOSIS — Z86.73 HX-TIA (TRANSIENT ISCHEMIC ATTACK): ICD-10-CM

## 2019-09-03 DIAGNOSIS — Z79.01 LONG-TERM (CURRENT) USE OF ANTICOAGULANTS, INR GOAL 2.0-3.0: ICD-10-CM

## 2019-09-03 PROCEDURE — 93793 PR ANTICOAGULANT MGMT FOR PT TAKING WARFARIN: ICD-10-PCS | Mod: ,,,

## 2019-09-03 PROCEDURE — 93793 ANTICOAG MGMT PT WARFARIN: CPT | Mod: ,,,

## 2019-09-16 ENCOUNTER — ANTI-COAG VISIT (OUTPATIENT)
Dept: CARDIOLOGY | Facility: CLINIC | Age: 84
End: 2019-09-16
Payer: MEDICARE

## 2019-09-16 DIAGNOSIS — Z79.01 LONG-TERM (CURRENT) USE OF ANTICOAGULANTS, INR GOAL 2.0-3.0: ICD-10-CM

## 2019-09-16 DIAGNOSIS — Z86.73 HX-TIA (TRANSIENT ISCHEMIC ATTACK): ICD-10-CM

## 2019-09-16 LAB — INR PPP: 2.4

## 2019-09-16 PROCEDURE — 93793 PR ANTICOAGULANT MGMT FOR PT TAKING WARFARIN: ICD-10-PCS | Mod: ,,,

## 2019-09-16 PROCEDURE — 93793 ANTICOAG MGMT PT WARFARIN: CPT | Mod: ,,,

## 2019-09-21 DIAGNOSIS — I48.19 PERSISTENT ATRIAL FIBRILLATION: ICD-10-CM

## 2019-09-21 DIAGNOSIS — I48.0 PAROXYSMAL ATRIAL FIBRILLATION: ICD-10-CM

## 2019-09-21 DIAGNOSIS — E78.5 HYPERLIPIDEMIA, UNSPECIFIED HYPERLIPIDEMIA TYPE: ICD-10-CM

## 2019-09-23 ENCOUNTER — TELEPHONE (OUTPATIENT)
Dept: FAMILY MEDICINE | Facility: CLINIC | Age: 84
End: 2019-09-23

## 2019-09-23 RX ORDER — SIMVASTATIN 20 MG/1
TABLET, FILM COATED ORAL
Qty: 90 TABLET | Refills: 0 | Status: SHIPPED | OUTPATIENT
Start: 2019-09-23 | End: 2019-10-05

## 2019-09-23 RX ORDER — WARFARIN 2 MG/1
TABLET ORAL
Qty: 150 TABLET | Refills: 3 | Status: SHIPPED | OUTPATIENT
Start: 2019-09-23 | End: 2019-10-05 | Stop reason: ALTCHOICE

## 2019-09-23 RX ORDER — METOPROLOL TARTRATE 25 MG/1
TABLET, FILM COATED ORAL
Qty: 540 TABLET | Refills: 0 | Status: ON HOLD | OUTPATIENT
Start: 2019-09-23 | End: 2019-10-06 | Stop reason: HOSPADM

## 2019-09-23 NOTE — TELEPHONE ENCOUNTER
----- Message from Torrie Farrell MD sent at 9/23/2019 12:53 PM CDT -----  Patient due for office visit - please schedule.

## 2019-09-26 ENCOUNTER — PATIENT OUTREACH (OUTPATIENT)
Dept: ADMINISTRATIVE | Facility: OTHER | Age: 84
End: 2019-09-26

## 2019-09-26 DIAGNOSIS — E11.29 CONTROLLED TYPE 2 DIABETES MELLITUS WITH MICROALBUMINURIA, UNSPECIFIED WHETHER LONG TERM INSULIN USE: Primary | ICD-10-CM

## 2019-09-26 DIAGNOSIS — R80.9 CONTROLLED TYPE 2 DIABETES MELLITUS WITH MICROALBUMINURIA, UNSPECIFIED WHETHER LONG TERM INSULIN USE: Primary | ICD-10-CM

## 2019-09-30 ENCOUNTER — ANTI-COAG VISIT (OUTPATIENT)
Dept: CARDIOLOGY | Facility: CLINIC | Age: 84
End: 2019-09-30
Payer: MEDICARE

## 2019-09-30 ENCOUNTER — OFFICE VISIT (OUTPATIENT)
Dept: PODIATRY | Facility: CLINIC | Age: 84
End: 2019-09-30
Payer: MEDICARE

## 2019-09-30 VITALS
SYSTOLIC BLOOD PRESSURE: 88 MMHG | DIASTOLIC BLOOD PRESSURE: 60 MMHG | HEIGHT: 65 IN | WEIGHT: 134 LBS | BODY MASS INDEX: 22.33 KG/M2

## 2019-09-30 DIAGNOSIS — Z79.01 LONG-TERM (CURRENT) USE OF ANTICOAGULANTS, INR GOAL 2.0-3.0: ICD-10-CM

## 2019-09-30 DIAGNOSIS — B35.1 ONYCHOMYCOSIS DUE TO DERMATOPHYTE: ICD-10-CM

## 2019-09-30 DIAGNOSIS — Z86.73 HX-TIA (TRANSIENT ISCHEMIC ATTACK): ICD-10-CM

## 2019-09-30 DIAGNOSIS — S80.822A BLISTER OF LEFT LEG WITHOUT INFECTION, INITIAL ENCOUNTER: ICD-10-CM

## 2019-09-30 DIAGNOSIS — G60.9 IDIOPATHIC PERIPHERAL NEUROPATHY: Primary | ICD-10-CM

## 2019-09-30 DIAGNOSIS — R60.0 BILATERAL LEG EDEMA: ICD-10-CM

## 2019-09-30 LAB — INR PPP: 3.7

## 2019-09-30 PROCEDURE — 99999 PR PBB SHADOW E&M-EST. PATIENT-LVL III: ICD-10-PCS | Mod: PBBFAC,,, | Performed by: PODIATRIST

## 2019-09-30 PROCEDURE — 99213 PR OFFICE/OUTPT VISIT, EST, LEVL III, 20-29 MIN: ICD-10-PCS | Mod: 25,S$PBB,, | Performed by: PODIATRIST

## 2019-09-30 PROCEDURE — 11721 DEBRIDE NAIL 6 OR MORE: CPT | Performed by: PODIATRIST

## 2019-09-30 PROCEDURE — 11721 PR DEBRIDEMENT OF NAILS, 6 OR MORE: ICD-10-PCS | Mod: Q9,S$PBB,, | Performed by: PODIATRIST

## 2019-09-30 PROCEDURE — 99999 PR PBB SHADOW E&M-EST. PATIENT-LVL III: CPT | Mod: PBBFAC,,, | Performed by: PODIATRIST

## 2019-09-30 PROCEDURE — 99213 OFFICE O/P EST LOW 20 MIN: CPT | Mod: 25,S$PBB,, | Performed by: PODIATRIST

## 2019-09-30 PROCEDURE — 93793 PR ANTICOAGULANT MGMT FOR PT TAKING WARFARIN: ICD-10-PCS | Mod: ,,,

## 2019-09-30 PROCEDURE — 99213 OFFICE O/P EST LOW 20 MIN: CPT | Mod: PBBFAC,PO | Performed by: PODIATRIST

## 2019-09-30 PROCEDURE — 93793 ANTICOAG MGMT PT WARFARIN: CPT | Mod: ,,,

## 2019-09-30 PROCEDURE — 11721 DEBRIDE NAIL 6 OR MORE: CPT | Mod: Q9,S$PBB,, | Performed by: PODIATRIST

## 2019-09-30 NOTE — PROGRESS NOTES
Subjective:      Patient ID: Adela Garay is a 89 y.o. female.    Chief Complaint: Wound Check (ov 7/15/19 Dr Ivy)    Adela is a 89 y.o. female who presents to the clinic for evaluation and treatment of high risk feet. Adela has a past medical history of Anticoagulant long-term use, Anticoagulated on Coumadin, Aortic valve stenosis, Arthritis, Atrial fibrillation, Carotid artery occlusion, Chronic rhinosinusitis, Coronary artery disease, Granulomatous lung disease, Heart failure, Hyperlipidemia, Hypertension, Hypertensive heart disease without CHF (congestive heart failure), Mitral valve prolapse, PN (peripheral neuropathy), Severe aortic stenosis by prior echocardiogram, TIA (transient ischemic attack), Type 2 diabetes mellitus, and Type II or unspecified type diabetes mellitus without mention of complication, not stated as uncontrolled. The patient's chief complaint is long, thick toenails. This patient has documented high risk feet requiring routine maintenance secondary to peripheral neuropathy.    Per her son, patient had a fall that resulted in left leg ulcer.    04/22/19 Patient's son has been caring for leg as instructed.  Subjective improvement to wound and edema.  She has been elevating and watching sodium intake. Denies pain.  No new pedal complaints    05/9/19 Patient's son has been caring for leg as instructed.  Subjective improvement to wound.  She has been watching sodium intake. She relates that she has been sitting with her legs down more often. Patient admits to barefoot walking often in and around home. Denies pain.  No new pedal complaints    06/06/19  Patient's son has been caring for leg as instructed.  Subjective healed wound.  Denies pain.  No new pedal complaints    09/30/19 patient presents to clinic for routine evaluation and foot care.  She relates that nails are elongated thick difficult to manage.  Patient relates that approximately 4 weeks ago when she fell and hit her head  when she also hit the toe loss and leg and arm on her left side.  She now has blistering to her left leg that she has been treating with gentian violet.  Patient has chronic bilateral lower extremity edema.  She does not wear compression socks regularly secondary to difficulty donning socks.  She does attempt elevate as much as possible.  Patient denies nausea, vomiting, fever, chills.  She denies pain.    PCP: Torrie Farrell MD    Date Last Seen by PCP:   Chief Complaint   Patient presents with    Wound Check     ov 7/15/19 Dr Ivy       Current shoe gear:  SAS shoe, worn    Hemoglobin A1C   Date Value Ref Range Status   04/17/2019 5.8 (H) 4.0 - 5.6 % Final     Comment:     ADA Screening Guidelines:  5.7-6.4%  Consistent with prediabetes  >or=6.5%  Consistent with diabetes  High levels of fetal hemoglobin interfere with the HbA1C  assay. Heterozygous hemoglobin variants (HbS, HgC, etc)do  not significantly interfere with this assay.   However, presence of multiple variants may affect accuracy.     09/26/2018 5.6 4.0 - 5.6 % Final     Comment:     ADA Screening Guidelines:  5.7-6.4%  Consistent with prediabetes  >or=6.5%  Consistent with diabetes  High levels of fetal hemoglobin interfere with the HbA1C  assay. Heterozygous hemoglobin variants (HbS, HgC, etc)do  not significantly interfere with this assay.   However, presence of multiple variants may affect accuracy.     04/12/2018 5.8 (H) 4.0 - 5.6 % Final     Comment:     According to ADA guidelines, hemoglobin A1c <7.0% represents  optimal control in non-pregnant diabetic patients. Different  metrics may apply to specific patient populations.   Standards of Medical Care in Diabetes-2016.  For the purpose of screening for the presence of diabetes:  <5.7%     Consistent with the absence of diabetes  5.7-6.4%  Consistent with increasing risk for diabetes   (prediabetes)  >or=6.5%  Consistent with diabetes  Currently, no consensus exists for use of hemoglobin  A1c  for diagnosis of diabetes for children.  This Hemoglobin A1c assay has significant interference with fetal   hemoglobin   (HbF). The results are invalid for patients with abnormal amounts of   HbF,   including those with known Hereditary Persistence   of Fetal Hemoglobin. Heterozygous hemoglobin variants (HbAS, HbAC,   HbAD, HbAE, HbA2) do not significantly interfere with this assay;   however, presence of multiple variants in a sample may impact the %   interference.           Patient Active Problem List   Diagnosis    Hyperlipidemia    Coronary artery disease    Hx-TIA (transient ischemic attack)    Long-term (current) use of anticoagulants, INR goal 2.0-3.0    Benign hypertensive heart disease with congestive heart failure    Pulmonary hypertension    Carotid arterial disease    Carotid aneurysm, left    Atrial fibrillation    Controlled type 2 diabetes mellitus with microalbuminuria    DDD (degenerative disc disease), cervical    Lumbar disc disease    Multiple lung nodules    Mild major depression    Idiopathic peripheral neuropathy    Atherosclerosis of aorta    Postinflammatory pulmonary fibrosis    Chronic cough    Coronary artery disease involving native coronary artery of native heart without angina pectoris    Controlled type 2 diabetes with neuropathy    S/P TAVR (transcatheter aortic valve replacement)    Cardiac pacemaker in situ    Examination of participant in clinical trial    Wheelchair dependence       Current Outpatient Medications on File Prior to Visit   Medication Sig Dispense Refill    blood sugar diagnostic (BLOOD GLUCOSE TEST) Strp 1 strip by Misc.(Non-Drug; Combo Route) route once daily. Currently using Nova max plus meter. 50 strip 11    digoxin (LANOXIN) 125 mcg tablet TAKE 1 TABLET (0.125 MG TOTAL) ONCE DAILY. 90 tablet 3    metoprolol tartrate (LOPRESSOR) 25 MG tablet TAKE 3 TABLETS TWICE DAILY 540 tablet 0    potassium chloride (KLOR-CON) 10 MEQ TbSR  Take 1 tablet (10 mEq total) by mouth 2 (two) times daily as needed (take when taking bumex). 60 tablet 11    simvastatin (ZOCOR) 20 MG tablet TAKE 1 TABLET EVERY EVENING 90 tablet 0    triamterene-hydrochlorothiazide 37.5-25 mg (DYAZIDE) 37.5-25 mg per capsule TAKE 1 CAPSULE EVERY DAY 90 capsule 1    warfarin (COUMADIN) 2 MG tablet 4mg PO Mon/Wed/Sat and 2mg PO all other days - OR AS DIRECTED  BY  COUMADIN  CLINIC 150 tablet 3    bumetanide (BUMEX) 2 MG tablet Take 1 tablet (2 mg total) by mouth 2 (two) times daily as needed (Edema or kennedi gain). 60 tablet 11    lisinopril (PRINIVIL,ZESTRIL) 2.5 MG tablet Take 1 tablet (2.5 mg total) by mouth once daily. 90 tablet 3     No current facility-administered medications on file prior to visit.        Review of patient's allergies indicates:   Allergen Reactions    Levaquin [levofloxacin]     Doxycycline hyclate Other (See Comments)     Unknown to patient    Iodine and iodide containing products     Neurontin  [gabapentin]      Other reaction(s): Unknown    Norpace  [disopyramide]      Other reaction(s): Hives    Phenytoin sodium extended      Other reaction(s): Muscle pain    Sulfamethoxazole-trimethoprim      Other reaction(s): Muscle cramps       Past Surgical History:   Procedure Laterality Date    CARDIAC PACEMAKER PLACEMENT  11/2017    CARDIAC VALVE REPLACEMENT  10/17/2017    aorta    SINUS SURGERY      tonsillectomy      TONSILLECTOMY         Family History   Problem Relation Age of Onset    Heart disease Father         49    Heart disease Mother     Thyroid disease Sister     Atrial fibrillation Sister     Atrial fibrillation Sister         neice    Lymphoma Sister 29    Atrial fibrillation Sister     Asthma Neg Hx     Emphysema Neg Hx        Social History     Socioeconomic History    Marital status:      Spouse name: Not on file    Number of children: 6    Years of education: 2 college    Highest education level: Not on file  "  Occupational History    Occupation: retired housewife   Social Needs    Financial resource strain: Not on file    Food insecurity:     Worry: Not on file     Inability: Not on file    Transportation needs:     Medical: Not on file     Non-medical: Not on file   Tobacco Use    Smoking status: Never Smoker    Smokeless tobacco: Never Used   Substance and Sexual Activity    Alcohol use: Yes     Alcohol/week: 1.0 standard drinks     Types: 1 Shots of liquor per week     Comment: rare    Drug use: No    Sexual activity: Never   Lifestyle    Physical activity:     Days per week: Not on file     Minutes per session: Not on file    Stress: Not on file   Relationships    Social connections:     Talks on phone: Not on file     Gets together: Not on file     Attends Taoist service: Not on file     Active member of club or organization: Not on file     Attends meetings of clubs or organizations: Not on file     Relationship status: Not on file   Other Topics Concern    Not on file   Social History Narrative    Not on file     Review of Systems   Constitutional: Negative for chills and fever.   Respiratory: Negative for cough.    Cardiovascular: Positive for leg swelling.   Gastrointestinal: Negative for nausea and vomiting.   Musculoskeletal: Positive for falls, joint pain and myalgias.   Skin: Negative for itching and rash.   Neurological: Positive for tingling and sensory change.         Objective:       Vitals:    09/30/19 1045   BP: (!) 88/60   Weight: 60.8 kg (134 lb)   Height: 5' 5" (1.651 m)   PainSc:   2       Physical Exam   Constitutional:  Non-toxic appearance. She does not have a sickly appearance. No distress.   Pt. is well-developed, well-nourished, appears stated age, in no acute distress, alert and oriented x 3. No evidence of depression, anxiety, or agitation. Calm, cooperative, and communicative. Appropriate interactions and affect.   Cardiovascular:   Pulses:       Dorsalis pedis pulses are " 1+ on the right side, and 1+ on the left side.        Posterior tibial pulses are 1+ on the right side, and 1+ on the left side.   Dorsalis pedis and posterior tibial pulses are diminished Bilaterally. 1+ pitting edema, skin is atrophic, decreased digital hair, feet slightly cool, nails thickened, mild plantar rubor     Pulmonary/Chest: No respiratory distress.   Musculoskeletal:        Right ankle: No tenderness. No lateral malleolus, no medial malleolus, no AITFL, no CF ligament and no posterior TFL tenderness found. Achilles tendon exhibits no pain, no defect and normal Harman's test results.        Left ankle: No tenderness. No lateral malleolus, no medial malleolus, no AITFL, no CF ligament and no posterior TFL tenderness found. Achilles tendon exhibits no pain, no defect and normal Harman's test results.        Right foot: There is no tenderness and no bony tenderness.        Left foot: There is no tenderness and no bony tenderness.   Patient has hammertoes of digits 2-5 bilateral partially reducible without symptom today.    2nd toes adduct against great toe    TA function absent L on muscle testing    Visible and palpable bunion without pain at dorsomedial 1st metatarsal head right and left.  Hallux abducted right and left partially reducible, tracks laterally without being track bound.  No ecchymosis, erythema, edema, or cardinal signs infection or signs of trauma same foot.     Lymphadenopathy:   No lymphatic streaking    Negative lymphadenopathy bilateral popliteal fossa and tarsal tunnel.     Neurological:   Yreka-Jakub 5.07 monofilamant testing is diminished Moreno feet. Decreased/absent vibratory sensation bilateral feet to 128Hz tuning fork.    Skin: Skin is warm, dry and intact.  She is not diaphoretic. No cyanosis. No pallor. Nails show no clubbing.   Toenails 1-5 bilaterally are elongated by 2-3 mm, thickened by 2-3 mm, discolored/yellowed, dystrophic, brittle with subungual debris.  Dry  subungual hematoma to digits 1 through 4 of the left foot    Focal hyperkeratotic lesion consisting entirely of hyperkeratotic tissue without open skin, drainage, pus, fluctuance, malodor: bilateral 5th digit PIPJ with localized erythema. And sub 1st MTPJ of the left foot    Blistering to left anterior leg, evidence of gentian violet.  Serous drainage noted. No surrounding erythema or edema    Psychiatric: Her mood appears not anxious. Her affect is not inappropriate. Her speech is not slurred. She is not combative. She is communicative. She is attentive.   Nursing note reviewed.        Assessment:       Encounter Diagnoses   Name Primary?    Idiopathic peripheral neuropathy Yes    Bilateral leg edema     Blister of left leg without infection, initial encounter     Onychomycosis due to dermatophyte          Plan:       Adela was seen today for wound check.    Diagnoses and all orders for this visit:    Idiopathic peripheral neuropathy    Bilateral leg edema    Blister of left leg without infection, initial encounter    Onychomycosis due to dermatophyte      I counseled the patient on her conditions, their implications and medical management.      Greater than 50% of this visit spent on counseling and coordination of care.    Education about neuropathy and prevention of limb loss.    Discussed wound healing cycle, skin integrity, ways to care for skin.Counseled patient on the effects of PVD  on healing. She verbalizes understanding that it can increase the chances of delayed healing and this prolonged exposure leads to infection or progression of infection which subsequently can result in loss of limb.    Tubigrips to BLE; patient is to elevate legs. When sleeping, place a pillow under lower extremities. When sitting, support the legs so that they are level with the waist.    Detailed home care instructions dispensed.  Family will aid in care.  Inform patient that if blistering is not healed in 2 weeks that she  is to return to clinic for care.    With patient's permission, nails were aggressively reduced and debrided x 10 to their soft tissue attachment mechanically and with electric , removing all offending nail and debris. Patient relates relief following the procedure.     Follow-up: 8-12 weeks but should call [facility] immediately if any signs of infection, such as fever, chills, sweats, increased redness or pain.     Long-term goals include keeping the wound healed by good offloading and medical management under the direction of internist.

## 2019-09-30 NOTE — PROGRESS NOTES
Daughter Mayte was advised to have Patient skip coumadin tonight -9/30 then resume dose: 2mg daily except 4mg -Monday/Wednesday/Saturday and re test the INR 10/07, verbalized understanding

## 2019-09-30 NOTE — PROGRESS NOTES
INR high. Patient has not been feeling well lately. Will hold a dose today then reevaluate next week. Dose has been doing well prior to today. Will decrease dose next week if patient still not doing well and based on trend

## 2019-10-04 ENCOUNTER — NURSE TRIAGE (OUTPATIENT)
Dept: ADMINISTRATIVE | Facility: CLINIC | Age: 84
End: 2019-10-04

## 2019-10-04 NOTE — TELEPHONE ENCOUNTER
Pt has CHF, and heart valve replacement several years ago, so there is long term breathing difficulty, but this is different than usual. Symptoms began about 2 weeks ago, but within the last few days she is complaining of difficulty swallowing, not eating much, and feeling weak, and she is unable to get above 500 on the incentive spirometer, and usually she can get to 1000.  The family has put her on O2, 3-4 Liters, which she is wearing almost constantly, except when she gets up to move around the house, but otherwise she's keeping it on.  When she takes it off, she drops into the 80's.  Her pulse has been dropping into the 40's, despite having a pacemaker.   Pt refuses ED, attempted to call Dr. Ivy, was unable to reach anyone, will message.    Reason for Disposition   MODERATE difficulty breathing (e.g., speaks in phrases, SOB even at rest, pulse 100-120) of new onset or worse than normal    Additional Information   Negative: Breathing stopped and hasn't returned   Negative: Choking on something   Negative: SEVERE difficulty breathing (e.g., struggling for each breath, speaks in single words, pulse > 120)   Negative: Bluish (or gray) lips or face   Negative: Difficult to awaken or acting confused (e.g., disoriented, slurred speech)   Negative: Passed out (i.e., fainted, collapsed and was not responding)   Negative: Wheezing started suddenly after medicine, an allergic food, or bee sting   Negative: Stridor   Negative: Slow, shallow and weak breathing   Negative: Sounds like a life-threatening emergency to the triager   Negative: Chest pain   Negative: Wheezing (high pitched whistling sound) and previous asthma attacks or use of asthma medicines   Negative: Difficulty breathing and only present when coughing   Negative: Difficulty breathing and only from stuffy or runny nose    Protocols used: BREATHING DIFFICULTY-A-OH

## 2019-10-05 ENCOUNTER — HOSPITAL ENCOUNTER (INPATIENT)
Facility: HOSPITAL | Age: 84
LOS: 1 days | Discharge: HOSPICE | DRG: 640 | End: 2019-10-06
Attending: EMERGENCY MEDICINE | Admitting: INTERNAL MEDICINE
Payer: MEDICARE

## 2019-10-05 DIAGNOSIS — R06.02 SHORTNESS OF BREATH: ICD-10-CM

## 2019-10-05 DIAGNOSIS — R79.1 SUPRATHERAPEUTIC INTERNATIONAL NORMALIZED RATIO (INR): ICD-10-CM

## 2019-10-05 DIAGNOSIS — J90 PLEURAL EFFUSION, BILATERAL: Primary | ICD-10-CM

## 2019-10-05 DIAGNOSIS — R62.51 FAILURE TO THRIVE (0-17): ICD-10-CM

## 2019-10-05 DIAGNOSIS — R47.01 EXPRESSIVE APHASIA: ICD-10-CM

## 2019-10-05 DIAGNOSIS — R13.10 TROUBLE SWALLOWING: ICD-10-CM

## 2019-10-05 DIAGNOSIS — R13.10 DYSPHAGIA, UNSPECIFIED TYPE: ICD-10-CM

## 2019-10-05 DIAGNOSIS — J96.01 ACUTE RESPIRATORY FAILURE WITH HYPOXIA: ICD-10-CM

## 2019-10-05 PROBLEM — D68.9 COAGULOPATHY: Status: ACTIVE | Noted: 2019-10-05

## 2019-10-05 LAB
ABO + RH BLD: NORMAL
ALBUMIN SERPL BCP-MCNC: 3.7 G/DL (ref 3.5–5.2)
ALP SERPL-CCNC: 143 U/L (ref 55–135)
ALT SERPL W/O P-5'-P-CCNC: 17 U/L (ref 10–44)
ANION GAP SERPL CALC-SCNC: 9 MMOL/L (ref 8–16)
APTT BLDCRRT: 51.4 SEC (ref 21–32)
AST SERPL-CCNC: 29 U/L (ref 10–40)
BASOPHILS # BLD AUTO: 0.01 K/UL (ref 0–0.2)
BASOPHILS NFR BLD: 0.1 % (ref 0–1.9)
BILIRUB SERPL-MCNC: 2 MG/DL (ref 0.1–1)
BILIRUB UR QL STRIP: NEGATIVE
BLD GP AB SCN CELLS X3 SERPL QL: NORMAL
BNP SERPL-MCNC: 764 PG/ML (ref 0–99)
BUN SERPL-MCNC: 36 MG/DL (ref 10–30)
CALCIUM SERPL-MCNC: 9.7 MG/DL (ref 8.7–10.5)
CHLORIDE SERPL-SCNC: 91 MMOL/L (ref 95–110)
CLARITY UR: CLEAR
CO2 SERPL-SCNC: 35 MMOL/L (ref 23–29)
COLOR UR: YELLOW
CREAT SERPL-MCNC: 0.9 MG/DL (ref 0.5–1.4)
DIFFERENTIAL METHOD: ABNORMAL
DIGOXIN SERPL-MCNC: 0.9 NG/ML (ref 0.8–2)
DIGOXIN SERPL-MCNC: 0.9 NG/ML (ref 0.8–2)
EOSINOPHIL # BLD AUTO: 0 K/UL (ref 0–0.5)
EOSINOPHIL NFR BLD: 0.1 % (ref 0–8)
ERYTHROCYTE [DISTWIDTH] IN BLOOD BY AUTOMATED COUNT: 15.5 % (ref 11.5–14.5)
EST. GFR  (AFRICAN AMERICAN): >60 ML/MIN/1.73 M^2
EST. GFR  (NON AFRICAN AMERICAN): 56 ML/MIN/1.73 M^2
GLUCOSE SERPL-MCNC: 126 MG/DL (ref 70–110)
GLUCOSE UR QL STRIP: NEGATIVE
HCT VFR BLD AUTO: 37 % (ref 37–48.5)
HGB BLD-MCNC: 10.7 G/DL (ref 12–16)
HGB UR QL STRIP: NEGATIVE
HYALINE CASTS #/AREA URNS LPF: 25 /LPF
HYPOCHROMIA BLD QL SMEAR: ABNORMAL
INR PPP: 6.8 (ref 0.8–1.2)
KETONES UR QL STRIP: NEGATIVE
LACTATE SERPL-SCNC: 1.8 MMOL/L (ref 0.5–2.2)
LEUKOCYTE ESTERASE UR QL STRIP: NEGATIVE
LIPASE SERPL-CCNC: 17 U/L (ref 4–60)
LYMPHOCYTES # BLD AUTO: 0.7 K/UL (ref 1–4.8)
LYMPHOCYTES NFR BLD: 7.7 % (ref 18–48)
MAGNESIUM SERPL-MCNC: 2.4 MG/DL (ref 1.6–2.6)
MCH RBC QN AUTO: 28.1 PG (ref 27–31)
MCHC RBC AUTO-ENTMCNC: 28.9 G/DL (ref 32–36)
MCV RBC AUTO: 97 FL (ref 82–98)
MICROSCOPIC COMMENT: ABNORMAL
MONOCYTES # BLD AUTO: 2.5 K/UL (ref 0.3–1)
MONOCYTES NFR BLD: 26 % (ref 4–15)
NEUTROPHILS # BLD AUTO: 6.2 K/UL (ref 1.8–7.7)
NEUTROPHILS NFR BLD: 66.1 % (ref 38–73)
NITRITE UR QL STRIP: NEGATIVE
PH UR STRIP: 5 [PH] (ref 5–8)
PLATELET # BLD AUTO: 320 K/UL (ref 150–350)
PLATELET BLD QL SMEAR: ABNORMAL
PMV BLD AUTO: 9.1 FL (ref 9.2–12.9)
POLYCHROMASIA BLD QL SMEAR: ABNORMAL
POTASSIUM SERPL-SCNC: 4.8 MMOL/L (ref 3.5–5.1)
PROT SERPL-MCNC: 8.5 G/DL (ref 6–8.4)
PROT UR QL STRIP: NEGATIVE
PROTHROMBIN TIME: 71.2 SEC (ref 9–12.5)
RBC # BLD AUTO: 3.81 M/UL (ref 4–5.4)
RBC #/AREA URNS HPF: 2 /HPF (ref 0–4)
SODIUM SERPL-SCNC: 135 MMOL/L (ref 136–145)
SP GR UR STRIP: 1.01 (ref 1–1.03)
TROPONIN I SERPL DL<=0.01 NG/ML-MCNC: <0.006 NG/ML (ref 0–0.03)
TSH SERPL DL<=0.005 MIU/L-ACNC: 2.19 UIU/ML (ref 0.4–4)
URN SPEC COLLECT METH UR: ABNORMAL
UROBILINOGEN UR STRIP-ACNC: ABNORMAL EU/DL
WBC # BLD AUTO: 9.45 K/UL (ref 3.9–12.7)
WBC #/AREA URNS HPF: 2 /HPF (ref 0–5)

## 2019-10-05 PROCEDURE — 63600175 PHARM REV CODE 636 W HCPCS: Performed by: INTERNAL MEDICINE

## 2019-10-05 PROCEDURE — S0171 BUMETANIDE 0.5 MG: HCPCS | Performed by: EMERGENCY MEDICINE

## 2019-10-05 PROCEDURE — 85610 PROTHROMBIN TIME: CPT

## 2019-10-05 PROCEDURE — 85025 COMPLETE CBC W/AUTO DIFF WBC: CPT

## 2019-10-05 PROCEDURE — 25000003 PHARM REV CODE 250: Performed by: EMERGENCY MEDICINE

## 2019-10-05 PROCEDURE — 80162 ASSAY OF DIGOXIN TOTAL: CPT

## 2019-10-05 PROCEDURE — 94761 N-INVAS EAR/PLS OXIMETRY MLT: CPT

## 2019-10-05 PROCEDURE — 93010 ELECTROCARDIOGRAM REPORT: CPT | Mod: ,,, | Performed by: INTERNAL MEDICINE

## 2019-10-05 PROCEDURE — 83735 ASSAY OF MAGNESIUM: CPT

## 2019-10-05 PROCEDURE — 21400001 HC TELEMETRY ROOM

## 2019-10-05 PROCEDURE — 84484 ASSAY OF TROPONIN QUANT: CPT

## 2019-10-05 PROCEDURE — 80053 COMPREHEN METABOLIC PANEL: CPT

## 2019-10-05 PROCEDURE — 85730 THROMBOPLASTIN TIME PARTIAL: CPT

## 2019-10-05 PROCEDURE — 83880 ASSAY OF NATRIURETIC PEPTIDE: CPT

## 2019-10-05 PROCEDURE — 86901 BLOOD TYPING SEROLOGIC RH(D): CPT

## 2019-10-05 PROCEDURE — 93005 ELECTROCARDIOGRAM TRACING: CPT

## 2019-10-05 PROCEDURE — 83690 ASSAY OF LIPASE: CPT

## 2019-10-05 PROCEDURE — 83605 ASSAY OF LACTIC ACID: CPT

## 2019-10-05 PROCEDURE — 36415 COLL VENOUS BLD VENIPUNCTURE: CPT

## 2019-10-05 PROCEDURE — 84443 ASSAY THYROID STIM HORMONE: CPT

## 2019-10-05 PROCEDURE — 99291 CRITICAL CARE FIRST HOUR: CPT

## 2019-10-05 PROCEDURE — 93010 EKG 12-LEAD: ICD-10-PCS | Mod: ,,, | Performed by: INTERNAL MEDICINE

## 2019-10-05 PROCEDURE — 81000 URINALYSIS NONAUTO W/SCOPE: CPT

## 2019-10-05 RX ORDER — BUMETANIDE 0.25 MG/ML
2 INJECTION INTRAMUSCULAR; INTRAVENOUS 2 TIMES DAILY
Status: DISCONTINUED | OUTPATIENT
Start: 2019-10-05 | End: 2019-10-05

## 2019-10-05 RX ORDER — DIGOXIN 125 MCG
0.12 TABLET ORAL DAILY
Status: DISCONTINUED | OUTPATIENT
Start: 2019-10-05 | End: 2019-10-05

## 2019-10-05 RX ORDER — FUROSEMIDE 10 MG/ML
60 INJECTION INTRAMUSCULAR; INTRAVENOUS DAILY
Status: DISCONTINUED | OUTPATIENT
Start: 2019-10-05 | End: 2019-10-06 | Stop reason: HOSPADM

## 2019-10-05 RX ORDER — POTASSIUM CHLORIDE 750 MG/1
10 TABLET, EXTENDED RELEASE ORAL 2 TIMES DAILY PRN
Status: DISCONTINUED | OUTPATIENT
Start: 2019-10-05 | End: 2019-10-05

## 2019-10-05 RX ORDER — HYDROCODONE BITARTRATE AND ACETAMINOPHEN 500; 5 MG/1; MG/1
TABLET ORAL
Status: DISCONTINUED | OUTPATIENT
Start: 2019-10-05 | End: 2019-10-06 | Stop reason: HOSPADM

## 2019-10-05 RX ORDER — SODIUM CHLORIDE 0.9 % (FLUSH) 0.9 %
10 SYRINGE (ML) INJECTION
Status: DISCONTINUED | OUTPATIENT
Start: 2019-10-05 | End: 2019-10-06 | Stop reason: HOSPADM

## 2019-10-05 RX ORDER — BUMETANIDE 0.25 MG/ML
2 INJECTION INTRAMUSCULAR; INTRAVENOUS
Status: COMPLETED | OUTPATIENT
Start: 2019-10-05 | End: 2019-10-05

## 2019-10-05 RX ADMIN — BUMETANIDE 2 MG: 0.25 INJECTION INTRAMUSCULAR; INTRAVENOUS at 02:10

## 2019-10-05 RX ADMIN — FUROSEMIDE 60 MG: 10 INJECTION, SOLUTION INTRAVENOUS at 08:10

## 2019-10-05 NOTE — ED TRIAGE NOTES
Pt comes in with complaints of sob x couple weeks and trouble swallowing for a few days. Pt denies cough. Family reports pt increased weakness past few days. Family also reports hx of CHF and pacemaker. Pt has +3 edema to lower extremities. Family reports pt lives home alone and is usually able to do household things alone but the past few weeks she has gotten progressively weaker.

## 2019-10-05 NOTE — HOSPITAL COURSE
Mrs. Garay is a 89 yo F who presents from home with family for general decline in health, not eating, dizzy and some SOB. She was brought to the ED and fount do have 02 sats on RA in the 70's. The patient has a history of a TAVR in 2017 but daughter (whom most of the history is from)- states that the patient has had a general decline in the last 2 years, ane even worse over the past 2 weeks. She was found to have an elevated BNP and bilateral pleural effusions. She was also found to have an INR of 6.8.  I had a long discussion with the daughter and she confirmed that patient is DNR. Further, they are requesting home hospice with [facility] as they note that the patient would not want to live like this.  consulted.  IV lasix was started.  Family wished only comfort measures.  Patient discharged to home with hospice. Activity as tolerated. Diet- soft. Follow up per hospice

## 2019-10-05 NOTE — ED NOTES
"Attempted swallow study. Pt unable to drink at this time and shakes head "no" when given a sip of water  "

## 2019-10-05 NOTE — ED NOTES
Dr. Vuong at bedside talking to patient and family about plan of care. DNR discussed and daughter states patient has a DNR order.

## 2019-10-05 NOTE — SUBJECTIVE & OBJECTIVE
Past Medical History:   Diagnosis Date    Anticoagulant long-term use     Anticoagulated on Coumadin     Aortic valve stenosis     - s/p total aortic valve replacement    Arthritis     Atrial fibrillation     Carotid artery occlusion     Chronic rhinosinusitis     Coronary artery disease     Granulomatous lung disease     Heart failure     Hyperlipidemia     Hypertension     Hypertensive heart disease without CHF (congestive heart failure)     Mitral valve prolapse     PN (peripheral neuropathy)     hereditary?(sister has it also)/idiopathic?/patient thinks from Zocor    Severe aortic stenosis by prior echocardiogram     TIA (transient ischemic attack)        Past Surgical History:   Procedure Laterality Date    CARDIAC PACEMAKER PLACEMENT  11/2017    CARDIAC VALVE REPLACEMENT  10/17/2017    aorta    SINUS SURGERY      tonsillectomy      TONSILLECTOMY         Review of patient's allergies indicates:   Allergen Reactions    Levaquin [levofloxacin]     Doxycycline hyclate Other (See Comments)     Unknown to patient    Iodine and iodide containing products     Neurontin  [gabapentin]      Other reaction(s): Unknown    Norpace  [disopyramide]      Other reaction(s): Hives    Phenytoin sodium extended      Other reaction(s): Muscle pain    Sulfamethoxazole-trimethoprim      Other reaction(s): Muscle cramps       No current facility-administered medications on file prior to encounter.      Current Outpatient Medications on File Prior to Encounter   Medication Sig    bumetanide (BUMEX) 2 MG tablet Take 1 tablet (2 mg total) by mouth 2 (two) times daily as needed (Edema or kennedi gain).    digoxin (LANOXIN) 125 mcg tablet TAKE 1 TABLET (0.125 MG TOTAL) ONCE DAILY.    metoprolol tartrate (LOPRESSOR) 25 MG tablet TAKE 3 TABLETS TWICE DAILY    potassium chloride (KLOR-CON) 10 MEQ TbSR Take 1 tablet (10 mEq total) by mouth 2 (two) times daily as needed (take when taking bumex).    [DISCONTINUED]  simvastatin (ZOCOR) 20 MG tablet TAKE 1 TABLET EVERY EVENING    [DISCONTINUED] warfarin (COUMADIN) 2 MG tablet 4mg PO Mon/Wed/Sat and 2mg PO all other days - OR AS DIRECTED  BY  COUMADIN  CLINIC    blood sugar diagnostic (BLOOD GLUCOSE TEST) Strp 1 strip by Misc.(Non-Drug; Combo Route) route once daily. Currently using Nova max plus meter.    [DISCONTINUED] lisinopril (PRINIVIL,ZESTRIL) 2.5 MG tablet Take 1 tablet (2.5 mg total) by mouth once daily.    [DISCONTINUED] triamterene-hydrochlorothiazide 37.5-25 mg (DYAZIDE) 37.5-25 mg per capsule TAKE 1 CAPSULE EVERY DAY     Family History     Problem Relation (Age of Onset)    Atrial fibrillation Sister, Sister, Sister    Heart disease Father, Mother    Lymphoma Sister (29)    Thyroid disease Sister        Tobacco Use    Smoking status: Never Smoker    Smokeless tobacco: Never Used   Substance and Sexual Activity    Alcohol use: Yes     Alcohol/week: 1.0 standard drinks     Types: 1 Shots of liquor per week     Comment: rare    Drug use: No    Sexual activity: Never     Review of Systems   Constitutional: Positive for activity change, appetite change and fatigue. Negative for chills and diaphoresis.   Eyes: Negative for discharge.   Respiratory: Positive for shortness of breath. Negative for cough, choking, chest tightness, wheezing and stridor.    Cardiovascular: Negative for chest pain, palpitations and leg swelling.   Gastrointestinal: Negative for abdominal distention, abdominal pain, blood in stool, constipation and diarrhea.   Genitourinary: Negative for difficulty urinating.   Musculoskeletal: Negative for arthralgias and back pain.   Neurological: Positive for dizziness and speech difficulty.   Psychiatric/Behavioral: Negative for agitation and behavioral problems.     Objective:     Vital Signs (Most Recent):  Temp: 98.8 °F (37.1 °C) (10/05/19 1242)  Pulse: 60 (10/05/19 1431)  Resp: (!) 28 (10/05/19 1431)  BP: (!) 147/73 (10/05/19 1431)  SpO2: 100 %  (10/05/19 1431) Vital Signs (24h Range):  Temp:  [97.5 °F (36.4 °C)-98.8 °F (37.1 °C)] 98.8 °F (37.1 °C)  Pulse:  [59-60] 60  Resp:  [22-28] 28  SpO2:  [86 %-100 %] 100 %  BP: (133-157)/(58-73) 147/73     Weight: 60.8 kg (134 lb)  Body mass index is 22.3 kg/m².    Physical Exam   Constitutional: She is oriented to person, place, and time. She appears well-developed and well-nourished.   HENT:   Head: Normocephalic and atraumatic.   Cardiovascular: Normal rate and regular rhythm. Exam reveals no gallop and no friction rub.   Murmur heard.  Pulmonary/Chest: Effort normal and breath sounds normal. No stridor. No respiratory distress. She has no wheezes. She has no rales.   Abdominal: Soft. Bowel sounds are normal. She exhibits no distension. There is no tenderness. There is no guarding.   Neurological: She is alert and oriented to person, place, and time.   Skin: Skin is warm and dry.   Nursing note and vitals reviewed.          Significant Labs:   BMP:   Recent Labs   Lab 10/05/19  1030   *   *   K 4.8   CL 91*   CO2 35*   BUN 36*   CREATININE 0.9   CALCIUM 9.7   MG 2.4     CBC:   Recent Labs   Lab 10/05/19  1030   WBC 9.45   HGB 10.7*   HCT 37.0          Significant Imaging:   EKG paced rhythm    CXR- bilateral effusions L > R.   .    INR 6.8

## 2019-10-05 NOTE — ED NOTES
Per primary MD Curry, no consults. Only palliative and spiritual care. Changing code status to DNR.

## 2019-10-05 NOTE — ED PROVIDER NOTES
Encounter Date: 10/5/2019    SCRIBE #1 NOTE: I, Yasemin Bran, am scribing for, and in the presence of,  Dr. Yevgeniy MD. I have scribed the following portions of the note - Other sections scribed: HPI, ROS.       History     Chief Complaint   Patient presents with    Shortness of Breath     sobx few weeks, hx of pacemaker, chf     Time of initial assessment: 10:31    CC: Shortness of Breath    HPI:  This is a 89 y.o. Female, with a PMHx of HTN, TIA, Hyperlipidemia, DM, CAD, and CHF, who presents to the Emergency Department with a cc of SOB x2 days. Pt's daughter reports associated difficulty speaking, trouble swallowing, weakness, dizziness, leg swelling, and a HA located at the top of her head. She states that the pt has a bruise on top of her head. She also reports a seemingly atraumatic fall that occurred approximately two weeks ago. Pt states that she is currently experiencing HA, dizziness, and is slightly short of breath. Pt appears blue. Her daughter reports that her oxygen levels dropped to a minimum of 84% yesterday. Pt reports left lateral inferior thoracic chest pain. She denies confusion,  abdominal pain, chest pressure, chest tightness, cough, eye pain, ear pain, sore throat, fever, chills, vomiting, diarrhea, and unusual urinary problems. However, she has chronic urinary incontinence and utilizes bladder control pads. Pt has a PSHx of an aortic valve replacement and a pacemaker input two years ago. She is currently taking Coumadin. Pt's daughter denies smoking and consumption of alcohol. She further denies a PMHx of COPD and thyroid disease.    The history is provided by the patient and a relative. No  was used.     Review of patient's allergies indicates:   Allergen Reactions    Levaquin [levofloxacin]     Doxycycline hyclate Other (See Comments)     Unknown to patient    Iodine and iodide containing products     Neurontin  [gabapentin]      Other reaction(s): Unknown     Norpace  [disopyramide]      Other reaction(s): Hives    Phenytoin sodium extended      Other reaction(s): Muscle pain    Sulfamethoxazole-trimethoprim      Other reaction(s): Muscle cramps     Past Medical History:   Diagnosis Date    Anticoagulant long-term use     Anticoagulated on Coumadin     Aortic valve stenosis     - s/p total aortic valve replacement    Arthritis     Atrial fibrillation     Carotid artery occlusion     Chronic rhinosinusitis     Coronary artery disease     Granulomatous lung disease     Heart failure     Hyperlipidemia     Hypertension     Hypertensive heart disease without CHF (congestive heart failure)     Mitral valve prolapse     PN (peripheral neuropathy)     hereditary?(sister has it also)/idiopathic?/patient thinks from Zocor    Severe aortic stenosis by prior echocardiogram     TIA (transient ischemic attack)      Past Surgical History:   Procedure Laterality Date    CARDIAC PACEMAKER PLACEMENT  11/2017    CARDIAC VALVE REPLACEMENT  10/17/2017    aorta    SINUS SURGERY      tonsillectomy      TONSILLECTOMY       Family History   Problem Relation Age of Onset    Heart disease Father         49    Heart disease Mother     Thyroid disease Sister     Atrial fibrillation Sister     Atrial fibrillation Sister         neice    Lymphoma Sister 29    Atrial fibrillation Sister     Asthma Neg Hx     Emphysema Neg Hx      Social History     Tobacco Use    Smoking status: Never Smoker    Smokeless tobacco: Never Used   Substance Use Topics    Alcohol use: Yes     Alcohol/week: 1.0 standard drinks     Types: 1 Shots of liquor per week     Comment: rare    Drug use: No     Review of Systems   Constitutional: Negative for chills and fever.   HENT: Positive for trouble swallowing. Negative for ear pain and sore throat.    Eyes: Negative for pain.   Respiratory: Negative for cough and chest tightness.    Cardiovascular: Positive for chest pain and leg swelling.    Gastrointestinal: Negative for abdominal pain, diarrhea, nausea and vomiting.   Genitourinary: Negative for decreased urine volume, difficulty urinating, dysuria, frequency, hematuria, urgency and vaginal pain.   Skin: Positive for color change.   Neurological: Positive for dizziness, speech difficulty, weakness and headaches.   Hematological: Bruises/bleeds easily.   Psychiatric/Behavioral: Negative for confusion.       Physical Exam     Initial Vitals [10/05/19 1020]   BP Pulse Resp Temp SpO2   (!) 133/58 60 (!) 22 97.5 °F (36.4 °C) (!) 86 %      MAP       --         Physical Exam  The patient was examined specifically for the following:   General:No significant distress, Good color, Warm and dry. Head and neck:Scalp atraumatic, Neck supple. Neurological:Appropriate conversation, Gross motor deficits. Eyes:Conjugate gaze, Clear corneas. ENT: No epistaxis. Cardiac: Regular rate and rhythm, Grossly normal heart tones. Pulmonary: Wheezing, Rales. Gastrointestinal: Abdominal tenderness, Abdominal distention. Musculoskeletal: Extremity deformity, Apparent pain with range of motion of the joints. Skin: Rash.   The findings on examination were normal except for the following:  The patient is tachypneic.  She is cyanotic.  There is mild to moderate respiratory distress. The abdomen is completely nontender. The lungs are clear.  Heart tones are basically normal.  The patient has a palpable pacemaker in the left chest.  She has pitting edema in the lower extremities to the proximal calves.   ED Course   Critical Care  Date/Time: 10/5/2019 12:22 PM  Performed by: Can Vuong MD  Authorized by: Can Vuong MD   Direct patient critical care time: 22 minutes  Additional history critical care time: 11 minutes  Ordering / reviewing critical care time: 11 minutes  Documentation critical care time: 11 minutes  Consulting other physicians critical care time: 11 minutes  Consult with family critical care time: 5  minutes  Total critical care time (exclusive of procedural time) : 71 minutes  Critical care time was exclusive of separately billable procedures and treating other patients and teaching time.  Critical care was necessary to treat or prevent imminent or life-threatening deterioration of the following conditions: cardiac failure, respiratory failure and metabolic crisis.  Critical care was time spent personally by me on the following activities: development of treatment plan with patient or surrogate, discussions with consultants, discussions with primary provider, evaluation of patient's response to treatment, examination of patient, obtaining history from patient or surrogate, ordering and review of laboratory studies, ordering and performing treatments and interventions, ordering and review of radiographic studies, pulse oximetry, re-evaluation of patient's condition and review of old charts.        Labs Reviewed   COMPREHENSIVE METABOLIC PANEL - Abnormal; Notable for the following components:       Result Value    Sodium 135 (*)     Chloride 91 (*)     CO2 35 (*)     Glucose 126 (*)     BUN, Bld 36 (*)     Total Protein 8.5 (*)     Total Bilirubin 2.0 (*)     Alkaline Phosphatase 143 (*)     eGFR if non  56 (*)     All other components within normal limits   CBC W/ AUTO DIFFERENTIAL - Abnormal; Notable for the following components:    RBC 3.81 (*)     Hemoglobin 10.7 (*)     Mean Corpuscular Hemoglobin Conc 28.9 (*)     RDW 15.5 (*)     MPV 9.1 (*)     Lymph # 0.7 (*)     Mono # 2.5 (*)     Lymph% 7.7 (*)     Mono% 26.0 (*)     All other components within normal limits   B-TYPE NATRIURETIC PEPTIDE - Abnormal; Notable for the following components:     (*)     All other components within normal limits   APTT - Abnormal; Notable for the following components:    aPTT 51.4 (*)     All other components within normal limits    Narrative:        critical result(s) called and verbal readback  obtained from Doreen CAPELLAN in EMR, 10/05/2019 11:42   PROTIME-INR - Abnormal; Notable for the following components:    Prothrombin Time 71.2 (*)     INR 6.8 (*)     All other components within normal limits    Narrative:        critical result(s) called and verbal readback obtained from Doreen CAPELLAN in EMR, 10/05/2019 11:42   URINALYSIS, REFLEX TO URINE CULTURE - Abnormal; Notable for the following components:    Urobilinogen, UA 4.0-6.0 (*)     All other components within normal limits    Narrative:     Preferred Collection Type->Urine, Clean Catch   URINALYSIS MICROSCOPIC - Abnormal; Notable for the following components:    Hyaline Casts, UA 25 (*)     All other components within normal limits    Narrative:     Preferred Collection Type->Urine, Clean Catch   TROPONIN I   LIPASE   MAGNESIUM   LACTIC ACID, PLASMA   TSH     EKG Readings: (Independently Interpreted)   This patient is in a paced rhythm with a heart rate of 60.  There is a wide QRS complex.  There are nonspecific ST segment and T-wave changes.  There is no evidence of acute myocardial infarction or malignant arrhythmia.        Imaging Results          X-Ray Chest AP Portable (Final result)  Result time 10/05/19 11:06:04    Final result by Thea Carter MD (10/05/19 11:06:04)                 Impression:      Left greater than right pleural effusions which have increased in volume compared to the previous exam.      Electronically signed by: Thea Carter MD  Date:    10/05/2019  Time:    11:06             Narrative:    EXAMINATION:  XR CHEST AP PORTABLE    CLINICAL HISTORY:  chest pain;    TECHNIQUE:  Single frontal view of the chest was performed.    COMPARISON:  11/26/2018    FINDINGS:  This evaluation is limited secondary to the AP lordotic positioning.    The lungs are symmetrically hypoinflated.  There is a moderate to large volume left and moderate volume right pleural effusion.  No pneumothorax.  The cardiac silhouette is largely obscured  by the pleural effusions.  It is grossly normal as is the mediastinum.  The vascular stent as well as the left cardiac pacing device are unchanged in position.  The osseous and soft tissue structures are normal.                              Medical decision making:  Given the above this patient presents to the emergency room with shortness of breath. She also has difficulty speaking.  She also has difficulty swallowing.  Stroke is considered.  The patient cannot receive an MRI because of her pacemaker.  CT does not reveal any bleeding.  Her INR is markedly elevated.  The patient is on Coumadin for atrial fibrillation.  She is not in atrial fibrillation at this time.  The case was discussed with Dr. Ivy.  We will discontinue the Coumadin.  We will treat the patient with FFP we will arrange interventional radiology consultation for tapping bilateral pleural effusions which are likely contributing to her respiratory distress.  The patient has cardiomegaly.  The case was discussed with Dr. Nelson who also suggests evaluation by Neurology in palliative care.  We will continue oxygen and Bumex.                 Scribe Attestation:   Scribe #1: I performed the above scribed service and the documentation accurately describes the services I performed. I attest to the accuracy of the note.     I personally performed the services described in this documentation.  All medical record  entries made by the scribe are at my direction and in my presence.  Signed, Dr. Vuong           Clinical Impression:       ICD-10-CM ICD-9-CM   1. Pleural effusion, bilateral J90 511.9   2. Shortness of breath R06.02 786.05   3. Supratherapeutic international normalized ratio (INR) R79.1 790.92   4. Dysphagia, unspecified type R13.10 787.20   5. Expressive aphasia R47.01 784.3   6. Acute respiratory failure with hypoxia J96.01 518.81                                Can Vuong MD  10/05/19 [id number]

## 2019-10-05 NOTE — H&P
[facility] Hospital Medicine  History & Physical    Patient Name: Adela Garay  MRN: [id number]  Admission Date: 10/5/2019  Attending Physician: Can Vuong MD   Primary Care Provider: Torrie Farrell MD         Patient information was obtained from patient and ER records. And daughter     Subjective:     Principal Problem:Failure to thrive (0-17)    Chief Complaint:   Chief Complaint   Patient presents with    Shortness of Breath     sobx few weeks, hx of pacemaker, chf        HPI: Mrs. Garay is a 89 yo F who presents from home with family for general decline in health, not eating, dizzy and some SOB. She was brought to the ED and fount do have 02 sats on RA in the 70's. The patient has a history of a TAVR in 2017 but daughter (whom most of the history is from)- states that the patient has had a general decline in the last 2 years, ane even worse over the past 2 weeks. She was found to have an elevated BNP and bilateral pleural effusions. She was also found to have an INR of 6.8.  I had a long discussion with the daughter and she confirmed that patient is DNR. Further, they are requesting home hospice with [facility] as they note that the patient would not want to live like this.  The daughter agreed to no escalation of care and mainly focus on comfort/IV diuresis over the next 24 hours.  All consults cancelled per agreement with daughter.     Past Medical History:   Diagnosis Date    Anticoagulant long-term use     Anticoagulated on Coumadin     Aortic valve stenosis     - s/p total aortic valve replacement    Arthritis     Atrial fibrillation     Carotid artery occlusion     Chronic rhinosinusitis     Coronary artery disease     Granulomatous lung disease     Heart failure     Hyperlipidemia     Hypertension     Hypertensive heart disease without CHF (congestive heart failure)     Mitral valve prolapse     PN (peripheral neuropathy)     hereditary?(sister has it  also)/idiopathic?/patient thinks from Zocor    Severe aortic stenosis by prior echocardiogram     TIA (transient ischemic attack)        Past Surgical History:   Procedure Laterality Date    CARDIAC PACEMAKER PLACEMENT  11/2017    CARDIAC VALVE REPLACEMENT  10/17/2017    aorta    SINUS SURGERY      tonsillectomy      TONSILLECTOMY         Review of patient's allergies indicates:   Allergen Reactions    Levaquin [levofloxacin]     Doxycycline hyclate Other (See Comments)     Unknown to patient    Iodine and iodide containing products     Neurontin  [gabapentin]      Other reaction(s): Unknown    Norpace  [disopyramide]      Other reaction(s): Hives    Phenytoin sodium extended      Other reaction(s): Muscle pain    Sulfamethoxazole-trimethoprim      Other reaction(s): Muscle cramps       No current facility-administered medications on file prior to encounter.      Current Outpatient Medications on File Prior to Encounter   Medication Sig    bumetanide (BUMEX) 2 MG tablet Take 1 tablet (2 mg total) by mouth 2 (two) times daily as needed (Edema or kennedi gain).    digoxin (LANOXIN) 125 mcg tablet TAKE 1 TABLET (0.125 MG TOTAL) ONCE DAILY.    metoprolol tartrate (LOPRESSOR) 25 MG tablet TAKE 3 TABLETS TWICE DAILY    potassium chloride (KLOR-CON) 10 MEQ TbSR Take 1 tablet (10 mEq total) by mouth 2 (two) times daily as needed (take when taking bumex).    [DISCONTINUED] simvastatin (ZOCOR) 20 MG tablet TAKE 1 TABLET EVERY EVENING    [DISCONTINUED] warfarin (COUMADIN) 2 MG tablet 4mg PO Mon/Wed/Sat and 2mg PO all other days - OR AS DIRECTED  BY  COUMADIN  CLINIC    blood sugar diagnostic (BLOOD GLUCOSE TEST) Strp 1 strip by Misc.(Non-Drug; Combo Route) route once daily. Currently using Nova max plus meter.    [DISCONTINUED] lisinopril (PRINIVIL,ZESTRIL) 2.5 MG tablet Take 1 tablet (2.5 mg total) by mouth once daily.    [DISCONTINUED] triamterene-hydrochlorothiazide 37.5-25 mg (DYAZIDE) 37.5-25 mg per  capsule TAKE 1 CAPSULE EVERY DAY     Family History     Problem Relation (Age of Onset)    Atrial fibrillation Sister, Sister, Sister    Heart disease Father, Mother    Lymphoma Sister (29)    Thyroid disease Sister        Tobacco Use    Smoking status: Never Smoker    Smokeless tobacco: Never Used   Substance and Sexual Activity    Alcohol use: Yes     Alcohol/week: 1.0 standard drinks     Types: 1 Shots of liquor per week     Comment: rare    Drug use: No    Sexual activity: Never     Review of Systems   Constitutional: Positive for activity change, appetite change and fatigue. Negative for chills and diaphoresis.   Eyes: Negative for discharge.   Respiratory: Positive for shortness of breath. Negative for cough, choking, chest tightness, wheezing and stridor.    Cardiovascular: Negative for chest pain, palpitations and leg swelling.   Gastrointestinal: Negative for abdominal distention, abdominal pain, blood in stool, constipation and diarrhea.   Genitourinary: Negative for difficulty urinating.   Musculoskeletal: Negative for arthralgias and back pain.   Neurological: Positive for dizziness and speech difficulty.   Psychiatric/Behavioral: Negative for agitation and behavioral problems.     Objective:     Vital Signs (Most Recent):  Temp: 98.8 °F (37.1 °C) (10/05/19 1242)  Pulse: 60 (10/05/19 1431)  Resp: (!) 28 (10/05/19 1431)  BP: (!) 147/73 (10/05/19 1431)  SpO2: 100 % (10/05/19 1431) Vital Signs (24h Range):  Temp:  [97.5 °F (36.4 °C)-98.8 °F (37.1 °C)] 98.8 °F (37.1 °C)  Pulse:  [59-60] 60  Resp:  [22-28] 28  SpO2:  [86 %-100 %] 100 %  BP: (133-157)/(58-73) 147/73     Weight: 60.8 kg (134 lb)  Body mass index is 22.3 kg/m².    Physical Exam   Constitutional: She is oriented to person, place, and time. She appears well-developed and well-nourished.   HENT:   Head: Normocephalic and atraumatic.   Cardiovascular: Normal rate and regular rhythm. Exam reveals no gallop and no friction rub.   Murmur  heard.  Pulmonary/Chest: Effort normal and breath sounds normal. No stridor. No respiratory distress. She has no wheezes. She has no rales.   Abdominal: Soft. Bowel sounds are normal. She exhibits no distension. There is no tenderness. There is no guarding.   Neurological: She is alert and oriented to person, place, and time.   Skin: Skin is warm and dry.   Nursing note and vitals reviewed.          Significant Labs:   BMP:   Recent Labs   Lab 10/05/19  1030   *   *   K 4.8   CL 91*   CO2 35*   BUN 36*   CREATININE 0.9   CALCIUM 9.7   MG 2.4     CBC:   Recent Labs   Lab 10/05/19  1030   WBC 9.45   HGB 10.7*   HCT 37.0          Significant Imaging:   EKG paced rhythm    CXR- bilateral effusions L > R.   .    INR 6.8         Assessment/Plan:     * Failure to thrive (0-17)  This appears to be patient's main issue. Per family general decline over past two years since TAVR- as well as last 2 weeks.  The patient appears to me like she is dying.  Hospice very appropriate- they want home. DNR. Daughter agrees to no escalation of care. No further consults, imaging/procedures.  Focus on comfort in next 24 hours.  CT head is negative.             Pleural effusion, bilateral  Likely from heart failure. Will try lasix.  Family does not want thoracentesis.          Coagulopathy  INR of 6.8.  Will stop coumadin.  No FFP or Vit K for now.         Cardiac pacemaker in situ  No acute issues       S/P TAVR (transcatheter aortic valve replacement)  2017. No acute issues       Mild major depression  No acute issues       DDD (degenerative disc disease), cervical  Comfort measures       Controlled type 2 diabetes mellitus with microalbuminuria  No acute issues.       Pulmonary hypertension  By echo. No acute issues.       Coronary artery disease  No acute issues       Hyperlipidemia  Hold meds          VTE Risk Mitigation (From admission, onward)    None             Jose C Curry MD  Department of  Hospital Medicine [facility]

## 2019-10-05 NOTE — HPI
Mrs. Garay is a 89 yo F who presents from home with family for general decline in health, not eating, dizzy and some SOB. She was brought to the ED and fount do have 02 sats on RA in the 70's. The patient has a history of a TAVR in 2017 but daughter (whom most of the history is from)- states that the patient has had a general decline in the last 2 years, ane even worse over the past 2 weeks. She was found to have an elevated BNP and bilateral pleural effusions. She was also found to have an INR of 6.8.  I had a long discussion with the daughter and she confirmed that patient is DNR. Further, they are requesting home hospice with [facility] as they note that the patient would not want to live like this.  The daughter agreed to no escalation of care and mainly focus on comfort/IV diuresis over the next 24 hours.  All consults cancelled per agreement with daughter.

## 2019-10-05 NOTE — ASSESSMENT & PLAN NOTE
This appears to be patient's main issue. Per family general decline over past two years since TAVR- as well as last 2 weeks.  The patient appears to me like she is dying.  Hospice very appropriate- they want home. DNR. Daughter agrees to no escalation of care. No further consults, imaging/procedures.  Focus on comfort in next 24 hours.

## 2019-10-06 VITALS
WEIGHT: 138.69 LBS | OXYGEN SATURATION: 96 % | SYSTOLIC BLOOD PRESSURE: 170 MMHG | DIASTOLIC BLOOD PRESSURE: 77 MMHG | TEMPERATURE: 97 F | HEIGHT: 65 IN | BODY MASS INDEX: 23.11 KG/M2 | HEART RATE: 60 BPM | RESPIRATION RATE: 25 BRPM

## 2019-10-06 PROCEDURE — 63600175 PHARM REV CODE 636 W HCPCS: Performed by: INTERNAL MEDICINE

## 2019-10-06 PROCEDURE — 51702 INSERT TEMP BLADDER CATH: CPT

## 2019-10-06 PROCEDURE — 94761 N-INVAS EAR/PLS OXIMETRY MLT: CPT

## 2019-10-06 RX ADMIN — FUROSEMIDE 60 MG: 10 INJECTION, SOLUTION INTRAVENOUS at 09:10

## 2019-10-06 NOTE — ASSESSMENT & PLAN NOTE
This appears to be patient's main issue. Per family general decline over past two years since TAVR- as well as last 2 weeks.  The patient appears to me like she is dying.  Hospice very appropriate- they want home. DNR. Daughter agrees to no escalation of care. No further consults, imaging/procedures.  Focus on comfort in next 24 hours.   Likely to hospice at home today.

## 2019-10-06 NOTE — NURSING
Bedside report received from MARILIA Huitron. Patient lying in bed with eyes closed. Respirations even and unlabored. NAD noted at this time. Daughter at bedside. All safety precautions in place. Will continue to monitor.

## 2019-10-06 NOTE — PLAN OF CARE
Problem: Skin Injury Risk Increased  Goal: Skin Health and Integrity  Outcome: Ongoing, Progressing  Intervention: Optimize Skin Protection  Flowsheets (Taken 10/6/2019 0315)  Pressure Reduction Techniques: weight shift assistance provided  Pressure Reduction Devices: positioning supports utilized; pressure-redistributing mattress utilized  Skin Protection: adhesive use limited; tubing/devices free from skin contact  Head of Bed (HOB): HOB elevated  Intervention: Promote and Optimize Oral Intake  Flowsheets (Taken 10/6/2019 0315)  Oral Nutrition Promotion: rest periods promoted     Problem: Adult Inpatient Plan of Care  Goal: Plan of Care Review  Outcome: Ongoing, Progressing  Goal: Patient-Specific Goal (Individualization)  Outcome: Ongoing, Progressing     Problem: Coping Ineffective  Goal: Effective Coping  Outcome: Ongoing, Progressing  Intervention: Support and Enhance Coping Strategies  Flowsheets (Taken 10/6/2019 0315)  Complementary Therapy: music therapy  Supportive Measures: active listening utilized; decision-making supported; verbalization of feelings encouraged  Family/Support System Care: caregiver stress acknowledged; presence promoted; involvement promoted; support provided  Environmental Support: calm environment promoted; environmental consistency promoted

## 2019-10-06 NOTE — PLAN OF CARE
SW met with patient's daughters to complete discharge planning assessment. Prior to beginning the discharge planning assessment, patient verified name and date of birth. SW educated patient on the discharge process and explained that discharge planning begins at admission. SW met with daughters to discuss discharge to home hospice with [facility]. Daughter's stated 1:00 pm transport is best. Patient stated SW wrote name and phone number on white communication board.     10/06/19 1105   Discharge Assessment   Assessment Type Discharge Planning Assessment   Confirmed/corrected address and phone number on facesheet? Yes   Assessment information obtained from? Caregiver   Expected Length of Stay (days) 2   Communicated expected length of stay with patient/caregiver yes   Prior to hospitilization cognitive status: Alert/Oriented   Prior to hospitalization functional status: Independent   Current cognitive status: Unable to Assess   Current Functional Status: Completely Dependent   Facility Arrived From: Home   Lives With alone   Able to Return to Prior Arrangements yes  (with Hospice)   Is patient able to care for self after discharge? No   Who are your caregiver(s) and their phone number(s)? Mayte (Daughter) [phone]; Monica (Daughter) [phone]   Patient's perception of discharge disposition hospice/home   Readmission Within the Last 30 Days no previous admission in last 30 days   Patient currently being followed by outpatient case management? No   Patient currently receives any other outside agency services? No   Equipment Currently Used at Home walker, rolling;wheelchair;shower chair   Do you have any problems affording any of your prescribed medications? No   Is the patient taking medications as prescribed? yes   Does the patient have transportation home? Yes   Transportation Anticipated other (see comments)  (ambulance)   Does the patient receive services at the [facility]? Yes  ([facility])   Discharge Plan A  Hospice/home   Discharge Plan B Hospice/home   DME Needed Upon Discharge  none   Patient/Family in Agreement with Plan yes   Torrie Farrell MD    [facility] - [place], [place] - [address]  [address] [address]  Phone: [phone] Fax: [phone]    [id number] [place] [address] [facility] [address] [place] [address] [address]  [facility] [place] [address]  Phone: [phone] Fax: [phone]

## 2019-10-06 NOTE — PLAN OF CARE
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]  Yes    DDD (degenerative disc disease), cervical [M50.30]  Yes    Controlled type 2 diabetes mellitus with microalbuminuria [E11.29, R80.9]  Yes     - diet controlled      Pulmonary hypertension [I27.20]  Yes    Coronary artery disease [I25.10]  Yes    Hyperlipidemia [E78.5]  Yes      Resolved Hospital Problems    Diagnosis Date Resolved POA    Acute on chronic diastolic heart failure [I50.33] 04/16/2018 Yes    Chronic atrial fibrillation [I48.20] 08/28/2017 Yes       Hospice Qualifying Diagnoses:        Patient has a life expectancy < 6 months due to:  1) Primary Hospice Diagnosis: Failure to thrive         Vital Signs: Routine per Hospice Protocol.    Code Status: DNR     Allergies:   Review of patient's allergies indicates:   Allergen Reactions    Levaquin [levofloxacin]     Doxycycline hyclate Other (See Comments)     Unknown to patient    Iodine and iodide containing products     Neurontin  [gabapentin]      Other reaction(s): Unknown    Norpace  [disopyramide]      Other reaction(s): Hives    Phenytoin sodium extended      Other reaction(s): Muscle pain    Sulfamethoxazole-trimethoprim      Other reaction(s): Muscle cramps       Diet: soft as tolerated     Activities: Bed bound     Nursing: Per Hospice Routine.      Oxygen:  2L NC continuous  May benefit from suction  available to patient     Other Miscellaneous Care:       Medications:      Adela Garay   Home Medication Instructions MARIE:[id number]    Printed on:10/06/19 0900   Medication Information                      bumetanide (BUMEX) 2 MG tablet  Take 1 tablet (2 mg total) by mouth 2 (two) times daily as needed (Edema or kennedi gain).                       Future Orders:  Hospice Medical Director may dictate new orders for comfortable care measures & sign death certificate.        _________________________________  Jose C Curry MD  10/06/2019

## 2019-10-06 NOTE — DISCHARGE SUMMARY
[facility] Hospital Medicine  Discharge Summary      Patient Name: Adela Garay  MRN: [id number]  Admission Date: 10/5/2019  Hospital Length of Stay: 1 days  Discharge Date and Time:  10/06/2019 8:59 AM  Attending Physician: Jose C Mccullough MD   Discharging Provider: Jose C Mccullough MD  Primary Care Provider: Torrie Farrell MD      HPI:   Mrs. Garay is a 89 yo F who presents from home with family for general decline in health, not eating, dizzy and some SOB. She was brought to the ED and fount do have 02 sats on RA in the 70's. The patient has a history of a TAVR in 2017 but daughter (whom most of the history is from)- states that the patient has had a general decline in the last 2 years, ane even worse over the past 2 weeks. She was found to have an elevated BNP and bilateral pleural effusions. She was also found to have an INR of 6.8.  I had a long discussion with the daughter and she confirmed that patient is DNR. Further, they are requesting home hospice with [facility] as they note that the patient would not want to live like this.  The daughter agreed to no escalation of care and mainly focus on comfort/IV diuresis over the next 24 hours.  All consults cancelled per agreement with daughter.     * No surgery found *      Hospital Course:   Mrs. Garay is a 89 yo F who presents from home with family for general decline in health, not eating, dizzy and some SOB. She was brought to the ED and fount do have 02 sats on RA in the 70's. The patient has a history of a TAVR in 2017 but daughter (whom most of the history is from)- states that the patient has had a general decline in the last 2 years, ane even worse over the past 2 weeks. She was found to have an elevated BNP and bilateral pleural effusions. She was also found to have an INR of 6.8.  I had a long discussion with the daughter and she confirmed that patient is DNR. Further, they are requesting home hospice with [facility]   as they note that the patient would not want to live like this.  consulted.  IV lasix was started.  Family wished only comfort measures.  Patient discharged to home with hospice. Activity as tolerated. Diet- soft. Follow up per hospice      Consults:   Consults (From admission, onward)        Status Ordering Provider     Inpatient consult to Palliative Care  Once     Provider:  Leyda Foster RN    Acknowledged GRACE PALAFOX     Inpatient consult to Social Work  Once     Provider:  (Not yet assigned)    Completed CANDACE HUBER     Inpatient consult to Spiritual Care  Once     Provider:  (Not yet assigned)    Acknowledged GRACE PALAFOX          No new Assessment & Plan notes have been filed under this hospital service since the last note was generated.  Service: Hospital Medicine    Final Active Diagnoses:    Diagnosis Date Noted POA    PRINCIPAL PROBLEM:  Failure to thrive (0-17) [R62.51] 10/05/2019 Yes    Pleural effusion, bilateral [J90] 10/05/2019 Yes    Coagulopathy [D68.9] 10/05/2019 Yes    Cardiac pacemaker in situ [Z95.0] 11/21/2017 Yes    S/P TAVR (transcatheter aortic valve replacement) [Z95.2] 10/17/2017 Not Applicable    Mild major depression [F32.0] 08/18/2014 Yes    DDD (degenerative disc disease), cervical [M50.30] 11/21/2013 Yes    Controlled type 2 diabetes mellitus with microalbuminuria [E11.29, R80.9]  Yes    Pulmonary hypertension [I27.20] 11/01/2012 Yes    Coronary artery disease [I25.10]  Yes    Hyperlipidemia [E78.5]  Yes      Problems Resolved During this Admission:    Diagnosis Date Noted Date Resolved POA    Acute on chronic diastolic heart failure [I50.33] 10/31/2017 04/16/2018 Yes    Chronic atrial fibrillation [I48.20] 03/02/2017 08/28/2017 Yes       Discharged Condition: fair    Disposition: to home hospice     Follow Up:  Follow-up Information     Torrie Farrell MD In 1 week.    Specialties:  Internal Medicine, Pediatrics  Contact  information:  [address] [facility]  [place] [address]  [phone]                 Patient Instructions:   No discharge procedures on file.    Significant Diagnostic Studies:    Pending Diagnostic Studies:     None         Medications:  Reconciled Home Medications:      Medication List      CONTINUE taking these medications    bumetanide 2 MG tablet  Commonly known as:  BUMEX  Take 1 tablet (2 mg total) by mouth 2 (two) times daily as needed (Edema or kennedi gain).        STOP taking these medications    blood sugar diagnostic Strp  Commonly known as:  BLOOD GLUCOSE TEST     digoxin 125 mcg tablet  Commonly known as:  LANOXIN     lisinopril 2.5 MG tablet  Commonly known as:  PRINIVIL,ZESTRIL     metoprolol tartrate 25 MG tablet  Commonly known as:  LOPRESSOR     potassium chloride 10 MEQ Tbsr  Commonly known as:  KLOR-CON     simvastatin 20 MG tablet  Commonly known as:  ZOCOR     triamterene-hydrochlorothiazide 37.5-25 mg 37.5-25 mg per capsule  Commonly known as:  DYAZIDE            Indwelling Lines/Drains at time of discharge:   Lines/Drains/Airways     None                 Time spent on the discharge of patient: > 30 minutes  Patient was seen and examined on the date of discharge and determined to be suitable for discharge.         Jose C Curry MD  Department of Hospital Medicine  [facility]

## 2019-10-06 NOTE — NURSING
Bedside report given to MARILIA Huitron. Patient lying in bed with eyes closed. Family member at bedside. All safety precautions in place.

## 2019-10-06 NOTE — PLAN OF CARE
GLENN spoke with Andrew, Admin for [facility], who informed that he will meet patient's daughter Mayte between 11:00 am and 11:30 am to complete paperwork. Andrew stated equipment  should be set up between noon and 1:00 pm.

## 2019-10-06 NOTE — CONSULTS
SW spoke with patient's daughter, Mayte who informed that her sister spoke with representative from [facility] yesterday, but had not set anything up. SW inquired if [facility] was their hospice preference and daughter said yes. GLENN informed that a referral will be sent to the agency and someone would contact them.

## 2019-10-06 NOTE — NURSING
"Per family, "MD told us that there will be not labs or procedures done".     Labs refused this AM.     Will lab orders be canceled?   "

## 2019-10-06 NOTE — SUBJECTIVE & OBJECTIVE
Interval History: *Comfortable     Review of Systems   Constitutional: Positive for activity change, appetite change and fatigue. Negative for chills and diaphoresis.   Eyes: Negative for discharge.   Respiratory: Positive for shortness of breath. Negative for cough, choking, chest tightness, wheezing and stridor.    Cardiovascular: Negative for chest pain, palpitations and leg swelling.   Gastrointestinal: Negative for abdominal distention, abdominal pain, blood in stool, constipation and diarrhea.   Genitourinary: Negative for difficulty urinating.   Musculoskeletal: Negative for arthralgias and back pain.   Neurological: Positive for dizziness and speech difficulty.   Psychiatric/Behavioral: Negative for agitation and behavioral problems.     Objective:     Vital Signs (Most Recent):  Temp: 97.6 °F (36.4 °C) (10/06/19 0600)  Pulse: 60 (10/06/19 0811)  Resp: 17 (10/06/19 0811)  BP: (!) 171/76 (10/06/19 0811)  SpO2: (!) 93 % (10/06/19 0842) Vital Signs (24h Range):  Temp:  [97.5 °F (36.4 °C)-98.8 °F (37.1 °C)] 97.6 °F (36.4 °C)  Pulse:  [59-77] 60  Resp:  [17-28] 17  SpO2:  [86 %-100 %] 93 %  BP: (133-171)/(58-76) 171/76     Weight: 62.9 kg (138 lb 10.7 oz)  Body mass index is 23.08 kg/m².  No intake or output data in the 24 hours ending 10/06/19 0855   Physical Exam   Constitutional: She appears well-developed.   HENT:   Head: Normocephalic and atraumatic.   Neurological: She is alert.   Skin: Skin is warm and dry.   Psychiatric: She has a normal mood and affect. Her behavior is normal.   Nursing note and vitals reviewed.      Significant Labs:   BMP:   Recent Labs   Lab 10/05/19  1030   *   *   K 4.8   CL 91*   CO2 35*   BUN 36*   CREATININE 0.9   CALCIUM 9.7   MG 2.4     CBC:   Recent Labs   Lab 10/05/19  1030   WBC 9.45   HGB 10.7*   HCT 37.0          Significant Imaging:

## 2019-10-06 NOTE — PLAN OF CARE
SW spoke with patient's daughter, Mayte who informed that her sister spoke with representative from [facility] yesterday, but had not set anything up. SW inquired if [facility] was their hospice preference and daughter said yes. GLENN informed that a referral will be sent to the agency and someone would contact them.      10/06/19 0820   Post-Acute Status   Post-Acute Authorization Home Health/Hospice   Home Health/Hospice Status Referrals Sent

## 2019-10-06 NOTE — PLAN OF CARE
GLENN met with patient's daughters and discussed discharge planning and informed that transportation is scheduled for 1:00 pm for transport home for home hospice. GLENN informed nurse Fabiola that patient is ready for discharge from case management stand point.        10/06/19 1123   Final Note   Assessment Type Final Discharge Note   Anticipated Discharge Disposition HospiceHome   What phone number can be called within the next 1-3 days to see how you are doing after discharge? [phone]   Hospital Follow Up  Appt(s) scheduled? No   Discharge plans and expectations educations in teach back method with documentation complete? No   Right Care Referral Info   Post Acute Recommendation Home-care   Referral Type Hospice   Facility Name [place] [place]    ADT 30 order placed for  Transportation.  ETA: 1:00 pm (Stretcher)  If transportation does not arrive at ETA time nurse will be instructed to follow protocol for transportation below:  How can I get in touch directly with dispatch, if needed?                 Non-emergent (stretcher): [phone]  Escalation Needs (PFC Lead): [phone]

## 2019-10-06 NOTE — PROGRESS NOTES
[facility] Hospital Medicine  Progress Note    Patient Name: Adela Garay  MRN: [id number]  Patient Class: IP- Inpatient   Admission Date: 10/5/2019  Length of Stay: 1 days  Attending Physician: Jose C Mccullough MD  Primary Care Provider: Torrie Farrell MD        Subjective:     Principal Problem:Failure to thrive (0-17)        HPI:  Mrs. Garay is a 89 yo F who presents from home with family for general decline in health, not eating, dizzy and some SOB. She was brought to the ED and fount do have 02 sats on RA in the 70's. The patient has a history of a TAVR in 2017 but daughter (whom most of the history is from)- states that the patient has had a general decline in the last 2 years, ane even worse over the past 2 weeks. She was found to have an elevated BNP and bilateral pleural effusions. She was also found to have an INR of 6.8.  I had a long discussion with the daughter and she confirmed that patient is DNR. Further, they are requesting home hospice with [facility] as they note that the patient would not want to live like this.  The daughter agreed to no escalation of care and mainly focus on comfort/IV diuresis over the next 24 hours.  All consults cancelled per agreement with daughter.     Overview/Hospital Course:  Mrs. Garay is a 89 yo F who presents from home with family for general decline in health, not eating, dizzy and some SOB. She was brought to the ED and fount do have 02 sats on RA in the 70's. The patient has a history of a TAVR in 2017 but daughter (whom most of the history is from)- states that the patient has had a general decline in the last 2 years, ane even worse over the past 2 weeks. She was found to have an elevated BNP and bilateral pleural effusions. She was also found to have an INR of 6.8.  I had a long discussion with the daughter and she confirmed that patient is DNR. Further, they are requesting home hospice with [facility] as they note that the  patient would not want to live like this.  consulted.  IV lasix was started.  Family wished only comfort measures.     Interval History: *Comfortable     Review of Systems   Constitutional: Positive for activity change, appetite change and fatigue. Negative for chills and diaphoresis.   Eyes: Negative for discharge.   Respiratory: Positive for shortness of breath. Negative for cough, choking, chest tightness, wheezing and stridor.    Cardiovascular: Negative for chest pain, palpitations and leg swelling.   Gastrointestinal: Negative for abdominal distention, abdominal pain, blood in stool, constipation and diarrhea.   Genitourinary: Negative for difficulty urinating.   Musculoskeletal: Negative for arthralgias and back pain.   Neurological: Positive for dizziness and speech difficulty.   Psychiatric/Behavioral: Negative for agitation and behavioral problems.     Objective:     Vital Signs (Most Recent):  Temp: 97.6 °F (36.4 °C) (10/06/19 0600)  Pulse: 60 (10/06/19 0811)  Resp: 17 (10/06/19 0811)  BP: (!) 171/76 (10/06/19 0811)  SpO2: (!) 93 % (10/06/19 0842) Vital Signs (24h Range):  Temp:  [97.5 °F (36.4 °C)-98.8 °F (37.1 °C)] 97.6 °F (36.4 °C)  Pulse:  [59-77] 60  Resp:  [17-28] 17  SpO2:  [86 %-100 %] 93 %  BP: (133-171)/(58-76) 171/76     Weight: 62.9 kg (138 lb 10.7 oz)  Body mass index is 23.08 kg/m².  No intake or output data in the 24 hours ending 10/06/19 0855   Physical Exam   Constitutional: She appears well-developed.   HENT:   Head: Normocephalic and atraumatic.   Neurological: She is alert.   Skin: Skin is warm and dry.   Psychiatric: She has a normal mood and affect. Her behavior is normal.   Nursing note and vitals reviewed.      Significant Labs:   BMP:   Recent Labs   Lab 10/05/19  1030   *   *   K 4.8   CL 91*   CO2 35*   BUN 36*   CREATININE 0.9   CALCIUM 9.7   MG 2.4     CBC:   Recent Labs   Lab 10/05/19  1030   WBC 9.45   HGB 10.7*   HCT 37.0           Significant Imaging:       Assessment/Plan:      * Failure to thrive (0-17)  This appears to be patient's main issue. Per family general decline over past two years since TAVR- as well as last 2 weeks.  The patient appears to me like she is dying.  Hospice very appropriate- they want home. DNR. Daughter agrees to no escalation of care. No further consults, imaging/procedures.  Focus on comfort in next 24 hours.   Likely to hospice at home today.            Pleural effusion, bilateral  Likely from heart failure. Will try lasix.  Family does not want thoracentesis.          Coagulopathy  INR of 6.8.  Will stop coumadin.  No FFP or Vit K for now.         Cardiac pacemaker in situ  No acute issues       S/P TAVR (transcatheter aortic valve replacement)  2017. No acute issues       Mild major depression  No acute issues       DDD (degenerative disc disease), cervical  Comfort measures       Controlled type 2 diabetes mellitus with microalbuminuria  No acute issues.       Pulmonary hypertension  By echo. No acute issues.       Coronary artery disease  No acute issues       Hyperlipidemia  Hold meds          VTE Risk Mitigation (From admission, onward)         Ordered     IP VTE HIGH RISK PATIENT  Once      10/05/19 1608     Reason for No Pharmacological VTE Prophylaxis  Once      10/05/19 1608                Will dc to home with hospice when arranged.  Hopefully today.         Jose C Curry MD  Department of Hospital Medicine   [facility]

## 2019-10-07 LAB
BLD PROD TYP BPU: NORMAL
BLD PROD TYP BPU: NORMAL
BLOOD UNIT EXPIRATION DATE: NORMAL
BLOOD UNIT EXPIRATION DATE: NORMAL
BLOOD UNIT TYPE CODE: 6200
BLOOD UNIT TYPE CODE: 6200
BLOOD UNIT TYPE: NORMAL
BLOOD UNIT TYPE: NORMAL
CODING SYSTEM: NORMAL
CODING SYSTEM: NORMAL
DISPENSE STATUS: NORMAL
DISPENSE STATUS: NORMAL
NUM UNITS TRANS FFP: NORMAL
NUM UNITS TRANS FFP: NORMAL

## 2019-10-08 ENCOUNTER — ANTI-COAG VISIT (OUTPATIENT)
Dept: CARDIOLOGY | Facility: CLINIC | Age: 84
End: 2019-10-08

## 2019-10-08 DIAGNOSIS — Z86.73 HX-TIA (TRANSIENT ISCHEMIC ATTACK): ICD-10-CM

## 2019-10-08 DIAGNOSIS — Z79.01 LONG-TERM (CURRENT) USE OF ANTICOAGULANTS, INR GOAL 2.0-3.0: ICD-10-CM

## 2019-10-08 NOTE — PROGRESS NOTES
Called daughter -Mayte to check on patient's status since she had been in the hospital over the weekend, INR while in the ED 10/05 was 6.8 and coumadin was stopped. As per ED report the patient presented to ED with SOB, weakness, not eating. Patient was discharged 10/06 with home hospice of [facility]. Mayte reports this morning that the patient passed away last night -10/07/19

## 2019-10-24 NOTE — ED PROVIDER NOTES
Encounter Date: 10/30/2017       History     Chief Complaint   Patient presents with    Edema     2 weeks ago had tavr procedure, and pacemaker, more swelling, lasix not working any more     89 y.o. female with medical history of HTN, HLD, CHF, DM type 2, afib, on long term anticogulation, and s/p TAVR and pacemaker presents to ED with shortness of breath and cough.  Patient also endorses lower extremity swelling.  Patient states shortness of breath with laying down.  Patient spoke with Dr. Campbell's office and was told to report to the ED for further evaluation.  Patient denies fever, chills, nausea, vomiting, chest pain, abdominal pain, weakness, syncope, headache, diaphoresis.          Review of patient's allergies indicates:   Allergen Reactions    Levaquin [levofloxacin]     Doxycycline hyclate Other (See Comments)     Unknown to patient    Iodine and iodide containing products     Neurontin  [gabapentin]      Other reaction(s): Unknown    Norpace  [disopyramide]      Other reaction(s): Hives    Phenytoin sodium extended      Other reaction(s): Muscle pain    Sulfamethoxazole-trimethoprim      Other reaction(s): Muscle cramps     Past Medical History:   Diagnosis Date    Anticoagulant long-term use     Anticoagulated on Coumadin     Arthritis     Atrial fibrillation     Atrial fibrillation     Carotid artery occlusion     Chronic rhinosinusitis     Coronary artery disease     Granulomatous lung disease     Heart failure     Hyperlipidemia     Hypertension     Hypertensive heart disease without CHF (congestive heart failure)     Mitral valve prolapse     PN (peripheral neuropathy)     hereditary?(sister has it also)/idiopathic?/patient thinks from [employer]    Severe aortic stenosis by prior echocardiogram     TIA (transient ischemic attack)     Type 2 diabetes mellitus     Type II or unspecified type diabetes mellitus without mention of complication, not stated as uncontrolled      Past  Surgical History:   Procedure Laterality Date    SINUS SURGERY      tonsillectomy      TONSILLECTOMY       Family History   Problem Relation Age of Onset    Heart disease Father      49    Heart disease Mother     Thyroid disease Sister     Atrial fibrillation Sister     Atrial fibrillation Sister      neice    Lymphoma Sister 29    Atrial fibrillation Sister     Asthma Neg Hx     Emphysema Neg Hx      Social History   Substance Use Topics    Smoking status: Never Smoker    Smokeless tobacco: Never Used    Alcohol use 0.0 oz/week      Comment: rare     Review of Systems   Constitutional: Negative for chills, diaphoresis, fatigue and fever.   HENT: Negative for sore throat.    Eyes: Negative for visual disturbance.   Respiratory: Positive for shortness of breath.    Cardiovascular: Positive for leg swelling. Negative for chest pain.   Gastrointestinal: Negative for abdominal pain, diarrhea, nausea and vomiting.   Genitourinary: Negative for dysuria and flank pain.   Musculoskeletal: Positive for back pain. Negative for joint swelling.   Skin: Negative for rash.   Neurological: Negative for syncope, weakness, light-headedness and headaches.   Hematological: Does not bruise/bleed easily.   Psychiatric/Behavioral: The patient is not nervous/anxious.        Physical Exam     Initial Vitals [10/30/17 1152]   BP Pulse Resp Temp SpO2   139/70 60 18 97.8 °F (36.6 °C) 97 %      MAP       93         Physical Exam    Vitals reviewed.  Constitutional: Vital signs are normal. She appears well-developed and well-nourished. She is not diaphoretic. No distress.   HENT:   Head: Normocephalic and atraumatic.   Nose: Nose normal.   Mouth/Throat: Oropharynx is clear and moist.   Eyes: Conjunctivae and lids are normal. Pupils are equal, round, and reactive to light. Lids are everted and swept, no foreign bodies found.   Neck: Trachea normal and normal range of motion. Neck supple.   Cardiovascular: Normal rate, intact  distal pulses and normal pulses.   Edema noted to bilateral lower extremities   Pulmonary/Chest: She has no wheezes. She has no rhonchi. She has rales.   Abdominal: Soft. Normal appearance and bowel sounds are normal. There is no tenderness. There is no rebound and no guarding.   Musculoskeletal: She exhibits no edema.   Neurological: She is alert and oriented to person, place, and time. She has normal strength. No sensory deficit.   Skin: Skin is warm. Capillary refill takes less than 2 seconds. No rash noted. No cyanosis.   Psychiatric: She has a normal mood and affect.         ED Course   Procedures  Labs Reviewed   CBC W/ AUTO DIFFERENTIAL - Abnormal; Notable for the following:        Result Value    RBC 3.85 (*)     Hemoglobin 11.3 (*)     Hematocrit 36.2 (*)     MCHC 31.2 (*)     RDW 15.6 (*)     Mono # 1.3 (*)     Lymph% 12.8 (*)     All other components within normal limits   COMPREHENSIVE METABOLIC PANEL - Abnormal; Notable for the following:     Glucose 111 (*)     Total Bilirubin 1.2 (*)     All other components within normal limits   B-TYPE NATRIURETIC PEPTIDE - Abnormal; Notable for the following:      (*)     All other components within normal limits   PROTIME-INR - Abnormal; Notable for the following:     Prothrombin Time 23.8 (*)     INR 2.4 (*)     All other components within normal limits    Narrative:     ADD-ON PT-INR #[id number] PER WANDA JEFFERY PA-C 15:31    10/30/2017    MAGNESIUM   TROPONIN I   PROTIME-INR   HEMOGLOBIN A1C   HEMOGLOBIN A1C     EKG Readings: (Independently Interpreted)   EKG: junctional paced rhythm at 60 bpm, intraventricular block, no MAU's or STD's, non-specific twave pattern, no STEMI     Imaging Results          X-Ray Chest PA And Lateral (Final result)  Result time 10/30/17 14:00:01    Final result by Can Francis MD (10/30/17 14:00:01)                 Impression:     See above      Electronically signed by: Can Francis MD  Date:      10/30/17  Time:    14:00              Narrative:    2 views    Pacemaker identified as before.  Postsurgical changes similar to the previous study  Cardiomegaly.  Bilateral pleural effusion larger on the right side.  Atelectatic changes at the lung bases.  Upper lung fields are clear.  Previous described lung nodule is not well identified due to the large amount of pleural effusion                                     APC / Resident Notes:   89 y.o. female with medical history of HTN, HLD, CHF, DM type 2, afib, on long term anticogulation, and s/p TAVR and pacemaker presents to ED with shortness of breath and cough.    DDX includes but is not limited to CHF, ACS, pneumonia, electrolyte abnormality. Will get labs, cXR and re-assess. . Troponin negative at .020. All other labs benign. CXR shows cardiomegaly with pleural effusions. Cardiology consulted, concerned for pacemaker induced cardiomyopathy. Will admit to [facility].     I have discussed and reviewed with my supervising physician.         Attending Attestation:     Physician Attestation Statement for NP/PA:   I have conducted a face to face encounter with this patient in addition to the NP/PA, due to Medical Complexity                  ED Course      Clinical Impression:   The primary encounter diagnosis was Edema. Diagnoses of Heart failure, unspecified heart failure chronicity, unspecified heart failure type and Congestive heart failure were also pertinent to this visit.    Disposition:   Disposition: Admitted  Condition: Stable                        Daniella Moreno PA-C  10/30/17 1817       Fabian Walker MD  10/30/17 0006     Yes - the patient is able to be screened

## 2020-04-07 ENCOUNTER — RESEARCH ENCOUNTER (OUTPATIENT)
Dept: RESEARCH | Facility: HOSPITAL | Age: 85
End: 2020-04-07

## 2020-04-20 NOTE — TELEPHONE ENCOUNTER
I am aware of the fall (the palliative care provider messaged me last night). I recommended office visit for evaluation.  If CT of the head is needed then she needs to go to the ED.   Suction (cmH2O) Suction (cmH2O) Suction (cmH2O)

## 2022-01-01 NOTE — PROGRESS NOTES
Patient called to report that on 8/22 she'll be having an Angiogram and is supposed to hold coumadin x 3 days prior, Patient's now testing every other week, next INR is due 7/24, Patient's call back # [phone]  
The pt let us know that she will be having an angiogram on 8/22 and was instructed to hold coumadin x 3 days prior.  She is CHADs = 6 (Age, TIA, HTN, CHF, DM) and therefore, I would recommend that she holds with a lovenox bridge.  I have sent this recommendation to Dr. Ivy and we will provide her with detailed holding instructions with an INR that is closer to her procedure date.  
Statement Selected

## 2023-05-22 NOTE — PROGRESS NOTES
Subjective:    Patient ID:  Adela Garay is a 89 y.o. female who presents for follow-up of Valvular Heart Disease      HPI     Severe AS   Chronic A-fib - rate controlled on coumadin, HTN, DM, Hx TIA    Echo 6/7/17    1 - Normal left ventricular systolic function (EF 55-60%).     2 - Eccentric hypertrophy.     3 - Biatrial enlargement.     4 - Restrictive LV filling pattern, indicating markedly elevated LAP (grade 3 diastolic dysfunction).     5 - Right ventricular enlargement with moderately depressed systolic function.     6 - Pulmonary hypertension. The estimated PA systolic pressure is 43 mmHg.     7 - Severe aortic stenosis, DILAN = 0.53 cm2, peak velocity = 4.86 m/s, mean gradient = 62 mmHg.     8 - Mild mitral regurgitation.     9 - Severe tricuspid regurgitation.     10 - Trivial pulmonic regurgitation.     11 - Trivial pericardial effusion.     Saw Dr Campbell 7/10/17  Adela Garay is a 89 y.o. female referred by Dr Ivy for evaluation of severe AS (NYHA Class II sx).  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 Comorbidities include DM, AF on coumadin, TIA, severe TR, PA systolic pressure is 43 mmHg and HTN. In the past she has been evaluated at [facility] by Dr Briseno and then by us 6/6/2016 for TAVR but she was not symptomatic at that time and hence further workup was differed. She is now symptomatic and is short of breath on walking in the back yard which is new, also she gets up at night because she is short of breath. No chest pain. Her daughter is with her today.      The patient has undergone the following TAVR work-up:   · ECHO (Date 6/7/2017): DILAN= 0.53 cm2, MG= 62 mmHg, Peak Jose Elias= 4.86 m/s, EF= 60%.   · LHC : pending  · STS: 5.3%   · Frailty: 2/4 (walk and  strength)  · Iliacs are pending  · LVOT area by CTA is pending  · Incidental findings on CT: is pending  · CT Surgery risk assessment: high risk, per Dr Lamb due to age and comorbidities (note from 6/7/2016 by Dr Lamb)  · Rhythm issues: AF  with nonspecific intraventricular block  · PFTs: pending    Staff:  I have personally taken the history and examined this patient and agree with the fellow's note as stated above and amended it accordingly :-) This delightful lady is not sure if she wants to have TAVR evaluation.   If she decides to proceed with TAVR evaluation will do cath and possible PCI, PFT's, CTA, and have her see CT surgery.    She is scheduled for a [facility] next month followed by TAVR eventually  ESTRADA stable      Review of Systems   Constitution: Negative for decreased appetite.   HENT: Negative for ear discharge.    Eyes: Negative for blurred vision.   Respiratory: Negative for hemoptysis.    Endocrine: Negative for polyphagia.   Hematologic/Lymphatic: Negative for adenopathy.   Skin: Negative for color change.   Musculoskeletal: Negative for joint swelling.   Neurological: Negative for brief paralysis.   Psychiatric/Behavioral: Negative for hallucinations.        Objective:    Physical Exam   Constitutional: She is oriented to person, place, and time. She appears well-developed and well-nourished.   HENT:   Head: Normocephalic and atraumatic.   Eyes: Conjunctivae are normal. Pupils are equal, round, and reactive to light.   Neck: Normal range of motion. Neck supple.   Cardiovascular: Normal rate and intact distal pulses.  An irregularly irregular rhythm present.   Murmur heard.   Harsh midsystolic murmur is present with a grade of 2/6  at the upper right sternal border radiating to the neck  Pulmonary/Chest: Effort normal and breath sounds normal.   Abdominal: Soft. Bowel sounds are normal.   Musculoskeletal: Normal range of motion. She exhibits edema.   Neurological: She is alert and oriented to person, place, and time.   Skin: Skin is warm and dry.         Assessment:       1. Pulmonary hypertension    2. Coronary artery disease involving native coronary artery of native heart without angina pectoris    3. Severe aortic stenosis by prior  echocardiogram    4. Benign hypertensive heart disease with congestive heart failure    5. Carotid artery disease, unspecified laterality    6. Paroxysmal atrial fibrillation         Plan:       Agree with plans for LHC and possible TAVR  OV here 2 months             Detail Level: Detailed Initiate Treatment: Claravis 30 mg BID Discontinue Regimen: Claravis 20 mg bid

## 2023-07-11 NOTE — NURSING
Patient discharged to home with hospice. Transported to home via stretcher/ambulance. Patient diaz in place per family request.   Spoke with Flor from hospice and gave her report on patient.   All personal belongings with patient daughter at the time of discharge.    [No Edema] : there was no peripheral edema [No CVA Tenderness] : no CVA  tenderness [No Spinal Tenderness] : no spinal tenderness [No Rash] : no rash [Coordination Grossly Intact] : coordination grossly intact [Normal Gait] : normal gait [Normal] : no jugular venous distention, supple, no lymphadenopathy and the thyroid was normal and there were no nodules present [de-identified] : both eyes are swollen , slightly red.

## 2024-06-04 NOTE — TELEPHONE ENCOUNTER
Palliative care, Dr. Gardner, called stating that patient is having acute mental status changes and confusion. Call routed to Dr. Farrell for orders and recommendations.   
Spoke with nurse from palliative care program.  Patient will go in 1 hr to check BMP for sodium level and give specimen for urine culture at the allergy is clinic.  Palliative care nurse will go out to see patient this afternoon.  She will call me with results.  Patient will go to the emergency room for any acute changes in her medical status.  
independent

## 2024-08-19 NOTE — PLAN OF CARE
Please call patient's  to give him instructions for her insulin pump the night before surgery.    The night before her surgery when she is to stop eating and drinking, they will put her pump and attempt target.    To do this, they will go under smart card (shield/ police badge symbol).  Choose temp target  Set time for 24 hours      This setting change will turn off her auto corrections and increase her blood sugar target to 150 to prevent low blood sugar while she is NPO.  It will automatically go back to her previous settings after 24 hours.  If they require assistance, please have them reach out to the [employer] helpline or they can  come in the day before the surgery to meet with Grecia to assist with that process as well.    Patient has dementia and  assists her with her pump.    Problem: Physical Therapy Goal  Goal: Physical Therapy Goal  Goals to be met by: 2017    Patient will increase functional independence with mobility by performin. Sit to stand transfer with Modified [place] using single point cane - not met  2. Gait  x 400 feet with Modified [place] using Single-point Cane . - not met  3. Lower extremity exercise program x15 reps per handout, with independence - not met      Outcome: Ongoing (interventions implemented as appropriate)  Goals reviewed and remain appropriate. Pt progressing towards goals.    Laina Garay, PT, DPT   11/10/2017  [phone]
